# Patient Record
Sex: MALE | Race: WHITE | NOT HISPANIC OR LATINO | Employment: OTHER | URBAN - METROPOLITAN AREA
[De-identification: names, ages, dates, MRNs, and addresses within clinical notes are randomized per-mention and may not be internally consistent; named-entity substitution may affect disease eponyms.]

---

## 2017-02-08 NOTE — PROGRESS NOTES
SUBJECTIVE:                                                    Bucky Edgar is a 61 year old male who presents to clinic today for the following health issues:      Hypertension Follow-up      Outpatient blood pressures are being checked at home.  Results are 160-165/110.    Low Salt Diet: low salt. States daughter brings him low salt options, fruits and vegetables.     Has started taking nicotine lozenges-has taken about 10 lozenges daily for the last 7 days.  is helping with cigarette cravings (only smoking 3-4 per day instead of a pack per day)     Notes feeling anxious and jittery     Denies headaches, blurry vision, chest pain    Patient was previously on lisinopril 60 mg, coreg 25 mg, and chlorthalidone 12.5 mg. He was taken off these medications in October. He had fall at which point he has systolic BPs in 90s to 100s. At that time he had acute kidney injury with creatinine of 4.87. On recheck in clinic on 10/19 post hospitalization, creatinine was 1.21. He was not restarted on his blood pressure medications post hospitalization as blood pressures had remained within normal range. In addition, patient had stopped drinking.      COPD Follow-Up    Symptoms are currently: improved    Current fatigue or dyspnea with ambulation: none    Shortness of breath: stable    Increased or change in Cough/Sputum: No    Fever(s): No    Baseline ambulation without stopping to rest Not stopping to rest in the winter. Able to shovel snow without shortness of breath. No stairs in house so he is unsure how many flights of stairs without stopping to rest.    Any ER/UC or hospital admissions since your last visit? No     Hasn't used albuterol inhaler about a year.     History   Smoking status     Current Every Day Smoker -- 0.50 packs/day for 38 years     Types: Cigarettes   Smokeless tobacco     Never Used     Comment: Quit for 10 years, now smoked for 10 years. Max 2 PPD i the past     FEV1        2.89    11/27/2013  [id number]      60%   11/27/2013       Amount of exercise or physical activity: None    Problems taking medications regularly: No    Medication side effects: none    Diet: regular (no restrictions)    Patient also complains of left shoulder pain. He notes this started around the same time as the fall. States it is difficult to move arm and is having trouble picking things up. He did not have a shoulder x-ray done at the time of his fall.     Problem list and histories reviewed & adjusted, as indicated.  Additional history: as documented    Patient Active Problem List   Diagnosis     Displacement of lumbar intervertebral disc without myelopathy     Tobacco use disorder     CARDIOVASCULAR SCREENING; LDL GOAL LESS THAN 130     HTN, goal below 140/90     Advanced directives, counseling/discussion     COPD (chronic obstructive pulmonary disease) (H)     Impaired fasting glucose     Hypertriglyceridemia     Vitamin D deficiency     Chronic bronchitis with COPD (chronic obstructive pulmonary disease) (H)     Closed fracture of multiple ribs of left side with routine healing, subsequent encounter     Past Surgical History   Procedure Laterality Date     C decompress spinal cord,1 seg  1995, 4/2002     leftL4-L5, 2nd right L4-L5     Hc repair rotator cuff,chronic  1989     C spine fusion,anter,3 sgmts  2/9/2004     ant and post fusion L4-S1       Social History   Substance Use Topics     Smoking status: Current Some Day Smoker     Packs/day: 0.50     Years: 38.00     Types: Cigarettes     Smokeless tobacco: Never Used      Comment: Quit for 10 years, now smoked for 10 years. Max 2 PPD i the past     Alcohol use 0.0 oz/week     0 Standard drinks or equivalent per week      Comment: 2 beers a week     Family History   Problem Relation Age of Onset     Family History Negative Other      DIABETES No family hx of      Coronary Artery Disease No family hx of      Hypertension No family hx of      Hyperlipidemia No family  hx of      Breast Cancer No family hx of      Prostate Cancer No family hx of      Anxiety Disorder No family hx of      Substance Abuse No family hx of      Asthma No family hx of      Thyroid Disease No family hx of      Unknown/Adopted No family hx of      Genetic Disorder No family hx of      OSTEOPOROSIS No family hx of      Anesthesia Reaction No family hx of      MENTAL ILLNESS No family hx of      Depression No family hx of      Other Cancer No family hx of      Colon Cancer No family hx of      CEREBROVASCULAR DISEASE No family hx of      Obesity No family hx of          Current Outpatient Prescriptions   Medication Sig Dispense Refill     nicotine polacrilex (NICORETTE) 2 MG lozenge Place 2 mg inside cheek every hour as needed for smoking cessation       lisinopril (PRINIVIL/ZESTRIL) 40 MG tablet Take 1 tablet (40 mg) by mouth daily 30 tablet 1     acetaminophen (TYLENOL) 325 MG tablet Take 650 mg by mouth every 6 hours       albuterol (PROAIR HFA, PROVENTIL HFA, VENTOLIN HFA) 108 (90 BASE) MCG/ACT inhaler Inhale 2 puffs into the lungs every 6 hours as needed for shortness of breath / dyspnea 3 Inhaler 2     Allergies   Allergen Reactions     Chlorthalidone Other (See Comments)     Excessive lowering of blood pressure     Amlodipine Other (See Comments)     Intractable headache with use of medication as well as noting a small tremor of the upper extremities       ROS:  C: NEGATIVE for fever, chills, change in weight  E/M: NEGATIVE for ear, mouth and throat problems  R: NEGATIVE for significant cough or SOB  CV: NEGATIVE for chest pain, palpitations or peripheral edema  GI: NEGATIVE for nausea, abdominal pain, heartburn, or change in bowel habits  : negative for dysuria, hematuria, decreased urinary stream  MUSCULOSKELETAL: POSITIVE for left shoulder pain, decreased range of motion.   PSYCHIATRIC: NEGATIVE for changes in mood or affect    OBJECTIVE:                                                   "  Initial /110. Recheck BP (!) 170/116 (BP Location: Left arm)  Pulse 74  Temp 98.7  F (37.1  C) (Temporal)  Resp 10  Ht 5' 9.69\" (1.77 m)  Wt 207 lb 3.2 oz (94 kg)  SpO2 97%  BMI 30 kg/m2  Body mass index is 30 kg/(m^2).  GENERAL: healthy, alert and no distress  NECK: no adenopathy, no asymmetry, masses, or scars and thyroid normal to palpation  RESP: lungs clear to auscultation - no rales, rhonchi or wheezes  CV: regular rate and rhythm, normal S1 S2, no S3 or S4, no murmur, click or rub, no peripheral edema and peripheral pulses strong. No carotid bruits.   ABDOMEN: soft, nontender, no hepatosplenomegaly, no masses and bowel sounds normal  MS: no gross musculoskeletal defects noted, no edema, left shoulder decreased range of motion-flexion to 90 degrees. Unable to internally rotate shoulder.  NEURO: Normal strength and tone, mentation intact and speech normal    Left Shoulder X-ray  HISTORY: Pain in left shoulder.     COMPARISON: None.      FINDINGS: There is no fracture. The humeral head is well located  within the glenoid fossa. There are mild-to-moderate degenerative  changes of the acromioclavicular joint with minimal superior and  inferior hypertrophic change. Coracoclavicular and glenohumeral spaces  are well-maintained. Visualized portions of the adjacent lung are  clear.          IMPRESSION: Mild to moderate degenerative changes of the left  acromioclavicular joint. No other significant abnormalities are  identified. If there is clinical concern for rotator cuff pathology,  further evaluation with MRI may be helpful.       ASSESSMENT/PLAN:                                                        ICD-10-CM    1. Essential hypertension I10 lisinopril (PRINIVIL/ZESTRIL) 40 MG tablet     Basic metabolic panel   2. Encounter for fecal immunochemical test screening Z12.11    3. Screening for colon cancer Z12.11 Fecal colorectal cancer screen FIT   4. Left shoulder pain, unspecified chronicity M25.512 ORTHO "  REFERRAL     XR Shoulder Left G/E 3 Views     Starting patient on 40 mg lisinopril. He was previously on a higher dose of lisinopril as well as coreg.Blood pressure was significantly elevated today at 170/110 on initial check and 170/116 on recheck. Discussed with patient if he had symptoms such as dizziness that he should only take 20 mg (half tablet).    Patient to return to clinic in 1 week for follow up with provider. He will need BP check at this time. Will order BMP to check kidney function.     Patient due for FIT for colon cancer screening. Will order this for patient.    Shoulder x-ray was negative for fracture. MRI ordered to rule out rotator cuff injury. Referral placed for ortho consult.     I, Trish Marie RN, Student Nurse Practitioner, have seen and examined this patient with My Lipscomb CNP, TROY and am serving as scribe for this visit. She has reviewed and revised documentation as needed.     Trish Marie RN, Student Nurse Practitioner    TROY Martin CNP  [facility]

## 2017-02-14 ENCOUNTER — RADIANT APPOINTMENT (OUTPATIENT)
Dept: GENERAL RADIOLOGY | Facility: CLINIC | Age: 62
End: 2017-02-14
Attending: NURSE PRACTITIONER
Payer: COMMERCIAL

## 2017-02-14 ENCOUNTER — OFFICE VISIT (OUTPATIENT)
Dept: FAMILY MEDICINE | Facility: CLINIC | Age: 62
End: 2017-02-14
Payer: COMMERCIAL

## 2017-02-14 VITALS
HEART RATE: 74 BPM | SYSTOLIC BLOOD PRESSURE: 170 MMHG | TEMPERATURE: 98.7 F | RESPIRATION RATE: 10 BRPM | HEIGHT: 70 IN | WEIGHT: 207.2 LBS | DIASTOLIC BLOOD PRESSURE: 116 MMHG | OXYGEN SATURATION: 97 % | BODY MASS INDEX: 29.66 KG/M2

## 2017-02-14 DIAGNOSIS — M25.512 LEFT SHOULDER PAIN, UNSPECIFIED CHRONICITY: ICD-10-CM

## 2017-02-14 DIAGNOSIS — Z12.11 ENCOUNTER FOR FECAL IMMUNOCHEMICAL TEST SCREENING: ICD-10-CM

## 2017-02-14 DIAGNOSIS — Z12.11 SCREENING FOR COLON CANCER: ICD-10-CM

## 2017-02-14 DIAGNOSIS — I10 ESSENTIAL HYPERTENSION: Primary | ICD-10-CM

## 2017-02-14 LAB
ANION GAP SERPL CALCULATED.3IONS-SCNC: 9 MMOL/L (ref 3–14)
BUN SERPL-MCNC: 9 MG/DL (ref 7–30)
CALCIUM SERPL-MCNC: 9.2 MG/DL (ref 8.5–10.1)
CHLORIDE SERPL-SCNC: 105 MMOL/L (ref 94–109)
CO2 SERPL-SCNC: 29 MMOL/L (ref 20–32)
CREAT SERPL-MCNC: 0.97 MG/DL (ref 0.66–1.25)
GFR SERPL CREATININE-BSD FRML MDRD: 79 ML/MIN/1.7M2
GLUCOSE SERPL-MCNC: 86 MG/DL (ref 70–99)
POTASSIUM SERPL-SCNC: 4.1 MMOL/L (ref 3.4–5.3)
SODIUM SERPL-SCNC: 143 MMOL/L (ref 133–144)

## 2017-02-14 PROCEDURE — 73030 X-RAY EXAM OF SHOULDER: CPT | Mod: LT

## 2017-02-14 PROCEDURE — 36415 COLL VENOUS BLD VENIPUNCTURE: CPT | Performed by: NURSE PRACTITIONER

## 2017-02-14 PROCEDURE — 99214 OFFICE O/P EST MOD 30 MIN: CPT | Performed by: NURSE PRACTITIONER

## 2017-02-14 PROCEDURE — 80048 BASIC METABOLIC PNL TOTAL CA: CPT | Performed by: NURSE PRACTITIONER

## 2017-02-14 RX ORDER — LISINOPRIL 40 MG/1
40 TABLET ORAL DAILY
Qty: 30 TABLET | Refills: 1 | Status: SHIPPED | OUTPATIENT
Start: 2017-02-14 | End: 2017-03-09

## 2017-02-14 RX ORDER — POLYETHYLENE GLYCOL 3350 17 G
2 POWDER IN PACKET (EA) ORAL
COMMUNITY
End: 2021-02-18

## 2017-02-14 ASSESSMENT — PAIN SCALES - GENERAL: PAINLEVEL: MODERATE PAIN (5)

## 2017-02-14 NOTE — MR AVS SNAPSHOT
After Visit Summary   2/14/2017    Bucky Edgar    MRN: [id number]           Patient Information     Date Of Birth          1955        Visit Information        Provider Department      2/14/2017 11:00 AM My Lipscomb APRN CNP [facility]        Today's Diagnoses     Essential hypertension    -  1    Encounter for fecal immunochemical test screening        Screening for colon cancer        Left shoulder pain, unspecified chronicity           Follow-ups after your visit        Additional Services     ORTHO  REFERRAL       [facility] is referring you to the Orthopedic  Services at [facility].       The  Representative will assist you in the coordination of your Orthopedic and Musculoskeletal Care as prescribed by your physician.    The  Representative will call you within 1 business day to help schedule your appointment, or you may contact the  Representative at:    All areas ~ [phone]     Type of Referral : Surgical / Specialist       Timeframe requested: Dr. Martinez, this week if able at [place]    Coverage of these services is subject to the terms and limitations of your health insurance plan.  Please call member services at your health plan with any benefit or coverage questions.      If X-rays, CT or MRI's have been performed, please contact the facility where they were done to arrange for , prior to your scheduled appointment.  Please bring this referral request to your appointment and present it to your specialist.                  Your next 10 appointments already scheduled     Feb 14, 2017 11:35 AM CST   XR SHOULDER LEFT G/E 2 VIEWS with RGXR1   [facility] ([facility])    [address], [address]  [place] [address] [phone]           Please bring a list of your current medicines to your exam. (Include vitamins, minerals and  "over-thecounter medicines.) Leave your valuables at home.  Tell your doctor if there is a chance you may be pregnant.  You do not need to do anything special for this exam.              Future tests that were ordered for you today     Open Future Orders        Priority Expected Expires Ordered    Fecal colorectal cancer screen FIT Routine 3/7/2017 2017 2017            Who to contact     If you have questions or need follow up information about today's clinic visit or your schedule please contact [facility] [place] directly at [phone].  Normal or non-critical lab and imaging results will be communicated to you by FoodFanhart, letter or phone within 4 business days after the clinic has received the results. If you do not hear from us within 7 days, please contact the clinic through [employer] or phone. If you have a critical or abnormal lab result, we will notify you by phone as soon as possible.  Submit refill requests through [employer] or call your pharmacy and they will forward the refill request to us. Please allow 3 business days for your refill to be completed.          Additional Information About Your Visit        [employer] Information     [employer] lets you send messages to your doctor, view your test results, renew your prescriptions, schedule appointments and more. To sign up, go to [email or web address] . Click on \"Log in\" on the left side of the screen, which will take you to the Welcome page. Then click on \"Sign up Now\" on the right side of the page.     You will be asked to enter the access code listed below, as well as some personal information. Please follow the directions to create your username and password.     Your access code is: [email or web address]  Expires: 3/12/2017 10:48 AM     Your access code will  in 90 days. If you need help or a new code, please call your [place] clinic or [phone].        Care EveryWhere ID     This is your Care EveryWhere ID. This could be used by other " "organizations to access your [facility] medical records  [id number]        Your Vitals Were     Pulse Temperature Respirations Height Pulse Oximetry BMI (Body Mass Index)    74 98.7  F (37.1  C) (Temporal) 10 5' 9.69\" (1.77 m) 97% 30 kg/m2       Blood Pressure from Last 3 Encounters:   02/14/17 (!) 170/110   12/12/16 138/86   11/29/16 110/78    Weight from Last 3 Encounters:   02/14/17 207 lb 3.2 oz (94 kg)   12/12/16 201 lb 8 oz (91.4 kg)   11/29/16 193 lb 8 oz (87.8 kg)              We Performed the Following     Basic metabolic panel     ORTHO  REFERRAL          Today's Medication Changes          These changes are accurate as of: 2/14/17 11:32 AM.  If you have any questions, ask your nurse or doctor.               Start taking these medicines.        Dose/Directions    lisinopril 40 MG tablet   Commonly known as:  PRINIVIL/ZESTRIL   Used for:  Essential hypertension   Started by:  My Lipscomb APRN CNP        Dose:  40 mg   Take 1 tablet (40 mg) by mouth daily   Quantity:  30 tablet   Refills:  1         Stop taking these medicines if you haven't already. Please contact your care team if you have questions.     hydrOXYzine 25 MG capsule   Commonly known as:  VISTARIL   Stopped by:  My Lipscomb APRN CNP           oxyCODONE 5 MG IR tablet   Commonly known as:  ROXICODONE   Stopped by:  My Lipscomb APRN CNP           polyethylene glycol Packet   Commonly known as:  MIRALAX/GLYCOLAX   Stopped by:  My Lipscomb APRN CNP           senna-docusate 8.6-50 MG per tablet   Commonly known as:  SENOKOT-S;PERICOLACE   Stopped by:  My Lipscomb APRN CNP                Where to get your medicines      These medications were sent to [facility] [place] - [address]  [address] [place] [address]    Hours:  test fax sent successfully 1/20/04   Phone:  [phone]     lisinopril 40 MG tablet                Primary Care Provider Office Phone # " Fax #    Percy Deshpande [phone]       [facility] [place] [address]  [place] [address]        Thank you!     Thank you for choosing [facility] [place]  for your care. Our goal is always to provide you with excellent care. Hearing back from our patients is one way we can continue to improve our services. Please take a few minutes to complete the written survey that you may receive in the mail after your visit with us. Thank you!             Your Updated Medication List - Protect others around you: Learn how to safely use, store and throw away your medicines at www.disposemymeds.org.          This list is accurate as of: 2/14/17 11:32 AM.  Always use your most recent med list.                   Brand Name Dispense Instructions for use    acetaminophen 325 MG tablet    TYLENOL     Take 650 mg by mouth every 6 hours       albuterol 108 (90 BASE) MCG/ACT Inhaler    PROAIR HFA/PROVENTIL HFA/VENTOLIN HFA    3 Inhaler    Inhale 2 puffs into the lungs every 6 hours as needed for shortness of breath / dyspnea       lisinopril 40 MG tablet    PRINIVIL/ZESTRIL    30 tablet    Take 1 tablet (40 mg) by mouth daily       NICORETTE 2 MG lozenge   Generic drug:  nicotine polacrilex      Place 2 mg inside cheek every hour as needed for smoking cessation

## 2017-02-15 ENCOUNTER — TELEPHONE (OUTPATIENT)
Dept: FAMILY MEDICINE | Facility: CLINIC | Age: 62
End: 2017-02-15

## 2017-02-15 NOTE — TELEPHONE ENCOUNTER
Notes Recorded by Alexandro Sunshine on 2/15/2017 at 8:44 AM  Unable to reach pt, busy signal. Will have to try again .    ------    Notes Recorded by My Lipscomb APRN CNP on 2/14/2017 at 5:00 PM  Labs are normal. Please notify patient. My Lipscomb, DNP

## 2017-02-20 NOTE — PROGRESS NOTES
"  SUBJECTIVE:                                                    Bucky Edgar is a 61 year old male who presents to clinic today for the following health issues:    Hypertension Follow-up      Outpatient blood pressures are being checked at home.  Results are 165/110.    Low Salt Diet: no added salt       Amount of exercise or physical activity: 4-5 days/week for an average of 30-45 minutes    Problems taking medications regularly: No    Medication side effects: none  Diet: low salt    COPD Follow-Up    Symptoms are currently: stable    Current fatigue or dyspnea with ambulation: none    Shortness of breath: stable, only with walk because pt walk 1 mile a day when the weather is nice     Increased or change in Cough/Sputum: No    Fever(s): No    Baseline ambulation without stopping to rest 1 feet, miles. Able to walk up \"don't know\" flights of stairs without stopping to rest.    Any ER/UC or hospital admissions since your last visit? No     History   Smoking Status     Current Some Day Smoker     Packs/day: 0.50     Years: 38.00     Types: Cigarettes   Smokeless Tobacco     Never Used     Comment: Quit for 10 years, now smoked for 10 years. Max 2 PPD i the past     Lab Results   Component Value Date    FEV1 2.89 11/27/2013    [id number] 60% 11/27/2013          Patient reports his BP remains elevated. His at home reading are around 165/110. He states he is not able to tell that his BP is elevated. Patient denies dizziness.     Problem list and histories reviewed & adjusted, as indicated.  Additional history: as documented    Patient Active Problem List   Diagnosis     Displacement of lumbar intervertebral disc without myelopathy     Tobacco use disorder     CARDIOVASCULAR SCREENING; LDL GOAL LESS THAN 130     HTN, goal below 140/90     Advanced directives, counseling/discussion     COPD (chronic obstructive pulmonary disease) (H)     Impaired fasting glucose     Hypertriglyceridemia     Vitamin D deficiency     " Chronic bronchitis with COPD (chronic obstructive pulmonary disease) (H)     Closed fracture of multiple ribs of left side with routine healing, subsequent encounter     Chronic pain due to trauma     Past Surgical History   Procedure Laterality Date     C decompress spinal cord,1 seg  1995, 4/2002     leftL4-L5, 2nd right L4-L5     Hc repair rotator cuff,chronic  1989     C spine fusion,anter,3 sgmts  2/9/2004     ant and post fusion L4-S1       Social History   Substance Use Topics     Smoking status: Current Some Day Smoker     Packs/day: 0.50     Years: 38.00     Types: Cigarettes     Smokeless tobacco: Never Used      Comment: Quit for 10 years, now smoked for 10 years. Max 2 PPD i the past     Alcohol use 0.0 oz/week     0 Standard drinks or equivalent per week      Comment: 2 beers a week     Family History   Problem Relation Age of Onset     Family History Negative Other      DIABETES No family hx of      Coronary Artery Disease No family hx of      Hypertension No family hx of      Hyperlipidemia No family hx of      Breast Cancer No family hx of      Prostate Cancer No family hx of      Anxiety Disorder No family hx of      Substance Abuse No family hx of      Asthma No family hx of      Thyroid Disease No family hx of      Unknown/Adopted No family hx of      Genetic Disorder No family hx of      OSTEOPOROSIS No family hx of      Anesthesia Reaction No family hx of      MENTAL ILLNESS No family hx of      Depression No family hx of      Other Cancer No family hx of      Colon Cancer No family hx of      CEREBROVASCULAR DISEASE No family hx of      Obesity No family hx of          Current Outpatient Prescriptions   Medication Sig Dispense Refill     carvedilol (COREG CR) 20 MG 24 hr capsule Take 1 capsule (20 mg) by mouth daily 30 capsule 1     nicotine polacrilex (NICORETTE) 2 MG lozenge Place 2 mg inside cheek every hour as needed for smoking cessation       lisinopril (PRINIVIL/ZESTRIL) 40 MG tablet  "Take 1 tablet (40 mg) by mouth daily 30 tablet 1     acetaminophen (TYLENOL) 325 MG tablet Take 650 mg by mouth every 6 hours       albuterol (PROAIR HFA, PROVENTIL HFA, VENTOLIN HFA) 108 (90 BASE) MCG/ACT inhaler Inhale 2 puffs into the lungs every 6 hours as needed for shortness of breath / dyspnea (Patient not taking: Reported on 2/23/2017) 3 Inhaler 2     Problem list, Medication list, Allergies, and Medical/Social/Surgical histories reviewed in [facility] and updated as appropriate.    ROS:  Constitutional, HEENT, cardiovascular, pulmonary, gi and gu systems are negative, except as otherwise noted.    This document serves as a record of the services and decisions personally performed and made by My Lipscomb DNP. It was created on her behalf by Cyndy Gardner, a trained medical scribe. The creation of this document is based on the provider's statements to the medical scribe.  Cyndy Gardner 12:13 PM February 23, 2017    OBJECTIVE:                                                    BP (!) 160/102  Pulse 88  Temp 99  F (37.2  C) (Temporal)  Resp 16  Ht 5' 9\" (1.753 m)  Wt 206 lb (93.4 kg)  BMI 30.42 kg/m2  Body mass index is 30.42 kg/(m^2).  GENERAL APPEARANCE: healthy, alert and no distress  RESP: lungs clear to auscultation - no rales, rhonchi or wheezes  CV: regular rates and rhythm, normal S1 S2, no S3 or S4 and no murmur, click or rub  NEURO: Normal strength and tone, mentation intact and speech normal  PSYCH: mentation appears normal and affect normal/bright    Diagnostic test results:  No results found for this or any previous visit (from the past 24 hour(s)).     ASSESSMENT/PLAN:                                                        ICD-10-CM    1. Essential hypertension I10 carvedilol (COREG CR) 20 MG 24 hr capsule   2. Chronic pain due to trauma G89.21        Adding long acting coreg to HTN medication regimen. Order placed for carvedilol. Medication direction, dosage, and side effects discussed with " patient. Getting close to pre-accident medication regimen, suspect will get back to similar dosing.     Left should pain is improving so he is holding off on MRI. Pt has been doing stretches at home.     Follow up with: Nurse in 2 weeks     The information in this document, created by the medical scribe for me, accurately reflects the services I personally performed and the decisions made by me. I have reviewed and approved this document for accuracy prior to leaving the patient care area.  February 23, 2017 12:14 PM    TROY Martin [facility]

## 2017-02-23 ENCOUNTER — OFFICE VISIT (OUTPATIENT)
Dept: FAMILY MEDICINE | Facility: CLINIC | Age: 62
End: 2017-02-23
Payer: COMMERCIAL

## 2017-02-23 DIAGNOSIS — Z12.11 SCREENING FOR COLON CANCER: ICD-10-CM

## 2017-02-23 DIAGNOSIS — I10 ESSENTIAL HYPERTENSION: Primary | ICD-10-CM

## 2017-02-23 DIAGNOSIS — G89.21 CHRONIC PAIN DUE TO TRAUMA: ICD-10-CM

## 2017-02-23 PROCEDURE — 82274 ASSAY TEST FOR BLOOD FECAL: CPT | Performed by: NURSE PRACTITIONER

## 2017-02-23 PROCEDURE — 99213 OFFICE O/P EST LOW 20 MIN: CPT | Performed by: NURSE PRACTITIONER

## 2017-02-23 RX ORDER — CARVEDILOL PHOSPHATE 20 MG/1
20 CAPSULE, EXTENDED RELEASE ORAL DAILY
Qty: 30 CAPSULE | Refills: 1 | Status: SHIPPED | OUTPATIENT
Start: 2017-02-23 | End: 2017-05-17

## 2017-02-23 ASSESSMENT — PAIN SCALES - GENERAL: PAINLEVEL: SEVERE PAIN (7)

## 2017-02-23 NOTE — NURSING NOTE
"Chief Complaint   Patient presents with     Hypertension     Panel Management     MyChart, FIT, Tobacco Cessation       Initial BP (!) 164/108 (BP Location: Left arm, Cuff Size: Adult Regular)  Pulse 88  Temp 99  F (37.2  C) (Temporal)  Resp 16  Ht 5' 9\" (1.753 m)  Wt 206 lb (93.4 kg)  BMI 30.42 kg/m2 Estimated body mass index is 30.42 kg/(m^2) as calculated from the following:    Height as of this encounter: 5' 9\" (1.753 m).    Weight as of this encounter: 206 lb (93.4 kg).  Medication Reconciliation: complete     Alexandro Sunshine MA    "

## 2017-02-23 NOTE — MR AVS SNAPSHOT
"              After Visit Summary   2/23/2017    Bucky Edgar    MRN: [id number]           Patient Information     Date Of Birth          1955        Visit Information        Provider Department      2/23/2017 12:00 PM My Lipscomb, APRN CNP [facility]        Today's Diagnoses     Essential hypertension    -  1    Chronic pain due to trauma           Follow-ups after your visit        Your next 10 appointments already scheduled     Mar 09, 2017 11:00 AM CST   [facility]    [address], [address]  [facility] [id number] [phone]              Who to contact     If you have questions or need follow up information about today's clinic visit or your schedule please contact [facility] directly at [phone].  Normal or non-critical lab and imaging results will be communicated to you by Access System[employer], letter or phone within 4 business days after the clinic has received the results. If you do not hear from us within 7 days, please contact the clinic through Access System[employer]art or phone. If you have a critical or abnormal lab result, we will notify you by phone as soon as possible.  Submit refill requests through [employer] or call your pharmacy and they will forward the refill request to us. Please allow 3 business days for your refill to be completed.          Additional Information About Your Visit        MyChart Information     [employer] lets you send messages to your doctor, view your test results, renew your prescriptions, schedule appointments and more. To sign up, go to [email or web address] . Click on \"Log in\" on the left side of the screen, which will take you to the Welcome page. Then click on \"Sign up Now\" on the right side of the page.     You will be asked to enter the access code listed below, as well as some personal information. Please follow the directions to create your username and password.     Your access " "code is: [email or web address]  Expires: 3/12/2017 10:48 AM     Your access code will  in 90 days. If you need help or a new code, please call your [place] clinic or [phone].        Care EveryWhere ID     This is your Care EveryWhere ID. This could be used by other organizations to access your [place] medical records  [id number]        Your Vitals Were     Pulse Temperature Respirations Height BMI (Body Mass Index)       88 99  F (37.2  C) (Temporal) 16 5' 9\" (1.753 m) 30.42 kg/m2        Blood Pressure from Last 3 Encounters:   17 (!) 160/102   17 (!) 170/116   16 138/86    Weight from Last 3 Encounters:   17 206 lb (93.4 kg)   17 207 lb 3.2 oz (94 kg)   16 201 lb 8 oz (91.4 kg)              Today, you had the following     No orders found for display         Today's Medication Changes          These changes are accurate as of: 17 11:59 PM.  If you have any questions, ask your nurse or doctor.               Start taking these medicines.        Dose/Directions    carvedilol 20 MG 24 hr capsule   Commonly known as:  COREG CR   Used for:  Essential hypertension   Started by:  My Lipscomb APRN CNP        Dose:  20 mg   Take 1 capsule (20 mg) by mouth daily   Quantity:  30 capsule   Refills:  1            Where to get your medicines      These medications were sent to [facility] [address]  [place] - [address]  [address], [place] [address]    Hours:  test fax sent successfully 04   Phone:  [phone]     carvedilol 20 MG 24 hr capsule                Primary Care Provider Office Phone # Fax #    Percy Deshpande [phone]       [facility] [place] [address]  [place] [address]        Thank you!     Thank you for choosing [facility]  for your care. Our goal is always to provide you with excellent care. Hearing back from our patients is one way we can continue to " improve our services. Please take a few minutes to complete the written survey that you may receive in the mail after your visit with us. Thank you!             Your Updated Medication List - Protect others around you: Learn how to safely use, store and throw away your medicines at www.disposemymeds.org.          This list is accurate as of: 2/23/17 11:59 PM.  Always use your most recent med list.                   Brand Name Dispense Instructions for use    acetaminophen 325 MG tablet    TYLENOL     Take 650 mg by mouth every 6 hours       albuterol 108 (90 BASE) MCG/ACT Inhaler    PROAIR HFA/PROVENTIL HFA/VENTOLIN HFA    3 Inhaler    Inhale 2 puffs into the lungs every 6 hours as needed for shortness of breath / dyspnea       carvedilol 20 MG 24 hr capsule    COREG CR    30 capsule    Take 1 capsule (20 mg) by mouth daily       lisinopril 40 MG tablet    PRINIVIL/ZESTRIL    30 tablet    Take 1 tablet (40 mg) by mouth daily       NICORETTE 2 MG lozenge   Generic drug:  nicotine polacrilex      Place 2 mg inside cheek every hour as needed for smoking cessation

## 2017-02-24 VITALS
TEMPERATURE: 99 F | WEIGHT: 206 LBS | HEIGHT: 69 IN | SYSTOLIC BLOOD PRESSURE: 160 MMHG | DIASTOLIC BLOOD PRESSURE: 102 MMHG | BODY MASS INDEX: 30.51 KG/M2 | RESPIRATION RATE: 16 BRPM | HEART RATE: 88 BPM

## 2017-02-25 LAB — HEMOCCULT STL QL IA: NEGATIVE

## 2017-02-27 ENCOUNTER — TELEPHONE (OUTPATIENT)
Dept: FAMILY MEDICINE | Facility: CLINIC | Age: 62
End: 2017-02-27

## 2017-02-27 DIAGNOSIS — I10 ESSENTIAL HYPERTENSION: Primary | ICD-10-CM

## 2017-02-27 RX ORDER — CARVEDILOL 25 MG/1
25 TABLET ORAL 2 TIMES DAILY WITH MEALS
Qty: 60 TABLET | Refills: 1 | Status: SHIPPED | OUTPATIENT
Start: 2017-02-27 | End: 2017-05-11

## 2017-02-27 NOTE — TELEPHONE ENCOUNTER
Prior Authorization request received from  [facility] for  carvedilol (COREG CR) 20 MG 24 hr capsule .     Insurance company   phone #   and member ID#  .    Would you like to proceed with prior authorization or change medication?     Rationale for PA request?    What medications has patient tried & failed?

## 2017-02-27 NOTE — TELEPHONE ENCOUNTER
Still titrating dose, will fill short acting until BP is controlled. Notified pt. Of dose change.   My Lipscomb

## 2017-03-09 ENCOUNTER — ALLIED HEALTH/NURSE VISIT (OUTPATIENT)
Dept: FAMILY MEDICINE | Facility: CLINIC | Age: 62
End: 2017-03-09
Payer: COMMERCIAL

## 2017-03-09 VITALS — HEART RATE: 72 BPM | DIASTOLIC BLOOD PRESSURE: 88 MMHG | SYSTOLIC BLOOD PRESSURE: 132 MMHG

## 2017-03-09 DIAGNOSIS — I10 ESSENTIAL HYPERTENSION: ICD-10-CM

## 2017-03-09 DIAGNOSIS — I10 HTN, GOAL BELOW 140/90: Primary | ICD-10-CM

## 2017-03-09 PROCEDURE — 99207 ZZC NO CHARGE NURSE ONLY: CPT

## 2017-03-09 RX ORDER — LISINOPRIL 40 MG/1
40 TABLET ORAL DAILY
Qty: 30 TABLET | Refills: 1 | Status: SHIPPED | OUTPATIENT
Start: 2017-03-09 | End: 2017-05-11

## 2017-03-09 NOTE — TELEPHONE ENCOUNTER
Lisinopril      Last Written Prescription Date: 2/14/2017  Last Fill Quantity: 30, # refills: 1  Last Office Visit with [facility], [facility] or [facility] prescribing provider: 2/23/2017  Next 5 appointments (look out 90 days)     Mar 09, 2017 11:00 AM CST   Nurse Only with RG FLOAT 1   [facility]    [address], [address]  [facility] [id number] [phone]                   Potassium   Date Value Ref Range Status   02/14/2017 4.1 3.4 - 5.3 mmol/L Final     Creatinine   Date Value Ref Range Status   02/14/2017 0.97 0.66 - 1.25 mg/dL Final     BP Readings from Last 3 Encounters:   02/24/17 (!) 160/102   02/14/17 (!) 170/116   12/12/16 138/86

## 2017-03-09 NOTE — MR AVS SNAPSHOT
"              After Visit Summary   3/9/2017    Bucky Edgar    MRN: [id number]           Patient Information     Date Of Birth          1955        Visit Information        Provider Department      3/9/2017 11:00 AM RG FLOAT 1 [facility] [place]        Today's Diagnoses     HTN, goal below 140/90    -  1       Follow-ups after your visit        Who to contact     If you have questions or need follow up information about today's clinic visit or your schedule please contact [facility] directly at [phone].  Normal or non-critical lab and imaging results will be communicated to you by [employer], letter or phone within 4 business days after the clinic has received the results. If you do not hear from us within 7 days, please contact the clinic through [employer] or phone. If you have a critical or abnormal lab result, we will notify you by phone as soon as possible.  Submit refill requests through [employer] or call your pharmacy and they will forward the refill request to us. Please allow 3 business days for your refill to be completed.          Additional Information About Your Visit        MyChart Information     [employer] lets you send messages to your doctor, view your test results, renew your prescriptions, schedule appointments and more. To sign up, go to [email or web address] . Click on \"Log in\" on the left side of the screen, which will take you to the Welcome page. Then click on \"Sign up Now\" on the right side of the page.     You will be asked to enter the access code listed below, as well as some personal information. Please follow the directions to create your username and password.     Your access code is: [email or web address]  Expires: 3/12/2017 10:48 AM     Your access code will  in 90 days. If you need help or a new code, please call your [facility] or [phone].        Care EveryWhere ID     This is your Care EveryWhere ID. This could be used by other organizations to " access your [place] medical records  [id number]        Your Vitals Were     Pulse                   72            Blood Pressure from Last 3 Encounters:   03/09/17 132/88   02/24/17 (!) 160/102   02/14/17 (!) 170/116    Weight from Last 3 Encounters:   02/23/17 206 lb (93.4 kg)   02/14/17 207 lb 3.2 oz (94 kg)   12/12/16 201 lb 8 oz (91.4 kg)              Today, you had the following     No orders found for display       Primary Care Provider Office Phone # Fax #    Percy Deshpande [phone]       [facility] [address] [address]        Thank you!     Thank you for choosing [facility]  for your care. Our goal is always to provide you with excellent care. Hearing back from our patients is one way we can continue to improve our services. Please take a few minutes to complete the written survey that you may receive in the mail after your visit with us. Thank you!             Your Updated Medication List - Protect others around you: Learn how to safely use, store and throw away your medicines at www.disposemymeds.org.          This list is accurate as of: 3/9/17 11:00 AM.  Always use your most recent med list.                   Brand Name Dispense Instructions for use    acetaminophen 325 MG tablet    TYLENOL     Take 650 mg by mouth every 6 hours       albuterol 108 (90 BASE) MCG/ACT Inhaler    PROAIR HFA/PROVENTIL HFA/VENTOLIN HFA    3 Inhaler    Inhale 2 puffs into the lungs every 6 hours as needed for shortness of breath / dyspnea       carvedilol 20 MG 24 hr capsule    COREG CR    30 capsule    Take 1 capsule (20 mg) by mouth daily       carvedilol 25 MG tablet    COREG    60 tablet    Take 1 tablet (25 mg) by mouth 2 times daily (with meals)       lisinopril 40 MG tablet    PRINIVIL/ZESTRIL    30 tablet    Take 1 tablet (40 mg) by mouth daily       NICORETTE 2 MG lozenge   Generic drug:  nicotine polacrilex      Place 2 mg inside cheek every hour  as needed for smoking cessation

## 2017-03-09 NOTE — NURSING NOTE
Bucky Edgar is a 61 year old male who comes in today for a Blood Pressure check because of ongoing blood pressure monitoring.    /88 (BP Location: Left arm, Patient Position: Supine, Cuff Size: Adult Large)  Pulse 72      Vitals as recorded, a large cuff was used.    Patient is taking medication as prescribed  Patient is tolerating medications well.  Patient is monitoring Blood Pressure at home.  Average readings if yes are 150s/100s    Current complaints: none    Disposition: results routed to MD/AP

## 2017-04-17 ENCOUNTER — TELEPHONE (OUTPATIENT)
Dept: FAMILY MEDICINE | Facility: CLINIC | Age: 62
End: 2017-04-17

## 2017-04-17 DIAGNOSIS — M10.9 ACUTE GOUTY ARTHRITIS: Primary | ICD-10-CM

## 2017-04-17 RX ORDER — METHYLPREDNISOLONE 4 MG
TABLET, DOSE PACK ORAL
Qty: 21 TABLET | Refills: 0 | Status: SHIPPED | OUTPATIENT
Start: 2017-04-17 | End: 2017-05-17

## 2017-04-17 NOTE — TELEPHONE ENCOUNTER
Reviewed--    1. Sent a prescription for Medrol Dose Pack 4 mg to help with the acute gouty attack.  2. To be seen if not improving in a few days.  3. Needing to discontinue all alcohol intake.    Percy Deshpande MD  Please close encounter when call/work completed.

## 2017-04-17 NOTE — TELEPHONE ENCOUNTER
"Bucky Edgar is a 61 year old male    S-(situation): Bucky is calling today with concerns of gout attack    B-(background): patient states he has had in past. Most recent labs for this done in December; at that time was mentioned no need to prophylactic treatment, but would continue to monitor. Last Uric Acid level was 7.1. Patient states last week went to a [employer] gathering and ate too much steak and drank too much beer and is now \"paying for it\"    A-(assessment): present 2 days. Left ankle and foot swollen. Red ankle. Hot to touch. Pain-can barely walk due to pain.     Pain scale (1-10): 7/10   Denies: Fever, sores, drainage, inability to walk/move, deformity, injury   Meds/MeasuresTried: Staying off as much as possible, elevating   Improves/Worsens: n/a       R-(recommendations): Home care advice - will route to provider for review. Will notify patient if able to send in or if needs to be seen.   Will comply with recommendation: YES     If further questions/concerns or if Sxs do not improve, worsen or new Sxs develop, call your PCP or [facility] as soon as possible.    Guideline used:  Telephone Triage Protocols for Nurses, Fourth Edition, Kira Marin  Foot problems  Gout Clinical References    My Manning RN, BSN      "

## 2017-05-12 NOTE — PROGRESS NOTES
SUBJECTIVE:                                                    Bucky Edgar is a 61 year old male who presents to clinic today for the following health issues:      Hypertension Follow-up--doing well on current medications      Outpatient blood pressures are being checked at home.  Results are 130-140/80.    Low Salt Diet: no added salt,      COPD Follow-Up    Symptoms are currently: stable    Current fatigue or dyspnea with ambulation: stable     Shortness of breath: stable    Increased or change in Cough/Sputum: No    Fever(s): No    Baseline ambulation without stopping to rest 1/2 miles. Able to walk up 8 flights of stairs without stopping to rest.    Any ER/UC or hospital admissions since your last visit? No     History   Smoking Status     Current Some Day Smoker     Packs/day: 0.50     Years: 38.00     Types: Cigarettes   Smokeless Tobacco     Never Used     Comment: Quit for 10 years, now smoked for 10 years. Max 2 PPD i the past     Lab Results   Component Value Date    FEV1 2.89 11/27/2013    MMA8UQQ 60% 11/27/2013          Amount of exercise or physical activity: 4-5 days/week for an average of 30-45 minutes    Problems taking medications regularly: No    Medication side effects: none    Diet: regular (no restrictions) and low salt          Problem list and histories reviewed & adjusted, as indicated.  Additional history: as documented        Reviewed and updated as needed this visit by clinical staff  Tobacco  Allergies  Med Hx  Surg Hx  Fam Hx  Soc Hx      Reviewed and updated as needed this visit by Provider         ROS:  C: NEGATIVE for fever, chills, change in weight  I: NEGATIVE for worrisome rashes, moles or lesions  E: NEGATIVE for vision changes or irritation  E/M: NEGATIVE for ear, mouth and throat problems  RESP:continues to smoke but is down to 1/3 pack per day. Occasional use of Albuterol for respiratory symptoms. Has ongoing localized intermittent pain form prior fractured ribs.No  "changes in respiratory status.  B: NEGATIVE for masses, tenderness or discharge  CV: NEGATIVE for chest pain, palpitations or peripheral edema  GI: NEGATIVE for nausea, abdominal pain, heartburn, or change in bowel habits  : NEGATIVE for frequency, dysuria, or hematuria   male :totally recovered form the acute renal failure from hypotension and dehydration.  MUSCULOSKELETAL:1-2 episodes of gouty arthritis when increased beer and protein intake. Responds well to Medrol   N: NEGATIVE for weakness, dizziness or paresthesias  E: NEGATIVE for temperature intolerance, skin/hair changes  H: NEGATIVE for bleeding problems  P: NEGATIVE for changes in mood or affect    OBJECTIVE:                                                    /84 (BP Location: Left arm, Cuff Size: Adult Large)  Pulse 72  Temp 99.1  F (37.3  C) (Temporal)  Resp 16  Ht 5' 9\" (1.753 m)  Wt 209 lb (94.8 kg)  SpO2 97%  BMI 30.86 kg/m2  Body mass index is 30.86 kg/(m^2).  GENERAL: alert, no distress, cooperative and over weight  EYES: Eyes grossly normal to inspection, extraocular movements - intact, and PERRL  HENT: ear canals- normal; TMs- normal; Nose- normal; Mouth- no ulcers, no lesions  NECK: no tenderness, no adenopathy, no asymmetry, no masses, no stiffness; thyroid- normal to palpation  NECK: no carotid bruits  RESP: lungs clear to auscultation - no rales, no rhonchi, no wheezes  CV: regular rates and rhythm, normal S1 S2, no S3 or S4 and no murmur, no click or rub -  ABDOMEN: soft, no tenderness, no  hepatosplenomegaly, no masses, normal bowel sounds  MS: extremities- no gross deformities noted, no edema  MS: peripheral pulses normal  SKIN: no suspicious lesions, no rashes  NEURO: strength and tone- normal, sensory exam- grossly normal, mentation- intact, speech- normal, reflexes- symmetric  BACK: no CVA tenderness, no paralumbar tenderness  PSYCH: Alert and oriented times 3; speech- coherent , normal rate and volume; able to " articulate logical thoughts, able to abstract reason, no tangential thoughts, no hallucinations or delusions, affect- normal  LYMPHATICS: ant. cervical- normal, post. cervical- normal, axillary- normal, supraclavicular- normal, inguinal- normal    Diagnostic test results:  Diagnostic Test Results:  Results for orders placed or performed in visit on 02/23/17   Fecal colorectal cancer screen FIT   Result Value Ref Range    Occult Blood Scn FIT Negative NEG        Last Basic Metabolic Panel:  Lab Results   Component Value Date     02/14/2017      Lab Results   Component Value Date    POTASSIUM 4.1 02/14/2017     Lab Results   Component Value Date    CHLORIDE 105 02/14/2017     Lab Results   Component Value Date    JUSTIN 9.2 02/14/2017     Lab Results   Component Value Date    CO2 29 02/14/2017     Lab Results   Component Value Date    BUN 9 02/14/2017     Lab Results   Component Value Date    CR 0.97 02/14/2017     Lab Results   Component Value Date    GLC 86 02/14/2017       ASSESSMENT/PLAN:                                                    1. HTN, goal below 140/90  Well controlled on current medications - see in six months  - carvedilol (COREG) 25 MG tablet; Take 1 tablet (25 mg) by mouth 2 times daily (with meals)  Dispense: 60 tablet; Refill: 4  - lisinopril (PRINIVIL/ZESTRIL) 40 MG tablet; Take 1 tablet (40 mg) by mouth daily  Dispense: 30 tablet; Refill: 4    2. Chronic obstructive pulmonary disease, unspecified COPD type (H)  Appearing to be stable--advising to discontinue all tobacco use   - COPD ACTION PLAN    3. Tobacco abuse  Advise and information given to discontinue all tobacco use  - Tobacco Cessation - for Health Maintenance      Follow up with Provider - Follow up in six months with clinic visit and fasting blood work.   See Patient Instructions    The patient understood the rational for the diagnosis and treatment plan. All questions were answered to best of my ability and the patient's  satisfaction.      Percy Deshpande MD  [facility]

## 2017-05-17 ENCOUNTER — OFFICE VISIT (OUTPATIENT)
Dept: FAMILY MEDICINE | Facility: CLINIC | Age: 62
End: 2017-05-17
Payer: COMMERCIAL

## 2017-05-17 VITALS
SYSTOLIC BLOOD PRESSURE: 122 MMHG | BODY MASS INDEX: 30.96 KG/M2 | HEART RATE: 72 BPM | DIASTOLIC BLOOD PRESSURE: 84 MMHG | OXYGEN SATURATION: 97 % | HEIGHT: 69 IN | WEIGHT: 209 LBS | TEMPERATURE: 99.1 F | RESPIRATION RATE: 16 BRPM

## 2017-05-17 DIAGNOSIS — Z72.0 TOBACCO ABUSE: ICD-10-CM

## 2017-05-17 DIAGNOSIS — J44.9 CHRONIC OBSTRUCTIVE PULMONARY DISEASE, UNSPECIFIED COPD TYPE (H): ICD-10-CM

## 2017-05-17 DIAGNOSIS — I10 HTN, GOAL BELOW 140/90: Primary | ICD-10-CM

## 2017-05-17 PROCEDURE — 99214 OFFICE O/P EST MOD 30 MIN: CPT | Performed by: FAMILY MEDICINE

## 2017-05-17 RX ORDER — LISINOPRIL 40 MG/1
40 TABLET ORAL DAILY
Qty: 30 TABLET | Refills: 4 | Status: SHIPPED | OUTPATIENT
Start: 2017-05-17 | End: 2017-12-08

## 2017-05-17 RX ORDER — CARVEDILOL 25 MG/1
25 TABLET ORAL 2 TIMES DAILY WITH MEALS
Qty: 60 TABLET | Refills: 4 | Status: SHIPPED | OUTPATIENT
Start: 2017-05-17 | End: 2017-12-14

## 2017-05-17 ASSESSMENT — PAIN SCALES - GENERAL: PAINLEVEL: MODERATE PAIN (5)

## 2017-05-17 ASSESSMENT — ANXIETY QUESTIONNAIRES
3. WORRYING TOO MUCH ABOUT DIFFERENT THINGS: NOT AT ALL
GAD7 TOTAL SCORE: 0
6. BECOMING EASILY ANNOYED OR IRRITABLE: NOT AT ALL
5. BEING SO RESTLESS THAT IT IS HARD TO SIT STILL: NOT AT ALL
1. FEELING NERVOUS, ANXIOUS, OR ON EDGE: NOT AT ALL
2. NOT BEING ABLE TO STOP OR CONTROL WORRYING: NOT AT ALL
7. FEELING AFRAID AS IF SOMETHING AWFUL MIGHT HAPPEN: NOT AT ALL

## 2017-05-17 ASSESSMENT — PATIENT HEALTH QUESTIONNAIRE - PHQ9: 5. POOR APPETITE OR OVEREATING: NOT AT ALL

## 2017-05-17 NOTE — NURSING NOTE
"Chief Complaint   Patient presents with     Hypertension     Panel Management     PHQ, ALLEN        Initial /84 (BP Location: Left arm, Cuff Size: Adult Large)  Pulse 72  Temp 99.1  F (37.3  C) (Temporal)  Resp 16  Ht 5' 9\" (1.753 m)  Wt 209 lb (94.8 kg)  BMI 30.86 kg/m2 Estimated body mass index is 30.86 kg/(m^2) as calculated from the following:    Height as of this encounter: 5' 9\" (1.753 m).    Weight as of this encounter: 209 lb (94.8 kg).  Medication Reconciliation: complete       Alexandro Sunshine MA    "

## 2017-05-17 NOTE — LETTER
My COPD Action Plan   Name: Bucky Edgar    YOB: 1955   Date: 5/17/2017    My doctor: Percy Deshpande MD   My clinic: [address], [address]  [place] [id number] [phone]  My Controller Medicine: None   Dose:      My Rescue Medicine: Albuterol (Proair/Ventolin/Proventil) inhaler   Dose: 2 puffs every 4 hours as needed     My Flare Up Medicine: Prednisone and Doxycycline (Doryx, Monodox, Vibramycin)   Dose: As directed  My COPD Severity: Moderate = FeV1 < 79% -50%     Use of Oxygen: Oxygen Not Prescribed         GREEN ZONE       Doing well today      Usual level of activity and exercise    Usual amount of cough and mucus    No shortness of breath    Usual level of health (thinking clearly, sleeping well, feel like eating) Actions:      Take daily medicines    Use oxygen as prescribed    Follow regular exercise and diet plan    Avoid cigarette smoke and other irritants that harm the lungs           YELLOW ZONE          Having a bad day or flare up      Short of breath more than usual    A lot more sputum (mucus) than usual    Sputum looks yellow, green, tan, brown or bloody    More coughing or wheezing    Fever or chills    Less energy; trouble completing activities    Trouble thinking or focusing    Using quick relief inhaler or nebulizer more often    Poor sleep; symptoms wake me up    Do not feel like eating Actions:      Get plenty of rest    Take daily medicines    Use quick relief inhaler every 4 hours    If you use oxygen, call you doctor to see if you should adjust your oxygen    Do breathing exercises or other things to help you relax    Let a loved one, friend or neighbor know you are feeling worse    Call your care team if you have 2 or more symptoms.  Start taking steroids or antibiotics if directed by your care team           RED ZONE       Need medical care now      Severe shortness of breath (feel you can't breathe)    Fever, chills    Not  enough breath to do any activity    Trouble coughing up mucus, walking or talking    Blood in mucus    Frequent coughing   Rescue medicines are not working    Not able to sleep because of breathing    Feel confused or drowsy    Chest pain    Actions:      Call your health care team.  If you cannot reach your care team, call 911 or go to the emergency room.        Electronically signed by: Percy Deshpande, May 17, 2017  Annual Reminders:  Meet with Care Team, Flu Shot every Fall and Pneumonia Shot at least once  Pharmacy:    [facility] [place], [place] - [address] [address] [place] [address] [id number]

## 2017-05-17 NOTE — PATIENT INSTRUCTIONS
HOW TO QUIT SMOKING  Smoking is one of the hardest habits to break. About half of all those who have ever smoked have been able to quit, and most of those (about 70%) who still smoke want to quit. Here are some of the best ways to stop smoking.     KEEP TRYING:  It takes most smokers about 8 tries before they are finally able to fully quit. So, the more often you try and fail, the better your chance of quitting the next time! So, don't give up!    GO COLD TURKEY:  Most ex-smokers quit cold turkey. Trying to cut back gradually doesn't seem to work as well, perhaps because it continues the smoking habit. Also, it is possible to fool yourself by inhaling more while smoking fewer cigarettes. This results in the same amount of nicotine in your body!    GET SUPPORT:  Support programs can make an important difference, especially for the heavy smoker. These groups offer lectures, methods to change your behavior and peer support. Call the free national Quitline for more information. 800-QUIT-NOW (633-530-7618). Low-cost or free programs are offered by many hospitals, local chapters of the American Lung Association (927-869-6516) and the American Cancer Society (352-318-9487). Support at home is important too. Non-smokers can help by offering praise and encouragement. If the smoker fails to quit, encourage them to try again!    OVER-THE-COUNTER MEDICINES:  For those who can't quit on their own, Nicotine Replacement Therapy (NRT) may make quitting much easier. Certain aids such as the nicotine patch, gum and lozenge are available without a prescription. However, it is best to use these under the guidance of your doctor. The skin patch provides a steady supply of nicotine to the body. Nicotine gum and lozenge gives temporary bursts of low levels of nicotine. Both methods take the edge off the craving for cigarettes. WARNING: If you feel symptoms of nicotine overdose, such as nausea, vomiting, dizziness, weakness, or fast  heartbeat, stop using these and see your doctor.    PRESCRIPTION MEDICINES:  After evaluating your smoking patterns and prior attempts at quitting, your doctor may offer a prescription medicine such as bupropion (Zyban, Wellbutrin), varenicline (Chantix, Champix), a niocotine inhaler or nasal spray. Each has its unique advantage and side effects which your doctor can review with you.    HEALTH BENEFITS OF QUITTING:  The benefits of quitting start right away and keep improving the longer you go without smokin minutes: blood pressure and pulse return to normal  8 hours: oxygen levels return to normal  2 days: ability to smell and taste begins to improve as damaged nerves start to regrow  2-3 weeks: circulation and lung function improves  1-9 months: decreased cough, congestion and shortness of breath; less tired  1 year: risk of heart attack decreases by half  5 years: risk of lung cancer decreases by half; risk of stroke becomes the same as a non-smoker  For information about how to quit smoking, visit the following links:  National Cancer [place] ,   Clearing the Air, Quit Smoking Today   - an online booklet. http://www.smokefree.gov/pubs/clearing_the_air.pdf  Smokefree.gov http://smokefree.gov/  QuitNet http://www.quitnet.com/    1326-5323 Richelle Islas, [address] [place] [address], [place] [address]. All rights reserved. This information is not intended as a substitute for professional medical care. Always follow your healthcare professional's instructions.    The Benefits of Living Smoke Free  What do you want to gain from quitting? Check off some reasons to quit.  Health Benefits  ___ Reduce my risk of lung cancer, heart disease, chronic lung disease  ___ Have fewer wrinkles and softer skin  ___ Improve my sense of taste and smell  ___ For pregnant women--reduce the risk of having a miscarriage, stillbirth, premature birth, or low-birth-weight baby  Personal Benefits  ___ Feel more in control of my  life  ___ Have better-smelling hair, breath, clothes, home, and car  ___ Save time by not having to take smoke breaks, buy cigarettes, or hunt for a light  ___ Have whiter teeth  Family Benefits  ___ Reduce my children s respiratory tract infections  ___ Set a good example for my children  ___ Reduce my family s cancer risk  Financial Benefits  ___ Save hundreds of dollars each year that would be spent on cigarettes  ___ Save money on medical bills  ___ Save on life, health, and car insurance premiums    Those Dollars Add Up!  Cigarettes are expensive, and getting more expensive all the time. Do you realize how much money you are spending on cigarettes per year? What is the average amount you spend on a pack of cigarettes? What is the average number of packs that you smoke per day? Using your answers to these questions, fill in this formula to help you find out:  ($ _____ per pack) ×  ( _____ number of packs per day) × (365 days) =  $ _____ yearly cost of smoking  Besides tobacco, there are other costs, including extra cleaning bills and replacement costs for clothing and furniture; medical expenses for smoking-related illnesses; and higher health, life, and car insurance premiums.    Cigars and Pipes Count Too!  Cigars and pipes are also dangerous. So are smokeless (chewing) tobacco and snuff. All of these products contain nicotine, a highly addictive substance that has harmful effects on your body. Quitting smoking means giving up all tobacco products.      0406-8483 [facility], [address] [place] [address], [place] [address]. All rights reserved. This information is not intended as a substitute for professional medical care. Always follow your healthcare professional's instructions.    These are new changes to your current plan of care based on today's visit:    Medications stopped    Medications to be started    Change dose of this medication None   New treatments        Follow up appointments:    1.  FOLLOW UP  WITH YOUR PRIMARY CARE PROVIDER: 6 month(s) for clinic visit with fasting blood work    Percy Deshpande MD

## 2017-05-17 NOTE — MR AVS SNAPSHOT
After Visit Summary   5/17/2017    Bucky Edgar    MRN: [id number]           Patient Information     Date Of Birth          1955        Visit Information        Provider Department      5/17/2017 9:40 AM Percy Deshpande MD [facility]        Today's Diagnoses     Tobacco abuse    -  1    Essential hypertension          Care Instructions      HOW TO QUIT SMOKING  Smoking is one of the hardest habits to break. About half of all those who have ever smoked have been able to quit, and most of those (about 70%) who still smoke want to quit. Here are some of the best ways to stop smoking.     KEEP TRYING:  It takes most smokers about 8 tries before they are finally able to fully quit. So, the more often you try and fail, the better your chance of quitting the next time! So, don't give up!    GO COLD TURKEY:  Most ex-smokers quit cold turkey. Trying to cut back gradually doesn't seem to work as well, perhaps because it continues the smoking habit. Also, it is possible to fool yourself by inhaling more while smoking fewer cigarettes. This results in the same amount of nicotine in your body!    GET SUPPORT:  Support programs can make an important difference, especially for the heavy smoker. These groups offer lectures, methods to change your behavior and peer support. Call the free national Quitline for more information. 800-QUIT-NOW (926-116-0766). Low-cost or free programs are offered by many hospitals, local chapters of the American Lung Association (957-609-4384) and the American Cancer Society (622-539-6061). Support at home is important too. Non-smokers can help by offering praise and encouragement. If the smoker fails to quit, encourage them to try again!    OVER-THE-COUNTER MEDICINES:  For those who can't quit on their own, Nicotine Replacement Therapy (NRT) may make quitting much easier. Certain aids such as the nicotine patch, gum and lozenge are available without a  prescription. However, it is best to use these under the guidance of your doctor. The skin patch provides a steady supply of nicotine to the body. Nicotine gum and lozenge gives temporary bursts of low levels of nicotine. Both methods take the edge off the craving for cigarettes. WARNING: If you feel symptoms of nicotine overdose, such as nausea, vomiting, dizziness, weakness, or fast heartbeat, stop using these and see your doctor.    PRESCRIPTION MEDICINES:  After evaluating your smoking patterns and prior attempts at quitting, your doctor may offer a prescription medicine such as bupropion (Zyban, Wellbutrin), varenicline (Chantix, Champix), a niocotine inhaler or nasal spray. Each has its unique advantage and side effects which your doctor can review with you.    HEALTH BENEFITS OF QUITTING:  The benefits of quitting start right away and keep improving the longer you go without smokin minutes: blood pressure and pulse return to normal  8 hours: oxygen levels return to normal  2 days: ability to smell and taste begins to improve as damaged nerves start to regrow  2-3 weeks: circulation and lung function improves  1-9 months: decreased cough, congestion and shortness of breath; less tired  1 year: risk of heart attack decreases by half  5 years: risk of lung cancer decreases by half; risk of stroke becomes the same as a non-smoker  For information about how to quit smoking, visit the following links:  National Cancer [place] ,   Clearing the Air, Quit Smoking Today   - an online booklet. http://www.smokefree.gov/pubs/clearing_the_air.pdf  Smokefree.gov http://smokefree.gov/  QuitNet http://www.quitnet.com/    4176-1331 Richelle Islas, [address] [place] [address]. All rights reserved. This information is not intended as a substitute for professional medical care. Always follow your healthcare professional's instructions.    The Benefits of Living Smoke Free  What do you want to gain from  quitting? Check off some reasons to quit.  Health Benefits  ___ Reduce my risk of lung cancer, heart disease, chronic lung disease  ___ Have fewer wrinkles and softer skin  ___ Improve my sense of taste and smell  ___ For pregnant women--reduce the risk of having a miscarriage, stillbirth, premature birth, or low-birth-weight baby  Personal Benefits  ___ Feel more in control of my life  ___ Have better-smelling hair, breath, clothes, home, and car  ___ Save time by not having to take smoke breaks, buy cigarettes, or hunt for a light  ___ Have whiter teeth  Family Benefits  ___ Reduce my children s respiratory tract infections  ___ Set a good example for my children  ___ Reduce my family s cancer risk  Financial Benefits  ___ Save hundreds of dollars each year that would be spent on cigarettes  ___ Save money on medical bills  ___ Save on life, health, and car insurance premiums    Those Dollars Add Up!  Cigarettes are expensive, and getting more expensive all the time. Do you realize how much money you are spending on cigarettes per year? What is the average amount you spend on a pack of cigarettes? What is the average number of packs that you smoke per day? Using your answers to these questions, fill in this formula to help you find out:  ($ _____ per pack) ×  ( _____ number of packs per day) × (365 days) =  $ _____ yearly cost of smoking  Besides tobacco, there are other costs, including extra cleaning bills and replacement costs for clothing and furniture; medical expenses for smoking-related illnesses; and higher health, life, and car insurance premiums.    Cigars and Pipes Count Too!  Cigars and pipes are also dangerous. So are smokeless (chewing) tobacco and snuff. All of these products contain nicotine, a highly addictive substance that has harmful effects on your body. Quitting smoking means giving up all tobacco products.      7789-4090 Richelle Islas, [address] [facility], [place] [address]. All rights  "reserved. This information is not intended as a substitute for professional medical care. Always follow your healthcare professional's instructions.    These are new changes to your current plan of care based on today's visit:    Medications stopped    Medications to be started    Change dose of this medication None   New treatments        Follow up appointments:    1.  FOLLOW UP WITH YOUR PRIMARY CARE PROVIDER: 6 month(s) for clinic visit with fasting blood work    Percy Deshpande MD              Follow-ups after your visit        Follow-up notes from your care team     Return in about 6 months (around 11/17/2017) for Physical Exam, Lab Work.      Who to contact     If you have questions or need follow up information about today's clinic visit or your schedule please contact [facility] directly at [phone].  Normal or non-critical lab and imaging results will be communicated to you by [email or web address], letter or phone within 4 business days after the clinic has received the results. If you do not hear from us within 7 days, please contact the clinic through [email or web address] or phone. If you have a critical or abnormal lab result, we will notify you by phone as soon as possible.  Submit refill requests through [employer] or call your pharmacy and they will forward the refill request to us. Please allow 3 business days for your refill to be completed.          Additional Information About Your Visit        Hidden[employer] Information     [employer] lets you send messages to your doctor, view your test results, renew your prescriptions, schedule appointments and more. To sign up, go to [email or web address] . Click on \"Log in\" on the left side of the screen, which will take you to the Welcome page. Then click on \"Sign up Now\" on the right side of the page.     You will be asked to enter the access code listed below, as well as some personal information. Please follow the directions to create your username and password.     Your " "access code is: [email or web address]  Expires: 8/15/2017 10:05 AM     Your access code will  in 90 days. If you need help or a new code, please call your [facility] clinic or [phone].        Care EveryWhere ID     This is your Care EveryWhere ID. This could be used by other organizations to access your [facility] medical records  [id number]        Your Vitals Were     Pulse Temperature Respirations Height BMI (Body Mass Index)       72 99.1  F (37.3  C) (Temporal) 16 5' 9\" (1.753 m) 30.86 kg/m2        Blood Pressure from Last 3 Encounters:   17 122/84   17 132/88   17 (!) 160/102    Weight from Last 3 Encounters:   17 209 lb (94.8 kg)   17 206 lb (93.4 kg)   17 207 lb 3.2 oz (94 kg)              We Performed the Following     Tobacco Cessation - for Health Maintenance          Where to get your medicines      These medications were sent to [id number] - [place] - [address]  [address], [place] [address]    Hours:  test fax sent successfully 04  kr Phone:  [phone]     carvedilol 25 MG tablet    lisinopril 40 MG tablet          Primary Care Provider Office Phone # Fax #    Percy Deshpande [phone]       [facility] [address] [facility]  [place] [address]        Thank you!     Thank you for choosing [facility]  for your care. Our goal is always to provide you with excellent care. Hearing back from our patients is one way we can continue to improve our services. Please take a few minutes to complete the written survey that you may receive in the mail after your visit with us. Thank you!             Your Updated Medication List - Protect others around you: Learn how to safely use, store and throw away your medicines at www.disposemymeds.org.          This list is accurate as of: 17 10:05 AM.  Always use your most recent med list.                   Brand Name " Dispense Instructions for use    acetaminophen 325 MG tablet    TYLENOL     Take 650 mg by mouth every 6 hours       albuterol 108 (90 BASE) MCG/ACT Inhaler    PROAIR HFA/PROVENTIL HFA/VENTOLIN HFA    3 Inhaler    Inhale 2 puffs into the lungs every 6 hours as needed for shortness of breath / dyspnea       carvedilol 25 MG tablet    COREG    60 tablet    Take 1 tablet (25 mg) by mouth 2 times daily (with meals)       lisinopril 40 MG tablet    PRINIVIL/ZESTRIL    30 tablet    Take 1 tablet (40 mg) by mouth daily       NICORETTE 2 MG lozenge   Generic drug:  nicotine polacrilex      Place 2 mg inside cheek every hour as needed for smoking cessation

## 2017-05-18 ASSESSMENT — ANXIETY QUESTIONNAIRES: GAD7 TOTAL SCORE: 0

## 2017-05-18 ASSESSMENT — PATIENT HEALTH QUESTIONNAIRE - PHQ9: SUM OF ALL RESPONSES TO PHQ QUESTIONS 1-9: 1

## 2017-06-13 ENCOUNTER — TELEPHONE (OUTPATIENT)
Dept: FAMILY MEDICINE | Facility: CLINIC | Age: 62
End: 2017-06-13

## 2017-06-13 DIAGNOSIS — M10.9 ACUTE GOUTY ARTHRITIS: ICD-10-CM

## 2017-06-13 RX ORDER — METHYLPREDNISOLONE 4 MG
TABLET, DOSE PACK ORAL
Qty: 21 TABLET | Refills: 0 | Status: SHIPPED | OUTPATIENT
Start: 2017-06-13 | End: 2017-06-29

## 2017-06-13 NOTE — TELEPHONE ENCOUNTER
Patient was called concerning the request for refill on methylprednisolone. He has had a second episode of gouty arthritis, presumably from alcohol use which he admits to. This is the second occurrence in the last 6 weeks.    We'll refill it methylprednisolone ×1. Advised to discontinue alcohol. Should be seen in the clinic next week or if this is recurring to consider beginning Allopurinol.    The patient understood the rational for the diagnosis and treatment plan. All questions were answered to best of my ability and the patient's satisfaction.    Percy Deshpande MD

## 2017-06-13 NOTE — TELEPHONE ENCOUNTER
Methylprednisone 4 mg      Last Written Prescription Date:    Last Fill Quantity: ,   # refills:   Last Office Visit with [facility] or  [email or web address] prescribing provider: 5/17/2017  Future Office visit:       Routing refill request to provider for review/approval because:  Drug not on the [facility], [facility] or [facility] refill protocol or controlled substance

## 2017-06-14 NOTE — TELEPHONE ENCOUNTER
I have attempted to call the pt with the following message. Unable to leave message. I will call back another time. Rosenda Miner CMA ([facility])

## 2017-06-15 NOTE — PROGRESS NOTES
"  SUBJECTIVE:                                                    Bucky Edgar is a 61 year old male who presents to clinic today for the following health issues:      Gout/ single inflamed joint - Recheck--    Patient seen today for initial evaluation of the pain involving his mid left lower leg. His self diagnosis was that he had recurrent gout and was placed on a Medrol Dosepak at his request. This did not change the situation.    In essence he appears to have sustained a partial tear of the gastrocnemius muscle 3 weeks ago when he stepped off a couch and injured his left calf. Was somewhat of a awkward stepping down and he hyperextended his left lower leg at the knee. He felt immediate pain in within a few days had ecchymotic changes develop the proximal posterior left calf.    The present time he still has some discomfort but is slowly improving. There's been no swelling. He is ambulatory.        Onset: 3-4 weeks here for recheck     Description:   Location: back of left knee and calf muscle  - left  Joint Swelling: no   Redness: YES- \"black and blue \"   Pain: YES    Intensity: moderate    Progression of Symptoms:  improving    Accompanying Signs & Symptoms:  Fevers: no    History:   Trauma to the area: YES- hear a pop sounds   Previous history of gout: YES   Recent illness:  no     Precipitating factors:   Diet-rich in purine: no  Alcohol use: YES- had one beer a week ago   Diuretic use: no     Alleviating factors:  none     Therapies Tried and outcome: prednisolone , Tylenol          Problem list and histories reviewed & adjusted, as indicated.  Additional history: as documented        Reviewed and updated as needed this visit by clinical staff       Reviewed and updated as needed this visit by Provider         ROS:  C: NEGATIVE for fever, chills, change in weight  E/M: NEGATIVE for ear, mouth and throat problems  R: NEGATIVE for significant cough or SOB  RESP: Kidneys are regularly cigarettes  CV: " "NEGATIVE for chest pain, palpitations or peripheral edema  GI: NEGATIVE for nausea, abdominal pain, heartburn, or change in bowel habits  : NEGATIVE for frequency, dysuria, or hematuria  MUSCULOSKELETAL: Subacute strain of his left lower leg as noted above. Past history of recurrent gouty attacks due to abuse of diet.  N: NEGATIVE for weakness, dizziness or paresthesias  P: NEGATIVE for changes in mood or affect    OBJECTIVE:                                                    /80  Pulse 63  Temp 98.7  F (37.1  C) (Temporal)  Ht 5' 9\" (1.753 m)  Wt 203 lb (92.1 kg)  SpO2 96%  BMI 29.98 kg/m2  Body mass index is 29.98 kg/(m^2).  GENERAL: alert, no distress and cooperative  HENT: ear canals- normal; TMs- normal; Nose- normal; Mouth- no ulcers, no lesions  NECK: no tenderness, no adenopathy, no asymmetry, no masses, no stiffness; thyroid- normal to palpation  RESP: lungs clear to auscultation - no rales, no rhonchi, no wheezes  CV: regular rates and rhythm, normal S1 S2, no S3 or S4 and no murmur, no click or rub -  ABDOMEN: soft, no tenderness, no  hepatosplenomegaly, no masses, normal bowel sounds  MS: Left lower extremity does not appear edematous. There is some slight tenderness at the proximal gastrocnemius muscle but no mass effect. Does not appear to have venous distention or venous obstruction. Full range of motion at the ankle and knee. Dorsiflexion and plantar flexion of the left foot are within normal limits. CMS appears intact.  NEURO: strength and tone- normal, sensory exam- grossly normal, mentation- intact, speech- normal, reflexes- symmetric  PSYCH: Alert and oriented times 3; speech- coherent , normal rate and volume; able to articulate logical thoughts, able to abstract reason, no tangential thoughts, no hallucinations or delusions, affect- normal    Diagnostic test results:  Diagnostic Test Results:  none      ASSESSMENT/PLAN:                                                    1. " Gastrocnemius muscle tear, left, initial encounter    By history appears that he partially torn portion of the gastrocnemius muscle or plantaris muscle. Treatment is simply stretching, use as tolerated and heat and elevation as needed. Follow-up if not improving.    2. Tobacco abuse   Continue to advise to discontinue smoking.  - Tobacco Cessation - for Health Maintenance      Follow up with Provider - Follow-up in November as planned. If recurrent gout related attacks do occur, consider Allopurinol for preventative therapy.   See Patient Instructions    The patient understood the rational for the diagnosis and treatment plan. All questions were answered to best of my ability and the patient's satisfaction.    Note: Chart documentation done in part with Dragon Voice Recognition software. Although reviewed after completion, some word and grammatical errors may remain.  Please consider this when interpreting information in this chart.      Percy Deshpande MD  [facility]

## 2017-06-19 ENCOUNTER — OFFICE VISIT (OUTPATIENT)
Dept: FAMILY MEDICINE | Facility: CLINIC | Age: 62
End: 2017-06-19
Payer: COMMERCIAL

## 2017-06-19 VITALS
WEIGHT: 203 LBS | SYSTOLIC BLOOD PRESSURE: 138 MMHG | HEIGHT: 69 IN | DIASTOLIC BLOOD PRESSURE: 80 MMHG | OXYGEN SATURATION: 96 % | TEMPERATURE: 98.7 F | HEART RATE: 63 BPM | BODY MASS INDEX: 30.07 KG/M2

## 2017-06-19 DIAGNOSIS — S86.112A GASTROCNEMIUS MUSCLE TEAR, LEFT, INITIAL ENCOUNTER: Primary | ICD-10-CM

## 2017-06-19 DIAGNOSIS — Z72.0 TOBACCO ABUSE: ICD-10-CM

## 2017-06-19 PROCEDURE — 99213 OFFICE O/P EST LOW 20 MIN: CPT | Performed by: FAMILY MEDICINE

## 2017-06-19 ASSESSMENT — PAIN SCALES - GENERAL: PAINLEVEL: MODERATE PAIN (5)

## 2017-06-19 NOTE — NURSING NOTE
"Chief Complaint   Patient presents with     RECHECK     Gout     Panel Management     Rejit, PHQ, ALLEN        Initial /90  Pulse 63  Temp 98.7  F (37.1  C) (Temporal)  Ht 5' 9\" (1.753 m)  Wt 203 lb (92.1 kg)  SpO2 96%  BMI 29.98 kg/m2 Estimated body mass index is 29.98 kg/(m^2) as calculated from the following:    Height as of this encounter: 5' 9\" (1.753 m).    Weight as of this encounter: 203 lb (92.1 kg).  Medication Reconciliation: complete       Alexandro Sunshine MA    "

## 2017-06-19 NOTE — MR AVS SNAPSHOT
After Visit Summary   6/19/2017    Bucky Edgar    MRN: [id number]           Patient Information     Date Of Birth          1955        Visit Information        Provider Department      6/19/2017 11:20 AM Percy Deshpande MD [facility]        Today's Diagnoses     Gastrocnemius muscle tear, left, initial encounter    -  1      Care Instructions    These are new changes to your current plan of care based on today's visit:    Medications stopped    Medications to be started    Change dose of this medication None   New treatments        Follow up appointments:    1.  FOLLOW UP WITH YOUR PRIMARY CARE PROVIDER: 5 month(s) for clinic visit with fasting blood work as planned                  Follow-ups after your visit        Follow-up notes from your care team     Return in about 5 months (around 11/19/2017) for Routine Visit, Lab Work.      Your next 10 appointments already scheduled     Nov 13, 2017 10:00 AM CST   Office Visit with Percy Deshpande MD   [facility] ([facility])    [address] [place] [address], [address]  [facility] [id number] [phone]           Bring a current list of meds and any records pertaining to this visit.  For Physicals, please bring immunization records and any forms needing to be filled out.  Please arrive 10 minutes early to complete paperwork.              Who to contact     If you have questions or need follow up information about today's clinic visit or your schedule please contact [facility] directly at [phone].  Normal or non-critical lab and imaging results will be communicated to you by MyChart, letter or phone within 4 business days after the clinic has received the results. If you do not hear from us within 7 days, please contact the clinic through MyChart or phone. If you have a critical or abnormal lab result, we will notify you by phone as soon as possible.  Submit refill requests through [employer]  "or call your pharmacy and they will forward the refill request to us. Please allow 3 business days for your refill to be completed.          Additional Information About Your Visit        MyChart Information     [employer] lets you send messages to your doctor, view your test results, renew your prescriptions, schedule appointments and more. To sign up, go to [email or web address] . Click on \"Log in\" on the left side of the screen, which will take you to the Welcome page. Then click on \"Sign up Now\" on the right side of the page.     You will be asked to enter the access code listed below, as well as some personal information. Please follow the directions to create your username and password.     Your access code is: [email or web address]  Expires: 8/15/2017 10:05 AM     Your access code will  in 90 days. If you need help or a new code, please call your [place] clinic or [phone].        Care EveryWhere ID     This is your Care EveryWhere ID. This could be used by other organizations to access your [place] medical records  [id number]        Your Vitals Were     Pulse Temperature Height Pulse Oximetry BMI (Body Mass Index)       63 98.7  F (37.1  C) (Temporal) 5' 9\" (1.753 m) 96% 29.98 kg/m2        Blood Pressure from Last 3 Encounters:   17 138/80   17 122/84   17 132/88    Weight from Last 3 Encounters:   17 203 lb (92.1 kg)   17 209 lb (94.8 kg)   17 206 lb (93.4 kg)              Today, you had the following     No orders found for display       Primary Care Provider Office Phone # Fax #    Percy Deshpande [phone]       [facility] [place] [address]  [place] [address]        Thank you!     [facility]  [facility] your care. Our goal is always to provide you with excellent care. Hearing back from our patients is one way we can continue to improve our services. Please take a few minutes to complete the written " survey that you may receive in the mail after your visit with us. Thank you!             Your Updated Medication List - Protect others around you: Learn how to safely use, store and throw away your medicines at www.disposemymeds.org.          This list is accurate as of: 6/19/17 11:51 AM.  Always use your most recent med list.                   Brand Name Dispense Instructions for use    acetaminophen 325 MG tablet    TYLENOL     Take 650 mg by mouth every 6 hours       albuterol 108 (90 BASE) MCG/ACT Inhaler    PROAIR HFA/PROVENTIL HFA/VENTOLIN HFA    3 Inhaler    Inhale 2 puffs into the lungs every 6 hours as needed for shortness of breath / dyspnea       carvedilol 25 MG tablet    COREG    60 tablet    Take 1 tablet (25 mg) by mouth 2 times daily (with meals)       lisinopril 40 MG tablet    PRINIVIL/ZESTRIL    30 tablet    Take 1 tablet (40 mg) by mouth daily       methylPREDNISolone 4 MG tablet    MEDROL DOSEPAK    21 tablet    FOLLOW PACKAGE DIRECTIONS       NICORETTE 2 MG lozenge   Generic drug:  nicotine polacrilex      Place 2 mg inside cheek every hour as needed for smoking cessation

## 2017-06-19 NOTE — PATIENT INSTRUCTIONS
These are new changes to your current plan of care based on today's visit:    Medications stopped    Medications to be started    Change dose of this medication None   New treatments        Follow up appointments:    1.  FOLLOW UP WITH YOUR PRIMARY CARE PROVIDER: 5 month(s) for clinic visit with fasting blood work as planned          HOW TO QUIT SMOKING  Smoking is one of the hardest habits to break. About half of all those who have ever smoked have been able to quit, and most of those (about 70%) who still smoke want to quit. Here are some of the best ways to stop smoking.     KEEP TRYING:  It takes most smokers about 8 tries before they are finally able to fully quit. So, the more often you try and fail, the better your chance of quitting the next time! So, don't give up!    GO COLD TURKEY:  Most ex-smokers quit cold turkey. Trying to cut back gradually doesn't seem to work as well, perhaps because it continues the smoking habit. Also, it is possible to fool yourself by inhaling more while smoking fewer cigarettes. This results in the same amount of nicotine in your body!    GET SUPPORT:  Support programs can make an important difference, especially for the heavy smoker. These groups offer lectures, methods to change your behavior and peer support. Call the free national Quitline for more information. 800-QUIT-NOW (205-990-7945). Low-cost or free programs are offered by many hospitals, local chapters of the American Lung Association (970-899-9803) and the American Cancer Society (766-916-4315). Support at home is important too. Non-smokers can help by offering praise and encouragement. If the smoker fails to quit, encourage them to try again!    OVER-THE-COUNTER MEDICINES:  For those who can't quit on their own, Nicotine Replacement Therapy (NRT) may make quitting much easier. Certain aids such as the nicotine patch, gum and lozenge are available without a prescription. However, it is best to use these under the  guidance of your doctor. The skin patch provides a steady supply of nicotine to the body. Nicotine gum and lozenge gives temporary bursts of low levels of nicotine. Both methods take the edge off the craving for cigarettes. WARNING: If you feel symptoms of nicotine overdose, such as nausea, vomiting, dizziness, weakness, or fast heartbeat, stop using these and see your doctor.    PRESCRIPTION MEDICINES:  After evaluating your smoking patterns and prior attempts at quitting, your doctor may offer a prescription medicine such as bupropion (Zyban, Wellbutrin), varenicline (Chantix, Champix), a niocotine inhaler or nasal spray. Each has its unique advantage and side effects which your doctor can review with you.    HEALTH BENEFITS OF QUITTING:  The benefits of quitting start right away and keep improving the longer you go without smokin minutes: blood pressure and pulse return to normal  8 hours: oxygen levels return to normal  2 days: ability to smell and taste begins to improve as damaged nerves start to regrow  2-3 weeks: circulation and lung function improves  1-9 months: decreased cough, congestion and shortness of breath; less tired  1 year: risk of heart attack decreases by half  5 years: risk of lung cancer decreases by half; risk of stroke becomes the same as a non-smoker  For information about how to quit smoking, visit the following links:  National Cancer [employer] ,   Clearing the Air, Quit Smoking Today   - an online booklet. http://www.smokefree.gov/pubs/clearing_the_air.pdf  Smokefree.gov http://smokefree.gov/  QuitNet http://www.quitnet.com/    1241-2595 Richelle Islas, [address] [place] [address], [place] [address]. All rights reserved. This information is not intended as a substitute for professional medical care. Always follow your healthcare professional's instructions.    The Benefits of Living Smoke Free  What do you want to gain from quitting? Check off some reasons to quit.  Health  Benefits  ___ Reduce my risk of lung cancer, heart disease, chronic lung disease  ___ Have fewer wrinkles and softer skin  ___ Improve my sense of taste and smell  ___ For pregnant women--reduce the risk of having a miscarriage, stillbirth, premature birth, or low-birth-weight baby  Personal Benefits  ___ Feel more in control of my life  ___ Have better-smelling hair, breath, clothes, home, and car  ___ Save time by not having to take smoke breaks, buy cigarettes, or hunt for a light  ___ Have whiter teeth  Family Benefits  ___ Reduce my children s respiratory tract infections  ___ Set a good example for my children  ___ Reduce my family s cancer risk  Financial Benefits  ___ Save hundreds of dollars each year that would be spent on cigarettes  ___ Save money on medical bills  ___ Save on life, health, and car insurance premiums    Those Dollars Add Up!  Cigarettes are expensive, and getting more expensive all the time. Do you realize how much money you are spending on cigarettes per year? What is the average amount you spend on a pack of cigarettes? What is the average number of packs that you smoke per day? Using your answers to these questions, fill in this formula to help you find out:  ($ _____ per pack) ×  ( _____ number of packs per day) × (365 days) =  $ _____ yearly cost of smoking  Besides tobacco, there are other costs, including extra cleaning bills and replacement costs for clothing and furniture; medical expenses for smoking-related illnesses; and higher health, life, and car insurance premiums.    Cigars and Pipes Count Too!  Cigars and pipes are also dangerous. So are smokeless (chewing) tobacco and snuff. All of these products contain nicotine, a highly addictive substance that has harmful effects on your body. Quitting smoking means giving up all tobacco products.      8703-6379 Richelle Islas, [address], [place] [address]. All rights reserved. This information is not intended as a  substitute for professional medical care. Always follow your healthcare professional's instructions.

## 2017-06-20 ASSESSMENT — PATIENT HEALTH QUESTIONNAIRE - PHQ9: SUM OF ALL RESPONSES TO PHQ QUESTIONS 1-9: 1

## 2017-06-28 NOTE — PROGRESS NOTES
"  SUBJECTIVE:                                                    Bucky Edgar is a 61 year old male who presents to clinic today for the following health issues:    Joint Pain    Onset: x 2 weeks    Description:   Location: left knee  Character: Dull ache    Intensity: moderate, severe    Progression of Symptoms: better than Monday and Tuesday    Accompanying Signs & Symptoms:  Other symptoms: swelling    History:   Previous similar pain: no       Precipitating factors:   Trauma or overuse: YES    Alleviating factors:  Improved by: rest/inactivity, heat and ice    Therapies Tried and outcome: ice, heat, rest    Saw  ~10 d ago (06/19/17) for leg pain, injury after stepping awkardly off ladder. Knee hyperextended \"bent the wrong way\".   Calf swelled. Dx with tear of gastrocnemius. Was treating with wrapping and applying heat.   Helped the calf pain. Then knee swelled up a few days later- hard time bending it. Swelling did go down with ice.   Pain above knee cap, laterally.   Feels like \"can't trust it\", like will give out.   Some continued swelling behind the knee.   Unable to bend knee normally, cannot extend fully and trouble bending.   Is a lot better than earlier this week. Icing helped.     No hx of knee surgery or knee problems  Pt was worried about gout but does not think that is what it was/is.   No hip or ankle pain.     Problem list and histories reviewed & adjusted, as indicated.  Additional history: as documented    Patient Active Problem List   Diagnosis     Displacement of lumbar intervertebral disc without myelopathy     Tobacco use disorder     CARDIOVASCULAR SCREENING; LDL GOAL LESS THAN 130     HTN, goal below 140/90     Advanced directives, counseling/discussion     COPD (chronic obstructive pulmonary disease) (H)     Impaired fasting glucose     Hypertriglyceridemia     Vitamin D deficiency     Chronic bronchitis with COPD (chronic obstructive pulmonary disease) (H)     Closed " fracture of multiple ribs of left side with routine healing, subsequent encounter     Chronic pain due to trauma     Past Surgical History:   Procedure Laterality Date     C DECOMPRESS SPINAL CORD,1 SEG  1995, 4/2002    leftL4-L5, 2nd right L4-L5     C SPINE FUSION,ANTER,3 SGMTS  2/9/2004    ant and post fusion L4-S1     HC REPAIR ROTATOR CUFF,CHRONIC  1989       Social History   Substance Use Topics     Smoking status: Current Some Day Smoker     Packs/day: 0.50     Years: 38.00     Types: Cigarettes     Smokeless tobacco: Never Used      Comment: Quit for 10 years, now smoked for 10 years. Max 2 PPD i the past     Alcohol use 0.0 oz/week     0 Standard drinks or equivalent per week      Comment: 2 beers a week     Family History   Problem Relation Age of Onset     Family History Negative Other      DIABETES No family hx of      Coronary Artery Disease No family hx of      Hypertension No family hx of      Hyperlipidemia No family hx of      Breast Cancer No family hx of      Prostate Cancer No family hx of      Anxiety Disorder No family hx of      Substance Abuse No family hx of      Asthma No family hx of      Thyroid Disease No family hx of      Unknown/Adopted No family hx of      Genetic Disorder No family hx of      OSTEOPOROSIS No family hx of      Anesthesia Reaction No family hx of      MENTAL ILLNESS No family hx of      Depression No family hx of      Other Cancer No family hx of      Colon Cancer No family hx of      CEREBROVASCULAR DISEASE No family hx of      Obesity No family hx of          Current Outpatient Prescriptions   Medication Sig Dispense Refill     carvedilol (COREG) 25 MG tablet Take 1 tablet (25 mg) by mouth 2 times daily (with meals) 60 tablet 4     lisinopril (PRINIVIL/ZESTRIL) 40 MG tablet Take 1 tablet (40 mg) by mouth daily 30 tablet 4     nicotine polacrilex (NICORETTE) 2 MG lozenge Place 2 mg inside cheek every hour as needed for smoking cessation       acetaminophen (TYLENOL)  "325 MG tablet Take 650 mg by mouth every 6 hours       albuterol (PROAIR HFA, PROVENTIL HFA, VENTOLIN HFA) 108 (90 BASE) MCG/ACT inhaler Inhale 2 puffs into the lungs every 6 hours as needed for shortness of breath / dyspnea (Patient not taking: Reported on 6/19/2017) 3 Inhaler 2     Allergies   Allergen Reactions     Chlorthalidone Other (See Comments)     Excessive lowering of blood pressure     Amlodipine Other (See Comments)     Intractable headache with use of medication as well as noting a small tremor of the upper extremities     BP Readings from Last 3 Encounters:   06/29/17 112/68   06/19/17 138/80   05/17/17 122/84    Wt Readings from Last 3 Encounters:   06/29/17 200 lb 9.6 oz (91 kg)   06/19/17 203 lb (92.1 kg)   05/17/17 209 lb (94.8 kg)                  Labs reviewed in EPIC    Reviewed and updated as needed this visit by clinical staff       Reviewed and updated as needed this visit by Provider         ROS:  As above    OBJECTIVE:     /68  Pulse 78  Temp 98.8  F (37.1  C) (Temporal)  Resp 16  Ht 5' 8.78\" (1.747 m)  Wt 200 lb 9.6 oz (91 kg)  SpO2 96%  BMI 29.81 kg/m2  Body mass index is 29.81 kg/(m^2).  General: well appearing, pleasant 61 year old male NAD  Knee: LEFT: no skin changes, bruising. Small effusion vs right. Tender to palpation above and lateral to patella and also tender at popliteal space. Joint line nontender. Patella nontender to exam. ROM: less than full extension, flexion stops at 90 degrees. Pain with ligament testing but no obvious laxity.  Mild calf tenderness. Posterior tibial  And dorsalis pedis pulses normal, no edema, sensation intact. Gait cautious, using cane    Diagnostic Test Results:  Xray -   Xr Knee Left 3 Views  Result Date: 6/29/2017  LEFT KNEE 3 VIEWS  6/29/2017 4:21 PM HISTORY:  Pain in left knee. COMPARISON:  None. FINDINGS:  No fracture or osseous lesion is seen. The joint spaces are well preserved. No adjacent soft tissue pathology is seen. "     IMPRESSION:  Unremarkable examination. HAYDEE MOHR MD      ASSESSMENT/PLAN:       1. Acute pain of left knee  Continue ice, rest, brace or wrap for comfort  Difficulty w/ambulation not improving and impaired ROM, not improving.  Discussed consult ortho vs MRI  Pt would like to do MRI but wants to call insurance  - XR Knee Left 3 Views; Future  - MR Knee Left w/o Contrast; Future    Follow Up: For worsening symptoms (ie new fevers, worsening pain, etc), non-improvement as expected/discussed, questions regarding your medications or treatment plan. Discussed parameters for follow up and included in After Visit Summary given to patient.      Elizabeth Bojorquez PA-C  [facility]

## 2017-06-29 ENCOUNTER — OFFICE VISIT (OUTPATIENT)
Dept: FAMILY MEDICINE | Facility: CLINIC | Age: 62
End: 2017-06-29
Payer: COMMERCIAL

## 2017-06-29 ENCOUNTER — RADIANT APPOINTMENT (OUTPATIENT)
Dept: GENERAL RADIOLOGY | Facility: CLINIC | Age: 62
End: 2017-06-29
Attending: PHYSICIAN ASSISTANT
Payer: COMMERCIAL

## 2017-06-29 VITALS
HEIGHT: 69 IN | DIASTOLIC BLOOD PRESSURE: 68 MMHG | WEIGHT: 200.6 LBS | HEART RATE: 78 BPM | OXYGEN SATURATION: 96 % | RESPIRATION RATE: 16 BRPM | SYSTOLIC BLOOD PRESSURE: 112 MMHG | TEMPERATURE: 98.8 F | BODY MASS INDEX: 29.71 KG/M2

## 2017-06-29 DIAGNOSIS — M25.562 ACUTE PAIN OF LEFT KNEE: ICD-10-CM

## 2017-06-29 DIAGNOSIS — M25.562 ACUTE PAIN OF LEFT KNEE: Primary | ICD-10-CM

## 2017-06-29 PROCEDURE — 73562 X-RAY EXAM OF KNEE 3: CPT | Mod: LT

## 2017-06-29 PROCEDURE — 99214 OFFICE O/P EST MOD 30 MIN: CPT | Performed by: PHYSICIAN ASSISTANT

## 2017-06-29 ASSESSMENT — PAIN SCALES - GENERAL: PAINLEVEL: MODERATE PAIN (5)

## 2017-06-29 NOTE — NURSING NOTE
"Chief Complaint   Patient presents with     Musculoskeletal Problem     left knee pain x 2 weeks     Panel Management     MyChart, Tobacco Cessation       Initial /68  Pulse 78  Temp 98.8  F (37.1  C) (Temporal)  Resp 16  Ht 5' 8.78\" (1.747 m)  Wt 200 lb 9.6 oz (91 kg)  SpO2 96%  BMI 29.81 kg/m2 Estimated body mass index is 29.81 kg/(m^2) as calculated from the following:    Height as of this encounter: 5' 8.78\" (1.747 m).    Weight as of this encounter: 200 lb 9.6 oz (91 kg).  Medication Reconciliation: complete  Rosenda Miner CMA (AAMA)    "

## 2017-06-29 NOTE — MR AVS SNAPSHOT
After Visit Summary   6/29/2017    Bucky Edgar    MRN: [id number]           Patient Information     Date Of Birth          1955        Visit Information        Provider Department      6/29/2017 3:40 PM Elizabeth Bojorquez PA-C [facility]        Today's Diagnoses     Acute pain of left knee    -  1      Care Instructions    We discussed MRI of the knee or an office visit with the orthopedic MD  I placed the referral for the MRI    You can call to schedule:  To schedule your test or specialty referral please call:  [facility]:  Radiology (imaging): [phone]  [address]  [facility] [address]               Follow-ups after your visit        Your next 10 appointments already scheduled     Nov 13, 2017 10:00 AM CST   Office Visit with Percy Deshpande MD   [facility] ([facility])    [address], [address]  [place] [address] [phone]           Bring a current list of meds and any records pertaining to this visit.  For Physicals, please bring immunization records and any forms needing to be filled out.  Please arrive 10 minutes early to complete paperwork.              Future tests that were ordered for you today     Open Future Orders        Priority Expected Expires Ordered    MR Knee Left w/o Contrast Routine  6/29/2018 6/29/2017            Who to contact     If you have questions or need follow up information about today's clinic visit or your schedule please contact [facility] directly at [phone].  Normal or non-critical lab and imaging results will be communicated to you by MyChart, letter or phone within 4 business days after the clinic has received the results. If you do not hear from us within 7 days, please contact the clinic through MyChart or phone. If you have a critical or abnormal lab result, we will notify you by phone as soon as possible.  Submit refill  "requests through [employer] or call your pharmacy and they will forward the refill request to us. Please allow 3 business days for your refill to be completed.          Additional Information About Your Visit        [employer] Information     [employer] lets you send messages to your doctor, view your test results, renew your prescriptions, schedule appointments and more. To sign up, go to [email or web address] . Click on \"Log in\" on the left side of the screen, which will take you to the Welcome page. Then click on \"Sign up Now\" on the right side of the page.     You will be asked to enter the access code listed below, as well as some personal information. Please follow the directions to create your username and password.     Your access code is: [email or web address]  Expires: 8/15/2017 10:05 AM     Your access code will  in 90 days. If you need help or a new code, please call your [place] clinic or [phone].        Care EveryWhere ID     This is your Care EveryWhere ID. This could be used by other organizations to access your [place] medical records  [id number]        Your Vitals Were     Pulse Temperature Respirations Height Pulse Oximetry BMI (Body Mass Index)    78 98.8  F (37.1  C) (Temporal) 16 5' 8.78\" (1.747 m) 96% 29.81 kg/m2       Blood Pressure from Last 3 Encounters:   17 112/68   17 138/80   17 122/84    Weight from Last 3 Encounters:   17 200 lb 9.6 oz (91 kg)   17 203 lb (92.1 kg)   17 209 lb (94.8 kg)               Primary Care Provider Office Phone # Fax #    Percy Deshpande [phone]       [facility] [place] [address] [address] [facility] [address] [facility]  [place] [address]        Equal Access to Services     [facility] GEENA : Herbie Mathew, waesteban luqadaha, qaybta chelsy dhillon . [facility] [phone].    ATENCIÓN: Si habla español, tiene a garcia disposición servicios gratuitos de asistencia lingüística. " Deanna joe [phone].    We comply with applicable federal civil rights laws and [place] laws. We do not discriminate on the basis of race, color, national origin, age, disability sex, sexual orientation or gender identity.            Thank you!     Thank you for choosing [facility]  for your care. Our goal is always to provide you with excellent care. Hearing back from our patients is one way we can continue to improve our services. Please take a few minutes to complete the written survey that you may receive in the mail after your visit with us. Thank you!             Your Updated Medication List - Protect others around you: Learn how to safely use, store and throw away your medicines at www.disposemymeds.org.          This list is accurate as of: 6/29/17  4:32 PM.  Always use your most recent med list.                   Brand Name Dispense Instructions for use Diagnosis    acetaminophen 325 MG tablet    TYLENOL     Take 650 mg by mouth every 6 hours        albuterol 108 (90 BASE) MCG/ACT Inhaler    PROAIR HFA/PROVENTIL HFA/VENTOLIN HFA    3 Inhaler    Inhale 2 puffs into the lungs every 6 hours as needed for shortness of breath / dyspnea    Chronic bronchitis with COPD (chronic obstructive pulmonary disease) (H)       carvedilol 25 MG tablet    COREG    60 tablet    Take 1 tablet (25 mg) by mouth 2 times daily (with meals)    HTN, goal below 140/90       lisinopril 40 MG tablet    PRINIVIL/ZESTRIL    30 tablet    Take 1 tablet (40 mg) by mouth daily    HTN, goal below 140/90       NICORETTE 2 MG lozenge   Generic drug:  nicotine polacrilex      Place 2 mg inside cheek every hour as needed for smoking cessation

## 2017-06-29 NOTE — PATIENT INSTRUCTIONS
We discussed MRI of the knee or an office visit with the orthopedic MD  I placed the referral for the MRI    You can call to schedule:  To schedule your test or specialty referral please call:  [facility]:  Radiology (imaging): [phone] [address]  [facility] [address]

## 2017-08-23 ENCOUNTER — TELEPHONE (OUTPATIENT)
Dept: FAMILY MEDICINE | Facility: CLINIC | Age: 62
End: 2017-08-23

## 2017-08-23 NOTE — TELEPHONE ENCOUNTER
Reason for Call:  Medication or medication refill:    Do you use a [facility]?  Name of the pharmacy and phone number for the current request:  [facility]il[facility] - [phone]    Name of the medication requested: rx for gout on leg    Other request: declined appt. Is wondering if he could just get a refill ?     Can we leave a detailed message on this number? YES    Phone number patient can be reached at: Home number on file [phone] (home)    Best Time: any    Call taken on 8/23/2017 at 9:54 AM by Mariaelena Gregory

## 2017-08-23 NOTE — TELEPHONE ENCOUNTER
Reviewed--    Needing to be seen--does not appear to be gout--cellulitis likely and needing assessment.    Percy Deshpande MD

## 2017-08-23 NOTE — PROGRESS NOTES
SUBJECTIVE:                                                    Bucky Edgar is a 61 year old male who presents to clinic today for the following health issues:      Concern - Left Leg Swelling    This patient presents with a history of tenderness and discomfort in the proximal medial left thigh 2 days ago. Discomfort increased yesterday and over the last 12-24 hours she has had significant swelling of the entire left lower extremity. Is also dusky in color. Initially he felt there was swelling in the medial left thigh. Currently he has no respiratory changes. Gross examination with suggestive massive DVT of the left lower extremity. Currently in the left lower extremity is not cold touch. Significant swelling of at least twice the size of the right lower extremity.    Onset: 2 days    Description:   Dull ache, Swelling, Painful, throbbing    Intensity: moderate when putting pressure on leg it is moderate    Progression of Symptoms:  worsening    Accompanying Signs & Symptoms:  Hot to the touch    Previous history of similar problem:   In foot and knee    Precipitating factors:   Worsened by: pressure    Alleviating factors:  Improved by: Laying down    Therapies Tried and outcome: ice and heat, made it worse    (M79.89) Swelling of left lower extremity  (primary encounter diagnosis)  Comment:  Briefly looked at the patient's left lower extremity and took a history. Feel this is a high likelihood of a massive DVT of the left lower extremity and should have immediate evaluation the emergency room for diagnosis and subsequent treatment.  Plan: If a major DVT, will need thrombolytic therapy. He was sent immediately to [facility] and information was given to one of the ER physicians.    Patient should not have a primary care charge as he was simply briefly looked at and triaged quickly.    The patient understood the rational for the diagnosis and treatment plan. All questions were answered to best  of my ability and the patient's satisfaction.    I have reviewed the family history with the patient today and appropriate changes and additions were made.    Percy Deshpande MD

## 2017-08-23 NOTE — TELEPHONE ENCOUNTER
Patient informed and scheduled.   Next 5 appointments (look out 90 days)     Aug 24, 2017 10:40 AM CDT   [facility] Visit with Percy [facility]hpande MD   [facility]    [address]  [facility] [address]   [phone]            Nov 16, 2017 10:00 AM CST   Office Visit with Percy Deshpande MD   [facility]    [address]  [place] [address]   [phone]                Tabby Dixon, RN, BSN

## 2017-08-23 NOTE — TELEPHONE ENCOUNTER
Bucky Edgar is a 61 year old male who calls with requesting a medication for gout flareup.    NURSING ASSESSMENT:  Description:  Requesting a medication refill for Methylprednisolone. Patient believes he is having a gout flare up. Left upper thigh is swollen and hot to the touch. Stated it has never been in the thigh before, usually occurred in his foot. Thigh swelling started 2 days ago and progressively getting worse. Having chills and sweats at night. Uncomfortable sleeping. Is curing walking with a cane and limping due to the pressure and pain on his knee with this swelling. Denies chest pain, shortness of breath, discoloration, fever.   Onset/duration:  2 days  Improves/worsens symptoms:  Tylenol decreases pain, elevating foot  Pain scale (0-10)   6/10 dull ache and pressure  Last exam/Treatment:  06/29/2017  Allergies:   Allergies   Allergen Reactions     Chlorthalidone Other (See Comments)     Excessive lowering of blood pressure     Amlodipine Other (See Comments)     Intractable headache with use of medication as well as noting a small tremor of the upper extremities     NURSING PLAN: Routed to provider Yes    RECOMMENDED DISPOSITION:  See in 2-4 hours, another person to drive - for thigh swelling, hot to the touch and pain  Will comply with recommendation: No- Barriers to comply with plan of care would like a prescription.  If further questions/concerns or if symptoms do not improve, worsen or new symptoms develop, call your PCP or [facility] as soon as possible.    NOTES:  Disposition was determined by the first positive assessment question, therefore all previous assessment questions were negative    Guideline used:  Telephone Triage Protocols for Nurses, Fifth Edition, Kira Marin  Leg Pain/Swelling  Nursing judgment  Routing to  to review and advise    Tabby Dixon, RN, BSN

## 2017-08-24 ENCOUNTER — OFFICE VISIT (OUTPATIENT)
Dept: FAMILY MEDICINE | Facility: CLINIC | Age: 62
End: 2017-08-24
Payer: COMMERCIAL

## 2017-08-24 VITALS
HEART RATE: 80 BPM | OXYGEN SATURATION: 97 % | SYSTOLIC BLOOD PRESSURE: 108 MMHG | HEIGHT: 69 IN | DIASTOLIC BLOOD PRESSURE: 70 MMHG | TEMPERATURE: 96.7 F | RESPIRATION RATE: 16 BRPM

## 2017-08-24 DIAGNOSIS — M79.89 SWELLING OF LEFT LOWER EXTREMITY: Primary | ICD-10-CM

## 2017-08-24 PROCEDURE — 99207 ZZC NO BILLABLE SERVICE THIS VISIT: CPT | Performed by: FAMILY MEDICINE

## 2017-08-24 ASSESSMENT — PAIN SCALES - GENERAL: PAINLEVEL: WORST PAIN (10)

## 2017-08-24 NOTE — MR AVS SNAPSHOT
"              After Visit Summary   8/24/2017    Bucky Edgar    MRN: [id number]           Patient Information     Date Of Birth          1955        Visit Information        Provider Department      8/24/2017 10:40 AM Percy Deshpande MD [facility]        Today's Diagnoses     Swelling of left lower extremity    -  1       Follow-ups after your visit        Your next 10 appointments already scheduled     Nov 16, 2017 10:00 AM CST   Office Visit with Percy Deshpande MD   [facility] ([facility])    [address], [address]  [place] [address]   [phone]           Bring a current list of meds and any records pertaining to this visit. For Physicals, please bring immunization records and any forms needing to be filled out. Please arrive 10 minutes early to complete paperwork.              Who to contact     If you have questions or need follow up information about today's clinic visit or your schedule please contact [facility] directly at [phone].  Normal or non-critical lab and imaging results will be communicated to you by Compare And Sharehart, letter or phone within 4 business days after the clinic has received the results. If you do not hear from us within 7 days, please contact the clinic through Compare [employer] Share[employer] or phone. If you have a critical or abnormal lab result, we will notify you by phone as soon as possible.  Submit refill requests through Symptify or call your pharmacy and they will forward the refill request to us. Please allow 3 business days for your refill to be completed.          Additional Information About Your Visit        Compare And ShareharCitalDoc Information     Symptify lets you send messages to your doctor, view your test results, renew your prescriptions, schedule appointments and more. To sign up, go to [email or web address] . Click on \"Log in\" on the left side of the screen, which will take you to the Welcome page. Then click on \"Sign up " "Now\" on the right side of the page.     You will be asked to enter the access code listed below, as well as some personal information. Please follow the directions to create your username and password.     Your access code is: [email or web address]  Expires: 2017  3:50 PM     Your access code will  in 90 days. If you need help or a new code, please call your [place] clinic or [phone].        Care EveryWhere ID     This is your Care EveryWhere ID. This could be used by other organizations to access your [place] medical records  [id number]        Your Vitals Were     Pulse Temperature Respirations Height Pulse Oximetry       80 96.7  F (35.9  C) (Temporal) 16 5' 9\" (1.753 m) 97%        Blood Pressure from Last 3 Encounters:   17 108/70   17 112/68   17 138/80    Weight from Last 3 Encounters:   17 200 lb 9.6 oz (91 kg)   17 203 lb (92.1 kg)   17 209 lb (94.8 kg)              Today, you had the following     No orders found for display       Primary Care Provider Office Phone # Fax #    Percy Deshpande [phone] [address] [facility] [address]        Equal Access to Services     [facility]: Hadii aad ku hadasho Soomaali, waaxda luqadaha, qaybta kaalmada adeegyada, chelsy gonzalez . So [facility] [phone].    ATENCIÓN: Si habla español, tiene a garcia disposición servicios gratuitos de asistencia lingüística. Llame al [phone].    We comply with applicable federal civil rights laws and [place] laws. We do not discriminate on the basis of race, color, national origin, age, disability sex, sexual orientation or gender identity.            Thank you!     Thank you for choosing [facility]  for your care. Our goal is always to provide you with excellent care. Hearing back from our patients is one way we can continue to improve our services. Please take a few minutes to complete the written survey that " you may receive in the mail after your visit with us. Thank you!             Your Updated Medication List - Protect others around you: Learn how to safely use, store and throw away your medicines at www.disposemymeds.org.          This list is accurate as of: 8/24/17  3:50 PM.  Always use your most recent med list.                   Brand Name Dispense Instructions for use Diagnosis    acetaminophen 325 MG tablet    TYLENOL     Take 650 mg by mouth every 6 hours        albuterol 108 (90 BASE) MCG/ACT Inhaler    PROAIR HFA/PROVENTIL HFA/VENTOLIN HFA    3 Inhaler    Inhale 2 puffs into the lungs every 6 hours as needed for shortness of breath / dyspnea    Chronic bronchitis with COPD (chronic obstructive pulmonary disease) (H)       carvedilol 25 MG tablet    COREG    60 tablet    Take 1 tablet (25 mg) by mouth 2 times daily (with meals)    HTN, goal below 140/90       lisinopril 40 MG tablet    PRINIVIL/ZESTRIL    30 tablet    Take 1 tablet (40 mg) by mouth daily    HTN, goal below 140/90       NICORETTE 2 MG lozenge   Generic drug:  nicotine polacrilex      Place 2 mg inside cheek every hour as needed for smoking cessation

## 2017-09-01 ENCOUNTER — TELEPHONE (OUTPATIENT)
Dept: FAMILY MEDICINE | Facility: CLINIC | Age: 62
End: 2017-09-01

## 2017-09-01 ENCOUNTER — TELEPHONE (OUTPATIENT)
Dept: ANTICOAGULATION | Facility: OTHER | Age: 62
End: 2017-09-01

## 2017-09-01 ENCOUNTER — ANTICOAGULATION THERAPY VISIT (OUTPATIENT)
Dept: ANTICOAGULATION | Facility: OTHER | Age: 62
End: 2017-09-01
Payer: COMMERCIAL

## 2017-09-01 DIAGNOSIS — Z79.01 LONG-TERM (CURRENT) USE OF ANTICOAGULANTS: ICD-10-CM

## 2017-09-01 DIAGNOSIS — Z79.01 LONG TERM CURRENT USE OF ANTICOAGULANT THERAPY: Primary | ICD-10-CM

## 2017-09-01 DIAGNOSIS — I82.4Z9 ACUTE DEEP VEIN THROMBOSIS (DVT) OF DISTAL VEIN OF LOWER EXTREMITY, UNSPECIFIED LATERALITY (H): Primary | ICD-10-CM

## 2017-09-01 DIAGNOSIS — Z79.01 LONG TERM CURRENT USE OF ANTICOAGULANT THERAPY: ICD-10-CM

## 2017-09-01 DIAGNOSIS — I82.409 DEEP VEIN THROMBOSIS (DVT) (H): ICD-10-CM

## 2017-09-01 LAB — INR BLD: 1.8 (ref 0.86–1.14)

## 2017-09-01 PROCEDURE — 99207 ZZC NO CHARGE NURSE ONLY: CPT | Performed by: FAMILY MEDICINE

## 2017-09-01 NOTE — MR AVS SNAPSHOT
Bucky CHRISTINE Edgar   9/1/2017   Anticoagulation Therapy Visit    Description:  61 year old male   Provider:  Percy Deshpande MD   Department:  Er Anticoag           INR as of 9/1/2017     Today's INR 1.8!      Anticoagulation Summary as of 9/1/2017     INR goal 2.0-3.0   Today's INR 1.8!   Full instructions 9/1: 7.5 mg; 9/2: 5 mg; 9/3: 7.5 mg; 9/4: 5 mg; Otherwise No maintenance plan   Next INR check 9/5/2017    Indications   Long-term (current) use of anticoagulants [Z79.01] [Z79.01]  Deep vein thrombosis (DVT) (H) [I82.409] [I82.409]         Your next Anticoagulation Clinic appointment(s)     Sep 05, 2017  1:00 PM CDT   Anticoagulation Visit with ER ANTI COAG   [facility]    [address] [facility] [address]   [phone]              Contact Numbers     Clinic Number:         September 2017 Details    Sun Mon Tue Wed Thu Fri Sat          1      7.5 mg   See details      2      5 mg           3      7.5 mg         4      5 mg         5            6               7               8               9                 10               11               12               13               14               15               16                 17               18               19               20               21               22               23                 24               25               26               27               28               29               30                Date Details   09/01 This INR check       Date of next INR:  9/5/2017         How to take your warfarin dose     To take:  5 mg Take 1 of the 5 mg tablets.    To take:  7.5 mg Take 1.5 of the 5 mg tablets.

## 2017-09-01 NOTE — TELEPHONE ENCOUNTER
Huddled with FIONA, per [facility] INR referral placed and will have the INR clinic reach out to patient today and set up appointment.     I called the INR clinic and spoke to Larry. The INR clinic will placed the lab order, call and talk to the patient today.

## 2017-09-01 NOTE — TELEPHONE ENCOUNTER
Telephone call from nurse at [facility], that pt was started on coumadin for DVT (seen in office on 8/24) and was discharged from [facility] on 8/30/17.  Pt is supposed to get INR today.  My [place] CNP placed INR referral and nurse is requesting that [facility] follow up with pt.  Per chart review in care everywhere, it appears pt took 7.5 mg Sat, 7.5 mg Sun, 7.5 mg Mon, 5 Tue, 5 mg Wed, 5 mg Thurs and pt has already taken 5 mg today.      Telephone call to pt.  Pt going to lab at [facility] at 2 pm today.  Standing orders placed for INR.  Advised pt that he will get INR drawn at lab and [facility] will call with results and dosing. Did schedule pt for consult at [facility] on 9/5 to go through education.  Routing to [facility].    Larry Hernandez RN, BSN

## 2017-09-01 NOTE — TELEPHONE ENCOUNTER
Patient was recently in the [facility] for 10 days and got discharge on Wednesday  due of blood clot and was told that he needs lab order done as soon as possible. Patient is on blood thinner. Provider please place order and call patient back.   Patient scheduled to see Dr. Deshpande next week 9/7/17.

## 2017-09-01 NOTE — PROGRESS NOTES
ANTICOAGULATION FOLLOW-UP CLINIC VISIT    Patient Name:  Bucky Edgar  Date:  9/1/2017  Contact Type:  Telephone    SUBJECTIVE:     Patient Findings     Positives Initiation of therapy    Comments Telephone call [facility], that pt was started on coumadin for DVT (seen in office on 8/24) and was discharged from [facility] on 8/30/17.  Pt is supposed to get INR today.  My [facility] CNP placed INR referral and nurse is requesting that [facility] follow up with pt.  Per chart review in care everywhere, it appears pt took 7.5 mg Sat, 7.5 mg Sun, 7.5 mg Mon, 5 Tue, 5 mg Wed, 5 mg Thurs and pt has already taken 5 mg today.             OBJECTIVE    INR Point of Care   Date Value Ref Range Status   09/01/2017 1.8 (H) 0.86 - 1.14 Final     Comment:     This test is intended for monitoring Coumadin therapy.  Results are not   accurate in patients with prolonged INR due to factor deficiency.         ASSESSMENT / PLAN  INR assessment SUB    Recheck INR In: 4 DAYS    INR Location Outside lab      Anticoagulation Summary as of 9/1/2017     INR goal 2.0-3.0   Today's INR 1.8!   Maintenance plan No maintenance plan   Full instructions 9/1: 7.5 mg; 9/2: 5 mg; 9/3: 7.5 mg; 9/4: 5 mg; Otherwise No maintenance plan   Next INR check 9/5/2017   Target end date     Indications   Long-term (current) use of anticoagulants [Z79.01] [Z79.01]  Deep vein thrombosis (DVT) (H) [I82.409] [I82.409]         Anticoagulation Episode Summary     INR check location     Preferred lab     Send INR reminders to [facility] MIGDALIA    Comments       Anticoagulation Care Providers     Provider Role Specialty Phone number    Percy Deshpande MD [facility] [phone]            See the Encounter Report to view Anticoagulation Flowsheet and Dosing Calendar (Go to Encounters tab in chart review, and find the Anticoagulation Therapy Visit)    Dosage adjustment made based on physician directed care plan.    Allie Menard,  RN

## 2017-09-05 ENCOUNTER — ANTICOAGULATION THERAPY VISIT (OUTPATIENT)
Dept: ANTICOAGULATION | Facility: OTHER | Age: 62
End: 2017-09-05
Payer: COMMERCIAL

## 2017-09-05 DIAGNOSIS — Z79.01 LONG-TERM (CURRENT) USE OF ANTICOAGULANTS: ICD-10-CM

## 2017-09-05 DIAGNOSIS — I82.409 DEEP VEIN THROMBOSIS (DVT) (H): ICD-10-CM

## 2017-09-05 LAB — INR POINT OF CARE: 1.7 (ref 0.86–1.14)

## 2017-09-05 PROCEDURE — 85610 PROTHROMBIN TIME: CPT | Mod: QW

## 2017-09-05 PROCEDURE — 36416 COLLJ CAPILLARY BLOOD SPEC: CPT

## 2017-09-05 PROCEDURE — 99207 ZZC NO CHARGE NURSE ONLY: CPT

## 2017-09-05 RX ORDER — WARFARIN SODIUM 5 MG/1
5 TABLET ORAL DAILY
Qty: 1 TABLET | COMMUNITY
Start: 2017-09-05 | End: 2017-09-11

## 2017-09-05 NOTE — PROGRESS NOTES
ANTICOAGULATION INITIAL CLINIC VISIT    Patient Name:  Bucky Edgar  Date:  9/5/2017  Referred by: Dr. Deshpande  Contact Type:  Face to Face    SUBJECTIVE:  Coumadin education was completed today.  Topics covered include:  -Introduction to coumadin  -Proper Administration  -INR Testing  -Sign/Symptoms of Bleeding  -Signs/Symptoms of Clot Formation or Stroke  -Dietary Intake of Vitamin K  -Drug Interactions  -Anticoagulation Identification (bracelet, necklace or wallet card)  -Future Surgery  -Effects of Alcohol, Tobacco, and Exercise on Coumadin    Coumadin Education Booklet and Coumadin Identification Wallet Card were given to the patient.       Patient Findings     Positives Initiation of therapy          OBJECTIVE    INR Protime   Date Value Ref Range Status   09/05/2017 1.7 (A) 0.86 - 1.14 Final       ASSESSMENT / PLAN  INR assessment SUB    Recheck INR In: 2 DAYS    INR Location Clinic      Anticoagulation Summary as of 9/5/2017     INR goal 2.0-3.0   Today's INR 1.7!   Maintenance plan No maintenance plan   Full instructions 9/5: 7.5 mg; 9/6: 7.5 mg; Otherwise No maintenance plan   Next INR check 9/7/2017   Target end date     Indications   Long-term (current) use of anticoagulants [Z79.01] [Z79.01]  Deep vein thrombosis (DVT) (H) [I82.409] [I82.409]         Anticoagulation Episode Summary     INR check location     Preferred lab     Send INR reminders to [facility] MIGDALIA    Comments       Anticoagulation Care Providers     Provider Role Specialty Phone number    Percy Deshpande MD [facility] [phone]            See the Encounter Report to view Anticoagulation Flowsheet and Dosing Calendar (Go to Encounters tab in chart review, and find the Anticoagulation Therapy Visit)    Dosage adjustment made based on physician directed care plan.    Larry Hernandez RN

## 2017-09-07 ENCOUNTER — ANTICOAGULATION THERAPY VISIT (OUTPATIENT)
Dept: ANTICOAGULATION | Facility: OTHER | Age: 62
End: 2017-09-07
Payer: COMMERCIAL

## 2017-09-07 ENCOUNTER — OFFICE VISIT (OUTPATIENT)
Dept: FAMILY MEDICINE | Facility: CLINIC | Age: 62
End: 2017-09-07
Payer: COMMERCIAL

## 2017-09-07 VITALS
HEIGHT: 69 IN | WEIGHT: 207 LBS | BODY MASS INDEX: 30.66 KG/M2 | OXYGEN SATURATION: 98 % | TEMPERATURE: 98.8 F | SYSTOLIC BLOOD PRESSURE: 122 MMHG | DIASTOLIC BLOOD PRESSURE: 84 MMHG | HEART RATE: 69 BPM

## 2017-09-07 DIAGNOSIS — Z71.6 TOBACCO ABUSE COUNSELING: ICD-10-CM

## 2017-09-07 DIAGNOSIS — Z79.01 LONG TERM CURRENT USE OF ANTICOAGULANT THERAPY: ICD-10-CM

## 2017-09-07 DIAGNOSIS — I82.4Y2 ACUTE DEEP VEIN THROMBOSIS (DVT) OF PROXIMAL VEIN OF LEFT LOWER EXTREMITY (H): Primary | ICD-10-CM

## 2017-09-07 DIAGNOSIS — E66.3 PATIENT OVERWEIGHT: ICD-10-CM

## 2017-09-07 DIAGNOSIS — Z79.01 LONG-TERM (CURRENT) USE OF ANTICOAGULANTS: ICD-10-CM

## 2017-09-07 DIAGNOSIS — I10 HTN, GOAL BELOW 140/90: ICD-10-CM

## 2017-09-07 DIAGNOSIS — I82.409 DEEP VEIN THROMBOSIS (DVT) (H): ICD-10-CM

## 2017-09-07 LAB — INR BLD: 1.3 (ref 0.86–1.14)

## 2017-09-07 PROCEDURE — 99207 ZZC NO CHARGE NURSE ONLY: CPT | Performed by: FAMILY MEDICINE

## 2017-09-07 PROCEDURE — 99214 OFFICE O/P EST MOD 30 MIN: CPT | Performed by: FAMILY MEDICINE

## 2017-09-07 RX ORDER — NICOTINE 21 MG/24HR
1 PATCH, TRANSDERMAL 24 HOURS TRANSDERMAL EVERY 24 HOURS
Qty: 28 PATCH | Refills: 5 | Status: SHIPPED | OUTPATIENT
Start: 2017-09-07 | End: 2017-11-16

## 2017-09-07 RX ORDER — OXYCODONE HYDROCHLORIDE 5 MG/1
5 TABLET ORAL
COMMUNITY
Start: 2017-08-30 | End: 2017-10-23

## 2017-09-07 RX ORDER — POLYETHYLENE GLYCOL 3350 17 G/17G
17 POWDER, FOR SOLUTION ORAL
COMMUNITY
Start: 2017-08-30 | End: 2017-10-23

## 2017-09-07 RX ORDER — SENNOSIDES 8.6 MG
TABLET ORAL
Refills: 0 | COMMUNITY
Start: 2017-08-30 | End: 2020-04-07

## 2017-09-07 ASSESSMENT — PAIN SCALES - GENERAL: PAINLEVEL: SEVERE PAIN (6)

## 2017-09-07 NOTE — PROGRESS NOTES
SUBJECTIVE:   Bucky Edgar is a 61 year old male who presents to clinic today for the following health issues:          Hospital Follow-up Visit:    This patient was acutely admitted to [facility] with a extensive deep venous thrombophlebitis of the left lower extremity. This extended from the proximal deep femoral vein to involve the distal veins as well. Patient need thrombolytic therapy and also angioplasty of the proximal common femoral vein to drainage. Was hospitalized for 10 days. He is currently on warfarin therapy but somewhat subtherapeutic today. Overall his leg is improved but still painful and still swollen. Improved from 2 weeks ago.    Did not appear to have pulmonary emboli with his acute episode.    Hospital/Nursing Home/IP Rehab Facility: [facility]   Date of Admission: 8/24/17  Date of Discharge: 8/30/17  Reason(s) for Admission: blood clot in left leg             Problems taking medications regularly:  None       Medication changes since discharge: coumadin        Problems adhering to non-medication therapy:  None    Summary of hospitalization:  CareEverywhere information obtained and reviewed  Diagnostic Tests/Treatments reviewed.  Follow up needed: Ongoing INR monitoring for adequate anticoagulation for 6 months  Other Healthcare Providers Involved in Patient s Care:         None  Update since discharge: improved.     Post Discharge Medication Reconciliation: discharge medications reconciled and changed, per note/orders (see AVS).  Plan of care communicated with patient     Coding guidelines for this visit:  Type of Medical   Decision Making Face-to-Face Visit       within 7 Days of discharge Face-to-Face Visit        within 14 days of discharge   Moderate Complexity [id number]   High Complexity 97197 [id number]              Hypertension Follow-up      Outpatient blood pressures are not being checked.    Low Salt Diet: no added salt          Problem list and histories reviewed &  "adjusted, as indicated.  Additional history: as documented        Reviewed and updated as needed this visit by clinical staffTobacco  Allergies  Med Hx  Surg Hx  Fam Hx  Soc Hx      Reviewed and updated as needed this visit by Provider         ROS:  C: NEGATIVE for fever, chills, change in weight  I: NEGATIVE for worrisome rashes, moles or lesions  E: NEGATIVE for vision changes or irritation  E/M: NEGATIVE for ear, mouth and throat problems  R: NEGATIVE for significant cough or SOB  RESP: Has discontinued smoking for the last 2 weeks.  CV: NEGATIVE for chest pain, palpitations or peripheral edema  GI: NEGATIVE for nausea, abdominal pain, heartburn, or change in bowel habits  : NEGATIVE for frequency, dysuria, or hematuria  MUSCULOSKELETAL: Some improvement in the left lower extremity but still significant swelling, discomfort and needing support with walking.  N: NEGATIVE for weakness, dizziness or paresthesias  E: NEGATIVE for temperature intolerance, skin/hair changes  H: NEGATIVE for bleeding problems  P: NEGATIVE for changes in mood or affect    OBJECTIVE:                                                    /84  Pulse 69  Temp 98.8  F (37.1  C) (Temporal)  Ht 5' 9\" (1.753 m)  Wt 207 lb (93.9 kg)  SpO2 98%  BMI 30.57 kg/m2  Body mass index is 30.57 kg/(m^2).  GENERAL: alert, no distress and cooperative  EYES: Eyes grossly normal to inspection, extraocular movements - intact, and PERRL  HENT: ear canals- normal; TMs- normal; Nose- normal; Mouth- no ulcers, no lesions  NECK: no tenderness, no adenopathy, no asymmetry, no masses, no stiffness; thyroid- normal to palpation  RESP: lungs clear to auscultation - no rales, no rhonchi, no wheezes  CV: regular rates and rhythm, normal S1 S2, no S3 or S4 and no murmur, no click or rub -  ABDOMEN: soft, no tenderness, no  hepatosplenomegaly, no masses, normal bowel sounds  MS: Left lower extremity still is significantly swollen compared to the right. No " major deep venous tenderness. The dusky color has resolved. No suggestion of ischemic changes.  SKIN: no suspicious lesions, no rashes  NEURO: strength and tone- normal, sensory exam- grossly normal, mentation- intact, speech- normal, reflexes- symmetric  PSYCH: Alert and oriented times 3; speech- coherent , normal rate and volume; able to articulate logical thoughts, able to abstract reason, no tangential thoughts, no hallucinations or delusions, affect- normal    Diagnostic test results:  Diagnostic Test Results:  Results for orders placed or performed in visit on 09/07/17 (from the past 24 hour(s))   INR point of care   Result Value Ref Range    INR Point of Care 1.3 (H) 0.86 - 1.14          ASSESSMENT/PLAN:                                                    1. Acute deep vein thrombosis (DVT) of proximal vein of left lower extremity (H)   Stable with 2 adequately be anticoagulated. Followed by the INR clinics. Follow up in 1 month.    2. HTN, goal below 140/90  Adequate control with medication    3. Patient overweight  Long-term weight loss would be recommended    4. Tobacco abuse counseling  Continue nicotine patches during the day and absolutely discontinue all smoking.  - nicotine (NICODERM CQ) 21 MG/24HR 24 hr patch; Place 1 patch onto the skin every 24 hours  Dispense: 28 patch; Refill: 5      Follow up with Provider - Follow-up in 4 weeks.   See Patient Instructions    The patient understood the rational for the diagnosis and treatment plan. All questions were answered to best of my ability and the patient's satisfaction.    Note: Chart documentation done in part with Dragon Voice Recognition software. Although reviewed after completion, some word and grammatical errors may remain.  Please consider this when interpreting information in this chart.      Percy Deshpande MD  [facility]

## 2017-09-07 NOTE — NURSING NOTE
"Chief Complaint   Patient presents with     Hospital F/U       Initial /84  Pulse 69  Temp 98.8  F (37.1  C) (Temporal)  Ht 5' 9\" (1.753 m)  Wt 207 lb (93.9 kg)  SpO2 98%  BMI 30.57 kg/m2 Estimated body mass index is 30.57 kg/(m^2) as calculated from the following:    Height as of this encounter: 5' 9\" (1.753 m).    Weight as of this encounter: 207 lb (93.9 kg).  Medication Reconciliation: complete     Alexandro Sunshine MA    "

## 2017-09-07 NOTE — MR AVS SNAPSHOT
"              After Visit Summary   9/7/2017    Bucky Edgar    MRN: [id number]           Patient Information     Date Of Birth          1955        Visit Information        Provider Department      9/7/2017 11:40 AM Percy Deshpande MD [facility]         Follow-ups after your visit        Follow-up notes from your care team     Return in about 1 month (around 10/7/2017) for Routine Visit.      Your next 10 appointments already scheduled     Nov 16, 2017 10:00 AM CST   Office Visit with Percy Deshpande MD   [facility] ([facility])    [address], [address]  [facility] [address]   [phone]           Bring a current list of meds and any records pertaining to this visit. For Physicals, please bring immunization records and any forms needing to be filled out. Please arrive 10 minutes early to complete paperwork.              Who to contact     If you have questions or need follow up information about today's clinic visit or your schedule please contact [facility] directly at [phone].  Normal or non-critical lab and imaging results will be communicated to you by 24h00hart, letter or phone within 4 business days after the clinic has received the results. If you do not hear from us within 7 days, please contact the clinic through 24h00hart or phone. If you have a critical or abnormal lab result, we will notify you by phone as soon as possible.  Submit refill requests through [employer] or call your pharmacy and they will forward the refill request to us. Please allow 3 business days for your refill to be completed.          Additional Information About Your Visit        MyChart Information     [employer] lets you send messages to your doctor, view your test results, renew your prescriptions, schedule appointments and more. To sign up, go to [email or web address] . Click on \"Log in\" on the left side of the screen, which will take you to the " "Welcome page. Then click on \"Sign up Now\" on the right side of the page.     You will be asked to enter the access code listed below, as well as some personal information. Please follow the directions to create your username and password.     Your access code is: [email or web address]  Expires: 2017  3:50 PM     Your access code will  in 90 days. If you need help or a new code, please call your [place] clinic or [phone].        Care EveryWhere ID     This is your Care EveryWhere ID. This could be used by other organizations to access your [facility] medical records  [id number]        Your Vitals Were     Pulse Temperature Height Pulse Oximetry BMI (Body Mass Index)       69 98.8  F (37.1  C) (Temporal) 5' 9\" (1.753 m) 98% 30.57 kg/m2        Blood Pressure from Last 3 Encounters:   17 122/84   17 108/70   17 112/68    Weight from Last 3 Encounters:   17 207 lb (93.9 kg)   17 200 lb 9.6 oz (91 kg)   17 203 lb (92.1 kg)              Today, you had the following     No orders found for display       Primary Care Provider Office Phone # Fax #    Percy Deshpande [phone] [address] [facility] [address]        Equal Access to Services     [facility] AH: Hadii aad ku hadasho Soomaali, waaxda luqadaha, qaybta kaalmada adeegyada, chelsy gonzalez . So [facility] [phone].    ATENCIÓN: Si habla español, tiene a garcia disposición servicios gratuitos de asistencia lingüística. Llame al [phone].    We comply with applicable federal civil rights laws and [place] laws. We do not discriminate on the basis of race, color, national origin, age, disability sex, sexual orientation or gender identity.            Thank you!     Thank you for choosing [facility]  for your care. Our goal is always to provide you with excellent care. Hearing back from our patients is one way we can continue to improve our services. Please " take a few minutes to complete the written survey that you may receive in the mail after your visit with us. Thank you!             Your Updated Medication List - Protect others around you: Learn how to safely use, store and throw away your medicines at www.disposemymeds.org.          This list is accurate as of: 9/7/17 12:04 PM.  Always use your most recent med list.                   Brand Name Dispense Instructions for use Diagnosis    acetaminophen 325 MG tablet    TYLENOL     Take 650 mg by mouth every 6 hours        albuterol 108 (90 BASE) MCG/ACT Inhaler    PROAIR HFA/PROVENTIL HFA/VENTOLIN HFA    3 Inhaler    Inhale 2 puffs into the lungs every 6 hours as needed for shortness of breath / dyspnea    Chronic bronchitis with COPD (chronic obstructive pulmonary disease) (H)       carvedilol 25 MG tablet    COREG    60 tablet    Take 1 tablet (25 mg) by mouth 2 times daily (with meals)    HTN, goal below 140/90       lisinopril 40 MG tablet    PRINIVIL/ZESTRIL    30 tablet    Take 1 tablet (40 mg) by mouth daily    HTN, goal below 140/90       NICORETTE 2 MG lozenge   Generic drug:  nicotine polacrilex      Place 2 mg inside cheek every hour as needed for smoking cessation        NICOTINE STEP 1 21 MG/24HR 24 hr patch   Generic drug:  nicotine      ROBBY 1 PATCH TO SKIN ONCE D X 42 DAYS        oxyCODONE 5 MG IR tablet    ROXICODONE     Take 5 mg by mouth        sennosides 8.6 MG tablet    SENOKOT     TK 1 T PO BID        * SMOOTH LAX powder   Generic drug:  polyethylene glycol      TK 17GRAMS MIXED WITH LIQUID ONCE D        * polyethylene glycol Packet    MIRALAX/GLYCOLAX     Take 17 g by mouth        warfarin 5 MG tablet    COUMADIN    1 tablet    Take 1 tablet (5 mg) by mouth daily        * Notice:  This list has 2 medication(s) that are the same as other medications prescribed for you. Read the directions carefully, and ask your doctor or other care provider to review them with you.

## 2017-09-07 NOTE — MR AVS SNAPSHOT
Bucky CHRISTINE Edgar   9/7/2017   Anticoagulation Therapy Visit    Description:  61 year old male   Provider:  Percy Deshpande MD   Department:  Er Anticoag           INR as of 9/7/2017     Today's INR 1.3!      Anticoagulation Summary as of 9/7/2017     INR goal 2.0-3.0   Today's INR 1.3!   Full instructions 9/7: 10 mg; 9/8: 7.5 mg; 9/9: 10 mg; 9/10: 7.5 mg; Otherwise No maintenance plan   Next INR check 9/11/2017    Indications   Long-term (current) use of anticoagulants [Z79.01] [Z79.01]  Deep vein thrombosis (DVT) (H) [I82.409] [I82.409]         Your next Anticoagulation Clinic appointment(s)     Sep 11, 2017  1:15 PM CDT   [facility] with [facility]AG   [facility]    [address] [facility] [address]   [phone]              Contact Numbers     Clinic Number:         September 2017 Details    Sun Mon Tue Wed Thu Fri Sat          1               2                 3               4               5               6               7      10 mg   See details      8      7.5 mg         9      10 mg           10      7.5 mg         11            12               13               14               15               16                 17               18               19               20               21               22               23                 24               25               26               27               28               29               30                Date Details   09/07 This INR check       Date of next INR:  9/11/2017         How to take your warfarin dose     To take:  7.5 mg Take 1.5 of the 5 mg tablets.    To take:  10 mg Take 2 of the 5 mg tablets.

## 2017-09-07 NOTE — PATIENT INSTRUCTIONS
Nicotine Patch    Dosing:    >10 cigarettes per day Dose   Weeks 1-6 Use one 21 mg patch per day.   Weeks 7-8 Use one 14 mg patch per day.   Weeks 9-10 Use one 7 mg patch per day   <10 cigarettes per day  Weeks 1-6 Use one 14 mg patch per day   Weeks 7-8 Use one 7 mg patch per day       How to use the Nicotine Patch:    Apply a new patch to non-hairy, clean, dry skin on the upper body or upper outer arm.  Remove backing from patch and press on skin.  Hold for 10 seconds.    Apply patch around the same time every day.    Wash your hands after applying the patch.    Remove the patch at the end of the day before you go to bed.    Apply a new patch the next morning.    Do not apply the patch to an area where you have worn a patch in the last week.   This will help prevent or reduce skin irritation.    Some Tips:    Do not smoke while you are using the nicotine patch!    Do not cut the nicotine patch -it will be ineffective.    Remove the patch prior to receiving an MRI since the patch may contain some metal.    Do not put the patch on irritated, cut, or burned skin.    If the patch falls off and cannot be reapplied, put on a new patch.    Follow -up with your health care professional if you have any questions and also to help taper your nicotine patch dose.    Side Effects:  Some people experience some skin irritation where the patch was placed.  Moving around the site where you are putting the patch each day should help.  If you experience any other troublesome or unusual side effects, call your health care professional.            HOW TO QUIT SMOKING  Smoking is one of the hardest habits to break. About half of all those who have ever smoked have been able to quit, and most of those (about 70%) who still smoke want to quit. Here are some of the best ways to stop smoking.     KEEP TRYING:  It takes most smokers about 8 tries before they are finally able to fully quit. So, the more often you try and fail, the better  your chance of quitting the next time! So, don't give up!    GO COLD TURKEY:  Most ex-smokers quit cold turkey. Trying to cut back gradually doesn't seem to work as well, perhaps because it continues the smoking habit. Also, it is possible to fool yourself by inhaling more while smoking fewer cigarettes. This results in the same amount of nicotine in your body!    GET SUPPORT:  Support programs can make an important difference, especially for the heavy smoker. These groups offer lectures, methods to change your behavior and peer support. Call the free national Quitline for more information. 800-QUIT-NOW (854-948-0056). Low-cost or free programs are offered by many hospitals, local chapters of the American Lung Association (108-426-9296) and the American Cancer Society (570-638-6560). Support at home is important too. Non-smokers can help by offering praise and encouragement. If the smoker fails to quit, encourage them to try again!    OVER-THE-COUNTER MEDICINES:  For those who can't quit on their own, Nicotine Replacement Therapy (NRT) may make quitting much easier. Certain aids such as the nicotine patch, gum and lozenge are available without a prescription. However, it is best to use these under the guidance of your doctor. The skin patch provides a steady supply of nicotine to the body. Nicotine gum and lozenge gives temporary bursts of low levels of nicotine. Both methods take the edge off the craving for cigarettes. WARNING: If you feel symptoms of nicotine overdose, such as nausea, vomiting, dizziness, weakness, or fast heartbeat, stop using these and see your doctor.    PRESCRIPTION MEDICINES:  After evaluating your smoking patterns and prior attempts at quitting, your doctor may offer a prescription medicine such as bupropion (Zyban, Wellbutrin), varenicline (Chantix, Champix), a niocotine inhaler or nasal spray. Each has its unique advantage and side effects which your doctor can review with you.    HEALTH  BENEFITS OF QUITTING:  The benefits of quitting start right away and keep improving the longer you go without smokin minutes: blood pressure and pulse return to normal  8 hours: oxygen levels return to normal  2 days: ability to smell and taste begins to improve as damaged nerves start to regrow  2-3 weeks: circulation and lung function improves  1-9 months: decreased cough, congestion and shortness of breath; less tired  1 year: risk of heart attack decreases by half  5 years: risk of lung cancer decreases by half; risk of stroke becomes the same as a non-smoker  For information about how to quit smoking, visit the following links:  National Cancer [place] ,   Clearing the Air, Quit Smoking Today   - an online booklet. http://www.smokefree.gov/pubs/clearing_the_air.pdf  Smokefree.gov http://smokefree.gov/  QuitNet http://www.quitnet.com/    8020-6890 Richelle Islas, [address] [place] [address], [place] [address]. All rights reserved. This information is not intended as a substitute for professional medical care. Always follow your healthcare professional's instructions.

## 2017-09-07 NOTE — PROGRESS NOTES
Body mass index is 30.57 kg/(m^2).  Weight management plan: Discussed healthy diet and exercise guidelines and patient will follow up in 12 months in clinic to re-evaluate.     Percy Deshpande MD

## 2017-09-07 NOTE — PROGRESS NOTES
ANTICOAGULATION FOLLOW-UP CLINIC VISIT    Patient Name:  Bucky Edgar  Date:  9/7/2017  Contact Type:  Telephone    SUBJECTIVE:     Patient Findings     Positives Initiation of therapy, Unexplained INR or factor level change           OBJECTIVE    INR Point of Care   Date Value Ref Range Status   09/07/2017 1.3 (H) 0.86 - 1.14 Final     Comment:     This test is intended for monitoring Coumadin therapy.  Results are not   accurate in patients with prolonged INR due to factor deficiency.         ASSESSMENT / PLAN  INR assessment SUB    Recheck INR In: 4 DAYS    INR Location Outside lab      Anticoagulation Summary as of 9/7/2017     INR goal 2.0-3.0   Today's INR 1.3!   Maintenance plan No maintenance plan   Full instructions 9/7: 10 mg; 9/8: 7.5 mg; 9/9: 10 mg; 9/10: 7.5 mg; Otherwise No maintenance plan   Next INR check 9/11/2017   Target end date     Indications   Long-term (current) use of anticoagulants [Z79.01] [Z79.01]  Deep vein thrombosis (DVT) (H) [I82.409] [I82.409]         Anticoagulation Episode Summary     INR check location     Preferred lab     Send INR reminders to [facility] MIGDALIA    Comments       Anticoagulation Care Providers     Provider Role Specialty Phone number    Percy Deshpande MD [facility] [phone]            See the Encounter Report to view Anticoagulation Flowsheet and Dosing Calendar (Go to Encounters tab in chart review, and find the Anticoagulation Therapy Visit)    Dosage adjustment made based on physician directed care plan.    Allie Menard RN

## 2017-09-11 ENCOUNTER — ANTICOAGULATION THERAPY VISIT (OUTPATIENT)
Dept: ANTICOAGULATION | Facility: OTHER | Age: 62
End: 2017-09-11
Payer: COMMERCIAL

## 2017-09-11 DIAGNOSIS — Z79.01 LONG-TERM (CURRENT) USE OF ANTICOAGULANTS: ICD-10-CM

## 2017-09-11 DIAGNOSIS — I82.409 DEEP VEIN THROMBOSIS (DVT) (H): ICD-10-CM

## 2017-09-11 LAB — INR POINT OF CARE: 2.5 (ref 0.86–1.14)

## 2017-09-11 PROCEDURE — 36416 COLLJ CAPILLARY BLOOD SPEC: CPT

## 2017-09-11 PROCEDURE — 85610 PROTHROMBIN TIME: CPT | Mod: QW

## 2017-09-11 PROCEDURE — 99207 ZZC NO CHARGE NURSE ONLY: CPT

## 2017-09-11 RX ORDER — WARFARIN SODIUM 5 MG/1
TABLET ORAL
Qty: 50 TABLET | Refills: 0 | Status: SHIPPED | OUTPATIENT
Start: 2017-09-11 | End: 2017-10-12

## 2017-09-11 NOTE — PROGRESS NOTES
ANTICOAGULATION FOLLOW-UP CLINIC VISIT    Patient Name:  Bucky Edgar  Date:  9/11/2017  Contact Type:  Face to Face    SUBJECTIVE:     Patient Findings     Positives No Problem Findings           OBJECTIVE    INR Protime   Date Value Ref Range Status   09/11/2017 2.5 (A) 0.86 - 1.14 Final       ASSESSMENT / PLAN  INR assessment THER    Recheck INR In: 1 WEEK    INR Location Clinic      Anticoagulation Summary as of 9/11/2017     INR goal 2.0-3.0   Today's INR 2.5   Maintenance plan 10 mg (5 mg x 2) on Thu; 7.5 mg (5 mg x 1.5) all other days   Full instructions 10 mg on Thu; 7.5 mg all other days   Weekly total 55 mg   Plan last modified Janeth Mccarthy RN (9/11/2017)   Next INR check 9/18/2017   Target end date     Indications   Long-term (current) use of anticoagulants [Z79.01] [Z79.01]  Deep vein thrombosis (DVT) (H) [I82.409] [I82.409]         Anticoagulation Episode Summary     INR check location     Preferred lab     Send INR reminders to [facility] MIGDALIA    Comments       Anticoagulation Care Providers     Provider Role Specialty Phone number    Percy Deshpande MD [facility] [phone]            See the Encounter Report to view Anticoagulation Flowsheet and Dosing Calendar (Go to Encounters tab in chart review, and find the Anticoagulation Therapy Visit)    Dosage adjustment made based on physician directed care plan.    Janeth Mccarthy RN

## 2017-09-11 NOTE — MR AVS SNAPSHOT
Bucky CHRISTINE Edgar   9/11/2017 1:15 PM   Anticoagulation Therapy Visit    Description:  62 year old male   Provider:  ER ANTI COAG   Department:  Er Anticoag           INR as of 9/11/2017     Today's INR 2.5      Anticoagulation Summary as of 9/11/2017     INR goal 2.0-3.0   Today's INR 2.5   Full instructions 10 mg on Thu; 7.5 mg all other days   Next INR check 9/18/2017    Indications   Long-term (current) use of anticoagulants [Z79.01] [Z79.01]  Deep vein thrombosis (DVT) (H) [I82.409] [I82.409]         Your next Anticoagulation Clinic appointment(s)     Sep 18, 2017 11:15 AM CDT   [facility] with ER [facility]AG   [facility]    [address] [facility] [address]   [phone]              Contact Numbers     Clinic Number:         September 2017 Details    Sun Mon Tue Wed Thu Fri Sat          1               2                 3               4               5               6               7               8               9                 10               11      7.5 mg   See details      12      7.5 mg         13      7.5 mg         14      10 mg         15      7.5 mg         16      7.5 mg           17      7.5 mg         18            19               20               21               22               23                 24               25               26               27               28               29               30                Date Details   09/11 This INR check       Date of next INR:  9/18/2017         How to take your warfarin dose     To take:  7.5 mg Take 1.5 of the 5 mg tablets.    To take:  10 mg Take 2 of the 5 mg tablets.

## 2017-09-18 ENCOUNTER — ANTICOAGULATION THERAPY VISIT (OUTPATIENT)
Dept: ANTICOAGULATION | Facility: OTHER | Age: 62
End: 2017-09-18
Payer: COMMERCIAL

## 2017-09-18 DIAGNOSIS — Z79.01 LONG-TERM (CURRENT) USE OF ANTICOAGULANTS: ICD-10-CM

## 2017-09-18 DIAGNOSIS — I82.409 DEEP VEIN THROMBOSIS (DVT) (H): ICD-10-CM

## 2017-09-18 LAB — INR POINT OF CARE: 4.3 (ref 0.86–1.14)

## 2017-09-18 PROCEDURE — 85610 PROTHROMBIN TIME: CPT | Mod: QW

## 2017-09-18 PROCEDURE — 99207 ZZC NO CHARGE NURSE ONLY: CPT

## 2017-09-18 PROCEDURE — 36416 COLLJ CAPILLARY BLOOD SPEC: CPT

## 2017-09-18 NOTE — MR AVS SNAPSHOT
Bucky CHRISTINE Edgar   9/18/2017 11:15 AM   Anticoagulation Therapy Visit    Description:  62 year old male   Provider:  ER ANTI COAG   Department:  Er Anticoag           INR as of 9/18/2017     Today's INR 4.3!      Anticoagulation Summary as of 9/18/2017     INR goal 2.0-3.0   Today's INR 4.3!   Full instructions 9/18: 2.5 mg; Otherwise 5 mg on Tue; 7.5 mg all other days   Next INR check 9/25/2017    Indications   Long-term (current) use of anticoagulants [Z79.01] [Z79.01]  Deep vein thrombosis (DVT) (H) [I82.409] [I82.409]         Your next Anticoagulation Clinic appointment(s)     Sep 25, 2017 11:00 AM CDT   Anticoagulation Visit with ER ANTI COAG   [facility]    [address] [facility] [address]   [phone]              Contact Numbers     Clinic Number:         September 2017 Details    Sun Mon Tue Wed Thu Fri Sat          1               2                 3               4               5               6               7               8               9                 10               11               12               13               14               15               16                 17               18      2.5 mg   See details      19      5 mg         20      7.5 mg         21      7.5 mg         22      7.5 mg         23      7.5 mg           24      7.5 mg         25            26               27               28               29               30                Date Details   09/18 This INR check       Date of next INR:  9/25/2017         How to take your warfarin dose     To take:  2.5 mg Take 0.5 of a 5 mg tablet.    To take:  5 mg Take 1 of the 5 mg tablets.    To take:  7.5 mg Take 1.5 of the 5 mg tablets.

## 2017-09-18 NOTE — PROGRESS NOTES
ANTICOAGULATION FOLLOW-UP CLINIC VISIT    Patient Name:  Bucky Edgar  Date:  9/18/2017  Contact Type:  Face to Face    SUBJECTIVE:     Patient Findings     Positives Initiation of therapy, Unexplained INR or factor level change           OBJECTIVE    INR Protime   Date Value Ref Range Status   09/18/2017 4.3 (A) 0.86 - 1.14 Final       ASSESSMENT / PLAN  INR assessment SUPRA    Recheck INR In: 1 WEEK    INR Location Clinic      Anticoagulation Summary as of 9/18/2017     INR goal 2.0-3.0   Today's INR 4.3!   Maintenance plan 5 mg (5 mg x 1) on Tue; 7.5 mg (5 mg x 1.5) all other days   Full instructions 9/18: 2.5 mg; Otherwise 5 mg on Tue; 7.5 mg all other days   Weekly total 50 mg   Plan last modified Janeth Mccarthy RN (9/18/2017)   Next INR check 9/25/2017   Target end date     Indications   Long-term (current) use of anticoagulants [Z79.01] [Z79.01]  Deep vein thrombosis (DVT) (H) [I82.409] [I82.409]         Anticoagulation Episode Summary     INR check location     Preferred lab     Send INR reminders to [facility]    Comments PM dose, 5 mg tabs        Anticoagulation Care Providers     Provider Role Specialty Phone number    Percy Deshpande MD [facility] [phone]            See the Encounter Report to view Anticoagulation Flowsheet and Dosing Calendar (Go to Encounters tab in chart review, and find the Anticoagulation Therapy Visit)    Dosage adjustment made based on physician directed care plan.    Janeth Mccarthy RN

## 2017-09-25 ENCOUNTER — ANTICOAGULATION THERAPY VISIT (OUTPATIENT)
Dept: ANTICOAGULATION | Facility: OTHER | Age: 62
End: 2017-09-25
Payer: COMMERCIAL

## 2017-09-25 DIAGNOSIS — I82.409 DEEP VEIN THROMBOSIS (DVT) (H): ICD-10-CM

## 2017-09-25 DIAGNOSIS — Z79.01 LONG-TERM (CURRENT) USE OF ANTICOAGULANTS: ICD-10-CM

## 2017-09-25 LAB — INR POINT OF CARE: 3.2 (ref 0.86–1.14)

## 2017-09-25 PROCEDURE — 85610 PROTHROMBIN TIME: CPT | Mod: QW

## 2017-09-25 PROCEDURE — 99207 ZZC NO CHARGE NURSE ONLY: CPT

## 2017-09-25 PROCEDURE — 36416 COLLJ CAPILLARY BLOOD SPEC: CPT

## 2017-09-25 NOTE — PROGRESS NOTES
ANTICOAGULATION FOLLOW-UP CLINIC VISIT    Patient Name:  Bucky Edgar  Date:  9/25/2017  Contact Type:  Face to Face    SUBJECTIVE:     Patient Findings     Positives Initiation of therapy, No Problem Findings           OBJECTIVE    INR Protime   Date Value Ref Range Status   09/25/2017 3.2 (A) 0.86 - 1.14 Final       ASSESSMENT / PLAN  INR assessment THER    Recheck INR In: 1 WEEK    INR Location Clinic      Anticoagulation Summary as of 9/25/2017     INR goal 2.0-3.0   Today's INR 3.2!   Maintenance plan 5 mg (5 mg x 1) on Mon, Wed, Fri; 7.5 mg (5 mg x 1.5) all other days   Full instructions 9/25: 5 mg; 9/26: 7.5 mg; 9/27: 5 mg; 9/29: 5 mg; Otherwise 5 mg on Mon, Wed, Fri; 7.5 mg all other days   Weekly total 45 mg   Plan last modified Larry Hernandez RN (9/25/2017)   Next INR check 10/2/2017   Target end date     Indications   Long-term (current) use of anticoagulants [Z79.01] [Z79.01]  Deep vein thrombosis (DVT) (H) [I82.409] [I82.409]         Anticoagulation Episode Summary     INR check location     Preferred lab     Send INR reminders to [facility] MIGDALIA    Comments PM dose, 5 mg tabs        Anticoagulation Care Providers     Provider Role Specialty Phone number    Percy Deshpande MD [facility] [phone]            See the Encounter Report to view Anticoagulation Flowsheet and Dosing Calendar (Go to Encounters tab in chart review, and find the Anticoagulation Therapy Visit)    Dosage adjustment made based on physician directed care plan.    Larry Hernandez RN

## 2017-09-25 NOTE — MR AVS SNAPSHOT
Bucky CHRISTINE Edgar   9/25/2017 11:00 AM   Anticoagulation Therapy Visit    Description:  62 year old male   Provider:  [facility]   Department:  [facility]           INR as of 9/25/2017     Today's INR 3.2!      Anticoagulation Summary as of 9/25/2017     INR goal 2.0-3.0   Today's INR 3.2!   Full instructions 9/25: 5 mg; 9/26: 7.5 mg; 9/27: 5 mg; 9/29: 5 mg; Otherwise 5 mg on Mon, Wed, Fri; 7.5 mg all other days   Next INR check 10/2/2017    Indications   Long-term (current) use of anticoagulants [Z79.01] [Z79.01]  Deep vein thrombosis (DVT) (H) [I82.409] [I82.409]         Your next Anticoagulation Clinic appointment(s)     Oct 02, 2017 11:00 AM CDT   [facility]    [address] [place] [address]   [phone]              Contact Numbers     Clinic Number:         September 2017 Details    Sun Mon Tue Wed Thu Fri Sat          1               2                 3               4               5               6               7               8               9                 10               11               12               13               14               15               16                 17               18               19               20               21               22               23                 24               25      5 mg   See details      26      7.5 mg         27      5 mg         28      7.5 mg         29      5 mg         30      7.5 mg          Date Details   09/25 This INR check               How to take your warfarin dose     To take:  5 mg Take 1 of the 5 mg tablets.    To take:  7.5 mg Take 1.5 of the 5 mg tablets.           October 2017 Details    Sun Mon Tue Wed Thu Fri Sat     1      7.5 mg         2            3               4               5               6               7                 8               9               10               11               12               13                14                 15               16               17               18               19               20               21                 22               23               24               25               26               27               28                 29               30               31                    Date Details   No additional details    Date of next INR:  10/2/2017         How to take your warfarin dose     To take:  5 mg Take 1 of the 5 mg tablets.    To take:  7.5 mg Take 1.5 of the 5 mg tablets.

## 2017-10-02 ENCOUNTER — ANTICOAGULATION THERAPY VISIT (OUTPATIENT)
Dept: ANTICOAGULATION | Facility: OTHER | Age: 62
End: 2017-10-02
Payer: COMMERCIAL

## 2017-10-02 DIAGNOSIS — Z79.01 LONG-TERM (CURRENT) USE OF ANTICOAGULANTS: ICD-10-CM

## 2017-10-02 DIAGNOSIS — I82.409 DEEP VEIN THROMBOSIS (DVT) (H): ICD-10-CM

## 2017-10-02 LAB — INR POINT OF CARE: 4.4 (ref 0.86–1.14)

## 2017-10-02 PROCEDURE — 85610 PROTHROMBIN TIME: CPT | Mod: QW

## 2017-10-02 PROCEDURE — 36416 COLLJ CAPILLARY BLOOD SPEC: CPT

## 2017-10-02 PROCEDURE — 99207 ZZC NO CHARGE NURSE ONLY: CPT

## 2017-10-02 NOTE — MR AVS SNAPSHOT
Bucky CHRISTINE Edgar   10/2/2017 11:00 AM   Anticoagulation Therapy Visit    Description:  62 year old male   Provider:  [facility]   Department:  [facility] Antico[facility]           INR as of 10/2/2017     Today's INR 4.4!      Anticoagulation Summary as of 10/2/2017     INR goal 2.0-3.0   Today's INR 4.4!   Full instructions 10/2: 2.5 mg; Otherwise 7.5 mg on Mon, Fri; 5 mg all other days   Next INR check 10/12/2017    Indications   Long-term (current) use of anticoagulants [Z79.01] [Z79.01]  Deep vein thrombosis (DVT) (H) [I82.409] [I82.409]         Your next Anticoagulation Clinic appointment(s)     Oct 12, 2017 10:45 AM CDT   An[facility]agulation Visit with [facility]   [facility]    [address] [facility] [address]   [phone]              Contact Numbers     Clinic Number:         October 2017 Details    Sun Mon Tue Wed Thu Fri Sat     1               2      2.5 mg   See details      3      5 mg         4      5 mg         5      5 mg         6      7.5 mg         7      5 mg           8      5 mg         9      7.5 mg         10      5 mg         11      5 mg         12            13               14                 15               16               17               18               19               20               21                 22               23               24               25               26               27               28                 29               30               31                    Date Details   10/02 This INR check       Date of next INR:  10/12/2017         How to take your warfarin dose     To take:  2.5 mg Take 0.5 of a 5 mg tablet.    To take:  5 mg Take 1 of the 5 mg tablets.    To take:  7.5 mg Take 1.5 of the 5 mg tablets.

## 2017-10-02 NOTE — PROGRESS NOTES
ANTICOAGULATION FOLLOW-UP CLINIC VISIT    Patient Name:  Bucky Edgar  Date:  10/2/2017  Contact Type:  Face to Face    SUBJECTIVE:     Patient Findings     Positives Initiation of therapy, No Problem Findings, Unexplained INR or factor level change           OBJECTIVE    INR Protime   Date Value Ref Range Status   10/02/2017 4.4 (A) 0.86 - 1.14 Final       ASSESSMENT / PLAN  INR assessment SUPRA    Recheck INR In: 9 DAYS    INR Location Clinic      Anticoagulation Summary as of 10/2/2017     INR goal 2.0-3.0   Today's INR 4.4!   Maintenance plan 7.5 mg (5 mg x 1.5) on Mon, Fri; 5 mg (5 mg x 1) all other days   Full instructions 10/2: 2.5 mg; Otherwise 7.5 mg on Mon, Fri; 5 mg all other days   Weekly total 40 mg   Plan last modified Janeth Mccarthy RN (10/2/2017)   Next INR check 10/12/2017   Target end date     Indications   Long-term (current) use of anticoagulants [Z79.01] [Z79.01]  Deep vein thrombosis (DVT) (H) [I82.409] [I82.409]         Anticoagulation Episode Summary     INR check location     Preferred lab     Send INR reminders to [facility] MIGDALIA    Comments PM dose, 5 mg tabs        Anticoagulation Care Providers     Provider Role Specialty Phone number    Percy Deshpande MD [facility] [phone]            See the Encounter Report to view Anticoagulation Flowsheet and Dosing Calendar (Go to Encounters tab in chart review, and find the Anticoagulation Therapy Visit)    Dosage adjustment made based on physician directed care plan.    Janeth Mccarthy RN

## 2017-10-12 ENCOUNTER — ANTICOAGULATION THERAPY VISIT (OUTPATIENT)
Dept: ANTICOAGULATION | Facility: OTHER | Age: 62
End: 2017-10-12
Payer: COMMERCIAL

## 2017-10-12 DIAGNOSIS — I82.409 DEEP VEIN THROMBOSIS (DVT) (H): ICD-10-CM

## 2017-10-12 DIAGNOSIS — Z79.01 LONG-TERM (CURRENT) USE OF ANTICOAGULANTS: ICD-10-CM

## 2017-10-12 LAB — INR POINT OF CARE: 2.3 (ref 0.86–1.14)

## 2017-10-12 PROCEDURE — 36416 COLLJ CAPILLARY BLOOD SPEC: CPT

## 2017-10-12 PROCEDURE — 99207 ZZC NO CHARGE NURSE ONLY: CPT

## 2017-10-12 PROCEDURE — 85610 PROTHROMBIN TIME: CPT | Mod: QW

## 2017-10-12 RX ORDER — WARFARIN SODIUM 5 MG/1
TABLET ORAL
Qty: 50 TABLET | Refills: 0 | COMMUNITY
Start: 2017-10-12 | End: 2017-11-28 | Stop reason: DRUGHIGH

## 2017-10-12 NOTE — PROGRESS NOTES
ANTICOAGULATION FOLLOW-UP CLINIC VISIT    Patient Name:  Bucky Edgar  Date:  10/12/2017  Contact Type:  Face to Face    SUBJECTIVE:     Patient Findings     Positives No Problem Findings           OBJECTIVE    INR Protime   Date Value Ref Range Status   10/12/2017 2.3 (A) 0.86 - 1.14 Final       ASSESSMENT / PLAN  INR assessment THER    Recheck INR In: 2 WEEKS    INR Location Clinic      Anticoagulation Summary as of 10/12/2017     INR goal 2.0-3.0   Today's INR 2.3   Maintenance plan 7.5 mg (5 mg x 1.5) on Mon, Fri; 5 mg (5 mg x 1) all other days   Full instructions 7.5 mg on Mon, Fri; 5 mg all other days   Weekly total 40 mg   No change documented Janeth Mccarthy RN   Plan last modified Janeth Mccarthy RN (10/2/2017)   Next INR check 10/24/2017   Target end date     Indications   Long-term (current) use of anticoagulants [Z79.01] [Z79.01]  Deep vein thrombosis (DVT) (H) [I82.409] [I82.409]         Anticoagulation Episode Summary     INR check location     Preferred lab     Send INR reminders to [facility]IGDALIA    Comments PM dose, 5 mg tabs        Anticoagulation Care Providers     Provider Role Specialty Phone number    Percy Deshpande MD [facility] [phone]            See the Encounter Report to view Anticoagulation Flowsheet and Dosing Calendar (Go to Encounters tab in chart review, and find the Anticoagulation Therapy Visit)    Dosage adjustment made based on physician directed care plan.    Janeth Mccarthy RN

## 2017-10-12 NOTE — MR AVS SNAPSHOT
Bucky CHRISTINE Edgar   10/12/2017 10:45 AM   Anticoagulation Therapy Visit    Description:  62 year old male   Provider:  [facility]   Department:  [facility] Anticoag           INR as of 10/12/2017     Today's INR 2.3      Anticoagulation Summary as of 10/12/2017     INR goal 2.0-3.0   Today's INR 2.3   Full instructions 7.5 mg on Mon, Fri; 5 mg all other days   Next INR check 10/24/2017    Indications   Long-term (current) use of anticoagulants [Z79.01] [Z79.01]  Deep vein thrombosis (DVT) (H) [I82.409] [I82.409]         Your next Anticoagulation Clinic appointment(s)     Oct 12, 2017 10:45 AM CDT   [facility]    [address] [facility] [address]   [phone]            Oct 24, 2017 11:00 AM CDT   [facility]   [facility]    [address] [facility] [address]   [phone]              Contact Numbers     Clinic Number:         October 2017 Details    Sun Mon Tue Wed Thu Fri Sat     1               2               3               4               5               6               7                 8               9               10               11               12      5 mg   See details      13      7.5 mg         14      5 mg           15      5 mg         16      7.5 mg         17      5 mg         18      5 mg         19      5 mg         20      7.5 mg         21      5 mg           22      5 mg         23      7.5 mg         24            25               26               27               28                 29               30               31                    Date Details   10/12 This INR check       Date of next INR:  10/24/2017         How to take your warfarin dose     To take:  5 mg Take 1 of the 5 mg tablets.    To take:  7.5 mg Take 1.5 of the 5 mg tablets.

## 2017-10-17 NOTE — PROGRESS NOTES
"  SUBJECTIVE:                                                    Bucky Edgar is a 62 year old male who presents to clinic today for the following health issues:      History of Present Illness     Diet:  Low salt  Frequency of exercise:  2-3 days/week  Duration of exercise:  30-45 minutes  Taking medications regularly:  Yes  Medication side effects:  None  Additional concerns today:  No      Concern - follow up blood clot --    This patient is currently stable on warfarin therapy. He is completed approximately 2 months of anticoagulation post extensive deep venous thrombosis involving the left lower extremity. He did not have any pulmonary emboli. Was relatively unprovoked but no major history of DVTs. Initial screening studies apparently did not indicate hypercoagulable markers. He continues to have decreasing swelling of his left lower extremity. His knee is no longer painful. This had no respiratory changes.    Plan is for 6 months of anticoagulation and then likely discontinuation. He did require invasive thrombolytic therapy and intervention from the extensive clot.    Onset: 2 months ago, had surgery     Description:   Patient says he still have pain in his left leg, slightly swelling, dulled pain, discoloration around the ankle same as last time, no new symptom. Patient mentioned about pain in left elbow onset for week, don't recall hurting, it hurt to use the left elbow or \" glass of water\"     Intensity: 6/10    Progression of Symptoms:  Improving in left leg and worsening in elbow     Accompanying Signs & Symptoms:  \"it hurt and feels weird  \":     Previous history of similar problem:   None     Precipitating factors:   Worsened by: movement hurt the elbow     Alleviating factors:  Improved by: none     Therapies Tried and outcome: ice,and tylenol      Problem list and histories reviewed & adjusted, as indicated.  Additional history: as documented      Hypertension Follow-up  --  has been " compliant with medications according to the patient.       Outpatient blood pressures are being checked at home.  Results are usually less than 140/90 over the last number of weeks. Over the last weekend, the patient consumed a good deal of alcohol and likely salt-containing foods. Blood pressure noted to be quite elevated today..    Low Salt Diet: Was not monitoring salt or alcohol over the past weekend.            ROS:  C: NEGATIVE for fever, chills, change in weight  I: NEGATIVE for worrisome rashes, moles or lesions  E: NEGATIVE for vision changes or irritation  E/M: NEGATIVE for ear, mouth and throat problems  R: NEGATIVE for significant cough or SOB  CV: NEGATIVE for chest pain, palpitations or peripheral edema  CV: Improving venous return of the left lower extremity. Completed 2 months of anticoagulation.  GI: NEGATIVE for nausea, abdominal pain, heartburn, or change in bowel habits  : NEGATIVE for frequency, dysuria, or hematuria  M: NEGATIVE for significant arthralgias or myalgia  MUSCULOSKELETAL: Improving left lower extremity swelling. As insidious onset of pain in his left elbow with gripping, localized at the left lateral epicondyle. No cervical pain.  N: NEGATIVE for weakness, dizziness or paresthesias  E: NEGATIVE for temperature intolerance, skin/hair changes  H: NEGATIVE for bleeding problems  P: NEGATIVE for changes in mood or affect    OBJECTIVE:                                                    BP (!) 180/100  Pulse 59  Temp 99.3  F (37.4  C) (Temporal)  Wt 221 lb (100.2 kg)  SpO2 96%  BMI 32.64 kg/m2  Body mass index is 32.64 kg/(m^2).  GENERAL: alert, active, no distress and cooperative  EYES: Eyes grossly normal to inspection, extraocular movements - intact, and PERRL  HENT: ear canals- normal; TMs- normal; Nose- normal; Mouth- no ulcers, no lesions  NECK: no tenderness, no adenopathy, no asymmetry, no masses, no stiffness; thyroid- normal to palpation  NECK: No carotid bruits  noted  RESP: lungs clear to auscultation - no rales, no rhonchi, no wheezes  CV: regular rates and rhythm, normal S1 S2, no S3 or S4 and no murmur, no click or rub -  ABDOMEN: soft, no tenderness, no  hepatosplenomegaly, no masses, normal bowel sounds  MS: extremities- no gross deformities noted, no edema  MS: Mild peripheral edema of the left lower extremity but otherwise stable circulatory status. CMS otherwise intact. Tender over the left lateral epicondylar area with pain with gripping.  SKIN: no suspicious lesions, no rashes  NEURO: strength and tone- normal, sensory exam- grossly normal, mentation- intact, speech- normal, reflexes- symmetric  BACK: no CVA tenderness, no paralumbar tenderness  PSYCH: Alert and oriented times 3; speech- coherent , normal rate and volume; able to articulate logical thoughts, able to abstract reason, no tangential thoughts, no hallucinations or delusions, affect- normal  LYMPHATICS: ant. cervical- normal, post. cervical- normal, axillary- normal, supraclavicular- normal, inguinal- normal    Diagnostic test results:  Diagnostic Test Results:  none        ASSESSMENT/PLAN:                                                    1. Acute deep vein thrombosis (DVT) of other specified vein of left lower extremity (H)  2 months post anticoagulation for extensive deep venous thrombosis of the left lower extremity. We'll plan on completing 6 months of anticoagulation followed by discontinuation of warfarin and monitor. No symptoms consistent with progression for pulmonary emboli.    2. HTN, goal below 140/90   Blood pressure markedly elevated today. Suspect secondary to heavy alcohol use in salt intake over the last weekend. He'll monitor his blood pressure home and return for a nurse visit in 11 week. He'll be seen in 3-4 weeks for a scheduled appointment. Currently we'll continue the antihypertensives as ordered.    3. Epicondylitis, lateral, left  Patient has a tennis elbow support and at  home. He'll use this to splint the force on the left lateral epicondylar area. Short course of Medrol for anticoagulation.  - methylprednisolone (MEDROL DOSEPAK) 4 MG tablet; Follow package instructions  Dispense: 21 tablet; Refill: 0    4. Need for prophylactic vaccination and inoculation against influenza  Given today.  - FLU VAC, SPLIT VIRUS IM > 3 YO (QUADRIVALENT) [95191]  - Vaccine Administration, Initial [93813]      Follow up with Provider - Follow-up in mid November as planned.   See Patient Instructions    The patient understood the rational for the diagnosis and treatment plan. All questions were answered to best of my ability and the patient's satisfaction.    Note: Chart documentation done in part with Dragon Voice Recognition software. Although reviewed after completion, some word and grammatical errors may remain.  Please consider this when interpreting information in this chart.      Percy Deshpande MD  [facility]

## 2017-10-23 ENCOUNTER — OFFICE VISIT (OUTPATIENT)
Dept: FAMILY MEDICINE | Facility: CLINIC | Age: 62
End: 2017-10-23
Payer: COMMERCIAL

## 2017-10-23 VITALS
BODY MASS INDEX: 32.64 KG/M2 | DIASTOLIC BLOOD PRESSURE: 100 MMHG | OXYGEN SATURATION: 96 % | HEART RATE: 59 BPM | WEIGHT: 221 LBS | SYSTOLIC BLOOD PRESSURE: 180 MMHG | TEMPERATURE: 99.3 F

## 2017-10-23 DIAGNOSIS — M77.12 EPICONDYLITIS, LATERAL, LEFT: ICD-10-CM

## 2017-10-23 DIAGNOSIS — Z23 NEED FOR PROPHYLACTIC VACCINATION AND INOCULATION AGAINST INFLUENZA: ICD-10-CM

## 2017-10-23 DIAGNOSIS — I82.492 ACUTE DEEP VEIN THROMBOSIS (DVT) OF OTHER SPECIFIED VEIN OF LEFT LOWER EXTREMITY (H): Primary | ICD-10-CM

## 2017-10-23 DIAGNOSIS — I10 HTN, GOAL BELOW 140/90: ICD-10-CM

## 2017-10-23 PROCEDURE — 90686 IIV4 VACC NO PRSV 0.5 ML IM: CPT | Performed by: FAMILY MEDICINE

## 2017-10-23 PROCEDURE — 90471 IMMUNIZATION ADMIN: CPT | Performed by: FAMILY MEDICINE

## 2017-10-23 PROCEDURE — 99214 OFFICE O/P EST MOD 30 MIN: CPT | Mod: 25 | Performed by: FAMILY MEDICINE

## 2017-10-23 RX ORDER — METHYLPREDNISOLONE 4 MG
TABLET, DOSE PACK ORAL
Qty: 21 TABLET | Refills: 0 | Status: SHIPPED | OUTPATIENT
Start: 2017-10-23 | End: 2017-11-16

## 2017-10-23 ASSESSMENT — PAIN SCALES - GENERAL: PAINLEVEL: SEVERE PAIN (6)

## 2017-10-23 NOTE — NURSING NOTE
"Chief Complaint   Patient presents with     Deep Vein Thrombosis     follow up blood clot      Panel Management     flu, honoring choice ,       Initial BP (!) 190/116  Pulse 66  Temp 99.3  F (37.4  C) (Temporal)  Wt 221 lb (100.2 kg)  SpO2 96%  BMI 32.64 kg/m2 Estimated body mass index is 32.64 kg/(m^2) as calculated from the following:    Height as of 9/7/17: 5' 9\" (1.753 m).    Weight as of this encounter: 221 lb (100.2 kg).  Medication Reconciliation: complete     Alexandro Sunshine MA    "

## 2017-10-23 NOTE — MR AVS SNAPSHOT
After Visit Summary   10/23/2017    Bucky Edgar    MRN: [id number]           Patient Information     Date Of Birth          1955        Visit Information        Provider Department      10/23/2017 11:20 AM Percy Deshpande MD [facility]        Today's Diagnoses     Acute deep vein thrombosis (DVT) of other specified vein of left lower extremity (H)    -  1    HTN, goal below 140/90        Epicondylitis, lateral, left          Care Instructions    Monitor blood pressure over the week and return next week for a blood pressure check    Tennis elbow band as discussed.    Percy Deshpande MD            Follow-ups after your visit        Follow-up notes from your care team     Return in about 3 weeks (around 11/16/2017) for Lab Work, Routine Visit.      Your next 10 appointments already scheduled     Oct 24, 2017 11:00 AM CDT   Anticoagulation Visit with ER ANTI COAG   [facility] ([facility]    [address] [facility] [address]   [phone]            Nov 16, 2017 10:00 AM CST   Office Visit with Percy Deshpande MD   [facility] ([facility]    [address], [address]  [facility] [id number] [phone]           Bring a current list of meds and any records pertaining to this visit. For Physicals, please bring immunization records and any forms needing to be filled out. Please arrive 10 minutes early to complete paperwork.              Who to contact     If you have questions or need follow up information about today's clinic visit or your schedule please contact [facility] directly at [phone].  Normal or non-critical lab and imaging results will be communicated to you by MyChart, letter or phone within 4 business days after the clinic has received the results. If you do not hear from us within 7 days, please contact the clinic through MyChart or phone. If you have a critical or  "abnormal lab result, we will notify you by phone as soon as possible.  Submit refill requests through [employer] or call your pharmacy and they will forward the refill request to us. Please allow 3 business days for your refill to be completed.          Additional Information About Your Visit        [employer] Information     [employer] lets you send messages to your doctor, view your test results, renew your prescriptions, schedule appointments and more. To sign up, go to [email or web address] . Click on \"Log in\" on the left side of the screen, which will take you to the Welcome page. Then click on \"Sign up Now\" on the right side of the page.     You will be asked to enter the access code listed below, as well as some personal information. Please follow the directions to create your username and password.     Your access code is: [email or web address]  Expires: 2017  3:50 PM     Your access code will  in 90 days. If you need help or a new code, please call your [place] clinic or [phone].        Care EveryWhere ID     This is your Care EveryWhere ID. This could be used by other organizations to access your [place] medical records  [id number]        Your Vitals Were     Pulse Temperature Pulse Oximetry BMI (Body Mass Index)          59 99.3  F (37.4  C) (Temporal) 96% 32.64 kg/m2         Blood Pressure from Last 3 Encounters:   10/23/17 (!) 180/100   17 122/84   17 108/70    Weight from Last 3 Encounters:   10/23/17 221 lb (100.2 kg)   17 207 lb (93.9 kg)   17 200 lb 9.6 oz (91 kg)              Today, you had the following     No orders found for display         Today's Medication Changes          These changes are accurate as of: 10/23/17 12:05 PM.  If you have any questions, ask your nurse or doctor.               Start taking these medicines.        Dose/Directions    methylPREDNISolone 4 MG tablet   Commonly known as:  MEDROL DOSEPAK   Used for:  Epicondylitis, lateral, left   Started " by:  Percy Deshpande MD        Follow package instructions   Quantity:  21 tablet   Refills:  0            Where to get your medicines      These medications were sent to [facility] - [place] - [address]  [address], [place] [address]    Hours:  test fax sent successfully 1/20/04  kr Phone:  [phone]     methylPREDNISolone 4 MG tablet                Primary Care Provider Office Phone # Fax #    Percy Deshpande [phone] [address]  [place] [address]        Equal Access to Services     [facility]: Hadii aad ku hadasho Soomaali, waaxda luqadaha, qaybta kaalmada adeegyada, waxay idiin haychadn leslie gonzalez . So [facility] [phone].    ATENCIÓN: Si habla español, tiene a garcia disposición servicios gratuitos de asistencia lingüística. Llame al [phone].    We comply with applicable federal civil rights laws and [place] laws. We do not discriminate on the basis of race, color, national origin, age, disability, sex, sexual orientation, or gender identity.            Thank you!     Thank you for choosing [facility]  for your care. Our goal is always to provide you with excellent care. Hearing back from our patients is one way we can continue to improve our services. Please take a few minutes to complete the written survey that you may receive in the mail after your visit with us. Thank you!             Your Updated Medication List - Protect others around you: Learn how to safely use, store and throw away your medicines at www.disposemymeds.org.          This list is accurate as of: 10/23/17 12:05 PM.  Always use your most recent med list.                   Brand Name Dispense Instructions for use Diagnosis    acetaminophen 325 MG tablet    TYLENOL     Take 650 mg by mouth every 6 hours        albuterol 108 (90 BASE) MCG/ACT Inhaler    PROAIR HFA/PROVENTIL HFA/VENTOLIN HFA    3 Inhaler     Inhale 2 puffs into the lungs every 6 hours as needed for shortness of breath / dyspnea    Chronic bronchitis with COPD (chronic obstructive pulmonary disease) (H)       carvedilol 25 MG tablet    COREG    60 tablet    Take 1 tablet (25 mg) by mouth 2 times daily (with meals)    HTN, goal below 140/90       lisinopril 40 MG tablet    PRINIVIL/ZESTRIL    30 tablet    Take 1 tablet (40 mg) by mouth daily    HTN, goal below 140/90       methylPREDNISolone 4 MG tablet    MEDROL DOSEPAK    21 tablet    Follow package instructions    Epicondylitis, lateral, left       NICORETTE 2 MG lozenge   Generic drug:  nicotine polacrilex      Place 2 mg inside cheek every hour as needed for smoking cessation        * NICOTINE STEP 1 21 MG/24HR 24 hr patch   Generic drug:  nicotine      ROBBY 1 PATCH TO SKIN ONCE D X 42 DAYS        * nicotine 21 MG/24HR 24 hr patch    NICODERM CQ    28 patch    Place 1 patch onto the skin every 24 hours    Tobacco abuse counseling       sennosides 8.6 MG tablet    SENOKOT     TK 1 T PO BID        warfarin 5 MG tablet    COUMADIN    50 tablet    Take 7.5 mg Mon & Fri and 5 mg all other days or as directed by coumadin clinic    Long-term (current) use of anticoagulants, Deep vein thrombosis (DVT) (H)       * Notice:  This list has 2 medication(s) that are the same as other medications prescribed for you. Read the directions carefully, and ask your doctor or other care provider to review them with you.

## 2017-10-23 NOTE — PATIENT INSTRUCTIONS
Monitor blood pressure over the week and return next week for a blood pressure check    Tennis elbow band as discussed.    Percy Deshpande MD

## 2017-10-24 ENCOUNTER — ANTICOAGULATION THERAPY VISIT (OUTPATIENT)
Dept: ANTICOAGULATION | Facility: OTHER | Age: 62
End: 2017-10-24
Payer: COMMERCIAL

## 2017-10-24 DIAGNOSIS — Z79.01 LONG-TERM (CURRENT) USE OF ANTICOAGULANTS: ICD-10-CM

## 2017-10-24 DIAGNOSIS — I82.409 DEEP VEIN THROMBOSIS (DVT) (H): ICD-10-CM

## 2017-10-24 LAB — INR POINT OF CARE: 2.5 (ref 0.86–1.14)

## 2017-10-24 PROCEDURE — 99207 ZZC NO CHARGE NURSE ONLY: CPT

## 2017-10-24 PROCEDURE — 85610 PROTHROMBIN TIME: CPT | Mod: QW

## 2017-10-24 PROCEDURE — 36416 COLLJ CAPILLARY BLOOD SPEC: CPT

## 2017-10-24 NOTE — MR AVS SNAPSHOT
Bucky Edgar   10/24/2017 11:00 AM   Anticoagulation Therapy Visit    Description:  62 year old male   Provider:  ER ANTI COAG   Department:  Er Anticoag           INR as of 10/24/2017     Today's INR 2.5      Anticoagulation Summary as of 10/24/2017     INR goal 2.0-3.0   Today's INR 2.5   Full instructions 7.5 mg on Mon, Fri; 5 mg all other days   Next INR check 11/14/2017    Indications   Long-term (current) use of anticoagulants [Z79.01] [Z79.01]  Deep vein thrombosis (DVT) (H) [I82.409] [I82.409]         Your next Anticoagulation Clinic appointment(s)     Nov 14, 2017 10:45 AM CST   An[facility]agulation Visit with ER ANTI COAG   [facility]    [address] [address]   [phone]              Contact Numbers     Clinic Number:         October 2017 Details    Sun Mon Tue Wed Thu Fri Sat     1               2               3               4               5               6               7                 8               9               10               11               12               13               14                 15               16               17               18               19               20               21                 22               23               24      5 mg   See details      25      5 mg         26      5 mg         27      7.5 mg         28      5 mg           29      5 mg         30      7.5 mg         31      5 mg              Date Details   10/24 This INR check               How to take your warfarin dose     To take:  5 mg Take 1 of the 5 mg tablets.    To take:  7.5 mg Take 1.5 of the 5 mg tablets.           November 2017 Details    Sun Mon Tue Wed Thu Fri Sat        1      5 mg         2      5 mg         3      7.5 mg         4      5 mg           5      5 mg         6      7.5 mg         7      5 mg         8      5 mg         9      5 mg         10      7.5 mg         11      5 mg           12      5 mg          13      7.5 mg         14            15               16               17               18                 19               20               21               22               23               24               25                 26               27               28               29               30                  Date Details   No additional details    Date of next INR:  11/14/2017         How to take your warfarin dose     To take:  5 mg Take 1 of the 5 mg tablets.    To take:  7.5 mg Take 1.5 of the 5 mg tablets.

## 2017-10-24 NOTE — PROGRESS NOTES
ANTICOAGULATION FOLLOW-UP CLINIC VISIT    Patient Name:  Bucky Edgar  Date:  10/24/2017  Contact Type:  Face to Face    SUBJECTIVE:     Patient Findings     Positives No Problem Findings           OBJECTIVE    INR Protime   Date Value Ref Range Status   10/24/2017 2.5 (A) 0.86 - 1.14 Final       ASSESSMENT / PLAN  INR assessment THER    Recheck INR In: 3 WEEKS    INR Location Clinic      Anticoagulation Summary as of 10/24/2017     INR goal 2.0-3.0   Today's INR 2.5   Maintenance plan 7.5 mg (5 mg x 1.5) on Mon, Fri; 5 mg (5 mg x 1) all other days   Full instructions 7.5 mg on Mon, Fri; 5 mg all other days   Weekly total 40 mg   No change documented Janeth Mccarthy RN   Plan last modified Janeth Mccarthy RN (10/2/2017)   Next INR check 11/14/2017   Target end date     Indications   Long-term (current) use of anticoagulants [Z79.01] [Z79.01]  Deep vein thrombosis (DVT) (H) [I82.409] [I82.409]         Anticoagulation Episode Summary     INR check location     Preferred lab     Send INR reminders to [facility] MIGDALIA    Comments PM dose, 5 mg tabs        Anticoagulation Care Providers     Provider Role Specialty Phone number    Percy Deshpande MD [facility] [phone]            See the Encounter Report to view Anticoagulation Flowsheet and Dosing Calendar (Go to Encounters tab in chart review, and find the Anticoagulation Therapy Visit)    Dosage adjustment made based on physician directed care plan.    Janeth Mccarthy RN

## 2017-11-03 DIAGNOSIS — Z79.01 LONG-TERM (CURRENT) USE OF ANTICOAGULANTS: ICD-10-CM

## 2017-11-03 DIAGNOSIS — I82.409 DEEP VEIN THROMBOSIS (DVT) (H): ICD-10-CM

## 2017-11-03 RX ORDER — WARFARIN SODIUM 5 MG/1
TABLET ORAL
Qty: 100 TABLET | Refills: 0 | Status: SHIPPED | OUTPATIENT
Start: 2017-11-03 | End: 2017-11-28

## 2017-11-10 NOTE — PROGRESS NOTES
SUBJECTIVE:                                                    Bucky Edgar is a 62 year old male who presents to clinic today for the following health issues:      HPI    Hypertension Follow-up--Improved with triple therapy but still has systolic pressures above 140 and diastolic near 90. Is tolerating his current medications.      Outpatient blood pressures are being checked at home.  Results are 177/110 this morning      Low Salt Diet: no added salt    COPD Follow-Up    Symptoms are currently: stable    Current fatigue or dyspnea with ambulation: stable     Shortness of breath: stable    Increased or change in Cough/Sputum: No    Fever(s): No    Baseline ambulation without stopping to rest:  1/2 blocks. Able to walk up 3 flights of stairs without stopping to rest.    Any ER/UC or hospital admissions since your last visit? No     History   Smoking Status     Current Some Day Smoker     Packs/day: 0.50     Years: 38.00     Types: Cigarettes   Smokeless Tobacco     Never Used     Comment: Quit for 10 years, now smoked for 10 years. Max 2 PPD i the past     Lab Results   Component Value Date    FEV1 2.89 11/27/2013    ZQR4MGM 60% 11/27/2013             Problem list and histories reviewed & adjusted, as indicated.  Additional history: as documented            ROS:  C: NEGATIVE for fever, chills, change in weight  I: NEGATIVE for worrisome rashes, moles or lesions  E: NEGATIVE for vision changes or irritation  E/M: NEGATIVE for ear, mouth and throat problems  R: NEGATIVE for significant cough or SOB  RESP: Stable COPD. Patient is using nicotine replacement therapy but has not been actively smoking for 3 months.  B: NEGATIVE for masses, tenderness or discharge  CV: NEGATIVE for chest pain, palpitations or peripheral edema  CV: Blood pressure still not adequate control but improved.  GI: NEGATIVE for nausea, abdominal pain, heartburn, or change in bowel habits  : NEGATIVE for frequency, dysuria, or  "hematuria  MUSCULOSKELETAL: Was diagnosed with left lateral epicondylitis in his last visit and a course of oral steroids failed to change his level of discomfort. He also continues to have some persistent left knee pain following his DVT. Looking for orthopedic consultation.  N: NEGATIVE for weakness, dizziness or paresthesias  E: NEGATIVE for temperature intolerance, skin/hair changes  ENDOCRINE:  Has a history of impaired fasting glucose will recheck labs.  H: NEGATIVE for bleeding problems  HEME/ALLERGY/IMMUNE: Continues on warfarin therapy for a total 6 months for DVT of the left lower extremity. This was not provoked. Will complete treatment at the end of February 2018.  P: NEGATIVE for changes in mood or affect    OBJECTIVE:                                                    /80  Pulse 74  Temp 96.3  F (35.7  C) (Temporal)  Resp 16  Ht 5' 9\" (1.753 m)  Wt 220 lb (99.8 kg)  SpO2 97%  BMI 32.49 kg/m2  Body mass index is 32.49 kg/(m^2).  GENERAL: alert, no distress, cooperative and over weight  EYES: Eyes grossly normal to inspection, extraocular movements - intact, and PERRL  HENT: ear canals- normal; TMs- normal; Nose- normal; Mouth- no ulcers, no lesions  NECK: no tenderness, no adenopathy, no asymmetry, no masses, no stiffness; thyroid- normal to palpation  RESP: lungs clear to auscultation - no rales, no rhonchi, no wheezes  RESP: No major change in breath sounds.  CV: regular rates and rhythm, normal S1 S2, no S3 or S4 and no murmur, no click or rub -  ABDOMEN: soft, no tenderness, no  hepatosplenomegaly, no masses, normal bowel sounds  MS: Slightly larger left lower extremity post DVT. No active tenderness. Has an antalgic gait with his left knee.  over the left lateral epicondylar area.  SKIN: no suspicious lesions, no rashes  NEURO: strength and tone- normal, sensory exam- grossly normal, mentation- intact, speech- normal, reflexes- symmetric  BACK: no CVA tenderness, no " paralumbar tenderness  PSYCH: Alert and oriented times 3; speech- coherent , normal rate and volume; able to articulate logical thoughts, able to abstract reason, no tangential thoughts, no hallucinations or delusions, affect- normal  LYMPHATICS: ant. cervical- normal, post. cervical- normal, axillary- normal, supraclavicular- normal, inguinal- normal    Diagnostic test results:  Diagnostic Test Results:  Results for orders placed or performed in visit on 11/16/17 (from the past 24 hour(s))   CBC with platelets   Result Value Ref Range    WBC 8.1 4.0 - 11.0 10e9/L    RBC Count 5.42 4.4 - 5.9 10e12/L    Hemoglobin 17.7 13.3 - 17.7 g/dL    Hematocrit 51.0 40.0 - 53.0 %    MCV 94 78 - 100 fl    MCH 32.7 26.5 - 33.0 pg    MCHC 34.7 31.5 - 36.5 g/dL    RDW 16.3 (H) 10.0 - 15.0 %    Platelet Count 192 150 - 450 10e9/L   Lipid panel reflex to direct LDL Fasting   Result Value Ref Range    Cholesterol 230 (H) <200 mg/dL    Triglycerides 314 (H) <150 mg/dL    HDL Cholesterol 56 >39 mg/dL    LDL Cholesterol Calculated 111 (H) <100 mg/dL    Non HDL Cholesterol 174 (H) <130 mg/dL   Comprehensive metabolic panel   Result Value Ref Range    Sodium 139 133 - 144 mmol/L    Potassium 3.9 3.4 - 5.3 mmol/L    Chloride 108 94 - 109 mmol/L    Carbon Dioxide 23 20 - 32 mmol/L    Anion Gap 8 3 - 14 mmol/L    Glucose 111 (H) 70 - 99 mg/dL    Urea Nitrogen 14 7 - 30 mg/dL    Creatinine 0.96 0.66 - 1.25 mg/dL    GFR Estimate 80 >60 mL/min/1.7m2    GFR Estimate If Black >90 >60 mL/min/1.7m2    Calcium 8.9 8.5 - 10.1 mg/dL    Bilirubin Total 1.1 0.2 - 1.3 mg/dL    Albumin 3.7 3.4 - 5.0 g/dL    Protein Total 7.1 6.8 - 8.8 g/dL    Alkaline Phosphatase 84 40 - 150 U/L    ALT 26 0 - 70 U/L    AST 16 0 - 45 U/L   Prostate spec antigen screen   Result Value Ref Range    PSA 0.40 0 - 4 ug/L   Albumin Random Urine Quantitative with Creat Ratio   Result Value Ref Range    Creatinine Urine 296 mg/dL    Albumin Urine mg/L 77 mg/L    Albumin Urine mg/g Cr  26.01 (H) 0 - 17 mg/g Cr          ASSESSMENT/PLAN:                                                    1. HTN, goal below 140/90   Not At Goal. Will Add Small Dose of Hydrochlorothiazide. He Did Have a Significant Drop in Blood Pressure with A Time but at a Time When He Was Also Mildly Dehydrated. Follow-up in 1 month.  - hydrochlorothiazide 12.5 MG TABS tablet; Take 1 tablet (12.5 mg) by mouth daily  Dispense: 30 tablet; Refill: 1  - CBC with platelets  - Comprehensive metabolic panel  - Albumin Random Urine Quantitative with Creat Ratio    2. Chronic obstructive pulmonary disease, unspecified COPD type (H)  Currently appears stable and he is not using tobacco products. Continue with nicotine replacement therapy  - CBC with platelets    3. Tobacco abuse counseling  continue with nicotine replacement therapy and remain off smoking    4. Deep vein thrombosis (DVT) of distal vein of left lower extremity, unspecified chronicity (H)  Stable the present time. No recurrences. We'll continue Warfarin until 3/1/18.    5. Impaired fasting glucose  Checking A1c. Fasting glucose 112    6. Special screening for malignant neoplasm of prostate  PSA is normal. Exam was deferred today.  - Prostate spec antigen screen    7. CARDIOVASCULAR SCREENING; LDL GOAL LESS THAN 130  Lipid profile reexamined. High triglycerides noted, likely from diet.  - Lipid panel reflex to direct LDL Fasting    8. Medial epicondylitis of elbow, left  Continues despite trial of oral steroids. Referred for orthopedic evaluation  - ORTHO  REFERRAL    9. Left knee pain, unspecified chronicity  Referred for orthopedic evaluation.  - ORTHO  REFERRAL      Follow up with Provider - Follow-up in 1 month for hypertension management.   See Patient Instructions    The patient understood the rational for the diagnosis and treatment plan. All questions were answered to best of my ability and the patient's satisfaction.    Note: Chart documentation done  in part with Dragon Voice Recognition software. Although reviewed after completion, some word and grammatical errors may remain.  Please consider this when interpreting information in this chart.    Percy Deshpande MD  [facility]  Answers for HPI/ROS submitted by the patient on 11/16/2017   PHQ-2 Score: 0

## 2017-11-14 ENCOUNTER — ANTICOAGULATION THERAPY VISIT (OUTPATIENT)
Dept: ANTICOAGULATION | Facility: OTHER | Age: 62
End: 2017-11-14
Payer: COMMERCIAL

## 2017-11-14 DIAGNOSIS — I82.409 DEEP VEIN THROMBOSIS (DVT) (H): ICD-10-CM

## 2017-11-14 DIAGNOSIS — Z79.01 LONG-TERM (CURRENT) USE OF ANTICOAGULANTS: ICD-10-CM

## 2017-11-14 LAB — INR POINT OF CARE: 1.7 (ref 0.86–1.14)

## 2017-11-14 PROCEDURE — 85610 PROTHROMBIN TIME: CPT | Mod: QW

## 2017-11-14 PROCEDURE — 99207 ZZC NO CHARGE NURSE ONLY: CPT

## 2017-11-14 PROCEDURE — 36416 COLLJ CAPILLARY BLOOD SPEC: CPT

## 2017-11-14 NOTE — PROGRESS NOTES
ANTICOAGULATION FOLLOW-UP CLINIC VISIT    Patient Name:  Bucky Edgar  Date:  11/14/2017  Contact Type:  Face to Face    SUBJECTIVE:     Patient Findings     Positives Change in diet/appetite (Had a few servings of greens this past, had only beeing eating about 1/wk. would like to have 2-3. )           OBJECTIVE    INR Protime   Date Value Ref Range Status   11/14/2017 1.7 (A) 0.86 - 1.14 Final       ASSESSMENT / PLAN  INR assessment SUB    Recheck INR In: 2 WEEKS    INR Location Clinic      Anticoagulation Summary as of 11/14/2017     INR goal 2.0-3.0   Today's INR 1.7!   Maintenance plan 7.5 mg (5 mg x 1.5) on Mon, Wed, Fri; 5 mg (5 mg x 1) all other days   Full instructions 11/14: 7.5 mg; Otherwise 7.5 mg on Mon, Wed, Fri; 5 mg all other days   Weekly total 42.5 mg   Plan last modified Janeth Mccarthy RN (11/14/2017)   Next INR check 11/28/2017   Target end date     Indications   Long-term (current) use of anticoagulants [Z79.01] [Z79.01]  Deep vein thrombosis (DVT) (H) [I82.409] [I82.409]         Anticoagulation Episode Summary     INR check location     Preferred lab     Send INR reminders to [facility]IGDALIA    Comments PM dose, 5 mg tabs        Anticoagulation Care Providers     Provider Role Specialty Phone number    Percy Deshpnade MD [facility] [phone]            See the Encounter Report to view Anticoagulation Flowsheet and Dosing Calendar (Go to Encounters tab in chart review, and find the Anticoagulation Therapy Visit)    Dosage adjustment made based on physician directed care plan.    Janeth Mccarthy, KINA

## 2017-11-14 NOTE — MR AVS SNAPSHOT
Bucky Edgar   11/14/2017 10:45 AM   Anticoagulation Therapy Visit    Description:  62 year old male   Provider:  ER ANTI COAG   Department:  Er Anticoag           INR as of 11/14/2017     Today's INR 1.7!      Anticoagulation Summary as of 11/14/2017     INR goal 2.0-3.0   Today's INR 1.7!   Full instructions 11/14: 7.5 mg; Otherwise 7.5 mg on Mon, Wed, Fri; 5 mg all other days   Next INR check 11/28/2017    Indications   Long-term (current) use of anticoagulants [Z79.01] [Z79.01]  Deep vein thrombosis (DVT) (H) [I82.409] [I82.409]         Your next Anticoagulation Clinic appointment(s)     Nov 28, 2017 11:15 AM CST   [facility] with ER [facility]AG   [facility]    [address] [facility] [address]   [phone]              Contact Numbers     Clinic Number:         November 2017 Details    Sun Mon Tue Wed Thu Fri Sat        1               2               3               4                 5               6               7               8               9               10               11                 12               13               14      7.5 mg   See details      15      7.5 mg         16      5 mg         17      7.5 mg         18      5 mg           19      5 mg         20      7.5 mg         21      5 mg         22      7.5 mg         23      5 mg         24      7.5 mg         25      5 mg           26      5 mg         27      7.5 mg         28            29               30                  Date Details   11/14 This INR check       Date of next INR:  11/28/2017         How to take your warfarin dose     To take:  5 mg Take 1 of the 5 mg tablets.    To take:  7.5 mg Take 1.5 of the 5 mg tablets.

## 2017-11-16 ENCOUNTER — OFFICE VISIT (OUTPATIENT)
Dept: FAMILY MEDICINE | Facility: CLINIC | Age: 62
End: 2017-11-16
Payer: COMMERCIAL

## 2017-11-16 VITALS
DIASTOLIC BLOOD PRESSURE: 80 MMHG | HEIGHT: 69 IN | RESPIRATION RATE: 16 BRPM | HEART RATE: 74 BPM | WEIGHT: 220 LBS | TEMPERATURE: 96.3 F | OXYGEN SATURATION: 97 % | BODY MASS INDEX: 32.58 KG/M2 | SYSTOLIC BLOOD PRESSURE: 150 MMHG

## 2017-11-16 DIAGNOSIS — I82.4Z2 DEEP VEIN THROMBOSIS (DVT) OF DISTAL VEIN OF LEFT LOWER EXTREMITY, UNSPECIFIED CHRONICITY (H): ICD-10-CM

## 2017-11-16 DIAGNOSIS — M77.02 MEDIAL EPICONDYLITIS OF ELBOW, LEFT: ICD-10-CM

## 2017-11-16 DIAGNOSIS — I10 HTN, GOAL BELOW 140/90: Primary | ICD-10-CM

## 2017-11-16 DIAGNOSIS — Z12.5 SPECIAL SCREENING FOR MALIGNANT NEOPLASM OF PROSTATE: ICD-10-CM

## 2017-11-16 DIAGNOSIS — Z13.6 CARDIOVASCULAR SCREENING; LDL GOAL LESS THAN 130: ICD-10-CM

## 2017-11-16 DIAGNOSIS — M25.562 LEFT KNEE PAIN, UNSPECIFIED CHRONICITY: ICD-10-CM

## 2017-11-16 DIAGNOSIS — R73.01 IMPAIRED FASTING GLUCOSE: ICD-10-CM

## 2017-11-16 DIAGNOSIS — J44.9 CHRONIC OBSTRUCTIVE PULMONARY DISEASE, UNSPECIFIED COPD TYPE (H): ICD-10-CM

## 2017-11-16 DIAGNOSIS — Z71.6 TOBACCO ABUSE COUNSELING: ICD-10-CM

## 2017-11-16 LAB
ALBUMIN SERPL-MCNC: 3.7 G/DL (ref 3.4–5)
ALP SERPL-CCNC: 84 U/L (ref 40–150)
ALT SERPL W P-5'-P-CCNC: 26 U/L (ref 0–70)
ANION GAP SERPL CALCULATED.3IONS-SCNC: 8 MMOL/L (ref 3–14)
AST SERPL W P-5'-P-CCNC: 16 U/L (ref 0–45)
BILIRUB SERPL-MCNC: 1.1 MG/DL (ref 0.2–1.3)
BUN SERPL-MCNC: 14 MG/DL (ref 7–30)
CALCIUM SERPL-MCNC: 8.9 MG/DL (ref 8.5–10.1)
CHLORIDE SERPL-SCNC: 108 MMOL/L (ref 94–109)
CHOLEST SERPL-MCNC: 230 MG/DL
CO2 SERPL-SCNC: 23 MMOL/L (ref 20–32)
CREAT SERPL-MCNC: 0.96 MG/DL (ref 0.66–1.25)
CREAT UR-MCNC: 296 MG/DL
ERYTHROCYTE [DISTWIDTH] IN BLOOD BY AUTOMATED COUNT: 16.3 % (ref 10–15)
GFR SERPL CREATININE-BSD FRML MDRD: 80 ML/MIN/1.7M2
GLUCOSE SERPL-MCNC: 111 MG/DL (ref 70–99)
HBA1C MFR BLD: 5.1 % (ref 4.3–6)
HCT VFR BLD AUTO: 51 % (ref 40–53)
HDLC SERPL-MCNC: 56 MG/DL
HGB BLD-MCNC: 17.7 G/DL (ref 13.3–17.7)
LDLC SERPL CALC-MCNC: 111 MG/DL
MCH RBC QN AUTO: 32.7 PG (ref 26.5–33)
MCHC RBC AUTO-ENTMCNC: 34.7 G/DL (ref 31.5–36.5)
MCV RBC AUTO: 94 FL (ref 78–100)
MICROALBUMIN UR-MCNC: 77 MG/L
MICROALBUMIN/CREAT UR: 26.01 MG/G CR (ref 0–17)
NONHDLC SERPL-MCNC: 174 MG/DL
PLATELET # BLD AUTO: 192 10E9/L (ref 150–450)
POTASSIUM SERPL-SCNC: 3.9 MMOL/L (ref 3.4–5.3)
PROT SERPL-MCNC: 7.1 G/DL (ref 6.8–8.8)
PSA SERPL-ACNC: 0.4 UG/L (ref 0–4)
RBC # BLD AUTO: 5.42 10E12/L (ref 4.4–5.9)
SODIUM SERPL-SCNC: 139 MMOL/L (ref 133–144)
TRIGL SERPL-MCNC: 314 MG/DL
WBC # BLD AUTO: 8.1 10E9/L (ref 4–11)

## 2017-11-16 PROCEDURE — 85027 COMPLETE CBC AUTOMATED: CPT | Performed by: FAMILY MEDICINE

## 2017-11-16 PROCEDURE — 80061 LIPID PANEL: CPT | Performed by: FAMILY MEDICINE

## 2017-11-16 PROCEDURE — 80053 COMPREHEN METABOLIC PANEL: CPT | Performed by: FAMILY MEDICINE

## 2017-11-16 PROCEDURE — 36415 COLL VENOUS BLD VENIPUNCTURE: CPT | Performed by: FAMILY MEDICINE

## 2017-11-16 PROCEDURE — 82043 UR ALBUMIN QUANTITATIVE: CPT | Performed by: FAMILY MEDICINE

## 2017-11-16 PROCEDURE — 99214 OFFICE O/P EST MOD 30 MIN: CPT | Performed by: FAMILY MEDICINE

## 2017-11-16 PROCEDURE — G0103 PSA SCREENING: HCPCS | Performed by: FAMILY MEDICINE

## 2017-11-16 PROCEDURE — 83036 HEMOGLOBIN GLYCOSYLATED A1C: CPT | Performed by: FAMILY MEDICINE

## 2017-11-16 RX ORDER — HYDROCHLOROTHIAZIDE 12.5 MG/1
12.5 TABLET ORAL DAILY
Qty: 30 TABLET | Refills: 1 | Status: SHIPPED | OUTPATIENT
Start: 2017-11-16 | End: 2017-12-14

## 2017-11-16 ASSESSMENT — PAIN SCALES - GENERAL: PAINLEVEL: EXTREME PAIN (9)

## 2017-11-16 NOTE — PATIENT INSTRUCTIONS
These are new changes to your current plan of care based on today's visit:    Medications stopped    Medications to be started Adding low dose of Hydrochlorothiazide 12.5 mg each morning to help lower blood pressure   Change dose of this medication    New treatments        Follow up appointments:    1.  FOLLOW UP WITH YOUR PRIMARY CARE PROVIDER: 1 month(s) for clinic visit     2. FOLLOW UP WITH SPECIALIST: Orthopedist for the elbow and the left knee    Percy Deshpande MD

## 2017-11-16 NOTE — MR AVS SNAPSHOT
After Visit Summary   11/16/2017    Bucky Edgar    MRN: [id number]           Patient Information     Date Of Birth          1955        Visit Information        Provider Department      11/16/2017 10:00 AM Percy Deshpande MD [facility]        Today's Diagnoses     HTN, goal below 140/90    -  1    Chronic obstructive pulmonary disease, unspecified COPD type (H)        Tobacco abuse counseling        Deep vein thrombosis (DVT) of distal vein of left lower extremity, unspecified chronicity (H)        Impaired fasting glucose        Special screening for malignant neoplasm of prostate        CARDIOVASCULAR SCREENING; LDL GOAL LESS THAN 130        Medial epicondylitis of elbow, left        Left knee pain, unspecified chronicity          Care Instructions    These are new changes to your current plan of care based on today's visit:    Medications stopped    Medications to be started Adding low dose of Hydrochlorothiazide 12.5 mg each morning to help lower blood pressure   Change dose of this medication    New treatments        Follow up appointments:    1.  FOLLOW UP WITH YOUR PRIMARY CARE PROVIDER: 1 month(s) for clinic visit     2. FOLLOW UP WITH SPECIALIST: Orthopedist for the elbow and the left knee    Percy Deshpande MD                Follow-ups after your visit        Additional Services     ORTHO  REFERRAL       [facility] is referring you to the Orthopedic  Services at [facility].       The  Representative will assist you in the coordination of your Orthopedic and Musculoskeletal Care as prescribed by your physician.    The  Representative will call you within 1 business day to help schedule your appointment, or you may contact the  Representative at:    All areas ~ [phone]     Type of Referral : Surgical / Specialist -- Orthopedic Surgeon      Timeframe requested:  "Routine    Coverage of these services is subject to the terms and limitations of your health insurance plan.  Please call member services at your health plan with any benefit or coverage questions.      If X-rays, CT or MRI's have been performed, please contact the facility where they were done to arrange for , prior to your scheduled appointment.  Please bring this referral request to your appointment and present it to your specialist.                  Follow-up notes from your care team     Return in about 1 month (around 12/16/2017) for Routine Visit, Lab Work.      Your next 10 appointments already scheduled     Nov 28, 2017 11:15 AM CST   Anticoagulation Visit with ER ANTI COAG   [facility]    [address] [facility] [address] [phone]              Who to contact     If you have questions or need follow up information about today's clinic visit or your schedule please contact [facility] [place] directly at [phone].  Normal or non-critical lab and imaging results will be communicated to you by MyChart, letter or phone within 4 business days after the clinic has received the results. If you do not hear from us within 7 days, please contact the clinic through [facility]yl[facility] or phone. If you have a critical or abnormal lab result, we will notify you by phone as soon as possible.  Submit refill requests through Sc[facility]ers or call your pharmacy and they will forward the refill request to us. Please allow 3 business days for your refill to be completed.          Additional Information About Your Visit        [facility]ylah[facility] Information     Scooters lets you send messages to your doctor, view your test results, renew your prescriptions, schedule appointments and more. To sign up, go to [email or web address] . Click on \"Log in\" on the left side of the screen, which will take you to the Welcome page. Then click on \"Sign up Now\" on the right side of the page. " "    You will be asked to enter the access code listed below, as well as some personal information. Please follow the directions to create your username and password.     Your access code is: [email or web address]  Expires: 2017  2:50 PM     Your access code will  in 90 days. If you need help or a new code, please call your [facility] clinic or [phone].        Care EveryWhere ID     This is your Care EveryWhere ID. This could be used by other organizations to access your [facility] medical records  [id number]        Your Vitals Were     Pulse Temperature Respirations Height Pulse Oximetry BMI (Body Mass Index)    74 96.3  F (35.7  C) (Temporal) 16 5' 9\" (1.753 m) 97% 32.49 kg/m2       Blood Pressure from Last 3 Encounters:   17 150/80   10/23/17 (!) 180/100   17 122/84    Weight from Last 3 Encounters:   17 220 lb (99.8 kg)   10/23/17 221 lb (100.2 kg)   17 207 lb (93.9 kg)              We Performed the Following     Albumin Random Urine Quantitative with Creat Ratio     CBC with platelets     Comprehensive metabolic panel     Lipid panel reflex to direct LDL Fasting     ORTHO  REFERRAL     Prostate spec antigen screen          Today's Medication Changes          These changes are accurate as of: 17 10:36 AM.  If you have any questions, ask your nurse or doctor.               Start taking these medicines.        Dose/Directions    hydrochlorothiazide 12.5 MG Tabs tablet   Used for:  HTN, goal below 140/90   Started by:  Percy Deshpande MD        Dose:  12.5 mg   Take 1 tablet (12.5 mg) by mouth daily   Quantity:  30 tablet   Refills:  1         These medicines have changed or have updated prescriptions.        Dose/Directions    NICOTINE STEP 1 21 MG/24HR 24 hr patch   This may have changed:  Another medication with the same name was removed. Continue taking this medication, and follow the directions you see here.   Generic drug:  nicotine   Changed by:  Jeramy" Percy Jacobson MD        ROBBY 1 PATCH TO SKIN ONCE D X 42 DAYS   Refills:  0         Stop taking these medicines if you haven't already. Please contact your care team if you have questions.     methylPREDNISolone 4 MG tablet   Commonly known as:  MEDROL DOSEPAK   Stopped by:  Percy Deshpande MD                Where to get your medicines      These medications were sent to [id number]  [place] - [address]  [address], [place] [address]    Hours:  test fax sent successfully 1/20/04  kr Phone:  [phone]     hydrochlorothiazide 12.5 MG Tabs tablet                Primary Care Provider Office Phone # Fax #    Percy Deshpande [phone] [address] [facility]  [place] [address]        Equal Access to Services     [facility]: Hadii aad ku hadasho Soomaali, waaxda luqadaha, qaybta kaalmada adeegyada, waxay idiin hayaan adeeg kharabalaji augustaan . So [facility] [phone].    ATENCIÓN: Si habla español, tiene a garcia disposición servicios gratuitos de asistencia lingüística. Llame al [phone].    We comply with applicable federal civil rights laws and [place] laws. We do not discriminate on the basis of race, color, national origin, age, disability, sex, sexual orientation, or gender identity.            Thank you!     Thank you for choosing [facility]  for your care. Our goal is always to provide you with excellent care. Hearing back from our patients is one way we can continue to improve our services. Please take a few minutes to complete the written survey that you may receive in the mail after your visit with us. Thank you!             Your Updated Medication List - Protect others around you: Learn how to safely use, store and throw away your medicines at www.disposemymeds.org.          This list is accurate as of: 11/16/17 10:36 AM.  Always use your most recent med list.                   Brand Name Dispense  Instructions for use Diagnosis    acetaminophen 325 MG tablet    TYLENOL     Take 650 mg by mouth every 6 hours        albuterol 108 (90 BASE) MCG/ACT Inhaler    PROAIR HFA/PROVENTIL HFA/VENTOLIN HFA    3 Inhaler    Inhale 2 puffs into the lungs every 6 hours as needed for shortness of breath / dyspnea    Chronic bronchitis with COPD (chronic obstructive pulmonary disease) (H)       carvedilol 25 MG tablet    COREG    60 tablet    Take 1 tablet (25 mg) by mouth 2 times daily (with meals)    HTN, goal below 140/90       hydrochlorothiazide 12.5 MG Tabs tablet     30 tablet    Take 1 tablet (12.5 mg) by mouth daily    HTN, goal below 140/90       lisinopril 40 MG tablet    PRINIVIL/ZESTRIL    30 tablet    Take 1 tablet (40 mg) by mouth daily    HTN, goal below 140/90       NICORETTE 2 MG lozenge   Generic drug:  nicotine polacrilex      Place 2 mg inside cheek every hour as needed for smoking cessation        NICOTINE STEP 1 21 MG/24HR 24 hr patch   Generic drug:  nicotine      ROBBY 1 PATCH TO SKIN ONCE D X 42 DAYS        sennosides 8.6 MG tablet    SENOKOT     TK 1 T PO BID        * warfarin 5 MG tablet    COUMADIN    50 tablet    Take 7.5 mg Mon & Fri and 5 mg all other days or as directed by coumadin clinic    Long-term (current) use of anticoagulants, Deep vein thrombosis (DVT) (H)       * warfarin 5 MG tablet    COUMADIN    100 tablet    Take 7.5 mg on Monday, Friday and 5 mg all other days, or as directed by the coumadin clinic.    Long-term (current) use of anticoagulants, Deep vein thrombosis (DVT) (H)       * Notice:  This list has 2 medication(s) that are the same as other medications prescribed for you. Read the directions carefully, and ask your doctor or other care provider to review them with you.

## 2017-11-16 NOTE — NURSING NOTE
"Chief Complaint   Patient presents with     Panel Management     my chart,      Recheck Medication       Initial /80  Pulse 74  Temp 96.3  F (35.7  C) (Temporal)  Resp 16  Ht 5' 9\" (1.753 m)  Wt 220 lb (99.8 kg)  SpO2 97%  BMI 32.49 kg/m2 Estimated body mass index is 32.49 kg/(m^2) as calculated from the following:    Height as of this encounter: 5' 9\" (1.753 m).    Weight as of this encounter: 220 lb (99.8 kg).  Medication Reconciliation: complete     Audrey Sorensen MA       "

## 2017-11-16 NOTE — LETTER
November 16, 2017      Bucky Edgar  [address] [place] [address]        Dear Robyn Abebe is a copy of the laboratory values from today. Results as follows:    1. There continues to be some protein/albumin in the urine due to the high blood pressure readings. We'll continue to follow this. This should improve after the blood pressures well under control.  2. No evidence of diabetes although your fasting glucose was 111. A1c was well within normal limits and average blood glucose levels over time. Liver function and kidney function by blood studies were normal.  4. There was significant elevation of total cholesterol and triglycerides. Dietary measures should include food with lower fat content, lower amount of starches and carbohydrates and the elimination of alcohol. These can be rechecked in the future.  5. The CBC shows no evidence of anemia and is normal  6. The PSA screening for prostate cancer continues to be very low and normal. No suspicion of development of prostate cancer.    Follow-up is planned for blood pressure. Will plan on rechecking the lipid profile in the future.  Please call with any questions or concerns and thank you for your confidence.      Sincerely,        Percy Deshpande MD

## 2017-11-28 ENCOUNTER — ANTICOAGULATION THERAPY VISIT (OUTPATIENT)
Dept: ANTICOAGULATION | Facility: OTHER | Age: 62
End: 2017-11-28
Payer: COMMERCIAL

## 2017-11-28 DIAGNOSIS — I82.4Z2 DEEP VEIN THROMBOSIS (DVT) OF DISTAL VEIN OF LEFT LOWER EXTREMITY, UNSPECIFIED CHRONICITY (H): ICD-10-CM

## 2017-11-28 DIAGNOSIS — Z79.01 LONG-TERM (CURRENT) USE OF ANTICOAGULANTS: ICD-10-CM

## 2017-11-28 DIAGNOSIS — I82.409 DEEP VEIN THROMBOSIS (DVT) (H): ICD-10-CM

## 2017-11-28 LAB — INR POINT OF CARE: 1.8 (ref 0.86–1.14)

## 2017-11-28 PROCEDURE — 85610 PROTHROMBIN TIME: CPT | Mod: QW

## 2017-11-28 PROCEDURE — 36416 COLLJ CAPILLARY BLOOD SPEC: CPT

## 2017-11-28 PROCEDURE — 99207 ZZC NO CHARGE NURSE ONLY: CPT

## 2017-11-28 RX ORDER — WARFARIN SODIUM 5 MG/1
TABLET ORAL
Qty: 100 TABLET | Refills: 0 | COMMUNITY
Start: 2017-11-28 | End: 2018-02-08

## 2017-11-28 NOTE — PROGRESS NOTES
ANTICOAGULATION FOLLOW-UP CLINIC VISIT    Patient Name:  Bucky Edgar  Date:  11/28/2017  Contact Type:  Face to Face    SUBJECTIVE:     Patient Findings     Positives Change in diet/appetite (increased greens)           OBJECTIVE    INR Protime   Date Value Ref Range Status   11/28/2017 1.8 (A) 0.86 - 1.14 Final       ASSESSMENT / PLAN  INR assessment SUB    Recheck INR In: 2 WEEKS    INR Location Clinic      Anticoagulation Summary as of 11/28/2017     INR goal 2.0-3.0   Today's INR 1.8!   Maintenance plan 5 mg (5 mg x 1) on Tue, Thu, Sat; 7.5 mg (5 mg x 1.5) all other days   Full instructions 11/28: 7.5 mg; Otherwise 5 mg on Tue, Thu, Sat; 7.5 mg all other days   Weekly total 45 mg   Plan last modified Janeth Mccarthy RN (11/28/2017)   Next INR check 12/14/2017   Target end date     Indications   Long-term (current) use of anticoagulants [Z79.01] [Z79.01]  Deep vein thrombosis (DVT) (H) [I82.409] [I82.409]         Anticoagulation Episode Summary     INR check location     Preferred lab     Send INR reminders to [facility] [place]    Comments PM dose, 5 mg tabs        Anticoagulation Care Providers     Provider Role Specialty Phone number    Percy Deshpande MD [facility] [phone]            See the Encounter Report to view Anticoagulation Flowsheet and Dosing Calendar (Go to Encounters tab in chart review, and find the Anticoagulation Therapy Visit)    Dosage adjustment made based on physician directed care plan.    Janeth Mccarthy RN

## 2017-11-28 NOTE — MR AVS SNAPSHOT
Bucky CHRISTINE Edgar   11/28/2017 11:15 AM   Anticoagulation Therapy Visit    Description:  62 year old male   Provider:  ER ANTI COAG   Department:  Er Anticoag           INR as of 11/28/2017     Today's INR 1.8!      Anticoagulation Summary as of 11/28/2017     INR goal 2.0-3.0   Today's INR 1.8!   Full instructions 11/28: 7.5 mg; Otherwise 5 mg on Tue, Thu, Sat; 7.5 mg all other days   Next INR check 12/14/2017    Indications   Long-term (current) use of anticoagulants [Z79.01] [Z79.01]  Deep vein thrombosis (DVT) (H) [I82.409] [I82.409]         Contact Numbers     Clinic Number:         November 2017 Details    Sun Mon Tue Wed Thu Fri Sat        1               2               3               4                 5               6               7               8               9               10               11                 12               13               14               15               16               17               18                 19               20               21               22               23               24               25                 26               27               28      7.5 mg   See details      29      7.5 mg         30      5 mg            Date Details   11/28 This INR check               How to take your warfarin dose     To take:  5 mg Take 1 of the 5 mg tablets.    To take:  7.5 mg Take 1.5 of the 5 mg tablets.           December 2017 Details    Sun Mon Tue Wed Thu Fri Sat          1      7.5 mg         2      5 mg           3      7.5 mg         4      7.5 mg         5      5 mg         6      7.5 mg         7      5 mg         8      7.5 mg         9      5 mg           10      7.5 mg         11      7.5 mg         12      5 mg         13      7.5 mg         14            15               16                 17               18               19               20               21               22               23                 24               25               26                27               28               29               30                 31                      Date Details   No additional details    Date of next INR:  12/14/2017         How to take your warfarin dose     To take:  5 mg Take 1 of the 5 mg tablets.    To take:  7.5 mg Take 1.5 of the 5 mg tablets.

## 2017-12-08 DIAGNOSIS — I10 HTN, GOAL BELOW 140/90: ICD-10-CM

## 2017-12-08 RX ORDER — LISINOPRIL 40 MG/1
TABLET ORAL
Qty: 90 TABLET | Refills: 1 | Status: SHIPPED | OUTPATIENT
Start: 2017-12-08 | End: 2018-06-18

## 2017-12-08 NOTE — PROGRESS NOTES
SUBJECTIVE:                                                    Bucky Edgar is a 62 year old male who presents to clinic today for the following health issues:      History of Present Illness     Hypertension:     Blood pressures checked at:  Home    Dietary sodium intake::  Low salt diet    Diet:  Low salt  Taking medications regularly:  Yes  Medication side effects:  None  Additional concerns today:  No    Recently he has had blood pressure readings in the 90s systolically and discontinue the diuretic therapy.  Most recent blood pressure reading at home was yesterday with a systolic pressure 135.  Currently has no symptoms of lightheadedness or unsteadiness given his systolic pressure of 100-105.  No symptoms of cardiac arrhythmia.    Currently he has not been consuming as much beer or alcoholic beverages which appears to have helped his blood pressure.  Medications will be adjusted.    COPD Follow-Up    Symptoms are currently: stable    Current fatigue or dyspnea with ambulation: none    Shortness of breath: minimal  due to current cold     Increased or change in Cough/Sputum: No    Fever(s): No    Baseline ambulation without stopping to rest: 200  feet. Able to walk up 2 flights of stairs without stopping to rest.    Any ER/UC or hospital admissions since your last visit? No     History   Smoking Status     Current Some Day Smoker     Packs/day: 0.50     Years: 38.00     Types: Cigarettes   Smokeless Tobacco     Never Used     Comment: Quit for 10 years, now smoked for 10 years. Max 2 PPD i the past     Lab Results   Component Value Date    FEV1 2.89 11/27/2013    YNP6ZHR 60% 11/27/2013             Problem list and histories reviewed & adjusted, as indicated.  Additional history: as documented            ROS:  C: NEGATIVE for fever, chills, change in weight  I: NEGATIVE for worrisome rashes, moles or lesions  E: NEGATIVE for vision changes or irritation  E/M: NEGATIVE for ear, mouth and throat problems  R:  "NEGATIVE for significant cough or SOB  RESP: Stable COPD and encouraged not to use tobacco products  CV: NEGATIVE for chest pain, palpitations or peripheral edema  CV: Hypertension under treatment.  Medication to be adjusted given his systolic pressure below 105.  No cardiac issues noted.  GI: NEGATIVE for nausea, abdominal pain, heartburn, or change in bowel habits  : NEGATIVE for frequency, dysuria, or hematuria  M: NEGATIVE for significant arthralgias or myalgia  MUSCULOSKELETAL: Has essentially lost all the swelling and enlargement of his left lower extremity, now 4 months post treatment for DVT.  Continues on anticoagulation through February 2018.   N: NEGATIVE for weakness, dizziness or paresthesias  E: NEGATIVE for temperature intolerance, skin/hair changes  H: NEGATIVE for bleeding problems  HEME/ALLERGY/IMMUNE: Continues on warfarin therapy for an additional 10 weeks.  P: NEGATIVE for changes in mood or affect    OBJECTIVE:                                                    /68  Pulse 78  Temp 96.3  F (35.7  C) (Temporal)  Resp 18  Ht 5' 9\" (1.753 m)  Wt 218 lb 6.4 oz (99.1 kg)  SpO2 98%  BMI 32.25 kg/m2  Body mass index is 32.25 kg/(m^2).  GENERAL: healthy, alert and no distress  EYES: Eyes grossly normal to inspection, extraocular movements - intact, and PERRL  HENT: ear canals- normal; TMs- normal; Nose- normal; Mouth- no ulcers, no lesions  NECK: no tenderness, no adenopathy, no asymmetry, no masses, no stiffness; thyroid- normal to palpation  NECK: No carotid bruits noted  RESP: lungs clear to auscultation - no rales, no rhonchi, no wheezes  CV: regular rates and rhythm, normal S1 S2, no S3 or S4 and no murmur, no click or rub -  ABDOMEN: soft, no tenderness, no  hepatosplenomegaly, no masses, normal bowel sounds  MS: extremities- no gross deformities noted, no edema  MS: Left lower extremity appears similar in size to the right with no deep venous tenderness.  Negative Homans " sign.  SKIN: no suspicious lesions, no rashes  NEURO: strength and tone- normal, sensory exam- grossly normal, mentation- intact, speech- normal, reflexes- symmetric  PSYCH: Alert and oriented times 3; speech- coherent , normal rate and volume; able to articulate logical thoughts, able to abstract reason, no tangential thoughts, no hallucinations or delusions, affect- normal  LYMPHATICS: ant. cervical- normal, post. cervical- normal, axillary- normal, supraclavicular- normal, inguinal- normal    Diagnostic test results:  Diagnostic Test Results:  Results for orders placed or performed in visit on 12/14/17 (from the past 24 hour(s))   INR point of care   Result Value Ref Range    INR Point of Care 1.8 (H) 0.86 - 1.14          ASSESSMENT/PLAN:                                                    1. HTN, goal below 140/90  Given current levels of his blood pressure, will reduce Coreg to 12.5 mg twice daily instead of 25 mg twice daily.  Continue off the diuretic therapy.  Blood pressure check in 2 weeks and continue to monitor at home with assessment based on accuracy.  He will compare his home blood pressure reading today to the reading in the office today.  Appropriate adjustments to be made.  - carvedilol (COREG) 25 MG tablet; Take 0.5 tablets (12.5 mg) by mouth 2 times daily (with meals)  Dispense: 60 tablet; Refill: 4    2. Long term current use of anticoagulant therapy  Continue on Warfarin until the end of February 2018  - INR point of care    3. Chronic obstructive pulmonary disease, unspecified COPD type (H)  Stable and encourage no tobacco use.    4. Deep vein thrombosis (DVT) of distal vein of left lower extremity, unspecified chronicity (H)  Seems to have resolved the obstructive flow with the left lower extremity now looking normal in size.      Follow up with Provider -Follow-up in February 2018 for hypertension follow-up and to look to discontinue long-term anticoagulation.  He will then have reached 6  months on warfarin therapy.  See Patient Instructions    The patient understood the rational for the diagnosis and treatment plan. All questions were answered to best of my ability and the patient's satisfaction.    Note: Chart documentation done in part with Dragon Voice Recognition software. Although reviewed after completion, some word and grammatical errors may remain.  Please consider this when interpreting information in this chart.      Percy Deshpande MD  [facility]

## 2017-12-08 NOTE — TELEPHONE ENCOUNTER
BP Readings from Last 3 Encounters:   11/16/17 150/80   10/23/17 (!) 180/100   09/07/17 122/84     Routing refill request to provider for review/approval because:  Labs out of range:  Blood pressure.    Rhonda Matthew RN

## 2017-12-14 ENCOUNTER — ANTICOAGULATION THERAPY VISIT (OUTPATIENT)
Dept: ANTICOAGULATION | Facility: OTHER | Age: 62
End: 2017-12-14
Payer: COMMERCIAL

## 2017-12-14 ENCOUNTER — OFFICE VISIT (OUTPATIENT)
Dept: FAMILY MEDICINE | Facility: CLINIC | Age: 62
End: 2017-12-14
Payer: COMMERCIAL

## 2017-12-14 VITALS
OXYGEN SATURATION: 98 % | HEART RATE: 78 BPM | BODY MASS INDEX: 32.35 KG/M2 | RESPIRATION RATE: 18 BRPM | TEMPERATURE: 96.3 F | HEIGHT: 69 IN | SYSTOLIC BLOOD PRESSURE: 104 MMHG | WEIGHT: 218.4 LBS | DIASTOLIC BLOOD PRESSURE: 68 MMHG

## 2017-12-14 DIAGNOSIS — I82.4Z2 DEEP VEIN THROMBOSIS (DVT) OF DISTAL VEIN OF LEFT LOWER EXTREMITY, UNSPECIFIED CHRONICITY (H): ICD-10-CM

## 2017-12-14 DIAGNOSIS — I10 HTN, GOAL BELOW 140/90: ICD-10-CM

## 2017-12-14 DIAGNOSIS — J44.9 CHRONIC OBSTRUCTIVE PULMONARY DISEASE, UNSPECIFIED COPD TYPE (H): Primary | ICD-10-CM

## 2017-12-14 DIAGNOSIS — Z79.01 LONG-TERM (CURRENT) USE OF ANTICOAGULANTS: ICD-10-CM

## 2017-12-14 DIAGNOSIS — Z79.01 LONG TERM CURRENT USE OF ANTICOAGULANT THERAPY: ICD-10-CM

## 2017-12-14 LAB — INR BLD: 1.8 (ref 0.86–1.14)

## 2017-12-14 PROCEDURE — 99214 OFFICE O/P EST MOD 30 MIN: CPT | Performed by: FAMILY MEDICINE

## 2017-12-14 PROCEDURE — 85610 PROTHROMBIN TIME: CPT | Mod: QW | Performed by: FAMILY MEDICINE

## 2017-12-14 PROCEDURE — 99207 ZZC NO CHARGE NURSE ONLY: CPT | Performed by: FAMILY MEDICINE

## 2017-12-14 PROCEDURE — 36416 COLLJ CAPILLARY BLOOD SPEC: CPT | Performed by: FAMILY MEDICINE

## 2017-12-14 RX ORDER — CARVEDILOL 25 MG/1
12.5 TABLET ORAL 2 TIMES DAILY WITH MEALS
Qty: 60 TABLET | Refills: 4
Start: 2017-12-14 | End: 2018-02-28

## 2017-12-14 ASSESSMENT — PAIN SCALES - GENERAL: PAINLEVEL: NO PAIN (0)

## 2017-12-14 NOTE — PATIENT INSTRUCTIONS
These are new changes to your current plan of care based on today's visit:    Medications stopped    Medications to be started    Change dose of this medication Reduce Carvedilol to 12.5 mg twice daily  ( 1/2 tablet twice daily )   New treatments        Follow up appointments:    1.  FOLLOW UP WITH YOUR PRIMARY CARE PROVIDER: 2 month(s) for clinic visit     2. FOLLOW UP WITH SPECIALIST:     3. FOLLOW UP WITH NURSE VISIT: As planned for INR/Warfarin and also have a blood pressure check with following INR check    Percy Deshpande MD

## 2017-12-14 NOTE — MR AVS SNAPSHOT
Bucky CHRISTINE Edgar   12/14/2017   Anticoagulation Therapy Visit    Description:  62 year old male   Provider:  Percy Deshpande MD   Department:  Er Anticoag           INR as of 12/14/2017     Today's INR 1.8!      Anticoagulation Summary as of 12/14/2017     INR goal 2.0-3.0   Today's INR 1.8!   Full instructions 5 mg on Tue; 7.5 mg all other days   Next INR check 12/21/2017    Indications   Long-term (current) use of anticoagulants [Z79.01] [Z79.01]  Deep vein thrombosis (DVT) (H) [I82.409] [I82.409]         Your next Anticoagulation Clinic appointment(s)     Dec 21, 2017 10:30 AM CST   Anticoagulation Visit with ER ANTI COAG   [facility]    [address] [facility] [address]   [phone]              Contact Numbers     Clinic Number:         December 2017 Details    Sun Mon Tue Wed Thu Fri Sat          1               2                 3               4               5               6               7               8               9                 10               11               12               13               14      7.5 mg   See details      15      7.5 mg         16      7.5 mg           17      7.5 mg         18      7.5 mg         19      5 mg         20      7.5 mg         21            22               23                 24               25               26               27               28               29               30                 31                      Date Details   12/14 This INR check       Date of next INR:  12/21/2017         How to take your warfarin dose     To take:  5 mg Take 1 of the 5 mg tablets.    To take:  7.5 mg Take 1.5 of the 5 mg tablets.

## 2017-12-14 NOTE — MR AVS SNAPSHOT
After Visit Summary   12/14/2017    Bucky Edgar    MRN: [id number]           Patient Information     Date Of Birth          1955        Visit Information        Provider Department      12/14/2017 10:00 AM Percy Deshpande MD [facility]        Today's Diagnoses     HTN, goal below 140/90        Long term current use of anticoagulant therapy          Care Instructions    These are new changes to your current plan of care based on today's visit:    Medications stopped    Medications to be started    Change dose of this medication Reduce Carvedilol to 12.5 mg twice daily  ( 1/2 tablet twice daily )   New treatments        Follow up appointments:    1.  FOLLOW UP WITH YOUR PRIMARY CARE PROVIDER: 2 month(s) for clinic visit     2. FOLLOW UP WITH SPECIALIST:     3. FOLLOW UP WITH NURSE VISIT: As planned for INR/Warfarin and also have a blood pressure check with following INR check    Percy Deshpande MD                Follow-ups after your visit        Follow-up notes from your care team     Return in about 2 months (around 2/14/2018) for Routine Visit, Lab Work.      Who to contact     If you have questions or need follow up information about today's clinic visit or your schedule please contact [facility] directly at [phone].  Normal or non-critical lab and imaging results will be communicated to you by MyChart, letter or phone within 4 business days after the clinic has received the results. If you do not hear from us within 7 days, please contact the clinic through Thumb Arcadehart or phone. If you have a critical or abnormal lab result, we will notify you by phone as soon as possible.  Submit refill requests through Tour Desk or call your pharmacy and they will forward the refill request to us. Please allow 3 business days for your refill to be completed.          Additional Information About Your Visit        [employer] Information     Tour Desk lets you send messages to  "your doctor, view your test results, renew your prescriptions, schedule appointments and more. To sign up, go to [email or web address] . Click on \"Log in\" on the left side of the screen, which will take you to the Welcome page. Then click on \"Sign up Now\" on the right side of the page.     You will be asked to enter the access code listed below, as well as some personal information. Please follow the directions to create your username and password.     Your access code is: [email or web address]  Expires: 3/14/2018 10:29 AM     Your access code will  in 90 days. If you need help or a new code, please call your [place] clinic or [phone].        Care EveryWhere ID     This is your Care EveryWhere ID. This could be used by other organizations to access your [place] medical records  [id number]        Your Vitals Were     Pulse Temperature Respirations Height Pulse Oximetry BMI (Body Mass Index)    78 96.3  F (35.7  C) (Temporal) 18 5' 9\" (1.753 m) 98% 32.25 kg/m2       Blood Pressure from Last 3 Encounters:   17 104/68   17 150/80   10/23/17 (!) 180/100    Weight from Last 3 Encounters:   17 218 lb 6.4 oz (99.1 kg)   17 220 lb (99.8 kg)   10/23/17 221 lb (100.2 kg)              We Performed the Following     INR point of care          Today's Medication Changes          These changes are accurate as of: 17 10:30 AM.  If you have any questions, ask your nurse or doctor.               These medicines have changed or have updated prescriptions.        Dose/Directions    carvedilol 25 MG tablet   Commonly known as:  COREG   This may have changed:  how much to take   Used for:  HTN, goal below 140/90   Changed by:  Percy Deshpande MD        Dose:  12.5 mg   Take 0.5 tablets (12.5 mg) by mouth 2 times daily (with meals)   Quantity:  60 tablet   Refills:  4         Stop taking these medicines if you haven't already. Please contact your care team if you have questions.     " hydrochlorothiazide 12.5 MG Tabs tablet   Stopped by:  Percy Deshpande MD                Where to get your medicines      Some of these will need a paper prescription and others can be bought over the counter.  Ask your nurse if you have questions.     You don't need a prescription for these medications     carvedilol 25 MG tablet                Primary Care Provider Office Phone # Fax #    Percy Deshpande [phone] [address] [facility] [address]        Equal Access to Services     [facility]: Hadii aad ku hadasho Soomaali, waaxda luqadaha, qaybta kaalmada adeegyada, waxay idiin hayaan adeeg khjossue gonzalez . So [facility] [phone].    ATENCIÓN: Si habla español, tiene a garcia disposición servicios gratuitos de asistencia lingüística. [facility] [phone].    We comply with applicable federal civil rights laws and [place] laws. We do not discriminate on the basis of race, color, national origin, age, disability, sex, sexual orientation, or gender identity.            Thank you!     Thank you for choosing [facility]  for your care. Our goal is always to provide you with excellent care. Hearing back from our patients is one way we can continue to improve our services. Please take a few minutes to complete the written survey that you may receive in the mail after your visit with us. Thank you!             Your Updated Medication List - Protect others around you: Learn how to safely use, store and throw away your medicines at www.disposemymeds.org.          This list is accurate as of: 12/14/17 10:30 AM.  Always use your most recent med list.                   Brand Name Dispense Instructions for use Diagnosis    acetaminophen 325 MG tablet    TYLENOL     Take 650 mg by mouth every 6 hours        albuterol 108 (90 BASE) MCG/ACT Inhaler    PROAIR HFA/PROVENTIL HFA/VENTOLIN HFA    3 Inhaler    Inhale 2 puffs into the lungs every 6 hours as needed for shortness of  breath / dyspnea    Chronic bronchitis with COPD (chronic obstructive pulmonary disease) (H)       carvedilol 25 MG tablet    COREG    60 tablet    Take 0.5 tablets (12.5 mg) by mouth 2 times daily (with meals)    HTN, goal below 140/90       lisinopril 40 MG tablet    PRINIVIL/ZESTRIL    90 tablet    TAKE ONE TABLET BY MOUTH DAILY    HTN, goal below 140/90       NICORETTE 2 MG lozenge   Generic drug:  nicotine polacrilex      Place 2 mg inside cheek every hour as needed for smoking cessation        NICOTINE STEP 1 21 MG/24HR 24 hr patch   Generic drug:  nicotine      ROBBY 1 PATCH TO SKIN ONCE D X 42 DAYS        sennosides 8.6 MG tablet    SENOKOT     TK 1 T PO BID        warfarin 5 MG tablet    COUMADIN    100 tablet    Take 5 mg TU, TH, SA and 7.5 mg all other days, or as directed by the coumadin clinic.    Long-term (current) use of anticoagulants, Deep vein thrombosis (DVT) (H)

## 2017-12-14 NOTE — NURSING NOTE
"Chief Complaint   Patient presents with     Recheck Medication     Panel Management     advance directive        Initial /68  Pulse 78  Temp 96.3  F (35.7  C) (Temporal)  Resp 18  Ht 5' 9\" (1.753 m)  Wt 218 lb 6.4 oz (99.1 kg)  SpO2 98%  BMI 32.25 kg/m2 Estimated body mass index is 32.25 kg/(m^2) as calculated from the following:    Height as of this encounter: 5' 9\" (1.753 m).    Weight as of this encounter: 218 lb 6.4 oz (99.1 kg).  Medication Reconciliation: complete     Audrey Sorensen MA       "

## 2017-12-14 NOTE — PROGRESS NOTES
ANTICOAGULATION FOLLOW-UP CLINIC VISIT    Patient Name:  Bucky Edgar  Date:  12/14/2017  Contact Type:  Telephone    SUBJECTIVE:     Patient Findings     Positives Change in diet/appetite (eating more greens)           OBJECTIVE    INR Point of Care   Date Value Ref Range Status   12/14/2017 1.8 (H) 0.86 - 1.14 Final     Comment:     This test is intended for monitoring Coumadin therapy.  Results are not   accurate in patients with prolonged INR due to factor deficiency.         ASSESSMENT / PLAN  INR assessment SUB    Recheck INR In: 1 WEEK    INR Location Outside lab      Anticoagulation Summary as of 12/14/2017     INR goal 2.0-3.0   Today's INR 1.8!   Maintenance plan 5 mg (5 mg x 1) on Tue; 7.5 mg (5 mg x 1.5) all other days   Full instructions 5 mg on Tue; 7.5 mg all other days   Weekly total 50 mg   Plan last modified Janeth Mccarthy RN (12/14/2017)   Next INR check 12/21/2017   Target end date     Indications   Long-term (current) use of anticoagulants [Z79.01] [Z79.01]  Deep vein thrombosis (DVT) (H) [I82.409] [I82.409]         Anticoagulation Episode Summary     INR check location     Preferred lab     Send INR reminders to [facility] [place]    Comments PM dose, 5 mg tabs        Anticoagulation Care Providers     Provider Role Specialty Phone number    Percy Deshpande MD [facility] [phone]            See the Encounter Report to view Anticoagulation Flowsheet and Dosing Calendar (Go to Encounters tab in chart review, and find the Anticoagulation Therapy Visit)    Dosage adjustment made based on physician directed care plan.    Janeth Mccarthy RN

## 2017-12-19 ENCOUNTER — TELEPHONE (OUTPATIENT)
Dept: FAMILY MEDICINE | Facility: CLINIC | Age: 62
End: 2017-12-19

## 2017-12-19 NOTE — TELEPHONE ENCOUNTER
Reason for call:  Form  Reason for Call:  Form, our goal is to have forms completed with 72 hours, however, some forms may require a visit or additional information.    Type of letter, form or note:  medical    Who is the form from?: principal insurance forms    Where did the form come from: form was faxed in    What clinic location was the form placed at?: [facility] - [phone]    Where the form was placed:  in box     What number is listed as a contact on the form?: please fax        Additional comments: Fax to [phone]    Call taken on 11/8/2016 at 3:08 PM by Chitra Asencio

## 2017-12-19 NOTE — TELEPHONE ENCOUNTER
Per DA, pt requested to schedule OV to complete the forms    Pt informed and scheduled for tomorrow

## 2017-12-19 NOTE — PROGRESS NOTES
SUBJECTIVE:                                                    Bucky Edgar is a 62 year old male who presents to clinic today for the following health issues:      HPI     This patient is seen today for consultation and completion of disability forms related to life insurance policy.  Has disability is from multilevel degenerative disc disease of the lumbar spine with 3 prior surgeries, the last including two-level fusion.  He is quite limited in activities due to his lower back.  Form was filled out with the assistance of the patient.  He has limited range of motion of his lower back and is limited in the fact that static positioning causes increased pain and he has minimal ability to lift and carry and cannot push or pull due to back pain.    Hypertension Follow-up      Outpatient blood pressures are being checked at home.  Results are 130/80-90.    Low Salt Diet: no added salt    COPD Follow-Up    Symptoms are currently: stable    Current fatigue or dyspnea with ambulation: stable     Shortness of breath: stable    Increased or change in Cough/Sputum: No    Fever(s): Yes-  Had cold last week    Baseline ambulation without stopping to rest:  100 feet. Able to walk up 1 flights of stairs without stopping to rest.    Any ER/UC or hospital admissions since your last visit? No     History   Smoking Status     Former Smoker     Packs/day: 0.50     Years: 38.00     Types: Cigarettes   Smokeless Tobacco     Never Used     Comment: Quit for 10 years, now smoked for 10 years. Max 2 PPD i the past     Lab Results   Component Value Date    FEV1 2.89 11/27/2013    HXM1UTK 60% 11/27/2013             Problem list and histories reviewed & adjusted, as indicated.  Additional history: as documented            ROS:   ROS: 10 point ROS neg other than the symptoms noted above in the HPI.      OBJECTIVE:                                                    /80  Pulse 78  Temp 99.6  F (37.6  C) (Temporal)  Resp 16  Wt  222 lb (100.7 kg)  SpO2 97%  BMI 32.78 kg/m2  Body mass index is 32.78 kg/(m^2).  GENERAL: healthy, alert and no distress  HENT: ear canals- normal; TMs- normal; Nose- normal; Mouth- no ulcers, no lesions  NECK: no tenderness, no adenopathy, no asymmetry, no masses, no stiffness; thyroid- normal to palpation  RESP: lungs clear to auscultation - no rales, no rhonchi, no wheezes  CV: regular rates and rhythm, normal S1 S2, no S3 or S4 and no murmur, no click or rub -  ABDOMEN: soft, no tenderness, no  hepatosplenomegaly, no masses, normal bowel sounds  MS: Does not appear to have gross weakness of the lower extremities but does have diminished sensation on the medial aspect of his right foot/great toe.  Decreased flexibility of the lumbar spine at the hips.  NEURO: strength and tone- normal, sensory exam- grossly normal, mentation- intact, speech- normal, reflexes- symmetric  NEURO: No major focal deficit concerning motor function.  PSYCH: Alert and oriented times 3; speech- coherent , normal rate and volume; able to articulate logical thoughts, able to abstract reason, no tangential thoughts, no hallucinations or delusions, affect- normal    Diagnostic test results:  Diagnostic Test Results:  none        ASSESSMENT/PLAN:                                                    1. Displacement of lumbar intervertebral disc without myelopathy  Current situation is leading to long-term disability and inability to work due to restrictions and limitations.  Disability forms filled out with the assistance of the patient and reviewing his current functional capacities.    2. HTN, goal below 140/90  Continue current medications      Follow up with Provider -Follow-up in late February 2018 as planned.  See Patient Instructions    The patient understood the rational for the diagnosis and treatment plan. All questions were answered to best of my ability and the patient's satisfaction.    Note: Chart documentation done in part with  Validus Voice Recognition software. Although reviewed after completion, some word and grammatical errors may remain.  Please consider this when interpreting information in this chart.      Percy Deshpande MD  [facility]

## 2017-12-20 ENCOUNTER — OFFICE VISIT (OUTPATIENT)
Dept: FAMILY MEDICINE | Facility: CLINIC | Age: 62
End: 2017-12-20
Payer: COMMERCIAL

## 2017-12-20 VITALS
OXYGEN SATURATION: 97 % | TEMPERATURE: 99.6 F | HEART RATE: 78 BPM | BODY MASS INDEX: 32.78 KG/M2 | DIASTOLIC BLOOD PRESSURE: 80 MMHG | WEIGHT: 222 LBS | SYSTOLIC BLOOD PRESSURE: 130 MMHG | RESPIRATION RATE: 16 BRPM

## 2017-12-20 DIAGNOSIS — I10 HTN, GOAL BELOW 140/90: ICD-10-CM

## 2017-12-20 DIAGNOSIS — M51.26 DISPLACEMENT OF LUMBAR INTERVERTEBRAL DISC WITHOUT MYELOPATHY: Primary | ICD-10-CM

## 2017-12-20 PROCEDURE — 99213 OFFICE O/P EST LOW 20 MIN: CPT | Performed by: FAMILY MEDICINE

## 2017-12-20 ASSESSMENT — PAIN SCALES - GENERAL: PAINLEVEL: MODERATE PAIN (5)

## 2017-12-20 NOTE — MR AVS SNAPSHOT
After Visit Summary   12/20/2017    Bucky Edgar    MRN: [id number]           Patient Information     Date Of Birth          1955        Visit Information        Provider Department      12/20/2017 3:20 PM Percy Deshpande MD [facility]        Today's Diagnoses     Displacement of lumbar intervertebral disc without myelopathy    -  1    HTN, goal below 140/90           Follow-ups after your visit        Your next 10 appointments already scheduled     Dec 21, 2017 10:30 AM CST   Anticoagulation Visit with ER ANTI COAG   [facility] ([facility]    [address]  [facility] [address]   [phone]            Feb 14, 2018  9:20 AM CST   Office Visit with Percy Deshpande MD   [facility] ([facility])    [address], [address]  [facility] [address] [phone]           Bring a current list of meds and any records pertaining to this visit. For Physicals, please bring immunization records and any forms needing to be filled out. Please arrive 10 minutes early to complete paperwork.              Who to contact     If you have questions or need follow up information about today's clinic visit or your schedule please contact [facility] directly at [phone].  Normal or non-critical lab and imaging results will be communicated to you by Looxiihart, letter or phone within 4 business days after the clinic has received the results. If you do not hear from us within 7 days, please contact the clinic through Looxiihart or phone. If you have a critical or abnormal lab result, we will notify you by phone as soon as possible.  Submit refill requests through [employer] or call your pharmacy and they will forward the refill request to us. Please allow 3 business days for your refill to be completed.          Additional Information About Your Visit        [employer] Information     [employer] lets you send messages to  "your doctor, view your test results, renew your prescriptions, schedule appointments and more. To sign up, go to [email or web address] . Click on \"Log in\" on the left side of the screen, which will take you to the Welcome page. Then click on \"Sign up Now\" on the right side of the page.     You will be asked to enter the access code listed below, as well as some personal information. Please follow the directions to create your username and password.     Your access code is: [email or web address]  Expires: 3/14/2018 10:29 AM     Your access code will  in 90 days. If you need help or a new code, please call your [place] clinic or [phone].        Care EveryWhere ID     This is your Care EveryWhere ID. This could be used by other organizations to access your [place] medical records  [id number]        Your Vitals Were     Pulse Temperature Respirations Pulse Oximetry BMI (Body Mass Index)       78 99.6  F (37.6  C) (Temporal) 16 97% 32.78 kg/m2        Blood Pressure from Last 3 Encounters:   17 130/80   17 104/68   17 150/80    Weight from Last 3 Encounters:   17 222 lb (100.7 kg)   17 218 lb 6.4 oz (99.1 kg)   17 220 lb (99.8 kg)              Today, you had the following     No orders found for display       Primary Care Provider Office Phone # Fax #    Percy Deshpande [phone] [address] [facility] [address]        Equal Access to Services     [facility] : Hadii lynn Mathew, waaxda janeen, qaybta carsonalchelsy knight. So [facility] [phone].    ATENCIÓN: Si habla español, tiene a garcia disposición servicios gratuitos de asistencia lingüística. Llame al [phone].    We comply with applicable federal civil rights laws and [place] laws. We do not discriminate on the basis of race, color, national origin, age, disability, sex, sexual orientation, or gender identity.            Thank you! "     Thank you for choosing [facility]  for your care. Our goal is always to provide you with excellent care. Hearing back from our patients is one way we can continue to improve our services. Please take a few minutes to complete the written survey that you may receive in the mail after your visit with us. Thank you!             Your Updated Medication List - Protect others around you: Learn how to safely use, store and throw away your medicines at www.disposemymeds.org.          This list is accurate as of: 12/20/17  6:59 PM.  Always use your most recent med list.                   Brand Name Dispense Instructions for use Diagnosis    acetaminophen 325 MG tablet    TYLENOL     Take 650 mg by mouth every 6 hours        albuterol 108 (90 BASE) MCG/ACT Inhaler    PROAIR HFA/PROVENTIL HFA/VENTOLIN HFA    3 Inhaler    Inhale 2 puffs into the lungs every 6 hours as needed for shortness of breath / dyspnea    Chronic bronchitis with COPD (chronic obstructive pulmonary disease) (H)       carvedilol 25 MG tablet    COREG    60 tablet    Take 0.5 tablets (12.5 mg) by mouth 2 times daily (with meals)    HTN, goal below 140/90       lisinopril 40 MG tablet    PRINIVIL/ZESTRIL    90 tablet    TAKE ONE TABLET BY MOUTH DAILY    HTN, goal below 140/90       NICORETTE 2 MG lozenge   Generic drug:  nicotine polacrilex      Place 2 mg inside cheek every hour as needed for smoking cessation        NICOTINE STEP 1 21 MG/24HR 24 hr patch   Generic drug:  nicotine      ROBBY 1 PATCH TO SKIN ONCE D X 42 DAYS        sennosides 8.6 MG tablet    SENOKOT     TK 1 T PO BID        warfarin 5 MG tablet    COUMADIN    100 tablet    Take 5 mg TU, TH, SA and 7.5 mg all other days, or as directed by the coumadin clinic.    Long-term (current) use of anticoagulants, Deep vein thrombosis (DVT) (H)

## 2018-01-02 ENCOUNTER — TELEPHONE (OUTPATIENT)
Dept: FAMILY MEDICINE | Facility: OTHER | Age: 63
End: 2018-01-02

## 2018-01-02 ENCOUNTER — ANTICOAGULATION THERAPY VISIT (OUTPATIENT)
Dept: ANTICOAGULATION | Facility: OTHER | Age: 63
End: 2018-01-02
Payer: COMMERCIAL

## 2018-01-02 VITALS — DIASTOLIC BLOOD PRESSURE: 92 MMHG | SYSTOLIC BLOOD PRESSURE: 164 MMHG

## 2018-01-02 DIAGNOSIS — I82.4Z2 DEEP VEIN THROMBOSIS (DVT) OF DISTAL VEIN OF LEFT LOWER EXTREMITY, UNSPECIFIED CHRONICITY (H): ICD-10-CM

## 2018-01-02 DIAGNOSIS — Z79.01 LONG-TERM (CURRENT) USE OF ANTICOAGULANTS: ICD-10-CM

## 2018-01-02 LAB — INR POINT OF CARE: 1.8 (ref 0.86–1.14)

## 2018-01-02 PROCEDURE — 36416 COLLJ CAPILLARY BLOOD SPEC: CPT

## 2018-01-02 PROCEDURE — 85610 PROTHROMBIN TIME: CPT | Mod: QW

## 2018-01-02 PROCEDURE — 99207 ZZC NO CHARGE NURSE ONLY: CPT

## 2018-01-02 NOTE — TELEPHONE ENCOUNTER
Call pt. advise OV with PCP, Dr. Deshpande as BP is not controlled, and likely contributing to his symptoms. My [place]

## 2018-01-02 NOTE — TELEPHONE ENCOUNTER
Pt was in for INR check today.  States that he had his blood pressure medications all changed around over past couple of weeks.  Pt states that he has had lightheadedness at least once daily.  States that sometimes he notices it with position change, but not always.   States that he has had a very mild, nagging headache for a few days.  States that his blood pressure was 145/90 at lunch today.  Checked blood pressure in appt and it was 164/92.  States that he is not lightheaded currently.  Pt denies any loss of consciousness, chest pain, difficulty breathing, nausea/vomiting, diarrhea, signs of dehydration, bleeding (blood in stool/urine, etc). Did advise pt that if he is having new/worsening symptoms that he needs to go into the ER.  Routing to  DP provider for further recommendation, please advise.       Larry Hernandez RN, BSN

## 2018-01-02 NOTE — TELEPHONE ENCOUNTER
Please schedule a clinic visit for follow-up on his blood pressure.    Percy Deshpande MD  Please close encounter when call/work completed.

## 2018-01-02 NOTE — MR AVS SNAPSHOT
Bucky CHRISTINE Edgar   1/2/2018 4:15 PM   Anticoagulation Therapy Visit    Description:  62 year old male   Provider:  ER ANTI COAG   Department:  Er Anticoag           INR as of 1/2/2018     Today's INR 1.8!      Anticoagulation Summary as of 1/2/2018     INR goal 2.0-3.0   Today's INR 1.8!   Full instructions 1/2: 7.5 mg; 1/3: 5 mg; 1/4: 5 mg; 1/5: 5 mg; 1/6: 5 mg; 1/7: 5 mg; 1/8: 5 mg; Otherwise 5 mg on Tue; 7.5 mg all other days   Next INR check 1/9/2018    Indications   Long-term (current) use of anticoagulants [Z79.01] [Z79.01]  Deep vein thrombosis (DVT) (H) [I82.409] [I82.409]         Your next Anticoagulation Clinic appointment(s)     Jan 09, 2018  9:00 AM CST   Anticoagulation Visit with ER ANTI COAG   [facility]    [address] [address]   [phone]              Contact Numbers     Clinic Number:         January 2018 Details    Sun Mon Tue Wed Thu Fri Sat      1               2      7.5 mg   See details      3      5 mg         4      5 mg         5      5 mg         6      5 mg           7      5 mg         8      5 mg         9            10               11               12               13                 14               15               16               17               18               19               20                 21               22               23               24               25               26               27                 28               29               30               31                   Date Details   01/02 This INR check       Date of next INR:  1/9/2018         How to take your warfarin dose     To take:  5 mg Take 1 of the 5 mg tablets.    To take:  7.5 mg Take 1.5 of the 5 mg tablets.

## 2018-01-02 NOTE — PROGRESS NOTES
ANTICOAGULATION FOLLOW-UP CLINIC VISIT    Patient Name:  Bucky Edgar  Date:  1/2/2018  Contact Type:  Face to Face    SUBJECTIVE:     Patient Findings     Positives Other complaints (see TE to PCP), Med error (Pt missed last INR appt and decided on his own to take 5 mg daily)           OBJECTIVE    INR Protime   Date Value Ref Range Status   01/02/2018 1.8 (A) 0.86 - 1.14 Final       ASSESSMENT / PLAN  INR assessment SUB    Recheck INR In: 1 WEEK    INR Location Clinic      Anticoagulation Summary as of 1/2/2018     INR goal 2.0-3.0   Today's INR 1.8!   Maintenance plan 5 mg (5 mg x 1) on Tue; 7.5 mg (5 mg x 1.5) all other days   Full instructions 1/2: 7.5 mg; 1/3: 5 mg; 1/4: 5 mg; 1/5: 5 mg; 1/6: 5 mg; 1/7: 5 mg; 1/8: 5 mg; Otherwise 5 mg on Tue; 7.5 mg all other days   Weekly total 50 mg   Plan last modified Janeth Mccarthy, RN (12/14/2017)   Next INR check 1/9/2018   Target end date     Indications   Long-term (current) use of anticoagulants [Z79.01] [Z79.01]  Deep vein thrombosis (DVT) (H) [I82.409] [I82.409]         Anticoagulation Episode Summary     INR check location     Preferred lab     Send INR reminders to [facility] MIGDALIA    Comments PM dose, 5 mg tabs        Anticoagulation Care Providers     Provider Role Specialty Phone number    Percy Deshpande MD [facility] [phone]            See the Encounter Report to view Anticoagulation Flowsheet and Dosing Calendar (Go to Encounters tab in chart review, and find the Anticoagulation Therapy Visit)    Dosage adjustment made based on physician directed care plan.    Larry Hernandez, RN

## 2018-01-04 NOTE — PROGRESS NOTES
SUBJECTIVE:                                                    Bucky Edgar is a 62 year old male who presents to clinic today for the following health issues:      History of Present Illness     COPD:     Current status of COPD symptom::  Stable    Status of fatigue and dyspnea with ambulation::  Stable (have cold for the last couple weeks)    Status of dyspnea::  Stable    Increase or change in cough or sputum::  YES (cough)    Fever::  No    Baseline ambulation without stopping to rest::  6-10 blocks    Number of flights of stairs without resting::  >5    ER or UC Visits or Hospital admissions::  None    Hypertension:     Outpatient blood pressures:  Are being checked    Blood pressures checked at:  Home    Dietary sodium intake::  No added salt diet    Diet:  Low salt  Frequency of exercise:  2-3 days/week  Duration of exercise:  15-30 minutes  Taking medications regularly:  Yes  Medication side effects:  None  Additional concerns today:  No    The last number of days or weeks, this patient's had labile blood pressure readings.  At times systolic pressures above 160.  Home blood pressure reading today was 145-150/.  Reports mild headache but no chest pain, respiratory changes or lower extremity swelling.    Current medications are Coreg 12.5 mg twice daily lisinopril 40 mg each morning.  Morning blood pressure readings are consistently elevated recently but afternoon blood pressure readings are below 140/90.  Does appear to have early morning rise in blood pressure readings.     Reports no major alcohol use since his trip to [place] a few weeks ago.  Apparently has been watching his diet from the standpoint of salt intake.        Problem list and histories reviewed & adjusted, as indicated.  Additional history: as documented            ROS:  C: NEGATIVE for fever, chills, change in weight  CONSTITUTIONAL: Continues overweight and add obesity level 1  I: NEGATIVE for worrisome rashes, moles  "or lesions  E: NEGATIVE for vision changes or irritation  E/M: NEGATIVE for ear, mouth and throat problems  RESP:NEGATIVE for significant cough or SOB and stable COPD from smoking  CV: NEGATIVE for chest pain, palpitations or peripheral edema  CV: Hypertension, not under perfect control.  Currently appears not to have major target organ effects.  GI: NEGATIVE for nausea, abdominal pain, heartburn, or change in bowel habits  : NEGATIVE for frequency, dysuria, or hematuria  M: NEGATIVE for significant arthralgias or myalgia  N: NEGATIVE for weakness, dizziness or paresthesias  NEURO: Mild transient headache at times with his blood pressure elevated.  No focal neurologic deficits.  E: NEGATIVE for temperature intolerance, skin/hair changes  H: NEGATIVE for bleeding problems  HEME/ALLERGY/IMMUNE: Continues on warfarin therapy through February 2018 for DVT with pulmonary emboli.  P: NEGATIVE for changes in mood or affect    OBJECTIVE:                                                    BP (!) 146/94  Pulse 64  Temp 98.9  F (37.2  C) (Temporal)  Resp 16  Ht 5' 9\" (1.753 m)  Wt 222 lb (100.7 kg)  SpO2 96%  BMI 32.78 kg/m2  Body mass index is 32.78 kg/(m^2).  GENERAL: alert, no distress, cooperative and over weight  EYES: Eyes grossly normal to inspection, extraocular movements - intact, and PERRL  HENT: ear canals- normal; TMs- normal; Nose- normal; Mouth- no ulcers, no lesions  NECK: no tenderness, no adenopathy, no asymmetry, no masses, no stiffness; thyroid- normal to palpation  NECK: No carotid bruits bilaterally  RESP: lungs clear to auscultation - no rales, no rhonchi, no wheezes  CV: regular rates and rhythm, normal S1 S2, no S3 or S4 and no murmur, no click or rub -  ABDOMEN: soft, no tenderness, no  hepatosplenomegaly, no masses, normal bowel sounds  MS: extremities- no gross deformities noted, no edema  MS: Left lower extremity shows no swelling or edema.  NEURO: strength and tone- normal, sensory exam- " "grossly normal, mentation- intact, speech- normal, reflexes- symmetric  PSYCH: Alert and oriented times 3; speech- coherent , normal rate and volume; able to articulate logical thoughts, able to abstract reason, no tangential thoughts, no hallucinations or delusions, affect- normal    Diagnostic test results:  Diagnostic Test Results:  none        ASSESSMENT/PLAN:                                                      (I10) HTN, goal below 140/90  (primary encounter diagnosis)  Comment: Blood pressure slightly variable with elevated blood pressure readings in the morning and acceptable readings in the afternoon.  Plan: We will continue Coreg 12.5 mg twice daily.  Begin Lisinopril 20 mg twice daily (splitting the current 40 mg tablets at home) in an effort to \"even out\" the blood pressure control effects..  If the blood pressure readings do not improve and become stable, add Amlodipine 5 mg daily.  Patient will call with blood pressure readings in 3-4 days.    (J44.9) Chronic obstructive pulmonary disease, unspecified COPD type (H)  Comment:   Plan: Currently appears stable from a symptomatic standpoint.  To avoid smoking.        Follow up with Provider -Follow-up in February as planned for DVT/PE follow-up.  Adjust medications for blood pressure in the next few days.  See Patient Instructions    The patient understood the rational for the diagnosis and treatment plan. All questions were answered to best of my ability and the patient's satisfaction.    Note: Chart documentation done in part with Dragon Voice Recognition software. Although reviewed after completion, some word and grammatical errors may remain.  Please consider this when interpreting information in this chart.      Percy Deshpande MD  [facility]RR[facility]  "

## 2018-01-08 ENCOUNTER — OFFICE VISIT (OUTPATIENT)
Dept: FAMILY MEDICINE | Facility: CLINIC | Age: 63
End: 2018-01-08
Payer: COMMERCIAL

## 2018-01-08 VITALS
BODY MASS INDEX: 32.88 KG/M2 | SYSTOLIC BLOOD PRESSURE: 146 MMHG | HEART RATE: 64 BPM | TEMPERATURE: 98.9 F | WEIGHT: 222 LBS | OXYGEN SATURATION: 96 % | HEIGHT: 69 IN | DIASTOLIC BLOOD PRESSURE: 94 MMHG | RESPIRATION RATE: 16 BRPM

## 2018-01-08 DIAGNOSIS — J44.9 CHRONIC OBSTRUCTIVE PULMONARY DISEASE, UNSPECIFIED COPD TYPE (H): ICD-10-CM

## 2018-01-08 DIAGNOSIS — I10 HTN, GOAL BELOW 140/90: Primary | ICD-10-CM

## 2018-01-08 PROCEDURE — 99213 OFFICE O/P EST LOW 20 MIN: CPT | Performed by: FAMILY MEDICINE

## 2018-01-08 ASSESSMENT — PAIN SCALES - GENERAL: PAINLEVEL: MODERATE PAIN (4)

## 2018-01-08 NOTE — PATIENT INSTRUCTIONS
These are new changes to your current plan of care based on today's visit:    Medications stopped    Medications to be started    Change dose of this medication Changing Lisinopril to 20 mg twice daily-- 1/2 tablet twice daily   New treatments May add Amlodipine 5 mg daily if the blood pressure is still elevated at the end of the week       Follow up appointments:    1.  FOLLOW UP WITH YOUR PRIMARY CARE PROVIDER: As planned for February 2018    Call the blood pressure readings Thursday or Friday for possibly adding a third medication.    Peryc Deshpande MD

## 2018-01-08 NOTE — MR AVS SNAPSHOT
After Visit Summary   1/8/2018    Bucky Edgar    MRN: [id number]           Patient Information     Date Of Birth          1955        Visit Information        Provider Department      1/8/2018 10:20 AM Percy Deshpande MD [facility]        Care Instructions    These are new changes to your current plan of care based on today's visit:    Medications stopped    Medications to be started    Change dose of this medication Changing Lisinopril to 20 mg twice daily-- 1/2 tablet twice daily   New treatments May add Amlodipine 5 mg daily if the blood pressure is still elevated at the end of the week       Follow up appointments:    1.  FOLLOW UP WITH YOUR PRIMARY CARE PROVIDER: As planned for February 2018    Call the blood pressure readings Thursday or Friday for possibly adding a third medication.    Percy Deshpande MD                  Follow-ups after your visit        Your next 10 appointments already scheduled     Jan 09, 2018  9:00 AM CST   An[facility] with ER ANTI COAG   [facility]    [address] [facility] [address] [facility] [address]   [phone]            Feb 14, 2018  9:20 AM CST   [facility]    [address], [address]  [place] [facility] [facility] [id number] [phone]           Bring a current list of meds and any records pertaining to this visit. For Physicals, please bring immunization records and any forms needing to be filled out. Please arrive 10 minutes early to complete paperwork.              Who to contact     If you have questions or need follow up information about today's clinic visit or your schedule please contact [facility] directly at [phone].  Normal or non-critical lab and imaging results will be communicated to you by MyChart, letter or phone within 4 business days after the clinic has received the results. If you  "do not hear from us within 7 days, please contact the clinic through [employer] or phone. If you have a critical or abnormal lab result, we will notify you by phone as soon as possible.  Submit refill requests through [employer] or call your pharmacy and they will forward the refill request to us. Please allow 3 business days for your refill to be completed.          Additional Information About Your Visit        [employer] Information     [employer] lets you send messages to your doctor, view your test results, renew your prescriptions, schedule appointments and more. To sign up, go to [email or web address] . Click on \"Log in\" on the left side of the screen, which will take you to the Welcome page. Then click on \"Sign up Now\" on the right side of the page.     You will be asked to enter the access code listed below, as well as some personal information. Please follow the directions to create your username and password.     Your access code is: [email or web address]  Expires: 3/14/2018 10:29 AM     Your access code will  in 90 days. If you need help or a new code, please call your [facility] clinic or [phone].        Care EveryWhere ID     This is your Care EveryWhere ID. This could be used by other organizations to access your [facility] medical records  [id number]        Your Vitals Were     Pulse Temperature Respirations Height Pulse Oximetry BMI (Body Mass Index)    64 98.9  F (37.2  C) (Temporal) 16 5' 9\" (1.753 m) 96% 32.78 kg/m2       Blood Pressure from Last 3 Encounters:   18 (!) 146/94   18 (!) 164/92   17 130/80    Weight from Last 3 Encounters:   18 222 lb (100.7 kg)   17 222 lb (100.7 kg)   17 218 lb 6.4 oz (99.1 kg)              Today, you had the following     No orders found for display       Primary Care Provider Office Phone # Fax #    Percy Deshpande [phone] [address] [address]        Equal Access to Services     [facility] " GAAR : Hadii aad ku hadleticia Somylesali, waaxda luqadaha, qaybta kaalmada adehiren, chelsy yangn aderuthy mejias laCaydenmauricio . So [facility] [phone].    ATENCIÓN: Si habla español, tiene a garcia disposición servicios gratuitos de asistencia lingüística. Llame al [phone].    We comply with applicable federal civil rights laws and [place] laws. We do not discriminate on the basis of race, color, national origin, age, disability, sex, sexual orientation, or gender identity.            Thank you!     Thank you for choosing [facility]  for your care. Our goal is always to provide you with excellent care. Hearing back from our patients is one way we can continue to improve our services. Please take a few minutes to complete the written survey that you may receive in the mail after your visit with us. Thank you!             Your Updated Medication List - Protect others around you: Learn how to safely use, store and throw away your medicines at www.disposemymeds.org.          This list is accurate as of: 1/8/18 10:47 AM.  Always use your most recent med list.                   Brand Name Dispense Instructions for use Diagnosis    acetaminophen 325 MG tablet    TYLENOL     Take 650 mg by mouth every 6 hours        albuterol 108 (90 BASE) MCG/ACT Inhaler    PROAIR HFA/PROVENTIL HFA/VENTOLIN HFA    3 Inhaler    Inhale 2 puffs into the lungs every 6 hours as needed for shortness of breath / dyspnea    Chronic bronchitis with COPD (chronic obstructive pulmonary disease) (H)       carvedilol 25 MG tablet    COREG    60 tablet    Take 0.5 tablets (12.5 mg) by mouth 2 times daily (with meals)    HTN, goal below 140/90       lisinopril 40 MG tablet    PRINIVIL/ZESTRIL    90 tablet    TAKE ONE TABLET BY MOUTH DAILY    HTN, goal below 140/90       NICORETTE 2 MG lozenge   Generic drug:  nicotine polacrilex      Place 2 mg inside cheek every hour as needed for smoking cessation        NICOTINE STEP 1 21 MG/24HR 24 hr  patch   Generic drug:  nicotine      ROBBY 1 PATCH TO SKIN ONCE D X 42 DAYS        sennosides 8.6 MG tablet    SENOKOT     TK 1 T PO BID        warfarin 5 MG tablet    COUMADIN    100 tablet    Take 5 mg TU, TH, SA and 7.5 mg all other days, or as directed by the coumadin clinic.    Long-term (current) use of anticoagulants, Deep vein thrombosis (DVT) (H)

## 2018-01-09 ENCOUNTER — ANTICOAGULATION THERAPY VISIT (OUTPATIENT)
Dept: ANTICOAGULATION | Facility: OTHER | Age: 63
End: 2018-01-09
Payer: COMMERCIAL

## 2018-01-09 DIAGNOSIS — Z79.01 LONG-TERM (CURRENT) USE OF ANTICOAGULANTS: ICD-10-CM

## 2018-01-09 DIAGNOSIS — I82.4Z2 DEEP VEIN THROMBOSIS (DVT) OF DISTAL VEIN OF LEFT LOWER EXTREMITY, UNSPECIFIED CHRONICITY (H): ICD-10-CM

## 2018-01-09 LAB — INR POINT OF CARE: 2.2 (ref 0.86–1.14)

## 2018-01-09 PROCEDURE — 85610 PROTHROMBIN TIME: CPT | Mod: QW

## 2018-01-09 PROCEDURE — 99207 ZZC NO CHARGE NURSE ONLY: CPT

## 2018-01-09 PROCEDURE — 36416 COLLJ CAPILLARY BLOOD SPEC: CPT

## 2018-01-09 NOTE — PROGRESS NOTES
ANTICOAGULATION FOLLOW-UP CLINIC VISIT    Patient Name:  Bucky Edgar  Date:  1/9/2018  Contact Type:  Face to Face    SUBJECTIVE:     Patient Findings     Positives No Problem Findings           OBJECTIVE    INR Protime   Date Value Ref Range Status   01/09/2018 2.2 (A) 0.86 - 1.14 Final       ASSESSMENT / PLAN  INR assessment THER    Recheck INR In: 3 WEEKS    INR Location Clinic      Anticoagulation Summary as of 1/9/2018     INR goal 2.0-3.0   Today's INR 2.2   Maintenance plan 7.5 mg (5 mg x 1.5) on Tue; 5 mg (5 mg x 1) all other days   Full instructions 7.5 mg on Tue; 5 mg all other days   Weekly total 37.5 mg   Plan last modified Janeth Mccarthy RN (1/9/2018)   Next INR check 1/30/2018   Target end date     Indications   Long-term (current) use of anticoagulants [Z79.01] [Z79.01]  Deep vein thrombosis (DVT) (H) [I82.409] [I82.409]         Anticoagulation Episode Summary     INR check location     Preferred lab     Send INR reminders to [facility] MIGDALIA    Comments PM dose, 5 mg tabs        Anticoagulation Care Providers     Provider Role Specialty Phone number    Percy Deshpande MD [facility] [phone]            See the Encounter Report to view Anticoagulation Flowsheet and Dosing Calendar (Go to Encounters tab in chart review, and find the Anticoagulation Therapy Visit)    Dosage adjustment made based on physician directed care plan.    Janeth Mccarthy RN

## 2018-01-09 NOTE — MR AVS SNAPSHOT
Bucky CHRISTINE Edgar   1/9/2018 9:00 AM   Anticoagulation Therapy Visit    Description:  62 year old male   Provider:  ER ANTI COAG   Department:  Er Anticoag           INR as of 1/9/2018     Today's INR 2.2      Anticoagulation Summary as of 1/9/2018     INR goal 2.0-3.0   Today's INR 2.2   Full instructions 7.5 mg on Tue; 5 mg all other days   Next INR check 1/30/2018    Indications   Long-term (current) use of anticoagulants [Z79.01] [Z79.01]  Deep vein thrombosis (DVT) (H) [I82.409] [I82.409]         Your next Anticoagulation Clinic appointment(s)     Jan 30, 2018 10:45 AM CST   [facility]AG   [facility]    [address] [address]   [phone]              Contact Numbers     Clinic Number:         January 2018 Details    Sun Mon Tue Wed Thu Fri Sat      1               2               3               4               5               6                 7               8               9      7.5 mg   See details      10      5 mg         11      5 mg         12      5 mg         13      5 mg           14      5 mg         15      5 mg         16      7.5 mg         17      5 mg         18      5 mg         19      5 mg         20      5 mg           21      5 mg         22      5 mg         23      7.5 mg         24      5 mg         25      5 mg         26      5 mg         27      5 mg           28      5 mg         29      5 mg         30            31                   Date Details   01/09 This INR check       Date of next INR:  1/30/2018         How to take your warfarin dose     To take:  5 mg Take 1 of the 5 mg tablets.    To take:  7.5 mg Take 1.5 of the 5 mg tablets.

## 2018-01-10 DIAGNOSIS — I10 HTN, GOAL BELOW 140/90: ICD-10-CM

## 2018-01-11 RX ORDER — CARVEDILOL 25 MG/1
TABLET ORAL
Qty: 60 TABLET | Refills: 0 | OUTPATIENT
Start: 2018-01-11

## 2018-01-11 NOTE — TELEPHONE ENCOUNTER
"Requested Prescriptions   Pending Prescriptions Disp Refills     carvedilol (COREG) 25 MG tablet [Pharmacy Med Name: CARVEDILOL 25MG TABLETS] 60 tablet 0     Sig: TAKE ONE TABLET BY MOUTH TWICE DAILY WITH MEALS    Beta-Blockers Protocol Failed    1/11/2018  2:41 PM       Failed - Blood pressure under 140/90    BP Readings from Last 3 Encounters:   01/08/18 (!) 146/94   01/02/18 (!) 164/92   12/20/17 130/80                Passed - Patient is age 6 or older       Passed - Recent or future visit with authorizing provider's specialty    Patient had office visit in the last year or has a visit in the next 30 days with authorizing provider.  See \"Patient Info\" tab in inbasket, or \"Choose Columns\" in Meds & Orders section of the refill encounter.     01/08/2018          Should have refills from dec.    Cesar Ochoa, RN, BSN        "

## 2018-01-22 ENCOUNTER — TRANSFERRED RECORDS (OUTPATIENT)
Dept: HEALTH INFORMATION MANAGEMENT | Facility: CLINIC | Age: 63
End: 2018-01-22

## 2018-01-30 ENCOUNTER — ANTICOAGULATION THERAPY VISIT (OUTPATIENT)
Dept: ANTICOAGULATION | Facility: OTHER | Age: 63
End: 2018-01-30
Payer: COMMERCIAL

## 2018-01-30 DIAGNOSIS — I82.4Z2 DEEP VEIN THROMBOSIS (DVT) OF DISTAL VEIN OF LEFT LOWER EXTREMITY, UNSPECIFIED CHRONICITY (H): ICD-10-CM

## 2018-01-30 DIAGNOSIS — Z79.01 LONG-TERM (CURRENT) USE OF ANTICOAGULANTS: ICD-10-CM

## 2018-01-30 LAB — INR POINT OF CARE: 1.7 (ref 0.86–1.14)

## 2018-01-30 PROCEDURE — 85610 PROTHROMBIN TIME: CPT | Mod: QW

## 2018-01-30 PROCEDURE — 99207 ZZC NO CHARGE NURSE ONLY: CPT

## 2018-01-30 PROCEDURE — 36416 COLLJ CAPILLARY BLOOD SPEC: CPT

## 2018-01-30 NOTE — PROGRESS NOTES
ANTICOAGULATION FOLLOW-UP CLINIC VISIT    Patient Name:  Bucky Edgar  Date:  1/30/2018  Contact Type:  Face to Face    SUBJECTIVE:     Patient Findings     Positives Med error (Pt on abx (unknown) and predisone for severe gout flare in his hand ([facility])), Unexplained INR or factor level change           OBJECTIVE    INR Protime   Date Value Ref Range Status   01/30/2018 1.7 (A) 0.86 - 1.14 Final       ASSESSMENT / PLAN  INR assessment SUB    Recheck INR In: 1 WEEK    INR Location Clinic      Anticoagulation Summary as of 1/30/2018     INR goal 2.0-3.0   Today's INR 1.7!   Maintenance plan 7.5 mg (5 mg x 1.5) on Tue, Thu; 5 mg (5 mg x 1) all other days   Full instructions 7.5 mg on Tue, Thu; 5 mg all other days   Weekly total 40 mg   Plan last modified Janeth Mccarthy RN (1/30/2018)   Next INR check 2/6/2018   Target end date     Indications   Long-term (current) use of anticoagulants [Z79.01] [Z79.01]  Deep vein thrombosis (DVT) (H) [I82.409] [I82.409]         Anticoagulation Episode Summary     INR check location     Preferred lab     Send INR reminders to [facility]    Comments PM dose, 5 mg tabs        Anticoagulation Care Providers     Provider Role Specialty Phone number    Percy Dsehpande MD [facility] [phone]            See the Encounter Report to view Anticoagulation Flowsheet and Dosing Calendar (Go to Encounters tab in chart review, and find the Anticoagulation Therapy Visit)    Dosage adjustment made based on physician directed care plan.    Janeth Mccarthy RN

## 2018-01-30 NOTE — MR AVS SNAPSHOT
Bucky CHRISTINE Edgar   1/30/2018 10:45 AM   Anticoagulation Therapy Visit    Description:  62 year old male   Provider:  ER ANTI COAG   Department:  Er Anticoag           INR as of 1/30/2018     Today's INR 1.7!      Anticoagulation Summary as of 1/30/2018     INR goal 2.0-3.0   Today's INR 1.7!   Full instructions 7.5 mg on Tue, Thu; 5 mg all other days   Next INR check 2/6/2018    Indications   Long-term (current) use of anticoagulants [Z79.01] [Z79.01]  Deep vein thrombosis (DVT) (H) [I82.409] [I82.409]         Your next Anticoagulation Clinic appointment(s)     Feb 06, 2018 10:30 AM CST   [facility]agulation Visit with ER ANTI COAG   [facility]    [address] [facility] [address]   [phone]              Contact Numbers     Clinic Number:         January 2018 Details    Sun Mon Tue Wed Thu Fri Sat      1               2               3               4               5               6                 7               8               9               10               11               12               13                 14               15               16               17               18               19               20                 21               22               23               24               25               26               27                 28               29               30      7.5 mg   See details      31      5 mg             Date Details   01/30 This INR check               How to take your warfarin dose     To take:  5 mg Take 1 of the 5 mg tablets.    To take:  7.5 mg Take 1.5 of the 5 mg tablets.           February 2018 Details    Sun Mon Tue Wed Thu Fri Sat         1      7.5 mg         2      5 mg         3      5 mg           4      5 mg         5      5 mg         6            7               8               9               10                 11               12               13               14               15               16                17                 18               19               20               21               22               23               24                 25               26               27               28                   Date Details   No additional details    Date of next INR:  2/6/2018         How to take your warfarin dose     To take:  5 mg Take 1 of the 5 mg tablets.    To take:  7.5 mg Take 1.5 of the 5 mg tablets.

## 2018-02-05 ENCOUNTER — OFFICE VISIT (OUTPATIENT)
Dept: FAMILY MEDICINE | Facility: CLINIC | Age: 63
End: 2018-02-05
Payer: COMMERCIAL

## 2018-02-05 VITALS
OXYGEN SATURATION: 96 % | DIASTOLIC BLOOD PRESSURE: 90 MMHG | HEIGHT: 69 IN | RESPIRATION RATE: 16 BRPM | WEIGHT: 220.2 LBS | BODY MASS INDEX: 32.61 KG/M2 | TEMPERATURE: 98.6 F | SYSTOLIC BLOOD PRESSURE: 124 MMHG | HEART RATE: 86 BPM

## 2018-02-05 DIAGNOSIS — M10.9 ACUTE GOUTY ARTHRITIS: ICD-10-CM

## 2018-02-05 DIAGNOSIS — Z79.01 LONG-TERM (CURRENT) USE OF ANTICOAGULANTS: ICD-10-CM

## 2018-02-05 DIAGNOSIS — J44.9 CHRONIC OBSTRUCTIVE PULMONARY DISEASE, UNSPECIFIED COPD TYPE (H): ICD-10-CM

## 2018-02-05 DIAGNOSIS — M25.532 LEFT WRIST PAIN: Primary | ICD-10-CM

## 2018-02-05 LAB
CRP SERPL-MCNC: 173 MG/L (ref 0–8)
ERYTHROCYTE [DISTWIDTH] IN BLOOD BY AUTOMATED COUNT: 13.8 % (ref 10–15)
ERYTHROCYTE [SEDIMENTATION RATE] IN BLOOD BY WESTERGREN METHOD: 8 MM/H (ref 0–20)
HCT VFR BLD AUTO: 51.5 % (ref 40–53)
HGB BLD-MCNC: 17.2 G/DL (ref 13.3–17.7)
MCH RBC QN AUTO: 33.1 PG (ref 26.5–33)
MCHC RBC AUTO-ENTMCNC: 33.4 G/DL (ref 31.5–36.5)
MCV RBC AUTO: 99 FL (ref 78–100)
PLATELET # BLD AUTO: 295 10E9/L (ref 150–450)
RBC # BLD AUTO: 5.2 10E12/L (ref 4.4–5.9)
URATE SERPL-MCNC: 7.8 MG/DL (ref 3.5–7.2)
WBC # BLD AUTO: 16.5 10E9/L (ref 4–11)

## 2018-02-05 PROCEDURE — 86140 C-REACTIVE PROTEIN: CPT | Performed by: PHYSICIAN ASSISTANT

## 2018-02-05 PROCEDURE — 84550 ASSAY OF BLOOD/URIC ACID: CPT | Performed by: PHYSICIAN ASSISTANT

## 2018-02-05 PROCEDURE — 85652 RBC SED RATE AUTOMATED: CPT | Performed by: PHYSICIAN ASSISTANT

## 2018-02-05 PROCEDURE — 36415 COLL VENOUS BLD VENIPUNCTURE: CPT | Performed by: PHYSICIAN ASSISTANT

## 2018-02-05 PROCEDURE — 85027 COMPLETE CBC AUTOMATED: CPT | Performed by: PHYSICIAN ASSISTANT

## 2018-02-05 PROCEDURE — 99214 OFFICE O/P EST MOD 30 MIN: CPT | Performed by: PHYSICIAN ASSISTANT

## 2018-02-05 RX ORDER — OXYCODONE HYDROCHLORIDE 5 MG/1
5 TABLET ORAL EVERY 6 HOURS PRN
Qty: 12 TABLET | Refills: 0 | Status: SHIPPED | OUTPATIENT
Start: 2018-02-05 | End: 2018-02-28

## 2018-02-05 RX ORDER — ALLOPURINOL 100 MG/1
100 TABLET ORAL DAILY
Qty: 30 TABLET | Refills: 0 | Status: SHIPPED | OUTPATIENT
Start: 2018-02-05 | End: 2018-02-28

## 2018-02-05 RX ORDER — ALLOPURINOL 100 MG/1
100 TABLET ORAL DAILY
Qty: 30 TABLET | Refills: 1 | Status: CANCELLED | OUTPATIENT
Start: 2018-02-05

## 2018-02-05 RX ORDER — PREDNISONE 20 MG/1
20 TABLET ORAL DAILY
Qty: 8 TABLET | Refills: 0 | Status: SHIPPED | OUTPATIENT
Start: 2018-02-05 | End: 2018-02-14

## 2018-02-05 ASSESSMENT — PAIN SCALES - GENERAL: PAINLEVEL: WORST PAIN (10)

## 2018-02-05 NOTE — PROGRESS NOTES
"  SUBJECTIVE:                                                    Bucky Edgar is a 62 year old male who presents to clinic today for the following health issues:      Joint Pain- LEFT WRIST    Onset: 3 weeks  No injury or trauma. No repetitive tasks with left hand. Is right hand dominant  Pain, swelling, redness, warmth to the touch. \"Like wrist has a heartbeat\" No fevers.   Was seen [facility] ER 01/18/2018- dx cellulitis and gout. Had xray. Tx: augmentin and prednisone. Referred to orthopedics- [facility]. Saw [facility] and told \"looks good\".  As soon as done with medications \"it came back\". So went back to the ER at [facility] 01/24/2018. Did arthrocentesis \"not enough to test. Only 3 drops came out\". Dx gout. Prednisone for 9 days (taper from 60mg to 20mg).  Sx went away on the prednisone. \"Just didn't stay away\".   Pt reports he drinks \"6 pack of beer per week\".   Quit smoking Sept 2017.  Coumadin for DVT.     Labs from [facility] ER ([facility])  01/18/2018: Uric acid 10.0 (H). CRP 3.7 (H)  01/24/18: CRP 3.0      Description:   Location: left hand  Character: Sharp and Burning    Intensity: severe    Progression of Symptoms: worse    Accompanying Signs & Symptoms:  Other symptoms: radiation of pain to wrist and knuckles, warmth, swelling and redness    History:   Previous similar pain: YES      Precipitating factors:   Trauma or overuse: no     Alleviating factors:  Improved by: nothing    Therapies Tried and outcome: Tylenol. Does not help.     From [facility]:  XR HAND LT 3 VIEW1/18/2018  [facility]   Result Impression   IMPRESSION: AP, lateral, and oblique images acquired.      The alignment is normal. There is no fracture or other acute bone abnormality.    No significant joint degeneration.    Soft tissue swelling about the wrist and dorsum of the hand.   Result Narrative   EXAM: XR LEFT HAND, 3 VIEWS, 18 January 2018    CLINICAL DATA: Swelling at wrist and dorsum of hand, " evaluate for fracture or joint effusion.    COMPARISON: None.   Status           Problem list and histories reviewed & adjusted, as indicated.  Additional history: as documented    Patient Active Problem List   Diagnosis     Displacement of lumbar intervertebral disc without myelopathy     Tobacco use disorder     CARDIOVASCULAR SCREENING; LDL GOAL LESS THAN 130     HTN, goal below 140/90     Advanced directives, counseling/discussion     COPD (chronic obstructive pulmonary disease) (H)     Impaired fasting glucose     Hypertriglyceridemia     Vitamin D deficiency     Chronic bronchitis with COPD (chronic obstructive pulmonary disease) (H)     Closed fracture of multiple ribs of left side with routine healing, subsequent encounter     Chronic pain due to trauma     Long-term (current) use of anticoagulants [Z79.01]     Deep vein thrombosis (DVT) (H) [I82.409]     Past Surgical History:   Procedure Laterality Date     C DECOMPRESS SPINAL CORD,1 SEG  1995, 4/2002    leftL4-L5, 2nd right L4-L5     C SPINE FUSION,ANTER,3 SGMTS  2/9/2004    ant and post fusion L4-S1     HC REPAIR ROTATOR CUFF,CHRONIC  1989       Social History   Substance Use Topics     Smoking status: Former Smoker     Packs/day: 0.50     Years: 38.00     Types: Cigarettes     Quit date: 10/1/2017     Smokeless tobacco: Never Used      Comment: Quit for 10 years, now smoked for 10 years. Max 2 PPD i the past     Alcohol use 0.0 oz/week     0 Standard drinks or equivalent per week      Comment: 2 beers a week     Family History   Problem Relation Age of Onset     Family History Negative Other      DIABETES No family hx of      Coronary Artery Disease No family hx of      Hypertension No family hx of      Hyperlipidemia No family hx of      Breast Cancer No family hx of      Prostate Cancer No family hx of      Anxiety Disorder No family hx of      Substance Abuse No family hx of      Asthma No family hx of      Thyroid Disease No family hx of       Unknown/Adopted No family hx of      Genetic Disorder No family hx of      OSTEOPOROSIS No family hx of      Anesthesia Reaction No family hx of      MENTAL ILLNESS No family hx of      Depression No family hx of      Other Cancer No family hx of      Colon Cancer No family hx of      CEREBROVASCULAR DISEASE No family hx of      Obesity No family hx of          Current Outpatient Prescriptions   Medication Sig Dispense Refill     predniSONE (DELTASONE) 20 MG tablet Take 1 tablet (20 mg) by mouth daily Take 2 tabs (40mg) today, then 1 tab each day after for 6 days. 8 tablet 0     allopurinol (ZYLOPRIM) 100 MG tablet Take 1 tablet (100 mg) by mouth daily 30 tablet 0     oxyCODONE IR (ROXICODONE) 5 MG tablet Take 1 tablet (5 mg) by mouth every 6 hours as needed for pain maximum 4 tablet(s) per day 12 tablet 0     carvedilol (COREG) 25 MG tablet Take 0.5 tablets (12.5 mg) by mouth 2 times daily (with meals) 60 tablet 4     lisinopril (PRINIVIL/ZESTRIL) 40 MG tablet TAKE ONE TABLET BY MOUTH DAILY 90 tablet 1     warfarin (COUMADIN) 5 MG tablet Take 5 mg TU, TH, SA and 7.5 mg all other days, or as directed by the coumadin clinic. 100 tablet 0     nicotine polacrilex (NICORETTE) 2 MG lozenge Place 2 mg inside cheek every hour as needed for smoking cessation       acetaminophen (TYLENOL) 325 MG tablet Take 650 mg by mouth every 6 hours       NICOTINE STEP 1 21 MG/24HR 24 hr patch ROBBY 1 PATCH TO SKIN ONCE D X 42 DAYS  0     sennosides (SENOKOT) 8.6 MG tablet TK 1 T PO BID  0     albuterol (PROAIR HFA, PROVENTIL HFA, VENTOLIN HFA) 108 (90 BASE) MCG/ACT inhaler Inhale 2 puffs into the lungs every 6 hours as needed for shortness of breath / dyspnea (Patient not taking: Reported on 12/20/2017) 3 Inhaler 2     Allergies   Allergen Reactions     Chlorthalidone Other (See Comments)     Excessive lowering of blood pressure     Amlodipine Other (See Comments)     Intractable headache with use of medication as well as noting a small  "tremor of the upper extremities     BP Readings from Last 3 Encounters:   02/05/18 124/90   01/08/18 (!) 146/94   01/02/18 (!) 164/92    Wt Readings from Last 3 Encounters:   02/05/18 220 lb 3.2 oz (99.9 kg)   01/08/18 222 lb (100.7 kg)   12/20/17 222 lb (100.7 kg)                  Labs reviewed in EPIC    ROS:  \"Cold sx for a week or so\". Mucus in throat. Denies SOB, CROCKER from baseline with his COPD. Denies CP.   Denies swelling in legs.   Constitutional, HEENT, cardiovascular, pulmonary, gi and gu systems are negative, except as otherwise noted.    OBJECTIVE:     /90  Pulse 86  Temp 98.6  F (37  C) (Temporal)  Resp 16  Ht 5' 9\" (1.753 m)  Wt 220 lb 3.2 oz (99.9 kg)  SpO2 96%  BMI 32.52 kg/m2  Body mass index is 32.52 kg/(m^2).  GENERAL: healthy, alert and no distress  EYES: Eyes grossly normal to inspection, PERRL and conjunctivae and sclerae normal  NECK: no adenopathy, no asymmetry, masses, or scars and thyroid normal to palpation  RESP: lungs clear to auscultation - no rales, rhonchi or wheezes  CV: regular rate and rhythm, normal S1 S2, no S3 or S4, no murmur, click or rub, no peripheral edema and peripheral pulses strong  MSK: Hand/Wrist:LEFT: +swelling, +redness, + warmth over dorsum of left wrist. Exquisitely tender with palpation and minimal wrist ROM flex/extension and  pronation, supination at forearm.  4/5, sensation normal, radial pulses normal.  Nontender MCPs, fingers- and no swelling/edema onto fingers.   Elbow: LEFT: mildly tender    Diagnostic Test Results:  Results for orders placed or performed in visit on 02/05/18 (from the past 24 hour(s))   CBC with platelets   Result Value Ref Range    WBC 16.5 (H) 4.0 - 11.0 10e9/L    RBC Count 5.20 4.4 - 5.9 10e12/L    Hemoglobin 17.2 13.3 - 17.7 g/dL    Hematocrit 51.5 40.0 - 53.0 %    MCV 99 78 - 100 fl    MCH 33.1 (H) 26.5 - 33.0 pg    MCHC 33.4 31.5 - 36.5 g/dL    RDW 13.8 10.0 - 15.0 %    Platelet Count 295 150 - 450 10e9/L   ESR: " Erythrocyte sedimentation rate   Result Value Ref Range    Sed Rate 8 0 - 20 mm/h       ASSESSMENT/PLAN:     1. Left wrist pain  2. Acute gouty arthritis  Reviewed notes, xray reports, labs from ER visits at [facility]. Reviewed most recent labs - comp panel 11/20017 from .   Prolonged gout flare with recurrent sx occurring with prednisone withdrawal. Uric acid 10.0 from [place] results.   Restart prednisone. Start allopurinol daily.   Small supply of oxycodone for pain.  Labs as below.  Gout diet and limited alcohol. (Pt reports 6 beers per week).  See PCP in 1 week for recheck   - Uric acid  - CBC with platelets  - ESR: Erythrocyte sedimentation rate  - CRP, inflammation  - predniSONE (DELTASONE) 20 MG tablet; Take 1 tablet (20 mg) by mouth daily Take 2 tabs (40mg) today, then 1 tab each day after for 6 days.  Dispense: 8 tablet; Refill: 0  - allopurinol (ZYLOPRIM) 100 MG tablet; Take 1 tablet (100 mg) by mouth daily  Dispense: 30 tablet; Refill: 0  - oxyCODONE IR (ROXICODONE) 5 MG tablet; Take 1 tablet (5 mg) by mouth every 6 hours as needed for pain maximum 4 tablet(s) per day  Dispense: 12 tablet; Refill: 0    3. Chronic obstructive pulmonary disease, unspecified COPD type (H)  URI sx for 2 weeks with increase in sputum. Pt declines abx at this time. Sees PCP in 1 week for recheck, can reassess at that time.     4. Long-term (current) use of anticoagulants [Z79.01]  Seeing INR RN tomorrow- discuss new medications and may want INR recheck Friday before the weekend.     Follow Up: For worsening symptoms (ie new fevers, worsening pain, etc), non-improvement as expected/discussed, questions regarding your medications or treatment plan. Discussed parameters for follow up and included in After Visit Summary given to patient.      Elizabeth Bojorquez PA-C  [facility]

## 2018-02-05 NOTE — MR AVS SNAPSHOT
After Visit Summary   2/5/2018    Bucky Edgar    MRN: [id number]           Patient Information     Date Of Birth          1955        Visit Information        Provider Department      2/5/2018 11:40 AM Elizabeth Bojorquez PA-C [facility]        Today's Diagnoses     Left wrist pain    -  1    Acute gouty arthritis          Care Instructions    Start prednisone today 40mg (2 tabs), then after today 1 tab daily for 6 more days.     Start allopurinol 100mg daily. Plan to see  in 1 week. May need to go up on the dose to lower the uric acid level.  The uric acid level was 10.0 in the ER. (goal is best under 7).    Tell your INR Nurse tomorrow that you were started on the allopurinol it can affect the INR    Oxycodone for pain for a few days. 1 tab every 6 hours.     The medication you were prescribed, is a narcotic or contains a narcotic. Narcotics can cause dizzy or drowsiness. You should not drive or operate machinery while taking this medication. Narcotics can cause constipation. If you will be taking this medication for more than a few days, add Colace (docusate sodium) 100mg twice daily to keep stools soft or Miralax 1 cap daily dissolved into a beverage of your choice.    Prednisone: This is a strong anti-inflammatory.   Primary side effects can include insomnia (trouble with sleep), increased appetite, and irritability/moodiness.  By dosing this medication earlier in the day (taking in the morning and/or at lunch) can help reduce the sleep issues.    Do not take any NSAIDs while taking prednisone (examples of NSAIDs: ibuprofen, Advil, Aleve, naproxen, diclofenac, celebrex, etc).                Follow-ups after your visit        Your next 10 appointments already scheduled     Feb 06, 2018 10:30 AM CST   An[facility]agulation Visit with ER ANTI COAG   [facility]    [address] [facility] [id number] [phone]             "2018  9:20 AM CST   Office Visit with Percy Deshpande MD   [facility] [place] ([facility])    [address], [address]  [place] [address] [phone]           Bring a current list of meds and any records pertaining to this visit. For Physicals, please bring immunization records and any forms needing to be filled out. Please arrive 10 minutes early to complete paperwork.              Who to contact     If you have questions or need follow up information about today's clinic visit or your schedule please contact [facility] directly at [phone].  Normal or non-critical lab and imaging results will be communicated to you by MyChart, letter or phone within 4 business days after the clinic has received the results. If you do not hear from us within 7 days, please contact the clinic through Freebase[employer] or phone. If you have a critical or abnormal lab result, we will notify you by phone as soon as possible.  Submit refill requests through [employer] or call your pharmacy and they will forward the refill request to us. Please allow 3 business days for your refill to be completed.          Additional Information About Your Visit        MyChart Information     [employer] lets you send messages to your doctor, view your test results, renew your prescriptions, schedule appointments and more. To sign up, go to [email or web address] . Click on \"Log in\" on the left side of the screen, which will take you to the Welcome page. Then click on \"Sign up Now\" on the right side of the page.     You will be asked to enter the access code listed below, as well as some personal information. Please follow the directions to create your username and password.     Your access code is: [email or web address]  Expires: 3/14/2018 10:29 AM     Your access code will  in 90 days. If you need help or a new code, please call your [facility] or [phone].        Care Every[facility] ID     This is your " "Care [facility] ID. This could be used by other organizations to access your [place] medical records  [id number]        Your Vitals Were     Pulse Temperature Respirations Height Pulse Oximetry BMI (Body Mass Index)    86 98.6  F (37  C) (Temporal) 16 5' 9\" (1.753 m) 96% 32.52 kg/m2       Blood Pressure from Last 3 Encounters:   02/05/18 124/90   01/08/18 (!) 146/94   01/02/18 (!) 164/92    Weight from Last 3 Encounters:   02/05/18 220 lb 3.2 oz (99.9 kg)   01/08/18 222 lb (100.7 kg)   12/20/17 222 lb (100.7 kg)              We Performed the Following     CBC with platelets     CRP, inflammation     ESR: Erythrocyte sedimentation rate     Uric acid          Today's Medication Changes          These changes are accurate as of 2/5/18 12:10 PM.  If you have any questions, ask your nurse or doctor.               Start taking these medicines.        Dose/Directions    allopurinol 100 MG tablet   Commonly known as:  ZYLOPRIM   Used for:  Acute gouty arthritis   Started by:  Elizabeth Bojorquez PA-C        Dose:  100 mg   Take 1 tablet (100 mg) by mouth daily   Quantity:  30 tablet   Refills:  0       oxyCODONE IR 5 MG tablet   Commonly known as:  ROXICODONE   Used for:  Acute gouty arthritis   Started by:  Elizabeth Bojorquez PA-C        Dose:  5 mg   Take 1 tablet (5 mg) by mouth every 6 hours as needed for pain maximum 4 tablet(s) per day   Quantity:  12 tablet   Refills:  0       predniSONE 20 MG tablet   Commonly known as:  DELTASONE   Used for:  Left wrist pain, Acute gouty arthritis   Started by:  Elizabeth Bojorquez PA-C        Dose:  20 mg   Take 1 tablet (20 mg) by mouth daily Take 2 tabs (40mg) today, then 1 tab each day after for 6 days.   Quantity:  8 tablet   Refills:  0            Where to get your medicines      [facility] [place] - [address] AT [address]  [address], [place] [address]    Hours:  test fax sent " successfully 1/20/04   Phone:  [phone]     allopurinol 100 MG tablet    predniSONE 20 MG tablet         Some of these will need a paper prescription and others can be bought over the counter.  Ask your nurse if you have questions.     Bring a paper prescription for each of these medications     oxyCODONE IR 5 MG tablet                Primary Care Provider Office Phone # Fax #    Percy Deshpande [phone] [address] [facility] [address]        Equal Access to Services     CHRISTOPHER SEAY : Hadii aad ku hadasho Soomaali, waaxda luqadaha, qaybta kaalmada adeegyada, waxay idiin hayaan adeeg magalie gonzalez . So [facility] [phone].    ATENCIÓN: Si habla español, tiene a garcia disposición servicios gratuitos de asistencia lingüística. [facility] [phone].    We comply with applicable federal civil rights laws and [place] laws. We do not discriminate on the basis of race, color, national origin, age, disability, sex, sexual orientation, or gender identity.            Thank you!     Thank you for choosing [facility]  for your care. Our goal is always to provide you with excellent care. Hearing back from our patients is one way we can continue to improve our services. Please take a few minutes to complete the written survey that you may receive in the mail after your visit with us. Thank you!             Your Updated Medication List - Protect others around you: Learn how to safely use, store and throw away your medicines at www.disposemymeds.org.          This list is accurate as of 2/5/18 12:10 PM.  Always use your most recent med list.                   Brand Name Dispense Instructions for use Diagnosis    acetaminophen 325 MG tablet    TYLENOL     Take 650 mg by mouth every 6 hours        albuterol 108 (90 BASE) MCG/ACT Inhaler    PROAIR HFA/PROVENTIL HFA/VENTOLIN HFA    3 Inhaler    Inhale 2 puffs into the lungs every 6 hours as needed for shortness of breath / dyspnea     Chronic bronchitis with COPD (chronic obstructive pulmonary disease) (H)       allopurinol 100 MG tablet    ZYLOPRIM    30 tablet    Take 1 tablet (100 mg) by mouth daily    Acute gouty arthritis       carvedilol 25 MG tablet    COREG    60 tablet    Take 0.5 tablets (12.5 mg) by mouth 2 times daily (with meals)    HTN, goal below 140/90       lisinopril 40 MG tablet    PRINIVIL/ZESTRIL    90 tablet    TAKE ONE TABLET BY MOUTH DAILY    HTN, goal below 140/90       NICORETTE 2 MG lozenge   Generic drug:  nicotine polacrilex      Place 2 mg inside cheek every hour as needed for smoking cessation        NICOTINE STEP 1 21 MG/24HR 24 hr patch   Generic drug:  nicotine      ROBBY 1 PATCH TO SKIN ONCE D X 42 DAYS        oxyCODONE IR 5 MG tablet    ROXICODONE    12 tablet    Take 1 tablet (5 mg) by mouth every 6 hours as needed for pain maximum 4 tablet(s) per day    Acute gouty arthritis       predniSONE 20 MG tablet    DELTASONE    8 tablet    Take 1 tablet (20 mg) by mouth daily Take 2 tabs (40mg) today, then 1 tab each day after for 6 days.    Left wrist pain, Acute gouty arthritis       sennosides 8.6 MG tablet    SENOKOT     TK 1 T PO BID        warfarin 5 MG tablet    COUMADIN    100 tablet    Take 5 mg TU, TH, SA and 7.5 mg all other days, or as directed by the coumadin clinic.    Long-term (current) use of anticoagulants, Deep vein thrombosis (DVT) (H)

## 2018-02-05 NOTE — PATIENT INSTRUCTIONS
Start prednisone today 40mg (2 tabs), then after today 1 tab daily for 6 more days.     Start allopurinol 100mg daily. Plan to see  in 1 week. May need to go up on the dose to lower the uric acid level.  The uric acid level was 10.0 in the ER. (goal is best under 7).    Tell your INR Nurse tomorrow that you were started on the allopurinol it can affect the INR    Oxycodone for pain for a few days. 1 tab every 6 hours.     The medication you were prescribed, is a narcotic or contains a narcotic. Narcotics can cause dizzy or drowsiness. You should not drive or operate machinery while taking this medication. Narcotics can cause constipation. If you will be taking this medication for more than a few days, add Colace (docusate sodium) 100mg twice daily to keep stools soft or Miralax 1 cap daily dissolved into a beverage of your choice.    Prednisone: This is a strong anti-inflammatory.   Primary side effects can include insomnia (trouble with sleep), increased appetite, and irritability/moodiness.  By dosing this medication earlier in the day (taking in the morning and/or at lunch) can help reduce the sleep issues.    Do not take any NSAIDs while taking prednisone (examples of NSAIDs: ibuprofen, Advil, Aleve, naproxen, diclofenac, celebrex, etc).

## 2018-02-08 ENCOUNTER — ANTICOAGULATION THERAPY VISIT (OUTPATIENT)
Dept: ANTICOAGULATION | Facility: OTHER | Age: 63
End: 2018-02-08
Payer: COMMERCIAL

## 2018-02-08 DIAGNOSIS — Z79.01 LONG-TERM (CURRENT) USE OF ANTICOAGULANTS: ICD-10-CM

## 2018-02-08 DIAGNOSIS — I82.409 DEEP VEIN THROMBOSIS (DVT) (H): ICD-10-CM

## 2018-02-08 DIAGNOSIS — I82.4Z2 DEEP VEIN THROMBOSIS (DVT) OF DISTAL VEIN OF LEFT LOWER EXTREMITY, UNSPECIFIED CHRONICITY (H): ICD-10-CM

## 2018-02-08 LAB — INR POINT OF CARE: 1.2 (ref 0.86–1.14)

## 2018-02-08 PROCEDURE — 85610 PROTHROMBIN TIME: CPT | Mod: QW

## 2018-02-08 PROCEDURE — 36416 COLLJ CAPILLARY BLOOD SPEC: CPT

## 2018-02-08 PROCEDURE — 99207 ZZC NO CHARGE NURSE ONLY: CPT

## 2018-02-08 RX ORDER — WARFARIN SODIUM 5 MG/1
TABLET ORAL
Qty: 100 TABLET | Refills: 0 | Status: SHIPPED | OUTPATIENT
Start: 2018-02-08 | End: 2018-02-14

## 2018-02-08 NOTE — PROGRESS NOTES
ANTICOAGULATION FOLLOW-UP CLINIC VISIT    Patient Name:  Bucky Edgar  Date:  2/8/2018  Contact Type:  Face to Face    SUBJECTIVE:     Patient Findings     Positives Change in medications (Pt started on allopurinol on monday which may raise INR), Unexplained INR or factor level change    Comments Pt denies any missed doses?           OBJECTIVE    INR Protime   Date Value Ref Range Status   02/08/2018 1.2 (A) 0.86 - 1.14 Final       ASSESSMENT / PLAN  INR assessment SUB    Recheck INR In: 4 DAYS    INR Location Clinic      Anticoagulation Summary as of 2/8/2018     INR goal 2.0-3.0   Today's INR 1.2!   Maintenance plan 7.5 mg (5 mg x 1.5) on Tue, Thu; 5 mg (5 mg x 1) all other days   Full instructions 2/9: 7.5 mg; 2/10: 7.5 mg; 2/11: 7.5 mg; Otherwise 7.5 mg on Tue, Thu; 5 mg all other days   Weekly total 40 mg   Plan last modified Janeth Mccarthy RN (1/30/2018)   Next INR check 2/12/2018   Target end date     Indications   Long-term (current) use of anticoagulants [Z79.01] [Z79.01]  Deep vein thrombosis (DVT) (H) [I82.409] [I82.409]         Anticoagulation Episode Summary     INR check location     Preferred lab     Send INR reminders to [facility]    Comments PM dose, 5 mg tabs        Anticoagulation Care Providers     Provider Role Specialty Phone number    Percy Deshpande MD [facility] [phone]            See the Encounter Report to view Anticoagulation Flowsheet and Dosing Calendar (Go to Encounters tab in chart review, and find the Anticoagulation Therapy Visit)    Dosage adjustment made based on physician directed care plan.    Janeth Mccarthy, KINA

## 2018-02-08 NOTE — MR AVS SNAPSHOT
Bucky CHRISTINE Edgar   2/8/2018 1:15 PM   Anticoagulation Therapy Visit    Description:  62 year old male   Provider:  ER ANTI COAG   Department:  [facility] Anticoag           INR as of 2/8/2018     Today's INR 1.2!      Anticoagulation Summary as of 2/8/2018     INR goal 2.0-3.0   Today's INR 1.2!   Full instructions 2/9: 7.5 mg; 2/10: 7.5 mg; 2/11: 7.5 mg; Otherwise 7.5 mg on Tue, Thu; 5 mg all other days   Next INR check 2/12/2018    Indications   Long-term (current) use of anticoagulants [Z79.01] [Z79.01]  Deep vein thrombosis (DVT) (H) [I82.409] [I82.409]         Your next Anticoagulation Clinic appointment(s)     Feb 12, 2018 11:30 AM CST   Anticoagulation Visit with [facility]   [facility]    [address] [address]   [phone]              Contact Numbers     Clinic Number:         February 2018 Details    Sun Mon Tue Wed Thu Fri Sat         1               2               3                 4               5               6               7               8      7.5 mg   See details      9      7.5 mg         10      7.5 mg           11      7.5 mg         12            13               14               15               16               17                 18               19               20               21               22               23               24                 25               26               27               28                   Date Details   02/08 This INR check       Date of next INR:  2/12/2018         How to take your warfarin dose     To take:  5 mg Take 1 of the 5 mg tablets.    To take:  7.5 mg Take 1.5 of the 5 mg tablets.

## 2018-02-09 NOTE — PROGRESS NOTES
SUBJECTIVE:   Bucky Edgar is a 62 year old male who presents to clinic today for the following health issues:      History of Present Illness     Medication Followup of prednisone, allopurinol, oxycodone    Taking Medication as prescribed: yes    Side Effects:  None    Medication Helping Symptoms:  Yes, but symptoms return after medications are stopped.      Problem list and histories reviewed & adjusted, as indicated.  Additional history: as documented    Hand pain--    This patient is seen in follow-up for acute gouty arthritis of his left hand and wrist.  His uric acid level is been as high as 10 in the past.  He was seen 10 days ago and was placed on prednisone which helped the pain initially but it is quickly come back after prednisone was finished.  He is use some oxycodone for pain relief.  He was placed on Allopurinol 100 mg daily 10 days ago while he was having an acute attack.  Uric acid was 7.8 with the acute attack.  He is usually had recurrent attacks in his left foot and ankle.  He has had multiple attacks through the last year.      Duration: 9 days    Description (location/character/radiation): left hand    Intensity:  severe    Accompanying signs and symptoms: None    History (similar episodes/previous evaluation): Seen on 2/5 by     Precipitating or alleviating factors: None    Therapies tried and outcome: medications prescribed on 2/5 help the symptoms but symptoms return after finishing medications.            ROS:  C: NEGATIVE for fever, chills, change in weight  I: NEGATIVE for worrisome rashes, moles or lesions  E/M: NEGATIVE for ear, mouth and throat problems  R: NEGATIVE for significant cough or SOB  CV: NEGATIVE for chest pain, palpitations or peripheral edema  CV: Past history of hypertension and on medication with good control.  GI: NEGATIVE for nausea, abdominal pain, heartburn, or change in bowel habits  : NEGATIVE for frequency, dysuria, or hematuria  MUSCULOSKELETAL:  "Recurrent gouty arthritis as noted  N: NEGATIVE for weakness, dizziness or paresthesias  ENDOCRINE: Hyperuricemia with recurrent gout attacks.  H: NEGATIVE for bleeding problems  HEME/ALLERGY/IMMUNE: Is approximately 6 months from his left lower extremity DVT with pulmonary emboli.  States that he has not been taking warfarin for the last week or 2 because he ran out of medication.  There is been no recurrences.  Given the multiple medications being used, will discontinue at this time.  P: NEGATIVE for changes in mood or affect    OBJECTIVE:                                                    /80  Pulse 102  Temp 98.3  F (36.8  C) (Temporal)  Resp 18  Ht 5' 9\" (1.753 m)  Wt 221 lb (100.2 kg)  SpO2 95%  BMI 32.64 kg/m2  Body mass index is 32.64 kg/(m^2).  GENERAL: alert, mild distress and cooperative  HENT: ear canals- normal; TMs- normal; Nose- normal; Mouth- no ulcers, no lesions  NECK: no tenderness, no adenopathy, no asymmetry, no masses, no stiffness; thyroid- normal to palpation  RESP: lungs clear to auscultation - no rales, no rhonchi, no wheezes  CV: regular rates and rhythm, normal S1 S2, no S3 or S4 and no murmur, no click or rub -  ABDOMEN: soft, no tenderness, no  hepatosplenomegaly, no masses, normal bowel sounds  MS: Significant swelling of his left wrist and dorsum of the hand to the MP joints.  Tender to touch.  Patient wishes not to move the joint.  Slight increase in temperature also noted.  CMS otherwise intact.  SKIN: no suspicious lesions, no rashes  NEURO: strength and tone- normal, sensory exam- grossly normal, mentation- intact, speech- normal, reflexes- symmetric  PSYCH: Alert and oriented times 3; speech- coherent , normal rate and volume; able to articulate logical thoughts, able to abstract reason, no tangential thoughts, no hallucinations or delusions, affect- normal  LYMPHATICS: ant. cervical- normal, post. cervical- normal, axillary- normal, supraclavicular- normal, " inguinal- normal    Diagnostic test results:  Diagnostic Test Results:  none      ASSESSMENT/PLAN:                                                    1. Acute gouty arthritis  Attempted to place on twice daily colchicine but cost was prohibitive.  1 month supply was over 300 hours.  Will treat with a 12 day course of prednisone, tapering to 10 mg per day over the last 3 days.  We will also begin Naproxen 500 mg twice daily as the patient has normal kidney function.  Short supply of Oxycodone given to the patient to assist in pain management until the prednisone can be effective.  Follow-up in 12-14 days.  - predniSONE (DELTASONE) 20 MG tablet; Take 3 tabs (60 mg) by mouth daily x 3 days, 2 tabs (40 mg) daily x 3 days, 1 tab (20 mg) daily x 3 days, then 1/2 tab (10 mg) x 3 days.  Dispense: 20 tablet; Refill: 0  - oxyCODONE-acetaminophen (PERCOCET) 5-325 MG per tablet; Take 1 tablet by mouth every 6 hours as needed for pain maximum 4 tablet(s) per day  Dispense: 18 tablet; Refill: 0    2. Hyperuricemia  Was started on Allopurinol 100 mg daily.  Last uric acid level was 7.8 milligrams percent.  Goal will be to increase Allopurinol eventually to a point of having the uric acid less than 6.0 mg percent to prevent gouty attacks.    3. HTN, goal below 140/90  Stable despite the pain.    (Z59.465) History of deep venous thrombosis  Comment: Approaching 6 months since being treated and on long-term anticoagulation.  Plan: We will discontinue 2 weeks early due to the multiple medications being used which would interfere with his INR.  He is also been off warfarin for at least a week.    (Z84.678) History of pulmonary embolism  Comment: Discontinue therapy at this time.  Plan: Follow-up as needed.          Follow up with Provider -Follow-up in 2 weeks to assess need for possible prednisone continuation.  Will check uric acid level in 2 weeks.  See Patient Instructions    The patient understood the rational for the diagnosis  and treatment plan. All questions were answered to best of my ability and the patient's satisfaction.    Note: Chart documentation done in part with Dragon Voice Recognition software. Although reviewed after completion, some word and grammatical errors may remain.  Please consider this when interpreting information in this chart.      Percy Deshpande MD  [facility]  Answers for HPI/ROS submitted by the patient on 2/14/2018   PHQ-2 Score: Incomplete

## 2018-02-14 ENCOUNTER — TELEPHONE (OUTPATIENT)
Dept: FAMILY MEDICINE | Facility: CLINIC | Age: 63
End: 2018-02-14

## 2018-02-14 ENCOUNTER — OFFICE VISIT (OUTPATIENT)
Dept: FAMILY MEDICINE | Facility: CLINIC | Age: 63
End: 2018-02-14
Payer: COMMERCIAL

## 2018-02-14 VITALS
HEIGHT: 69 IN | DIASTOLIC BLOOD PRESSURE: 80 MMHG | HEART RATE: 102 BPM | OXYGEN SATURATION: 95 % | SYSTOLIC BLOOD PRESSURE: 115 MMHG | TEMPERATURE: 98.3 F | WEIGHT: 221 LBS | RESPIRATION RATE: 18 BRPM | BODY MASS INDEX: 32.73 KG/M2

## 2018-02-14 DIAGNOSIS — M10.9 ACUTE GOUTY ARTHRITIS: Primary | ICD-10-CM

## 2018-02-14 DIAGNOSIS — Z86.711 HISTORY OF PULMONARY EMBOLISM: ICD-10-CM

## 2018-02-14 DIAGNOSIS — E79.0 HYPERURICEMIA: ICD-10-CM

## 2018-02-14 DIAGNOSIS — I10 HTN, GOAL BELOW 140/90: ICD-10-CM

## 2018-02-14 DIAGNOSIS — Z86.718 HISTORY OF DEEP VENOUS THROMBOSIS: ICD-10-CM

## 2018-02-14 PROCEDURE — 99214 OFFICE O/P EST MOD 30 MIN: CPT | Performed by: FAMILY MEDICINE

## 2018-02-14 RX ORDER — COLCHICINE 0.6 MG/1
0.6 TABLET ORAL 2 TIMES DAILY
Qty: 60 TABLET | Refills: 1 | Status: SHIPPED | OUTPATIENT
Start: 2018-02-14 | End: 2018-02-14

## 2018-02-14 RX ORDER — NAPROXEN 500 MG/1
500 TABLET ORAL 2 TIMES DAILY WITH MEALS
Qty: 60 TABLET | Refills: 1 | Status: SHIPPED | OUTPATIENT
Start: 2018-02-14 | End: 2018-03-23

## 2018-02-14 RX ORDER — PREDNISONE 20 MG/1
TABLET ORAL
Qty: 20 TABLET | Refills: 0 | Status: SHIPPED | OUTPATIENT
Start: 2018-02-14 | End: 2018-04-04

## 2018-02-14 RX ORDER — OXYCODONE AND ACETAMINOPHEN 5; 325 MG/1; MG/1
1 TABLET ORAL EVERY 6 HOURS PRN
Qty: 18 TABLET | Refills: 0 | Status: SHIPPED | OUTPATIENT
Start: 2018-02-14 | End: 2018-02-28

## 2018-02-14 ASSESSMENT — PAIN SCALES - GENERAL: PAINLEVEL: EXTREME PAIN (8)

## 2018-02-14 NOTE — PATIENT INSTRUCTIONS
These are new changes to your current plan of care based on today's visit:    Medications stopped    Medications to be started Prednisone for 12 days  Colchicine 0.6 mg twice daily for gout   Oxycodone 5 mg as directed for initial pain relief   Change dose of this medication Warfarin to be stopped today   New treatments        Follow up appointments:    1.  FOLLOW UP WITH YOUR PRIMARY CARE PROVIDER: 2 week(s) for follow up     Percy Deshpande MD

## 2018-02-14 NOTE — MR AVS SNAPSHOT
After Visit Summary   2/14/2018    Bucky Edgar    MRN: [id number]           Patient Information     Date Of Birth          1955        Visit Information        Provider Department      2/14/2018 9:20 AM Percy Deshpande MD [facility]        Today's Diagnoses     Acute gouty arthritis    -  1    Hyperuricemia        HTN, goal below 140/90          Care Instructions    These are new changes to your current plan of care based on today's visit:    Medications stopped    Medications to be started Prednisone for 12 days  Colchicine 0.6 mg twice daily for gout   Oxycodone 5 mg as directed for initial pain relief   Change dose of this medication Warfarin to be stopped today   New treatments        Follow up appointments:    1.  FOLLOW UP WITH YOUR PRIMARY CARE PROVIDER: 2 week(s) for follow up     Percy Deshpande MD                Follow-ups after your visit        Follow-up notes from your care team     Return in about 2 weeks (around 2/28/2018) for Routine Visit.      Who to contact     If you have questions or need follow up information about today's clinic visit or your schedule please contact [facility] [place] directly at [phone].  Normal or non-critical lab and imaging results will be communicated to you by InSamplehart, letter or phone within 4 business days after the clinic has received the results. If you do not hear from us within 7 days, please contact the clinic through InSamplehart or phone. If you have a critical or abnormal lab result, we will notify you by phone as soon as possible.  Submit refill requests through Connectloud or call your pharmacy and they will forward the refill request to us. Please allow 3 business days for your refill to be completed.          Additional Information About Your Visit        InSampleharNetronome Systems Information     Connectloud lets you send messages to your doctor, view your test results, renew your prescriptions, schedule appointments and more. To sign  "up, go to [email or web address] . Click on \"Log in\" on the left side of the screen, which will take you to the Welcome page. Then click on \"Sign up Now\" on the right side of the page.     You will be asked to enter the access code listed below, as well as some personal information. Please follow the directions to create your username and password.     Your access code is: [email or web address]  Expires: 3/14/2018 10:29 AM     Your access code will  in 90 days. If you need help or a new code, please call your [place] clinic or [phone].        Care EveryWhere ID     This is your Care EveryWhere ID. This could be used by other organizations to access your [place] medical records  [id number]        Your Vitals Were     Pulse Temperature Respirations Height Pulse Oximetry BMI (Body Mass Index)    102 98.3  F (36.8  C) (Temporal) 18 5' 9\" (1.753 m) 95% 32.64 kg/m2       Blood Pressure from Last 3 Encounters:   18 115/80   18 124/90   18 (!) 146/94    Weight from Last 3 Encounters:   18 221 lb (100.2 kg)   18 220 lb 3.2 oz (99.9 kg)   18 222 lb (100.7 kg)              Today, you had the following     No orders found for display         Today's Medication Changes          These changes are accurate as of 18 10:07 AM.  If you have any questions, ask your nurse or doctor.               Start taking these medicines.        Dose/Directions    colchicine 0.6 MG tablet   Used for:  Acute gouty arthritis   Started by:  Percy Deshpande MD        Dose:  0.6 mg   Take 1 tablet (0.6 mg) by mouth 2 times daily   Quantity:  60 tablet   Refills:  1       oxyCODONE-acetaminophen 5-325 MG per tablet   Commonly known as:  PERCOCET   Used for:  Acute gouty arthritis   Started by:  Percy Deshpande MD        Dose:  1 tablet   Take 1 tablet by mouth every 6 hours as needed for pain maximum 4 tablet(s) per day   Quantity:  18 tablet   Refills:  0         These medicines have changed or " have updated prescriptions.        Dose/Directions    predniSONE 20 MG tablet   Commonly known as:  DELTASONE   This may have changed:    - how much to take  - how to take this  - when to take this  - additional instructions   Used for:  Acute gouty arthritis   Changed by:  Percy Deshpande MD        Take 3 tabs (60 mg) by mouth daily x 3 days, 2 tabs (40 mg) daily x 3 days, 1 tab (20 mg) daily x 3 days, then 1/2 tab (10 mg) x 3 days.   Quantity:  20 tablet   Refills:  0         Stop taking these medicines if you haven't already. Please contact your care team if you have questions.     warfarin 5 MG tablet   Commonly known as:  COUMADIN   Stopped by:  Percy Deshpande MD                Where to get your medicines      These medications were sent to [id number] [place] - [address]  [address], [place] [address]    Hours:  test fax sent successfully 1/20/04  kr Phone:  [phone]     colchicine 0.6 MG tablet    predniSONE 20 MG tablet         Some of these will need a paper prescription and others can be bought over the counter.  Ask your nurse if you have questions.     Bring a paper prescription for each of these medications     oxyCODONE-acetaminophen 5-325 MG per tablet                Primary Care Provider Office Phone # Fax #    Percy Deshpande -[phone] [address]  [place] [address]        Equal Access to Services     MITRA SEAY AH: Hadii lynn ku hadasho Soomaali, waaxda luqadaha, qaybta kaalmada adeegyada, waxay berry gonzalez . So [facility] [phone].    ATENCIÓN: Si habla sandra, tiene a garcia disposición servicios gratuitos de asistencia lingüística. Llame al [phone].    We comply with applicable federal civil rights laws and [place] laws. We do not discriminate on the basis of race, color, national origin, age, disability, sex, sexual orientation, or gender identity.             Thank you!     Thank you for choosing [facility]  for your care. Our goal is always to provide you with excellent care. Hearing back from our patients is one way we can continue to improve our services. Please take a few minutes to complete the written survey that you may receive in the mail after your visit with us. Thank you!             Your Updated Medication List - Protect others around you: Learn how to safely use, store and throw away your medicines at www.disposemymeds.org.          This list is accurate as of 2/14/18 10:07 AM.  Always use your most recent med list.                   Brand Name Dispense Instructions for use Diagnosis    acetaminophen 325 MG tablet    TYLENOL     Take 650 mg by mouth every 6 hours        albuterol 108 (90 BASE) MCG/ACT Inhaler    PROAIR HFA/PROVENTIL HFA/VENTOLIN HFA    3 Inhaler    Inhale 2 puffs into the lungs every 6 hours as needed for shortness of breath / dyspnea    Chronic bronchitis with COPD (chronic obstructive pulmonary disease) (H)       allopurinol 100 MG tablet    ZYLOPRIM    30 tablet    Take 1 tablet (100 mg) by mouth daily    Acute gouty arthritis       * carvedilol 25 MG tablet    COREG    60 tablet    Take 0.5 tablets (12.5 mg) by mouth 2 times daily (with meals)    HTN, goal below 140/90       * carvedilol 25 MG tablet    COREG    180 tablet    TAKE ONE TABLET BY MOUTH TWICE DAILY WITH MEALS    HTN, goal below 140/90       colchicine 0.6 MG tablet     60 tablet    Take 1 tablet (0.6 mg) by mouth 2 times daily    Acute gouty arthritis       lisinopril 40 MG tablet    PRINIVIL/ZESTRIL    90 tablet    TAKE ONE TABLET BY MOUTH DAILY    HTN, goal below 140/90       NICORETTE 2 MG lozenge   Generic drug:  nicotine polacrilex      Place 2 mg inside cheek every hour as needed for smoking cessation        NICOTINE STEP 1 21 MG/24HR 24 hr patch   Generic drug:  nicotine      ROBBY 1 PATCH TO SKIN ONCE D X 42 DAYS        oxyCODONE IR 5 MG tablet     ROXICODONE    12 tablet    Take 1 tablet (5 mg) by mouth every 6 hours as needed for pain maximum 4 tablet(s) per day    Acute gouty arthritis       oxyCODONE-acetaminophen 5-325 MG per tablet    PERCOCET    18 tablet    Take 1 tablet by mouth every 6 hours as needed for pain maximum 4 tablet(s) per day    Acute gouty arthritis       predniSONE 20 MG tablet    DELTASONE    20 tablet    Take 3 tabs (60 mg) by mouth daily x 3 days, 2 tabs (40 mg) daily x 3 days, 1 tab (20 mg) daily x 3 days, then 1/2 tab (10 mg) x 3 days.    Acute gouty arthritis       sennosides 8.6 MG tablet    SENOKOT     TK 1 T PO BID        * Notice:  This list has 2 medication(s) that are the same as other medications prescribed for you. Read the directions carefully, and ask your doctor or other care provider to review them with you.

## 2018-02-14 NOTE — TELEPHONE ENCOUNTER
Reviewed--    Will send a prescription for Naproxen 500 mg twice daily as along term anti-inflammatory agent. Does not need to fill Colchicine.    To continue prednisone as prescribed and follow up in two weeks as planned.    Percy Deshpadne MD  Please close encounter when call/work completed.

## 2018-02-14 NOTE — TELEPHONE ENCOUNTER
Reason for call:  Right after the patient left the clinic he went to the pharmacy and the prescription that was sent over will cost him over 300.00.  He states he cant afford that.  Was wondering if there was something else he could use. The generic is just as expensive.  The medication is Colchicine. Ok to leave a message.

## 2018-02-21 NOTE — PROGRESS NOTES
SUBJECTIVE:   Bucky Edgar is a 62 year old male who presents to clinic today for the following health issues:      History of Present Illness     Follow up from hand/wrist problem from 2/14. No pain or swelling any longer. Still currently taking the medications as prescribed.     Has been having recurrent acute gouty arthritic exacerbations over the past few weeks. When last seen restarted oral prednisone and has deepti doing well with good resolution or right wrist pain and also of left knee and shoulder pain. Did start Allopurinol 100 mg daily for prevention approximately one month ago and may have led to an exacerbation of acute gouty arthritis. Has had uric acid levels over 10.0 mg% in the past, possibly as high as 12 mg%. No history of uric acid calculi.       Hypertension Follow-up      Outpatient blood pressures are being checked at home.  Results are improved with pain relief--currently very acceptable..    Low Salt Diet: no added salt              ROS:  CONSTITUTIONAL: NEGATIVE for fever, chills, change in weight  INTEGUMENTARY/SKIN: NEGATIVE for worrisome rashes, moles or lesions  EYES: NEGATIVE for vision changes or irritation  ENT/MOUTH: NEGATIVE for ear, mouth and throat problems  RESP:still with some smoking activity--otherwise stable COPD.  CV: NEGATIVE for chest pain, palpitations or peripheral edema  CV: Ongoing care for hypertension as noted. No cardiac symptoms noted. Has completed six months of warfarin for extensive left lower extremity DVT--stable and no residual edema or major post phlebitic changes.  GI: NEGATIVE for nausea, abdominal pain, heartburn, or change in bowel habits  GI: Needing a screening colonoscopy   : NEGATIVE for frequency, dysuria, or hematuria  MUSCULOSKELETAL:currently resolved acute gouty arthritic flares  NEURO: NEGATIVE for weakness, dizziness or paresthesias  ENDOCRINE: NEGATIVE for temperature intolerance, skin/hair changes  ENDOCRINE: Adjusting medications for  lowering hyperuricemia --currently on 100 mg Allopurinol  HEME: NEGATIVE for bleeding problems  HEME/ALLERGY/IMMUNE: has completed six months of warfarin therapy as noted.  PSYCHIATRIC: NEGATIVE for changes in mood or affect    OBJECTIVE:                                                    /84  Pulse 80  Temp 97.4  F (36.3  C) (Temporal)  Resp 16  Wt 220 lb (99.8 kg)  SpO2 99%  BMI 32.49 kg/m2  Body mass index is 32.49 kg/(m^2).  GENERAL: alert, active, no distress and cooperative  EYES: Eyes grossly normal to inspection, extraocular movements - intact, and PERRL  HENT: ear canals- normal; TMs- normal; Nose- normal; Mouth- no ulcers, no lesions  NECK: no tenderness, no adenopathy, no asymmetry, no masses, no stiffness; thyroid- normal to palpation  RESP: lungs clear to auscultation - no rales, no rhonchi, no wheezes  CV: regular rates and rhythm, normal S1 S2, no S3 or S4 and no murmur, no click or rub -  ABDOMEN: soft, no tenderness, no  hepatosplenomegaly, no masses, normal bowel sounds  MS: extremities- no gross deformities noted, no edema  MS: normal appearing left wrist--tenderness and joint effusion have resolved.  SKIN: no suspicious lesions, no rashes  NEURO: strength and tone- normal, sensory exam- grossly normal, mentation- intact, speech- normal, reflexes- symmetric  BACK: no CVA tenderness, no paralumbar tenderness  PSYCH: Alert and oriented times 3; speech- coherent , normal rate and volume; able to articulate logical thoughts, able to abstract reason, no tangential thoughts, no hallucinations or delusions, affect- normal  LYMPHATICS: ant. cervical- normal, post. cervical- normal, axillary- normal, supraclavicular- normal, inguinal- normal    Diagnostic test results:  Diagnostic Test Results:  Results for orders placed or performed in visit on 02/28/18 (from the past 24 hour(s))   CBC with platelets   Result Value Ref Range    WBC 12.0 (H) 4.0 - 11.0 10e9/L    RBC Count 5.03 4.4 - 5.9 10e12/L     Hemoglobin 16.5 13.3 - 17.7 g/dL    Hematocrit 49.2 40.0 - 53.0 %    MCV 98 78 - 100 fl    MCH 32.8 26.5 - 33.0 pg    MCHC 33.5 31.5 - 36.5 g/dL    RDW 13.5 10.0 - 15.0 %    Platelet Count 205 150 - 450 10e9/L   Uric acid   Result Value Ref Range    Uric Acid 6.9 3.5 - 7.2 mg/dL   Comprehensive metabolic panel   Result Value Ref Range    Sodium 139 133 - 144 mmol/L    Potassium 5.0 3.4 - 5.3 mmol/L    Chloride 106 94 - 109 mmol/L    Carbon Dioxide 27 20 - 32 mmol/L    Anion Gap 6 3 - 14 mmol/L    Glucose 107 (H) 70 - 99 mg/dL    Urea Nitrogen 17 7 - 30 mg/dL    Creatinine 1.27 (H) 0.66 - 1.25 mg/dL    GFR Estimate 58 (L) >60 mL/min/1.7m2    GFR Estimate If Black 70 >60 mL/min/1.7m2    Calcium 9.2 8.5 - 10.1 mg/dL    Bilirubin Total 0.7 0.2 - 1.3 mg/dL    Albumin 3.2 (L) 3.4 - 5.0 g/dL    Protein Total 6.7 (L) 6.8 - 8.8 g/dL    Alkaline Phosphatase 72 40 - 150 U/L    ALT 31 0 - 70 U/L    AST 14 0 - 45 U/L        ASSESSMENT/PLAN:                                                      (E79.0) Hyperuricemia  (primary encounter diagnosis)  Comment: 100 mg of allopurinol his reduce the uric acid to 6.9 mg percent.  Goal is to have the uric acid level below 6 mg percent.  Will be increasing Allopurinol to 300 mg daily and follow-up in 1 month.  Recheck uric acid in 1 month.  Continue on prophylactic agents until 1-2 months of past.  Plan: CBC with platelets, Uric acid, allopurinol         (ZYLOPRIM) 300 MG tablet            (M10.9) Acute gouty arthritis  Comment: Currently asymptomatic concerning his left wrist, left shoulder and left knee.  Currently on prednisone.  Plan: Monitor and continue on anti-inflammatory agents for the next few months.    (I10) HTN, goal below 140/90  Comment: Blood pressure adequately controlled when pain is controlled.  Continue current medications.  Restart aspirin after stopping Warfarin.  Plan: Comprehensive metabolic panel, aspirin 81 MG         tablet             (J44.9) Chronic  obstructive pulmonary disease, unspecified COPD type (H)  Comment: Continues with some tobacco products.  Encouraged to discontinue completely  Plan: Stable at this time.    (Z12.11) Special screening for malignant neoplasms, colon  Comment: Off warfarin and I will schedule a screening colonoscopy.  Order placed.  Plan: GASTROENTEROLOGY ADULT REF PROCEDURE ONLY [facility] [phone]; No Provider         Preference            (R78.00) Elevated serum creatinine  Comment: A be due slightly to dehydration.  Will encourage good hydration and recheck in 1 month.  Do not believe Allopurinol would cause this resolved but he has been on anti-inflammatory agents.  Plan: We will need to monitor closely.  When he was hypotensive for medication, he had an acute kidney injury and elevated serum creatinine requiring hospitalization.        Follow up with Provider -Follow-up in 1 month with repeat blood studies.  Will recommend that he be well-hydrated on the next blood studies were drawn.  See Patient Instructions    The patient understood the rational for the diagnosis and treatment plan. All questions were answered to best of my ability and the patient's satisfaction.    Note: Chart documentation done in part with Dragon Voice Recognition software. Although reviewed after completion, some word and grammatical errors may remain.  Please consider this when interpreting information in this chart.      Percy Deshpande MD  [facility]

## 2018-02-27 ENCOUNTER — TELEPHONE (OUTPATIENT)
Dept: FAMILY MEDICINE | Facility: CLINIC | Age: 63
End: 2018-02-27

## 2018-02-27 NOTE — TELEPHONE ENCOUNTER
Panel Management Review      Patient has the following on his problem list:     Hypertension   Last three blood pressure readings:  BP Readings from Last 3 Encounters:   02/14/18 115/80   02/05/18 124/90   01/08/18 (!) 146/94     Blood pressure: Passed    HTN Guidelines:  Age 18-59 BP range:  Less than 140/90  Age 60-85 with Diabetes:  Less than 140/90  Age 60-85 without Diabetes:  less than 150/90      Composite cancer screening  Chart review shows that this patient is due/due soon for the following Fecal Colorectal (FIT)  Summary:     Patient is due/failing the following:   Shoulder MRI and FIT    Action needed:   Patient needs non-fasting lab only appointment    Type of outreach:    None, routed to provider for review.    Questions for provider review:    Do you still want patient to have the shoulder MRI done still?                                                                                                                                    Kyra Welsh CMA       Chart routed to Provider .

## 2018-02-28 ENCOUNTER — OFFICE VISIT (OUTPATIENT)
Dept: FAMILY MEDICINE | Facility: CLINIC | Age: 63
End: 2018-02-28
Payer: COMMERCIAL

## 2018-02-28 VITALS
BODY MASS INDEX: 32.49 KG/M2 | RESPIRATION RATE: 16 BRPM | HEART RATE: 80 BPM | OXYGEN SATURATION: 99 % | DIASTOLIC BLOOD PRESSURE: 84 MMHG | TEMPERATURE: 97.4 F | WEIGHT: 220 LBS | SYSTOLIC BLOOD PRESSURE: 122 MMHG

## 2018-02-28 DIAGNOSIS — I10 HTN, GOAL BELOW 140/90: ICD-10-CM

## 2018-02-28 DIAGNOSIS — J44.9 CHRONIC OBSTRUCTIVE PULMONARY DISEASE, UNSPECIFIED COPD TYPE (H): ICD-10-CM

## 2018-02-28 DIAGNOSIS — R79.89 ELEVATED SERUM CREATININE: ICD-10-CM

## 2018-02-28 DIAGNOSIS — M10.9 ACUTE GOUTY ARTHRITIS: ICD-10-CM

## 2018-02-28 DIAGNOSIS — E79.0 HYPERURICEMIA: Primary | ICD-10-CM

## 2018-02-28 DIAGNOSIS — Z12.11 SPECIAL SCREENING FOR MALIGNANT NEOPLASMS, COLON: ICD-10-CM

## 2018-02-28 LAB
ALBUMIN SERPL-MCNC: 3.2 G/DL (ref 3.4–5)
ALP SERPL-CCNC: 72 U/L (ref 40–150)
ALT SERPL W P-5'-P-CCNC: 31 U/L (ref 0–70)
ANION GAP SERPL CALCULATED.3IONS-SCNC: 6 MMOL/L (ref 3–14)
AST SERPL W P-5'-P-CCNC: 14 U/L (ref 0–45)
BILIRUB SERPL-MCNC: 0.7 MG/DL (ref 0.2–1.3)
BUN SERPL-MCNC: 17 MG/DL (ref 7–30)
CALCIUM SERPL-MCNC: 9.2 MG/DL (ref 8.5–10.1)
CHLORIDE SERPL-SCNC: 106 MMOL/L (ref 94–109)
CO2 SERPL-SCNC: 27 MMOL/L (ref 20–32)
CREAT SERPL-MCNC: 1.27 MG/DL (ref 0.66–1.25)
ERYTHROCYTE [DISTWIDTH] IN BLOOD BY AUTOMATED COUNT: 13.5 % (ref 10–15)
GFR SERPL CREATININE-BSD FRML MDRD: 58 ML/MIN/1.7M2
GLUCOSE SERPL-MCNC: 107 MG/DL (ref 70–99)
HCT VFR BLD AUTO: 49.2 % (ref 40–53)
HGB BLD-MCNC: 16.5 G/DL (ref 13.3–17.7)
MCH RBC QN AUTO: 32.8 PG (ref 26.5–33)
MCHC RBC AUTO-ENTMCNC: 33.5 G/DL (ref 31.5–36.5)
MCV RBC AUTO: 98 FL (ref 78–100)
PLATELET # BLD AUTO: 205 10E9/L (ref 150–450)
POTASSIUM SERPL-SCNC: 5 MMOL/L (ref 3.4–5.3)
PROT SERPL-MCNC: 6.7 G/DL (ref 6.8–8.8)
RBC # BLD AUTO: 5.03 10E12/L (ref 4.4–5.9)
SODIUM SERPL-SCNC: 139 MMOL/L (ref 133–144)
URATE SERPL-MCNC: 6.9 MG/DL (ref 3.5–7.2)
WBC # BLD AUTO: 12 10E9/L (ref 4–11)

## 2018-02-28 PROCEDURE — 36415 COLL VENOUS BLD VENIPUNCTURE: CPT | Performed by: FAMILY MEDICINE

## 2018-02-28 PROCEDURE — 85027 COMPLETE CBC AUTOMATED: CPT | Performed by: FAMILY MEDICINE

## 2018-02-28 PROCEDURE — 84550 ASSAY OF BLOOD/URIC ACID: CPT | Performed by: FAMILY MEDICINE

## 2018-02-28 PROCEDURE — 99214 OFFICE O/P EST MOD 30 MIN: CPT | Performed by: FAMILY MEDICINE

## 2018-02-28 PROCEDURE — 80053 COMPREHEN METABOLIC PANEL: CPT | Performed by: FAMILY MEDICINE

## 2018-02-28 RX ORDER — ALLOPURINOL 300 MG/1
300 TABLET ORAL DAILY
Qty: 30 TABLET | Refills: 1 | Status: SHIPPED | OUTPATIENT
Start: 2018-02-28 | End: 2018-04-04

## 2018-02-28 ASSESSMENT — PAIN SCALES - GENERAL: PAINLEVEL: NO PAIN (0)

## 2018-02-28 NOTE — PATIENT INSTRUCTIONS
These are new changes to your current plan of care based on today's visit:    Medications stopped    Medications to be started    Change dose of this medication Increasing Allopurinol to 300 mg daily to further lower uric acid   New treatments        Follow up appointments:    1.  FOLLOW UP WITH YOUR PRIMARY CARE PROVIDER: 1 month(s) for clinic visit with blood work    2. FOLLOW UP WITH SPECIALIST:     3. FOLLOW UP WITH NURSE VISIT:     4. IMAGING STUDIES TO BE SCHEDULED:     5. PROCEDURES TO BE SCHEDULED: Schedule a colonoscopy    Percy Deshpande MD

## 2018-02-28 NOTE — MR AVS SNAPSHOT
After Visit Summary   2/28/2018    Bucky Edgar    MRN: [id number]           Patient Information     Date Of Birth          1955        Visit Information        Provider Department      2/28/2018 10:20 AM Percy Deshpande MD [facility]        Today's Diagnoses     Hyperuricemia    -  1    Acute gouty arthritis        HTN, goal below 140/90        Special screening for malignant neoplasms, colon          Care Instructions    These are new changes to your current plan of care based on today's visit:    Medications stopped    Medications to be started    Change dose of this medication Increasing Allopurinol to 300 mg daily to further lower uric acid   New treatments        Follow up appointments:    1.  FOLLOW UP WITH YOUR PRIMARY CARE PROVIDER: 1 month(s) for clinic visit with blood work    2. FOLLOW UP WITH SPECIALIST:     3. FOLLOW UP WITH NURSE VISIT:     4. IMAGING STUDIES TO BE SCHEDULED:     5. PROCEDURES TO BE SCHEDULED: Schedule a colonoscopy    Percy Deshpande MD                Follow-ups after your visit        Additional Services     GASTROENTEROLOGY ADULT REF PROCEDURE ONLY [facility] [phone]; No Provider Preference       Last Lab Result: Creatinine (mg/dL)       Date                     Value                 11/16/2017               0.96             ----------  Body mass index is 32.49 kg/(m^2).      Patient will be contacted to schedule procedure.     Please be aware that coverage of these services is subject to the terms and limitations of your health insurance plan.  Call member services at your health plan with any benefit or coverage questions.  Any procedures must be performed at a [place] facility OR coordinated by your clinic's referral office.    Please bring the following with you to your appointment:    (1) Any X-Rays, CTs or MRIs which have been performed.  Contact the facility where they were done to arrange for  prior to your  "scheduled appointment.    (2) List of current medications   (3) This referral request   (4) Any documents/labs given to you for this referral                  Who to contact     If you have questions or need follow up information about today's clinic visit or your schedule please contact [facility] [place] directly at [phone].  Normal or non-critical lab and imaging results will be communicated to you by MyChart, letter or phone within 4 business days after the clinic has received the results. If you do not hear from us within 7 days, please contact the clinic through [employer] or phone. If you have a critical or abnormal lab result, we will notify you by phone as soon as possible.  Submit refill requests through [email or web address] or call your pharmacy and they will forward the refill request to us. Please allow 3 business days for your refill to be completed.          Additional Information About Your Visit        MyChart Information     [email or web address] lets you send messages to your doctor, view your test results, renew your prescriptions, schedule appointments and more. To sign up, go to [email or web address] . Click on \"Log in\" on the left side of the screen, which will take you to the Welcome page. Then click on \"Sign up Now\" on the right side of the page.     You will be asked to enter the access code listed below, as well as some personal information. Please follow the directions to create your username and password.     Your access code is: [email or web address]  Expires: 3/14/2018 10:29 AM     Your access code will  in 90 days. If you need help or a new code, please call your [facility] or [phone].        Care EveryWhere ID     This is your Care EveryWhere ID. This could be used by other organizations to access your [place] medical records  [id number]        Your Vitals Were     Pulse Temperature Respirations Pulse Oximetry BMI (Body Mass Index)       80 97.4  F (36.3  C) (Temporal) 16 99% 32.49 kg/m2     "    Blood Pressure from Last 3 Encounters:   02/28/18 122/84   02/14/18 115/80   02/05/18 124/90    Weight from Last 3 Encounters:   02/28/18 220 lb (99.8 kg)   02/14/18 221 lb (100.2 kg)   02/05/18 220 lb 3.2 oz (99.9 kg)              We Performed the Following     CBC with platelets     Comprehensive metabolic panel     GASTROENTEROLOGY ADULT REF PROCEDURE ONLY [facility] [phone]; No Provider Preference     Uric acid          Today's Medication Changes          These changes are accurate as of 2/28/18 10:46 AM.  If you have any questions, ask your nurse or doctor.               Start taking these medicines.        Dose/Directions    aspirin 81 MG tablet   Used for:  HTN, goal below 140/90   Started by:  Percy Deshpande MD        Dose:  81 mg   Take 1 tablet (81 mg) by mouth daily   Quantity:  30 tablet   Refills:  0         These medicines have changed or have updated prescriptions.        Dose/Directions    allopurinol 300 MG tablet   Commonly known as:  ZYLOPRIM   This may have changed:    - medication strength  - how much to take   Used for:  Hyperuricemia   Changed by:  Percy Deshpande MD        Dose:  300 mg   Take 1 tablet (300 mg) by mouth daily   Quantity:  30 tablet   Refills:  1         Stop taking these medicines if you haven't already. Please contact your care team if you have questions.     oxyCODONE IR 5 MG tablet   Commonly known as:  ROXICODONE   Stopped by:  Percy Deshpande MD           oxyCODONE-acetaminophen 5-325 MG per tablet   Commonly known as:  PERCOCET   Stopped by:  Percy Deshpande MD                Where to get your medicines      These medications were sent to [id number] [place] - [address]  [address] [place] [address]    Hours:  test fax sent successfully 1/20/04  kr Phone:  [phone]     allopurinol 300 MG tablet         Some of these will need a paper prescription and others can  be bought over the counter.  Ask your nurse if you have questions.     You don't need a prescription for these medications     aspirin 81 MG tablet                Primary Care Provider Office Phone # Fax #    Percy Deshpande [phone] [address] [facility] [address]        Equal Access to Services     CHRISTOPHER SEAY : Hadii aad ku hadasho Soomaali, waaxda luqadaha, qaybta kaalmada adeegyada, waxay idiin hayaan adeeg kharash lashaann . So [facility] [phone].    ATENCIÓN: Si habla español, tiene a garcia disposición servicios gratuitos de asistencia lingüística. Llame al [phone].    We comply with applicable federal civil rights laws and [place] laws. We do not discriminate on the basis of race, color, national origin, age, disability, sex, sexual orientation, or gender identity.            Thank you!     Thank you for choosing [facility]  for your care. Our goal is always to provide you with excellent care. Hearing back from our patients is one way we can continue to improve our services. Please take a few minutes to complete the written survey that you may receive in the mail after your visit with us. Thank you!             Your Updated Medication List - Protect others around you: Learn how to safely use, store and throw away your medicines at www.disposemymeds.org.          This list is accurate as of 2/28/18 10:46 AM.  Always use your most recent med list.                   Brand Name Dispense Instructions for use Diagnosis    acetaminophen 325 MG tablet    TYLENOL     Take 650 mg by mouth every 6 hours        albuterol 108 (90 BASE) MCG/ACT Inhaler    PROAIR HFA/PROVENTIL HFA/VENTOLIN HFA    3 Inhaler    Inhale 2 puffs into the lungs every 6 hours as needed for shortness of breath / dyspnea    Chronic bronchitis with COPD (chronic obstructive pulmonary disease) (H)       allopurinol 300 MG tablet    ZYLOPRIM    30 tablet    Take 1 tablet (300 mg) by mouth daily     Hyperuricemia       aspirin 81 MG tablet     30 tablet    Take 1 tablet (81 mg) by mouth daily    HTN, goal below 140/90       * carvedilol 25 MG tablet    COREG    60 tablet    Take 0.5 tablets (12.5 mg) by mouth 2 times daily (with meals)    HTN, goal below 140/90       * carvedilol 25 MG tablet    COREG    180 tablet    TAKE ONE TABLET BY MOUTH TWICE DAILY WITH MEALS    HTN, goal below 140/90       lisinopril 40 MG tablet    PRINIVIL/ZESTRIL    90 tablet    TAKE ONE TABLET BY MOUTH DAILY    HTN, goal below 140/90       naproxen 500 MG tablet    NAPROSYN    60 tablet    Take 1 tablet (500 mg) by mouth 2 times daily (with meals)    Acute gouty arthritis       NICORETTE 2 MG lozenge   Generic drug:  nicotine polacrilex      Place 2 mg inside cheek every hour as needed for smoking cessation        NICOTINE STEP 1 21 MG/24HR 24 hr patch   Generic drug:  nicotine      ROBBY 1 PATCH TO SKIN ONCE D X 42 DAYS        predniSONE 20 MG tablet    DELTASONE    20 tablet    Take 3 tabs (60 mg) by mouth daily x 3 days, 2 tabs (40 mg) daily x 3 days, 1 tab (20 mg) daily x 3 days, then 1/2 tab (10 mg) x 3 days.    Acute gouty arthritis       sennosides 8.6 MG tablet    SENOKOT     TK 1 T PO BID        * Notice:  This list has 2 medication(s) that are the same as other medications prescribed for you. Read the directions carefully, and ask your doctor or other care provider to review them with you.

## 2018-02-28 NOTE — LETTER
March 1, 2018      Bucky Edgar  [address] [place] [address]              Dear Robyn Guevara is a copy of the laboratory studies from Tuesday with results as follows:    1.  Uric acid has lowered to 6.9 with the 100 mg of Allopurinol.  It would appear that 300 mg dose that you are now taking will likely get the level under 6.0 and should prevent further gout attacks.  2.  Liver enzymes are normal.  Slight decrease in kidney function was noted which may be related to being slightly dehydrated at the time.  This is not a serious drop and will be rechecked next month.  Be sure you are well hydrated during the day.  3.  The CBC was normal except for a slight elevation of white blood cells.  This is secondary to the prednisone.    Please follow-up in 1 month as planned.  We will recheck the kidney function and uric acid levels at your next visit.  Please be well-hydrated before coming.Please call with any questions or concerns and thank you for your confidence.      Sincerely,      Percy Deshpande MD

## 2018-03-23 ENCOUNTER — TELEPHONE (OUTPATIENT)
Dept: FAMILY MEDICINE | Facility: CLINIC | Age: 63
End: 2018-03-23

## 2018-03-23 ENCOUNTER — OFFICE VISIT (OUTPATIENT)
Dept: FAMILY MEDICINE | Facility: CLINIC | Age: 63
End: 2018-03-23
Payer: COMMERCIAL

## 2018-03-23 VITALS
HEIGHT: 69 IN | BODY MASS INDEX: 32.58 KG/M2 | TEMPERATURE: 98.9 F | RESPIRATION RATE: 18 BRPM | WEIGHT: 220 LBS | SYSTOLIC BLOOD PRESSURE: 120 MMHG | HEART RATE: 116 BPM | DIASTOLIC BLOOD PRESSURE: 86 MMHG

## 2018-03-23 DIAGNOSIS — M10.9 ACUTE GOUTY ARTHRITIS: Primary | ICD-10-CM

## 2018-03-23 DIAGNOSIS — E79.0 HYPERURICEMIA: ICD-10-CM

## 2018-03-23 DIAGNOSIS — R79.89 ELEVATED SERUM CREATININE: ICD-10-CM

## 2018-03-23 LAB
ANION GAP SERPL CALCULATED.3IONS-SCNC: 11 MMOL/L (ref 3–14)
BASOPHILS # BLD AUTO: 0.1 10E9/L (ref 0–0.2)
BASOPHILS NFR BLD AUTO: 0.6 %
BUN SERPL-MCNC: 10 MG/DL (ref 7–30)
CALCIUM SERPL-MCNC: 9.4 MG/DL (ref 8.5–10.1)
CHLORIDE SERPL-SCNC: 108 MMOL/L (ref 94–109)
CO2 SERPL-SCNC: 21 MMOL/L (ref 20–32)
CREAT SERPL-MCNC: 1.06 MG/DL (ref 0.66–1.25)
DIFFERENTIAL METHOD BLD: ABNORMAL
EOSINOPHIL # BLD AUTO: 0.2 10E9/L (ref 0–0.7)
EOSINOPHIL NFR BLD AUTO: 1.2 %
ERYTHROCYTE [DISTWIDTH] IN BLOOD BY AUTOMATED COUNT: 13.6 % (ref 10–15)
GFR SERPL CREATININE-BSD FRML MDRD: 71 ML/MIN/1.7M2
GLUCOSE SERPL-MCNC: 106 MG/DL (ref 70–99)
HCT VFR BLD AUTO: 46.8 % (ref 40–53)
HGB BLD-MCNC: 15.5 G/DL (ref 13.3–17.7)
LYMPHOCYTES # BLD AUTO: 1.7 10E9/L (ref 0.8–5.3)
LYMPHOCYTES NFR BLD AUTO: 13.3 %
MCH RBC QN AUTO: 31.3 PG (ref 26.5–33)
MCHC RBC AUTO-ENTMCNC: 33.1 G/DL (ref 31.5–36.5)
MCV RBC AUTO: 95 FL (ref 78–100)
MONOCYTES # BLD AUTO: 1.6 10E9/L (ref 0–1.3)
MONOCYTES NFR BLD AUTO: 12.4 %
NEUTROPHILS # BLD AUTO: 9.4 10E9/L (ref 1.6–8.3)
NEUTROPHILS NFR BLD AUTO: 72.5 %
PLATELET # BLD AUTO: 307 10E9/L (ref 150–450)
POTASSIUM SERPL-SCNC: 3.9 MMOL/L (ref 3.4–5.3)
RBC # BLD AUTO: 4.95 10E12/L (ref 4.4–5.9)
SODIUM SERPL-SCNC: 140 MMOL/L (ref 133–144)
URATE SERPL-MCNC: 5 MG/DL (ref 3.5–7.2)
WBC # BLD AUTO: 13 10E9/L (ref 4–11)

## 2018-03-23 PROCEDURE — 36415 COLL VENOUS BLD VENIPUNCTURE: CPT | Performed by: FAMILY MEDICINE

## 2018-03-23 PROCEDURE — 99213 OFFICE O/P EST LOW 20 MIN: CPT | Performed by: FAMILY MEDICINE

## 2018-03-23 PROCEDURE — 85025 COMPLETE CBC W/AUTO DIFF WBC: CPT | Performed by: FAMILY MEDICINE

## 2018-03-23 PROCEDURE — 80048 BASIC METABOLIC PNL TOTAL CA: CPT | Performed by: FAMILY MEDICINE

## 2018-03-23 PROCEDURE — 84550 ASSAY OF BLOOD/URIC ACID: CPT | Performed by: FAMILY MEDICINE

## 2018-03-23 RX ORDER — ALLOPURINOL 300 MG/1
300 TABLET ORAL 2 TIMES DAILY
Qty: 60 TABLET | Refills: 5 | Status: SHIPPED | OUTPATIENT
Start: 2018-03-23 | End: 2018-07-03

## 2018-03-23 RX ORDER — PREDNISONE 20 MG/1
TABLET ORAL
Qty: 20 TABLET | Refills: 0 | Status: SHIPPED | OUTPATIENT
Start: 2018-03-23 | End: 2018-04-04

## 2018-03-23 RX ORDER — NAPROXEN 500 MG/1
500 TABLET ORAL 2 TIMES DAILY WITH MEALS
Qty: 60 TABLET | Refills: 3 | Status: SHIPPED | OUTPATIENT
Start: 2018-03-23 | End: 2018-07-03

## 2018-03-23 RX ORDER — OXYCODONE HYDROCHLORIDE 5 MG/1
5 TABLET ORAL EVERY 6 HOURS PRN
Qty: 30 TABLET | Refills: 0 | Status: SHIPPED | OUTPATIENT
Start: 2018-03-23 | End: 2018-05-02

## 2018-03-23 ASSESSMENT — PAIN SCALES - GENERAL: PAINLEVEL: EXTREME PAIN (9)

## 2018-03-23 NOTE — LETTER
March 23, 2018      Bucky Edgar  [address] [place] [address]              Dear Robyn Guevara is a copy of the laboratory studies from today.  Results as follows:    1.  Uric acid level is actually at 5.0, under there is 6.0 goal.  This may be artificially lowered due to the acute gout attack.  I would still recommend that you increase Allopurinol to 300 mg twice daily.  2.  Your kidney function is now normal.  3 weeks ago it was mildly compromised.  3.  The white blood cell count is elevated at 13,000, most likely secondary to the acute inflammation that goes with gout.    Hopefully by the time this letter arrives you are much more comfortable.  Follow-up in 2 weeks as planned.Please call with any questions or concerns and thank you for your confidence.      Sincerely,      Percy Deshpande MD

## 2018-03-23 NOTE — PATIENT INSTRUCTIONS
These are new changes to your current plan of care based on today's visit:    Medications stopped    Medications to be started Naprosyn 500 mg twice daily--continue daily even if doing well    Prednisone as directed for 12 days       Change dose of this medication Increase Allopurinol to 300 mg twice daily to lower uric acid   New treatments          Follow up appointments:    1.  FOLLOW UP WITH YOUR PRIMARY CARE PROVIDER: 2 week(s) for follow up    2. FOLLOW UP WITH SPECIALIST: Rheumatologist as discussed for consultation    Percy Deshpande MD

## 2018-03-23 NOTE — PROGRESS NOTES
"  SUBJECTIVE:   Bucky Edgar is a 62 year old male who presents to clinic today for the following health issues:      HPI  Gout/ single inflamed joint --    At this patient is seen for an acute recurrence of apparent acute coronary arthritis of the left wrist and hand.  He finished the oral course of prednisone and Naprosyn approximately 10 days ago.  The last few days the pain has increased to the point of being extremely uncomfortable.  Swelling is also progressed.    He is currently on Allopurinol 300 mg daily which was started in the past after one of the acute attacks had resolved.  He did not continue Naproxen after finishing the prednisone.  In the past she has not been able to afford Colchicine due to cost.    In the past this joint is also been aspirated with insufficient fluid for evaluation.  He has had an x-ray as an outpatient in the hospital ER setting which was apparently unremarkable.  No fever or chills.  No trauma noted.    Onset: flare up again yesterday, worsen this morning     Description:   Location: hand - left  Joint Swelling: YES  Redness: YES- warmth to the touch   Pain: YES    Intensity: severe    Progression of Symptoms:  worsening    Accompanying Signs & Symptoms:  Fevers: no \" cold and chills all the time\"     History:   Trauma to the area: no   Previous history of gout: YES - same hand   Recent illness:  YES-cold     Precipitating factors:   Diet-rich in purine: no  Alcohol use: no - patient states that he stopped   Diuretic use: no     Alleviating factors:  None     Therapies Tried and outcome: ice- no help       Problem list and histories reviewed & adjusted, as indicated.  Additional history: as documented            ROS:   ROS: 10 point ROS neg or unchanged other than the symptoms noted above in the HPI.  Reports no alcohol use for months and no major intake of red meats.      OBJECTIVE:                                                    /86  Pulse 116  Temp 98.9  F " "(37.2  C) (Temporal)  Resp 18  Ht 5' 9\" (1.753 m)  Wt 220 lb (99.8 kg)  BMI 32.49 kg/m2  Body mass index is 32.49 kg/(m^2).  GENERAL: alert, mild distress and cooperative  HENT: ear canals- normal; TMs- normal; Nose- normal; Mouth- no ulcers, no lesions  NECK: no tenderness, no adenopathy, no asymmetry, no masses, no stiffness; thyroid- normal to palpation  RESP: lungs clear to auscultation - no rales, no rhonchi, no wheezes  CV: regular rates and rhythm, normal S1 S2, no S3 or S4 and no murmur, no click or rub -  ABDOMEN: soft, no tenderness, no  hepatosplenomegaly, no masses, normal bowel sounds  MS: Acute swelling of the left wrist and hand, with some increased warmth and moderate synovial swelling.  Patient states it is exactly the previous gout attack presentation.  SKIN: no suspicious lesions, no rashes  NEURO: strength and tone- normal, sensory exam- grossly normal, mentation- intact, speech- normal, reflexes- symmetric  PSYCH: Alert and oriented times 3; speech- coherent , normal rate and volume; able to articulate logical thoughts, able to abstract reason, no tangential thoughts, no hallucinations or delusions, affect- normal    Diagnostic test results:  Diagnostic Test Results:  Results for orders placed or performed in visit on 03/23/18 (from the past 24 hour(s))   CBC with platelets differential   Result Value Ref Range    WBC 13.0 (H) 4.0 - 11.0 10e9/L    RBC Count 4.95 4.4 - 5.9 10e12/L    Hemoglobin 15.5 13.3 - 17.7 g/dL    Hematocrit 46.8 40.0 - 53.0 %    MCV 95 78 - 100 fl    MCH 31.3 26.5 - 33.0 pg    MCHC 33.1 31.5 - 36.5 g/dL    RDW 13.6 10.0 - 15.0 %    Platelet Count 307 150 - 450 10e9/L    Diff Method Automated Method     % Neutrophils 72.5 %    % Lymphocytes 13.3 %    % Monocytes 12.4 %    % Eosinophils 1.2 %    % Basophils 0.6 %    Absolute Neutrophil 9.4 (H) 1.6 - 8.3 10e9/L    Absolute Lymphocytes 1.7 0.8 - 5.3 10e9/L    Absolute Monocytes 1.6 (H) 0.0 - 1.3 10e9/L    Absolute " Eosinophils 0.2 0.0 - 0.7 10e9/L    Absolute Basophils 0.1 0.0 - 0.2 10e9/L   Basic metabolic panel   Result Value Ref Range    Sodium 140 133 - 144 mmol/L    Potassium 3.9 3.4 - 5.3 mmol/L    Chloride 108 94 - 109 mmol/L    Carbon Dioxide 21 20 - 32 mmol/L    Anion Gap 11 3 - 14 mmol/L    Glucose 106 (H) 70 - 99 mg/dL    Urea Nitrogen 10 7 - 30 mg/dL    Creatinine 1.06 0.66 - 1.25 mg/dL    GFR Estimate 71 >60 mL/min/1.7m2    GFR Estimate If Black 86 >60 mL/min/1.7m2    Calcium 9.4 8.5 - 10.1 mg/dL   Uric acid   Result Value Ref Range    Uric Acid 5.0 3.5 - 7.2 mg/dL        ASSESSMENT/PLAN:                                                    1. Acute gouty arthritis  With the acute flareup, will again restart Prednisone or a 12 day tapering course and also add Naproxen on a daily basis with plans to continue indefinitely.  Follow-up in 2 weeks.  - predniSONE (DELTASONE) 20 MG tablet; Take 3 tabs (60 mg) by mouth daily x 3 days, 2 tabs (40 mg) daily x 3 days, 1 tab (20 mg) daily x 3 days, then 1/2 tab (10 mg) x 3 days.  Dispense: 20 tablet; Refill: 0  - naproxen (NAPROSYN) 500 MG tablet; Take 1 tablet (500 mg) by mouth 2 times daily (with meals)  Dispense: 60 tablet; Refill: 3  - oxyCODONE IR (ROXICODONE) 5 MG tablet; Take 1 tablet (5 mg) by mouth every 6 hours as needed for severe pain maximum 6 tablet(s) per day  Dispense: 30 tablet; Refill: 0  - RHEUMATOLOGY REFERRAL  - CBC with platelets differential    2. Hyperuricemia  Uric acid is down to 5.0 mg%.  May be artificially lower than the 6.9 mg percent seen previously due to the acute attack.  Will increase Allopurinol 300 mg twice daily and attempt to significantly lower uric acid below 6 to hopefully reduce recurrent attacks.  - allopurinol (ZYLOPRIM) 300 MG tablet; Take 1 tablet (300 mg) by mouth 2 times daily  Dispense: 60 tablet; Refill: 5  - Basic metabolic panel  - Uric acid    3. Elevated serum creatinine  Has resolved and is normal renal function at this  time.  To maintain good hydration.      Follow up with Provider -Follow-up in 2 weeks.  He will be off prednisone 2 days at that point.  Could consider low-dose prednisone but will need to assess benefits versus risk.  See Patient Instructions    The patient understood the rational for the diagnosis and treatment plan. All questions were answered to best of my ability and the patient's satisfaction.    Note: Chart documentation done in part with Dragon Voice Recognition software. Although reviewed after completion, some word and grammatical errors may remain.  Please consider this when interpreting information in this chart.      Percy Deshpande MD  [facility]

## 2018-03-23 NOTE — MR AVS SNAPSHOT
After Visit Summary   3/23/2018    Bucky Edgar    MRN: [id number]           Patient Information     Date Of Birth          1955        Visit Information        Provider Department      3/23/2018 8:40 AM Percy Deshpande MD [facility]        Today's Diagnoses     Acute gouty arthritis    -  1    Hyperuricemia        Elevated serum creatinine          Care Instructions    These are new changes to your current plan of care based on today's visit:    Medications stopped    Medications to be started Naprosyn 500 mg twice daily--continue daily even if doing well    Prednisone as directed for 12 days       Change dose of this medication Increase Allopurinol to 300 mg twice daily to lower uric acid   New treatments          Follow up appointments:    1.  FOLLOW UP WITH YOUR PRIMARY CARE PROVIDER: 2 week(s) for follow up    2. FOLLOW UP WITH SPECIALIST: Rheumatologist as discussed for consultation    Percy Deshpande MD                Follow-ups after your visit        Additional Services     RHEUMATOLOGY REFERRAL       Your provider has referred you to: [facility]: [facility] [phone]   [email or web address]    Please be aware that coverage of these services is subject to the terms and limitations of your health insurance plan.  Call member services at your health plan with any benefit or coverage questions.      Please bring the following with you to your appointment:    (1) Any X-Rays, CTs or MRIs which have been performed.  Contact the facility where they were done to arrange for  prior to your scheduled appointment.    (2) List of current medications   (3) This referral request   (4) Any documents/labs given to you for this referral                  Follow-up notes from your care team     Return in about 2 years (around 3/23/2020) for Routine Visit.      Your next 10 appointments already scheduled     Apr 02, 2018 11:00 AM CDT  "  Office Visit with Percy Deshpande MD   [facility] [place] ([facility])    [address], [address]  [place] [id number] [phone]           Bring a current list of meds and any records pertaining to this visit. For Physicals, please bring immunization records and any forms needing to be filled out. Please arrive 10 minutes early to complete paperwork.            Jul 03, 2018  8:40 AM CDT   New Visit with Coy Bolaños MD   [facility]    [address] [id number] [phone]              Who to contact     If you have questions or need follow up information about today's clinic visit or your schedule please contact [facility] directly at [phone].  Normal or non-critical lab and imaging results will be communicated to you by MyChart, letter or phone within 4 business days after the clinic has received the results. If you do not hear from us within 7 days, please contact the clinic through MyChart or phone. If you have a critical or abnormal lab result, we will notify you by phone as soon as possible.  Submit refill requests through [employer] or call your pharmacy and they will forward the refill request to us. Please allow 3 business days for your refill to be completed.          Additional Information About Your Visit        MyChart Information     [employer] lets you send messages to your doctor, view your test results, renew your prescriptions, schedule appointments and more. To sign up, go to [email or web address] . Click on \"Log in\" on the left side of the screen, which will take you to the Welcome page. Then click on \"Sign up Now\" on the right side of the page.     You will be asked to enter the access code listed below, as well as some personal information. Please follow the directions to create your username and password.     Your access code is: [email or web address]  Expires: " "2018  9:11 AM     Your access code will  in 90 days. If you need help or a new code, please call your [place] clinic or [phone].        Care EveryWhere ID     This is your Care EveryWhere ID. This could be used by other organizations to access your [place] medical records  [id number]        Your Vitals Were     Pulse Temperature Respirations Height BMI (Body Mass Index)       116 98.9  F (37.2  C) (Temporal) 18 5' 9\" (1.753 m) 32.49 kg/m2        Blood Pressure from Last 3 Encounters:   18 120/86   18 122/84   18 115/80    Weight from Last 3 Encounters:   18 220 lb (99.8 kg)   18 220 lb (99.8 kg)   18 221 lb (100.2 kg)              We Performed the Following     Basic metabolic panel     CBC with platelets differential     RHEUMATOLOGY REFERRAL     Uric acid          Today's Medication Changes          These changes are accurate as of 3/23/18  9:17 AM.  If you have any questions, ask your nurse or doctor.               Start taking these medicines.        Dose/Directions    oxyCODONE IR 5 MG tablet   Commonly known as:  ROXICODONE   Used for:  Acute gouty arthritis   Started by:  Percy Deshpande MD        Dose:  5 mg   Take 1 tablet (5 mg) by mouth every 6 hours as needed for severe pain maximum 6 tablet(s) per day   Quantity:  30 tablet   Refills:  0         These medicines have changed or have updated prescriptions.        Dose/Directions    * allopurinol 300 MG tablet   Commonly known as:  ZYLOPRIM   This may have changed:  Another medication with the same name was added. Make sure you understand how and when to take each.   Used for:  Hyperuricemia   Changed by:  Percy Deshpande MD        Dose:  300 mg   Take 1 tablet (300 mg) by mouth daily   Quantity:  30 tablet   Refills:  1       * allopurinol 300 MG tablet   Commonly known as:  ZYLOPRIM   This may have changed:  You were already taking a medication with the same name, and this prescription " was added. Make sure you understand how and when to take each.   Used for:  Hyperuricemia   Changed by:  Percy Deshpande MD        Dose:  300 mg   Take 1 tablet (300 mg) by mouth 2 times daily   Quantity:  60 tablet   Refills:  5       * predniSONE 20 MG tablet   Commonly known as:  DELTASONE   This may have changed:  Another medication with the same name was added. Make sure you understand how and when to take each.   Used for:  Acute gouty arthritis   Changed by:  Percy Deshpande MD        Take 3 tabs (60 mg) by mouth daily x 3 days, 2 tabs (40 mg) daily x 3 days, 1 tab (20 mg) daily x 3 days, then 1/2 tab (10 mg) x 3 days.   Quantity:  20 tablet   Refills:  0       * predniSONE 20 MG tablet   Commonly known as:  DELTASONE   This may have changed:  You were already taking a medication with the same name, and this prescription was added. Make sure you understand how and when to take each.   Used for:  Acute gouty arthritis   Changed by:  Percy Deshpande MD        Take 3 tabs (60 mg) by mouth daily x 3 days, 2 tabs (40 mg) daily x 3 days, 1 tab (20 mg) daily x 3 days, then 1/2 tab (10 mg) x 3 days.   Quantity:  20 tablet   Refills:  0       * Notice:  This list has 4 medication(s) that are the same as other medications prescribed for you. Read the directions carefully, and ask your doctor or other care provider to review them with you.         Where to get your medicines      These medications were sent to [facility]   [place] [place] - [address] AT SEC of [address]  [address] [place] [address]    Hours:  test fax sent successfully 1/20/04   Phone:  [phone]     allopurinol 300 MG tablet    naproxen 500 MG tablet    predniSONE 20 MG tablet         Some of these will need a paper prescription and others can be bought over the counter.  Ask your nurse if you have questions.     Bring a paper prescription for each of these medications     oxyCODONE IR 5 MG  tablet                Primary Care Provider Office Phone # Fax #    Percy Deshpande [phone] [address] [address]        Equal Access to Services     CHRISTOPHER SEAY : Hadii lynn cardenas bradfordo Somylesali, waaxda luqadaha, qaybta kaalmada adehiren, chelsy augustmauricio gates. So [facility] [phone].    ATENCIÓN: Si habla español, tiene a garcia disposición servicios gratuitos de asistencia lingüística. [facility] [phone].    We comply with applicable federal civil rights laws and [place] laws. We do not discriminate on the basis of race, color, national origin, age, disability, sex, sexual orientation, or gender identity.            Thank you!     Thank you for choosing [facility]  for your care. Our goal is always to provide you with excellent care. Hearing back from our patients is one way we can continue to improve our services. Please take a few minutes to complete the written survey that you may receive in the mail after your visit with us. Thank you!             Your Updated Medication List - Protect others around you: Learn how to safely use, store and throw away your medicines at www.disposemymeds.org.          This list is accurate as of 3/23/18  9:17 AM.  Always use your most recent med list.                   Brand Name Dispense Instructions for use Diagnosis    acetaminophen 325 MG tablet    TYLENOL     Take 650 mg by mouth every 6 hours        albuterol 108 (90 BASE) MCG/ACT Inhaler    PROAIR HFA/PROVENTIL HFA/VENTOLIN HFA    3 Inhaler    Inhale 2 puffs into the lungs every 6 hours as needed for shortness of breath / dyspnea    Chronic bronchitis with COPD (chronic obstructive pulmonary disease) (H)       * allopurinol 300 MG tablet    ZYLOPRIM    30 tablet    Take 1 tablet (300 mg) by mouth daily    Hyperuricemia       * allopurinol 300 MG tablet    ZYLOPRIM    60 tablet    Take 1 tablet (300 mg) by mouth 2 times daily    Hyperuricemia        aspirin 81 MG tablet     30 tablet    Take 1 tablet (81 mg) by mouth daily    HTN, goal below 140/90       carvedilol 25 MG tablet    COREG    180 tablet    TAKE ONE TABLET BY MOUTH TWICE DAILY WITH MEALS    HTN, goal below 140/90       lisinopril 40 MG tablet    PRINIVIL/ZESTRIL    90 tablet    TAKE ONE TABLET BY MOUTH DAILY    HTN, goal below 140/90       naproxen 500 MG tablet    NAPROSYN    60 tablet    Take 1 tablet (500 mg) by mouth 2 times daily (with meals)    Acute gouty arthritis       NICORETTE 2 MG lozenge   Generic drug:  nicotine polacrilex      Place 2 mg inside cheek every hour as needed for smoking cessation        NICOTINE STEP 1 21 MG/24HR 24 hr patch   Generic drug:  nicotine      ROBBY 1 PATCH TO SKIN ONCE D X 42 DAYS        oxyCODONE IR 5 MG tablet    ROXICODONE    30 tablet    Take 1 tablet (5 mg) by mouth every 6 hours as needed for severe pain maximum 6 tablet(s) per day    Acute gouty arthritis       * predniSONE 20 MG tablet    DELTASONE    20 tablet    Take 3 tabs (60 mg) by mouth daily x 3 days, 2 tabs (40 mg) daily x 3 days, 1 tab (20 mg) daily x 3 days, then 1/2 tab (10 mg) x 3 days.    Acute gouty arthritis       * predniSONE 20 MG tablet    DELTASONE    20 tablet    Take 3 tabs (60 mg) by mouth daily x 3 days, 2 tabs (40 mg) daily x 3 days, 1 tab (20 mg) daily x 3 days, then 1/2 tab (10 mg) x 3 days.    Acute gouty arthritis       sennosides 8.6 MG tablet    SENOKOT     TK 1 T PO BID        * Notice:  This list has 4 medication(s) that are the same as other medications prescribed for you. Read the directions carefully, and ask your doctor or other care provider to review them with you.

## 2018-03-30 NOTE — PROGRESS NOTES
"  SUBJECTIVE:   Bucky Edgar is a 62 year old male who presents to clinic today for the following health issues:      History of Present Illness     Diet:  Low salt  Frequency of exercise:  2-3 days/week  Duration of exercise:  15-30 minutes  Taking medications regularly:  Yes  Medication side effects:  Lightheadedness    Patient also mentioned about low left back pain, patient thinks it his kidney,felt the urgency to go bathroom.    Gout--    Patient continues with ongoing left wrist gouty arthritis.  He again improved with the oral steroids but within a few days of finishing prednisone, he is beginning to have pain and discomfort in his left wrist.  By history this is been evaluated both in the emergency room and by [facility] and consensus is that this is gouty arthritis.  His recent uric acid level was 5.0 while taking Allopurinol.  He has had a significant elevation of C-reactive protein and ESR as well as some leukocytosis.  He is now on allopurinol 300 mg daily.  Plan is to continue with control of the inflammation and continue to push the uric acid level lower.  In the past aspiration of the synovial fluid in the left wrist was not successful to confirm gout.  He is tolerated oral prednisone over time.  He is also on regular doses of Naprosyn on a daily basis.    Duration: 1 week ago flared up,\" better but still hurt \"  Description   Location: hand - left  Joint Swelling: YES  Redness: YES  Pain intensity:  moderate, severe  Accompanying signs and symptoms: lightheaded, \"feeling weird sometime off sometime, I don't if from the pills\"   History  Previous history of gout: YES   Trauma to the area: no   Precipitating factors:   Alcohol usage: quit drinking- patient reported he quit drinking about 3- 4 months   Diuretic use: no   Recent illness: no   Therapies tried and outcome:naproxen, patient states he competed oxycodone, still on prednisone, taking allopurinol daily as prescribed     Problem " list and histories reviewed & adjusted, as indicated.  Additional history: as documented        Current Outpatient Prescriptions   Medication Sig Dispense Refill     predniSONE (DELTASONE) 10 MG tablet Two tablets daily for one week then one daily to control gout 60 tablet 0     naproxen (NAPROSYN) 500 MG tablet Take 1 tablet (500 mg) by mouth 2 times daily (with meals) 60 tablet 3     oxyCODONE IR (ROXICODONE) 5 MG tablet Take 1 tablet (5 mg) by mouth every 6 hours as needed for severe pain maximum 6 tablet(s) per day 30 tablet 0     allopurinol (ZYLOPRIM) 300 MG tablet Take 1 tablet (300 mg) by mouth 2 times daily 60 tablet 5     aspirin 81 MG tablet Take 1 tablet (81 mg) by mouth daily 30 tablet 0     carvedilol (COREG) 25 MG tablet TAKE ONE TABLET BY MOUTH TWICE DAILY WITH MEALS 180 tablet 1     lisinopril (PRINIVIL/ZESTRIL) 40 MG tablet TAKE ONE TABLET BY MOUTH DAILY 90 tablet 1     NICOTINE STEP 1 21 MG/24HR 24 hr patch ROBBY 1 PATCH TO SKIN ONCE D X 42 DAYS  0     sennosides (SENOKOT) 8.6 MG tablet TK 1 T PO BID  0     nicotine polacrilex (NICORETTE) 2 MG lozenge Place 2 mg inside cheek every hour as needed for smoking cessation       acetaminophen (TYLENOL) 325 MG tablet Take 650 mg by mouth every 6 hours       albuterol (PROAIR HFA, PROVENTIL HFA, VENTOLIN HFA) 108 (90 BASE) MCG/ACT inhaler Inhale 2 puffs into the lungs every 6 hours as needed for shortness of breath / dyspnea 3 Inhaler 2       ROS:  CONSTITUTIONAL: NEGATIVE for fever, chills, change in weight  CONSTITUTIONAL: Patient continues to state that he remains off colic beverages.  INTEGUMENTARY/SKIN: NEGATIVE for worrisome rashes, moles or lesions  EYES: NEGATIVE for vision changes or irritation  ENT/MOUTH: NEGATIVE for ear, mouth and throat problems  RESP: NEGATIVE for significant cough or SOB  CV: NEGATIVE for chest pain, palpitations or peripheral edema  GI: NEGATIVE for nausea, abdominal pain, heartburn, or change in bowel habits  GI: He is  scheduled for screening colonoscopy for colorectal cancer screening.  : NEGATIVE for frequency, dysuria, or hematuria  MUSCULOSKELETAL: Persistent gouty arthritis suspected in the left wrist with continued limitations of pain and swelling.  NEURO: NEGATIVE for weakness, dizziness or paresthesias  ENDOCRINE: NEGATIVE for temperature intolerance, skin/hair changes  ENDOCRINE: Continues on Allopurinol 300 mg daily for uric acid lowering.  HEME: NEGATIVE for bleeding problems  PSYCHIATRIC: NEGATIVE for changes in mood or affect    OBJECTIVE:                                                    /84  Pulse 78  Temp 98.7  F (37.1  C) (Temporal)  Wt 225 lb (102.1 kg)  BMI 33.23 kg/m2  Body mass index is 33.23 kg/(m^2).  GENERAL: alert and cooperative  HENT: ear canals- normal; TMs- normal; Nose- normal; Mouth- no ulcers, no lesions  NECK: no tenderness, no adenopathy, no asymmetry, no masses, no stiffness; thyroid- normal to palpation  RESP: lungs clear to auscultation - no rales, no rhonchi, no wheezes  CV: regular rates and rhythm, normal S1 S2, no S3 or S4 and no murmur, no click or rub -  ABDOMEN: soft, no tenderness, no  hepatosplenomegaly, no masses, normal bowel sounds  MS: Slight recurrence synovial swelling of the left wrist with pain with extension and lateral flexion bilaterally.  No loss of motor function.  Slight increased warmth overlying the dorsal left wrist.  SKIN: no suspicious lesions, no rashes  NEURO: strength and tone- normal, sensory exam- grossly normal, mentation- intact, speech- normal, reflexes- symmetric  PSYCH: Alert and oriented times 3; speech- coherent , normal rate and volume; able to articulate logical thoughts, able to abstract reason, no tangential thoughts, no hallucinations or delusions, affect- normal  LYMPHATICS: ant. cervical- normal, post. cervical- normal, axillary- normal, supraclavicular- normal, inguinal- normal    Diagnostic test results:  Diagnostic Test  Results:  Results for orders placed or performed in visit on 04/04/18 (from the past 24 hour(s))   CBC with platelets differential   Result Value Ref Range    WBC 12.4 (H) 4.0 - 11.0 10e9/L    RBC Count 4.82 4.4 - 5.9 10e12/L    Hemoglobin 15.0 13.3 - 17.7 g/dL    Hematocrit 46.1 40.0 - 53.0 %    MCV 96 78 - 100 fl    MCH 31.1 26.5 - 33.0 pg    MCHC 32.5 31.5 - 36.5 g/dL    RDW 13.9 10.0 - 15.0 %    Platelet Count 228 150 - 450 10e9/L    Diff Method Automated Method     % Neutrophils 73.1 %    % Lymphocytes 15.2 %    % Monocytes 8.4 %    % Eosinophils 3.1 %    % Basophils 0.2 %    Absolute Neutrophil 9.0 (H) 1.6 - 8.3 10e9/L    Absolute Lymphocytes 1.9 0.8 - 5.3 10e9/L    Absolute Monocytes 1.0 0.0 - 1.3 10e9/L    Absolute Eosinophils 0.4 0.0 - 0.7 10e9/L    Absolute Basophils 0.0 0.0 - 0.2 10e9/L   ESR: Erythrocyte sedimentation rate   Result Value Ref Range    Sed Rate 9 0 - 20 mm/h   CRP, inflammation   Result Value Ref Range    CRP Inflammation 47.9 (H) 0.0 - 8.0 mg/L        ASSESSMENT/PLAN:                                                    1. Acute idiopathic gout of left wrist  Continue with the working diagnosis of acute and persistent gouty arthritis of the left wrist.  He has had this diagnosis confirmed by multiple consultants including ER visit and orthopedic visit.  Records to be requested from [facility].  Have not been able to aspirate any fluid from the left wrist.  He is not felt to have septic joint as he improves with prednisone and is not septic.  Plan is to utilize 10 mg of prednisone daily as a prophylactic measure since colchicine is expensive for the patient.  Continue Naprosyn.  Follow-up in 1 month.  Look to taper prednisone as able.  - predniSONE (DELTASONE) 10 MG tablet; Two tablets daily for one week then one daily to control gout  Dispense: 60 tablet; Refill: 0  - CBC with platelets differential  - ESR: Erythrocyte sedimentation rate  - CRP, inflammation    2. Screen for  colon cancer  Patient is scheduled for screening colonoscopy.    3. Hyperuricemia  Continue Allopurinol 300 mg daily.  Eventually should be able to reduce the uric acid levels in his joint.    4. Leukemoid reaction  Suspect part of this is due to high-dose prednisone.  He is improved from last visit.      Follow up with Provider -Follow-up in 1 month or as needed.  Records requested from [facility].  See Patient Instructions    The patient understood the rational for the diagnosis and treatment plan. All questions were answered to best of my ability and the patient's satisfaction.    Note: Chart documentation done in part with Dragon Voice Recognition software. Although reviewed after completion, some word and grammatical errors may remain.  Please consider this when interpreting information in this chart.      Percy Deshpande MD  [facility]

## 2018-04-04 ENCOUNTER — OFFICE VISIT (OUTPATIENT)
Dept: FAMILY MEDICINE | Facility: CLINIC | Age: 63
End: 2018-04-04
Payer: COMMERCIAL

## 2018-04-04 VITALS
DIASTOLIC BLOOD PRESSURE: 84 MMHG | WEIGHT: 225 LBS | SYSTOLIC BLOOD PRESSURE: 132 MMHG | HEART RATE: 78 BPM | BODY MASS INDEX: 33.23 KG/M2 | TEMPERATURE: 98.7 F

## 2018-04-04 DIAGNOSIS — E79.0 HYPERURICEMIA: ICD-10-CM

## 2018-04-04 DIAGNOSIS — M10.032 ACUTE IDIOPATHIC GOUT OF LEFT WRIST: Primary | ICD-10-CM

## 2018-04-04 DIAGNOSIS — D72.823 LEUKEMOID REACTION: ICD-10-CM

## 2018-04-04 DIAGNOSIS — Z12.11 SCREEN FOR COLON CANCER: ICD-10-CM

## 2018-04-04 LAB
BASOPHILS # BLD AUTO: 0 10E9/L (ref 0–0.2)
BASOPHILS NFR BLD AUTO: 0.2 %
CRP SERPL-MCNC: 47.9 MG/L (ref 0–8)
DIFFERENTIAL METHOD BLD: ABNORMAL
EOSINOPHIL # BLD AUTO: 0.4 10E9/L (ref 0–0.7)
EOSINOPHIL NFR BLD AUTO: 3.1 %
ERYTHROCYTE [DISTWIDTH] IN BLOOD BY AUTOMATED COUNT: 13.9 % (ref 10–15)
ERYTHROCYTE [SEDIMENTATION RATE] IN BLOOD BY WESTERGREN METHOD: 9 MM/H (ref 0–20)
HCT VFR BLD AUTO: 46.1 % (ref 40–53)
HGB BLD-MCNC: 15 G/DL (ref 13.3–17.7)
LYMPHOCYTES # BLD AUTO: 1.9 10E9/L (ref 0.8–5.3)
LYMPHOCYTES NFR BLD AUTO: 15.2 %
MCH RBC QN AUTO: 31.1 PG (ref 26.5–33)
MCHC RBC AUTO-ENTMCNC: 32.5 G/DL (ref 31.5–36.5)
MCV RBC AUTO: 96 FL (ref 78–100)
MONOCYTES # BLD AUTO: 1 10E9/L (ref 0–1.3)
MONOCYTES NFR BLD AUTO: 8.4 %
NEUTROPHILS # BLD AUTO: 9 10E9/L (ref 1.6–8.3)
NEUTROPHILS NFR BLD AUTO: 73.1 %
PLATELET # BLD AUTO: 228 10E9/L (ref 150–450)
RBC # BLD AUTO: 4.82 10E12/L (ref 4.4–5.9)
WBC # BLD AUTO: 12.4 10E9/L (ref 4–11)

## 2018-04-04 PROCEDURE — 36415 COLL VENOUS BLD VENIPUNCTURE: CPT | Performed by: FAMILY MEDICINE

## 2018-04-04 PROCEDURE — 99214 OFFICE O/P EST MOD 30 MIN: CPT | Performed by: FAMILY MEDICINE

## 2018-04-04 PROCEDURE — 85652 RBC SED RATE AUTOMATED: CPT | Performed by: FAMILY MEDICINE

## 2018-04-04 PROCEDURE — 85025 COMPLETE CBC W/AUTO DIFF WBC: CPT | Performed by: FAMILY MEDICINE

## 2018-04-04 PROCEDURE — 86140 C-REACTIVE PROTEIN: CPT | Performed by: FAMILY MEDICINE

## 2018-04-04 RX ORDER — PREDNISONE 10 MG/1
TABLET ORAL
Qty: 60 TABLET | Refills: 0 | Status: SHIPPED | OUTPATIENT
Start: 2018-04-04 | End: 2018-07-03

## 2018-04-04 ASSESSMENT — PAIN SCALES - GENERAL: PAINLEVEL: SEVERE PAIN (6)

## 2018-04-04 NOTE — PATIENT INSTRUCTIONS
These are new changes to your current plan of care based on today's visit:    Medications stopped    Medications to be started Prednisone 20 mg daily for one week and then one daily to control gout   Change dose of this medication    New treatments        Follow up appointments:    1.  FOLLOW UP WITH YOUR PRIMARY CARE PROVIDER: 1 month(s) for follow up    Percy Deshpande MD

## 2018-04-04 NOTE — MR AVS SNAPSHOT
After Visit Summary   4/4/2018    Bucky Edgar    MRN: [id number]           Patient Information     Date Of Birth          1955        Visit Information        Provider Department      4/4/2018 9:40 AM Percy Deshpande MD [facility]        Today's Diagnoses     Acute idiopathic gout of left wrist    -  1    Screen for colon cancer        Hyperuricemia          Care Instructions    These are new changes to your current plan of care based on today's visit:    Medications stopped    Medications to be started Prednisone 20 mg daily for one week and then one daily to control gout   Change dose of this medication    New treatments        Follow up appointments:    1.  FOLLOW UP WITH YOUR PRIMARY CARE PROVIDER: 1 month(s) for follow up    Percy Deshpande MD                Follow-ups after your visit        Follow-up notes from your care team     Return in about 4 weeks (around 5/2/2018) for Routine Visit.      Your next 10 appointments already scheduled     Jul 03, 2018  8:40 AM CDT   New Visit with Coy Bolaños MD   [facility]    [address] [place] [id number] [phone]              Who to contact     If you have questions or need follow up information about today's clinic visit or your schedule please contact [facility] directly at [phone].  Normal or non-critical lab and imaging results will be communicated to you by MyChart, letter or phone within 4 business days after the clinic has received the results. If you do not hear from us within 7 days, please contact the clinic through MyChart or phone. If you have a critical or abnormal lab result, we will notify you by phone as soon as possible.  Submit refill requests through [employer] or call your pharmacy and they will forward the refill request to us. Please allow 3 business days for your refill to be completed.          Additional  "Information About Your Visit        MyChart Information     [employer] lets you send messages to your doctor, view your test results, renew your prescriptions, schedule appointments and more. To sign up, go to [email or web address] . Click on \"Log in\" on the left side of the screen, which will take you to the Welcome page. Then click on \"Sign up Now\" on the right side of the page.     You will be asked to enter the access code listed below, as well as some personal information. Please follow the directions to create your username and password.     Your access code is: [email or web address]  Expires: 2018  9:11 AM     Your access code will  in 90 days. If you need help or a new code, please call your [facility] clinic or [phone].        Care EveryWhere ID     This is your Care EveryWhere ID. This could be used by other organizations to access your [facility] medical records  [id number]        Your Vitals Were     Pulse Temperature BMI (Body Mass Index)             78 98.7  F (37.1  C) (Temporal) 33.23 kg/m2          Blood Pressure from Last 3 Encounters:   18 132/84   18 120/86   18 122/84    Weight from Last 3 Encounters:   18 225 lb (102.1 kg)   18 220 lb (99.8 kg)   18 220 lb (99.8 kg)              We Performed the Following     CBC with platelets differential     CRP, inflammation     ESR: Erythrocyte sedimentation rate          Today's Medication Changes          These changes are accurate as of 18 10:16 AM.  If you have any questions, ask your nurse or doctor.               These medicines have changed or have updated prescriptions.        Dose/Directions    allopurinol 300 MG tablet   Commonly known as:  ZYLOPRIM   This may have changed:  Another medication with the same name was removed. Continue taking this medication, and follow the directions you see here.   Used for:  Hyperuricemia   Changed by:  Percy Deshpande MD        Dose:  300 mg   Take 1 tablet (300 " mg) by mouth 2 times daily   Quantity:  60 tablet   Refills:  5       predniSONE 10 MG tablet   Commonly known as:  DELTASONE   This may have changed:    - medication strength  - additional instructions  - Another medication with the same name was removed. Continue taking this medication, and follow the directions you see here.   Used for:  Acute idiopathic gout of left wrist   Changed by:  Percy Deshpande MD        Two tablets daily for one week then one daily to control gout   Quantity:  60 tablet   Refills:  0            Where to get your medicines      These medications were sent to [id number] - [place] - [address] AT SEC of [facility] [address]  [address], [place] [address]    Hours:  test fax sent successfully 1/20/04  kr Phone:  [phone]     predniSONE 10 MG tablet                Primary Care Provider Office Phone # Fax #    Percy Deshpande [phone] [address] [facility]  [place] [address]        Equal Access to Services     [facility] AH: Hadii aad ku hadasho Soomaali, waaxda luqadaha, qaybta kaalmada adeegyada, waxay idiin hayaan adeeg kharabalaji gonzalez . [facility] [place] [phone].    ATENCIÓN: Si habla español, tiene a garcia disposición servicios gratuitos de asistencia lingüística. [facility] [phone].    We comply with applicable federal civil rights laws and [place] laws. We do not discriminate on the basis of race, color, national origin, age, disability, sex, sexual orientation, or gender identity.            Thank you!     Thank you for choosing [facility]  for your care. Our goal is always to provide you with excellent care. Hearing back from our patients is one way we can continue to improve our services. Please take a few minutes to complete the written survey that you may receive in the mail after your visit with us. Thank you!             Your Updated Medication List - Protect others around you: Learn how to safely  use, store and throw away your medicines at www.disposemymeds.org.          This list is accurate as of 4/4/18 10:16 AM.  Always use your most recent med list.                   Brand Name Dispense Instructions for use Diagnosis    acetaminophen 325 MG tablet    TYLENOL     Take 650 mg by mouth every 6 hours        albuterol 108 (90 BASE) MCG/ACT Inhaler    PROAIR HFA/PROVENTIL HFA/VENTOLIN HFA    3 Inhaler    Inhale 2 puffs into the lungs every 6 hours as needed for shortness of breath / dyspnea    Chronic bronchitis with COPD (chronic obstructive pulmonary disease) (H)       allopurinol 300 MG tablet    ZYLOPRIM    60 tablet    Take 1 tablet (300 mg) by mouth 2 times daily    Hyperuricemia       aspirin 81 MG tablet     30 tablet    Take 1 tablet (81 mg) by mouth daily    HTN, goal below 140/90       carvedilol 25 MG tablet    COREG    180 tablet    TAKE ONE TABLET BY MOUTH TWICE DAILY WITH MEALS    HTN, goal below 140/90       lisinopril 40 MG tablet    PRINIVIL/ZESTRIL    90 tablet    TAKE ONE TABLET BY MOUTH DAILY    HTN, goal below 140/90       naproxen 500 MG tablet    NAPROSYN    60 tablet    Take 1 tablet (500 mg) by mouth 2 times daily (with meals)    Acute gouty arthritis       NICORETTE 2 MG lozenge   Generic drug:  nicotine polacrilex      Place 2 mg inside cheek every hour as needed for smoking cessation        NICOTINE STEP 1 21 MG/24HR 24 hr patch   Generic drug:  nicotine      ROBBY 1 PATCH TO SKIN ONCE D X 42 DAYS        oxyCODONE IR 5 MG tablet    ROXICODONE    30 tablet    Take 1 tablet (5 mg) by mouth every 6 hours as needed for severe pain maximum 6 tablet(s) per day    Acute gouty arthritis       predniSONE 10 MG tablet    DELTASONE    60 tablet    Two tablets daily for one week then one daily to control gout    Acute idiopathic gout of left wrist       sennosides 8.6 MG tablet    SENOKOT     TK 1 T PO BID

## 2018-04-27 NOTE — PROGRESS NOTES
"  SUBJECTIVE:   Bucky Edgar is a 62 year old male who presents to clinic today for the following health issues:      History of Present Illness     Diet:  Low salt  Frequency of exercise:  2-3 days/week  Duration of exercise:  15-30 minutes  Taking medications regularly:  Yes  Medication side effects:  None  Additional concerns today:  No    Gout--    This patient is seen in follow-up after re instituting prednisone for recurrent left wrist synovitis from acute gouty arthritis.  He has had a slow taper and currently feels reasonably well.  Slight swelling of the left wrist maintains but his pain is eventually resolved and his range of motion is improved.  He was on allopurinol 300 mg daily with continued recurrent symptoms this was increased to 600 mg daily which she is tolerating well.  He is here for follow-up and medication adjustment.  No other joints have been symptomatic at the present time.    Duration: ongoing for 6 months, patient states it getting better since the last office visit, a little stiffness in the left wrist.    Description   Location: hand and wrist - left  Joint Swelling: YES- \"feels weak\"   Redness: YES  Pain intensity:  mild  Accompanying signs and symptoms: None  History  Previous history of gout: YES   Trauma to the area: no   Precipitating factors:   Alcohol usage: \"right now none\"  Diuretic use: no   Recent illness: no   Therapies tried and outcome: allopurinol, and prednisone     Problem list and histories reviewed & adjusted, as indicated.  Additional history: as documented      Hypertension Follow-up      Outpatient blood pressures are being checked at home.  Results are usually less than 140/90..    Low Salt Diet: no added salt    COPD Follow-Up    Symptoms are currently: stable    Current fatigue or dyspnea with ambulation: stable     Shortness of breath: stable    Increased or change in Cough/Sputum: No    Fever(s): No    Baseline ambulation without stopping to rest: Multiple " blocks. Able to walk up multiple flights of stairs without stopping to rest.    Any ER/UC or hospital admissions since your last visit? No     History   Smoking Status     Former Smoker     Packs/day: 0.50     Years: 38.00     Types: Cigarettes     Quit date: 10/1/2017   Smokeless Tobacco     Never Used     Comment: Quit for 10 years, now smoked for 10 years. Max 2 PPD i the past     Lab Results   Component Value Date    FEV1 2.89 11/27/2013    DZP8CVA 60% 11/27/2013           Current Outpatient Prescriptions   Medication Sig Dispense Refill     acetaminophen (TYLENOL) 325 MG tablet Take 650 mg by mouth every 6 hours       albuterol (PROAIR HFA, PROVENTIL HFA, VENTOLIN HFA) 108 (90 BASE) MCG/ACT inhaler Inhale 2 puffs into the lungs every 6 hours as needed for shortness of breath / dyspnea 3 Inhaler 2     allopurinol (ZYLOPRIM) 300 MG tablet Take 1 tablet (300 mg) by mouth 2 times daily 60 tablet 5     aspirin 81 MG tablet Take 1 tablet (81 mg) by mouth daily 30 tablet 0     carvedilol (COREG) 25 MG tablet TAKE ONE TABLET BY MOUTH TWICE DAILY WITH MEALS 180 tablet 1     lisinopril (PRINIVIL/ZESTRIL) 40 MG tablet TAKE ONE TABLET BY MOUTH DAILY 90 tablet 1     naproxen (NAPROSYN) 500 MG tablet Take 1 tablet (500 mg) by mouth 2 times daily (with meals) 60 tablet 3     nicotine polacrilex (NICORETTE) 2 MG lozenge Place 2 mg inside cheek every hour as needed for smoking cessation       predniSONE (DELTASONE) 10 MG tablet Two tablets daily for one week then one daily to control gout 60 tablet 0     predniSONE (DELTASONE) 5 MG tablet One tablet daily for two weeks and then one every other day for two weeks 21 tablet 0     NICOTINE STEP 1 21 MG/24HR 24 hr patch ROBBY 1 PATCH TO SKIN ONCE D X 42 DAYS  0     sennosides (SENOKOT) 8.6 MG tablet TK 1 T PO BID  0       ROS:  CONSTITUTIONAL: NEGATIVE for fever, chills, change in weight  INTEGUMENTARY/SKIN: NEGATIVE for worrisome rashes, moles or lesions  EYES: NEGATIVE for vision  changes or irritation  ENT/MOUTH: NEGATIVE for ear, mouth and throat problems  RESP: NEGATIVE for significant cough or SOB  RESP: Stable COPD.  Currently not smoking.  CV: NEGATIVE for chest pain, palpitations or peripheral edema  CV: Hypertension has been adequately maintained on current medications  GI: NEGATIVE for nausea, abdominal pain, heartburn, or change in bowel habits  GI: Continues to work on having availability to do colonoscopy.  Planning to repeat this in the near future.  : NEGATIVE for frequency, dysuria, or hematuria  MUSCULOSKELETAL: Persistent gouty arthritis of the left wrist as noted above.  Also noting some postphlebitic swelling of his right lower extremity and some discomfort.  Likely secondary to postphlebitic swelling.  NEURO: NEGATIVE for weakness, dizziness or paresthesias  ENDOCRINE: NEGATIVE for temperature intolerance, skin/hair changes  HEME: NEGATIVE for bleeding problems  PSYCHIATRIC: NEGATIVE for changes in mood or affect    OBJECTIVE:                                                    /86  Pulse 76  Temp 98.9  F (37.2  C) (Temporal)  Resp 14  Wt 230 lb (104.3 kg)  BMI 33.97 kg/m2  Body mass index is 33.97 kg/(m^2).  GENERAL: healthy, alert and no distress  EYES: Eyes grossly normal to inspection, extraocular movements - intact, and PERRL  HENT: ear canals- normal; TMs- normal; Nose- normal; Mouth- no ulcers, no lesions  NECK: no tenderness, no adenopathy, no asymmetry, no masses, no stiffness; thyroid- normal to palpation  RESP: lungs clear to auscultation - no rales, no rhonchi, no wheezes  CV: regular rates and rhythm, normal S1 S2, no S3 or S4 and no murmur, no click or rub -  ABDOMEN: soft, no tenderness, no  hepatosplenomegaly, no masses, normal bowel sounds  MS: Left wrist is most improved with significant decrease in swelling and essentially normal range of motion.  No tenderness.  CMS intact.  Right lower extremity shows slight postphlebitic type edema but  otherwise no acute changes.  SKIN: no suspicious lesions, no rashes  NEURO: strength and tone- normal, sensory exam- grossly normal, mentation- intact, speech- normal, reflexes- symmetric  BACK: no CVA tenderness, no paralumbar tenderness  PSYCH: Alert and oriented times 3; speech- coherent , normal rate and volume; able to articulate logical thoughts, able to abstract reason, no tangential thoughts, no hallucinations or delusions, affect- normal  LYMPHATICS: ant. cervical- normal, post. cervical- normal, axillary- normal, supraclavicular- normal, inguinal- normal    Diagnostic test results:  Diagnostic Test Results:  Results for orders placed or performed in visit on 05/02/18 (from the past 24 hour(s))   Uric acid   Result Value Ref Range    Uric Acid 3.3 (L) 3.5 - 7.2 mg/dL   Comprehensive metabolic panel   Result Value Ref Range    Sodium 142 133 - 144 mmol/L    Potassium 4.4 3.4 - 5.3 mmol/L    Chloride 111 (H) 94 - 109 mmol/L    Carbon Dioxide 27 20 - 32 mmol/L    Anion Gap 4 3 - 14 mmol/L    Glucose 102 (H) 70 - 99 mg/dL    Urea Nitrogen 15 7 - 30 mg/dL    Creatinine 1.11 0.66 - 1.25 mg/dL    GFR Estimate 67 >60 mL/min/1.7m2    GFR Estimate If Black 82 >60 mL/min/1.7m2    Calcium 8.9 8.5 - 10.1 mg/dL    Bilirubin Total 0.8 0.2 - 1.3 mg/dL    Albumin 3.4 3.4 - 5.0 g/dL    Protein Total 6.8 6.8 - 8.8 g/dL    Alkaline Phosphatase 67 40 - 150 U/L    ALT 26 0 - 70 U/L    AST 18 0 - 45 U/L   CBC with platelets   Result Value Ref Range    WBC 11.1 (H) 4.0 - 11.0 10e9/L    RBC Count 4.83 4.4 - 5.9 10e12/L    Hemoglobin 15.0 13.3 - 17.7 g/dL    Hematocrit 45.5 40.0 - 53.0 %    MCV 94 78 - 100 fl    MCH 31.1 26.5 - 33.0 pg    MCHC 33.0 31.5 - 36.5 g/dL    RDW 14.5 10.0 - 15.0 %    Platelet Count 230 150 - 450 10e9/L   ESR: Erythrocyte sedimentation rate   Result Value Ref Range    Sed Rate 7 0 - 20 mm/h   CRP, inflammation   Result Value Ref Range    CRP Inflammation 10.1 (H) 0.0 - 8.0 mg/L        ASSESSMENT/PLAN:                                                     1. Hyperuricemia  Uric acid now 3.3.  Since he is tolerating Allopurinol 600 mg twice daily will continue this level to help maintain a low uric acid level and eliminate recurrent gouty attacks.  CBC and liver function as well as renal function stable.  - Uric acid  - Comprehensive metabolic panel  - CBC with platelets    2. HTN, goal below 140/90  Continue current blood pressure medications  - Comprehensive metabolic panel    3. Screen for colon cancer  We will temporarily do a stool screening with eventually complete a colonoscopy  - Fecal colorectal cancer screen (FIT); Future  - CBC with platelets    4. Chronic obstructive pulmonary disease, unspecified COPD type (H)  Stable with no medication needed  - COPD ACTION PLAN    5. Acute gouty arthritis  Slow taper prednisone to 5 mg daily for 2 weeks and 5 mg every other day and then discontinue.  Inflammatory markers are essentially normal.  - predniSONE (DELTASONE) 5 MG tablet; One tablet daily for two weeks and then one every other day for two weeks  Dispense: 21 tablet; Refill: 0  - ESR: Erythrocyte sedimentation rate  - CRP, inflammation      Follow up with Provider -follow-up in 3 months unless changes.  Continue to monitor blood work as needed.  See Patient Instructions    The patient understood the rational for the diagnosis and treatment plan. All questions were answered to best of my ability and the patient's satisfaction.    Note: Chart documentation done in part with Dragon Voice Recognition software. Although reviewed after completion, some word and grammatical errors may remain.  Please consider this when interpreting information in this chart.      Percy Deshpande MD  [facility]

## 2018-05-02 ENCOUNTER — OFFICE VISIT (OUTPATIENT)
Dept: FAMILY MEDICINE | Facility: CLINIC | Age: 63
End: 2018-05-02
Payer: COMMERCIAL

## 2018-05-02 VITALS
HEART RATE: 76 BPM | WEIGHT: 230 LBS | TEMPERATURE: 98.9 F | BODY MASS INDEX: 33.97 KG/M2 | SYSTOLIC BLOOD PRESSURE: 136 MMHG | RESPIRATION RATE: 14 BRPM | DIASTOLIC BLOOD PRESSURE: 86 MMHG

## 2018-05-02 DIAGNOSIS — I10 HTN, GOAL BELOW 140/90: ICD-10-CM

## 2018-05-02 DIAGNOSIS — J44.9 CHRONIC OBSTRUCTIVE PULMONARY DISEASE, UNSPECIFIED COPD TYPE (H): ICD-10-CM

## 2018-05-02 DIAGNOSIS — E79.0 HYPERURICEMIA: Primary | ICD-10-CM

## 2018-05-02 DIAGNOSIS — M10.9 ACUTE GOUTY ARTHRITIS: ICD-10-CM

## 2018-05-02 DIAGNOSIS — Z12.11 SCREEN FOR COLON CANCER: ICD-10-CM

## 2018-05-02 LAB
ALBUMIN SERPL-MCNC: 3.4 G/DL (ref 3.4–5)
ALP SERPL-CCNC: 67 U/L (ref 40–150)
ALT SERPL W P-5'-P-CCNC: 26 U/L (ref 0–70)
ANION GAP SERPL CALCULATED.3IONS-SCNC: 4 MMOL/L (ref 3–14)
AST SERPL W P-5'-P-CCNC: 18 U/L (ref 0–45)
BILIRUB SERPL-MCNC: 0.8 MG/DL (ref 0.2–1.3)
BUN SERPL-MCNC: 15 MG/DL (ref 7–30)
CALCIUM SERPL-MCNC: 8.9 MG/DL (ref 8.5–10.1)
CHLORIDE SERPL-SCNC: 111 MMOL/L (ref 94–109)
CO2 SERPL-SCNC: 27 MMOL/L (ref 20–32)
CREAT SERPL-MCNC: 1.11 MG/DL (ref 0.66–1.25)
CRP SERPL-MCNC: 10.1 MG/L (ref 0–8)
ERYTHROCYTE [DISTWIDTH] IN BLOOD BY AUTOMATED COUNT: 14.5 % (ref 10–15)
ERYTHROCYTE [SEDIMENTATION RATE] IN BLOOD BY WESTERGREN METHOD: 7 MM/H (ref 0–20)
GFR SERPL CREATININE-BSD FRML MDRD: 67 ML/MIN/1.7M2
GLUCOSE SERPL-MCNC: 102 MG/DL (ref 70–99)
HCT VFR BLD AUTO: 45.5 % (ref 40–53)
HGB BLD-MCNC: 15 G/DL (ref 13.3–17.7)
MCH RBC QN AUTO: 31.1 PG (ref 26.5–33)
MCHC RBC AUTO-ENTMCNC: 33 G/DL (ref 31.5–36.5)
MCV RBC AUTO: 94 FL (ref 78–100)
PLATELET # BLD AUTO: 230 10E9/L (ref 150–450)
POTASSIUM SERPL-SCNC: 4.4 MMOL/L (ref 3.4–5.3)
PROT SERPL-MCNC: 6.8 G/DL (ref 6.8–8.8)
RBC # BLD AUTO: 4.83 10E12/L (ref 4.4–5.9)
SODIUM SERPL-SCNC: 142 MMOL/L (ref 133–144)
URATE SERPL-MCNC: 3.3 MG/DL (ref 3.5–7.2)
WBC # BLD AUTO: 11.1 10E9/L (ref 4–11)

## 2018-05-02 PROCEDURE — 99214 OFFICE O/P EST MOD 30 MIN: CPT | Performed by: FAMILY MEDICINE

## 2018-05-02 PROCEDURE — 85027 COMPLETE CBC AUTOMATED: CPT | Performed by: FAMILY MEDICINE

## 2018-05-02 PROCEDURE — 84550 ASSAY OF BLOOD/URIC ACID: CPT | Performed by: FAMILY MEDICINE

## 2018-05-02 PROCEDURE — 80053 COMPREHEN METABOLIC PANEL: CPT | Performed by: FAMILY MEDICINE

## 2018-05-02 PROCEDURE — 86140 C-REACTIVE PROTEIN: CPT | Performed by: FAMILY MEDICINE

## 2018-05-02 PROCEDURE — 85652 RBC SED RATE AUTOMATED: CPT | Performed by: FAMILY MEDICINE

## 2018-05-02 PROCEDURE — 36415 COLL VENOUS BLD VENIPUNCTURE: CPT | Performed by: FAMILY MEDICINE

## 2018-05-02 RX ORDER — PREDNISONE 5 MG/1
TABLET ORAL
Qty: 21 TABLET | Refills: 0 | Status: SHIPPED | OUTPATIENT
Start: 2018-05-02 | End: 2018-07-03

## 2018-05-02 ASSESSMENT — ANXIETY QUESTIONNAIRES
6. BECOMING EASILY ANNOYED OR IRRITABLE: NOT AT ALL
2. NOT BEING ABLE TO STOP OR CONTROL WORRYING: NOT AT ALL
1. FEELING NERVOUS, ANXIOUS, OR ON EDGE: NOT AT ALL
4. TROUBLE RELAXING: SEVERAL DAYS
5. BEING SO RESTLESS THAT IT IS HARD TO SIT STILL: SEVERAL DAYS
GAD7 TOTAL SCORE: 2
3. WORRYING TOO MUCH ABOUT DIFFERENT THINGS: NOT AT ALL
GAD7 TOTAL SCORE: 2
7. FEELING AFRAID AS IF SOMETHING AWFUL MIGHT HAPPEN: NOT AT ALL
7. FEELING AFRAID AS IF SOMETHING AWFUL MIGHT HAPPEN: NOT AT ALL
GAD7 TOTAL SCORE: 2

## 2018-05-02 ASSESSMENT — PAIN SCALES - GENERAL: PAINLEVEL: MILD PAIN (3)

## 2018-05-02 ASSESSMENT — PATIENT HEALTH QUESTIONNAIRE - PHQ9
SUM OF ALL RESPONSES TO PHQ QUESTIONS 1-9: 0
SUM OF ALL RESPONSES TO PHQ QUESTIONS 1-9: 0

## 2018-05-02 NOTE — LETTER
My COPD Action Plan     Name: Bucky Edgar    YOB: 1955   Date: 5/2/2018    My doctor: Percy Deshpande MD   My clinic: [address], [address]  [place] [id number] [phone]  My Controller Medicine: None   Dose:      My Rescue Medicine: Albuterol (Proair/Ventolin/Proventil) inhaler   Dose: Two puffs every 4 hours as needed for breathing     My Flare Up Medicine: Prednisone   Dose: As needed FEV-1 (no units)   Date Value   11/27/2013 2.89     FEV1/FVC (no units)   Date Value   11/27/2013 60%      My COPD Severity: Moderate = FeV1 < 79% -50%      Use of Oxygen: Oxygen Not Prescribed      Make sure you've had your pneumonia   vaccines.          GREEN ZONE       Doing well today      Usual level of activity and exercise    Usual amount of cough and mucus    No shortness of breath    Usual level of health (thinking clearly, sleeping well, feel like eating) Actions:      Take daily medicines    Use oxygen as prescribed    Follow regular exercise and diet plan    Avoid cigarette smoke and other irritants that harm the lungs           YELLOW ZONE          Having a bad day or flare up      Short of breath more than usual    A lot more sputum (mucus) than usual    Sputum looks yellow, green, tan, brown or bloody    More coughing or wheezing    Fever or chills    Less energy; trouble completing activities    Trouble thinking or focusing    Using quick relief inhaler or nebulizer more often    Poor sleep; symptoms wake me up    Do not feel like eating Actions:      Get plenty of rest    Take daily medicines    Use quick relief inhaler every 4 hours    If you use oxygen, call you doctor to see if you should adjust your oxygen    Do breathing exercises or other things to help you relax    Let a loved one, friend or neighbor know you are feeling worse    Call your care team if you have 2 or more symptoms.  Start taking steroids or antibiotics if directed by your care  team           RED ZONE       Need medical care now      Severe shortness of breath (feel you can't breathe)    Fever, chills    Not enough breath to do any activity    Trouble coughing up mucus, walking or talking    Blood in mucus    Frequent coughing   Rescue medicines are not working    Not able to sleep because of breathing    Feel confused or drowsy    Chest pain    Actions:      Call your health care team.  If you cannot reach your care team, call 911 or go to the emergency room.        Annual Reminders:  Meet with Care Team, Flu Shot every Fall  Pharmacy:    [facility] [place], [place] - [address] [facility] [id number] [place] [address] [id number] OF  [address]

## 2018-05-02 NOTE — PATIENT INSTRUCTIONS
These are new changes to your current plan of care based on today's visit:    Medications stopped    Medications to be started    Change dose of this medication Tapering off Prednisone as discussed--10 mg daily till your current supply is finished and then 5 mg daily for two weeks and then 5 mg every other day for two weeks   New treatments        Follow up appointments:    1.  FOLLOW UP WITH YOUR PRIMARY CARE PROVIDER: 3 month(s) for follow up     2. FOLLOW UP WITH SPECIALIST:     3. FOLLOW UP WITH NURSE VISIT:     4. IMAGING STUDIES TO BE SCHEDULED:     5. PROCEDURES TO BE SCHEDULED: Complete the stool screening for colon cancer and eventually a colonoscopy    Percy Deshpande MD

## 2018-05-02 NOTE — LETTER
May 2, 2018      Bucky Edgar  [address] [place] [address]        Dear Robyn Guevara is a copy of the laboratory studies from today with results as follows:    1.  With the increase in Allopurinol, the uric acid level is now at 3.3, will under the goal of 6.0.  This should  resolve future gout attacks since the uric acid will stay in solution in the joints.  The other lab tests appear to indicate that you are tolerating the increased dose without difficulty.  2.  Liver function and kidney function are normal, not being affected by the increased dose of allopurinol.  3.  The inflammatory markers previously noted (CRP and sedimentation rate) are now essentially approaching normal.  This would indicate the joint inflammation is essentially resolved.  4.  The CBC shows only a slight elevation of white blood cell count at 11.1.  This is most likely due to reduction in the dose of Prednisone as well as resolution of the gouty arthritis.    No changes are suggested in your current plans and medication management.  Follow-up in 3 months as planned. Please call with any questions or concerns and thank you for your confidence.      Sincerely,      Percy Deshpande MD

## 2018-05-02 NOTE — MR AVS SNAPSHOT
After Visit Summary   5/2/2018    Bucky Edgar    MRN: [id number]           Patient Information     Date Of Birth          1955        Visit Information        Provider Department      5/2/2018 10:20 AM Percy Deshpande MD [facility]        Today's Diagnoses     Hyperuricemia    -  1    HTN, goal below 140/90        Screen for colon cancer        Chronic obstructive pulmonary disease, unspecified COPD type (H)        Acute gouty arthritis          Care Instructions    These are new changes to your current plan of care based on today's visit:    Medications stopped    Medications to be started    Change dose of this medication Tapering off Prednisone as discussed--10 mg daily till your current supply is finished and then 5 mg daily for two weeks and then 5 mg every other day for two weeks   New treatments        Follow up appointments:    1.  FOLLOW UP WITH YOUR PRIMARY CARE PROVIDER: 3 month(s) for follow up     2. FOLLOW UP WITH SPECIALIST:     3. FOLLOW UP WITH NURSE VISIT:     4. IMAGING STUDIES TO BE SCHEDULED:     5. PROCEDURES TO BE SCHEDULED: Complete the stool screening for colon cancer and eventually a colonoscopy    Percy Deshpande MD                Follow-ups after your visit        Follow-up notes from your care team     Return in about 3 months (around 8/2/2018).      Your next 10 appointments already scheduled     Jul 03, 2018  8:40 AM CDT   New Visit with Coy Bolaños MD   [facility]    [address] [place] [id number] [phone]              Future tests that were ordered for you today     Open Future Orders        Priority Expected Expires Ordered    Fecal colorectal cancer screen (FIT) Routine 5/18/2018 7/20/2018 5/2/2018            Who to contact     If you have questions or need follow up information about today's clinic visit or your schedule please contact [facility]  "[facility] directly at [phone].  Normal or non-critical lab and imaging results will be communicated to you by MyChart, letter or phone within 4 business days after the clinic has received the results. If you do not hear from us within 7 days, please contact the clinic through Sitrionhart or phone. If you have a critical or abnormal lab result, we will notify you by phone as soon as possible.  Submit refill requests through NextDocs or call your pharmacy and they will forward the refill request to us. Please allow 3 business days for your refill to be completed.          Additional Information About Your Visit        SitrionharVMob Information     NextDocs lets you send messages to your doctor, view your test results, renew your prescriptions, schedule appointments and more. To sign up, go to [email or web address] . Click on \"Log in\" on the left side of the screen, which will take you to the Welcome page. Then click on \"Sign up Now\" on the right side of the page.     You will be asked to enter the access code listed below, as well as some personal information. Please follow the directions to create your username and password.     Your access code is: [email or web address]  Expires: 2018  9:11 AM     Your access code will  in 90 days. If you need help or a new code, please call your [facility] clinic or [phone].        Care EveryWhere ID     This is your Care EveryWhere ID. This could be used by other organizations to access your [facility] medical records  [id number]        Your Vitals Were     Pulse Temperature Respirations BMI (Body Mass Index)          76 98.9  F (37.2  C) (Temporal) 14 33.97 kg/m2         Blood Pressure from Last 3 Encounters:   18 136/86   18 132/84   18 120/86    Weight from Last 3 Encounters:   18 230 lb (104.3 kg)   18 225 lb (102.1 kg)   18 220 lb (99.8 kg)              We Performed the Following     CBC with platelets     Comprehensive metabolic panel     " COPD ACTION PLAN     CRP, inflammation     ESR: Erythrocyte sedimentation rate     Uric acid          Today's Medication Changes          These changes are accurate as of 5/2/18 10:38 AM.  If you have any questions, ask your nurse or doctor.               These medicines have changed or have updated prescriptions.        Dose/Directions    * predniSONE 10 MG tablet   Commonly known as:  DELTASONE   This may have changed:  Another medication with the same name was added. Make sure you understand how and when to take each.   Used for:  Acute idiopathic gout of left wrist   Changed by:  Percy Deshpande MD        Two tablets daily for one week then one daily to control gout   Quantity:  60 tablet   Refills:  0       * predniSONE 5 MG tablet   Commonly known as:  DELTASONE   This may have changed:  You were already taking a medication with the same name, and this prescription was added. Make sure you understand how and when to take each.   Used for:  Acute gouty arthritis   Changed by:  Percy Deshpande MD        One tablet daily for two weeks and then one every other day for two weeks   Quantity:  21 tablet   Refills:  0       * Notice:  This list has 2 medication(s) that are the same as other medications prescribed for you. Read the directions carefully, and ask your doctor or other care provider to review them with you.      Stop taking these medicines if you haven't already. Please contact your care team if you have questions.     oxyCODONE IR 5 MG tablet   Commonly known as:  ROXICODONE   Stopped by:  Percy Deshpande MD                Where to get your medicines      These medications were sent to [facility] [id number]  [place] - [address]  [address] [place] [address]    Hours:  test fax sent successfully 1/20/04  kr Phone:  [phone]     predniSONE 5 MG tablet                Primary Care Provider Office Phone # Fax #    Percy Deshpande  [phone]       [address] [facility] [address]        Equal Access to Services     CHRISTOPHER SEAY : Hadii aad ku hadleticia Mathew, wachrisda luqmari, qaybta kaalmada driss, chelsy rachidin hayaan duncanruthy mejias laCaydenmauricio gates. So [facility] [phone].    ATENCIÓN: Si habla español, tiene a garcia disposición servicios gratuitos de asistencia lingüística. Llame al [phone].    We comply with applicable federal civil rights laws and [place] laws. We do not discriminate on the basis of race, color, national origin, age, disability, sex, sexual orientation, or gender identity.            Thank you!     Thank you for choosing [facility]  for your care. Our goal is always to provide you with excellent care. Hearing back from our patients is one way we can continue to improve our services. Please take a few minutes to complete the written survey that you may receive in the mail after your visit with us. Thank you!             Your Updated Medication List - Protect others around you: Learn how to safely use, store and throw away your medicines at www.disposemymeds.org.          This list is accurate as of 5/2/18 10:38 AM.  Always use your most recent med list.                   Brand Name Dispense Instructions for use Diagnosis    acetaminophen 325 MG tablet    TYLENOL     Take 650 mg by mouth every 6 hours        albuterol 108 (90 Base) MCG/ACT Inhaler    PROAIR HFA/PROVENTIL HFA/VENTOLIN HFA    3 Inhaler    Inhale 2 puffs into the lungs every 6 hours as needed for shortness of breath / dyspnea    Chronic bronchitis with COPD (chronic obstructive pulmonary disease) (H)       allopurinol 300 MG tablet    ZYLOPRIM    60 tablet    Take 1 tablet (300 mg) by mouth 2 times daily    Hyperuricemia       aspirin 81 MG tablet     30 tablet    Take 1 tablet (81 mg) by mouth daily    HTN, goal below 140/90       carvedilol 25 MG tablet    COREG    180 tablet    TAKE ONE TABLET BY MOUTH TWICE DAILY WITH MEALS     HTN, goal below 140/90       lisinopril 40 MG tablet    PRINIVIL/ZESTRIL    90 tablet    TAKE ONE TABLET BY MOUTH DAILY    HTN, goal below 140/90       naproxen 500 MG tablet    NAPROSYN    60 tablet    Take 1 tablet (500 mg) by mouth 2 times daily (with meals)    Acute gouty arthritis       NICORETTE 2 MG lozenge   Generic drug:  nicotine polacrilex      Place 2 mg inside cheek every hour as needed for smoking cessation        NICOTINE STEP 1 21 MG/24HR 24 hr patch   Generic drug:  nicotine      ROBBY 1 PATCH TO SKIN ONCE D X 42 DAYS        * predniSONE 10 MG tablet    DELTASONE    60 tablet    Two tablets daily for one week then one daily to control gout    Acute idiopathic gout of left wrist       * predniSONE 5 MG tablet    DELTASONE    21 tablet    One tablet daily for two weeks and then one every other day for two weeks    Acute gouty arthritis       sennosides 8.6 MG tablet    SENOKOT     TK 1 T PO BID        * Notice:  This list has 2 medication(s) that are the same as other medications prescribed for you. Read the directions carefully, and ask your doctor or other care provider to review them with you.

## 2018-05-03 ASSESSMENT — ANXIETY QUESTIONNAIRES: GAD7 TOTAL SCORE: 2

## 2018-05-03 ASSESSMENT — PATIENT HEALTH QUESTIONNAIRE - PHQ9: SUM OF ALL RESPONSES TO PHQ QUESTIONS 1-9: 0

## 2018-06-18 ENCOUNTER — TELEPHONE (OUTPATIENT)
Dept: FAMILY MEDICINE | Facility: CLINIC | Age: 63
End: 2018-06-18

## 2018-06-18 DIAGNOSIS — I10 HTN, GOAL BELOW 140/90: ICD-10-CM

## 2018-06-18 RX ORDER — LISINOPRIL 40 MG/1
40 TABLET ORAL DAILY
Qty: 90 TABLET | Refills: 1 | Status: SHIPPED | OUTPATIENT
Start: 2018-06-18 | End: 2018-11-01

## 2018-06-18 NOTE — TELEPHONE ENCOUNTER
Reason for Call:  Medication or medication refill:    Do you use a [facility]?  Name of the pharmacy and phone number for the current request:  [facility] - [phone]    Name of the medication requested: Lisinopril    Other request: Patient is out, please refill    Can we leave a detailed message on this number? YES    Phone number patient can be reached at: Home number on file [phone] (home)    Best Time: any    Call taken on 6/18/2018 at 8:43 AM by Radha Arellano

## 2018-07-03 ENCOUNTER — OFFICE VISIT (OUTPATIENT)
Dept: RHEUMATOLOGY | Facility: CLINIC | Age: 63
End: 2018-07-03
Attending: FAMILY MEDICINE
Payer: COMMERCIAL

## 2018-07-03 VITALS
OXYGEN SATURATION: 96 % | HEIGHT: 69 IN | HEART RATE: 66 BPM | BODY MASS INDEX: 34.57 KG/M2 | SYSTOLIC BLOOD PRESSURE: 198 MMHG | TEMPERATURE: 98.4 F | WEIGHT: 233.4 LBS | RESPIRATION RATE: 18 BRPM | DIASTOLIC BLOOD PRESSURE: 118 MMHG

## 2018-07-03 DIAGNOSIS — M1A.09X0 IDIOPATHIC CHRONIC GOUT OF MULTIPLE SITES WITHOUT TOPHUS: Primary | ICD-10-CM

## 2018-07-03 PROCEDURE — 99203 OFFICE O/P NEW LOW 30 MIN: CPT | Performed by: INTERNAL MEDICINE

## 2018-07-03 RX ORDER — ALLOPURINOL 100 MG/1
TABLET ORAL
Qty: 30 TABLET | Refills: 4 | Status: SHIPPED | OUTPATIENT
Start: 2018-07-03 | End: 2018-11-01

## 2018-07-03 RX ORDER — ALLOPURINOL 300 MG/1
TABLET ORAL
Qty: 60 TABLET | Refills: 4 | Status: SHIPPED | OUTPATIENT
Start: 2018-07-03 | End: 2018-11-13

## 2018-07-03 RX ORDER — PREDNISONE 20 MG/1
TABLET ORAL
Qty: 21 TABLET | Refills: 0 | Status: SHIPPED | OUTPATIENT
Start: 2018-07-03 | End: 2018-11-01

## 2018-07-03 NOTE — MR AVS SNAPSHOT
After Visit Summary   7/3/2018    Bucky Edgar    MRN: [id number]           Patient Information     Date Of Birth          1955        Visit Information        Provider Department      7/3/2018 8:40 AM Coy Bolaños MD [facility] [address] [facility]        Today's Diagnoses     Idiopathic chronic gout of multiple sites without tophus    -  1      Care Instructions    Please check with your insurance - will they cover colchicine as Mitigare or Colcrys?     Rheumatology    Dr. Coy [place] [facility]   (Monday)  [address]  [place] [address]       [facility]   (Tuesday)  [address]  [place] [address]    [facility]   (Wed., Thurs., and Friday)  [address] [facility] [address] [facility] [place] [address]    Phone number: [phone]  Thank you for choosing [facility].  Saba Patricia CMA            Follow-ups after your visit        Your next 10 appointments already scheduled     Aug 07, 2018  9:30 AM CDT   LAB with RG LAB   [facility] ([facility])    [address] [place] [address], [address]  [facility] [id number] [phone]           Please do not eat 10-12 hours before your appointment if you are coming in fasting for labs on lipids, cholesterol, or glucose (sugar). This does not apply to pregnant women. Water, hot tea and black coffee (with nothing added) are okay. Do not drink other fluids, diet soda or chew gum.            Nov 13, 2018  9:00 AM CST   Return Visit with Coy Bolaños MD   [facility] [address] ([facility])    [address] [facility] [id number] [phone]              Future tests that were ordered for you today     Open Future Orders        Priority Expected Expires Ordered    Uric acid Routine 7/31/2018 8/21/2018 7/3/2018    CBC with platelets differential Routine 7/31/2018 8/21/2018 7/3/2018    Creatinine Routine 7/31/2018 8/21/2018 7/3/2018    CRP inflammation  "Routine 7/31/2018 8/21/2018 7/3/2018    Erythrocyte sedimentation rate auto Routine 7/31/2018 8/21/2018 7/3/2018    Hepatic panel Routine 7/31/2018 8/21/2018 7/3/2018            Who to contact     If you have questions or need follow up information about today's clinic visit or your schedule please contact [facility] directly at [phone].  Normal or non-critical lab and imaging results will be communicated to you by MyChart, letter or phone within 4 business days after the clinic has received the results. If you do not hear from us within 7 days, please contact the clinic through [employer] or phone. If you have a critical or abnormal lab result, we will notify you by phone as soon as possible.  Submit refill requests through [employer] or call your pharmacy and they will forward the refill request to us. Please allow 3 business days for your refill to be completed.          Additional Information About Your Visit        Care EveryWhere ID     This is your Care EveryWhere ID. This could be used by other organizations to access your [facility] medical records  [id number]        Your Vitals Were     Pulse Temperature Respirations Height Pulse Oximetry BMI (Body Mass Index)    66 98.4  F (36.9  C) (Oral) 18 1.753 m (5' 9\") 96% 34.47 kg/m2       Blood Pressure from Last 3 Encounters:   05/02/18 136/86   04/04/18 132/84   03/23/18 120/86    Weight from Last 3 Encounters:   07/03/18 105.9 kg (233 lb 6.4 oz)   05/02/18 104.3 kg (230 lb)   04/04/18 102.1 kg (225 lb)                 Today's Medication Changes          These changes are accurate as of 7/3/18  9:24 AM.  If you have any questions, ask your nurse or doctor.               These medicines have changed or have updated prescriptions.        Dose/Directions    * allopurinol 300 MG tablet   Commonly known as:  ZYLOPRIM   This may have changed:    - how much to take  - how to take this  - when to take this  - additional instructions   Used for:  " Idiopathic chronic gout of multiple sites without tophus   Changed by:  Coy Bolaños MD        Allopurinol 700mg daily (two 300mg tablets + one 100mg tablet)   Quantity:  60 tablet   Refills:  4       * allopurinol 100 MG tablet   Commonly known as:  ZYLOPRIM   This may have changed:  You were already taking a medication with the same name, and this prescription was added. Make sure you understand how and when to take each.   Used for:  Idiopathic chronic gout of multiple sites without tophus   Changed by:  Coy Bolaños MD        Allopurinol 700mg daily (two 300mg tablets + one 100mg tablet)   Quantity:  30 tablet   Refills:  4       predniSONE 20 MG tablet   Commonly known as:  DELTASONE   This may have changed:    - medication strength  - additional instructions  - Another medication with the same name was removed. Continue taking this medication, and follow the directions you see here.   Used for:  Idiopathic chronic gout of multiple sites without tophus   Changed by:  Coy Bolaños MD        Prednisone 60mg daily x2days, then 40mg daily x5days, then 20mg daily x5days, then stop.   Quantity:  21 tablet   Refills:  0       * Notice:  This list has 2 medication(s) that are the same as other medications prescribed for you. Read the directions carefully, and ask your doctor or other care provider to review them with you.      Stop taking these medicines if you haven't already. Please contact your care team if you have questions.     naproxen 500 MG tablet   Commonly known as:  NAPROSYN   Stopped by:  Coy Bolaños MD                Where to get your medicines      These medications were sent to [facility] [id number]  [place] - [address] AT SEC of [facility] [address]  [address], [place] [address]    Hours:  test fax sent successfully 1/20/04   Phone:  [phone]     allopurinol 100 MG tablet    allopurinol 300 MG tablet    predniSONE 20 MG tablet                Primary Care  Provider Office Phone # Fax #    Percy Deshpande [phone] [address] [facility] [address]        Equal Access to Services     MICHAMITRA GEENA : Hadluciana lynn ku bradfordo Sohung, waaxda luqadaha, qaybta kaalmada adeleilada, chelsy davisday kody. So [facility] [phone].    ATENCIÓN: Si habla español, tiene a garcia disposición servicios gratuitos de asistencia lingüística. Nicoleame al [phone].    We comply with applicable federal civil rights laws and [place] laws. We do not discriminate on the basis of race, color, national origin, age, disability, sex, sexual orientation, or gender identity.            Thank you!     Thank you for choosing [facility]  for your care. Our goal is always to provide you with excellent care. Hearing back from our patients is one way we can continue to improve our services. Please take a few minutes to complete the written survey that you may receive in the mail after your visit with us. Thank you!             Your Updated Medication List - Protect others around you: Learn how to safely use, store and throw away your medicines at www.disposemymeds.org.          This list is accurate as of 7/3/18  9:24 AM.  Always use your most recent med list.                   Brand Name Dispense Instructions for use Diagnosis    acetaminophen 325 MG tablet    TYLENOL     Take 650 mg by mouth every 6 hours        albuterol 108 (90 Base) MCG/ACT Inhaler    PROAIR HFA/PROVENTIL HFA/VENTOLIN HFA    3 Inhaler    Inhale 2 puffs into the lungs every 6 hours as needed for shortness of breath / dyspnea    Chronic bronchitis with COPD (chronic obstructive pulmonary disease) (H)       * allopurinol 300 MG tablet    ZYLOPRIM    60 tablet    Allopurinol 700mg daily (two 300mg tablets + one 100mg tablet)    Idiopathic chronic gout of multiple sites without tophus       * allopurinol 100 MG tablet    ZYLOPRIM    30 tablet    Allopurinol 700mg daily  (two 300mg tablets + one 100mg tablet)    Idiopathic chronic gout of multiple sites without tophus       aspirin 81 MG tablet     30 tablet    Take 1 tablet (81 mg) by mouth daily    HTN, goal below 140/90       carvedilol 25 MG tablet    COREG    180 tablet    TAKE ONE TABLET BY MOUTH TWICE DAILY WITH MEALS    HTN, goal below 140/90       lisinopril 40 MG tablet    PRINIVIL/ZESTRIL    90 tablet    Take 1 tablet (40 mg) by mouth daily    HTN, goal below 140/90       NICORETTE 2 MG lozenge   Generic drug:  nicotine polacrilex      Place 2 mg inside cheek every hour as needed for smoking cessation        NICOTINE STEP 1 21 MG/24HR 24 hr patch   Generic drug:  nicotine      ROBBY 1 PATCH TO SKIN ONCE D X 42 DAYS        predniSONE 20 MG tablet    DELTASONE    21 tablet    Prednisone 60mg daily x2days, then 40mg daily x5days, then 20mg daily x5days, then stop.    Idiopathic chronic gout of multiple sites without tophus       sennosides 8.6 MG tablet    SENOKOT     TK 1 T PO BID        * Notice:  This list has 2 medication(s) that are the same as other medications prescribed for you. Read the directions carefully, and ask your doctor or other care provider to review them with you.

## 2018-07-03 NOTE — NURSING NOTE
Patient to follow up with Primary Care provider regarding elevated blood pressure.    Blood pressure rechecked after visit by KINA Urias at [facility]  218/124 five minutes later Bridgett went back in and took it again 182/22.   Patient stated he did not have any chest pain, dizzy, headache or nausea. Patient stated his blood pressure is always high when he goes to doctor appointments. Patient will follow up with his primary provider.    Saba Patricia CMA  7/3/2018 9:47 AM

## 2018-07-03 NOTE — PROGRESS NOTES
Rheumatology Clinic Visit      Bucky Edgar MRN# [id number]   YOB: 1955 Age: 62 year old      Date of visit: 7/03/18   Referring provider: Dr. Percy Deshpande  PCP: Dr. Percy Deshpande    Chief Complaint   Patient presents with:  Consult: Patient has gout, in his left wrist and right ankle.      Assessment and Plan     1.  Gout: Started having gout flares in approximately early 2017, acute onset and resolved with prednisone; joints involved include his left ankle, left first MTP, and left wrist.  Colchicine was cost prohibitive.  We reviewed his diagnosis and treatment options.  Currently on allopurinol 600 mg daily and has flared on this dose.  Advised that his allopurinol be increased to 700 mg daily and given that he has persistent left wrist discomfort that could be due to chronic gouty deposition will treat with prednisone taper that will also be prophylactic for mobilization flare during the increased allopurinol dose.  He was asked to check with his insurance about cost of mitigare versus colcrys - is one of these affordable?  - Increase allopurinol to 700 mg daily (two 300mg tablets + one 100mg tablet)  - Prednisone 60mg daily x2days, then 40mg daily x5days, then 20mg daily x5days, then stop.  - Labs in 4-6 weeks: CBC, Cr, Hepatic Panel, Uric Acid    # Prednisone Risks and Benefits: The risks and benefits of prednisone were discussed in detail and the patient verbalized understanding.  The risks discussed include, but are not limited to, weight gain, fluid retention, impaired wound healing, hyperglycemia, adrenal suppression, GI upset, peptic ulcer, hepatotoxicity, aseptic necrosis of the femoral and humeral heads, osteoporosis, myopathy, tendon rupture (particularly Achilles tendon), ocular changes including an increased intraocular pressure.  I encouraged reviewing the package insert and asking any questions about the medication.      # Allopurinol Risks and Benefits.  The risks and  benefits of allopurinol were discussed in detail and the patient verbalized understanding.  The risks discussed include, but are not limited to, the risk for hypersensitivity, anaphylaxis, anaphylactoid reactions,skin rash, precipitating gout flares, diarrhea, nausea, elevated liver enzymes, and bone marrow suppression.    2. Hypertersion: blood pressure is much too high at this time; I instructed that he be seen by his PCP or urgent care today. Patient to follow up with Primary Care provider regarding elevated blood pressure.     3. BMI 34.47, obesity:  Encouraged weight loss and discussed the health benefits.    Mr. Edgar verbalized agreement with and understanding of the rational for the diagnosis and treatment plan.  All questions were answered to best of my ability and the patient's satisfaction. Mr. Edgar was advised to contact the clinic with any questions that may arise after the clinic visit.      Thank you for involving me in the care of the patient    Return to clinic: 3 months      HPI   Bucky Edgar is a 62 year old male with a past medical history significant for COPD, dyslipidemia, vitamin D deficiency, history of rib fractures, DVT on anticoagulation, hypertension, who is seen in consultation at the request of Dr. Percy Deshpande for evaluation of gout.    Today, Mr. Edgar reports that for approximately 1.5 years he has been having acute onset gout flares affecting his left ankle, left first MTP, and left wrist pain, typically present when he wakes up in the morning, a sensation that is coming on before he goes to bed, and resolves with prednisone after a few days.  Colchicine was prescribed in the past but it was unaffordable.  For the last 2 uric acid values, and has been during a flare.  Flares are severe enough that he cannot move the joint that is affected.  Between flares, he will occasionally have left wrist pain but no stiffness.  Left toe and ankle are asymptomatic between  flares.    Denies fevers, chills, nausea, vomiting, constipation, diarrhea. No abdominal pain. No chest pain/pressure, palpitations, or shortness of breath. No LE swelling.  No rash.   No history of inflammatory eye disease or inflammatory bowel disease.      Tobacco: Quit in 2017  EtOH: 12 pack of beer every 2 weeks  Drugs: None    ROS   GEN: No fevers, chills, or night sweats  SKIN: No itching, rashes, sores  HEENT: No oral or nasal ulcers.  CV: No chest pain, pressure, palpitations, or dyspnea on exertion.  PULM: No SOB, wheeze, cough.  GI: No nausea, vomiting, constipation, diarrhea. No blood in stool. No abdominal pain.  : No blood in urine.  MSK: See HPI.  NEURO: No numbness, tingling, or weakness.  EXT: No LE swelling  PSYCH: Negative    Active Problem List     Patient Active Problem List   Diagnosis     Displacement of lumbar intervertebral disc without myelopathy     Tobacco use disorder     CARDIOVASCULAR SCREENING; LDL GOAL LESS THAN 130     HTN, goal below 140/90     Advanced directives, counseling/discussion     COPD (chronic obstructive pulmonary disease) (H)     Impaired fasting glucose     Hypertriglyceridemia     Vitamin D deficiency     Chronic bronchitis with COPD (chronic obstructive pulmonary disease) (H)     Closed fracture of multiple ribs of left side with routine healing, subsequent encounter     Chronic pain due to trauma     Long-term (current) use of anticoagulants [Z79.01]     Deep vein thrombosis (DVT) (H) [I82.409]     Elevated serum creatinine     Past Medical History     Past Medical History:   Diagnosis Date     COPD (chronic obstructive pulmonary disease) (H)      Displacement of lumbar intervertebral disc without myelopathy 1995, 2002     HTN, goal below 140/90      Past Surgical History     Past Surgical History:   Procedure Laterality Date     C DECOMPRESS SPINAL CORD,1 SEG  1995, 4/2002    leftL4-L5, 2nd right L4-L5     C SPINE FUSION,ANTER,3 SGMTS  2/9/2004    ant and post  fusion L4-S1     HC REPAIR ROTATOR CUFF,CHRONIC  1989     Allergy     Allergies   Allergen Reactions     Chlorthalidone Other (See Comments)     Excessive lowering of blood pressure     Amlodipine Other (See Comments)     Intractable headache with use of medication as well as noting a small tremor of the upper extremities     Current Medication List     Current Outpatient Prescriptions   Medication Sig     acetaminophen (TYLENOL) 325 MG tablet Take 650 mg by mouth every 6 hours     albuterol (PROAIR HFA, PROVENTIL HFA, VENTOLIN HFA) 108 (90 BASE) MCG/ACT inhaler Inhale 2 puffs into the lungs every 6 hours as needed for shortness of breath / dyspnea     allopurinol (ZYLOPRIM) 300 MG tablet Take 1 tablet (300 mg) by mouth 2 times daily     aspirin 81 MG tablet Take 1 tablet (81 mg) by mouth daily     carvedilol (COREG) 25 MG tablet TAKE ONE TABLET BY MOUTH TWICE DAILY WITH MEALS     lisinopril (PRINIVIL/ZESTRIL) 40 MG tablet Take 1 tablet (40 mg) by mouth daily     nicotine polacrilex (NICORETTE) 2 MG lozenge Place 2 mg inside cheek every hour as needed for smoking cessation     naproxen (NAPROSYN) 500 MG tablet Take 1 tablet (500 mg) by mouth 2 times daily (with meals) (Patient not taking: Reported on 7/3/2018)     NICOTINE STEP 1 21 MG/24HR 24 hr patch ROBBY 1 PATCH TO SKIN ONCE D X 42 DAYS     predniSONE (DELTASONE) 10 MG tablet Two tablets daily for one week then one daily to control gout (Patient not taking: Reported on 7/3/2018)     predniSONE (DELTASONE) 5 MG tablet One tablet daily for two weeks and then one every other day for two weeks (Patient not taking: Reported on 7/3/2018)     sennosides (SENOKOT) 8.6 MG tablet TK 1 T PO BID     No current facility-administered medications for this visit.          Social History   See HPI    Family History     Family History   Problem Relation Age of Onset     Family History Negative Other      Diabetes No family hx of      Coronary Artery Disease No family hx of       "Hypertension No family hx of      Hyperlipidemia No family hx of      Breast Cancer No family hx of      Prostate Cancer No family hx of      Anxiety Disorder No family hx of      Substance Abuse No family hx of      Asthma No family hx of      Thyroid Disease No family hx of      Unknown/Adopted No family hx of      Genetic Disorder No family hx of      Osteoperosis No family hx of      Anesthesia Reaction No family hx of      Mental Illness No family hx of      Depression No family hx of      Other Cancer No family hx of      Colon Cancer No family hx of      Cerebrovascular Disease No family hx of      Obesity No family hx of        Physical Exam     Temp Readings from Last 3 Encounters:   07/03/18 98.4  F (36.9  C) (Oral)   05/02/18 98.9  F (37.2  C) (Temporal)   04/04/18 98.7  F (37.1  C) (Temporal)     BP Readings from Last 5 Encounters:   05/02/18 136/86   04/04/18 132/84   03/23/18 120/86   02/28/18 122/84   02/14/18 115/80     Pulse Readings from Last 1 Encounters:   07/03/18 66     Resp Readings from Last 1 Encounters:   07/03/18 18     Estimated body mass index is 34.47 kg/(m^2) as calculated from the following:    Height as of this encounter: 1.753 m (5' 9\").    Weight as of this encounter: 105.9 kg (233 lb 6.4 oz).    GEN: NAD; overweight  HEENT: MMM. No oral lesions.  Anicteric, noninjected sclera  CV: S1, S2. RRR. No m/r/g.  PULM: CTA bilaterally. No w/c.  ABD: +BS.  MSK: MCPs, PIPs, wrists, elbows, shoulders, knees, and ankles without swelling or tenderness to palpation.  Negative MCP and MTP squeeze.   Hips nontender to palpation.    NEURO: UE and LE strengths 5/5 and equal bilaterally.   SKIN: No rash  EXT: No LE edema  PSYCH: Alert. Appropriate.    Labs / Imaging (select studies)   CBC  Recent Labs   Lab Test  05/02/18   1039  04/04/18   1023  03/23/18   0918   05/03/16   1155   WBC  11.1*  12.4*  13.0*   < >  8.2   RBC  4.83  4.82  4.95   < >  5.49   HGB  15.0  15.0  15.5   < >  17.7   HCT  45.5  " 46.1  46.8   < >  52.9   MCV  94  96  95   < >  96   RDW  14.5  13.9  13.6   < >  13.4   PLT  230  228  307   < >  200   MCH  31.1  31.1  31.3   < >  32.2   MCHC  33.0  32.5  33.1   < >  33.5   NEUTROPHIL   --   73.1  72.5   --   67.3   LYMPH   --   15.2  13.3   --   19.2   MONOCYTE   --   8.4  12.4   --   10.1   EOSINOPHIL   --   3.1  1.2   --   3.0   BASOPHIL   --   0.2  0.6   --   0.4   ANEU   --   9.0*  9.4*   --   5.5   ALYM   --   1.9  1.7   --   1.6   LIZZ   --   1.0  1.6*   --   0.8   AEOS   --   0.4  0.2   --   0.3   ABAS   --   0.0  0.1   --   0.0    < > = values in this interval not displayed.     CMP  Recent Labs   Lab Test  05/02/18   1039  03/23/18   0918 02/28/18   1047  11/16/17   1036   NA  142  140  139  139   POTASSIUM  4.4  3.9  5.0  3.9   CHLORIDE  111*  108  106  108   CO2  27  21  27  23   ANIONGAP  4  11  6  8   GLC  102*  106*  107*  111*   BUN  15  10  17  14   CR  1.11  1.06  1.27*  0.96   GFRESTIMATED  67  71  58*  80   GFRESTBLACK  82  86  70  >90   JUSTIN  8.9  9.4  9.2  8.9   BILITOTAL  0.8   --   0.7  1.1   ALBUMIN  3.4   --   3.2*  3.7   PROTTOTAL  6.8   --   6.7*  7.1   ALKPHOS  67   --   72  84   AST  18   --   14  16   ALT  26   --   31  26     Uric Acid  Recent Labs   Lab Test  05/02/18   1039  03/23/18   0918  02/28/18   1047  02/05/18   1212  12/12/16   1055  02/19/14   1532   URIC  3.3*  5.0  6.9  7.8*  7.1  7.5       Immunization History     Immunization History   Administered Date(s) Administered     Influenza Vaccine IM 3yrs+ 4 Valent IIV4 11/27/2013, 10/29/2015, 12/12/2016, 10/23/2017     Mantoux Tuberculin Skin Test 07/17/2012     Pneumo Conj 13-V (2010&after) 01/22/2016     Pneumococcal 23 valent 11/27/2013     TDAP Vaccine (Adacel) 08/25/2009          Chart documentation done in part with Dragon Voice recognition Software. Although reviewed after completion, some word and grammatical error may remain.    Coy Bolaños MD

## 2018-07-03 NOTE — PATIENT INSTRUCTIONS
Please check with your insurance - will they cover colchicine as Mitigare or Colcrys?     Rheumatology    Dr. Coy Bolaños         [facility]   (Monday)  [address]  [place] [address]       [facility]   (Tuesday)  [address] [place] [address]    [facility]   (Wed., Thurs., and Friday)  [address] [facility]  [place] [address]    Phone number: [phone]  Thank you for choosing [facility].  Saba Patricia CMA

## 2018-07-05 ENCOUNTER — TELEPHONE (OUTPATIENT)
Dept: RHEUMATOLOGY | Facility: CLINIC | Age: 63
End: 2018-07-05

## 2018-07-05 DIAGNOSIS — M1A.09X0 IDIOPATHIC CHRONIC GOUT OF MULTIPLE SITES WITHOUT TOPHUS: Primary | ICD-10-CM

## 2018-07-05 NOTE — TELEPHONE ENCOUNTER
Reason for Call:  Medication or medication refill:    Do you use a [facility]?  Name of the pharmacy and phone number for the current request:  See below    Name of the medication requested:  The only medication the insurance will cover is the colcrys. Please call in for him.     Other request:  na    Can we leave a detailed message on this number? YES    Phone number patient can be reached at: Home number on file [phone] (home)    Best Time:  any    Call taken on 7/5/2018 at 1:27 PM by Kandice Bender

## 2018-07-10 RX ORDER — COLCHICINE 0.6 MG/1
TABLET ORAL
Qty: 30 TABLET | Refills: 0 | Status: SHIPPED | OUTPATIENT
Start: 2018-07-10 | End: 2019-05-14

## 2018-07-12 NOTE — TELEPHONE ENCOUNTER
Attempted to contact Pt.  Pt does not have a voicemail, and did not answer will try again later.    Kristel Lebron, JESUS  7/12/2018  1:43 PM

## 2018-07-13 NOTE — TELEPHONE ENCOUNTER
2nd attempt to reach pt unsuccessful.  No option to leave vm.    Riley Petersen RN....7/13/2018 3:29 PM

## 2018-07-17 NOTE — TELEPHONE ENCOUNTER
Called and spoke with patient.  Notified him of Dr. Bolaños's message below.    Riley Petersen RN....7/17/2018 10:17 AM

## 2018-07-24 NOTE — TELEPHONE ENCOUNTER
Patient Information     Patient Name  Ria Warren MRN  [id number] Sex  Female   1948      Letter by Jada Bullard MD at      Author:  Jada Bullard MD Service:  (none) Author Type:  (none)    Filed:   Encounter Date:  2017 Status:  (Other)           Ria Warren  [address]  [facility] [address]          May 18, 2017    Dear Ms. Warren:    This letter is in regards to your colonoscopy that was done by Jada Bullard MD on 17.   The polyp(s) removed from your colon were TUBULAR ADENOMAS.  Adenomatous polyps are  pre-cancerous, yet BENIGN.  The polyp was removed. You have an increased risk for developing other polyps in the future.  For that reason, Dr. Jada Bullard MD recommends a repeat colonoscopy in 5 years unless you have problems.      Dr. Jada Bullard MD also recommends a high fiber diet. A fiber supplement such as Metamucil, FiberCon, or Citrucel is recommended. Generic forms of these supplements are fine. These are available over the counter.       If you have any questions regarding this report, please call the Surgery department at [phone]  A copy of this report will be placed in your electronic medical record for your primary care provider's review.    Sincerely,      General Surgery      Reviewed and electronically signed by provider.           Rheumatology team: Please call to notify Mr. Edgar that Colcrys has been prescribed, to be used only for gout flares.  If having more frequent gout flares then he is to notify me and we will change it to be used as a preventative treatment for gout flares until his uric acid is better controlled with allopurinol.    1. Idiopathic chronic gout of multiple sites without tophus  - colchicine (COLCRYS) 0.6 MG tablet; PRN gout flare: 1.2mg (2 tablets) once followed by 0.6mg (1 tablet) 1 hour later; do not take more than 1.8mg within a week period.  Dispense: 30 tablet; Refill: 0    Coy Bolaños MD  7/10/2018 2:14 PM

## 2018-07-26 PROCEDURE — 82274 ASSAY TEST FOR BLOOD FECAL: CPT | Performed by: FAMILY MEDICINE

## 2018-07-27 DIAGNOSIS — Z12.11 SCREEN FOR COLON CANCER: ICD-10-CM

## 2018-07-29 LAB — HEMOCCULT STL QL IA: NEGATIVE

## 2018-08-07 DIAGNOSIS — M1A.09X0 IDIOPATHIC CHRONIC GOUT OF MULTIPLE SITES WITHOUT TOPHUS: ICD-10-CM

## 2018-08-07 LAB
ALBUMIN SERPL-MCNC: 3.3 G/DL (ref 3.4–5)
ALP SERPL-CCNC: 108 U/L (ref 40–150)
ALT SERPL W P-5'-P-CCNC: 29 U/L (ref 0–70)
AST SERPL W P-5'-P-CCNC: 17 U/L (ref 0–45)
BASOPHILS # BLD AUTO: 0 10E9/L (ref 0–0.2)
BASOPHILS NFR BLD AUTO: 0.5 %
BILIRUB DIRECT SERPL-MCNC: 0.1 MG/DL (ref 0–0.2)
BILIRUB SERPL-MCNC: 0.4 MG/DL (ref 0.2–1.3)
CREAT SERPL-MCNC: 1.16 MG/DL (ref 0.66–1.25)
CRP SERPL-MCNC: 10.8 MG/L (ref 0–8)
DIFFERENTIAL METHOD BLD: ABNORMAL
EOSINOPHIL # BLD AUTO: 0.2 10E9/L (ref 0–0.7)
EOSINOPHIL NFR BLD AUTO: 3.2 %
ERYTHROCYTE [DISTWIDTH] IN BLOOD BY AUTOMATED COUNT: 15.4 % (ref 10–15)
ERYTHROCYTE [SEDIMENTATION RATE] IN BLOOD BY WESTERGREN METHOD: 3 MM/H (ref 0–20)
GFR SERPL CREATININE-BSD FRML MDRD: 64 ML/MIN/1.7M2
HCT VFR BLD AUTO: 54.3 % (ref 40–53)
HGB BLD-MCNC: 17.8 G/DL (ref 13.3–17.7)
LYMPHOCYTES # BLD AUTO: 1.5 10E9/L (ref 0.8–5.3)
LYMPHOCYTES NFR BLD AUTO: 19.4 %
MCH RBC QN AUTO: 31.5 PG (ref 26.5–33)
MCHC RBC AUTO-ENTMCNC: 32.8 G/DL (ref 31.5–36.5)
MCV RBC AUTO: 96 FL (ref 78–100)
MONOCYTES # BLD AUTO: 0.6 10E9/L (ref 0–1.3)
MONOCYTES NFR BLD AUTO: 8.3 %
NEUTROPHILS # BLD AUTO: 5.2 10E9/L (ref 1.6–8.3)
NEUTROPHILS NFR BLD AUTO: 68.6 %
PLATELET # BLD AUTO: 241 10E9/L (ref 150–450)
PROT SERPL-MCNC: 6.8 G/DL (ref 6.8–8.8)
RBC # BLD AUTO: 5.65 10E12/L (ref 4.4–5.9)
URATE SERPL-MCNC: 2 MG/DL (ref 3.5–7.2)
WBC # BLD AUTO: 7.6 10E9/L (ref 4–11)

## 2018-08-07 PROCEDURE — 82565 ASSAY OF CREATININE: CPT | Performed by: INTERNAL MEDICINE

## 2018-08-07 PROCEDURE — 86140 C-REACTIVE PROTEIN: CPT | Performed by: INTERNAL MEDICINE

## 2018-08-07 PROCEDURE — 85652 RBC SED RATE AUTOMATED: CPT | Performed by: INTERNAL MEDICINE

## 2018-08-07 PROCEDURE — 84550 ASSAY OF BLOOD/URIC ACID: CPT | Performed by: INTERNAL MEDICINE

## 2018-08-07 PROCEDURE — 36415 COLL VENOUS BLD VENIPUNCTURE: CPT | Performed by: INTERNAL MEDICINE

## 2018-08-07 PROCEDURE — 80076 HEPATIC FUNCTION PANEL: CPT | Performed by: INTERNAL MEDICINE

## 2018-08-07 PROCEDURE — 85025 COMPLETE CBC W/AUTO DIFF WBC: CPT | Performed by: INTERNAL MEDICINE

## 2018-08-14 NOTE — PROGRESS NOTES
I called and discussed with Mr. Edgar.  Uric acid 2.  He is no longer having flares.  It is possible that he just needs this lower the uric acid to prevent the flares.  Continue monitoring for now.  All questions answered anything me for the call.     Coy Bolaños MD  8/14/2018 5:53 PM

## 2018-11-01 ENCOUNTER — OFFICE VISIT (OUTPATIENT)
Dept: FAMILY MEDICINE | Facility: CLINIC | Age: 63
End: 2018-11-01
Payer: COMMERCIAL

## 2018-11-01 VITALS
DIASTOLIC BLOOD PRESSURE: 82 MMHG | HEIGHT: 69 IN | TEMPERATURE: 98.6 F | OXYGEN SATURATION: 96 % | WEIGHT: 231 LBS | HEART RATE: 69 BPM | RESPIRATION RATE: 18 BRPM | SYSTOLIC BLOOD PRESSURE: 114 MMHG | BODY MASS INDEX: 34.21 KG/M2

## 2018-11-01 DIAGNOSIS — M53.3 SACROILIAC JOINT PAIN: ICD-10-CM

## 2018-11-01 DIAGNOSIS — Z86.718 HISTORY OF DEEP VENOUS THROMBOSIS (DVT) OF DISTAL VEIN OF LEFT LOWER EXTREMITY: ICD-10-CM

## 2018-11-01 DIAGNOSIS — Z13.6 CARDIOVASCULAR SCREENING; LDL GOAL LESS THAN 130: ICD-10-CM

## 2018-11-01 DIAGNOSIS — R73.01 IMPAIRED FASTING GLUCOSE: ICD-10-CM

## 2018-11-01 DIAGNOSIS — Z23 NEED FOR PROPHYLACTIC VACCINATION AND INOCULATION AGAINST INFLUENZA: ICD-10-CM

## 2018-11-01 DIAGNOSIS — I10 HTN, GOAL BELOW 140/90: Primary | ICD-10-CM

## 2018-11-01 DIAGNOSIS — J44.9 CHRONIC OBSTRUCTIVE PULMONARY DISEASE, UNSPECIFIED COPD TYPE (H): ICD-10-CM

## 2018-11-01 DIAGNOSIS — Z71.89 ADVANCED DIRECTIVES, COUNSELING/DISCUSSION: ICD-10-CM

## 2018-11-01 PROBLEM — I82.409 DEEP VEIN THROMBOSIS (DVT) (H): Status: RESOLVED | Noted: 2017-09-01 | Resolved: 2018-11-01

## 2018-11-01 PROCEDURE — 99214 OFFICE O/P EST MOD 30 MIN: CPT | Mod: 25 | Performed by: FAMILY MEDICINE

## 2018-11-01 PROCEDURE — G0008 ADMIN INFLUENZA VIRUS VAC: HCPCS | Performed by: FAMILY MEDICINE

## 2018-11-01 PROCEDURE — 90686 IIV4 VACC NO PRSV 0.5 ML IM: CPT | Performed by: FAMILY MEDICINE

## 2018-11-01 RX ORDER — PREDNISONE 20 MG/1
20 TABLET ORAL 2 TIMES DAILY
Qty: 14 TABLET | Refills: 0 | Status: SHIPPED | OUTPATIENT
Start: 2018-11-01 | End: 2019-05-03

## 2018-11-01 RX ORDER — CARVEDILOL 25 MG/1
TABLET ORAL
Qty: 180 TABLET | Refills: 2 | Status: SHIPPED | OUTPATIENT
Start: 2018-11-01 | End: 2019-11-25

## 2018-11-01 RX ORDER — ALLOPURINOL 300 MG/1
TABLET ORAL
Qty: 60 TABLET | Refills: 4 | Status: CANCELLED | OUTPATIENT
Start: 2018-11-01

## 2018-11-01 RX ORDER — LISINOPRIL 40 MG/1
40 TABLET ORAL DAILY
Qty: 90 TABLET | Refills: 2 | Status: SHIPPED | OUTPATIENT
Start: 2018-11-01 | End: 2019-05-03

## 2018-11-01 ASSESSMENT — PAIN SCALES - GENERAL: PAINLEVEL: WORST PAIN (10)

## 2018-11-01 NOTE — PATIENT INSTRUCTIONS
These are new changes to your current plan of care based on today's visit:    Medications stopped    Medications to be started Prednisone 20 mg twice daily for one week   Change dose of this medication    New treatments Heat to the back for improving the pain level       Follow up appointments:    1.  FOLLOW UP WITH YOUR PRIMARY CARE PROVIDER: 6 month(s) for clinic visit with [facility]    2. FOLLOW UP WITH SPECIALIST: Dr. Bolaños    3. FOLLOW UP WITH NURSE VISIT:     4. IMAGING STUDIES TO BE SCHEDULED:     5. PROCEDURES TO BE SCHEDULED:     6. LABS TO BE COMPLETED PRIOR TO NEXT VISIT: fasting blood work on Tuesday AM     Percy Deshpande MD

## 2018-11-01 NOTE — MR AVS SNAPSHOT
After Visit Summary   11/1/2018    Bucky Edgar    MRN: [id number]           Patient Information     Date Of Birth          1955        Visit Information        Provider Department      11/1/2018 8:40 AM Percy Deshpande MD [facility]        Today's Diagnoses     HTN, goal below 140/90    -  1    Chronic obstructive pulmonary disease, unspecified COPD type (H)        Sacroiliac joint pain        CARDIOVASCULAR SCREENING; LDL GOAL LESS THAN 130        Advanced directives, counseling/discussion        History of deep venous thrombosis (DVT) of distal vein of left lower extremity        Impaired fasting glucose          Care Instructions    These are new changes to your current plan of care based on today's visit:    Medications stopped    Medications to be started Prednisone 20 mg twice daily for one week   Change dose of this medication    New treatments Heat to the back for improving the pain level       Follow up appointments:    1.  FOLLOW UP WITH YOUR PRIMARY CARE PROVIDER: 6 month(s) for clinic visit with [facility]    2. FOLLOW UP WITH SPECIALIST: Dr. Bolaños    3. FOLLOW UP WITH NURSE VISIT:     4. IMAGING STUDIES TO BE SCHEDULED:     5. PROCEDURES TO BE SCHEDULED:     6. LABS TO BE COMPLETED PRIOR TO NEXT VISIT: fasting blood work on Tuesday AM     Percy Deshpande MD                Follow-ups after your visit        Follow-up notes from your care team     Return in about 6 months (around 5/1/2019) for Lab Work, Routine Visit with [facility].      Your next 10 appointments already scheduled     Nov 13, 2018  9:00 AM CST   Return Visit with Coy Bolaños MD   [facility] ([facility])    [address] [place] [id number]   [phone]              Future tests that were ordered for you today     Open Future Orders        Priority Expected Expires Ordered    Lipid panel reflex to direct LDL Fasting Routine  "2018    Basic metabolic panel Routine 2018    Hemoglobin A1c Routine 2018            Who to contact     If you have questions or need follow up information about today's clinic visit or your schedule please contact [facility] [place] directly at [phone].  Normal or non-critical lab and imaging results will be communicated to you by SWYFhart, letter or phone within 4 business days after the clinic has received the results. If you do not hear from us within 7 days, please contact the clinic through SWYFhart or phone. If you have a critical or abnormal lab result, we will notify you by phone as soon as possible.  Submit refill requests through [employer] or call your pharmacy and they will forward the refill request to us. Please allow 3 business days for your refill to be completed.          Additional Information About Your Visit        [employer] Information     [employer] lets you send messages to your doctor, view your test results, renew your prescriptions, schedule appointments and more. To sign up, go to [email or web address] . Click on \"Log in\" on the left side of the screen, which will take you to the Welcome page. Then click on \"Sign up Now\" on the right side of the page.     You will be asked to enter the access code listed below, as well as some personal information. Please follow the directions to create your username and password.     Your access code is: [email or web address]  Expires: 2019  9:20 AM     Your access code will  in 90 days. If you need help or a new code, please call your [facility] clinic or [phone].        Care EveryWhere ID     This is your Care EveryWhere ID. This could be used by other organizations to access your [facility] medical records  [id number]        Your Vitals Were     Pulse Temperature Respirations Height Pulse Oximetry BMI (Body Mass Index)    69 98.6  F (37  C) (Temporal) 18 5' 9\" (1.753 m) " 96% 34.11 kg/m2       Blood Pressure from Last 3 Encounters:   11/01/18 114/82   07/03/18 (!) 198/118   05/02/18 136/86    Weight from Last 3 Encounters:   11/01/18 231 lb (104.8 kg)   07/03/18 233 lb 6.4 oz (105.9 kg)   05/02/18 230 lb (104.3 kg)                 Today's Medication Changes          These changes are accurate as of 11/1/18  9:22 AM.  If you have any questions, ask your nurse or doctor.               These medicines have changed or have updated prescriptions.        Dose/Directions    allopurinol 300 MG tablet   Commonly known as:  ZYLOPRIM   This may have changed:  Another medication with the same name was removed. Continue taking this medication, and follow the directions you see here.   Used for:  Idiopathic chronic gout of multiple sites without tophus   Changed by:  Percy Deshpande MD        Allopurinol 700mg daily (two 300mg tablets + one 100mg tablet)   Quantity:  60 tablet   Refills:  4       predniSONE 20 MG tablet   Commonly known as:  DELTASONE   This may have changed:    - how much to take  - how to take this  - when to take this  - additional instructions   Used for:  Sacroiliac joint pain   Changed by:  Percy Deshpande MD        Dose:  20 mg   Take 1 tablet (20 mg) by mouth 2 times daily   Quantity:  14 tablet   Refills:  0            Where to get your medicines      [id number] [place] - [address]  [address], [place] [address]    Hours:  test fax sent successfully 1/20/04   Phone:  [phone]     carvedilol 25 MG tablet    lisinopril 40 MG tablet    predniSONE 20 MG tablet                Primary Care Provider Office Phone # Fax #    Percy Deshpande [phone] [address] [facility]  [place] [address]        Equal Access to Services     [facility]: Hadii aad ku hadasho Soomaali, waaxda luqadaha, qaybta kaalmada driss, chelsy nelson  magalie augustmauricio ah. So [facility] [phone].    ATENCIÓN: Si grzegorz flores, tiene a garcia disposición servicios gratuitos de asistencia lingüística. Deanna joe [phone].    We comply with applicable federal civil rights laws and [place] laws. We do not discriminate on the basis of race, color, national origin, age, disability, sex, sexual orientation, or gender identity.            Thank you!     Thank you for choosing [facility]  for your care. Our goal is always to provide you with excellent care. Hearing back from our patients is one way we can continue to improve our services. Please take a few minutes to complete the written survey that you may receive in the mail after your visit with us. Thank you!             Your Updated Medication List - Protect others around you: Learn how to safely use, store and throw away your medicines at www.disposemymeds.org.          This list is accurate as of 11/1/18  9:22 AM.  Always use your most recent med list.                   Brand Name Dispense Instructions for use Diagnosis    acetaminophen 325 MG tablet    TYLENOL     Take 650 mg by mouth every 6 hours        albuterol 108 (90 Base) MCG/ACT inhaler    PROAIR HFA/PROVENTIL HFA/VENTOLIN HFA    3 Inhaler    Inhale 2 puffs into the lungs every 6 hours as needed for shortness of breath / dyspnea    Chronic bronchitis with COPD (chronic obstructive pulmonary disease) (H)       allopurinol 300 MG tablet    ZYLOPRIM    60 tablet    Allopurinol 700mg daily (two 300mg tablets + one 100mg tablet)    Idiopathic chronic gout of multiple sites without tophus       aspirin 81 MG tablet     30 tablet    Take 1 tablet (81 mg) by mouth daily    HTN, goal below 140/90       carvedilol 25 MG tablet    COREG    180 tablet    TAKE ONE TABLET BY MOUTH TWICE DAILY WITH MEALS    HTN, goal below 140/90       colchicine 0.6 MG tablet    COLCRYS    30 tablet    PRN gout flare: 1.2mg (2 tablets) once followed by 0.6mg (1 tablet) 1 hour  later; do not take more than 1.8mg within a week period.    Idiopathic chronic gout of multiple sites without tophus       lisinopril 40 MG tablet    PRINIVIL/ZESTRIL    90 tablet    Take 1 tablet (40 mg) by mouth daily    HTN, goal below 140/90       NICORETTE 2 MG lozenge   Generic drug:  nicotine polacrilex      Place 2 mg inside cheek every hour as needed for smoking cessation        NICOTINE STEP 1 21 MG/24HR 24 hr patch   Generic drug:  nicotine      ROBBY 1 PATCH TO SKIN ONCE D X 42 DAYS        predniSONE 20 MG tablet    DELTASONE    14 tablet    Take 1 tablet (20 mg) by mouth 2 times daily    Sacroiliac joint pain       sennosides 8.6 MG tablet    SENOKOT     TK 1 T PO BID

## 2018-11-01 NOTE — PROGRESS NOTES
SUBJECTIVE:   Bucky Edgar is a 63 year old male who presents to clinic today for the following health issues:      HPI  Hypertension Follow-up--Reporting blood pressures at home are usually less than 135/85.  Tolerating medications well.  No orthostatic symptoms.      Outpatient blood pressures are being checked at home.  Results are 135s/85s on average .    Low Salt Diet: low salt    COPD Follow-Up--has remained off cigarettes and tobacco use for over a year.  Currently has a mild viral type infection with minimal clear mucus production and no major shortness of breath.  Slight fatigue from the infection.  Has not needed an inhaler.    Symptoms are currently: stable    Current fatigue or dyspnea with ambulation: fatigue due to 2 week cold    Shortness of breath: manageable      Increased or change in Cough/Sputum: Yes-  Due to cold     Fever(s): No    Baseline ambulation without stopping to rest:  100 feet. Able to walk up 0 flights of stairs without stopping to rest.    Any ER/UC or hospital admissions since your last visit? No     History   Smoking Status     Former Smoker     Packs/day: 0.50     Years: 38.00     Types: Cigarettes     Quit date: 10/1/2017   Smokeless Tobacco     Never Used     Lab Results   Component Value Date    FEV1 2.89 11/27/2013    ULJ7AYG 60% 11/27/2013       Problem list and histories reviewed & adjusted, as indicated.  Additional history: as documented        BP Readings from Last 3 Encounters:   11/01/18 114/82   07/03/18 (!) 198/118   05/02/18 136/86    Wt Readings from Last 3 Encounters:   11/01/18 231 lb (104.8 kg)   07/03/18 233 lb 6.4 oz (105.9 kg)   05/02/18 230 lb (104.3 kg)                    ROS:  CONSTITUTIONAL: NEGATIVE for fever, chills, change in weight  CONSTITUTIONAL: Continues overweight with a BMI greater than 30.  INTEGUMENTARY/SKIN: NEGATIVE for worrisome rashes, moles or lesions  EYES: NEGATIVE for vision changes or irritation  ENT/MOUTH: NEGATIVE for ear,  "mouth and throat problems  RESP:as above and currently does not appear to be needing an antibiotic based on current symptoms.  BREAST: NEGATIVE for masses, tenderness or discharge  CV: NEGATIVE for chest pain, palpitations or peripheral edema  CV: Tino pressure appears to be adequately controlled.  GI: NEGATIVE for nausea, abdominal pain, heartburn, or change in bowel habits  : NEGATIVE for frequency, dysuria, or hematuria  MUSCULOSKELETAL: Has ongoing postphlebitic left lower extremity swelling, 15 months post extensive DVT involving the whole left lower extremity.  Mild discomfort when ambulatory due to suspected venous pressure and edema.  Currently on high dose allopurinol and has had good control of recurrent gouty arthritis and is followed by Dr. Bolaños.  Last uric acid level was 2.0.  Recently has developed bilateral SI joint pain with some difficulty with forward flexion but no radiculopathy.  He is tried stretching exercises as well as time and rest.  Symptoms present for the past few weeks.  No fever or chills.  No urinary symptoms.  He reports no recurrent gouty arthritic attacks since being on the higher dose of Allopurinol.  NEURO: NEGATIVE for weakness, dizziness or paresthesias  ENDOCRINE: NEGATIVE for temperature intolerance, skin/hair changes  ENDOCRINE: Long history of hyperuricemia.  HEME: NEGATIVE for bleeding problems  PSYCHIATRIC: NEGATIVE for changes in mood or affect    OBJECTIVE:                                                    /82  Pulse 69  Temp 98.6  F (37  C) (Temporal)  Resp 18  Ht 5' 9\" (1.753 m)  Wt 231 lb (104.8 kg)  SpO2 96%  BMI 34.11 kg/m2  Body mass index is 34.11 kg/(m^2).  GENERAL: alert, no distress, cooperative, over weight and mild antalgic gait due to bilateral lower back pain.  EYES: Eyes grossly normal to inspection, extraocular movements - intact, and PERRL  HENT: ear canals- normal; TMs- normal; Nose- normal; Mouth- no ulcers, no lesions  NECK: no " tenderness, no adenopathy, no asymmetry, no masses, no stiffness; thyroid- normal to palpation  NECK: No carotid bruits  RESP: lungs clear to auscultation - no rales, no rhonchi, no wheezes  CV: regular rates and rhythm, normal S1 S2, no S3 or S4 and no murmur, no click or rub -  ABDOMEN: soft, no tenderness, no  hepatosplenomegaly, no masses, normal bowel sounds  ABDOMEN: no bruits heard  MS: Mild diffuse edema of the left lower extremity compared to the right.  Tender over both SI joints with restricted forward flexion due to pain.  Straight leg raising appears negative.  Slight tenderness over the lower lumbar spine.  No major muscular spasm.  SKIN: no suspicious lesions, no rashes  NEURO: strength and tone- normal, sensory exam- grossly normal, mentation- intact, speech- normal, reflexes- symmetric  BACK: no CVA tenderness, no paralumbar tenderness  PSYCH: Alert and oriented times 3; speech- coherent , normal rate and volume; able to articulate logical thoughts, able to abstract reason, no tangential thoughts, no hallucinations or delusions, affect- normal  LYMPHATICS: ant. cervical- normal, post. cervical- normal, axillary- normal, supraclavicular- normal, inguinal- normal    Diagnostic test results:  Diagnostic Test Results:  Patient will return for fasting blood studies early next week.     ASSESSMENT/PLAN:                                                    1. HTN, goal below 140/90  Appears to be adequately controlled with dual therapy.  Renew medications for 9 months and to be followed up in 6 months.  - carvedilol (COREG) 25 MG tablet; TAKE ONE TABLET BY MOUTH TWICE DAILY WITH MEALS  Dispense: 180 tablet; Refill: 2  - lisinopril (PRINIVIL/ZESTRIL) 40 MG tablet; Take 1 tablet (40 mg) by mouth daily  Dispense: 90 tablet; Refill: 2  - Basic metabolic panel; Future    2. Chronic obstructive pulmonary disease, unspecified COPD type (H)  Stable at the present time with mild viral type respiratory infection  compromising status slightly.  No critical illness noted.    3. Sacroiliac joint pain  Bilateral tenderness will be treated with a short course of prednisone for 1 week.  Also recommended heat and continued back exercises.  - predniSONE (DELTASONE) 20 MG tablet; Take 1 tablet (20 mg) by mouth 2 times daily  Dispense: 14 tablet; Refill: 0    4. CARDIOVASCULAR SCREENING; LDL GOAL LESS THAN 130  We will be checking lipids next week.  - Lipid panel reflex to direct LDL Fasting; Future    5. Advanced directives, counseling/discussion  Discussed and the patient will be getting some information for Honoring Choices for his family.    6. History of deep venous thrombosis (DVT) of distal vein of left lower extremity  15 months post extensive DVT of the left lower extremity.  Has post phlebitic swelling noted.    7. Impaired fasting glucose  Past history of impaired fasting glucose.  Will check A1c along with a basic metabolic profile.  - Hemoglobin A1c; Future    8. Need for prophylactic vaccination and inoculation against influenza  Vaccination given for the current influenza season.  - FLU VACCINE, SPLIT VIRUS, IM (QUADRIVALENT) [50599]- >3 YRS  - Vaccine Administration, Initial [46535]      Follow up with Provider -Follow-up in 6 months unless needed.  Continue follow-up with Dr. Bolaños concerning treatment of recurring gout and hyperuricemia.  See Patient Instructions    Percy Deshpande MD  [facility]    Injectable Influenza Immunization Documentation    1.  Is the person to be vaccinated sick today?   No    2. Does the person to be vaccinated have an allergy to a component   of the vaccine?   No  Egg Allergy Algorithm Link    3. Has the person to be vaccinated ever had a serious reaction   to influenza vaccine in the past?   No    4. Has the person to be vaccinated ever had Guillain-Barré syndrome?   No    Form completed by Audrey Sorensen MA

## 2018-11-13 ENCOUNTER — TELEPHONE (OUTPATIENT)
Dept: RHEUMATOLOGY | Facility: CLINIC | Age: 63
End: 2018-11-13

## 2018-11-13 ENCOUNTER — OFFICE VISIT (OUTPATIENT)
Dept: RHEUMATOLOGY | Facility: CLINIC | Age: 63
End: 2018-11-13
Payer: COMMERCIAL

## 2018-11-13 VITALS
SYSTOLIC BLOOD PRESSURE: 130 MMHG | OXYGEN SATURATION: 95 % | HEART RATE: 73 BPM | DIASTOLIC BLOOD PRESSURE: 82 MMHG | WEIGHT: 231 LBS | BODY MASS INDEX: 34.11 KG/M2

## 2018-11-13 DIAGNOSIS — G89.29 CHRONIC LOW BACK PAIN, UNSPECIFIED BACK PAIN LATERALITY, WITH SCIATICA PRESENCE UNSPECIFIED: ICD-10-CM

## 2018-11-13 DIAGNOSIS — M54.5 CHRONIC LOW BACK PAIN, UNSPECIFIED BACK PAIN LATERALITY, WITH SCIATICA PRESENCE UNSPECIFIED: ICD-10-CM

## 2018-11-13 DIAGNOSIS — M1A.09X0 IDIOPATHIC CHRONIC GOUT OF MULTIPLE SITES WITHOUT TOPHUS: Primary | ICD-10-CM

## 2018-11-13 PROCEDURE — 99214 OFFICE O/P EST MOD 30 MIN: CPT | Performed by: INTERNAL MEDICINE

## 2018-11-13 RX ORDER — ALLOPURINOL 300 MG/1
TABLET ORAL
Qty: 60 TABLET | Refills: 6 | Status: SHIPPED | OUTPATIENT
Start: 2018-11-13 | End: 2019-05-14

## 2018-11-13 RX ORDER — ALLOPURINOL 100 MG/1
TABLET ORAL
Qty: 30 TABLET | Refills: 6 | Status: SHIPPED | OUTPATIENT
Start: 2018-11-13 | End: 2019-05-14

## 2018-11-13 RX ORDER — PREDNISONE 20 MG/1
TABLET ORAL
Qty: 21 TABLET | Refills: 0 | Status: SHIPPED | OUTPATIENT
Start: 2018-11-13 | End: 2019-05-03

## 2018-11-13 NOTE — TELEPHONE ENCOUNTER
Spoke to pharmacy and provided verbal to switch to 90 day supply of Allopurinol per patient request.    Riley Petersen RN....11/13/2018 3:30 PM

## 2018-11-13 NOTE — MR AVS SNAPSHOT
After Visit Summary   11/13/2018    Bucky Edgar    MRN: [id number]           Patient Information     Date Of Birth          1955        Visit Information        Provider Department      11/13/2018 9:00 AM Coy Bolaños MD [facility]        Today's Diagnoses     Idiopathic chronic gout of multiple sites without tophus    -  1    Chronic low back pain, unspecified back pain laterality, with sciatica presence unspecified           Follow-ups after your visit        Additional Services     ORTHO  REFERRAL       [facility] is referring you to the Orthopedic  Services at [facility].       The  Representative will assist you in the coordination of your Orthopedic and Musculoskeletal Care as prescribed by your physician.    The  Representative will call you within 1 business day to help schedule your appointment, or you may contact the  Representative at:    All areas ~ [phone]     Type of Referral : Spine: Lumbar: Spine Surgeon      Reason for referral: chronic lower back pain; reports having had fusions in the distant past (about 20 years ago) at the [facility].       Timeframe requested: Routine    Coverage of these services is subject to the terms and limitations of your health insurance plan.  Please call member services at your health plan with any benefit or coverage questions.      If X-rays, CT or MRI's have been performed, please contact the facility where they were done to arrange for , prior to your scheduled appointment.  Please bring this referral request to your appointment and present it to your specialist.                  Your next 10 appointments already scheduled     May 03, 2019  9:20 AM CDT   Office Visit with TROY Blancas CNP   [facility]    [address]  [facility] [address]    [phone]           Bring a current list of meds and any records pertaining to this visit. For Physicals, please bring immunization records and any forms needing to be filled out. Please arrive 10 minutes early to complete paperwork.            May 08, 2019 10:00 AM CDT   LAB with RG LAB   [facility]    [address], [address]  [facility] [id number] [phone]           Please do not eat 10-12 hours before your appointment if you are coming in fasting for labs on lipids, cholesterol, or glucose (sugar). This does not apply to pregnant women. Water, hot tea and black coffee (with nothing added) are okay. Do not drink other fluids, diet soda or chew gum.            May 14, 2019 10:00 AM CDT   [facility]    [address] [place] [id number] [phone]              Future tests that were ordered for you today     Open Future Orders        Priority Expected Expires Ordered    CBC with platelets differential Routine 4/29/2019 5/24/2019 11/13/2018    Creatinine Routine 4/29/2019 5/24/2019 11/13/2018    Hepatic panel Routine 4/29/2019 5/24/2019 11/13/2018    Uric acid Routine 4/29/2019 5/24/2019 11/13/2018            Who to contact     If you have questions or need follow up information about today's clinic visit or your schedule please contact [facility] directly at [phone].  Normal or non-critical lab and imaging results will be communicated to you by MyChart, letter or phone within 4 business days after the clinic has received the results. If you do not hear from us within 7 days, please contact the clinic through MyChart or phone. If you have a critical or abnormal lab result, we will notify you by phone as soon as possible.  Submit refill requests through [employer] or call your pharmacy and they will forward the refill  "request to us. Please allow 3 business days for your refill to be completed.          Additional Information About Your Visit        [employer] Information     [employer] lets you send messages to your doctor, view your test results, renew your prescriptions, schedule appointments and more. To sign up, go to [email or web address] . Click on \"Log in\" on the left side of the screen, which will take you to the Welcome page. Then click on \"Sign up Now\" on the right side of the page.     You will be asked to enter the access code listed below, as well as some personal information. Please follow the directions to create your username and password.     Your access code is: [email or web address]  Expires: 2019  8:20 AM     Your access code will  in 90 days. If you need help or a new code, please call your [facility] clinic or [phone].        Care EveryWhere ID     This is your Care EveryWhere ID. This could be used by other organizations to access your [facility] medical records  [id number]        Your Vitals Were     Pulse Pulse Oximetry BMI (Body Mass Index)             73 95% 34.11 kg/m2          Blood Pressure from Last 3 Encounters:   18 130/82   18 114/82   18 (!) 198/118    Weight from Last 3 Encounters:   18 104.8 kg (231 lb)   18 104.8 kg (231 lb)   18 105.9 kg (233 lb 6.4 oz)              We Performed the Following     ORTHO  REFERRAL          Today's Medication Changes          These changes are accurate as of 18  9:35 AM.  If you have any questions, ask your nurse or doctor.               These medicines have changed or have updated prescriptions.        Dose/Directions    * allopurinol 300 MG tablet   Commonly known as:  ZYLOPRIM   This may have changed:  Another medication with the same name was added. Make sure you understand how and when to take each.   Used for:  Idiopathic chronic gout of multiple sites without tophus   Changed by:  Coy Bolaños MD     "    Allopurinol 700mg daily (two 300mg tablets + one 100mg tablet)   Quantity:  60 tablet   Refills:  6       * allopurinol 100 MG tablet   Commonly known as:  ZYLOPRIM   This may have changed:  You were already taking a medication with the same name, and this prescription was added. Make sure you understand how and when to take each.   Used for:  Idiopathic chronic gout of multiple sites without tophus   Changed by:  Coy Bolaños MD        Allopurinol 700mg daily (two 300mg tablets + one 100mg tablet)   Quantity:  30 tablet   Refills:  6       * predniSONE 20 MG tablet   Commonly known as:  DELTASONE   This may have changed:  Another medication with the same name was added. Make sure you understand how and when to take each.   Used for:  Sacroiliac joint pain   Changed by:  Coy Bolaños MD        Dose:  20 mg   Take 1 tablet (20 mg) by mouth 2 times daily   Quantity:  14 tablet   Refills:  0       * predniSONE 20 MG tablet   Commonly known as:  DELTASONE   This may have changed:  You were already taking a medication with the same name, and this prescription was added. Make sure you understand how and when to take each.   Used for:  Idiopathic chronic gout of multiple sites without tophus   Changed by:  Coy Bolaños MD        For gout flare only: Prednisone 60mg daily x2days, then 40mg daily x5days, then 20mg daily x5days, then stop.   Quantity:  21 tablet   Refills:  0       * Notice:  This list has 4 medication(s) that are the same as other medications prescribed for you. Read the directions carefully, and ask your doctor or other care provider to review them with you.         Where to get your medicines      These medications were sent to [id number] [place] - [address] SEC OF [address]  [address], [place] [address]    Hours:  test fax sent successfully 1/20/04   Phone:  [phone]     allopurinol 100 MG tablet    allopurinol 300 MG tablet    predniSONE  20 MG tablet                Primary Care Provider Office Phone # Fax #    Percy Deshpande [phone] [address] [facility] [address]        Equal Access to Services     CHRISTOPHER SEAY : Hadluciana lynn cardenas bradfordo Somylesali, waaxda luqadaha, qaybta kaalmada adeegyada, chelsy augustmauricio gates. So [facility] [phone].    ATENCIÓN: Si habla español, tiene a garcia disposición servicios gratuitos de asistencia lingüística. Llame al [phone].    We comply with applicable federal civil rights laws and [place] laws. We do not discriminate on the basis of race, color, national origin, age, disability, sex, sexual orientation, or gender identity.            Thank you!     Thank you for choosing [facility]  for your care. Our goal is always to provide you with excellent care. Hearing back from our patients is one way we can continue to improve our services. Please take a few minutes to complete the written survey that you may receive in the mail after your visit with us. Thank you!             Your Updated Medication List - Protect others around you: Learn how to safely use, store and throw away your medicines at www.disposemymeds.org.          This list is accurate as of 11/13/18  9:35 AM.  Always use your most recent med list.                   Brand Name Dispense Instructions for use Diagnosis    acetaminophen 325 MG tablet    TYLENOL     Take 650 mg by mouth every 6 hours        albuterol 108 (90 Base) MCG/ACT inhaler    PROAIR HFA/PROVENTIL HFA/VENTOLIN HFA    3 Inhaler    Inhale 2 puffs into the lungs every 6 hours as needed for shortness of breath / dyspnea    Chronic bronchitis with COPD (chronic obstructive pulmonary disease) (H)       * allopurinol 300 MG tablet    ZYLOPRIM    60 tablet    Allopurinol 700mg daily (two 300mg tablets + one 100mg tablet)    Idiopathic chronic gout of multiple sites without tophus       * allopurinol 100 MG tablet     ZYLOPRIM    30 tablet    Allopurinol 700mg daily (two 300mg tablets + one 100mg tablet)    Idiopathic chronic gout of multiple sites without tophus       aspirin 81 MG tablet     30 tablet    Take 1 tablet (81 mg) by mouth daily    HTN, goal below 140/90       carvedilol 25 MG tablet    COREG    180 tablet    TAKE ONE TABLET BY MOUTH TWICE DAILY WITH MEALS    HTN, goal below 140/90       colchicine 0.6 MG tablet    COLCRYS    30 tablet    PRN gout flare: 1.2mg (2 tablets) once followed by 0.6mg (1 tablet) 1 hour later; do not take more than 1.8mg within a week period.    Idiopathic chronic gout of multiple sites without tophus       lisinopril 40 MG tablet    PRINIVIL/ZESTRIL    90 tablet    Take 1 tablet (40 mg) by mouth daily    HTN, goal below 140/90       NICORETTE 2 MG lozenge   Generic drug:  nicotine polacrilex      Place 2 mg inside cheek every hour as needed for smoking cessation        NICOTINE STEP 1 21 MG/24HR 24 hr patch   Generic drug:  nicotine      ROBBY 1 PATCH TO SKIN ONCE D X 42 DAYS        * predniSONE 20 MG tablet    DELTASONE    14 tablet    Take 1 tablet (20 mg) by mouth 2 times daily    Sacroiliac joint pain       * predniSONE 20 MG tablet    DELTASONE    21 tablet    For gout flare only: Prednisone 60mg daily x2days, then 40mg daily x5days, then 20mg daily x5days, then stop.    Idiopathic chronic gout of multiple sites without tophus       sennosides 8.6 MG tablet    SENOKOT     TK 1 T PO BID        * Notice:  This list has 4 medication(s) that are the same as other medications prescribed for you. Read the directions carefully, and ask your doctor or other care provider to review them with you.

## 2018-11-13 NOTE — TELEPHONE ENCOUNTER
Patient is requesting 90 days supply for allopurinol (ZYLOPRIM) 100 MG tablet.          Tello Faarax  [facility]

## 2018-12-13 ENCOUNTER — OFFICE VISIT (OUTPATIENT)
Dept: NEUROSURGERY | Facility: OTHER | Age: 63
End: 2018-12-13
Payer: COMMERCIAL

## 2018-12-13 VITALS
WEIGHT: 231 LBS | BODY MASS INDEX: 34.21 KG/M2 | TEMPERATURE: 97.4 F | SYSTOLIC BLOOD PRESSURE: 130 MMHG | DIASTOLIC BLOOD PRESSURE: 84 MMHG | HEIGHT: 69 IN

## 2018-12-13 DIAGNOSIS — M54.16 LUMBAR RADICULOPATHY: Primary | ICD-10-CM

## 2018-12-13 PROCEDURE — 99203 OFFICE O/P NEW LOW 30 MIN: CPT | Performed by: NURSE PRACTITIONER

## 2018-12-13 ASSESSMENT — MIFFLIN-ST. JEOR: SCORE: 1843.19

## 2018-12-13 NOTE — PROGRESS NOTES
Dr. Kwaku Kerr  [facility]  Neurosurgery Clinic Visit      CC: chronic low back pain    Primary care Provider: No Ref-Primary, Physician      Reason For Visit:   I was asked by Dr. Coy Bolaños to consult on the patient for chronic low back pain with sciatica.      HPI: Bucky Edgar is a 63 year old male with a history of chronic low back pain dating back to 1995. Per his report he has had multiple lumbar decompressions in the past and an anterior/posterior L4-S1 fusion in 2004. He also notes multiple SI joint injections. He notes intermittent flares of low back pain over the years that typically last less than a few weeks. Over the past month he has noted increasing pain in is right low back that intermittently radiates down his right thigh. He was unable to verbalize what location his pain radiated to. He also notes fluctuating numbness of his entire right leg. He states the pain is worse after lying down and improves with heat and elevation of his feet in a recliner. He denies weakness, foot drop, and bowel/bladder incontinences. He has not had any recent imaging, PT, or injections.     Pain at its worst 10  Pain right now:  6    Past Medical History:   Diagnosis Date     COPD (chronic obstructive pulmonary disease) (H)      Displacement of lumbar intervertebral disc without myelopathy 1995, 2002     HTN, goal below 140/90        Past Medical History reviewed with patient during visit.    Past Surgical History:   Procedure Laterality Date     C DECOMPRESS SPINAL CORD,1 SEG  1995, 4/2002    leftL4-L5, 2nd right L4-L5     C SPINE FUSION,ANTER,3 SGMTS  2/9/2004    ant and post fusion L4-S1     HC REPAIR ROTATOR CUFF,CHRONIC  1989     Past Surgical History reviewed with patient during visit.    Current Outpatient Medications   Medication     acetaminophen (TYLENOL) 325 MG tablet     albuterol (PROAIR HFA, PROVENTIL HFA, VENTOLIN HFA) 108 (90 BASE) MCG/ACT inhaler     allopurinol (ZYLOPRIM)  100 MG tablet     allopurinol (ZYLOPRIM) 300 MG tablet     aspirin 81 MG tablet     carvedilol (COREG) 25 MG tablet     colchicine (COLCRYS) 0.6 MG tablet     lisinopril (PRINIVIL/ZESTRIL) 40 MG tablet     nicotine polacrilex (NICORETTE) 2 MG lozenge     NICOTINE STEP 1 21 MG/24HR 24 hr patch     predniSONE (DELTASONE) 20 MG tablet     predniSONE (DELTASONE) 20 MG tablet     sennosides (SENOKOT) 8.6 MG tablet     No current facility-administered medications for this visit.        Allergies   Allergen Reactions     Chlorthalidone Other (See Comments)     Excessive lowering of blood pressure     Amlodipine Other (See Comments)     Intractable headache with use of medication as well as noting a small tremor of the upper extremities       Social History     Socioeconomic History     Marital status:      Spouse name: Not on file     Number of children: Not on file     Years of education: Not on file     Highest education level: Not on file   Social Needs     Financial resource strain: Not on file     Food insecurity - worry: Not on file     Food insecurity - inability: Not on file     Transportation needs - medical: Not on file     Transportation needs - non-medical: Not on file   Occupational History     Not on file   Tobacco Use     Smoking status: Former Smoker     Packs/day: 0.50     Years: 38.00     Pack years: 19.00     Types: Cigarettes     Last attempt to quit: 10/1/2017     Years since quittin.2     Smokeless tobacco: Never Used   Substance and Sexual Activity     Alcohol use: Yes     Alcohol/week: 0.0 oz     Comment: 12 pack of beer every 2 weeks     Drug use: No     Sexual activity: Not Currently     Partners: Female   Other Topics Concern     Parent/sibling w/ CABG, MI or angioplasty before 65F 55M? No   Social History Narrative     Not on file       Family History   Problem Relation Age of Onset     Family History Negative Other      Diabetes No family hx of      Coronary Artery Disease No family  hx of      Hypertension No family hx of      Hyperlipidemia No family hx of      Breast Cancer No family hx of      Prostate Cancer No family hx of      Anxiety Disorder No family hx of      Substance Abuse No family hx of      Asthma No family hx of      Thyroid Disease No family hx of      Unknown/Adopted No family hx of      Genetic Disorder No family hx of      Osteoporosis No family hx of      Anesthesia Reaction No family hx of      Mental Illness No family hx of      Depression No family hx of      Other Cancer No family hx of      Colon Cancer No family hx of      Cerebrovascular Disease No family hx of      Obesity No family hx of          Review Of Systems     ROS: 10 point ROS neg other than the symptoms noted above in the HPI.    Vital Signs:       Examination:  Constitutional:  Alert, well nourished, NAD.  Memory: recent and remote memory   HEENT: Normocephalic, atraumatic.   Pulm:  Without shortness of breath   CV:  No pitting edema of BLE.      Neurological:  Awake  Alert  Oriented x 3  Speech clear    Motor exam    Hip Flexor:                Right: 5/5  Left:  5/5  Hip Adductor:             Right:  5/5  Left:  5/5  Hip Abductor:             Right:  5/5  Left:  5/5  Gastroc Soleus:        Right:  5/5  Left:  5/5  Tib/Ant:                      Right:  5/5  Left:  5/5  EHL:                          Right:  5/5  Left:  5/5    Full strength of motor exam despite pain.    Sensation decreased to RLE  Muscle tone to bilateral upper and lower extremities.  Gait: Able to stand from a seated position. Normal non-antalgic, non-myelopathic gait.  Able to heel/toe walk without loss of balance    Lumbar examination reveals tenderness of the spine and right>left paraspinous muscles.  Hip height is symmetrical. Positive right SI joint tenderness, sciatic notch or greater trochanteric tenderness to palpation bilaterally.  Straight leg raise is positive bilaterally.      Imaging: None    Assessment/Plan:   Bucky KING  Tala is a 63 year old male with a history of chronic low back pain dating back to 1995. Per his report he has had multiple lumbar decompressions in the past and an anterior/posterior L4-S1 fusion in 2004. He also notes multiple SI joint injections. He notes intermittent flares of low back pain over the years that typically last less than a few weeks. Over the past month he has noted increasing pain in is right low back that intermittently radiates down his right thigh. He was unable to verbalize what location his pain radiated to. He also notes fluctuating numbness of his entire right leg. He states the pain is worse after lying down and improves with heat and elevation of his feet in a recliner. He denies weakness, foot drop, and bowel/bladder incontinences. He has not had any recent imaging, PT, or injections. Discussed getting updated imaging due to history of chronic back pain that is increasing in severity over the past month. I will contact him with the results. If imaging negative, would consider SI joint injection and PT. Patient verbalized understanding and agreement with the plan.    Denise Mcfarlane, CNP  [facility]  [address]  [address] [place] [id number]    Tel [phone]

## 2018-12-13 NOTE — PATIENT INSTRUCTIONS
Today's Visit    1. Ordered a lumbar MRI scan. To schedule, please call [phone]. You should schedule this within one week. We will call you with the results. If you don't hear from us 7 days after the scan, please call us.    Please call our clinic with any questions or concerns    Jeny TAYLOR RN ([facility] Nurse)  [facility]  [place] [address]  [place] [address]  T:  [phone]  F:  [phone]

## 2018-12-13 NOTE — LETTER
12/13/2018         RE: Bucky Edgar  [address] [place] [address]        Dear Colleague,    Thank you for referring your patient, Bucky Edgar, to the [facility]. Please see a copy of my visit note below.    Dr. Kwaku Kerr  [facility]  Neurosurgery Clinic Visit      CC: chronic low back pain    Primary care Provider: No Ref-Primary, Physician      Reason For Visit:   I was asked by Dr. Coy Bolaños to consult on the patient for chronic low back pain with sciatica.      HPI: Bucky Edgar is a 63 year old male with a history of chronic low back pain dating back to 1995. Per his report he has had multiple lumbar decompressions in the past and an anterior/posterior L4-S1 fusion in 2004. He also notes multiple SI joint injections. He notes intermittent flares of low back pain over the years that typically last less than a few weeks. Over the past month he has noted increasing pain in is right low back that intermittently radiates down his right thigh. He was unable to verbalize what location his pain radiated to. He also notes fluctuating numbness of his entire right leg. He states the pain is worse after lying down and improves with heat and elevation of his feet in a recliner. He denies weakness, foot drop, and bowel/bladder incontinences. He has not had any recent imaging, PT, or injections.     Pain at its worst 10  Pain right now:  6    Past Medical History:   Diagnosis Date     COPD (chronic obstructive pulmonary disease) (H)      Displacement of lumbar intervertebral disc without myelopathy 1995, 2002     HTN, goal below 140/90        Past Medical History reviewed with patient during visit.    Past Surgical History:   Procedure Laterality Date     C DECOMPRESS SPINAL CORD,1 SEG  1995, 4/2002    leftL4-L5, 2nd right L4-L5     C SPINE FUSION,ANTER,3 SGMTS  2/9/2004    ant and post fusion L4-S1     HC REPAIR ROTATOR CUFF,CHRONIC  1989     Past Surgical  History reviewed with patient during visit.    Current Outpatient Medications   Medication     acetaminophen (TYLENOL) 325 MG tablet     albuterol (PROAIR HFA, PROVENTIL HFA, VENTOLIN HFA) 108 (90 BASE) MCG/ACT inhaler     allopurinol (ZYLOPRIM) 100 MG tablet     allopurinol (ZYLOPRIM) 300 MG tablet     aspirin 81 MG tablet     carvedilol (COREG) 25 MG tablet     colchicine (COLCRYS) 0.6 MG tablet     lisinopril (PRINIVIL/ZESTRIL) 40 MG tablet     nicotine polacrilex (NICORETTE) 2 MG lozenge     NICOTINE STEP 1 21 MG/24HR 24 hr patch     predniSONE (DELTASONE) 20 MG tablet     predniSONE (DELTASONE) 20 MG tablet     sennosides (SENOKOT) 8.6 MG tablet     No current facility-administered medications for this visit.        Allergies   Allergen Reactions     Chlorthalidone Other (See Comments)     Excessive lowering of blood pressure     Amlodipine Other (See Comments)     Intractable headache with use of medication as well as noting a small tremor of the upper extremities       Social History     Socioeconomic History     Marital status:      Spouse name: Not on file     Number of children: Not on file     Years of education: Not on file     Highest education level: Not on file   Social Needs     Financial resource strain: Not on file     Food insecurity - worry: Not on file     Food insecurity - inability: Not on file     Transportation needs - medical: Not on file     Transportation needs - non-medical: Not on file   Occupational History     Not on file   Tobacco Use     Smoking status: Former Smoker     Packs/day: 0.50     Years: 38.00     Pack years: 19.00     Types: Cigarettes     Last attempt to quit: 10/1/2017     Years since quittin.2     Smokeless tobacco: Never Used   Substance and Sexual Activity     Alcohol use: Yes     Alcohol/week: 0.0 oz     Comment: 12 pack of beer every 2 weeks     Drug use: No     Sexual activity: Not Currently     Partners: Female   Other Topics Concern      Parent/sibling w/ CABG, MI or angioplasty before 65F 55M? No   Social History Narrative     Not on file       Family History   Problem Relation Age of Onset     Family History Negative Other      Diabetes No family hx of      Coronary Artery Disease No family hx of      Hypertension No family hx of      Hyperlipidemia No family hx of      Breast Cancer No family hx of      Prostate Cancer No family hx of      Anxiety Disorder No family hx of      Substance Abuse No family hx of      Asthma No family hx of      Thyroid Disease No family hx of      Unknown/Adopted No family hx of      Genetic Disorder No family hx of      Osteoporosis No family hx of      Anesthesia Reaction No family hx of      Mental Illness No family hx of      Depression No family hx of      Other Cancer No family hx of      Colon Cancer No family hx of      Cerebrovascular Disease No family hx of      Obesity No family hx of          Review Of Systems     ROS: 10 point ROS neg other than the symptoms noted above in the HPI.    Vital Signs:       Examination:  Constitutional:  Alert, well nourished, NAD.  Memory: recent and remote memory   HEENT: Normocephalic, atraumatic.   Pulm:  Without shortness of breath   CV:  No pitting edema of BLE.      Neurological:  Awake  Alert  Oriented x 3  Speech clear    Motor exam    Hip Flexor:                Right: 5/5  Left:  5/5  Hip Adductor:             Right:  5/5  Left:  5/5  Hip Abductor:             Right:  5/5  Left:  5/5  Gastroc Soleus:        Right:  5/5  Left:  5/5  Tib/Ant:                      Right:  5/5  Left:  5/5  EHL:                          Right:  5/5  Left:  5/5    Full strength of motor exam despite pain.    Sensation decreased to RLE  Muscle tone to bilateral upper and lower extremities.  Gait: Able to stand from a seated position. Normal non-antalgic, non-myelopathic gait.  Able to heel/toe walk without loss of balance    Lumbar examination reveals tenderness of the spine and  right>left paraspinous muscles.  Hip height is symmetrical. Positive right SI joint tenderness, sciatic notch or greater trochanteric tenderness to palpation bilaterally.  Straight leg raise is positive bilaterally.      Imaging: None    Assessment/Plan:   Bucky Edgar is a 63 year old male with a history of chronic low back pain dating back to 1995. Per his report he has had multiple lumbar decompressions in the past and an anterior/posterior L4-S1 fusion in 2004. He also notes multiple SI joint injections. He notes intermittent flares of low back pain over the years that typically last less than a few weeks. Over the past month he has noted increasing pain in is right low back that intermittently radiates down his right thigh. He was unable to verbalize what location his pain radiated to. He also notes fluctuating numbness of his entire right leg. He states the pain is worse after lying down and improves with heat and elevation of his feet in a recliner. He denies weakness, foot drop, and bowel/bladder incontinences. He has not had any recent imaging, PT, or injections. Discussed getting updated imaging due to history of chronic back pain that is increasing in severity over the past month. I will contact him with the results. If imaging negative, would consider SI joint injection and PT. Patient verbalized understanding and agreement with the plan.    Denise Mcfarlane CNP  [facility]  [address] [address]    Tel [phone]      Again, thank you for allowing me to participate in the care of your patient.        Sincerely,        Denise Mcfarlane NP

## 2018-12-15 ENCOUNTER — HOSPITAL ENCOUNTER (OUTPATIENT)
Dept: MRI IMAGING | Facility: CLINIC | Age: 63
Discharge: HOME | End: 2018-12-15
Attending: NURSE PRACTITIONER | Admitting: NURSE PRACTITIONER
Payer: MEDICARE

## 2018-12-15 DIAGNOSIS — M54.16 LUMBAR RADICULOPATHY: ICD-10-CM

## 2018-12-15 PROCEDURE — 72148 MRI LUMBAR SPINE W/O DYE: CPT

## 2018-12-17 ENCOUNTER — TELEPHONE (OUTPATIENT)
Dept: NEUROSURGERY | Facility: OTHER | Age: 63
End: 2018-12-17

## 2018-12-17 DIAGNOSIS — G89.29 CHRONIC BILATERAL LOW BACK PAIN WITH RIGHT-SIDED SCIATICA: Primary | ICD-10-CM

## 2018-12-17 DIAGNOSIS — M54.41 CHRONIC BILATERAL LOW BACK PAIN WITH RIGHT-SIDED SCIATICA: Primary | ICD-10-CM

## 2018-12-17 DIAGNOSIS — M54.16 LUMBAR RADICULOPATHY: ICD-10-CM

## 2018-12-17 NOTE — TELEPHONE ENCOUNTER
"Patient contacted to provide lumbar MRI results. He states he continues to have bilateral low back pain that occasionally radiates as a \"burning\" pain to his right thigh. He is unable to differentiate what location on his thigh but it does not extend past his knee. Discussed options of injections and physical therapy. He states that he has had SI joint injections in the past that were ineffective. Patient states he is not interested in any injections at this time and would like to initiate physical therapy instead. Order placed. Instructed him to contact the clinic if he decides to pursue an injection at a later date. Patient verbalized understanding and agreement with the plan.  "

## 2018-12-18 DIAGNOSIS — I10 HTN, GOAL BELOW 140/90: ICD-10-CM

## 2018-12-19 RX ORDER — LISINOPRIL 40 MG/1
TABLET ORAL
Qty: 90 TABLET | Refills: 0 | Status: SHIPPED | OUTPATIENT
Start: 2018-12-19 | End: 2019-05-03 | Stop reason: DRUGHIGH

## 2018-12-19 NOTE — TELEPHONE ENCOUNTER
"Requested Prescriptions   Pending Prescriptions Disp Refills     lisinopril (PRINIVIL/ZESTRIL) 40 MG tablet [Pharmacy Med Name: LISINOPRIL 40MG TABLETS] 90 tablet 0     Sig: TAKE 1 TABLET(40 MG) BY MOUTH DAILY    ACE Inhibitors (Including Combos) Protocol Passed - 12/18/2018 11:00 AM       Passed - Blood pressure under 140/90 in past 12 months    BP Readings from Last 3 Encounters:   12/13/18 130/84   11/13/18 130/82   11/01/18 114/82          Passed - Recent (12 mo) or future (30 days) visit within the authorizing provider's specialty    Patient had office visit in the last 12 months or has a visit in the next 30 days with authorizing provider or within the authorizing provider's specialty.  See \"Patient Info\" tab in inbasket, or \"Choose Columns\" in Meds & Orders section of the refill encounter.         Passed - Patient is age 18 or older       Passed - Normal serum creatinine on file in past 12 months    Recent Labs   Lab Test 08/07/18  0926   CR 1.16          Passed - Normal serum potassium on file in past 12 months    Recent Labs   Lab Test 05/02/18  1039   POTASSIUM 4.4           Last ov 11/01/2018    Prescription approved per [facility] [place] [place] Refill Protocol..    "

## 2018-12-27 ENCOUNTER — THERAPY VISIT (OUTPATIENT)
Dept: PHYSICAL THERAPY | Facility: CLINIC | Age: 63
End: 2018-12-27
Payer: MEDICARE

## 2018-12-27 DIAGNOSIS — M54.41 ACUTE RIGHT-SIDED LOW BACK PAIN WITH RIGHT-SIDED SCIATICA: Primary | ICD-10-CM

## 2018-12-27 DIAGNOSIS — M54.16 LUMBAR RADICULOPATHY: ICD-10-CM

## 2018-12-27 PROCEDURE — 97110 THERAPEUTIC EXERCISES: CPT | Mod: GP | Performed by: PHYSICAL THERAPIST

## 2018-12-27 PROCEDURE — G8982 BODY POS GOAL STATUS: HCPCS | Mod: GP | Performed by: PHYSICAL THERAPIST

## 2018-12-27 PROCEDURE — 97161 PT EVAL LOW COMPLEX 20 MIN: CPT | Mod: GP | Performed by: PHYSICAL THERAPIST

## 2018-12-27 PROCEDURE — 97140 MANUAL THERAPY 1/> REGIONS: CPT | Mod: GP | Performed by: PHYSICAL THERAPIST

## 2018-12-27 PROCEDURE — G8981 BODY POS CURRENT STATUS: HCPCS | Mod: GP | Performed by: PHYSICAL THERAPIST

## 2018-12-27 NOTE — PROGRESS NOTES
[facility] Initial Evaluation  Subjective:  Pt has been dealing with low back pain since Nov 2018.  The pain seemed to slowly come come on and increased over time.  Unknown cause.  He has hx of 5 lumbar surgeries.  The last surgery was a lumbar fusion L4-S1 joints 20 years ago.  Pain increases with prolonged positions.  Sitting limited to 10 min, standing limited to 10 min.  Waking after 4-5 hrs of sleep.  Then he is typically awake for a couple hours.  Feels better sitting in chair with heating pad.      The history is provided by the patient. No  was used.   Bucky Edgar is a 63 year old male with a sacroiliac and lumbar condition.  Condition occurred with:  Insidious onset.  Condition occurred: for unknown reasons.  This is a recurrent condition  MD order 12/17/18.    Patient reports pain:  SI joint right.  Radiates to:  Foot right, knee right, gluteals right, lower leg right and thigh right (typically stays in R thigh).  Pain is described as aching and burning and is constant and reported as 10/10.  Associated symptoms:  Loss of motion, loss of motion/stiffness, loss of strength, numbness and tingling. Pain is worse in the A.M..  Symptoms are exacerbated by standing, sitting, lying down, certain positions, lifting and bending and relieved by heat (tylenol).  Since onset symptoms are unchanged.  Special tests:  MRI (inflammed R SI joint).  Previous treatment includes surgery.  There was significant improvement following previous treatment.  General health as reported by patient is good.  Pertinent medical history includes:  High blood pressure and overweight.  Medical allergies: no.  Other surgeries include:  Orthopedic surgery (4 laminetomies/discectomy, 1 fusion).  Current medications:  High blood pressure medication.  Current occupation is None    Pt goal: improve sleeping and sitting tolerance.  Patient is currently not working due to present treatment problem.   Primary job tasks include:  Prolonged sitting and prolonged standing.    Barriers include:  None as reported by the patient.    Red flags:  None as reported by the patient.                        Objective:  System    Physical Exam    General     ROS  Bucky Edgar , : 1955, MRN: [id number]    Physical Therapy Objective Findings  Subjective information, goals, clinical impression, daily documentation and other information found in EPISODES tab.  Objective:     Lumbar Pain    Posture: decreased lumbar lordosis, mod rounded shoulders  Gait:  antalgic and R lower leg in ER, poor trunk rotation, poor push off R  Lumbar Range of Motion:  Flexion                                                33%                                                                                                                           Extension 10%   Right Side Bending 10%   Left Side Bending 25%   Repeated extension- standing    Repeated flexion- standing      Pelvic screen:                                                                         Positive                                            Negative                                             Standing Forward Bend X R    Gillet(March) X R    Supine to sit     Sacroiliac provocation test X R s/l with compression    Pubic symphysis provocation            -resisted hip add at 45  x   Other:       Hip Screen:                                                                  Positive                                             Negative                                             Hip ROM     Scour     GRUPO X B    FADIR X B    Other:     Manual Muscle Testing (graded 0-5, measured at 0 degrees unless otherwise noted):                                                                              Right                                  Left                                                     Transversus Abdominus     -Jose Leg Lowering (deg)     Hip Flex L2 3 (+++) 4+   Hip Abd      Hip Add 5 5   Hip Ext     Knee Flex 3 (+) 4 (+)   Knee Ext L3 3 (+) 4 (+)   Ankle Dorsiflexion L4 3 5   Great Toe Extension L5 3 5   Ankle Plantar Flexion S1     Other:     (+ mild pain, ++ moderate pain, +++ severe pain)    Special Tests:                                                                     Positive                                             Negative                                             Sign of Buttock  x   SLR X B    Quincy Test     Ely Test     Prone instability Test     Crossover SLR     Repeated extension prone     Other:       Flexibility:                                                              Right                                                 Left                                                      Hamstring SLR 27 SLR 35   Hip flexor     Quadricep     Elie's     piriformis Mod tightness Mod tightness   Other:            Segmental Mobility: fusion L4-S1, pain PA L3    Palpation: R ASIS sup, R PSIS inf, L sacral sulcus deeper, R MAREN inf, L FRS L3   Hypertonicity R QL    -If symptoms past hip, must do neuro testing  Dermatome/Sensory  Testing: decreased light touch R L4, L5 (related to other low back surgeries)  Reflex Testing:                                                                 Right                                                  Left                                                     Patellar Tendon Mild decrease  normal   Achilles Tendon normal normal   Babinski         Assessment/Plan:    Patient is a 63 year old male with lumbar complaints.    Patient has the following significant findings with corresponding treatment plan.                Diagnosis 1:  Low back pain consistent with L3-L4 and R SI irritation following lumbar fusion L4-S1 (20 years ago)    Pain -  hot/cold therapy, electric stimulation, self management, education and home program  Decreased ROM/flexibility - manual therapy, therapeutic exercise, therapeutic activity and home  program  Decreased joint mobility - manual therapy, therapeutic exercise, therapeutic activity and home program  Decreased strength - therapeutic exercise, therapeutic activities and home program  Inflammation - cold therapy, electric stimulation and self management/home program  Impaired gait - gait training and home program  Decreased function - therapeutic activities and home program  Impaired posture - neuro re-education, therapeutic activities and home program    Therapy Evaluation Codes:   1) History comprised of:   Personal factors that impact the plan of care:      Age, Past/current experiences and hx of 5 previous lumbar surgeries.    Comorbidity factors that impact the plan of care are:      Overweight.     Medications impacting care: High blood pressure.  2) Examination of Body Systems comprised of:   Body structures and functions that impact the plan of care:      Lumbar spine.   Activity limitations that impact the plan of care are:      Bathing, Bending, Driving, Dressing, Lifting, Sitting, Standing, Walking and Sleeping.  3) Clinical presentation characteristics are:   Stable/Uncomplicated.  4) Decision-Making    Low complexity using standardized patient assessment instrument and/or measureable assessment of functional outcome.  Cumulative Therapy Evaluation is: Low complexity.    Previous and current functional limitations:  (See Goal Flow Sheet for this information)    Short term and Long term goals: (See Goal Flow Sheet for this information)     Communication ability:  Patient appears to be able to clearly communicate and understand verbal and written communication and follow directions correctly.  Treatment Explanation - The following has been discussed with the patient:   RX ordered/plan of care  Anticipated outcomes  Possible risks and side effects  This patient would benefit from PT intervention to resume normal activities.   Rehab potential is good.    Frequency:  1 X week, once  daily  Duration:  for 8 weeks  Discharge Plan:  Achieve all LTG.  Independent in home treatment program.  Reach maximal therapeutic benefit.    Please refer to the daily flowsheet for treatment today, total treatment time and time spent performing 1:1 timed codes.     Kristopher Anguiano,PT, DPT, OCS

## 2018-12-27 NOTE — LETTER
DEPARTMENT OF HEALTH AND HUMAN SERVICES  CENTERS FOR MEDICARE & MEDICAID SERVICES    PLAN/UPDATED PLAN OF PROGRESS FOR OUTPATIENT REHABILITATION    PATIENTS NAME:  Bucky Edgar     : 1955    PROVIDER NUMBER:    [id number]    [id number]     PROVIDER NAME: [place] [facility]    MEDICAL RECORD NUMBER: [id number]     START OF CARE DATE:  SOC Date: 18   TYPE:  PT    PRIMARY/TREATMENT DIAGNOSIS: (Pertinent Medical Diagnosis)     Lumbar radiculopathy  Acute right-sided low back pain with right-sided sciatica    VISITS FROM START OF CARE:  Rxs Used: 1     [place] [facility] Initial Evaluation  Subjective:  Pt has been dealing with low back pain since 2018.  The pain seemed to slowly come come on and increased over time.  Unknown cause.  He has hx of 5 lumbar surgeries.  The last surgery was a lumbar fusion L4-S1 joints 20 years ago.  Pain increases with prolonged positions.  Sitting limited to 10 min, standing limited to 10 min.  Waking after 4-5 hrs of sleep.  Then he is typically awake for a couple hours.  Feels better sitting in chair with heating pad.    The history is provided by the patient. No  was used.   Bucky Edgar is a 63 year old male with a sacroiliac and lumbar condition.  Condition occurred with:  Insidious onset.  Condition occurred: for unknown reasons.  This is a recurrent condition  MD order 18.    Patient reports pain:  SI joint right.  Radiates to:  Foot right, knee right, gluteals right, lower leg right and thigh right (typically stays in R thigh).  Pain is described as aching and burning and is constant and reported as 10/10.  Associated symptoms:  Loss of motion, loss of motion/stiffness, loss of strength, numbness and tingling. Pain is worse in the A.M..  Symptoms are exacerbated by standing, sitting, lying down, certain positions, lifting and bending and relieved by heat (tylenol).   Since onset symptoms are unchanged.  Special tests:  MRI (inflammed R SI joint).  Previous treatment includes surgery.  There was significant improvement following previous treatment.  General health as reported by patient is good.  Pertinent medical history includes:  High blood pressure and overweight.  Medical allergies: no.  Other surgeries include:  Orthopedic surgery (4 laminetomies/discectomy, 1 fusion).  Current medications:  High blood pressure medication.  Current occupation is None    Pt goal: improve sleeping and sitting tolerance.  Patient is currently not working due to present treatment problem.  Primary job tasks include:  Prolonged sitting and prolonged standing.    Barriers include:  None as reported by the patient.    Red flags:  None as reported by the patient.            Objective:    Bucky CHRISTINE Edgar , : 1955, MRN: [id number]    Physical Therapy Objective Findings  Subjective information, goals, clinical impression, daily documentation and other information found in EPISODES tab.  Objective:     Lumbar Pain    Posture: decreased lumbar lordosis, mod rounded shoulders  Gait:  antalgic and R lower leg in ER, poor trunk rotation, poor push off R  Lumbar Range of Motion:  Flexion                                                33%                                                                                                                           Extension 10%   Right Side Bending 10%   Left Side Bending 25%   Repeated extension- standing    Repeated flexion- standing      Pelvic screen:                                                                         Positive                                            Negative                                             Standing Forward Bend X R    Gillet(March) X R    Supine to sit     Sacroiliac provocation test X R s/l with compression    Pubic symphysis provocation            -resisted hip add at 45  x   Other:       Hip Screen:                                                                   Positive                                             Negative                                             Hip ROM     Scour     GRUPO X B    FADIR X B    Other:     Manual Muscle Testing (graded 0-5, measured at 0 degrees unless otherwise noted):                                                                              Right                                  Left                                                     Transversus Abdominus     -Jose Leg Lowering (deg)     Hip Flex L2 3 (+++) 4+   Hip Abd     Hip Add 5 5   Hip Ext     Knee Flex 3 (+) 4 (+)   Knee Ext L3 3 (+) 4 (+)   Ankle Dorsiflexion L4 3 5   Great Toe Extension L5 3 5   Ankle Plantar Flexion S1     Other:     (+ mild pain, ++ moderate pain, +++ severe pain)    Special Tests:                                                                     Positive                                             Negative                                             Sign of Buttock  x   SLR X B    Quincy Test     Ely Test     Prone instability Test     Crossover SLR     Repeated extension prone     Other:       Flexibility:                                                              Right                                                 Left                                                      Hamstring SLR 27 SLR 35   Hip flexor     Quadricep     Elie's     piriformis Mod tightness Mod tightness   Other:            Segmental Mobility: fusion L4-S1, pain PA L3    Palpation: R ASIS sup, R PSIS inf, L sacral sulcus deeper, R MAREN inf, L FRS L3   Hypertonicity R QL    -If symptoms past hip, must do neuro testing  Dermatome/Sensory  Testing: decreased light touch R L4, L5 (related to other low back surgeries)  Reflex Testing:                                                                 Right                                                  Left                                                     Patellar Tendon Mild  decrease  normal   Achilles Tendon normal normal   Babinski       Assessment/Plan:    Patient is a 63 year old male with lumbar complaints.    Patient has the following significant findings with corresponding treatment plan.                Diagnosis 1:  Low back pain consistent with L3-L4 and R SI irritation following lumbar fusion L4-S1 (20 years ago)  Pain -  hot/cold therapy, electric stimulation, self management, education and home program  Decreased ROM/flexibility - manual therapy, therapeutic exercise, therapeutic activity and home program  Decreased joint mobility - manual therapy, therapeutic exercise, therapeutic activity and home program  Decreased strength - therapeutic exercise, therapeutic activities and home program  Inflammation - cold therapy, electric stimulation and self management/home program  Impaired gait - gait training and home program  Decreased function - therapeutic activities and home program  Impaired posture - neuro re-education, therapeutic activities and home program    Therapy Evaluation Codes:   1) History comprised of:   Personal factors that impact the plan of care:      Age, Past/current experiences and hx of 5 previous lumbar surgeries.    Comorbidity factors that impact the plan of care are:      Overweight.     Medications impacting care: High blood pressure.  2) Examination of Body Systems comprised of:   Body structures and functions that impact the plan of care:      Lumbar spine.   Activity limitations that impact the plan of care are:      Bathing, Bending, Driving, Dressing, Lifting, Sitting, Standing, Walking and Sleeping.  3) Clinical presentation characteristics are:   Stable/Uncomplicated.  4) Decision-Making    Low complexity using standardized patient assessment instrument and/or measureable assessment of functional outcome.  Cumulative Therapy Evaluation is: Low complexity.    Previous and current functional limitations:  (See Goal Flow Sheet for this  "information)    Short term and Long term goals: (See Goal Flow Sheet for this information)     Communication ability:  Patient appears to be able to clearly communicate and understand verbal and written communication and follow directions correctly.  Treatment Explanation - The following has been discussed with the patient:   RX ordered/plan of care  Anticipated outcomes  Possible risks and side effects  This patient would benefit from PT intervention to resume normal activities.   Rehab potential is good.    Frequency:  1 X week, once daily  Duration:  for 8 weeks  Discharge Plan:  Achieve all LTG.  Independent in home treatment program.  Reach maximal therapeutic benefit.    Caregiver Signature/Credentials _____________________________ Date ________       Treating Provider: Kristopher Anguiano DPT [facility]   I have reviewed and certified the need for these services and plan of treatment while under my care.        PHYSICIAN'S SIGNATURE:   _________________________________________  Date___________   Denise Mcfarlane MD    Certification period:  Beginning of Cert date period: 12/27/18 to  End of Cert period date: 03/07/19     Functional Level Progress Report: Please see attached \"Goal Flow sheet for Functional level.\"    ____X____ Continue Services or       ________ DC Services                Service dates: From  SOC Date: 12/27/18 date to present                         "

## 2018-12-27 NOTE — LETTER
DEPARTMENT OF HEALTH AND HUMAN SERVICES  CENTERS FOR MEDICARE & MEDICAID SERVICES    PLAN/UPDATED PLAN OF PROGRESS FOR OUTPATIENT REHABILITATION    PATIENTS NAME:  Bucky Edgar     : 1955    PROVIDER NUMBER:    [id number]    [id number]     PROVIDER NAME: [place] [facility]    MEDICAL RECORD NUMBER: [id number]     START OF CARE DATE:  SOC Date: 18   TYPE:  PT    PRIMARY/TREATMENT DIAGNOSIS: (Pertinent Medical Diagnosis)     Lumbar radiculopathy  Acute right-sided low back pain with right-sided sciatica    VISITS FROM START OF CARE:  Rxs Used: 1     [place] [facility] Initial Evaluation  Subjective:  Pt has been dealing with low back pain since 2018.  The pain seemed to slowly come come on and increased over time.  Unknown cause.  He has hx of 5 lumbar surgeries.  The last surgery was a lumbar fusion L4-S1 joints 20 years ago.  Pain increases with prolonged positions.  Sitting limited to 10 min, standing limited to 10 min.  Waking after 4-5 hrs of sleep.  Then he is typically awake for a couple hours.  Feels better sitting in chair with heating pad.    The history is provided by the patient. No  was used.   Bucky Edgar is a 63 year old male with a sacroiliac and lumbar condition.  Condition occurred with:  Insidious onset.  Condition occurred: for unknown reasons.  This is a recurrent condition     Patient reports pain:  SI joint right.  Radiates to:  Foot right, knee right, gluteals right, lower leg right and thigh right (typically stays in R thigh).  Pain is described as aching and burning and is constant and reported as 10/10.  Associated symptoms:  Loss of motion, loss of motion/stiffness, loss of strength, numbness and tingling. Pain is worse in the A.M..  Symptoms are exacerbated by standing, sitting, lying down, certain positions, lifting and bending and relieved by heat (tylenol).  Since onset symptoms  are unchanged.  Special tests:  MRI (inflammed R SI joint).  Previous treatment includes surgery.  There was significant improvement following previous treatment.  General health as reported by patient is good.  Pertinent medical history includes:  High blood pressure and overweight.  Medical allergies: no.  Other surgeries include:  Orthopedic surgery (4 laminetomies/discectomy, 1 fusion).  Current medications:  High blood pressure medication.  Current occupation is None    Pt goal: improve sleeping and sitting tolerance.  Patient is currently not working due to present treatment problem.  Primary job tasks include:  Prolonged sitting and prolonged standing.    Barriers include:  None as reported by the patient.  Red flags:  None as reported by the patient.           Objective:    Bucky Edgar , : 1955, MRN: [id number]    Physical Therapy Objective Findings  Subjective information, goals, clinical impression, daily documentation and other information found in EPISODES tab.  Objective:     Lumbar Pain    Posture: decreased lumbar lordosis, mod rounded shoulders  Gait:  antalgic and R lower leg in ER, poor trunk rotation, poor push off R  Lumbar Range of Motion:  Flexion                                                33%                                                                                                                           Extension 10%   Right Side Bending 10%   Left Side Bending 25%   Repeated extension- standing    Repeated flexion- standing      Pelvic screen:                                                                         Positive                                            Negative                                             Standing Forward Bend X R    Gillet(March) X R    Supine to sit     Sacroiliac provocation test X R s/l with compression    Pubic symphysis provocation            -resisted hip add at 45  x   Other:       Hip Screen:                                                                   Positive                                             Negative                                             Hip ROM     Scour     GRUPO X B    FADIR X B    Other:     Manual Muscle Testing (graded 0-5, measured at 0 degrees unless otherwise noted):                                                                              Right                                  Left                                                     Transversus Abdominus     -Jose Leg Lowering (deg)     Hip Flex L2 3 (+++) 4+   Hip Abd     Hip Add 5 5   Hip Ext     Knee Flex 3 (+) 4 (+)   Knee Ext L3 3 (+) 4 (+)   Ankle Dorsiflexion L4 3 5   Great Toe Extension L5 3 5   Ankle Plantar Flexion S1     Other:     (+ mild pain, ++ moderate pain, +++ severe pain)    Special Tests:                                                                     Positive                                             Negative                                             Sign of Buttock  x   SLR X B    Quincy Test     Ely Test     Prone instability Test     Crossover SLR     Repeated extension prone     Other:       Flexibility:                                                              Right                                                 Left                                                      Hamstring SLR 27 SLR 35   Hip flexor     Quadricep     Elie's     piriformis Mod tightness Mod tightness   Other:            Segmental Mobility: fusion L4-S1, pain PA L3    Palpation: R ASIS sup, R PSIS inf, L sacral sulcus deeper, R MAREN inf, L FRS L3   Hypertonicity R QL    -If symptoms past hip, must do neuro testing  Dermatome/Sensory  Testing: decreased light touch R L4, L5 (related to other low back surgeries)  Reflex Testing:                                                                 Right                                                  Left                                                     Patellar Tendon Mild decrease  normal    Achilles Tendon normal normal   Babinski         Assessment/Plan:    Patient is a 63 year old male with lumbar complaints.    Patient has the following significant findings with corresponding treatment plan.                Diagnosis 1:  Low back pain consistent with L3-L4 and R SI irritation following lumbar fusion L4-S1 (20 years ago)  Pain -  hot/cold therapy, electric stimulation, self management, education and home program  Decreased ROM/flexibility - manual therapy, therapeutic exercise, therapeutic activity and home program  Decreased joint mobility - manual therapy, therapeutic exercise, therapeutic activity and home program  Decreased strength - therapeutic exercise, therapeutic activities and home program  Inflammation - cold therapy, electric stimulation and self management/home program  Impaired gait - gait training and home program  Decreased function - therapeutic activities and home program  Impaired posture - neuro re-education, therapeutic activities and home program    Therapy Evaluation Codes:   1) History comprised of:   Personal factors that impact the plan of care:      Age, Past/current experiences and hx of 5 previous lumbar surgeries.    Comorbidity factors that impact the plan of care are:      Overweight.     Medications impacting care: High blood pressure.  2) Examination of Body Systems comprised of:   Body structures and functions that impact the plan of care:      Lumbar spine.   Activity limitations that impact the plan of care are:      Bathing, Bending, Driving, Dressing, Lifting, Sitting, Standing, Walking and Sleeping.  3) Clinical presentation characteristics are:   Stable/Uncomplicated.  4) Decision-Making    Low complexity using standardized patient assessment instrument and/or measureable assessment of functional outcome.  Cumulative Therapy Evaluation is: Low complexity.    Previous and current functional limitations:  (See Goal Flow Sheet for this information)    Short term  "and Long term goals: (See Goal Flow Sheet for this information)     Communication ability:  Patient appears to be able to clearly communicate and understand verbal and written communication and follow directions correctly.  Treatment Explanation - The following has been discussed with the patient:   RX ordered/plan of care  Anticipated outcomes  Possible risks and side effects  This patient would benefit from PT intervention to resume normal activities.   Rehab potential is good.    Frequency:  1 X week, once daily  Duration:  for 8 weeks  Discharge Plan:  Achieve all LTG.  Independent in home treatment program.  Reach maximal therapeutic benefit.    Caregiver Signature/Credentials _____________________________ Date ________       Treating Provider: Kristopher Anguiano DPT, [facility]   I have reviewed and certified the need for these services and plan of treatment while under my care.        PHYSICIAN'S SIGNATURE:   _________________________________________  Date___________   Denise Mcfarlane MD    Certification period:  Beginning of Cert date period: 12/27/18 to  End of Cert period date: 03/07/19     Functional Level Progress Report: Please see attached \"Goal Flow sheet for Functional level.\"    ____X____ Continue Services or       ________ DC Services                Service dates: From  SOC Date: 12/27/18 date to present                         "

## 2019-03-19 NOTE — PROGRESS NOTES
"  SUBJECTIVE:   Bucky Edgar is a 63 year old male who presents to clinic today for the following health issues:      HPI  Concern - possible hernia  Onset: 1 month    Description:   Left groin, tender     Intensity: depends    Progression of Symptoms:  same    Accompanying Signs & Symptoms:  Pain  Swelling - left side    Previous history of similar problem:   no    Precipitating factors:   Worsened by: certain movements     Alleviating factors:  Improved by: certain positions    Therapies Tried and outcome: NA      He has had a bulge in the left scrotum for the past 1.5 months. He denies any inciting injury but it seemed to just started 1 day. It is sore at times but it has not grown in size. He denies any redness. It is worse when moving from a lying to standing position. Sometimes he has nausea from the pain.     Problem list and histories reviewed & adjusted, as indicated.  Additional history: none    ROS:  GENERAL: Denies fever, fatigue, weakness, weight gain, or weight loss.  GENITOURINARY: +Left scrotal bulge and tenderness.     OBJECTIVE:     /82   Pulse 76   Temp 96.4  F (35.8  C) (Temporal)   Resp 18   Ht 1.753 m (5' 9\")   Wt 104.3 kg (230 lb)   SpO2 95%   BMI 33.97 kg/m    Body mass index is 33.97 kg/m .  GENERAL: healthy, alert and no distress  RESP: lungs clear to auscultation - no rales, rhonchi or wheezes  CV: regular rate and rhythm, normal S1 S2, no S3 or S4, no murmur, click or rub  ABDOMEN: soft, nontender, no hepatosplenomegaly, no masses and bowel sounds normal   (male): Peach sized left hydrocele, no current tenderness. No hernia.     ASSESSMENT/PLAN:       ICD-10-CM    1. Left hydrocele N43.3 UROLOGY ADULT REFERRAL       Left scrotal hydrocele noted which explains patient's symptoms. Since it has been painful, he would like it removed so will send to urology for further evaluation and to discuss treatment options. I recommend Tylenol as needed for pain with avoidance of " tight pants.    Hamlet Roberts PA-C  [facility]

## 2019-03-22 ENCOUNTER — OFFICE VISIT (OUTPATIENT)
Dept: FAMILY MEDICINE | Facility: OTHER | Age: 64
End: 2019-03-22
Payer: COMMERCIAL

## 2019-03-22 VITALS
HEIGHT: 69 IN | DIASTOLIC BLOOD PRESSURE: 82 MMHG | HEART RATE: 76 BPM | BODY MASS INDEX: 34.07 KG/M2 | SYSTOLIC BLOOD PRESSURE: 130 MMHG | OXYGEN SATURATION: 95 % | TEMPERATURE: 96.4 F | RESPIRATION RATE: 18 BRPM | WEIGHT: 230 LBS

## 2019-03-22 DIAGNOSIS — N43.3 LEFT HYDROCELE: Primary | ICD-10-CM

## 2019-03-22 PROCEDURE — 99213 OFFICE O/P EST LOW 20 MIN: CPT | Performed by: PHYSICIAN ASSISTANT

## 2019-03-22 ASSESSMENT — MIFFLIN-ST. JEOR: SCORE: 1838.65

## 2019-03-22 NOTE — PATIENT INSTRUCTIONS
The scrotal mass is consistent with a hydrocele which is a fluid filled sack.  It is not cancerous but can cause discomfort.  Will refer you to urology to discuss removal.      Patient Education     Hydrocele     A hydrocele is a sac of fluid that forms around a testicle. It occurs when fluid builds up in the layer of tissue that covers the testicle. It may be caused by an infection or by injury to the testicle. But the cause is often not known. A large hydrocele can cause pain or swelling in the scrotum. Hydrocelectomy is surgery to remove the hydrocele.    Date Last Reviewed: 8/1/2017 2000-2018 [employer]. [address] [place] [address], [place] [address]. All rights reserved. This information is not intended as a substitute for professional medical care. Always follow your healthcare professional's instructions.

## 2019-04-10 ENCOUNTER — OFFICE VISIT (OUTPATIENT)
Dept: UROLOGY | Facility: OTHER | Age: 64
End: 2019-04-10
Payer: COMMERCIAL

## 2019-04-10 VITALS — SYSTOLIC BLOOD PRESSURE: 134 MMHG | DIASTOLIC BLOOD PRESSURE: 86 MMHG

## 2019-04-10 DIAGNOSIS — N43.3 HYDROCELE, UNSPECIFIED HYDROCELE TYPE: Primary | ICD-10-CM

## 2019-04-10 PROCEDURE — 99203 OFFICE O/P NEW LOW 30 MIN: CPT | Performed by: UROLOGY

## 2019-04-10 ASSESSMENT — PAIN SCALES - GENERAL: PAINLEVEL: MODERATE PAIN (5)

## 2019-04-10 ASSESSMENT — MIFFLIN-ST. JEOR: SCORE: 1838.65

## 2019-04-10 NOTE — PROGRESS NOTES
S: Patient is a 63-year-old  male who is request be seen by Gilberto Roberts for a consultation with regard to patient's left scrotal swelling.  He has noticed this for several weeks.  He denies any trauma.  He has no pain.  It has not getting any larger.  Current Outpatient Medications   Medication Sig Dispense Refill     acetaminophen (TYLENOL) 325 MG tablet Take 650 mg by mouth every 6 hours       allopurinol (ZYLOPRIM) 100 MG tablet Allopurinol 700mg daily (two 300mg tablets + one 100mg tablet) 30 tablet 6     allopurinol (ZYLOPRIM) 300 MG tablet Allopurinol 700mg daily (two 300mg tablets + one 100mg tablet) 60 tablet 6     aspirin 81 MG tablet Take 1 tablet (81 mg) by mouth daily 30 tablet 0     carvedilol (COREG) 25 MG tablet TAKE ONE TABLET BY MOUTH TWICE DAILY WITH MEALS 180 tablet 2     colchicine (COLCRYS) 0.6 MG tablet PRN gout flare: 1.2mg (2 tablets) once followed by 0.6mg (1 tablet) 1 hour later; do not take more than 1.8mg within a week period. 30 tablet 0     lisinopril (PRINIVIL/ZESTRIL) 40 MG tablet TAKE 1 TABLET(40 MG) BY MOUTH DAILY 90 tablet 0     lisinopril (PRINIVIL/ZESTRIL) 40 MG tablet Take 1 tablet (40 mg) by mouth daily 90 tablet 2     nicotine polacrilex (NICORETTE) 2 MG lozenge Place 2 mg inside cheek every hour as needed for smoking cessation       NICOTINE STEP 1 21 MG/24HR 24 hr patch ROBBY 1 PATCH TO SKIN ONCE D X 42 DAYS  0     predniSONE (DELTASONE) 20 MG tablet For gout flare only: Prednisone 60mg daily x2days, then 40mg daily x5days, then 20mg daily x5days, then stop. 21 tablet 0     predniSONE (DELTASONE) 20 MG tablet Take 1 tablet (20 mg) by mouth 2 times daily 14 tablet 0     sennosides (SENOKOT) 8.6 MG tablet TK 1 T PO BID  0     albuterol (PROAIR HFA, PROVENTIL HFA, VENTOLIN HFA) 108 (90 BASE) MCG/ACT inhaler Inhale 2 puffs into the lungs every 6 hours as needed for shortness of breath / dyspnea (Patient not taking: Reported on 4/10/2019) 3 Inhaler 2     Allergies    Allergen Reactions     Chlorthalidone Other (See Comments)     Excessive lowering of blood pressure     Amlodipine Other (See Comments)     Intractable headache with use of medication as well as noting a small tremor of the upper extremities     Past Medical History:   Diagnosis Date     COPD (chronic obstructive pulmonary disease) (H)      Displacement of lumbar intervertebral disc without myelopathy 1995, 2002     HTN, goal below 140/90      Past Surgical History:   Procedure Laterality Date     C DECOMPRESS SPINAL CORD,1 SEG  1995, 4/2002    leftL4-L5, 2nd right L4-L5     C SPINE FUSION,ANTER,3 SGMTS  2/9/2004    ant and post fusion L4-S1     HC REPAIR ROTATOR CUFF,CHRONIC  1989      Family History   Problem Relation Age of Onset     Family History Negative Other      Diabetes No family hx of      Coronary Artery Disease No family hx of      Hypertension No family hx of      Hyperlipidemia No family hx of      Breast Cancer No family hx of      Prostate Cancer No family hx of      Anxiety Disorder No family hx of      Substance Abuse No family hx of      Asthma No family hx of      Thyroid Disease No family hx of      Unknown/Adopted No family hx of      Genetic Disorder No family hx of      Osteoporosis No family hx of      Anesthesia Reaction No family hx of      Mental Illness No family hx of      Depression No family hx of      Other Cancer No family hx of      Colon Cancer No family hx of      Cerebrovascular Disease No family hx of      Obesity No family hx of      Social History     Socioeconomic History     Marital status:      Spouse name: None     Number of children: None     Years of education: None     Highest education level: None   Occupational History     None   Social Needs     Financial resource strain: None     Food insecurity:     Worry: None     Inability: None     Transportation needs:     Medical: None     Non-medical: None   Tobacco Use     Smoking status: Former Smoker      "Packs/day: 0.50     Years: 38.00     Pack years: 19.00     Types: Cigarettes     Last attempt to quit: 10/1/2017     Years since quittin.5     Smokeless tobacco: Never Used   Substance and Sexual Activity     Alcohol use: Yes     Alcohol/week: 0.0 oz     Comment: 12 pack of beer every 2 weeks     Drug use: No     Sexual activity: Not Currently     Partners: Female   Lifestyle     Physical activity:     Days per week: None     Minutes per session: None     Stress: None   Relationships     Social connections:     Talks on phone: None     Gets together: None     Attends Sikhism service: None     Active member of club or organization: None     Attends meetings of clubs or organizations: None     Relationship status: None     Intimate partner violence:     Fear of current or ex partner: None     Emotionally abused: None     Physically abused: None     Forced sexual activity: None   Other Topics Concern     Parent/sibling w/ CABG, MI or angioplasty before 65F 55M? No   Social History Narrative     None       REVIEW OF SYSTEMS  =================  C: NEGATIVE for fever, chills, change in weight  I: NEGATIVE for worrisome rashes, moles or lesions  E/M: NEGATIVE for ear, mouth and throat problems  R: NEGATIVE for significant cough or SHORTNESS OF BREATH  CV:  NEGATIVE for chest pain, palpitations or peripheral edema  GI: NEGATIVE for nausea, abdominal pain, heartburn, or change in bowel habits  NEURO: NEGATIVE numbness/weakness  : see HPI  PSYCH: NEGATIVE depression/anxiety  LYmph: no new enlarged lymph nodes  Ortho: no new trauma/movements      Physical Exam:  /86   Pulse (P) 59   Temp (P) 98.1  F (36.7  C) (Oral)   Ht (P) 1.753 m (5' 9\")   Wt (P) 104.3 kg (230 lb)   BMI (P) 33.97 kg/m     Patient is pleasant, in no acute distress, good general condition.  Heart:  negative, PMI normal  Lung: no evidence of respiratory distress    Abdomen: Soft, nondistended, non tender. No masses. No rebound or " guarding.   Exam: Penis no discharge.  Testes without any masses.  Mild left hydrocele.  No scrotal skin lesion.  Skin: Warm and dry.  No redness.  Neuro: grossly normal  Musculaskeletal: moving all extremities  Psych normal mood and affect  Musculoskeletal  moving all extremities  Hematologic/Lymphatic/Immunologic: normal ant/post cervical, axillary, supraclavicular and inguinal nodes    Assessment/Plan:   Left hydrocele.  Anatomy discussed with patient at length.  Treatment options discussed.  Giving its current size I would recommend observation.  Follow-up with me as needed.

## 2019-04-29 NOTE — PROGRESS NOTES
SUBJECTIVE:   Bucky Edgar is a 63 year old male who presents to clinic today for the following health issues:    History of Present Illness     CKD:     NSAID use::  None    COPD:     Current status of COPD symptom::  Stable    Status of fatigue and dyspnea with ambulation::  Stable    Status of dyspnea::  Stable    Increase or change in cough or sputum::  YES (has cold for 3 weeks )    Fever::  YES (sweating at night )    Baseline ambulation without stopping to rest::  Less than 1 block    Number of flights of stairs without resting::  3    ER or UC Visits or Hospital admissions::  None    Hypertension:     Outpatient blood pressures:  Are being checked    Blood pressures checked at:  Home    Dietary sodium intake::  No added salt diet  Frequency of exercise:  2-3 days/week  Duration of exercise:  15-30 minutes  Taking medications regularly:  Yes  Medication side effects:  None  Additional concerns today:  No    Tobacco and alcohol use  Patient notes that he has stopped smoking, but continues to use nicotine lozenges. He reports having about 5 lozenges a day. He had smoked 2 packs a day for 10 years and a pack a day for over 20 years, since he was 14 years old. Alcohol use: 6 pack of beer a week. Patient relates that he can easily cut back.    Hypertension  He reports that he has had issues with his blood pressure for the past few days after helping his daughter replace her hardwood floors down in [place]; he is still sore in his knees and his blood pressure has stayed elevated. He notes that he skipped his afternoon pill twice this last week, but has no issues taking his morning doses.     COPD  He denies any chest pain or shortness of breath from his baseline COPD. Not had to use his inhaler for over a year. He denies having any unexpired inhalers at home.       He notes that he doesn't eat out and instead makes his meals at home. He relates his nutrition is decently healthy. He states that his mood is  good and stable.        Additional history: as documented  Reviewed and updated as needed this visit by clinical staff  Tobacco  Allergies  Meds  Med Hx  Surg Hx  Fam Hx  Soc Hx      Reviewed and updated as needed this visit by Provider       Patient Active Problem List   Diagnosis     Displacement of lumbar intervertebral disc without myelopathy     Tobacco use disorder     CARDIOVASCULAR SCREENING; LDL GOAL LESS THAN 130     HTN, goal below 140/90     Advanced directives, counseling/discussion     COPD (chronic obstructive pulmonary disease) (H)     Impaired fasting glucose     Hypertriglyceridemia     Vitamin D deficiency     Chronic bronchitis with COPD (chronic obstructive pulmonary disease) (H)     Closed fracture of multiple ribs of left side with routine healing, subsequent encounter     Chronic pain due to trauma     Long-term (current) use of anticoagulants [Z79.01]     Elevated serum creatinine     History of deep venous thrombosis (DVT) of distal vein of left lower extremity     Acute right-sided low back pain with right-sided sciatica     Past Surgical History:   Procedure Laterality Date     C DECOMPRESS SPINAL CORD,1 SEG  , 2002    leftL4-L5, 2nd right L4-L5     C SPINE FUSION,ANTER,3 SGMTS  2004    ant and post fusion L4-S1     HC REPAIR ROTATOR CUFF,CHRONIC         Social History     Tobacco Use     Smoking status: Former Smoker     Packs/day: 1.25     Years: 41.00     Pack years: 51.25     Types: Cigarettes     Last attempt to quit: 10/1/2017     Years since quittin.5     Smokeless tobacco: Never Used   Substance Use Topics     Alcohol use: Yes     Alcohol/week: 0.0 oz     Comment: 12 pack of beer every 2 weeks     Family History   Problem Relation Age of Onset     Family History Negative Other      Diabetes No family hx of      Coronary Artery Disease No family hx of      Hypertension No family hx of      Hyperlipidemia No family hx of      Breast Cancer No family hx of       Prostate Cancer No family hx of      Anxiety Disorder No family hx of      Substance Abuse No family hx of      Asthma No family hx of      Thyroid Disease No family hx of      Unknown/Adopted No family hx of      Genetic Disorder No family hx of      Osteoporosis No family hx of      Anesthesia Reaction No family hx of      Mental Illness No family hx of      Depression No family hx of      Other Cancer No family hx of      Colon Cancer No family hx of      Cerebrovascular Disease No family hx of      Obesity No family hx of          Current Outpatient Medications   Medication Sig Dispense Refill     acetaminophen (TYLENOL) 325 MG tablet Take 650 mg by mouth every 6 hours       albuterol (PROAIR HFA/PROVENTIL HFA/VENTOLIN HFA) 108 (90 Base) MCG/ACT inhaler Inhale 2 puffs into the lungs every 6 hours as needed for shortness of breath / dyspnea 18 g 0     allopurinol (ZYLOPRIM) 100 MG tablet Allopurinol 700mg daily (two 300mg tablets + one 100mg tablet) 30 tablet 6     allopurinol (ZYLOPRIM) 300 MG tablet Allopurinol 700mg daily (two 300mg tablets + one 100mg tablet) 60 tablet 6     aspirin 81 MG tablet Take 1 tablet (81 mg) by mouth daily 30 tablet 0     carvedilol (COREG) 25 MG tablet TAKE ONE TABLET BY MOUTH TWICE DAILY WITH MEALS 180 tablet 2     isosorbide mononitrate (IMDUR) 30 MG 24 hr tablet Take 1 tablet (30 mg) by mouth daily 30 tablet 1     lisinopril (PRINIVIL/ZESTRIL) 20 MG tablet Take 3 tablets (60 mg) by mouth daily 270 tablet 0     nicotine polacrilex (NICORETTE) 2 MG lozenge Place 2 mg inside cheek every hour as needed for smoking cessation       colchicine (COLCRYS) 0.6 MG tablet PRN gout flare: 1.2mg (2 tablets) once followed by 0.6mg (1 tablet) 1 hour later; do not take more than 1.8mg within a week period. (Patient not taking: Reported on 5/3/2019) 30 tablet 0     NICOTINE STEP 1 21 MG/24HR 24 hr patch ROBBY 1 PATCH TO SKIN ONCE D X 42 DAYS  0     sennosides (SENOKOT) 8.6 MG tablet TK 1 T PO  "BID  0     Allergies   Allergen Reactions     Chlorthalidone Other (See Comments)     Excessive lowering of blood pressure     Amlodipine Other (See Comments)     Intractable headache with use of medication as well as noting a small tremor of the upper extremities     Recent Labs   Lab Test 08/07/18  0926 05/02/18  1039 03/23/18  0918 02/28/18  1047 11/16/17  1037 11/16/17  1036  07/06/16  0950 11/12/15  1106 08/05/15  1333  01/29/14  0959   A1C  --   --   --   --  5.1  --   --   --   --  5.1  --  5.4   LDL  --   --   --   --   --  111*  --  105* 120  --   --  121   HDL  --   --   --   --   --  56  --  44 45  --   --  50   TRIG  --   --   --   --   --  314*  --  85 176*  --   --  205*   ALT 29 26  --  31  --  26  --  31  --   --   --   --    CR 1.16 1.11 1.06 1.27*  --  0.96   < > 1.13 1.03 1.18   < > 1.35*   GFRESTIMATED 64 67 71 58*  --  80   < > 66 74 63   < > 55*   GFRESTBLACK 77 82 86 70  --  >90   < > 80 90 77   < > 66   POTASSIUM  --  4.4 3.9 5.0  --  3.9   < > 4.7 4.1 4.6   < > 4.9    < > = values in this interval not displayed.      BP Readings from Last 3 Encounters:   05/03/19 (!) 160/108   04/10/19 134/86   03/22/19 130/82    Wt Readings from Last 3 Encounters:   05/03/19 103 kg (227 lb)   04/10/19 (P) 104.3 kg (230 lb)   03/22/19 104.3 kg (230 lb)        ROS:  Constitutional, HEENT, cardiovascular, pulmonary, GI, , musculoskeletal, neuro, skin, endocrine and psych systems are negative, except as otherwise noted.    This document serves as a record of the services and decisions personally performed and made by My Lipscomb CNP. It was created on his/her behalf by Aida Fitzgerald, trained medical scribe. The creation of this document is based the provider's statements to the medical scribes.    Tierney Fitzgerald 9:41 AM, May 3, 2019  OBJECTIVE:     BP (!) 160/108   Pulse 60   Temp 98.9  F (37.2  C) (Temporal)   Resp 20   Ht 1.753 m (5' 9\")   Wt 103 kg (227 lb)   SpO2 94%   BMI 33.52 kg/m  "   Body mass index is 33.52 kg/m .     GENERAL: healthy, alert and no distress  HENT: ear canals and TM's normal, nose and mouth without ulcers or lesions  NECK: no adenopathy, no asymmetry, masses, or scars and thyroid normal to palpation  RESP: no rales, rhonchi or wheezes, congestion on left  CV: regular rate and rhythm, normal S1 S2, no S3 or S4, no murmur, click or rub, no peripheral edema and peripheral pulses strong  SKIN:Francis face and roseacea, fine capillaries on face,   NEURO: Normal strength and tone, mentation intact and speech normal  PSYCH: mentation appears normal, affect normal/bright    Diagnostic Test Results:  No results found for this or any previous visit (from the past 24 hour(s)).    ASSESSMENT/PLAN:       ICD-10-CM    1. HTN, goal below 140/90 I10 BASIC METABOLIC PANEL     isosorbide mononitrate (IMDUR) 30 MG 24 hr tablet     lisinopril (PRINIVIL/ZESTRIL) 20 MG tablet     Basic metabolic panel     **A1C FUTURE 3mo     CARDIOLOGY EVAL ADULT REFERRAL   2. Nicotine use disorder F17.200 TOBACCO CESSATION ORDER FOR    3. Chronic bronchitis with COPD (chronic obstructive pulmonary disease) (H) J44.9 albuterol (PROAIR HFA/PROVENTIL HFA/VENTOLIN HFA) 108 (90 Base) MCG/ACT inhaler   4. Tobacco abuse counseling Z71.6    5. Personal history of tobacco use Z87.891 Prof Fee: Shared Decision Making Visit for Lung Cancer Screening     CT Chest Lung Cancer Scrn Low Dose wo   6. Screening for lipoid disorders Z13.220 Lipid panel reflex to direct LDL Fasting   7. Abnormal finding of blood chemistry  R79.9 **A1C FUTURE 3mo     Discussed CKD, COPD, hypertension, hyperlipidemia, healthy diet, and regular exercise at length with patient today. Future fasting lab work placed to return when fasting to complete- 5 days.    Blood pressure is uncontrolled. Advised increasing lisinopril from 40 to 60 mg once a day; Refill provided and prescription updated. Also advised starting Imdur 30 mg daily; Rx provided. Reviewed  directions, benefits, and side effects of medication with patient today.  Advised following up with cardiology; referral provided for patient to schedule, needs further eval.   Suspect alcohol use is under reported and contributing to labile HTN.     Medications are well tolerated with no reported side effects. Refills patient's albuterol 108 (90 base) inhaler mcg/act. Will recheck his lung congestion at follow up visit next week.    Due to patient's personal tobacco use history, discussed lung cancer screening. He does qualify for the low dose chest CT scan; referral provided for patient to schedule. Nicotine cessation as able.     Left base- mild lung congestion, discussed inhahler re-starting. Re-check exam next week. Asymptomatic of flaring COPD symptoms.     Follow up with PCP in 1 week for blood pressure /OV, fasting labs.   Cardiology referral.     The information in this document, created by the medical scribe for me, accurately reflects the services I personally performed and the decisions made by me. I have reviewed and approved this document for accuracy prior to leaving the patient care area.  My Lipscomb CNP  9:41 AM, 05/03/19    TROY Martin CNP  [facility]

## 2019-04-29 NOTE — PROGRESS NOTES
SUBJECTIVE:     Bucky Edgar is a 63 year old male, here for a routine health maintenance visit.    Patient was roomed by: Audrey MATHEW    {PEDS TEXT BY AGE:[id number]}

## 2019-05-03 ENCOUNTER — OFFICE VISIT (OUTPATIENT)
Dept: FAMILY MEDICINE | Facility: CLINIC | Age: 64
End: 2019-05-03
Payer: COMMERCIAL

## 2019-05-03 VITALS
DIASTOLIC BLOOD PRESSURE: 108 MMHG | WEIGHT: 227 LBS | HEART RATE: 60 BPM | BODY MASS INDEX: 33.62 KG/M2 | SYSTOLIC BLOOD PRESSURE: 160 MMHG | HEIGHT: 69 IN | RESPIRATION RATE: 20 BRPM | OXYGEN SATURATION: 94 % | TEMPERATURE: 98.9 F

## 2019-05-03 DIAGNOSIS — R79.9 ABNORMAL FINDING OF BLOOD CHEMISTRY: ICD-10-CM

## 2019-05-03 DIAGNOSIS — F17.200 NICOTINE USE DISORDER: ICD-10-CM

## 2019-05-03 DIAGNOSIS — Z71.6 TOBACCO ABUSE COUNSELING: ICD-10-CM

## 2019-05-03 DIAGNOSIS — J44.89 CHRONIC BRONCHITIS WITH COPD (CHRONIC OBSTRUCTIVE PULMONARY DISEASE) (H): ICD-10-CM

## 2019-05-03 DIAGNOSIS — Z13.220 SCREENING FOR LIPOID DISORDERS: ICD-10-CM

## 2019-05-03 DIAGNOSIS — Z87.891 PERSONAL HISTORY OF TOBACCO USE: ICD-10-CM

## 2019-05-03 DIAGNOSIS — I10 HTN, GOAL BELOW 140/90: Primary | ICD-10-CM

## 2019-05-03 PROCEDURE — G0296 VISIT TO DETERM LDCT ELIG: HCPCS | Performed by: NURSE PRACTITIONER

## 2019-05-03 PROCEDURE — 99214 OFFICE O/P EST MOD 30 MIN: CPT | Performed by: NURSE PRACTITIONER

## 2019-05-03 RX ORDER — ALBUTEROL SULFATE 90 UG/1
2 AEROSOL, METERED RESPIRATORY (INHALATION) EVERY 6 HOURS PRN
Qty: 18 G | Refills: 0 | Status: SHIPPED | OUTPATIENT
Start: 2019-05-03 | End: 2021-03-22

## 2019-05-03 RX ORDER — ISOSORBIDE MONONITRATE 30 MG/1
30 TABLET, EXTENDED RELEASE ORAL DAILY
Qty: 30 TABLET | Refills: 1 | Status: SHIPPED | OUTPATIENT
Start: 2019-05-03 | End: 2019-11-25

## 2019-05-03 RX ORDER — LISINOPRIL 20 MG/1
60 TABLET ORAL DAILY
Qty: 270 TABLET | Refills: 0 | Status: SHIPPED | OUTPATIENT
Start: 2019-05-03 | End: 2019-05-08

## 2019-05-03 ASSESSMENT — ANXIETY QUESTIONNAIRES
7. FEELING AFRAID AS IF SOMETHING AWFUL MIGHT HAPPEN: NOT AT ALL
GAD7 TOTAL SCORE: 3
3. WORRYING TOO MUCH ABOUT DIFFERENT THINGS: NOT AT ALL
2. NOT BEING ABLE TO STOP OR CONTROL WORRYING: NOT AT ALL
GAD7 TOTAL SCORE: 3
7. FEELING AFRAID AS IF SOMETHING AWFUL MIGHT HAPPEN: NOT AT ALL
6. BECOMING EASILY ANNOYED OR IRRITABLE: NOT AT ALL
1. FEELING NERVOUS, ANXIOUS, OR ON EDGE: NOT AT ALL
4. TROUBLE RELAXING: SEVERAL DAYS
5. BEING SO RESTLESS THAT IT IS HARD TO SIT STILL: MORE THAN HALF THE DAYS
GAD7 TOTAL SCORE: 3

## 2019-05-03 ASSESSMENT — PATIENT HEALTH QUESTIONNAIRE - PHQ9
10. IF YOU CHECKED OFF ANY PROBLEMS, HOW DIFFICULT HAVE THESE PROBLEMS MADE IT FOR YOU TO DO YOUR WORK, TAKE CARE OF THINGS AT HOME, OR GET ALONG WITH OTHER PEOPLE: NOT DIFFICULT AT ALL
SUM OF ALL RESPONSES TO PHQ QUESTIONS 1-9: 3
SUM OF ALL RESPONSES TO PHQ QUESTIONS 1-9: 3

## 2019-05-03 ASSESSMENT — PAIN SCALES - GENERAL: PAINLEVEL: MODERATE PAIN (4)

## 2019-05-03 ASSESSMENT — MIFFLIN-ST. JEOR: SCORE: 1825.05

## 2019-05-03 NOTE — PATIENT INSTRUCTIONS
Medications started:  - Today we started Imdur 30 mg daily and we increased your Lisinopril from 40 mg to 60 mg in the morning. You can cut your current pills in half to take 1.5 pills until you are out.  - Come fasting for your lab visit on Wednesday 5/8/19 and to make sure that the labs I placed as well as the labs your rheumatologist placed are drawn.  -Schedule a visit with cardiology as well      The Benefits of Living Smoke Free  What do you want to gain from quitting? Check off some reasons to quit.  Health Benefits  ___ Reduce my risk of lung cancer, heart disease, chronic lung disease  ___ Have fewer wrinkles and softer skin  ___ Improve my sense of taste and smell  ___ For pregnant women--reduce the risk of having a miscarriage, stillbirth, premature birth, or low-birth-weight baby  Personal Benefits  ___ Feel more in control of my life  ___ Have better-smelling hair, breath, clothes, home, and car  ___ Save time by not having to take smoke breaks, buy cigarettes, or hunt for a light  ___ Have whiter teeth  Family Benefits  ___ Reduce my children s respiratory tract infections  ___ Set a good example for my children  ___ Reduce my family s cancer risk  Financial Benefits  ___ Save hundreds of dollars each year that would be spent on cigarettes  ___ Save money on medical bills  ___ Save on life, health, and car insurance premiums    Those Dollars Add Up!  Cigarettes are expensive, and getting more expensive all the time. Do you realize how much money you are spending on cigarettes per year? What is the average amount you spend on a pack of cigarettes? What is the average number of packs that you smoke per day? Using your answers to these questions, fill in this formula to help you find out:  ($ _____ per pack) ×  ( _____ number of packs per day) × (365 days) =  $ _____ yearly cost of smoking  Besides tobacco, there are other costs, including extra cleaning bills and replacement costs for clothing and  furniture; medical expenses for smoking-related illnesses; and higher health, life, and car insurance premiums.    Cigars and Pipes Count Too!  Cigars and pipes are also dangerous. So are smokeless (chewing) tobacco and snuff. All of these products contain nicotine, a highly addictive substance that has harmful effects on your body. Quitting smoking means giving up all tobacco products.      3349-6449 [facility], [address] [place] [address], [place] [address]. All rights reserved. This information is not intended as a substitute for professional medical care. Always follow your healthcare professional's instructions.  Lung Cancer Screening   Frequently Asked Questions  If you are at high-risk for lung cancer, getting screened with low-dose computed tomography (LDCT) every year can help save your life. This handout offers answers to some of the most common questions about lung cancer screening. If you have other questions, please call [phone] ([phone]).     What is it?  Lung cancer screening uses special X-ray technology to create an image of your lung tissue. The exam is quick and easy and takes less than 10 seconds. We don t give you any medicine or use any needles. You can eat before and after the exam. You don t need to change your clothes as long as the clothing on your chest doesn t contain metal. But, you do need to be able to hold your breath for at least 6 seconds during the exam.    What is the goal of lung cancer screening?  The goal of lung cancer screening is to save lives. Many times, lung cancer is not found until a person starts having physical symptoms. Lung cancer screening can help detect lung cancer in the earliest stages when it may be easier to treat.    Who should be screened for lung cancer?  We suggest lung cancer screening for anyone who is at high-risk for lung cancer. You are in the high-risk group if you:      are between the ages of 55 and 79, and    have smoked at least  1 pack of cigarettes a day for 30 or more years, and    still smoke or have quit within the past 15 years.    However, if you have a new cough or shortness of breath, you should talk to your doctor before being screened.    Some national lung health advocacy groups also recommend screening for people ages 50 to 79 who have smoked an average of 1 pack of cigarettes a day for 20 years. They must also have at least 1 other risk factor for lung cancer, not including exposure to secondhand smoke. Other risk factors are having had cancer in the past, emphysema, pulmonary fibrosis, COPD, a family history of lung cancer, or exposure to certain materials such as arsenic, asbestos, beryllium, cadmium, chromium, diesel fumes, nickel, radon or silica. Your care team can help you know if you have one of these risk factors.     Why does it matter if I have symptoms?  Certain symptoms can be a sign that you have a condition in your lungs that should be checked and treated by your doctor. These symptoms include fever, chest pain, a new or changing cough, shortness of breath that you have never felt before, coughing up blood or unexplained weight loss. Having any of these symptoms can greatly affect the results of lung cancer screening.       Should all smokers get an LDCT lung cancer screening exam?  It depends. Lung cancer screening is for a very specific group of men and women who have a history of heavy smoking over a long period of time (see  Who should be screened for lung cancer  above).  I am in the high-risk group, but have been diagnosed with cancer in the past. Is LDCT lung cancer screening right for me?  In some cases, you should not have LDCT lung screening, such as when your doctor is already following your cancer with CT scan studies. Your doctor will help you decide if LDCT lung screening is right for you.  Do I need to have a screening exam every year?  Yes. If you are in the high-risk group described earlier, you  should get an LDCT lung cancer screening exam every year until you are 79, or are no longer willing or able to undergo screening and possible procedures to diagnose and treat lung cancer.  How effective is LDCT at preventing death from lung cancer?  Studies have shown that LDCT lung cancer screening can lower the risk of death from lung cancer by 20 percent in people who are at high-risk.  What are the risks?  There are some risks and limitations of LDCT lung cancer screening. We want to make sure you understand the risks and benefits, so please let us know if you have any questions. Your doctor may want to talk with you more about these risks.    Radiation exposure: As with any exam that uses radiation, there is a very small increased risk of cancer. The amount of radiation in LDCT is small--about the same amount a person would get from a mammogram. Your doctor orders the exam when he or she feels the potential benefits outweigh the risks.    False negatives: No test is perfect, including LDCT. It is possible that you may have a medical condition, including lung cancer, that is not found during your exam. This is called a false negative result.    False positives and more testing: LDCT very often finds something in the lung that could be cancer, but in fact is not. This is called a false positive result. False positive tests often cause anxiety. To make sure these findings are not cancer, you may need to have more tests. These tests will be done only if you give us permission. Sometimes patients need a treatment that can have side effects, such as a biopsy. For more information on false positives, see  What can I expect from the results?     Findings not related to lung cancer: Your LDCT exam also takes pictures of areas of your body next to your lungs. In a very small number of cases, the CT scan will show an abnormal finding in one of these areas, such as your kidneys, adrenal glands, liver or thyroid. This  finding may not be serious, but you may need more tests. Your doctor can help you decide what other tests you may need, if any.  What can I expect from the results?  About 1 out of 4 LDCT exams will find something that may need more tests. Most of the time, these findings are lung nodules. Lung nodules are very small collections of tissue in the lung. These nodules are very common, and the vast majority--more than 97 percent--are not cancer (benign). Most are normal lymph nodes or small areas of scarring from past infections.  But, if a small lung nodule is found to be cancer, the cancer can be cured more than 90 percent of the time. To know if the nodule is cancer, we may need to get more images before your next yearly screening exam. If the nodule has suspicious features (for example, it is large, has an odd shape or grows over time), we will refer you to a specialist for further testing.  Will my doctor also get the results?  Yes. Your doctor will get a copy of your results.  Is it okay to keep smoking now that there s a cancer screening exam?  No. Tobacco is one of the strongest cancer-causing agents. It causes not only lung cancer, but other cancers and cardiovascular (heart) diseases as well. The damage caused by smoking builds over time. This means that the longer you smoke, the higher your risk of disease. While it is never too late to quit, the sooner you quit, the better.  Where can I find help to quit smoking?  The best way to prevent lung cancer is to stop smoking. If you have already quit smoking, congratulations and keep it up! For help on quitting smoking, please call [employer] at [phone] ([phone] or the American Cancer Society at 1-535.710.5641 to find local resources near you.  One-on-one health coaching:  If you d prefer to work individually with a health care provider on tobacco cessation, we offer:      Medication Therapy Management:  Our specially trained pharmacists work closely with you  and your doctor to help you quit smoking.  Call [phone] or [phone] (toll free).     Can Do: Health coaching offered by [facility].  [email or web address]

## 2019-05-03 NOTE — PROGRESS NOTES
Lung Cancer Screening Shared Decision Making Visit     Bucky Edgar is eligible for lung cancer screening on the basis of the information provided in my signed lung cancer screening order.     I have discussed with patient the risks and benefits of screening for lung cancer with low-dose CT.     The risks include:  radiation exposure: one low dose chest CT has as much ionizing radiation as about 15 chest x-rays or 6 months of background radiation living in [place]    false positives: 96% of positive findings/nodules are NOT cancer, but some might still require additional diagnostic evaluation, including biopsy  over-diagnosis: some slow growing cancers that might never have been clinically significant will be detected and treated unnecessarily     The benefit of early detection of lung cancer is contingent upon adherence to annual screening or more frequent follow up if indicated.     Furthermore, reaping the benefits of screening requires Bucky Edgar to be willing and physically able to undergo diagnostic procedures, if indicated. Although no specific guide is available for determining severity of comorbidities, it is reasonable to withhold screening in patients who have greater mortality risk from other diseases.     We did discuss that the only way to prevent lung cancer is to not smoke. Smoking cessation assistance was offered.    I did not offer risk estimation using a calculator such as this one:    ShouldIScreen

## 2019-05-03 NOTE — PROGRESS NOTES
SUBJECTIVE:   Bucky Edgar is a 63 year old male who presents to clinic today for the following health issues:      HPI  Hypertension Follow-up      Outpatient blood pressures are being checked at home.  Results are 130-140/80s.    Low Salt Diet: no added salt    Pt was seen 1 week ago, last Thursday. He did not get labs done. He is FASTING today for labs. Also has labs per .     At his visit last week his BP was very high.   His lisinopril was increased from 40mg to 60mg and added imdur 30mg.  He states he did increase his lisinopril.  He did not  the imdur- he states he didn't know he was supposed to and didn't get it from the pharmacy.     He is already on carvedilol twice daily  He had medication reactions/side effects w/amlodipine and chlorthalidone (see allergy list)    He quit smoking > 1 year ago- Oct 2017  He walks for exercise sometimes and work around his home.  He follows a low sodium diet.       COPD Follow-Up    Symptoms are currently: stable- breathing feels stable.     Current fatigue or dyspnea with ambulation: none    Shortness of breath: stable    Increased or change in Cough/Sputum: No    Fever(s): No    Baseline ambulation without stopping to rest: 70  feet. Able to walk up 4 flights of stairs without stopping to rest.    Any ER/UC or hospital admissions since your last visit? No     History   Smoking Status     Former Smoker     Packs/day: 1.25     Years: 41.00     Types: Cigarettes     Quit date: 10/1/2017   Smokeless Tobacco     Never Used     Lab Results   Component Value Date    FEV1 2.89 11/27/2013    XLV1XVN 60% 11/27/2013       Additional history: as documented    Reviewed and updated as needed this visit by clinical staff         Reviewed and updated as needed this visit by Provider           Patient Active Problem List   Diagnosis     Displacement of lumbar intervertebral disc without myelopathy     Tobacco use disorder     CARDIOVASCULAR SCREENING; LDL GOAL  LESS THAN 130     HTN, goal below 140/90     Advanced directives, counseling/discussion     COPD (chronic obstructive pulmonary disease) (H)     Impaired fasting glucose     Hypertriglyceridemia     Vitamin D deficiency     Chronic bronchitis with COPD (chronic obstructive pulmonary disease) (H)     Closed fracture of multiple ribs of left side with routine healing, subsequent encounter     Chronic pain due to trauma     Long-term (current) use of anticoagulants [Z79.01]     Elevated serum creatinine     History of deep venous thrombosis (DVT) of distal vein of left lower extremity     Acute right-sided low back pain with right-sided sciatica     Past Surgical History:   Procedure Laterality Date     C DECOMPRESS SPINAL CORD,1 SEG  , 2002    leftL4-L5, 2nd right L4-L5     C SPINE FUSION,ANTER,3 SGMTS  2004    ant and post fusion L4-S1     HC REPAIR ROTATOR CUFF,CHRONIC         Social History     Tobacco Use     Smoking status: Former Smoker     Packs/day: 1.25     Years: 41.00     Pack years: 51.25     Types: Cigarettes     Last attempt to quit: 10/1/2017     Years since quittin.6     Smokeless tobacco: Never Used   Substance Use Topics     Alcohol use: Yes     Alcohol/week: 0.0 oz     Comment: 12 pack of beer every 2 weeks     Family History   Problem Relation Age of Onset     Family History Negative Other      Diabetes No family hx of      Coronary Artery Disease No family hx of      Hypertension No family hx of      Hyperlipidemia No family hx of      Breast Cancer No family hx of      Prostate Cancer No family hx of      Anxiety Disorder No family hx of      Substance Abuse No family hx of      Asthma No family hx of      Thyroid Disease No family hx of      Unknown/Adopted No family hx of      Genetic Disorder No family hx of      Osteoporosis No family hx of      Anesthesia Reaction No family hx of      Mental Illness No family hx of      Depression No family hx of      Other Cancer No family  hx of      Colon Cancer No family hx of      Cerebrovascular Disease No family hx of      Obesity No family hx of          Current Outpatient Medications   Medication Sig Dispense Refill     acetaminophen (TYLENOL) 325 MG tablet Take 650 mg by mouth every 6 hours       albuterol (PROAIR HFA/PROVENTIL HFA/VENTOLIN HFA) 108 (90 Base) MCG/ACT inhaler Inhale 2 puffs into the lungs every 6 hours as needed for shortness of breath / dyspnea 18 g 0     allopurinol (ZYLOPRIM) 100 MG tablet Allopurinol 700mg daily (two 300mg tablets + one 100mg tablet) 30 tablet 6     allopurinol (ZYLOPRIM) 300 MG tablet Allopurinol 700mg daily (two 300mg tablets + one 100mg tablet) 60 tablet 6     aspirin 81 MG tablet Take 1 tablet (81 mg) by mouth daily 30 tablet 0     carvedilol (COREG) 25 MG tablet TAKE ONE TABLET BY MOUTH TWICE DAILY WITH MEALS 180 tablet 2     colchicine (COLCRYS) 0.6 MG tablet PRN gout flare: 1.2mg (2 tablets) once followed by 0.6mg (1 tablet) 1 hour later; do not take more than 1.8mg within a week period. 30 tablet 0     isosorbide mononitrate (IMDUR) 30 MG 24 hr tablet Take 1 tablet (30 mg) by mouth daily 30 tablet 1     lisinopril (PRINIVIL/ZESTRIL) 20 MG tablet Take 3 tablets (60 mg) by mouth daily 270 tablet 0     nicotine polacrilex (NICORETTE) 2 MG lozenge Place 2 mg inside cheek every hour as needed for smoking cessation       NICOTINE STEP 1 21 MG/24HR 24 hr patch ROBBY 1 PATCH TO SKIN ONCE D X 42 DAYS  0     sennosides (SENOKOT) 8.6 MG tablet TK 1 T PO BID  0     Allergies   Allergen Reactions     Chlorthalidone Other (See Comments)     Excessive lowering of blood pressure     Amlodipine Other (See Comments)     Intractable headache with use of medication as well as noting a small tremor of the upper extremities     BP Readings from Last 3 Encounters:   05/08/19 140/88   05/03/19 (!) 160/108   04/10/19 134/86    Wt Readings from Last 3 Encounters:   05/08/19 102 kg (224 lb 12.8 oz)   05/03/19 103 kg (227 lb)  "  04/10/19 (P) 104.3 kg (230 lb)                  Labs reviewed in EPIC    ROS:  + seasonal spring allergies have started. Itchy, watery eyes, rhinorrhea.   Constitutional, HEENT, cardiovascular, pulmonary, gi and gu systems are negative, except as otherwise noted.    OBJECTIVE:     /88   Pulse 72   Temp 98.6  F (37  C) (Oral)   Resp 20   Ht 1.753 m (5' 9\")   Wt 102 kg (224 lb 12.8 oz)   SpO2 94%   BMI 33.20 kg/m    Body mass index is 33.2 kg/m .  GENERAL: healthy, alert and no distress  EYES: Eyes grossly normal to inspection, PERRL and conjunctivae and sclerae normal, right eye clear tearing.   RESP: mild throat clearing cough, decreased breath sounds in bases, no rales, rhonchi or wheeze.   CV: regular rate and rhythm, normal S1 S2, no S3 or S4, no murmur, click or rub, no peripheral edema and peripheral pulses strong  MS: no gross musculoskeletal defects noted, no edema  NEURO: Normal strength and tone, mentation intact and speech normal    Diagnostic Test Results:  Pending at time of note closure.    ASSESSMENT/PLAN:       1. HTN, goal below 140/90  Lisinopril - reduce from 60mg to 40mg. Max dosing per mfg 40mg daily.   Pt had not started imdur that was rx'ed last week. Provided info for him and in AVS to  from pharmacy.  Labs- fasting today- released from his visit last week drawn today.   F/u with My Lipscomb in 1-2 weeks for recheck.   - Basic metabolic panel  - **Hepatic panel FUTURE 2mo  - **A1C FUTURE 3mo  - lisinopril (PRINIVIL/ZESTRIL) 20 MG tablet; Take 2 tablets (40 mg) by mouth daily  Dispense: 180 tablet; Refill: 0    2. Abnormal finding of blood chemistry   Released future orders from last week  - **A1C FUTURE 3mo    3. Screening for lipoid disorders  Released future orders from last week- pt is fasting.   - Lipid panel reflex to direct LDL Fasting    4. Idiopathic chronic gout of multiple sites without tophus  Released orders from rheumatology - per pt request   - Uric acid  - " CBC with platelets differential    Follow Up: 1-2 weeks- HTN f/u   The patient was instructed to contact clinic for worsening symptoms, non-improvement as expected/discussed, and for questions regarding medications or treatment plan. Discussed parameters for follow up and included in After Visit Summary given to patient.      Elizabeth Bojorquez PA-C  [facility]

## 2019-05-04 ASSESSMENT — PATIENT HEALTH QUESTIONNAIRE - PHQ9: SUM OF ALL RESPONSES TO PHQ QUESTIONS 1-9: 3

## 2019-05-04 ASSESSMENT — ANXIETY QUESTIONNAIRES: GAD7 TOTAL SCORE: 3

## 2019-05-06 ENCOUNTER — ANCILLARY PROCEDURE (OUTPATIENT)
Dept: CT IMAGING | Facility: CLINIC | Age: 64
End: 2019-05-06
Attending: NURSE PRACTITIONER
Payer: COMMERCIAL

## 2019-05-06 DIAGNOSIS — Z87.891 PERSONAL HISTORY OF TOBACCO USE: ICD-10-CM

## 2019-05-06 PROCEDURE — G0297 LDCT FOR LUNG CA SCREEN: HCPCS | Performed by: RADIOLOGY

## 2019-05-08 ENCOUNTER — OFFICE VISIT (OUTPATIENT)
Dept: FAMILY MEDICINE | Facility: CLINIC | Age: 64
End: 2019-05-08
Payer: COMMERCIAL

## 2019-05-08 VITALS
SYSTOLIC BLOOD PRESSURE: 140 MMHG | RESPIRATION RATE: 20 BRPM | BODY MASS INDEX: 33.3 KG/M2 | HEIGHT: 69 IN | DIASTOLIC BLOOD PRESSURE: 88 MMHG | WEIGHT: 224.8 LBS | TEMPERATURE: 98.6 F | HEART RATE: 72 BPM | OXYGEN SATURATION: 94 %

## 2019-05-08 DIAGNOSIS — I10 HTN, GOAL BELOW 140/90: Primary | ICD-10-CM

## 2019-05-08 DIAGNOSIS — M1A.09X0 IDIOPATHIC CHRONIC GOUT OF MULTIPLE SITES WITHOUT TOPHUS: ICD-10-CM

## 2019-05-08 DIAGNOSIS — R79.9 ABNORMAL FINDING OF BLOOD CHEMISTRY: ICD-10-CM

## 2019-05-08 DIAGNOSIS — Z13.220 SCREENING FOR LIPOID DISORDERS: ICD-10-CM

## 2019-05-08 LAB
ALBUMIN SERPL-MCNC: 3.6 G/DL (ref 3.4–5)
ALP SERPL-CCNC: 105 U/L (ref 40–150)
ALT SERPL W P-5'-P-CCNC: 36 U/L (ref 0–70)
ANION GAP SERPL CALCULATED.3IONS-SCNC: 5 MMOL/L (ref 3–14)
AST SERPL W P-5'-P-CCNC: 20 U/L (ref 0–45)
BASOPHILS # BLD AUTO: 0.1 10E9/L (ref 0–0.2)
BASOPHILS NFR BLD AUTO: 0.6 %
BILIRUB DIRECT SERPL-MCNC: 0.3 MG/DL (ref 0–0.2)
BILIRUB SERPL-MCNC: 1.5 MG/DL (ref 0.2–1.3)
BUN SERPL-MCNC: 15 MG/DL (ref 7–30)
CALCIUM SERPL-MCNC: 9.2 MG/DL (ref 8.5–10.1)
CHLORIDE SERPL-SCNC: 107 MMOL/L (ref 94–109)
CHOLEST SERPL-MCNC: 180 MG/DL
CO2 SERPL-SCNC: 27 MMOL/L (ref 20–32)
CREAT SERPL-MCNC: 1.04 MG/DL (ref 0.66–1.25)
DIFFERENTIAL METHOD BLD: ABNORMAL
EOSINOPHIL # BLD AUTO: 0.3 10E9/L (ref 0–0.7)
EOSINOPHIL NFR BLD AUTO: 3.7 %
ERYTHROCYTE [DISTWIDTH] IN BLOOD BY AUTOMATED COUNT: 15.2 % (ref 10–15)
GFR SERPL CREATININE-BSD FRML MDRD: 77 ML/MIN/{1.73_M2}
GLUCOSE SERPL-MCNC: 100 MG/DL (ref 70–99)
HBA1C MFR BLD: 5.3 % (ref 0–5.6)
HCT VFR BLD AUTO: 58.1 % (ref 40–53)
HDLC SERPL-MCNC: 53 MG/DL
HGB BLD-MCNC: 19.7 G/DL (ref 13.3–17.7)
LDLC SERPL CALC-MCNC: 100 MG/DL
LYMPHOCYTES # BLD AUTO: 1.4 10E9/L (ref 0.8–5.3)
LYMPHOCYTES NFR BLD AUTO: 16.5 %
MCH RBC QN AUTO: 32.1 PG (ref 26.5–33)
MCHC RBC AUTO-ENTMCNC: 33.9 G/DL (ref 31.5–36.5)
MCV RBC AUTO: 95 FL (ref 78–100)
MONOCYTES # BLD AUTO: 1 10E9/L (ref 0–1.3)
MONOCYTES NFR BLD AUTO: 11.5 %
NEUTROPHILS # BLD AUTO: 5.8 10E9/L (ref 1.6–8.3)
NEUTROPHILS NFR BLD AUTO: 67.7 %
NONHDLC SERPL-MCNC: 127 MG/DL
PLATELET # BLD AUTO: 184 10E9/L (ref 150–450)
POTASSIUM SERPL-SCNC: 4.4 MMOL/L (ref 3.4–5.3)
PROT SERPL-MCNC: 7 G/DL (ref 6.8–8.8)
RBC # BLD AUTO: 6.13 10E12/L (ref 4.4–5.9)
SODIUM SERPL-SCNC: 139 MMOL/L (ref 133–144)
TRIGL SERPL-MCNC: 135 MG/DL
URATE SERPL-MCNC: 2.3 MG/DL (ref 3.5–7.2)
WBC # BLD AUTO: 8.6 10E9/L (ref 4–11)

## 2019-05-08 PROCEDURE — 80076 HEPATIC FUNCTION PANEL: CPT | Performed by: NURSE PRACTITIONER

## 2019-05-08 PROCEDURE — 85025 COMPLETE CBC W/AUTO DIFF WBC: CPT | Performed by: NURSE PRACTITIONER

## 2019-05-08 PROCEDURE — 99214 OFFICE O/P EST MOD 30 MIN: CPT | Performed by: PHYSICIAN ASSISTANT

## 2019-05-08 PROCEDURE — 83036 HEMOGLOBIN GLYCOSYLATED A1C: CPT | Performed by: NURSE PRACTITIONER

## 2019-05-08 PROCEDURE — 84550 ASSAY OF BLOOD/URIC ACID: CPT | Performed by: NURSE PRACTITIONER

## 2019-05-08 PROCEDURE — 80048 BASIC METABOLIC PNL TOTAL CA: CPT | Performed by: NURSE PRACTITIONER

## 2019-05-08 PROCEDURE — 36415 COLL VENOUS BLD VENIPUNCTURE: CPT | Performed by: NURSE PRACTITIONER

## 2019-05-08 PROCEDURE — 80061 LIPID PANEL: CPT | Performed by: NURSE PRACTITIONER

## 2019-05-08 RX ORDER — LISINOPRIL 20 MG/1
40 TABLET ORAL DAILY
Qty: 180 TABLET | Refills: 0 | Status: SHIPPED | OUTPATIENT
Start: 2019-05-08 | End: 2019-11-25

## 2019-05-08 ASSESSMENT — PATIENT HEALTH QUESTIONNAIRE - PHQ9
SUM OF ALL RESPONSES TO PHQ QUESTIONS 1-9: 0
10. IF YOU CHECKED OFF ANY PROBLEMS, HOW DIFFICULT HAVE THESE PROBLEMS MADE IT FOR YOU TO DO YOUR WORK, TAKE CARE OF THINGS AT HOME, OR GET ALONG WITH OTHER PEOPLE: NOT DIFFICULT AT ALL
SUM OF ALL RESPONSES TO PHQ QUESTIONS 1-9: 0

## 2019-05-08 ASSESSMENT — MIFFLIN-ST. JEOR: SCORE: 1815.07

## 2019-05-08 NOTE — PATIENT INSTRUCTIONS
Blood pressure - Hypertension   Lisinopril- take 40mg daily. (2 of the 20mg tablets)   Start on the isosorbide (Imdur 30mg) once daily for blood pressure. My prescribed this at your visit last week.   Continue low sodium diet    Will do fasting labs today     See My back in 1-2 weeks.

## 2019-05-09 ENCOUNTER — TELEPHONE (OUTPATIENT)
Dept: FAMILY MEDICINE | Facility: CLINIC | Age: 64
End: 2019-05-09

## 2019-05-09 DIAGNOSIS — R74.8 ELEVATED LIVER ENZYMES: Primary | ICD-10-CM

## 2019-05-09 ASSESSMENT — PATIENT HEALTH QUESTIONNAIRE - PHQ9: SUM OF ALL RESPONSES TO PHQ QUESTIONS 1-9: 0

## 2019-05-09 NOTE — TELEPHONE ENCOUNTER
Pt. notified of labs and liver function elevation. Advised no Tylenol- pt. Uses for his back. Drinks 6 pack/week.   Liver ultrasound ordered,number given. My Lipscomb

## 2019-05-10 PROBLEM — M54.41 ACUTE RIGHT-SIDED LOW BACK PAIN WITH RIGHT-SIDED SCIATICA: Status: RESOLVED | Noted: 2018-12-27 | Resolved: 2019-05-10

## 2019-05-10 NOTE — PROGRESS NOTES
Patient seen for one time evaluation and treatment.  Patient did not return for further treatment and current status is unknown.  Please see initial evaluation for further information.    Kristopher Anguiano,PT, DPT, OCS

## 2019-05-14 ENCOUNTER — OFFICE VISIT (OUTPATIENT)
Dept: RHEUMATOLOGY | Facility: CLINIC | Age: 64
End: 2019-05-14
Payer: COMMERCIAL

## 2019-05-14 ENCOUNTER — TELEPHONE (OUTPATIENT)
Dept: FAMILY MEDICINE | Facility: CLINIC | Age: 64
End: 2019-05-14

## 2019-05-14 VITALS
DIASTOLIC BLOOD PRESSURE: 108 MMHG | OXYGEN SATURATION: 94 % | BODY MASS INDEX: 39.52 KG/M2 | SYSTOLIC BLOOD PRESSURE: 188 MMHG | HEART RATE: 64 BPM | TEMPERATURE: 98.9 F | WEIGHT: 266.8 LBS | HEIGHT: 69 IN | RESPIRATION RATE: 16 BRPM

## 2019-05-14 DIAGNOSIS — I10 ESSENTIAL HYPERTENSION: Primary | ICD-10-CM

## 2019-05-14 DIAGNOSIS — M1A.09X0 IDIOPATHIC CHRONIC GOUT OF MULTIPLE SITES WITHOUT TOPHUS: Primary | ICD-10-CM

## 2019-05-14 DIAGNOSIS — Z87.891 PERSONAL HISTORY OF TOBACCO USE: ICD-10-CM

## 2019-05-14 PROCEDURE — 99213 OFFICE O/P EST LOW 20 MIN: CPT | Performed by: INTERNAL MEDICINE

## 2019-05-14 RX ORDER — ALLOPURINOL 100 MG/1
TABLET ORAL
Qty: 30 TABLET | Refills: 6 | Status: SHIPPED | OUTPATIENT
Start: 2019-05-14 | End: 2019-11-12

## 2019-05-14 RX ORDER — PREDNISONE 20 MG/1
TABLET ORAL
Qty: 21 TABLET | Refills: 0 | Status: SHIPPED | OUTPATIENT
Start: 2019-05-14 | End: 2021-03-17

## 2019-05-14 RX ORDER — ALLOPURINOL 300 MG/1
TABLET ORAL
Qty: 60 TABLET | Refills: 6 | Status: SHIPPED | OUTPATIENT
Start: 2019-05-14 | End: 2019-11-12

## 2019-05-14 ASSESSMENT — MIFFLIN-ST. JEOR: SCORE: 2005.58

## 2019-05-14 NOTE — NURSING NOTE
Bucky to follow up with Primary Care provider regarding elevated blood pressure.    Spoke with Dr. Bolaños about patient's blood pressure and he would like patient to call or see his PCP today. Explained this to patient and he will call her when he gets home. Patient had no headache, nausea, chest pain or tightness or pain in his arm.     Saba Patricia CMA Rheumatology  5/14/2019 10:53 AM

## 2019-05-14 NOTE — TELEPHONE ENCOUNTER
Make sure he is taking Imdur.  At this point, we can have him see cardiology. I would like him seen in the next 4 weeks. I'll place a referral. If not able to be seen, I want him seen with me later this week or early next week.    My [facility]comb

## 2019-05-14 NOTE — TELEPHONE ENCOUNTER
Pt saw Dr. Bolaños and his BP was 188/108.    FYI for provider.  Please advise if you request follow up.

## 2019-05-14 NOTE — TELEPHONE ENCOUNTER
Reason for Call:  Other Blood Pressure    Detailed comments: Patient just came back Dr. Mariam Bolaños want to have him call you regarding his bp    Phone Number Patient can be reached at: Cell number on file:    Telephone Information:   Mobile [phone]       Best Time: anytime    Can we leave a detailed message on this number?  No VM set up    Call taken on 5/14/2019 at 11:48 AM by Cuong Brown

## 2019-05-14 NOTE — NURSING NOTE
RAPID3 (0-30) Cumulative Score  13.7          RAPID3 Weighted Score (divide #4 by 3 and that is the weighted score)  4.6         Saba Patricia [facility]  5/14/2019 10:13 AM

## 2019-05-14 NOTE — PROGRESS NOTES
"Rheumatology Clinic Visit      Bucky Edgar MRN# [id number]   YOB: 1955 Age: 63 year old      Date of visit: 5/14/19   PCP: Dr. Percy Deshpande    Chief Complaint   Patient presents with:  Arthritis: Gout, patient has not had a flare since he last seen you in 11/13/18. Doing good.    Assessment and Plan     1.  Gout: Started having gout flares in approximately early 2017, acute onset and resolved with prednisone; joints involved include his left ankle, left first MTP, and left wrist.  Colchicine was cost prohibitive.  Currently on allopurinol 700 mg daily and is doing well with no gout flares.  Uric acid is low, lower than the standard \"less than 6\", but this level has achieved control of his gout flares so will continue. Encouraged EtOH reduction.   - Continue allopurinol 700 mg daily (two 300mg tablets + one 100mg tablet)  - For gout flare only: prednisone 60mg daily x2days, then 40mg daily x5days, then 20mg daily x5days, then stop.  - Labs in 6 months: CBC, Cr, Hepatic Panel, Uric Acid    2. Hypertersion: Bucky to follow up with Primary Care provider regarding elevated blood pressure.     3. Elevated Hgb: unclear etiology. Possibly related to COPD?  Advised that he follow-up with his PCP regarding this.     Mr. Edgar verbalized agreement with and understanding of the rational for the diagnosis and treatment plan.  All questions were answered to best of my ability and the patient's satisfaction. Mr. Edgar was advised to contact the clinic with any questions that may arise after the clinic visit.     Thank you for involving me in the care of the patient    Return to clinic: 6 months      HPI   Bucky Edgar is a 63 year old male with a past medical history significant for COPD, dyslipidemia, vitamin D deficiency, history of rib fractures, DVT on anticoagulation, hypertension, who is seen for follow-up of gout.    Today, Mr. Edgar reports that he has not had a gout flare since last seen.  " Tolerating allopurinol well.  Has not required prednisone.  No new issues.  He does note that he is going to have an abdominal ultrasound at the direction of his PCP because of elevated liver enzymes; last labs showed an elevated total bilirubin.  Blood pressure is elevated today and he says that his lisinopril was recently reduced.     Denies fevers, chills, nausea, vomiting, constipation, diarrhea. No abdominal pain. No chest pain/pressure, palpitations, or shortness of breath. No LE swelling.  No rash.   No history of inflammatory eye disease or inflammatory bowel disease.      Tobacco: Quit in 2017  EtOH: 12 pack of beer every 2 weeks  Drugs: None    ROS   GEN: No fevers, chills, or night sweats  SKIN: No itching, rashes, sores  HEENT: No oral or nasal ulcers.  CV: No chest pain, pressure, palpitations, or dyspnea on exertion.  PULM: No SOB, wheeze, cough.  GI: No nausea, vomiting, constipation, diarrhea. No blood in stool. No abdominal pain.  : No blood in urine.  MSK: See HPI.  NEURO: No numbness, tingling, or weakness.  EXT: No LE swelling  PSYCH: Negative    Active Problem List     Patient Active Problem List   Diagnosis     Displacement of lumbar intervertebral disc without myelopathy     CARDIOVASCULAR SCREENING; LDL GOAL LESS THAN 130     HTN, goal below 140/90     Advanced directives, counseling/discussion     COPD (chronic obstructive pulmonary disease) (H)     Impaired fasting glucose     Hypertriglyceridemia     Vitamin D deficiency     Chronic bronchitis with COPD (chronic obstructive pulmonary disease) (H)     Closed fracture of multiple ribs of left side with routine healing, subsequent encounter     Chronic pain due to trauma     Long-term (current) use of anticoagulants [Z79.01]     Elevated serum creatinine     History of deep venous thrombosis (DVT) of distal vein of left lower extremity     Elevated liver enzymes     Past Medical History     Past Medical History:   Diagnosis Date     COPD  (chronic obstructive pulmonary disease) (H)      Displacement of lumbar intervertebral disc without myelopathy 1995, 2002     HTN, goal below 140/90      Past Surgical History     Past Surgical History:   Procedure Laterality Date     C DECOMPRESS SPINAL CORD,1 SEG  1995, 4/2002    leftL4-L5, 2nd right L4-L5     C SPINE FUSION,ANTER,3 SGMTS  2/9/2004    ant and post fusion L4-S1     HC REPAIR ROTATOR CUFF,CHRONIC  1989     Allergy     Allergies   Allergen Reactions     Chlorthalidone Other (See Comments)     Excessive lowering of blood pressure     Amlodipine Other (See Comments)     Intractable headache with use of medication as well as noting a small tremor of the upper extremities     Current Medication List     Current Outpatient Medications   Medication Sig     acetaminophen (TYLENOL) 325 MG tablet Take 650 mg by mouth every 6 hours     albuterol (PROAIR HFA/PROVENTIL HFA/VENTOLIN HFA) 108 (90 Base) MCG/ACT inhaler Inhale 2 puffs into the lungs every 6 hours as needed for shortness of breath / dyspnea     allopurinol (ZYLOPRIM) 100 MG tablet Allopurinol 700mg daily (two 300mg tablets + one 100mg tablet)     allopurinol (ZYLOPRIM) 300 MG tablet Allopurinol 700mg daily (two 300mg tablets + one 100mg tablet)     aspirin 81 MG tablet Take 1 tablet (81 mg) by mouth daily     carvedilol (COREG) 25 MG tablet TAKE ONE TABLET BY MOUTH TWICE DAILY WITH MEALS     colchicine (COLCRYS) 0.6 MG tablet PRN gout flare: 1.2mg (2 tablets) once followed by 0.6mg (1 tablet) 1 hour later; do not take more than 1.8mg within a week period.     isosorbide mononitrate (IMDUR) 30 MG 24 hr tablet Take 1 tablet (30 mg) by mouth daily     lisinopril (PRINIVIL/ZESTRIL) 20 MG tablet Take 2 tablets (40 mg) by mouth daily     nicotine polacrilex (NICORETTE) 2 MG lozenge Place 2 mg inside cheek every hour as needed for smoking cessation     NICOTINE STEP 1 21 MG/24HR 24 hr patch ROBBY 1 PATCH TO SKIN ONCE D X 42 DAYS     sennosides (SENOKOT) 8.6  "MG tablet TK 1 T PO BID     No current facility-administered medications for this visit.          Social History   See HPI    Family History     Family History   Problem Relation Age of Onset     Family History Negative Other      Diabetes No family hx of      Coronary Artery Disease No family hx of      Hypertension No family hx of      Hyperlipidemia No family hx of      Breast Cancer No family hx of      Prostate Cancer No family hx of      Anxiety Disorder No family hx of      Substance Abuse No family hx of      Asthma No family hx of      Thyroid Disease No family hx of      Unknown/Adopted No family hx of      Genetic Disorder No family hx of      Osteoporosis No family hx of      Anesthesia Reaction No family hx of      Mental Illness No family hx of      Depression No family hx of      Other Cancer No family hx of      Colon Cancer No family hx of      Cerebrovascular Disease No family hx of      Obesity No family hx of        Physical Exam     Temp Readings from Last 3 Encounters:   05/08/19 98.6  F (37  C) (Oral)   05/03/19 98.9  F (37.2  C) (Temporal)   04/10/19 (P) 98.1  F (36.7  C) ((P) Oral)     BP Readings from Last 5 Encounters:   05/08/19 140/88   05/03/19 (!) 160/108   04/10/19 134/86   03/22/19 130/82   12/13/18 130/84     Pulse Readings from Last 1 Encounters:   05/08/19 72     Resp Readings from Last 1 Encounters:   05/08/19 20     Estimated body mass index is 33.2 kg/m  as calculated from the following:    Height as of 5/8/19: 1.753 m (5' 9\").    Weight as of 5/8/19: 102 kg (224 lb 12.8 oz).    GEN: NAD; overweight  HEENT: MMM. No oral lesions.  Anicteric, noninjected sclera  CV: S1, S2. RRR. No m/r/g.  PULM: CTA bilaterally. No w/c.  ABD: +BS.  MSK: MCPs, PIPs, wrists, elbows, shoulders, knees, and ankles without swelling or tenderness to palpation.  Negative MCP and MTP squeeze.   Hips nontender to palpation.    NEURO: UE and LE strengths 5/5 and equal bilaterally.   SKIN: No rash  EXT: No " LE edema  PSYCH: Alert. Appropriate.    Labs / Imaging (select studies)   CBC  Recent Labs   Lab Test 05/08/19  1041 08/07/18 0926 05/02/18  1039 04/04/18  1023   WBC 8.6 7.6 11.1* 12.4*   RBC 6.13* 5.65 4.83 4.82   HGB 19.7* 17.8* 15.0 15.0   HCT 58.1* 54.3* 45.5 46.1   MCV 95 96 94 96   RDW 15.2* 15.4* 14.5 13.9    241 230 228   MCH 32.1 31.5 31.1 31.1   MCHC 33.9 32.8 33.0 32.5   NEUTROPHIL 67.7 68.6  --  73.1   LYMPH 16.5 19.4  --  15.2   MONOCYTE 11.5 8.3  --  8.4   EOSINOPHIL 3.7 3.2  --  3.1   BASOPHIL 0.6 0.5  --  0.2   ANEU 5.8 5.2  --  9.0*   ALYM 1.4 1.5  --  1.9   LIZZ 1.0 0.6  --  1.0   AEOS 0.3 0.2  --  0.4   ABAS 0.1 0.0  --  0.0     CMP  Recent Labs   Lab Test 05/08/19  1041 08/07/18 0926 05/02/18 1039 03/23/18  0918     --  142 140   POTASSIUM 4.4  --  4.4 3.9   CHLORIDE 107  --  111* 108   CO2 27  --  27 21   ANIONGAP 5  --  4 11   *  --  102* 106*   BUN 15  --  15 10   CR 1.04 1.16 1.11 1.06   GFRESTIMATED 77 64 67 71   GFRESTBLACK 89 77 82 86   JUSTIN 9.2  --  8.9 9.4   BILITOTAL 1.5* 0.4 0.8  --    ALBUMIN 3.6 3.3* 3.4  --    PROTTOTAL 7.0 6.8 6.8  --    ALKPHOS 105 108 67  --    AST 20 17 18  --    ALT 36 29 26  --      Uric Acid  Recent Labs   Lab Test 05/08/19  1041 08/07/18  0926 05/02/18  1039 03/23/18  0918 02/28/18  1047 02/05/18  1212   URIC 2.3* 2.0* 3.3* 5.0 6.9 7.8*     Immunization History     Immunization History   Administered Date(s) Administered     Influenza Vaccine IM 3yrs+ 4 Valent IIV4 11/27/2013, 10/29/2015, 12/12/2016, 10/23/2017, 11/01/2018     Mantoux Tuberculin Skin Test 07/17/2012     Pneumo Conj 13-V (2010&after) 01/22/2016     Pneumococcal 23 valent 11/27/2013     TDAP Vaccine (Adacel) 08/25/2009     TDAP Vaccine (Boostrix) 08/25/2009          Chart documentation done in part with Dragon Voice recognition Software. Although reviewed after completion, some word and grammatical error may remain.    Coy Bolaños MD

## 2019-05-14 NOTE — TELEPHONE ENCOUNTER
I spoke with the pt who states 136/83 78.     Lisinopril 40mg daily  Imdur 30mg daily  Carvedilol 25mg BID    U/S Thursday    Next 5 appointments (look out 90 days)    May 20, 2019 10:20 AM CDT  Office Visit with TROY [facility]ancas CNP  [facility] [address], [address]  [place] [address] [phone]        Huddled with KL- Pt could try taking lisinopril 20mg BID. Spoke with pt and he will try that. He will discuss cardiology at next OV    Deyanira Willis, RN, BSN

## 2019-05-14 NOTE — PATIENT INSTRUCTIONS
Please call your primary doctor about your blood pressure, today it was 188/108.    Rheumatology    Dr. Coy Bolaños         [facility]   (Monday)  [address]  [place] [address]       [facility]   (Tuesday)  [address] [place] [address]    [facility]   (Wed., Thurs., and Friday)  [address] [place] [address]    Phone number: [phone]  Thank you for choosing [facility].  Saba Patricia CMA

## 2019-05-16 ENCOUNTER — ANCILLARY PROCEDURE (OUTPATIENT)
Dept: ULTRASOUND IMAGING | Facility: CLINIC | Age: 64
End: 2019-05-16
Attending: NURSE PRACTITIONER
Payer: COMMERCIAL

## 2019-05-16 ENCOUNTER — TELEPHONE (OUTPATIENT)
Dept: FAMILY MEDICINE | Facility: CLINIC | Age: 64
End: 2019-05-16

## 2019-05-16 DIAGNOSIS — N26.1 RENAL ATROPHY, RIGHT: Primary | ICD-10-CM

## 2019-05-16 DIAGNOSIS — I71.40 ABDOMINAL AORTIC ANEURYSM (AAA) WITHOUT RUPTURE (H): ICD-10-CM

## 2019-05-16 DIAGNOSIS — R16.0 HEPATOMEGALY: ICD-10-CM

## 2019-05-16 DIAGNOSIS — K76.0 FATTY LIVER: ICD-10-CM

## 2019-05-16 DIAGNOSIS — R74.8 ELEVATED LIVER ENZYMES: ICD-10-CM

## 2019-05-16 PROCEDURE — 76700 US EXAM ABDOM COMPLETE: CPT

## 2019-05-16 NOTE — TELEPHONE ENCOUNTER
Spoke with pt.     AAA- re-check very 1-2 years, small and stable. Continue smoke free    Fatty liver- avoid alcohol use and tylenol products. Work on healthy eating.   Discussed cirrhosis risks. LDL is at 100 without meds.     Right renal atrophy- unclear etiology, referred to nephrology for eval.     My Lipscomb

## 2019-05-20 ENCOUNTER — TELEPHONE (OUTPATIENT)
Dept: FAMILY MEDICINE | Facility: CLINIC | Age: 64
End: 2019-05-20

## 2019-05-20 NOTE — TELEPHONE ENCOUNTER
You placed a referral for patient to cardiology on 5/3/19.  Patient has not scheduled as of yet.      Please review and forward to team if follow up with the patient is needed.     Thank you!  Adriana/Clinic Referrals Dyad II

## 2019-05-20 NOTE — LETTER
[facility]  [address]  [place] [address]  [phone]      May 20, 2019    Bucky Edgar                                                                                                                     [address]  [place] [address]        Dear Bucky,      We received a notice that you are to be scheduled with a specialty clinic. The referral has been placed by your provider and you can call to schedule an appointment directly.     Enclosed, you will find the referral with the phone number to call to schedule an appointment.  If you have already scheduled this, you may disregard this letter.    Please call us if you have any questions or concerns.    Sincerely,     [facility] Support Staff / merrill

## 2019-06-03 ENCOUNTER — TELEPHONE (OUTPATIENT)
Dept: FAMILY MEDICINE | Facility: CLINIC | Age: 64
End: 2019-06-03

## 2019-06-03 NOTE — LETTER
[facility] ([facility]]  [address]  [place] [address]  [phone]      Rosemarie 3, 2019    Bucky Edgar                                                                                                                     [address]  [place] [address]        Dear Bucky,    We received a notice that you are to be scheduled with a specialty clinic. The referral has been placed by your provider and you can call to schedule an appointment directly.     Enclosed, you will find the referral with the phone number to call to schedule an appointment.  If you have already scheduled this, you may disregard this letter.    Please call us if you have any questions or concerns.      Sincerely,       [facility] Support Staff / merrill

## 2019-06-03 NOTE — TELEPHONE ENCOUNTER
You placed a referral for patient to nephrology on 5/16/19.  Patient has not scheduled as of yet.      Please review and forward to team if follow up with the patient is needed.     Thank you!  Adriana/Clinic Referrals Dyad II

## 2019-06-17 PROBLEM — Z79.01 LONG TERM CURRENT USE OF ANTICOAGULANT THERAPY: Status: ACTIVE | Noted: 2017-09-01

## 2019-07-10 ENCOUNTER — TELEPHONE (OUTPATIENT)
Dept: FAMILY MEDICINE | Facility: CLINIC | Age: 64
End: 2019-07-10

## 2019-07-10 NOTE — TELEPHONE ENCOUNTER
Summary:    Patient is due/failing the following:   BP check and FIT test    Action needed:   Patient needs nurse only appointment. and complete a FIT test    Type of outreach:    Phone, spoke to patient.  patient will call back to schedule  Patient reports that [place] passed away [place]  Questions for provider review:    None                                                                                                                                    Chantel Clayton       Chart routed to Care Team .    Panel Management Review      Patient has the following on his problem list:     Hypertension   Last three blood pressure readings:  BP Readings from Last 3 Encounters:   05/14/19 (!) 188/108   05/08/19 140/88   05/03/19 (!) 160/108     Blood pressure: FAILED    HTN Guidelines:  Less than 140/90      Composite cancer screening  Chart review shows that this patient is due/due soon for the following Fecal Colorectal (FIT)

## 2019-11-07 DIAGNOSIS — M1A.09X0 IDIOPATHIC CHRONIC GOUT OF MULTIPLE SITES WITHOUT TOPHUS: ICD-10-CM

## 2019-11-07 LAB
ALBUMIN SERPL-MCNC: 3.6 G/DL (ref 3.4–5)
ALP SERPL-CCNC: 103 U/L (ref 40–150)
ALT SERPL W P-5'-P-CCNC: 27 U/L (ref 0–70)
AST SERPL W P-5'-P-CCNC: 19 U/L (ref 0–45)
BASOPHILS # BLD AUTO: 0.1 10E9/L (ref 0–0.2)
BASOPHILS NFR BLD AUTO: 0.5 %
BILIRUB DIRECT SERPL-MCNC: 0.3 MG/DL (ref 0–0.2)
BILIRUB SERPL-MCNC: 1.1 MG/DL (ref 0.2–1.3)
CREAT SERPL-MCNC: 1.08 MG/DL (ref 0.66–1.25)
DIFFERENTIAL METHOD BLD: ABNORMAL
EOSINOPHIL # BLD AUTO: 0.2 10E9/L (ref 0–0.7)
EOSINOPHIL NFR BLD AUTO: 2.2 %
ERYTHROCYTE [DISTWIDTH] IN BLOOD BY AUTOMATED COUNT: 15.2 % (ref 10–15)
GFR SERPL CREATININE-BSD FRML MDRD: 73 ML/MIN/{1.73_M2}
HCT VFR BLD AUTO: 59.5 % (ref 40–53)
HGB BLD-MCNC: 20.1 G/DL (ref 13.3–17.7)
LYMPHOCYTES # BLD AUTO: 1.7 10E9/L (ref 0.8–5.3)
LYMPHOCYTES NFR BLD AUTO: 16.8 %
MCH RBC QN AUTO: 33 PG (ref 26.5–33)
MCHC RBC AUTO-ENTMCNC: 33.8 G/DL (ref 31.5–36.5)
MCV RBC AUTO: 98 FL (ref 78–100)
MONOCYTES # BLD AUTO: 0.8 10E9/L (ref 0–1.3)
MONOCYTES NFR BLD AUTO: 8.2 %
NEUTROPHILS # BLD AUTO: 7.2 10E9/L (ref 1.6–8.3)
NEUTROPHILS NFR BLD AUTO: 72.3 %
PLATELET # BLD AUTO: 198 10E9/L (ref 150–450)
PROT SERPL-MCNC: 6.9 G/DL (ref 6.8–8.8)
RBC # BLD AUTO: 6.09 10E12/L (ref 4.4–5.9)
URATE SERPL-MCNC: 4 MG/DL (ref 3.5–7.2)
WBC # BLD AUTO: 9.9 10E9/L (ref 4–11)

## 2019-11-07 PROCEDURE — 82565 ASSAY OF CREATININE: CPT | Performed by: INTERNAL MEDICINE

## 2019-11-07 PROCEDURE — 85025 COMPLETE CBC W/AUTO DIFF WBC: CPT | Performed by: INTERNAL MEDICINE

## 2019-11-07 PROCEDURE — 80076 HEPATIC FUNCTION PANEL: CPT | Performed by: INTERNAL MEDICINE

## 2019-11-07 PROCEDURE — 84550 ASSAY OF BLOOD/URIC ACID: CPT | Performed by: INTERNAL MEDICINE

## 2019-11-07 PROCEDURE — 36415 COLL VENOUS BLD VENIPUNCTURE: CPT | Performed by: INTERNAL MEDICINE

## 2019-11-12 ENCOUNTER — OFFICE VISIT (OUTPATIENT)
Dept: RHEUMATOLOGY | Facility: CLINIC | Age: 64
End: 2019-11-12
Payer: COMMERCIAL

## 2019-11-12 VITALS
HEART RATE: 63 BPM | DIASTOLIC BLOOD PRESSURE: 110 MMHG | SYSTOLIC BLOOD PRESSURE: 178 MMHG | BODY MASS INDEX: 32.88 KG/M2 | OXYGEN SATURATION: 93 % | HEIGHT: 69 IN | WEIGHT: 222 LBS

## 2019-11-12 DIAGNOSIS — D58.2 ELEVATED HEMOGLOBIN (H): Primary | ICD-10-CM

## 2019-11-12 DIAGNOSIS — Z23 NEED FOR PROPHYLACTIC VACCINATION AND INOCULATION AGAINST INFLUENZA: ICD-10-CM

## 2019-11-12 DIAGNOSIS — M1A.09X0 IDIOPATHIC CHRONIC GOUT OF MULTIPLE SITES WITHOUT TOPHUS: ICD-10-CM

## 2019-11-12 PROCEDURE — 99214 OFFICE O/P EST MOD 30 MIN: CPT | Mod: 25 | Performed by: INTERNAL MEDICINE

## 2019-11-12 PROCEDURE — 90682 RIV4 VACC RECOMBINANT DNA IM: CPT | Performed by: INTERNAL MEDICINE

## 2019-11-12 PROCEDURE — G0008 ADMIN INFLUENZA VIRUS VAC: HCPCS | Performed by: INTERNAL MEDICINE

## 2019-11-12 RX ORDER — ALLOPURINOL 300 MG/1
TABLET ORAL
Qty: 180 TABLET | Refills: 3 | Status: SHIPPED | OUTPATIENT
Start: 2019-11-12 | End: 2020-05-12

## 2019-11-12 RX ORDER — ALLOPURINOL 300 MG/1
TABLET ORAL
Qty: 90 TABLET | Refills: 3 | Status: SHIPPED | OUTPATIENT
Start: 2019-11-12 | End: 2019-11-12

## 2019-11-12 RX ORDER — ALLOPURINOL 100 MG/1
TABLET ORAL
Qty: 90 TABLET | Refills: 3 | Status: SHIPPED | OUTPATIENT
Start: 2019-11-12 | End: 2020-05-12

## 2019-11-12 RX ORDER — ALLOPURINOL 300 MG/1
TABLET ORAL
Qty: 90 TABLET | Refills: 3 | Status: CANCELLED | OUTPATIENT
Start: 2019-11-12

## 2019-11-12 ASSESSMENT — MIFFLIN-ST. JEOR: SCORE: 1797.37

## 2019-11-12 NOTE — PATIENT INSTRUCTIONS
Rheumatology    Dr. Coy Bolaños       [facility] [place]   (Monday)  [address]  [place] [address]       [facility] [place]   (Tuesday)  [address] [place] [address]    [facility] [place]   (Wed., Thurs., and Friday)  [address] [place] [address]    Phone number: [phone]  Thank you for choosing [facility].  Saba Patricia CMA

## 2019-11-12 NOTE — TELEPHONE ENCOUNTER
Pharmacy requesting this be changed to 90 day supply (180)    allopurinol (ZYLOPRIM) 300 MG tablet 90 tablet 3 11/12/2019

## 2019-11-12 NOTE — TELEPHONE ENCOUNTER
Dr. Bolaños has sent a new prescription to the pharmacy.  Saba Patricia CMA Rheumatology  11/12/2019 3:09 PM

## 2019-11-12 NOTE — NURSING NOTE
Bucky to follow up with Primary Care provider regarding elevated blood pressure.  Blood pressure rechecked after visit    178/110  Saba Patricia CMA Rheumatology  11/12/2019 10:43 AM                                RAPID3 (0-30) Cumulative Score  9.5          RAPID3 Weighted Score (divide #4 by 3 and that is the weighted score)  3.2     Saba Patricia CMA Rheumatology  11/12/2019 10:02 AM

## 2019-11-13 NOTE — TELEPHONE ENCOUNTER
ONCOLOGY INTAKE: Records Information        APPT INFORMATION:  Referring provider:  Coy Bolaños MD  Referring provider s clinic:  [facility]  Reason for visit/diagnosis:  D58.2 (ICD-10-CM) - Elevated hemoglobin (H)  Has patient been notified of appointment date and time?: yes     RECORDS INFORMATION:  Were the records received with the referral (via [employer])? No, Internal Referral     Has patient been seen for any external appt for this diagnosis? No     If yes, where? NA     Has patient had any imaging or procedures outside of [facility] for this condition? No                                If Yes, where? NA     ADDITIONAL INFORMATION:  LVM and Letter Sent

## 2019-11-13 NOTE — TELEPHONE ENCOUNTER
RECORDS STATUS - ALL OTHER DIAGNOSIS      RECORDS RECEIVED FROM: Epic/   DATE RECEIVED: 12/13/2019   NOTES STATUS DETAILS   OFFICE NOTE from referring provider Complete  Coy Bolaños MD   OFFICE NOTE from medical oncologist N/A    DISCHARGE SUMMARY from hospital N/A    DISCHARGE REPORT from the ER N/A    OPERATIVE REPORT N/A    MEDICATION LIST Complete [facility]   CLINICAL TRIAL TREATMENTS TO DATE N/A    LABS     PATHOLOGY REPORTS N/A    ANYTHING RELATED TO DIAGNOSIS Complete EPIC   GENONOMIC TESTING     TYPE:     IMAGING (NEED IMAGES & REPORT)     CT SCANS     MRI     MAMMO     ULTRASOUND     PET

## 2019-11-20 NOTE — PROGRESS NOTES
Subjective     Bucky Edgar is a 64 year old male who presents to clinic today for the following health issues:    History of Present Illness        COPD:  He presents for follow up of COPD.  Overall, COPD symptoms are stable since last visit. He has same as usual fatigue or shortness of breath with exertion and same as usual shortness of breath at rest.  He often coughs and does not have change in sputum. Patient has had recent fever. He can walk less than 1 block without stopping to rest. He can walk 2 flights of stairs without resting.The patient has had no ED, urgent care, or hospital admissions because of COPD since the last visit.     Hypertension: He presents for follow up of hypertension.  He does check blood pressure  regularly outside of the clinic. Outside blood pressures have been over 140/90. He follows a low salt diet.     He eats 2-3 servings of fruits and vegetables daily.He consumes 2 sweetened beverage(s) daily.He is missing 1 dose(s) of medications per week.  He is not taking prescribed medications regularly due to remembering to take.    He has COPD but does not use his rescue inhaler very frequently and is not on a daily inhaler. He has an intermittent cough but denies significant wheezing or shortness of breath.    Patient reports that his average at home BPs have been in the 150s systolic; he has been dealing with a lot of stress due to his brother's death and his other brother being sick over the past year. He continues to take his medications as prescribed. He reports a provider changed his Lisinopril 6-8mo ago, to 20 mg twice daily instead of 40 mg once daily. He admits to forgetting to take his medication some nights when he is up seeing his brother; max BP readings have been in the 200s systolic.     He stopped smoking one year and continues to use tobacco cessation medications, Chantix. He denies adverse effects but continues to have cravings.     Patient complains of pain in left  "side, related to previous rib fracture in 2016. He reports it was aggravated from working on a car and laying on his side. He denies no new changes in shortness of breath.    He has noticed some recently nasal/sinus congestion and plugging of his ears.    He has noticed right toe pain since he sprained his toe in July. Pain only occurs when his shoes rub against the toe and he states there is a \"bump\" there since the injury. It will occasionally turn red in color.    Answers for HPI/ROS submitted by the patient on 11/25/2019   Chronic problems general questions HPI Form  If you checked off any problems, how difficult have these problems made it for you to do your work, take care of things at home, or get along with other people?: Not difficult at all  PHQ9 TOTAL SCORE: 1  ALLEN 7 TOTAL SCORE: 2    Reviewed and updated as needed this visit by Provider  Tobacco  Allergies  Meds  Problems  Med Hx  Surg Hx  Fam Hx      Review of Systems   ROS COMP: Constitutional, HEENT, cardiovascular, pulmonary, gi and gu systems are negative, except as otherwise noted.      Objective    /84   Pulse 70   Temp 96.7  F (35.9  C) (Temporal)   Resp 18   Ht 1.753 m (5' 9\")   Wt 100.7 kg (222 lb)   SpO2 96%   BMI 32.78 kg/m    Body mass index is 32.78 kg/m .     Physical Exam   GENERAL: healthy, alert and no distress  HENT: serous effusion behind bilateral TM but without erythema or perforations; nasal turbinates erythematous and edematous but without lesions or rhinorrhea;  mouth without ulcers or lesions  NECK: no adenopathy, no asymmetry, masses, or scars and thyroid normal to palpation  RESP: lungs clear to auscultation - no rales, rhonchi or wheezes  CV: regular rate and rhythm, normal S1 S2, no S3 or S4, no murmur, click or rub, no peripheral edema   MS: Right first MTP joint with bony nodule. No tenderness or swelling. Full toe range of motion.   NEURO: Normal strength and tone, mentation intact and speech normal. " Gait is stable.   PSYCH: mentation appears normal, affect normal/bright      Assessment & Plan       ICD-10-CM    1. Essential hypertension I10 hydrALAZINE (APRESOLINE) 25 MG tablet     carvedilol (COREG) 25 MG tablet     lisinopril (PRINIVIL/ZESTRIL) 20 MG tablet   2. Chronic bronchitis with COPD (chronic obstructive pulmonary disease) (H) J44.9    3. Pain of toe of right foot M79.674 XR Toe Right G/E 2 Views   4. Idiopathic chronic gout of multiple sites without tophus M1A.09X0    5. Abdominal aortic aneurysm (AAA) without rupture (H) I71.4    6. Seasonal allergic rhinitis, unspecified trigger J30.2    7. Colon cancer screening Z12.11 Fecal colorectal cancer screen (FIT)   8. Need for Tdap vaccination Z23 TDAP, IM (10 - 64 YRS) - Adacel         1-HTN: Patient was advised to continue monitoring his BP at home and if readings are consistently over 140/90 to follow up. He will follow up every two weeks for one month for a BP recheck given his continued elevated blood pressure on initial reading today and home readings. Patient will continue to take lisinopril 20mg BID and carvedilol 25mg per day. Hydralazine 25 mg three times daily will be started to further decrease patient's BP which has been consistently in the 150s. Hydrochlorothiazide was not started due to patient's history of gout. He has not tolerated amlodipine in the past and did not start Imdur due to price. Patient is aware that a low sodium diet is recommended, he drinks approximately 1/2 gallon of water per day. He is also aware of the importance of regular exercise.     2. Patient reports no changes or exacerbations with his COPD. He stopped smoking approximately one year ago and reports he continues to use Chantix. He continues to use his albuterol inhaler as needed. His hemoglobin has been elevated recently which could be due to his chronic tobacco use/COPD causing hypoxia but he will be seeing hematology next month for further evaluatiuon.    3.  "Right toe pain with rubbing on his shoes and a bony nodule since injury in July. X-ray ordered for further evaluation and will await radiology report. May refer him to podiatry for further evaluation. Encouraged to try padding and wear wide shoes.    4. Chronic gout is well controlled with allopurinol.    5. History of mild AAA on US in May. Will recheck in 1 year.     6. Nasal congestion and ear effusions consistent with seasonal allergies. Recommend Flonase and a daily antihistamine.    7. Updated FIT testing ordered.    8. Tdap administered by MA.    Follow up in 6 months for a recheck.    BMI:   Estimated body mass index is 32.78 kg/m  as calculated from the following:    Height as of this encounter: 1.753 m (5' 9\").    Weight as of this encounter: 100.7 kg (222 lb).   Weight management plan: Discussed healthy diet and exercise guidelines    MEDICATIONS:  Continue current medications without change    Patient will follow up for a blood pressure reading every two weeks for the next month.    Patient seen in conjunction with MARK Roberts PA-C  [facility]    "

## 2019-11-25 ENCOUNTER — OFFICE VISIT (OUTPATIENT)
Dept: FAMILY MEDICINE | Facility: OTHER | Age: 64
End: 2019-11-25
Payer: COMMERCIAL

## 2019-11-25 ENCOUNTER — ANCILLARY PROCEDURE (OUTPATIENT)
Dept: GENERAL RADIOLOGY | Facility: OTHER | Age: 64
End: 2019-11-25
Attending: PHYSICIAN ASSISTANT
Payer: COMMERCIAL

## 2019-11-25 ENCOUNTER — TELEPHONE (OUTPATIENT)
Dept: FAMILY MEDICINE | Facility: OTHER | Age: 64
End: 2019-11-25

## 2019-11-25 VITALS
BODY MASS INDEX: 32.88 KG/M2 | SYSTOLIC BLOOD PRESSURE: 136 MMHG | HEIGHT: 69 IN | OXYGEN SATURATION: 96 % | TEMPERATURE: 96.7 F | WEIGHT: 222 LBS | RESPIRATION RATE: 18 BRPM | DIASTOLIC BLOOD PRESSURE: 84 MMHG | HEART RATE: 70 BPM

## 2019-11-25 DIAGNOSIS — I10 ESSENTIAL HYPERTENSION: Primary | ICD-10-CM

## 2019-11-25 DIAGNOSIS — I10 ESSENTIAL HYPERTENSION: ICD-10-CM

## 2019-11-25 DIAGNOSIS — J44.89 CHRONIC BRONCHITIS WITH COPD (CHRONIC OBSTRUCTIVE PULMONARY DISEASE) (H): ICD-10-CM

## 2019-11-25 DIAGNOSIS — M79.674 PAIN OF TOE OF RIGHT FOOT: ICD-10-CM

## 2019-11-25 DIAGNOSIS — M1A.09X0 IDIOPATHIC CHRONIC GOUT OF MULTIPLE SITES WITHOUT TOPHUS: ICD-10-CM

## 2019-11-25 DIAGNOSIS — I71.40 ABDOMINAL AORTIC ANEURYSM (AAA) WITHOUT RUPTURE (H): ICD-10-CM

## 2019-11-25 DIAGNOSIS — Z12.11 COLON CANCER SCREENING: ICD-10-CM

## 2019-11-25 DIAGNOSIS — Z23 NEED FOR TDAP VACCINATION: ICD-10-CM

## 2019-11-25 DIAGNOSIS — J30.2 SEASONAL ALLERGIC RHINITIS, UNSPECIFIED TRIGGER: ICD-10-CM

## 2019-11-25 PROCEDURE — 90715 TDAP VACCINE 7 YRS/> IM: CPT | Performed by: PHYSICIAN ASSISTANT

## 2019-11-25 PROCEDURE — 99214 OFFICE O/P EST MOD 30 MIN: CPT | Mod: 25 | Performed by: PHYSICIAN ASSISTANT

## 2019-11-25 PROCEDURE — 90471 IMMUNIZATION ADMIN: CPT | Performed by: PHYSICIAN ASSISTANT

## 2019-11-25 PROCEDURE — 73660 X-RAY EXAM OF TOE(S): CPT | Mod: RT

## 2019-11-25 RX ORDER — HYDRALAZINE HYDROCHLORIDE 25 MG/1
25 TABLET, FILM COATED ORAL 3 TIMES DAILY
Qty: 90 TABLET | Refills: 5 | Status: SHIPPED | OUTPATIENT
Start: 2019-11-25 | End: 2019-11-25

## 2019-11-25 RX ORDER — LISINOPRIL 20 MG/1
20 TABLET ORAL 2 TIMES DAILY
Qty: 180 TABLET | Refills: 3 | Status: SHIPPED | OUTPATIENT
Start: 2019-11-25 | End: 2021-02-08

## 2019-11-25 RX ORDER — CARVEDILOL 25 MG/1
TABLET ORAL
Qty: 180 TABLET | Refills: 3 | Status: SHIPPED | OUTPATIENT
Start: 2019-11-25 | End: 2021-02-08

## 2019-11-25 RX ORDER — HYDRALAZINE HYDROCHLORIDE 25 MG/1
25 TABLET, FILM COATED ORAL 3 TIMES DAILY
Qty: 270 TABLET | Refills: 1 | Status: SHIPPED | OUTPATIENT
Start: 2019-11-25 | End: 2021-03-22

## 2019-11-25 ASSESSMENT — MIFFLIN-ST. JEOR: SCORE: 1797.37

## 2019-11-25 ASSESSMENT — ANXIETY QUESTIONNAIRES
6. BECOMING EASILY ANNOYED OR IRRITABLE: NOT AT ALL
1. FEELING NERVOUS, ANXIOUS, OR ON EDGE: NOT AT ALL
4. TROUBLE RELAXING: SEVERAL DAYS
5. BEING SO RESTLESS THAT IT IS HARD TO SIT STILL: SEVERAL DAYS
7. FEELING AFRAID AS IF SOMETHING AWFUL MIGHT HAPPEN: NOT AT ALL
3. WORRYING TOO MUCH ABOUT DIFFERENT THINGS: NOT AT ALL
2. NOT BEING ABLE TO STOP OR CONTROL WORRYING: NOT AT ALL
GAD7 TOTAL SCORE: 2
7. FEELING AFRAID AS IF SOMETHING AWFUL MIGHT HAPPEN: NOT AT ALL

## 2019-11-25 ASSESSMENT — PATIENT HEALTH QUESTIONNAIRE - PHQ9
SUM OF ALL RESPONSES TO PHQ QUESTIONS 1-9: 1
10. IF YOU CHECKED OFF ANY PROBLEMS, HOW DIFFICULT HAVE THESE PROBLEMS MADE IT FOR YOU TO DO YOUR WORK, TAKE CARE OF THINGS AT HOME, OR GET ALONG WITH OTHER PEOPLE: NOT DIFFICULT AT ALL
SUM OF ALL RESPONSES TO PHQ QUESTIONS 1-9: 1

## 2019-11-25 NOTE — PATIENT INSTRUCTIONS
Will add hydralazine 25 mg 3 times daily to help with your blood pressure.  If you are still above 140/90 persistently at home, let me know.  Will have you follow up every 2 weeks for 1 month for blood pressure checks with the nurses.  Eat a low salt diet and stay well hydrated.    Will order an x-ray of your toe. If pain continues, let me know.    Follow up in 6 months for a recheck.

## 2019-11-25 NOTE — TELEPHONE ENCOUNTER
Pharmacy Note:  Patient is requesting authorization to dispense a 90 day supply: hydrALAZINE (APRESOLINE) 25 MG tablet

## 2019-11-26 ASSESSMENT — ANXIETY QUESTIONNAIRES: GAD7 TOTAL SCORE: 2

## 2019-11-26 ASSESSMENT — PATIENT HEALTH QUESTIONNAIRE - PHQ9: SUM OF ALL RESPONSES TO PHQ QUESTIONS 1-9: 1

## 2019-12-13 ENCOUNTER — ONCOLOGY VISIT (OUTPATIENT)
Dept: ONCOLOGY | Facility: CLINIC | Age: 64
End: 2019-12-13
Attending: INTERNAL MEDICINE
Payer: COMMERCIAL

## 2019-12-13 ENCOUNTER — PRE VISIT (OUTPATIENT)
Dept: FAMILY MEDICINE | Facility: CLINIC | Age: 64
End: 2019-12-13

## 2019-12-13 ENCOUNTER — PRE VISIT (OUTPATIENT)
Dept: ONCOLOGY | Facility: CLINIC | Age: 64
End: 2019-12-13

## 2019-12-13 VITALS
DIASTOLIC BLOOD PRESSURE: 78 MMHG | RESPIRATION RATE: 20 BRPM | HEIGHT: 69 IN | TEMPERATURE: 98.7 F | SYSTOLIC BLOOD PRESSURE: 162 MMHG | BODY MASS INDEX: 33.56 KG/M2 | WEIGHT: 226.6 LBS | HEART RATE: 73 BPM | OXYGEN SATURATION: 95 %

## 2019-12-13 DIAGNOSIS — D75.1 POLYCYTHEMIA: ICD-10-CM

## 2019-12-13 DIAGNOSIS — D58.2 ELEVATED HEMOGLOBIN (H): ICD-10-CM

## 2019-12-13 DIAGNOSIS — D75.1 POLYCYTHEMIA: Primary | ICD-10-CM

## 2019-12-13 LAB
ALBUMIN SERPL-MCNC: 3.6 G/DL (ref 3.4–5)
ALP SERPL-CCNC: 99 U/L (ref 40–150)
ALT SERPL W P-5'-P-CCNC: 28 U/L (ref 0–70)
ANION GAP SERPL CALCULATED.3IONS-SCNC: 4 MMOL/L (ref 3–14)
AST SERPL W P-5'-P-CCNC: 19 U/L (ref 0–45)
BASOPHILS # BLD AUTO: 0.1 10E9/L (ref 0–0.2)
BASOPHILS NFR BLD AUTO: 0.8 %
BILIRUB SERPL-MCNC: 0.9 MG/DL (ref 0.2–1.3)
BUN SERPL-MCNC: 14 MG/DL (ref 7–30)
CALCIUM SERPL-MCNC: 9.1 MG/DL (ref 8.5–10.1)
CHLORIDE SERPL-SCNC: 110 MMOL/L (ref 94–109)
CO2 SERPL-SCNC: 27 MMOL/L (ref 20–32)
CREAT SERPL-MCNC: 1.08 MG/DL (ref 0.66–1.25)
DIFFERENTIAL METHOD BLD: ABNORMAL
EOSINOPHIL # BLD AUTO: 0.3 10E9/L (ref 0–0.7)
EOSINOPHIL NFR BLD AUTO: 3.3 %
ERYTHROCYTE [DISTWIDTH] IN BLOOD BY AUTOMATED COUNT: 14.6 % (ref 10–15)
GFR SERPL CREATININE-BSD FRML MDRD: 73 ML/MIN/{1.73_M2}
GLUCOSE SERPL-MCNC: 101 MG/DL (ref 70–99)
HCT VFR BLD AUTO: 59.2 % (ref 40–53)
HGB BLD-MCNC: 19.8 G/DL (ref 13.3–17.7)
IMM GRANULOCYTES # BLD: 0 10E9/L (ref 0–0.4)
IMM GRANULOCYTES NFR BLD: 0.4 %
LDH SERPL L TO P-CCNC: 151 U/L (ref 85–227)
LYMPHOCYTES # BLD AUTO: 1.8 10E9/L (ref 0.8–5.3)
LYMPHOCYTES NFR BLD AUTO: 18.8 %
MCH RBC QN AUTO: 33.4 PG (ref 26.5–33)
MCHC RBC AUTO-ENTMCNC: 33.4 G/DL (ref 31.5–36.5)
MCV RBC AUTO: 100 FL (ref 78–100)
MONOCYTES # BLD AUTO: 0.8 10E9/L (ref 0–1.3)
MONOCYTES NFR BLD AUTO: 8.4 %
NEUTROPHILS # BLD AUTO: 6.5 10E9/L (ref 1.6–8.3)
NEUTROPHILS NFR BLD AUTO: 68.3 %
PLATELET # BLD AUTO: 194 10E9/L (ref 150–450)
POTASSIUM SERPL-SCNC: 4.2 MMOL/L (ref 3.4–5.3)
PROT SERPL-MCNC: 6.9 G/DL (ref 6.8–8.8)
RBC # BLD AUTO: 5.93 10E12/L (ref 4.4–5.9)
RETICS # AUTO: 133.4 10E9/L (ref 25–95)
RETICS/RBC NFR AUTO: 2.3 % (ref 0.5–2)
SODIUM SERPL-SCNC: 141 MMOL/L (ref 133–144)
WBC # BLD AUTO: 9.5 10E9/L (ref 4–11)

## 2019-12-13 PROCEDURE — 83615 LACTATE (LD) (LDH) ENZYME: CPT | Performed by: INTERNAL MEDICINE

## 2019-12-13 PROCEDURE — 85060 BLOOD SMEAR INTERPRETATION: CPT | Performed by: INTERNAL MEDICINE

## 2019-12-13 PROCEDURE — 85025 COMPLETE CBC W/AUTO DIFF WBC: CPT | Performed by: INTERNAL MEDICINE

## 2019-12-13 PROCEDURE — 85045 AUTOMATED RETICULOCYTE COUNT: CPT | Performed by: INTERNAL MEDICINE

## 2019-12-13 PROCEDURE — 36415 COLL VENOUS BLD VENIPUNCTURE: CPT | Performed by: INTERNAL MEDICINE

## 2019-12-13 PROCEDURE — 81270 JAK2 GENE: CPT | Performed by: INTERNAL MEDICINE

## 2019-12-13 PROCEDURE — 99000 SPECIMEN HANDLING OFFICE-LAB: CPT | Performed by: INTERNAL MEDICINE

## 2019-12-13 PROCEDURE — 82668 ASSAY OF ERYTHROPOIETIN: CPT | Mod: 90 | Performed by: INTERNAL MEDICINE

## 2019-12-13 PROCEDURE — 80053 COMPREHEN METABOLIC PANEL: CPT | Performed by: INTERNAL MEDICINE

## 2019-12-13 PROCEDURE — 99204 OFFICE O/P NEW MOD 45 MIN: CPT | Performed by: INTERNAL MEDICINE

## 2019-12-13 PROCEDURE — 81403 MOPATH PROCEDURE LEVEL 4: CPT | Performed by: INTERNAL MEDICINE

## 2019-12-13 ASSESSMENT — PAIN SCALES - GENERAL: PAINLEVEL: MODERATE PAIN (5)

## 2019-12-13 ASSESSMENT — MIFFLIN-ST. JEOR: SCORE: 1818.48

## 2019-12-13 NOTE — LETTER
12/13/2019         RE: Bucky Edgar  [address] [place] [address]        Dear Colleague,    Thank you for referring your patient, Bucky Edgar, to the [facility]. Please see a copy of my visit note below.    Hematology initial visit:  Date on this visit: 12/13/2019    Bucky Edgar  is referred by Dr.Kyle Bolaños for a hematology consultation. He requires evaluation for new diagnosis of elevated hemoglobin.    Primary Physician: Hamlet Roberts     History Of Present Illness:  Mr. Edgar is a 64 year old male who has hx of COPD, gout was referred for elevated hemoglobin which was noticed in August 2018.  Prior to that in 2015 and 2016 he had couple of readings when his hemoglobin was 18.1 and 18.5.  After that his hemoglobin was in the normal range but since August 2018 it has been persistently elevated.  Most recent hemoglobin on 11/7/2019 was 20.1 with hematocrit of 59.5 and RBC 6.09.    He comes in today and tells me that he has been feeling well.  He has some limitation due to chronic back pain and back surgeries.  He denies any B symptoms.  No erythromelalgia.  No weight loss.  Denies any nausea vomiting diarrhea constipation or bleeding issues.  Overall energy is stable.  Denies any change in his breathing but he has COPD so has dyspnea on exertion.  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      ROS:  A comprehensive ROS was otherwise neg    Past Medical/Surgical History:  Past Medical History:   Diagnosis Date     COPD (chronic obstructive pulmonary disease) (H)      Displacement of lumbar intervertebral disc without myelopathy 1995, 2002     HTN, goal below 140/90    Gout  He has history of DVT of the left leg in August 2017 and he required thrombolysis and there was suspicion for May Thurner syndrome with chronic obstruction of the left femoral vein and angioplasty was done.  He required anticoagulation  for 6 months.    Past Surgical History:   Procedure Laterality Date     C DECOMPRESS SPINAL CORD,1 SEG  1995, 4/2002    leftL4-L5, 2nd right L4-L5     C SPINE FUSION,ANTER,3 SGMTS  2/9/2004    ant and post fusion L4-S1     HC REPAIR ROTATOR CUFF,CHRONIC  1989     Allergies:  Allergies as of 12/13/2019 - Reviewed 11/25/2019   Allergen Reaction Noted     Chlorthalidone Other (See Comments) 11/08/2016     Amlodipine Other (See Comments) 12/01/2015     Current Medications:  Current Outpatient Medications   Medication Sig Dispense Refill     acetaminophen (TYLENOL) 325 MG tablet Take 650 mg by mouth every 6 hours       albuterol (PROAIR HFA/PROVENTIL HFA/VENTOLIN HFA) 108 (90 Base) MCG/ACT inhaler Inhale 2 puffs into the lungs every 6 hours as needed for shortness of breath / dyspnea 18 g 0     allopurinol (ZYLOPRIM) 100 MG tablet Allopurinol 700mg daily (two 300mg tablets + one 100mg tablet) 90 tablet 3     allopurinol (ZYLOPRIM) 300 MG tablet Allopurinol 700mg daily (two 300mg tablets + one 100mg tablet) 180 tablet 3     aspirin 81 MG tablet Take 1 tablet (81 mg) by mouth daily 30 tablet 0     carvedilol (COREG) 25 MG tablet TAKE ONE TABLET BY MOUTH TWICE DAILY WITH MEALS 180 tablet 3     hydrALAZINE (APRESOLINE) 25 MG tablet Take 1 tablet (25 mg) by mouth 3 times daily 270 tablet 1     lisinopril (PRINIVIL/ZESTRIL) 20 MG tablet Take 1 tablet (20 mg) by mouth 2 times daily 180 tablet 3     nicotine polacrilex (NICORETTE) 2 MG lozenge Place 2 mg inside cheek every hour as needed for smoking cessation       NICOTINE STEP 1 21 MG/24HR 24 hr patch ROBBY 1 PATCH TO SKIN ONCE D X 42 DAYS  0     predniSONE (DELTASONE) 20 MG tablet PRN gout flare: prednisone 60mg daily x2days, then 40mg daily x5days, then 20mg daily x5days, then stop. 21 tablet 0     sennosides (SENOKOT) 8.6 MG tablet TK 1 T PO BID  0      Family History:  Family History   Problem Relation Age of Onset     Family History Negative Other      Diabetes No family hx  of      Coronary Artery Disease No family hx of      Hypertension No family hx of      Hyperlipidemia No family hx of      Breast Cancer No family hx of      Prostate Cancer No family hx of      Anxiety Disorder No family hx of      Substance Abuse No family hx of      Asthma No family hx of      Thyroid Disease No family hx of      Unknown/Adopted No family hx of      Genetic Disorder No family hx of      Osteoporosis No family hx of      Anesthesia Reaction No family hx of      Mental Illness No family hx of      Depression No family hx of      Other Cancer No family hx of      Colon Cancer No family hx of      Cerebrovascular Disease No family hx of      Obesity No family hx of    His father had blood clot.  1 brother had pancreatic cancer and he will of blood clots.  Another brother had blood clots and he had hepatocellular carcinoma.  He has 2 children both daughters who are healthy.        Social History:  Social History     Socioeconomic History     Marital status:      Spouse name: Not on file     Number of children: Not on file     Years of education: Not on file     Highest education level: Not on file   Occupational History     Not on file   Social Needs     Financial resource strain: Not on file     Food insecurity:     Worry: Not on file     Inability: Not on file     Transportation needs:     Medical: Not on file     Non-medical: Not on file   Tobacco Use     Smoking status: Former Smoker     Packs/day: 1.25     Years: 41.00     Pack years: 51.25     Types: Cigarettes     Last attempt to quit: 10/1/2017     Years since quittin.2     Smokeless tobacco: Never Used   Substance and Sexual Activity     Alcohol use: Yes     Alcohol/week: 0.0 standard drinks     Comment: 12 pack of beer every 2 weeks     Drug use: No     Sexual activity: Not Currently     Partners: Female   Lifestyle     Physical activity:     Days per week: Not on file     Minutes per session: Not on file     Stress: Not on file    Relationships     Social connections:     Talks on phone: Not on file     Gets together: Not on file     Attends Che[employer] service: Not on file     Active member of club or organization: Not on file     Attends meetings of clubs or organizations: Not on file     Relationship status: Not on file     Intimate partner violence:     Fear of current or ex partner: Not on file     Emotionally abused: Not on file     Physically abused: Not on file     Forced sexual activity: Not on file   Other Topics Concern     Parent/sibling w/ CABG, MI or angioplasty before 65F 55M? No   Social History Narrative     Not on file   He has 45-pack-year smoking history but quit 1 year ago.  Drinks 3-4 beer a day.  Lives alone.  He is not working because of disability from the back.    Physical Exam:  There were no vitals taken for this visit.   Wt Readings from Last 4 Encounters:   11/25/19 100.7 kg (222 lb)   11/12/19 100.7 kg (222 lb)   05/14/19 121 kg (266 lb 12.8 oz)   05/08/19 102 kg (224 lb 12.8 oz)   CONSTITUTIONAL: no acute distress but his complexion is mildly bev  EYES: PERRLA, no palor or icterus.   ENT/MOUTH: no mouth lesions. Ears normal  CVS: s1s2 no m r g .   RESPIRATORY: clear to auscultation b/l  GI: soft non tender no hepatosplenomegaly  NEURO: AAOX3  Grossly non focal neuro exam  INTEGUMENT: no obvious rashes  LYMPHATIC: no palpable cervical, supraclavicular, axillary or inguinal LAD  MUSCULOSKELETAL: Unremarkable. No bony tenderness.   EXTREMITIES: no edema on the right side.  Trace edema on the left side.  PSYCH: Mentation, mood and affect are normal. Decision making capacity is intact    Laboratory/Imaging Studies    Reviewed        ASSESSMENT/PLAN:    Polycythemia with elevated hemoglobin, hematocrit and RBC.  I suspect that he could have polycythemia vera.  I would like to do further testing including peripheral blood dyspnea, erythropoietin level and Israel 2 mutation analysis.  We will repeat comprehensive  metabolic panel and LDH.  We also discussed that sometimes bone marrow biopsy is necessary and we discussed the procedure including its potential complications in detail but we will decide about Bone Marrow biopsy later on.    The other possibility for polycythemia could be from underlying COPD and [place] syndrome which basically is plasma contraction which is sometimes seen in patients with hypertension.    He does have episodes of gout and now he is on allopurinol 700 mg daily to prevent frequent flares and uric acid levels have been in the normal range.  I wonder if the episodes of gout are related to the underlying myeloproliferative neoplasm.    Elevated blood pressure.  In the clinic his blood pressure was very elevated.  He tells me that his blood pressure usually runs on the higher side.  I recommend close follow-up with his primary care physician and he tells me that he has an appointment on Monday.    Return to clinic on 12/31/2019.    All questions answered.  He is agreeable and comfortable with the plan.    Germain Muñoz MD      Again, thank you for allowing me to participate in the care of your patient.        Sincerely,        Germain Muñoz MD

## 2019-12-13 NOTE — PROGRESS NOTES
Hematology initial visit:  Date on this visit: 12/13/2019    Bucky Edgar  is referred by Dr.Kyle Bolaños for a hematology consultation. He requires evaluation for new diagnosis of elevated hemoglobin.    Primary Physician: Hamlet Roberts     History Of Present Illness:  Mr. Edgar is a 64 year old male who has hx of COPD, gout was referred for elevated hemoglobin which was noticed in August 2018.  Prior to that in 2015 and 2016 he had couple of readings when his hemoglobin was 18.1 and 18.5.  After that his hemoglobin was in the normal range but since August 2018 it has been persistently elevated.  Most recent hemoglobin on 11/7/2019 was 20.1 with hematocrit of 59.5 and RBC 6.09.    He comes in today and tells me that he has been feeling well.  He has some limitation due to chronic back pain and back surgeries.  He denies any B symptoms.  No erythromelalgia.  No weight loss.  Denies any nausea vomiting diarrhea constipation or bleeding issues.  Overall energy is stable.  Denies any change in his breathing but he has COPD so has dyspnea on exertion.  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      ROS:  A comprehensive ROS was otherwise neg    Past Medical/Surgical History:  Past Medical History:   Diagnosis Date     COPD (chronic obstructive pulmonary disease) (H)      Displacement of lumbar intervertebral disc without myelopathy 1995, 2002     HTN, goal below 140/90    Gout  He has history of DVT of the left leg in August 2017 and he required thrombolysis and there was suspicion for May Thurner syndrome with chronic obstruction of the left femoral vein and angioplasty was done.  He required anticoagulation for 6 months.    Past Surgical History:   Procedure Laterality Date     C DECOMPRESS SPINAL CORD,1 SEG  1995, 4/2002    leftL4-L5, 2nd right L4-L5     C SPINE FUSION,ANTER,3 SGMTS  2/9/2004    ant and post fusion L4-S1     HC REPAIR ROTATOR  CUFF,CHRONIC  1989     Allergies:  Allergies as of 12/13/2019 - Reviewed 11/25/2019   Allergen Reaction Noted     Chlorthalidone Other (See Comments) 11/08/2016     Amlodipine Other (See Comments) 12/01/2015     Current Medications:  Current Outpatient Medications   Medication Sig Dispense Refill     acetaminophen (TYLENOL) 325 MG tablet Take 650 mg by mouth every 6 hours       albuterol (PROAIR HFA/PROVENTIL HFA/VENTOLIN HFA) 108 (90 Base) MCG/ACT inhaler Inhale 2 puffs into the lungs every 6 hours as needed for shortness of breath / dyspnea 18 g 0     allopurinol (ZYLOPRIM) 100 MG tablet Allopurinol 700mg daily (two 300mg tablets + one 100mg tablet) 90 tablet 3     allopurinol (ZYLOPRIM) 300 MG tablet Allopurinol 700mg daily (two 300mg tablets + one 100mg tablet) 180 tablet 3     aspirin 81 MG tablet Take 1 tablet (81 mg) by mouth daily 30 tablet 0     carvedilol (COREG) 25 MG tablet TAKE ONE TABLET BY MOUTH TWICE DAILY WITH MEALS 180 tablet 3     hydrALAZINE (APRESOLINE) 25 MG tablet Take 1 tablet (25 mg) by mouth 3 times daily 270 tablet 1     lisinopril (PRINIVIL/ZESTRIL) 20 MG tablet Take 1 tablet (20 mg) by mouth 2 times daily 180 tablet 3     nicotine polacrilex (NICORETTE) 2 MG lozenge Place 2 mg inside cheek every hour as needed for smoking cessation       NICOTINE STEP 1 21 MG/24HR 24 hr patch ROBBY 1 PATCH TO SKIN ONCE D X 42 DAYS  0     predniSONE (DELTASONE) 20 MG tablet PRN gout flare: prednisone 60mg daily x2days, then 40mg daily x5days, then 20mg daily x5days, then stop. 21 tablet 0     sennosides (SENOKOT) 8.6 MG tablet TK 1 T PO BID  0      Family History:  Family History   Problem Relation Age of Onset     Family History Negative Other      Diabetes No family hx of      Coronary Artery Disease No family hx of      Hypertension No family hx of      Hyperlipidemia No family hx of      Breast Cancer No family hx of      Prostate Cancer No family hx of      Anxiety Disorder No family hx of       Substance Abuse No family hx of      Asthma No family hx of      Thyroid Disease No family hx of      Unknown/Adopted No family hx of      Genetic Disorder No family hx of      Osteoporosis No family hx of      Anesthesia Reaction No family hx of      Mental Illness No family hx of      Depression No family hx of      Other Cancer No family hx of      Colon Cancer No family hx of      Cerebrovascular Disease No family hx of      Obesity No family hx of    His father had blood clot.  1 brother had pancreatic cancer and he will of blood clots.  Another brother had blood clots and he had hepatocellular carcinoma.  He has 2 children both daughters who are healthy.        Social History:  Social History     Socioeconomic History     Marital status:      Spouse name: Not on file     Number of children: Not on file     Years of education: Not on file     Highest education level: Not on file   Occupational History     Not on file   Social Needs     Financial resource strain: Not on file     Food insecurity:     Worry: Not on file     Inability: Not on file     Transportation needs:     Medical: Not on file     Non-medical: Not on file   Tobacco Use     Smoking status: Former Smoker     Packs/day: 1.25     Years: 41.00     Pack years: 51.25     Types: Cigarettes     Last attempt to quit: 10/1/2017     Years since quittin.2     Smokeless tobacco: Never Used   Substance and Sexual Activity     Alcohol use: Yes     Alcohol/week: 0.0 standard drinks     Comment: 12 pack of beer every 2 weeks     Drug use: No     Sexual activity: Not Currently     Partners: Female   Lifestyle     Physical activity:     Days per week: Not on file     Minutes per session: Not on file     Stress: Not on file   Relationships     Social connections:     Talks on phone: Not on file     Gets together: Not on file     Attends Baptism service: Not on file     Active member of club or organization: Not on file     Attends meetings of clubs or  organizations: Not on file     Relationship status: Not on file     Intimate partner violence:     Fear of current or ex partner: Not on file     Emotionally abused: Not on file     Physically abused: Not on file     Forced sexual activity: Not on file   Other Topics Concern     Parent/sibling w/ CABG, MI or angioplasty before 65F 55M? No   Social History Narrative     Not on file   He has 45-pack-year smoking history but quit 1 year ago.  Drinks 3-4 beer a day.  Lives alone.  He is not working because of disability from the back.    Physical Exam:  There were no vitals taken for this visit.   Wt Readings from Last 4 Encounters:   11/25/19 100.7 kg (222 lb)   11/12/19 100.7 kg (222 lb)   05/14/19 121 kg (266 lb 12.8 oz)   05/08/19 102 kg (224 lb 12.8 oz)   CONSTITUTIONAL: no acute distress but his complexion is mildly bev  EYES: PERRLA, no palor or icterus.   ENT/MOUTH: no mouth lesions. Ears normal  CVS: s1s2 no m r g .   RESPIRATORY: clear to auscultation b/l  GI: soft non tender no hepatosplenomegaly  NEURO: AAOX3  Grossly non focal neuro exam  INTEGUMENT: no obvious rashes  LYMPHATIC: no palpable cervical, supraclavicular, axillary or inguinal LAD  MUSCULOSKELETAL: Unremarkable. No bony tenderness.   EXTREMITIES: no edema on the right side.  Trace edema on the left side.  PSYCH: Mentation, mood and affect are normal. Decision making capacity is intact    Laboratory/Imaging Studies    Reviewed        ASSESSMENT/PLAN:    Polycythemia with elevated hemoglobin, hematocrit and RBC.  I suspect that he could have polycythemia vera.  I would like to do further testing including peripheral blood dyspnea, erythropoietin level and Israel 2 mutation analysis.  We will repeat comprehensive metabolic panel and LDH.  We also discussed that sometimes bone marrow biopsy is necessary and we discussed the procedure including its potential complications in detail but we will decide about Bone Marrow biopsy later on.    The other  possibility for polycythemia could be from underlying COPD and [place] syndrome which basically is plasma contraction which is sometimes seen in patients with hypertension.    He does have episodes of gout and now he is on allopurinol 700 mg daily to prevent frequent flares and uric acid levels have been in the normal range.  I wonder if the episodes of gout are related to the underlying myeloproliferative neoplasm.    Elevated blood pressure.  In the clinic his blood pressure was very elevated.  He tells me that his blood pressure usually runs on the higher side.  I recommend close follow-up with his primary care physician and he tells me that he has an appointment on Monday.    Return to clinic on 12/31/2019.    All questions answered.  He is agreeable and comfortable with the plan.    Germain Muñoz MD

## 2019-12-13 NOTE — NURSING NOTE
"Oncology Rooming Note    December 13, 2019 2:32 PM   Bucky Edgar is a 64 year old male who presents for:    Chief Complaint   Patient presents with     Oncology Clinic Visit     New Patient- Elevated hemoglobin     Initial Vitals: BP (!) 162/78 (BP Location: Left arm)   Pulse 73   Temp 98.7  F (37.1  C) (Oral)   Resp 20   Ht 1.753 m (5' 9.02\")   Wt 102.8 kg (226 lb 9.6 oz)   SpO2 95%   BMI 33.45 kg/m   Estimated body mass index is 33.45 kg/m  as calculated from the following:    Height as of this encounter: 1.753 m (5' 9.02\").    Weight as of this encounter: 102.8 kg (226 lb 9.6 oz). Body surface area is 2.24 meters squared.  Moderate Pain (5) Comment: Data Unavailable   No LMP for male patient.  Allergies reviewed: Yes  Medications reviewed: Yes    Medications: Medication refills not needed today.  Pharmacy name entered into [facility]:    [facility] [place] [address] [id number] - [place] [address]  [facility][id number] - [place] - [address] [facility] [address] [place]              "

## 2019-12-16 LAB
COPATH REPORT: NORMAL
EPO SERPL-ACNC: 5 MU/ML (ref 4–27)

## 2019-12-24 LAB — COPATH REPORT: NORMAL

## 2020-01-01 NOTE — MR AVS SNAPSHOT
Bucky Edgar   9/5/2017 1:00 PM   Anticoagulation Therapy Visit    Description:  61 year old male   Provider:  [facility]   Department:  [facility]           INR as of 9/5/2017     Today's INR 1.7!      Anticoagulation Summary as of 9/5/2017     INR goal 2.0-3.0   Today's INR 1.7!   Full instructions 9/5: 7.5 mg; 9/6: 7.5 mg; Otherwise No maintenance plan   Next INR check 9/7/2017    Indications   Long-term (current) use of anticoagulants [Z79.01] [Z79.01]  Deep vein thrombosis (DVT) (H) [I82.409] [I82.409]         Contact Numbers     Clinic Number:         September 2017 Details    Sun Mon Tue Wed Thu Fri Sat          1               2                 3               4               5      7.5 mg   See details      6      7.5 mg         7            8               9                 10               11               12               13               14               15               16                 17               18               19               20               21               22               23                 24               25               26               27               28               29               30                Date Details   09/05 This INR check       Date of next INR:  9/7/2017         How to take your warfarin dose     To take:  7.5 mg Take 1.5 of the 5 mg tablets.            14.76 14.96 16.53 18.7

## 2020-01-02 ENCOUNTER — PATIENT OUTREACH (OUTPATIENT)
Dept: ONCOLOGY | Facility: CLINIC | Age: 65
End: 2020-01-02

## 2020-01-02 DIAGNOSIS — D75.1 POLYCYTHEMIA: Primary | ICD-10-CM

## 2020-01-02 NOTE — TELEPHONE ENCOUNTER
Communicated to patient that his results did not show a reason for his high hemoglobin/hematocrit and Dr. Muñoz is recommending a bone marrow biopsy; patient agrees and received information on this procedure when he was seen in clinic for consultation.  Advised we would be in touch with him for a date and time.

## 2020-01-03 ENCOUNTER — NURSE TRIAGE (OUTPATIENT)
Dept: FAMILY MEDICINE | Facility: OTHER | Age: 65
End: 2020-01-03

## 2020-01-03 NOTE — TELEPHONE ENCOUNTER
Additional Information    Negative: Bluish (or gray) lips or face    Negative: Severe difficulty breathing (e.g., struggling for each breath, speaks in single words)    Negative: Rapid onset of cough and has hives    Negative: Coughing started suddenly after medicine, an allergic food or bee sting    Negative: Difficulty breathing after exposure to flames, smoke, or fumes    Negative: Sounds like a life-threatening emergency to the triager    Negative: Previous asthma attacks and this feels like asthma attack    Negative: Chest pain present when not coughing    Negative: Difficulty breathing    Negative: Passed out (i.e., fainted, collapsed and was not responding)    Negative: Patient sounds very sick or weak to the triager    Negative: Wheezing is present    Negative: Coughed up > 1 tablespoon (15 ml) blood (Exception: blood-tinged sputum)    Negative: Fever > 103 F (39.4 C)    Negative: Fever > 101 F (38.3 C) and over 60 years of age    Negative: Fever > 100.0 F (37.8 C) and has diabetes mellitus or a weak immune system (e.g., HIV positive, cancer chemotherapy, organ transplant, splenectomy, chronic steroids)    Negative: Fever > 100.0 F (37.8 C) and bedridden (e.g., nursing home patient, stroke, chronic illness, recovering from surgery)    Negative: Increasing ankle swelling    SEVERE coughing spells (e.g., whooping sound after coughing, vomiting after coughing)    Known COPD or other severe lung disease (i.e., bronchiectasis, cystic fibrosis, lung surgery) and worsening symptoms (i.e., increased sputum purulence or amount, increased breathing difficulty)    Negative: Using nasal washes and pain medicine > 24 hours and sinus pain persists    Negative: Fever returns after gone for over 24 hours and symptoms worse or not improved    Negative: Fever present > 3 days (72 hours)    Negative: Coughing up berta-colored (reddish-brown) or blood-tinged sputum    Negative: Continuous (nonstop) coughing interferes with work  "or school and no improvement using cough treatment per Care Advice    Negative: Patient wants to be seen    Negative: Cough has been present for > 3 weeks    Negative: Allergy symptoms are also present (e.g., itchy eyes, clear nasal discharge, postnasal drip)    Negative: Nasal discharge present > 10 days    Negative: Exposure to TB (Tuberculosis)    Negative: Taking an ACE Inhibitor medication (e.g., benazepril/LOTENSIN, captopril/CAPOTEN, enalapril/VASOTEC, lisinopril/ZESTRIL)    Negative: Cough with no complications    Negative: Cough with cold symptoms (e.g., runny nose, postnasal drip, throat clearing)    Answer Assessment - Initial Assessment Questions  1. ONSET: \"When did the cough begin?\"       A few weeks ago, more productive the past few days  2. SEVERITY: \"How bad is the cough today?\"       Severe  3. RESPIRATORY DISTRESS: \"Describe your breathing.\"       No, speaking in full sentences  4. FEVER: \"Do you have a fever?\" If so, ask: \"What is your temperature, how was it measured, and when did it start?\"      Denies  5. HEMOPTYSIS: \"Are you coughing up any blood?\" If so ask: \"How much?\" (flecks, streaks, tablespoons, etc.)      Denies  6. TREATMENT: \"What have you done so far to treat the cough?\" (e.g., meds, fluids, humidifier)      Continues with home medications.  7. CARDIAC HISTORY: \"Do you have any history of heart disease?\" (e.g., heart attack, congestive heart failure)       HTN  8. LUNG HISTORY: \"Do you have any history of lung disease?\"  (e.g., pulmonary embolus, asthma, emphysema)      COPD  9. PE RISK FACTORS: \"Do you have a history of blood clots?\" (or: recent major surgery, recent prolonged travel, bedridden )      Denies  10. OTHER SYMPTOMS: \"Do you have any other symptoms? (e.g., runny nose, wheezing, chest pain)        Wheezing  11. PREGNANCY: \"Is there any chance you are pregnant?\" \"When was your last menstrual period?\"        denies  12. TRAVEL: \"Have you traveled out of the country in the " "last month?\" (e.g., travel history, exposures)        No    Protocols used: COUGH-A-OH      HX COPD, no current office appointments today, has pain with deep breathing and coughing.    Talking in full sentences, denies fevers, body aches, some trouble breathing.    Recommended urgent care today.    Tnoey Nesbitt RN, BSN      "

## 2020-01-03 NOTE — TELEPHONE ENCOUNTER
Reason for call:  Symptom  Reason for call:  Patient reporting a symptom    Symptom or request: cough/chest pain    Duration (how long have symptoms been present): a few weeks    Have you been treated for this before? Yes    Additional comments: Pt is concerned and has an appt on Monday the 6th. He thinks he may have pneumonia. He states that he has a hard time breathing and it hurts his heart. Sending to triage    Phone Number patient can be reached at:  Cell number on file:    Telephone Information:   Mobile [phone]       Best Time:  anytime    Can we leave a detailed message on this number:  YES    Call taken on 1/3/2020 at 9:28 AM by Albertina Velazquez

## 2020-01-03 NOTE — PROGRESS NOTES
"Subjective     Bucky Edgar is a 64 year old male who presents to clinic today for the following health issues:    HPI     Acute Illness   Acute illness concerns: cough  Onset: 2 weeks    Fever: no    Chills/Sweats: no    Headache (location?): no    Sinus Pressure:YES- post-nasal drainage, facial pain and pressure    Conjunctivitis:  no    Ear Pain: YES: left    Rhinorrhea: YES    Congestion: no    Sore Throat: no     Cough: YES-productive of green/brown sputum    Wheeze: YES- \"I have COPD\"    Decreased Appetite: no    Nausea: no    Vomiting: no    Diarrhea:  YES - a little bit the other day     Dysuria/Freq.: no    Fatigue/Achiness: YES    Sick/Strep Exposure: no     Therapies Tried and outcome: none - just flonase    He has having left chest/rib pain, worse with coughing and taking deep breaths.     Reviewed and updated as needed this visit by Provider   Allergies  Meds  Problems    Review of Systems   ROS COMP: Constitutional, HEENT, cardiovascular, pulmonary, gi and gu systems are negative, except as otherwise noted.      Objective    /80   Pulse 77   Temp 98.1  F (36.7  C) (Temporal)   Resp 18   Ht 1.753 m (5' 9\")   Wt 101.2 kg (223 lb)   SpO2 97%   BMI 32.93 kg/m    Body mass index is 32.93 kg/m .  Physical Exam   GENERAL: healthy, alert and no distress  EYES: Eyes grossly normal to inspection, PERRL and conjunctivae and sclerae normal  HENT: ear canals and TM's normal. Nasal mucosa is erythematous and edematous bilaterally. Pharynx is clear.   NECK: no adenopathy  RESP: lungs with mild rhonchi throughout, most noticeable over the right upper lung with mild crackles, no wheezing.   MUSCULOSKELETAL: Tenderness over the left lateral chest/ribs.   CV: regular rate and rhythm, normal S1 S2, no S3 or S4, no murmur, click or rub    Assessment & Plan       ICD-10-CM    1. Pneumonia of right upper lobe due to infectious organism (H) J18.1 azithromycin (ZITHROMAX) 250 MG tablet     predniSONE " (DELTASONE) 20 MG tablet   2. Chronic bronchitis with COPD (chronic obstructive pulmonary disease) (H) J44.9         Symptoms concerning for right upper lobe pneumonia. Will ttreat with azithromycin to take as directed.   Will prescribe prednisone 20 mg to take 2 tablets daily with breakfast for 5 days to help with breathing/wheezing.  I recommend plenty of fluids along with breathing in warm, moist air/humidifer.   Can use over the counter Mucinex to help with congestion. Flonase nasal spray can also be helpful.   Use Tylenol to help with fevers/pain including the rib pain. Continue with heating pads for the rib pain secondary to frequent coughing.  Honey and tea can help with cough.  If symptoms are not improving within the next week, he should be seen again.       Hamlet Roberts PA-C  [facility]

## 2020-01-06 ENCOUNTER — OFFICE VISIT (OUTPATIENT)
Dept: FAMILY MEDICINE | Facility: OTHER | Age: 65
End: 2020-01-06
Payer: COMMERCIAL

## 2020-01-06 VITALS
TEMPERATURE: 98.1 F | SYSTOLIC BLOOD PRESSURE: 138 MMHG | HEART RATE: 77 BPM | OXYGEN SATURATION: 97 % | DIASTOLIC BLOOD PRESSURE: 80 MMHG | RESPIRATION RATE: 18 BRPM | BODY MASS INDEX: 33.03 KG/M2 | HEIGHT: 69 IN | WEIGHT: 223 LBS

## 2020-01-06 DIAGNOSIS — J18.9 PNEUMONIA OF RIGHT UPPER LOBE DUE TO INFECTIOUS ORGANISM: Primary | ICD-10-CM

## 2020-01-06 DIAGNOSIS — J44.89 CHRONIC BRONCHITIS WITH COPD (CHRONIC OBSTRUCTIVE PULMONARY DISEASE) (H): ICD-10-CM

## 2020-01-06 PROCEDURE — 99213 OFFICE O/P EST LOW 20 MIN: CPT | Performed by: PHYSICIAN ASSISTANT

## 2020-01-06 RX ORDER — AZITHROMYCIN 250 MG/1
TABLET, FILM COATED ORAL
Qty: 6 TABLET | Refills: 0 | Status: SHIPPED | OUTPATIENT
Start: 2020-01-06 | End: 2020-01-14

## 2020-01-06 RX ORDER — PREDNISONE 20 MG/1
40 TABLET ORAL DAILY
Qty: 10 TABLET | Refills: 0 | Status: SHIPPED | OUTPATIENT
Start: 2020-01-06 | End: 2020-01-14

## 2020-01-06 ASSESSMENT — MIFFLIN-ST. JEOR: SCORE: 1801.9

## 2020-01-07 NOTE — PATIENT INSTRUCTIONS
Will treat you with azithromycin to take as directed.   Will prescribe prednisone to take 2 tablets daily with breakfast for 5 days to help with breathing/wheezing.  I recommend plenty of fluids along with breathing in warm, moist air/humidifer.   Can use over the counter Mucinex to help with congestion. Flonase nasal spray can also be helpful.   Use Tylenol to help with fevers/pain.  Honey and tea can help with cough.  If symptoms are not improving within the next week, you should be seen again.   Continue with heating pads for the rib pain.

## 2020-01-14 ENCOUNTER — ONCOLOGY VISIT (OUTPATIENT)
Dept: ONCOLOGY | Facility: CLINIC | Age: 65
End: 2020-01-14
Payer: COMMERCIAL

## 2020-01-14 VITALS
HEART RATE: 63 BPM | SYSTOLIC BLOOD PRESSURE: 172 MMHG | OXYGEN SATURATION: 96 % | DIASTOLIC BLOOD PRESSURE: 100 MMHG | TEMPERATURE: 98 F | RESPIRATION RATE: 16 BRPM

## 2020-01-14 DIAGNOSIS — D75.1 POLYCYTHEMIA: Primary | ICD-10-CM

## 2020-01-14 DIAGNOSIS — D75.1 POLYCYTHEMIA: ICD-10-CM

## 2020-01-14 LAB
BASOPHILS # BLD AUTO: 0.1 10E9/L (ref 0–0.2)
BASOPHILS NFR BLD AUTO: 0.5 %
DIFFERENTIAL METHOD BLD: ABNORMAL
EOSINOPHIL # BLD AUTO: 0.3 10E9/L (ref 0–0.7)
EOSINOPHIL NFR BLD AUTO: 3 %
ERYTHROCYTE [DISTWIDTH] IN BLOOD BY AUTOMATED COUNT: 13.9 % (ref 10–15)
HCT VFR BLD AUTO: 57.3 % (ref 40–53)
HGB BLD-MCNC: 19.2 G/DL (ref 13.3–17.7)
IMM GRANULOCYTES # BLD: 0.2 10E9/L (ref 0–0.4)
IMM GRANULOCYTES NFR BLD: 1.7 %
LYMPHOCYTES # BLD AUTO: 1.6 10E9/L (ref 0.8–5.3)
LYMPHOCYTES NFR BLD AUTO: 14.9 %
MCH RBC QN AUTO: 33.1 PG (ref 26.5–33)
MCHC RBC AUTO-ENTMCNC: 33.5 G/DL (ref 31.5–36.5)
MCV RBC AUTO: 99 FL (ref 78–100)
MONOCYTES # BLD AUTO: 1.2 10E9/L (ref 0–1.3)
MONOCYTES NFR BLD AUTO: 11.5 %
NEUTROPHILS # BLD AUTO: 7.3 10E9/L (ref 1.6–8.3)
NEUTROPHILS NFR BLD AUTO: 68.4 %
PLATELET # BLD AUTO: 195 10E9/L (ref 150–450)
RBC # BLD AUTO: 5.8 10E12/L (ref 4.4–5.9)
WBC # BLD AUTO: 10.7 10E9/L (ref 4–11)

## 2020-01-14 PROCEDURE — 40000951 ZZHCL STATISTIC BONE MARROW INTERP TC 85097: Performed by: INTERNAL MEDICINE

## 2020-01-14 PROCEDURE — 81403 MOPATH PROCEDURE LEVEL 4: CPT | Performed by: INTERNAL MEDICINE

## 2020-01-14 PROCEDURE — 96374 THER/PROPH/DIAG INJ IV PUSH: CPT | Mod: 59

## 2020-01-14 PROCEDURE — 38222 DX BONE MARROW BX & ASPIR: CPT | Performed by: NURSE PRACTITIONER

## 2020-01-14 PROCEDURE — 88311 DECALCIFY TISSUE: CPT | Performed by: INTERNAL MEDICINE

## 2020-01-14 PROCEDURE — 88161 CYTOPATH SMEAR OTHER SOURCE: CPT | Mod: 59 | Performed by: INTERNAL MEDICINE

## 2020-01-14 PROCEDURE — 88305 TISSUE EXAM BY PATHOLOGIST: CPT | Performed by: INTERNAL MEDICINE

## 2020-01-14 PROCEDURE — 88313 SPECIAL STAINS GROUP 2: CPT | Performed by: INTERNAL MEDICINE

## 2020-01-14 PROCEDURE — 00000161 ZZHCL STATISTIC H-SPHEME PROCESS B/S: Performed by: INTERNAL MEDICINE

## 2020-01-14 PROCEDURE — 88184 FLOWCYTOMETRY/ TC 1 MARKER: CPT | Performed by: INTERNAL MEDICINE

## 2020-01-14 PROCEDURE — 88185 FLOWCYTOMETRY/TC ADD-ON: CPT | Performed by: INTERNAL MEDICINE

## 2020-01-14 PROCEDURE — 81219 CALR GENE COM VARIANTS: CPT | Performed by: INTERNAL MEDICINE

## 2020-01-14 PROCEDURE — 85025 COMPLETE CBC W/AUTO DIFF WBC: CPT | Performed by: NURSE PRACTITIONER

## 2020-01-14 PROCEDURE — 85060 BLOOD SMEAR INTERPRETATION: CPT | Performed by: INTERNAL MEDICINE

## 2020-01-14 ASSESSMENT — PAIN SCALES - GENERAL
PAINLEVEL: SEVERE PAIN (6)
PAINLEVEL: MODERATE PAIN (5)

## 2020-01-14 NOTE — PROGRESS NOTES
[facility] Adult Bone Marrow Biopsy Procedure Note  January 14, 2020    /89   Pulse 67   Temp 99.4  F (37.4  C)   Resp 16   SpO2 96%    Results for orders placed or performed in visit on 01/14/20   CBC with platelets differential     Status: Abnormal   Result Value Ref Range    WBC 10.7 4.0 - 11.0 10e9/L    RBC Count 5.80 4.4 - 5.9 10e12/L    Hemoglobin 19.2 (H) 13.3 - 17.7 g/dL    Hematocrit 57.3 (H) 40.0 - 53.0 %    MCV 99 78 - 100 fl    MCH 33.1 (H) 26.5 - 33.0 pg    MCHC 33.5 31.5 - 36.5 g/dL    RDW 13.9 10.0 - 15.0 %    Platelet Count 195 150 - 450 10e9/L    Diff Method Automated Method     % Neutrophils 68.4 %    % Lymphocytes 14.9 %    % Monocytes 11.5 %    % Eosinophils 3.0 %    % Basophils 0.5 %    % Immature Granulocytes 1.7 %    Absolute Neutrophil 7.3 1.6 - 8.3 10e9/L    Absolute Lymphocytes 1.6 0.8 - 5.3 10e9/L    Absolute Monocytes 1.2 0.0 - 1.3 10e9/L    Absolute Eosinophils 0.3 0.0 - 0.7 10e9/L    Absolute Basophils 0.1 0.0 - 0.2 10e9/L    Abs Immature Granulocytes 0.2 0 - 0.4 10e9/L     Learning needs assessment complete within 12 months? NO    DIAGNOSIS: polycythemia and peripheral blood JAK2 is negative     PROCEDURE: Unilateral Bone Marrow Biopsy and Unilateral Aspirate    LOCATION: [facility]    Patient s identification was positively verified by verbal identification and invasive procedure safety checklist was completed. Informed consent was obtained. Following the administration of Midazolam 1+1mg as pre-medication, patient was placed in the prone position and prepped and draped in a sterile manner. Approximately 14 cc of 1% Lidocaine was used over the left posterior iliac spine. Following this a 3 mm incision was made. Trephine bone marrow core(s) was (were) obtained from the IC. Bone marrow aspirates were obtained from the IC. Aspirates were sent for morphology, immunophenotyping, cytogenetics and molecular diagnostics. A total of approximately 20 ml of marrow  was aspirated. Following this procedure a sterile dressing was applied to the bone marrow biopsy site(s). The patient was placed in the supine position to maintain pressure on the biopsy site. Post-procedure wound care instructions were given.     Complications: NO    Pre-procedural pain: 6 out of 10 on the numeric pain rating scale- chronic back pain with history of back surgeries    Post-procedural pain assessment: 6 out of 10 on the numeric pain rating scale.     Interventions: NO    Length of procedure:20 minutes or less      Procedure performed by: Misty Cedeno CNP

## 2020-01-14 NOTE — LETTER
1/14/2020         RE: Bucky Edgar  [address] [place] [address]        Dear Colleague,    Thank you for referring your patient, Bucky Edgar, to the [facility]. Please see a copy of my visit note below.    ONC Adult Bone Marrow Biopsy Procedure Note  January 14, 2020    /89   Pulse 67   Temp 99.4  F (37.4  C)   Resp 16   SpO2 96%    Results for orders placed or performed in visit on 01/14/20   CBC with platelets differential     Status: Abnormal   Result Value Ref Range    WBC 10.7 4.0 - 11.0 10e9/L    RBC Count 5.80 4.4 - 5.9 10e12/L    Hemoglobin 19.2 (H) 13.3 - 17.7 g/dL    Hematocrit 57.3 (H) 40.0 - 53.0 %    MCV 99 78 - 100 fl    MCH 33.1 (H) 26.5 - 33.0 pg    MCHC 33.5 31.5 - 36.5 g/dL    RDW 13.9 10.0 - 15.0 %    Platelet Count 195 150 - 450 10e9/L    Diff Method Automated Method     % Neutrophils 68.4 %    % Lymphocytes 14.9 %    % Monocytes 11.5 %    % Eosinophils 3.0 %    % Basophils 0.5 %    % Immature Granulocytes 1.7 %    Absolute Neutrophil 7.3 1.6 - 8.3 10e9/L    Absolute Lymphocytes 1.6 0.8 - 5.3 10e9/L    Absolute Monocytes 1.2 0.0 - 1.3 10e9/L    Absolute Eosinophils 0.3 0.0 - 0.7 10e9/L    Absolute Basophils 0.1 0.0 - 0.2 10e9/L    Abs Immature Granulocytes 0.2 0 - 0.4 10e9/L     Learning needs assessment complete within 12 months? NO    DIAGNOSIS: polycythemia and peripheral blood JAK2 is negative     PROCEDURE: Unilateral Bone Marrow Biopsy and Unilateral Aspirate    LOCATION: [facility]    Patient s identification was positively verified by verbal identification and invasive procedure safety checklist was completed. Informed consent was obtained. Following the administration of Midazolam 1+1mg as pre-medication, patient was placed in the prone position and prepped and draped in a sterile manner. Approximately 14 cc of 1% Lidocaine was used over the left posterior iliac spine. Following this a 3 mm incision was made.  Trephine bone marrow core(s) was (were) obtained from the [facility]. Bone marrow aspirates were obtained from the [facility]. Aspirates were sent for morphology, immunophenotyping, cytogenetics and molecular diagnostics. A total of approximately 20 ml of marrow was aspirated. Following this procedure a sterile dressing was applied to the bone marrow biopsy site(s). The patient was placed in the supine position to maintain pressure on the biopsy site. Post-procedure wound care instructions were given.     Complications: NO    Pre-procedural pain: 6 out of 10 on the numeric pain rating scale- chronic back pain with history of back surgeries    Post-procedural pain assessment: 6 out of 10 on the numeric pain rating scale.     Interventions: NO    Length of procedure:20 minutes or less      Procedure performed by: Misty Cedeno CNP      Again, thank you for allowing me to participate in the care of your patient.        Sincerely,        Misty Cedeno NP, APRN CNP

## 2020-01-14 NOTE — PROGRESS NOTES
Patient presents for bone marrow biopsy.  Discussed procedure with patient and daughter,Shayna.  Vitals obtained and stable.  Lab staff present during PIV start and lab drawn.  Misty Cedeno CNP met with patient and consent was signed.  Pre-medications administered, patient positioned in prone position.  Patient tolerated procedure well.     Post observation x 75 min. Patient remained hypertensive during observation period. Ok to discharge per Misty Cedeno CNP with instructions to continue to check blood pressure at home and if it remains elevated, to see PCP.  Rates pain a bmbx site 5 out of 10.  Offered Tylenol but patient declined ans said he will take it when he gets home.  PIV D/C'd.  Dressing assessed; no bleeding. Discharge instructions reviewed with patient and Shayna, both understanding.  Shayna accompanied patient to the car and will drive patient home.  Patient discharged at 1010. Kira Long RN  BSN OCN

## 2020-01-15 LAB — COPATH REPORT: NORMAL

## 2020-01-16 LAB — COPATH REPORT: NORMAL

## 2020-01-20 LAB — COPATH REPORT: NORMAL

## 2020-01-24 LAB — COPATH REPORT: NORMAL

## 2020-01-28 ENCOUNTER — ONCOLOGY VISIT (OUTPATIENT)
Dept: ONCOLOGY | Facility: CLINIC | Age: 65
End: 2020-01-28
Attending: INTERNAL MEDICINE
Payer: COMMERCIAL

## 2020-01-28 ENCOUNTER — ANCILLARY PROCEDURE (OUTPATIENT)
Dept: GENERAL RADIOLOGY | Facility: CLINIC | Age: 65
End: 2020-01-28
Attending: INTERNAL MEDICINE
Payer: COMMERCIAL

## 2020-01-28 VITALS
TEMPERATURE: 98.4 F | RESPIRATION RATE: 18 BRPM | BODY MASS INDEX: 33.62 KG/M2 | HEART RATE: 64 BPM | DIASTOLIC BLOOD PRESSURE: 98 MMHG | SYSTOLIC BLOOD PRESSURE: 154 MMHG | OXYGEN SATURATION: 96 % | HEIGHT: 69 IN | WEIGHT: 227 LBS

## 2020-01-28 DIAGNOSIS — R07.81 RIB PAIN ON LEFT SIDE: Primary | ICD-10-CM

## 2020-01-28 DIAGNOSIS — D75.1 POLYCYTHEMIA: ICD-10-CM

## 2020-01-28 DIAGNOSIS — R07.81 RIB PAIN ON LEFT SIDE: ICD-10-CM

## 2020-01-28 PROCEDURE — 99214 OFFICE O/P EST MOD 30 MIN: CPT | Performed by: INTERNAL MEDICINE

## 2020-01-28 PROCEDURE — 71101 X-RAY EXAM UNILAT RIBS/CHEST: CPT | Mod: LT | Performed by: RADIOLOGY

## 2020-01-28 ASSESSMENT — MIFFLIN-ST. JEOR: SCORE: 1820.3

## 2020-01-28 ASSESSMENT — PAIN SCALES - GENERAL: PAINLEVEL: SEVERE PAIN (6)

## 2020-01-28 NOTE — NURSING NOTE
"Oncology Rooming Note    January 28, 2020 7:38 AM   Bucky Edgar is a 64 year old male who presents for:    Chief Complaint   Patient presents with     Oncology Clinic Visit     Follow Up     Initial Vitals: BP (!) 203/124 (BP Location: Right arm)   Pulse 64   Temp 98.4  F (36.9  C) (Oral)   Resp 18   Ht 1.753 m (5' 9.02\")   Wt 103 kg (227 lb)   SpO2 96%   BMI 33.51 kg/m   Estimated body mass index is 33.51 kg/m  as calculated from the following:    Height as of this encounter: 1.753 m (5' 9.02\").    Weight as of this encounter: 103 kg (227 lb). Body surface area is 2.24 meters squared.  Severe Pain (6) Comment: Data Unavailable   No LMP for male patient.  Allergies reviewed: Yes  Medications reviewed: Yes    Medications: Medication refills not needed today.  Pharmacy name entered into [employer]:    [facility] [place] [place] [address] [id number] - [place] - [address]  [id number] - [place], [place] - [address] AT [facility] OF  [address]    Nae Espinosa LPN              "

## 2020-01-28 NOTE — LETTER
1/28/2020         RE: Bucky Edgar  [address] [place] [address]        Dear Colleague,    Thank you for referring your patient, Bucky Edgar, to the [facility]. Please see a copy of my visit note below.    Hematology follow-up visit:  Date on this visit: 1/28/2020     Bucky Edgar  is referred by Dr.Kyle Bolaños for a hematology consultation. He requires evaluation for new diagnosis of elevated hemoglobin.    Primary Physician: Hamlet Roberts     History Of Present Illness:  Please see my previous note for details.  I have copied and updated from prior note.    Mr. Edgar is a 64 year old male who has hx of COPD, gout was referred for elevated hemoglobin which was noticed in August 2018.  Prior to that in 2015 and 2016 he had couple of readings when his hemoglobin was 18.1 and 18.5.  After that his hemoglobin was in the normal range but since August 2018 it has been persistently elevated.  Most recent hemoglobin on 11/7/2019 was 20.1 with hematocrit of 59.5 and RBC 6.09.    When I saw him in December 2019, he was feeling well.  He has some limitation due to chronic back pain and back surgeries.  He denies any B symptoms.  No erythromelalgia.  No weight loss.  Denies any nausea vomiting diarrhea constipation or bleeding issues.  Overall energy is stable.  Denies any change in his breathing but he has COPD so has dyspnea on exertion.  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    I suspected that he could have polycythemia vera.  I did further testing including peripheral blood smear which did not show any unusual findings.  Erythropoietin level was 5 in the low normal range and peripheral blood NGS panel for myeloproliferative neoplasms was also unremarkable.  LDH was normal.  Ultrasound abdomen in May 2019 did not reveal splenomegaly.  We did a bone marrow biopsy on 1/14/2020 which was essentially unremarkable  showing normocellular marrow for age (30-40%) with trilineage hematopoiesis without increase in blasts and no dysplasia and no increase in fibrosis and no ring sideroblasts.  As the morphology was completely normal, FISH and cytogenetics was not done.    He comes in today and tells me overall he has been feeling about the same as before.  He continues to have left-sided rib cage area pain below the left nipple and omentum that sometimes he hears a crack and then it hurts more.  No new pain apart from chronic back pain.  He has chronic neuropathy of his feet.  Denies erythromelalgia or B symptoms.  No new swellings.  Overall energy is the same.  No new infections.  Denies any issues with bleeding or clotting.  He has dyspnea on exertion which is chronic.  He tells me that he was feeling very nervous today so that is why his blood pressure is elevated.      ROS:  Rest of the comprehensive review of the system was essentially unremarkable.    I reviewed other history in epic as below.    Past Medical/Surgical History:  Past Medical History:   Diagnosis Date     COPD (chronic obstructive pulmonary disease) (H)      Displacement of lumbar intervertebral disc without myelopathy 1995, 2002     HTN, goal below 140/90    Gout  He has history of DVT of the left leg in August 2017 and he required thrombolysis and there was suspicion for May Thurner syndrome with chronic obstruction of the left femoral vein and angioplasty was done.  He required anticoagulation for 6 months.    Past Surgical History:   Procedure Laterality Date     C DECOMPRESS SPINAL CORD,1 SEG  1995, 4/2002    leftL4-L5, 2nd right L4-L5     C SPINE FUSION,ANTER,3 SGMTS  2/9/2004    ant and post fusion L4-S1     HC REPAIR ROTATOR CUFF,CHRONIC  1989     Allergies:  Allergies as of 01/28/2020 - Reviewed 01/28/2020   Allergen Reaction Noted     Chlorthalidone Other (See Comments) 11/08/2016     Amlodipine Other (See Comments) 12/01/2015     Current  Medications:  Current Outpatient Medications   Medication Sig Dispense Refill     acetaminophen (TYLENOL) 325 MG tablet Take 650 mg by mouth every 6 hours       albuterol (PROAIR HFA/PROVENTIL HFA/VENTOLIN HFA) 108 (90 Base) MCG/ACT inhaler Inhale 2 puffs into the lungs every 6 hours as needed for shortness of breath / dyspnea 18 g 0     allopurinol (ZYLOPRIM) 100 MG tablet Allopurinol 700mg daily (two 300mg tablets + one 100mg tablet) 90 tablet 3     allopurinol (ZYLOPRIM) 300 MG tablet Allopurinol 700mg daily (two 300mg tablets + one 100mg tablet) 180 tablet 3     aspirin 81 MG tablet Take 1 tablet (81 mg) by mouth daily 30 tablet 0     carvedilol (COREG) 25 MG tablet TAKE ONE TABLET BY MOUTH TWICE DAILY WITH MEALS 180 tablet 3     lisinopril (PRINIVIL/ZESTRIL) 20 MG tablet Take 1 tablet (20 mg) by mouth 2 times daily 180 tablet 3     predniSONE (DELTASONE) 20 MG tablet PRN gout flare: prednisone 60mg daily x2days, then 40mg daily x5days, then 20mg daily x5days, then stop. 21 tablet 0     hydrALAZINE (APRESOLINE) 25 MG tablet Take 1 tablet (25 mg) by mouth 3 times daily (Patient not taking: Reported on 1/28/2020) 270 tablet 1     nicotine polacrilex (NICORETTE) 2 MG lozenge Place 2 mg inside cheek every hour as needed for smoking cessation       NICOTINE STEP 1 21 MG/24HR 24 hr patch ROBBY 1 PATCH TO SKIN ONCE D X 42 DAYS  0     sennosides (SENOKOT) 8.6 MG tablet TK 1 T PO BID  0      Family History:  Family History   Problem Relation Age of Onset     Family History Negative Other      Diabetes No family hx of      Coronary Artery Disease No family hx of      Hypertension No family hx of      Hyperlipidemia No family hx of      Breast Cancer No family hx of      Prostate Cancer No family hx of      Anxiety Disorder No family hx of      Substance Abuse No family hx of      Asthma No family hx of      Thyroid Disease No family hx of      Unknown/Adopted No family hx of      Genetic Disorder No family hx of       Osteoporosis No family hx of      Anesthesia Reaction No family hx of      Mental Illness No family hx of      Depression No family hx of      Other Cancer No family hx of      Colon Cancer No family hx of      Cerebrovascular Disease No family hx of      Obesity No family hx of    His father had blood clot.  1 brother had pancreatic cancer and he will of blood clots.  Another brother had blood clots and he had hepatocellular carcinoma.  He has 2 children both daughters who are healthy.        Social History:  Social History     Socioeconomic History     Marital status:      Spouse name: Not on file     Number of children: Not on file     Years of education: Not on file     Highest education level: Not on file   Occupational History     Not on file   Social Needs     Financial resource strain: Not on file     Food insecurity:     Worry: Not on file     Inability: Not on file     Transportation needs:     Medical: Not on file     Non-medical: Not on file   Tobacco Use     Smoking status: Former Smoker     Packs/day: 1.25     Years: 41.00     Pack years: 51.25     Types: Cigarettes     Last attempt to quit: 10/1/2017     Years since quittin.3     Smokeless tobacco: Never Used   Substance and Sexual Activity     Alcohol use: Yes     Alcohol/week: 0.0 standard drinks     Comment: 12 pack of beer every 2 weeks     Drug use: No     Sexual activity: Not Currently     Partners: Female   Lifestyle     Physical activity:     Days per week: Not on file     Minutes per session: Not on file     Stress: Not on file   Relationships     Social connections:     Talks on phone: Not on file     Gets together: Not on file     Attends Mu-ism service: Not on file     Active member of club or organization: Not on file     Attends meetings of clubs or organizations: Not on file     Relationship status: Not on file     Intimate partner violence:     Fear of current or ex partner: Not on file     Emotionally abused: Not on  "file     Physically abused: Not on file     Forced sexual activity: Not on file   Other Topics Concern     Parent/sibling w/ CABG, MI or angioplasty before 65F 55M? No   Social History Narrative     Not on file   He has 45-pack-year smoking history but quit 1 year ago.  Drinks 3-4 beer a day.  Lives alone.  He is not working because of disability from the back.    Physical Exam:  BP (!) 203/124 (BP Location: Right arm)   Pulse 64   Temp 98.4  F (36.9  C) (Oral)   Resp 18   Ht 1.753 m (5' 9.02\")   Wt 103 kg (227 lb)   SpO2 96%   BMI 33.51 kg/m      Wt Readings from Last 4 Encounters:   01/28/20 103 kg (227 lb)   01/06/20 101.2 kg (223 lb)   12/13/19 102.8 kg (226 lb 9.6 oz)   11/25/19 100.7 kg (222 lb)   CONSTITUTIONAL: No apparent distress  EYES: PERRLA, without pallor or jaundice  ENT/MOUTH: Ears unremarkable. No oral lesions  CVS: s1s2 normal  RESPIRATORY: Chest is clear  GI: Abdomen is benign  NEURO: Alert and oriented ×3  INTEGUMENT: no concerning skin rashes   LYMPHATIC: no palpable lymphadenopathy  MUSCULOSKELETAL: There is tenderness to palpation in the ribs in the anterior part below the left nipple  EXTREMITIES: no pedal edema  PSYCH: Mentation, mood and affect are appropriate      Laboratory/Imaging Studies    Reviewed        ASSESSMENT/PLAN:    Polycythemia with elevated hemoglobin, hematocrit and RBC.   He was noticed to have elevated hemoglobin in 2018 and prior to that in 2015 and 2016 he had couple of readings when his hemoglobin was 18.1 and 18.5.  After that his hemoglobin was in the normal range but since August 2018 it has been persistently elevated.  Recent hemoglobin on 11/7/2019 was 20.1 with hematocrit of 59.5 and RBC 6.09.  Most recent hemoglobin on 1/14/2020 was 19.2 with a hematocrit of 57.3.    I suspected that he could have polycythemia vera.  I did further testing including peripheral blood smear which did not show any unusual findings.  Erythropoietin level was 5 in the low " normal range and peripheral blood NGS panel for myeloproliferative neoplasms was also unremarkable.  LDH was normal.  Ultrasound abdomen in May 2019 did not reveal splenomegaly.  We did a bone marrow biopsy on 1/14/2020 which was essentially unremarkable showing normocellular marrow for age (30-40%) with trilineage hematopoiesis without increase in blasts and no dysplasia and no increase in fibrosis and no ring sideroblasts.  As the morphology was completely normal, FISH and cytogenetics was not done.    We discussed the situation in detail.  Currently there is no evidence of primary hematological disorder like polycythemia vera.  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    I recommend that he continues with baby aspirin daily to decrease the risk of clot.  As the hemoglobin is significantly high I believe that it would be reasonable to phlebotomize him and try to keep his hematocrit below 50.  We will try to schedule this and tentatively do this every month.    Elevated blood pressure.  When he came to the clinic his blood pressure was 203/124.  He was mentioning that he was very anxious coming to the clinic.  He takes his blood pressure medications regularly.  He denies any headaches or neurological problems.  He has chronic left rib cage area pain which is going on for the last 2 months or so.  He has chronic dyspnea on exertion which is unchanged.  I had thought of sending him to the emergency room but after we talked for a few minutes and he relaxed his repeat blood pressure by itself came down to 150/98.  I believe it is reasonable that he can follow as an outpatient with his primary care physician and I told him to follow very closely with him.  I also advised him to keep monitoring his blood pressures few times every day in a relaxed environment to make sure that it is not very  high.    Left rib cage area pain.  He mentions that he was told that it could be due to cracked ribs but no chest x-ray or rib x-rays were done.  He has tenderness to palpation of the ribs in the anterior part of the left chest below the left nipple.  I would like to check left-sided rib series x-ray.  We will try to get this done hopefully today.  I advised him to follow with primary care physician.    Chronic gout.  Continue allopurinol.  Now he is taking 700 mg daily to prevent frequent flares.  Last uric acid was in the normal range.    Going forward he will follow with his primary care physician but he knows to contact me if he has any questions or concerns.    I answered all of his questions to his satisfaction.  He is agreeable and comfortable with the plan.    Germain Muñoz MD      Again, thank you for allowing me to participate in the care of your patient.        Sincerely,        Germain Muñoz MD

## 2020-01-28 NOTE — PROGRESS NOTES
Hematology follow-up visit:  Date on this visit: 1/28/2020     Bucky Edgar  is referred by Dr.Kyle Bolaños for a hematology consultation. He requires evaluation for new diagnosis of elevated hemoglobin.    Primary Physician: Hamlet Roberts     History Of Present Illness:  Please see my previous note for details.  I have copied and updated from prior note.    Mr. Edgar is a 64 year old male who has hx of COPD, gout was referred for elevated hemoglobin which was noticed in August 2018.  Prior to that in 2015 and 2016 he had couple of readings when his hemoglobin was 18.1 and 18.5.  After that his hemoglobin was in the normal range but since August 2018 it has been persistently elevated.  Most recent hemoglobin on 11/7/2019 was 20.1 with hematocrit of 59.5 and RBC 6.09.    When I saw him in December 2019, he was feeling well.  He has some limitation due to chronic back pain and back surgeries.  He denies any B symptoms.  No erythromelalgia.  No weight loss.  Denies any nausea vomiting diarrhea constipation or bleeding issues.  Overall energy is stable.  Denies any change in his breathing but he has COPD so has dyspnea on exertion.  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    I suspected that he could have polycythemia vera.  I did further testing including peripheral blood smear which did not show any unusual findings.  Erythropoietin level was 5 in the low normal range and peripheral blood NGS panel for myeloproliferative neoplasms was also unremarkable.  LDH was normal.  Ultrasound abdomen in May 2019 did not reveal splenomegaly.  We did a bone marrow biopsy on 1/14/2020 which was essentially unremarkable showing normocellular marrow for age (30-40%) with trilineage hematopoiesis without increase in blasts and no dysplasia and no increase in fibrosis and no ring sideroblasts.  As the morphology was completely normal, FISH and cytogenetics was not  done.    He comes in today and tells me overall he has been feeling about the same as before.  He continues to have left-sided rib cage area pain below the left nipple and omentum that sometimes he hears a crack and then it hurts more.  No new pain apart from chronic back pain.  He has chronic neuropathy of his feet.  Denies erythromelalgia or B symptoms.  No new swellings.  Overall energy is the same.  No new infections.  Denies any issues with bleeding or clotting.  He has dyspnea on exertion which is chronic.  He tells me that he was feeling very nervous today so that is why his blood pressure is elevated.      ROS:  Rest of the comprehensive review of the system was essentially unremarkable.    I reviewed other history in epic as below.    Past Medical/Surgical History:  Past Medical History:   Diagnosis Date     COPD (chronic obstructive pulmonary disease) (H)      Displacement of lumbar intervertebral disc without myelopathy 1995, 2002     HTN, goal below 140/90    Gout  He has history of DVT of the left leg in August 2017 and he required thrombolysis and there was suspicion for May Thurner syndrome with chronic obstruction of the left femoral vein and angioplasty was done.  He required anticoagulation for 6 months.    Past Surgical History:   Procedure Laterality Date     C DECOMPRESS SPINAL CORD,1 SEG  1995, 4/2002    leftL4-L5, 2nd right L4-L5     C SPINE FUSION,ANTER,3 SGMTS  2/9/2004    ant and post fusion L4-S1     HC REPAIR ROTATOR CUFF,CHRONIC  1989     Allergies:  Allergies as of 01/28/2020 - Reviewed 01/28/2020   Allergen Reaction Noted     Chlorthalidone Other (See Comments) 11/08/2016     Amlodipine Other (See Comments) 12/01/2015     Current Medications:  Current Outpatient Medications   Medication Sig Dispense Refill     acetaminophen (TYLENOL) 325 MG tablet Take 650 mg by mouth every 6 hours       albuterol (PROAIR HFA/PROVENTIL HFA/VENTOLIN HFA) 108 (90 Base) MCG/ACT inhaler Inhale 2 puffs into  the lungs every 6 hours as needed for shortness of breath / dyspnea 18 g 0     allopurinol (ZYLOPRIM) 100 MG tablet Allopurinol 700mg daily (two 300mg tablets + one 100mg tablet) 90 tablet 3     allopurinol (ZYLOPRIM) 300 MG tablet Allopurinol 700mg daily (two 300mg tablets + one 100mg tablet) 180 tablet 3     aspirin 81 MG tablet Take 1 tablet (81 mg) by mouth daily 30 tablet 0     carvedilol (COREG) 25 MG tablet TAKE ONE TABLET BY MOUTH TWICE DAILY WITH MEALS 180 tablet 3     lisinopril (PRINIVIL/ZESTRIL) 20 MG tablet Take 1 tablet (20 mg) by mouth 2 times daily 180 tablet 3     predniSONE (DELTASONE) 20 MG tablet PRN gout flare: prednisone 60mg daily x2days, then 40mg daily x5days, then 20mg daily x5days, then stop. 21 tablet 0     hydrALAZINE (APRESOLINE) 25 MG tablet Take 1 tablet (25 mg) by mouth 3 times daily (Patient not taking: Reported on 1/28/2020) 270 tablet 1     nicotine polacrilex (NICORETTE) 2 MG lozenge Place 2 mg inside cheek every hour as needed for smoking cessation       NICOTINE STEP 1 21 MG/24HR 24 hr patch ROBBY 1 PATCH TO SKIN ONCE D X 42 DAYS  0     sennosides (SENOKOT) 8.6 MG tablet TK 1 T PO BID  0      Family History:  Family History   Problem Relation Age of Onset     Family History Negative Other      Diabetes No family hx of      Coronary Artery Disease No family hx of      Hypertension No family hx of      Hyperlipidemia No family hx of      Breast Cancer No family hx of      Prostate Cancer No family hx of      Anxiety Disorder No family hx of      Substance Abuse No family hx of      Asthma No family hx of      Thyroid Disease No family hx of      Unknown/Adopted No family hx of      Genetic Disorder No family hx of      Osteoporosis No family hx of      Anesthesia Reaction No family hx of      Mental Illness No family hx of      Depression No family hx of      Other Cancer No family hx of      Colon Cancer No family hx of      Cerebrovascular Disease No family hx of      Obesity No  family hx of    His father had blood clot.  1 brother had pancreatic cancer and he will of blood clots.  Another brother had blood clots and he had hepatocellular carcinoma.  He has 2 children both daughters who are healthy.        Social History:  Social History     Socioeconomic History     Marital status:      Spouse name: Not on file     Number of children: Not on file     Years of education: Not on file     Highest education level: Not on file   Occupational History     Not on file   Social Needs     Financial resource strain: Not on file     Food insecurity:     Worry: Not on file     Inability: Not on file     Transportation needs:     Medical: Not on file     Non-medical: Not on file   Tobacco Use     Smoking status: Former Smoker     Packs/day: 1.25     Years: 41.00     Pack years: 51.25     Types: Cigarettes     Last attempt to quit: 10/1/2017     Years since quittin.3     Smokeless tobacco: Never Used   Substance and Sexual Activity     Alcohol use: Yes     Alcohol/week: 0.0 standard drinks     Comment: 12 pack of beer every 2 weeks     Drug use: No     Sexual activity: Not Currently     Partners: Female   Lifestyle     Physical activity:     Days per week: Not on file     Minutes per session: Not on file     Stress: Not on file   Relationships     Social connections:     Talks on phone: Not on file     Gets together: Not on file     Attends [employer] service: Not on file     Active member of club or organization: Not on file     Attends meetings of clubs or organizations: Not on file     Relationship status: Not on file     Intimate partner violence:     Fear of current or ex partner: Not on file     Emotionally abused: Not on file     Physically abused: Not on file     Forced sexual activity: Not on file   Other Topics Concern     Parent/sibling w/ CABG, MI or angioplasty before 65F 55M? No   Social History Narrative     Not on file   He has 45-pack-year smoking history but quit 1 year ago.   "Drinks 3-4 beer a day.  Lives alone.  He is not working because of disability from the back.    Physical Exam:  BP (!) 203/124 (BP Location: Right arm)   Pulse 64   Temp 98.4  F (36.9  C) (Oral)   Resp 18   Ht 1.753 m (5' 9.02\")   Wt 103 kg (227 lb)   SpO2 96%   BMI 33.51 kg/m     Wt Readings from Last 4 Encounters:   01/28/20 103 kg (227 lb)   01/06/20 101.2 kg (223 lb)   12/13/19 102.8 kg (226 lb 9.6 oz)   11/25/19 100.7 kg (222 lb)   CONSTITUTIONAL: No apparent distress  EYES: PERRLA, without pallor or jaundice  ENT/MOUTH: Ears unremarkable. No oral lesions  CVS: s1s2 normal  RESPIRATORY: Chest is clear  GI: Abdomen is benign  NEURO: Alert and oriented ×3  INTEGUMENT: no concerning skin rashes   LYMPHATIC: no palpable lymphadenopathy  MUSCULOSKELETAL: There is tenderness to palpation in the ribs in the anterior part below the left nipple  EXTREMITIES: no pedal edema  PSYCH: Mentation, mood and affect are appropriate      Laboratory/Imaging Studies    Reviewed        ASSESSMENT/PLAN:    Polycythemia with elevated hemoglobin, hematocrit and RBC.   He was noticed to have elevated hemoglobin in 2018 and prior to that in 2015 and 2016 he had couple of readings when his hemoglobin was 18.1 and 18.5.  After that his hemoglobin was in the normal range but since August 2018 it has been persistently elevated.  Recent hemoglobin on 11/7/2019 was 20.1 with hematocrit of 59.5 and RBC 6.09.  Most recent hemoglobin on 1/14/2020 was 19.2 with a hematocrit of 57.3.    I suspected that he could have polycythemia vera.  I did further testing including peripheral blood smear which did not show any unusual findings.  Erythropoietin level was 5 in the low normal range and peripheral blood NGS panel for myeloproliferative neoplasms was also unremarkable.  LDH was normal.  Ultrasound abdomen in May 2019 did not reveal splenomegaly.  We did a bone marrow biopsy on 1/14/2020 which was essentially unremarkable showing normocellular " marrow for age (30-40%) with trilineage hematopoiesis without increase in blasts and no dysplasia and no increase in fibrosis and no ring sideroblasts.  As the morphology was completely normal, FISH and cytogenetics was not done.    We discussed the situation in detail.  Currently there is no evidence of primary hematological disorder like polycythemia vera.  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    I recommend that he continues with baby aspirin daily to decrease the risk of clot.  As the hemoglobin is significantly high I believe that it would be reasonable to phlebotomize him and try to keep his hematocrit below 50.  We will try to schedule this and tentatively do this every month.    Elevated blood pressure.  When he came to the clinic his blood pressure was 203/124.  He was mentioning that he was very anxious coming to the clinic.  He takes his blood pressure medications regularly.  He denies any headaches or neurological problems.  He has chronic left rib cage area pain which is going on for the last 2 months or so.  He has chronic dyspnea on exertion which is unchanged.  I had thought of sending him to the emergency room but after we talked for a few minutes and he relaxed his repeat blood pressure by itself came down to 150/98.  I believe it is reasonable that he can follow as an outpatient with his primary care physician and I told him to follow very closely with him.  I also advised him to keep monitoring his blood pressures few times every day in a relaxed environment to make sure that it is not very high.    Left rib cage area pain.  He mentions that he was told that it could be due to cracked ribs but no chest x-ray or rib x-rays were done.  He has tenderness to palpation of the ribs in the anterior part of the left chest below the left nipple.  I would like to check  left-sided rib series x-ray.  We will try to get this done hopefully today.  I advised him to follow with primary care physician.    Chronic gout.  Continue allopurinol.  Now he is taking 700 mg daily to prevent frequent flares.  Last uric acid was in the normal range.    Going forward he will follow with his primary care physician but he knows to contact me if he has any questions or concerns.    I answered all of his questions to his satisfaction.  He is agreeable and comfortable with the plan.    Germain Muñoz MD

## 2020-01-28 NOTE — PATIENT INSTRUCTIONS
Recheck BP  Cont aspirin 81 mg daily  Schedule phlebotomy every month to keep hematocrit below 50  Schedule xrays    Follow with pcp and see me as needed

## 2020-01-31 LAB — COPATH REPORT: NORMAL

## 2020-02-03 ENCOUNTER — OFFICE VISIT (OUTPATIENT)
Dept: FAMILY MEDICINE | Facility: OTHER | Age: 65
End: 2020-02-03
Payer: COMMERCIAL

## 2020-02-03 ENCOUNTER — PATIENT OUTREACH (OUTPATIENT)
Dept: ONCOLOGY | Facility: CLINIC | Age: 65
End: 2020-02-03

## 2020-02-03 VITALS
HEART RATE: 62 BPM | TEMPERATURE: 98.7 F | DIASTOLIC BLOOD PRESSURE: 111 MMHG | BODY MASS INDEX: 33.42 KG/M2 | SYSTOLIC BLOOD PRESSURE: 205 MMHG | OXYGEN SATURATION: 96 % | WEIGHT: 226.4 LBS | RESPIRATION RATE: 16 BRPM

## 2020-02-03 DIAGNOSIS — I10 HTN, GOAL BELOW 140/90: ICD-10-CM

## 2020-02-03 DIAGNOSIS — R07.81 RIB PAIN ON LEFT SIDE: Primary | ICD-10-CM

## 2020-02-03 PROCEDURE — 99213 OFFICE O/P EST LOW 20 MIN: CPT | Performed by: PHYSICIAN ASSISTANT

## 2020-02-03 RX ORDER — HYDROCODONE BITARTRATE AND ACETAMINOPHEN 5; 325 MG/1; MG/1
1 TABLET ORAL EVERY 6 HOURS PRN
Qty: 24 TABLET | Refills: 0 | Status: SHIPPED | OUTPATIENT
Start: 2020-02-03 | End: 2021-03-22

## 2020-02-03 NOTE — TELEPHONE ENCOUNTER
Patient called requesting result of recent chest x-ray from 1/28/20.  Advised will review and return call to patient.

## 2020-02-03 NOTE — PROGRESS NOTES
"Subjective     Bucky Edgar is a 64 year old male who presents to clinic today for the following health issues:    HPI   Joint Pain/rib pain recheck. Cannot sleep at night.     Onset: this has been going on for about a month and a half.     Description:   Location: left sided rib pain  Character: Sharp    Intensity: 6/10 constantly, jumps to about a 10/10 if he moves a certain way.     Progression of Symptoms: intermittent, yesterday he was \"great\" then around 11pm he couldn't move.     Accompanying Signs & Symptoms:  Other symptoms: none    History:   Previous similar pain: YES      Precipitating factors:   Trauma or overuse: YES- fell off of a ladder in the garage roughly 3 years ago, and when he had the xrays done recently they noted the same ribs. When they first started hurting this time around he was working on his car and laying on the left side.     Alleviating factors:  Improved by: heating pad but only for awhile.     Therapies Tried and outcome: heating pad, tylenol     About 2 months ago he was working on his car when he was laying on the left side and started to have rib pain.  It is the same area of pain that he had when he had the fall off ladder 3 years ago and fractured his ribs.  He had healed from initial injury 3 years ago and this was the first recurrence of pain.      He then developed LRTI and was seen by GA in January.  At that time he was diagnosed with Right sided upper lobe pneumonia and treated antibiotics and steroids.  He says the left side rib pain got significantly worse with the cough.  It had improved.  Yesterday he actually had no pain but then the pain recurred again today. He has been taking Tylenol without relief.     He has high blood pressure in clinic, also had elevated BP with Oncology visit recently but had come down to 150.  He does note he has higher blood pressures with doctor visits and pain.  He denies chest pain, shortness of breath, cough, headaches, vision " changes, claudication, paresthesias. He took his BP medications today. Takes BP at home is around 136 systolic.     Patient Active Problem List   Diagnosis     Displacement of lumbar intervertebral disc without myelopathy     CARDIOVASCULAR SCREENING; LDL GOAL LESS THAN 130     HTN, goal below 140/90     Advanced directives, counseling/discussion     COPD (chronic obstructive pulmonary disease) (H)     Impaired fasting glucose     Hypertriglyceridemia     Vitamin D deficiency     Chronic bronchitis with COPD (chronic obstructive pulmonary disease) (H)     Closed fracture of multiple ribs of left side with routine healing, subsequent encounter     Chronic pain due to trauma     Long-term (current) use of anticoagulants [Z79.01]     Elevated serum creatinine     History of deep venous thrombosis (DVT) of distal vein of left lower extremity     Elevated liver enzymes     Renal atrophy, right     Abdominal aortic aneurysm (AAA) without rupture (H)     Hepatomegaly     Fatty liver     Polycythemia     Past Surgical History:   Procedure Laterality Date     C DECOMPRESS SPINAL CORD,1 SEG  , 2002    leftL4-L5, 2nd right L4-L5     C SPINE FUSION,ANTER,3 SGMTS  2004    ant and post fusion L4-S1     HC REPAIR ROTATOR CUFF,CHRONIC         Social History     Tobacco Use     Smoking status: Former Smoker     Packs/day: 1.25     Years: 41.00     Pack years: 51.25     Types: Cigarettes     Last attempt to quit: 10/1/2017     Years since quittin.3     Smokeless tobacco: Never Used   Substance Use Topics     Alcohol use: Yes     Alcohol/week: 0.0 standard drinks     Comment: 12 pack of beer every 2 weeks     Family History   Problem Relation Age of Onset     Family History Negative Other      Diabetes No family hx of      Coronary Artery Disease No family hx of      Hypertension No family hx of      Hyperlipidemia No family hx of      Breast Cancer No family hx of      Prostate Cancer No family hx of      Anxiety  Disorder No family hx of      Substance Abuse No family hx of      Asthma No family hx of      Thyroid Disease No family hx of      Unknown/Adopted No family hx of      Genetic Disorder No family hx of      Osteoporosis No family hx of      Anesthesia Reaction No family hx of      Mental Illness No family hx of      Depression No family hx of      Other Cancer No family hx of      Colon Cancer No family hx of      Cerebrovascular Disease No family hx of      Obesity No family hx of          Current Outpatient Medications   Medication Sig Dispense Refill     acetaminophen (TYLENOL) 325 MG tablet Take 650 mg by mouth every 6 hours       allopurinol (ZYLOPRIM) 100 MG tablet Allopurinol 700mg daily (two 300mg tablets + one 100mg tablet) 90 tablet 3     allopurinol (ZYLOPRIM) 300 MG tablet Allopurinol 700mg daily (two 300mg tablets + one 100mg tablet) 180 tablet 3     aspirin 81 MG tablet Take 1 tablet (81 mg) by mouth daily 30 tablet 0     carvedilol (COREG) 25 MG tablet TAKE ONE TABLET BY MOUTH TWICE DAILY WITH MEALS 180 tablet 3     HYDROcodone-acetaminophen (NORCO) 5-325 MG tablet Take 1 tablet by mouth every 6 hours as needed for pain 24 tablet 0     lisinopril (PRINIVIL/ZESTRIL) 20 MG tablet Take 1 tablet (20 mg) by mouth 2 times daily 180 tablet 3     nicotine polacrilex (NICORETTE) 2 MG lozenge Place 2 mg inside cheek every hour as needed for smoking cessation       predniSONE (DELTASONE) 20 MG tablet PRN gout flare: prednisone 60mg daily x2days, then 40mg daily x5days, then 20mg daily x5days, then stop. 21 tablet 0     albuterol (PROAIR HFA/PROVENTIL HFA/VENTOLIN HFA) 108 (90 Base) MCG/ACT inhaler Inhale 2 puffs into the lungs every 6 hours as needed for shortness of breath / dyspnea (Patient not taking: Reported on 2/3/2020) 18 g 0     hydrALAZINE (APRESOLINE) 25 MG tablet Take 1 tablet (25 mg) by mouth 3 times daily (Patient not taking: Reported on 1/28/2020) 270 tablet 1     NICOTINE STEP 1 21 MG/24HR 24 hr  patch ROBBY 1 PATCH TO SKIN ONCE D X 42 DAYS  0     sennosides (SENOKOT) 8.6 MG tablet TK 1 T PO BID  0     Allergies   Allergen Reactions     Chlorthalidone Other (See Comments)     Excessive lowering of blood pressure     Amlodipine Other (See Comments)     Intractable headache with use of medication as well as noting a small tremor of the upper extremities       Reviewed and updated as needed this visit by Provider  Tobacco  Allergies  Meds  Problems  Med Hx  Surg Hx  Fam Hx         Review of Systems   ROS COMP: Constitutional, HEENT, cardiovascular, pulmonary, GI, , musculoskeletal, neuro, skin, endocrine and psych systems are negative, except as otherwise noted.      Objective    BP (!) 205/111 (BP Location: Right arm, Patient Position: Chair, Cuff Size: Adult Regular)   Pulse 62   Temp 98.7  F (37.1  C) (Temporal)   Resp 16   Wt 102.7 kg (226 lb 6.4 oz)   SpO2 96%   BMI 33.42 kg/m    Body mass index is 33.42 kg/m .  Physical Exam   GENERAL: healthy, alert and no distress  NECK: no adenopathy, no asymmetry, masses, or scars and thyroid normal to palpation  RESP: lungs clear to auscultation - no rales, rhonchi or wheezes  CV: regular rate and rhythm, normal S1 S2, no S3 or S4, no murmur, click or rub, no peripheral edema and peripheral pulses strong  MS: no gross musculoskeletal defects noted, no edema. Tenderness to palpation over the left lower ribs mainly anteriorly extending to the mid axillary line and mildly over the posterior portions of ribs.    SKIN: Left side lateral chest there is mild erythema over the 7-9th lateral ribs.    PSYCH: mentation appears normal, affect normal/bright    Diagnostic Test Results:  Labs reviewed in Epic        Assessment & Plan       ICD-10-CM    1. Rib pain on left side R07.81 HYDROcodone-acetaminophen (NORCO) 5-325 MG tablet   2. HTN, goal below 140/90 I10           Rib pain:  - Possibly acute on chronic pain. It was almost completely resolved (no pain  yesterday) but had pain again today.  The did an x-ray confirming the fractures.  No symptoms to suggest new fracture, PTX.    - Given norco to use for severe pain, discussed risks/benefits, no driving on medication.  He should use OTC therapies first.   - Because he had inciting causes for his pain - laying on side on ground and recent LRTI - this will likely resolve in the next 4 weeks with symptomatic cares.   - Did review x-ray and no signs of pulmonary etiology to suggest infection - no consolidation.  His last CT chest was in May.  May consider repeating this.     HTN:  - BP persistently elevated, advised evaluation in the ED to help lower.  He is having no symptoms of his elevated BP.   - Recommended ambulance but he is wanting to go via personal vehicle, advised to stop and call 911 if he develops symptoms.   - If this is white coat hypertension and pain they should be able to utilize pain medications and anxiolytics to manage BP, if not they can get stat labs as well as medications IV to help with BP.      Return in about 1 week (around 2/10/2020) for If not improving, sooner if worse or new concerns.     Options for treatment and follow-up care were reviewed with the patient and/or guardian. Patient and/or guardian engaged in the decision making process and verbalized understanding of the options discussed and agreed with the final plan.     Boubacar Mixon PA-C  [facility]

## 2020-02-03 NOTE — TELEPHONE ENCOUNTER
Reviewed x-ray results with patient.  Rib fractures are mildly displaced. These fractures have been present  on 5/6/2019 CT.  I let him know this; will call if Dr. Muñoz had anything else to add.

## 2020-02-04 NOTE — TELEPHONE ENCOUNTER
Per , patient should follow with his PCP.  Patient went to the ER due to rib pain and was found to have a pulmonary embolism and was put on Xarelto.  He will have a phlebotomy on 2/10/20.

## 2020-02-10 ENCOUNTER — INFUSION THERAPY VISIT (OUTPATIENT)
Dept: INFUSION THERAPY | Facility: CLINIC | Age: 65
End: 2020-02-10
Payer: COMMERCIAL

## 2020-02-10 VITALS
TEMPERATURE: 98.9 F | HEART RATE: 59 BPM | BODY MASS INDEX: 33.33 KG/M2 | DIASTOLIC BLOOD PRESSURE: 96 MMHG | WEIGHT: 225.8 LBS | RESPIRATION RATE: 16 BRPM | OXYGEN SATURATION: 96 % | SYSTOLIC BLOOD PRESSURE: 154 MMHG

## 2020-02-10 DIAGNOSIS — D75.1 POLYCYTHEMIA: Primary | ICD-10-CM

## 2020-02-10 DIAGNOSIS — M1A.09X0 IDIOPATHIC CHRONIC GOUT OF MULTIPLE SITES WITHOUT TOPHUS: ICD-10-CM

## 2020-02-10 LAB
BASOPHILS # BLD AUTO: 0.1 10E9/L (ref 0–0.2)
BASOPHILS NFR BLD AUTO: 0.8 %
DIFFERENTIAL METHOD BLD: ABNORMAL
EOSINOPHIL # BLD AUTO: 0.2 10E9/L (ref 0–0.7)
EOSINOPHIL NFR BLD AUTO: 2.4 %
ERYTHROCYTE [DISTWIDTH] IN BLOOD BY AUTOMATED COUNT: 13.9 % (ref 10–15)
HCT VFR BLD AUTO: 57.6 % (ref 40–53)
HGB BLD-MCNC: 19.2 G/DL (ref 13.3–17.7)
IMM GRANULOCYTES # BLD: 0.1 10E9/L (ref 0–0.4)
IMM GRANULOCYTES NFR BLD: 0.7 %
LYMPHOCYTES # BLD AUTO: 1.5 10E9/L (ref 0.8–5.3)
LYMPHOCYTES NFR BLD AUTO: 17.8 %
MCH RBC QN AUTO: 33.4 PG (ref 26.5–33)
MCHC RBC AUTO-ENTMCNC: 33.3 G/DL (ref 31.5–36.5)
MCV RBC AUTO: 100 FL (ref 78–100)
MONOCYTES # BLD AUTO: 0.9 10E9/L (ref 0–1.3)
MONOCYTES NFR BLD AUTO: 10.6 %
NEUTROPHILS # BLD AUTO: 5.8 10E9/L (ref 1.6–8.3)
NEUTROPHILS NFR BLD AUTO: 67.7 %
PLATELET # BLD AUTO: 193 10E9/L (ref 150–450)
RBC # BLD AUTO: 5.75 10E12/L (ref 4.4–5.9)
WBC # BLD AUTO: 8.5 10E9/L (ref 4–11)

## 2020-02-10 PROCEDURE — 36415 COLL VENOUS BLD VENIPUNCTURE: CPT | Performed by: INTERNAL MEDICINE

## 2020-02-10 PROCEDURE — 85025 COMPLETE CBC W/AUTO DIFF WBC: CPT | Performed by: INTERNAL MEDICINE

## 2020-02-10 PROCEDURE — 99195 PHLEBOTOMY: CPT | Performed by: NURSE PRACTITIONER

## 2020-02-10 ASSESSMENT — PAIN SCALES - GENERAL: PAINLEVEL: MILD PAIN (3)

## 2020-02-10 NOTE — PROGRESS NOTES
"Prior to scheduled phlebotomy verified patient identity using patient's name and date of birth.  Lab(s) verified: yes    Peripheral Access:  Accessed: right AC with 20ga autoguard instyte catheter without difficulty.  Positive blood return.  No redness, edema or complaints of discomfort.  Pt tolerated phlebotomy, 500 mls of blood removed via gravity using whole blood collection bag and scale per MD's order.  500 mls of oral fluid replaced.  Pt tolerated procedure without adverse reactions.  Pt denies headache, nausea or discomfort.  IV flushed with 10mls 0.9% Normal Saline after completion of procedure and discontinued per policy with pressure dressing applied.      Pre Vital Signs including O2 saturation documented in \"Vitals\"  Post Vital Signs see flow sheets.    Reviewed and provided discharge instructions regarding therapeutic phlebotomy.  Pt verbalized understanding.    Reviewed appointments and copy of schedule given to pt.  Return to clinic in: 4 weeks.       Ambulation steady.  Pt alert and orientated upon discharge.              "

## 2020-03-07 DIAGNOSIS — D75.1 POLYCYTHEMIA: Primary | ICD-10-CM

## 2020-03-09 ENCOUNTER — INFUSION THERAPY VISIT (OUTPATIENT)
Dept: INFUSION THERAPY | Facility: CLINIC | Age: 65
End: 2020-03-09
Payer: COMMERCIAL

## 2020-03-09 VITALS
OXYGEN SATURATION: 96 % | DIASTOLIC BLOOD PRESSURE: 97 MMHG | SYSTOLIC BLOOD PRESSURE: 162 MMHG | HEART RATE: 69 BPM | TEMPERATURE: 98.3 F | RESPIRATION RATE: 14 BRPM

## 2020-03-09 DIAGNOSIS — D75.1 POLYCYTHEMIA: Primary | ICD-10-CM

## 2020-03-09 DIAGNOSIS — D75.1 POLYCYTHEMIA: ICD-10-CM

## 2020-03-09 LAB
BASOPHILS # BLD AUTO: 0.1 10E9/L (ref 0–0.2)
BASOPHILS NFR BLD AUTO: 1 %
DIFFERENTIAL METHOD BLD: ABNORMAL
EOSINOPHIL # BLD AUTO: 0.2 10E9/L (ref 0–0.7)
EOSINOPHIL NFR BLD AUTO: 3.3 %
ERYTHROCYTE [DISTWIDTH] IN BLOOD BY AUTOMATED COUNT: 14.6 % (ref 10–15)
HCT VFR BLD AUTO: 60.8 % (ref 40–53)
HGB BLD-MCNC: 19.6 G/DL (ref 13.3–17.7)
IMM GRANULOCYTES # BLD: 0 10E9/L (ref 0–0.4)
IMM GRANULOCYTES NFR BLD: 0.4 %
LYMPHOCYTES # BLD AUTO: 1.4 10E9/L (ref 0.8–5.3)
LYMPHOCYTES NFR BLD AUTO: 19.5 %
MCH RBC QN AUTO: 32.9 PG (ref 26.5–33)
MCHC RBC AUTO-ENTMCNC: 32.2 G/DL (ref 31.5–36.5)
MCV RBC AUTO: 102 FL (ref 78–100)
MONOCYTES # BLD AUTO: 0.6 10E9/L (ref 0–1.3)
MONOCYTES NFR BLD AUTO: 8.9 %
NEUTROPHILS # BLD AUTO: 4.7 10E9/L (ref 1.6–8.3)
NEUTROPHILS NFR BLD AUTO: 66.9 %
PLATELET # BLD AUTO: 174 10E9/L (ref 150–450)
RBC # BLD AUTO: 5.96 10E12/L (ref 4.4–5.9)
WBC # BLD AUTO: 7 10E9/L (ref 4–11)

## 2020-03-09 PROCEDURE — 99195 PHLEBOTOMY: CPT | Performed by: NURSE PRACTITIONER

## 2020-03-09 PROCEDURE — 85025 COMPLETE CBC W/AUTO DIFF WBC: CPT | Performed by: INTERNAL MEDICINE

## 2020-03-09 PROCEDURE — 36415 COLL VENOUS BLD VENIPUNCTURE: CPT | Performed by: INTERNAL MEDICINE

## 2020-03-09 ASSESSMENT — PAIN SCALES - GENERAL: PAINLEVEL: NO PAIN (0)

## 2020-03-09 NOTE — PROGRESS NOTES
Patient identified by name and date of birth.    Patient here for scheduled phlebotomy. 20 gauge autoguard insyte placed in right upper arm with good blood return noted. Site patent and intact, free of redness, edema, or complaints of discomfort. 500 mLs phlebotomized via gravity from patient per MD order.     Phlebotomy tolerated without incident and access discontinued per protocol.  Patient denies dizziness, headache, nausea, or pain s/p phlebotomy: yes  Patient reports these side effects since last phlebotomy: non    **Patient reminded of follow-up care: Patient had high blood pressure readings today. Dr Armando LAMAS notified of these reading and patient report that he is taking his blood pressure medications as ordered.    **Dr Muñoz messaged as Ismael did not want to schedule future phlebotomies as he stated that Dr Muñoz was going to look at his labs and decide if he needed more than 2 phlebotomies (which he has now completed).    Albertina More RN

## 2020-03-16 ENCOUNTER — TELEPHONE (OUTPATIENT)
Dept: FAMILY MEDICINE | Facility: OTHER | Age: 65
End: 2020-03-16

## 2020-03-16 NOTE — PROGRESS NOTES
Bucky Edgar complains of    Chief Complaint   Patient presents with     Hypertension       I have reviewed and updated the patient's Past Medical History, Social History, Family History and Medication List.    ALLERGIES  Chlorthalidone and Amlodipine    Toma Gomez CMA  (MA signature)    Patient states that he does have a blood pressure cuff at home - he does sometimes get above 140/90 but not often.    Additional provider notes: He was seen in the ED last month for left sided chest wall/rib pain. He has a history of multiple left rib fractures in 2016 but imaging did not reveal any new fractures. He did have very high blood pressure and and elevated D dimer and was found to have a small right pulmonary embolism. He was started on Xarelto but states this ran out last week. He was also started on hydralazine 25 mg three times daily which seemed to be helping lower his blood pressures at first but they have been high again the past few weeks, usually around 140/80 in the evening but this morning it was 160/99. He has undergone two phlebotomies the past few months due to a new diagnosis of polycythemia vera without any change in his hemoglobin thus far. He is wondering when he needs to follow up with hematology again. His left rib pain has improved significantly since hist ED visit last month.     Assessment/Plan:    ICD-10-CM    1. HTN, goal below 140/90  I10 hydrALAZINE (APRESOLINE) 50 MG tablet   2. Pulmonary embolism without acute cor pulmonale, unspecified chronicity, unspecified pulmonary embolism type (H)  I26.99 rivaroxaban ANTICOAGULANT (XARELTO ANTICOAGULANT) 20 MG TABS tablet   3. Rib pain on left side  R07.81    4. Polycythemia  D75.1        1. His blood pressure remains elevated at home so will increase hydralazine to 50 mg three times daily. He will continue to monitor home pressures closely and if consistently above 140/90, he will contact the clinic. Will continue current doses of lisinopril  and Coreg. I recommend he continue to work on a low salt diet.     2, 4. PE noted on CT angiogram last month. He was started on Xarelto but states he finished it last week. He needs to be on this for at least 3 months so will restart this at 20 mg nightly with dinner. This seems to be an unprovoked PE and he has a history of DVT as well. Given his recent diagnosis of polycthemia as well, he will follow up with his hematologist, Dr. Muñoz, on 4/1 to discuss the PE and polycythemia. He will stop his aspirin as he restarts the Xarelto.    3. His rib pain has improved significantly over the past month. He likely has costochondritis so I recommend heat and Tylenol as needed along with avoiding heavy lifting.    Follow up in 3-4 months for a recheck.      I have reviewed the note as documented above.  This accurately captures the substance of my conversation with the patient.  Hamlet Roberts PA-C      Phone call contact time  Call Started at 9:40 am  Call Ended at 9:46 am

## 2020-03-16 NOTE — TELEPHONE ENCOUNTER
Reason for Call:  Other appointment    Detailed comments: patient calling to be seen for BP follow up. He stated everything is going well and if JM wants he is okay with waiting to be seen or does he want to do a telephone visit. Currently not out of medication.     Phone Number Patient can be reached at: Cell number on file:    Telephone Information:   Mobile [phone]       Best Time: any    Can we leave a detailed message on this number? YES    Call taken on 3/16/2020 at 1:33 PM by Alba Morton

## 2020-03-16 NOTE — TELEPHONE ENCOUNTER
I would recommend phone visit since he was in the ED last month and had a pulmonary embolism.    Hamlet Roberts PA-C

## 2020-03-18 ENCOUNTER — VIRTUAL VISIT (OUTPATIENT)
Dept: FAMILY MEDICINE | Facility: OTHER | Age: 65
End: 2020-03-18
Payer: COMMERCIAL

## 2020-03-18 DIAGNOSIS — I10 HTN, GOAL BELOW 140/90: Primary | ICD-10-CM

## 2020-03-18 DIAGNOSIS — I26.99 PULMONARY EMBOLISM WITHOUT ACUTE COR PULMONALE, UNSPECIFIED CHRONICITY, UNSPECIFIED PULMONARY EMBOLISM TYPE (H): ICD-10-CM

## 2020-03-18 DIAGNOSIS — R07.81 RIB PAIN ON LEFT SIDE: ICD-10-CM

## 2020-03-18 DIAGNOSIS — D75.1 POLYCYTHEMIA: ICD-10-CM

## 2020-03-18 PROCEDURE — 99441 ZZC PHYSICIAN TELEPHONE EVALUATION 5-10 MIN: CPT | Performed by: PHYSICIAN ASSISTANT

## 2020-03-18 RX ORDER — HYDRALAZINE HYDROCHLORIDE 50 MG/1
50 TABLET, FILM COATED ORAL 3 TIMES DAILY
Qty: 90 TABLET | Refills: 5 | Status: SHIPPED | OUTPATIENT
Start: 2020-03-18 | End: 2021-03-22

## 2020-03-18 NOTE — LETTER
March 18, 2020      Bucky Edgar  [address] [place] [address]        Dear Bucky,     This letter is to inform you of your appointment with Dr. Muñoz. It is scheduled for Wednesday, April 1st for 4:30 PM at the [facility]. If you have any questions for them, please call [phone].    Please let us know if you have any further questions or concerns.    Thank you,  [facility]

## 2020-04-01 ENCOUNTER — VIRTUAL VISIT (OUTPATIENT)
Dept: ONCOLOGY | Facility: CLINIC | Age: 65
End: 2020-04-01
Payer: COMMERCIAL

## 2020-04-01 ENCOUNTER — TELEPHONE (OUTPATIENT)
Dept: FAMILY MEDICINE | Facility: OTHER | Age: 65
End: 2020-04-01

## 2020-04-01 DIAGNOSIS — I26.99 PULMONARY EMBOLISM, UNSPECIFIED CHRONICITY, UNSPECIFIED PULMONARY EMBOLISM TYPE, UNSPECIFIED WHETHER ACUTE COR PULMONALE PRESENT (H): ICD-10-CM

## 2020-04-01 DIAGNOSIS — I26.99 PULMONARY EMBOLISM WITHOUT ACUTE COR PULMONALE, UNSPECIFIED CHRONICITY, UNSPECIFIED PULMONARY EMBOLISM TYPE (H): Primary | ICD-10-CM

## 2020-04-01 DIAGNOSIS — D75.1 POLYCYTHEMIA: Primary | ICD-10-CM

## 2020-04-01 PROCEDURE — 99213 OFFICE O/P EST LOW 20 MIN: CPT | Mod: TEL | Performed by: INTERNAL MEDICINE

## 2020-04-01 NOTE — NURSING NOTE
"Bucky Edgar is a 64 year old male who is being evaluated via a billable telephone visit.      The patient has been notified of following:     \"This telephone visit will be conducted via a call between you and your physician/provider. We have found that certain health care needs can be provided without the need for a physical exam.  This service lets us provide the care you need with a short phone conversation.  If a prescription is necessary we can send it directly to your pharmacy.  If lab work is needed we can place an order for that and you can then stop by our lab to have the test done at a later time.    If during the course of the call the physician/provider feels a telephone visit is not appropriate, you will not be charged for this service.\"     Patient has given verbal consent for Telephone visit?  Yes    Bucky Edgar complains of    Chief Complaint   Patient presents with     Oncology Clinic Visit     follow up        I have reviewed and updated the patient's Past Medical History, Social History, Family History and Medication List.    ALLERGIES  Chlorthalidone and Amlodipine     Eileen Claros CMA    "

## 2020-04-01 NOTE — TELEPHONE ENCOUNTER
He stopped his Xarelto due to affordability but needs to be on a blood thinner due to recent pulmonary embolism diagnosis. Will see if Eliquis is better covered so prescription has been sent to [facility] to take as directed. If not covered, he will need to start Warfarin. Please have him let us know right away if this is not covered or we can call the pharmacy to see how much it will be as he needs to get this restarted right away. Once restarting the Eliquis or the Warfarin, he needs to stop the aspirin.    Hamlet Roberts PA-C

## 2020-04-01 NOTE — TELEPHONE ENCOUNTER
Spoke to [facility], they said it is expensive $475.05 for one month. they say it tells them that the deductible is $429.45, they think it is going to the deductible. If he wants to know how much it will be after deductible, he will need to contact his insurance. Spoke to patient, he would like to try to have the Warfarin sent over. I believe he said his deductible is like $400?  Mahsa Winkler MA

## 2020-04-01 NOTE — PROGRESS NOTES
Hematology follow-up visit:  Date on this visit: 4/1/2020     Bucky Edgar  is referred by Dr.Kyle Bolaños for a hematology consultation. He requires evaluation for new diagnosis of elevated hemoglobin.    Primary Physician: Hamlet Roberts     History Of Present Illness:  Please see my previous note for details.  I have copied and updated from prior note.    Mr. Edgar is a 64 year old male who has hx of COPD, gout was referred for elevated hemoglobin which was noticed in August 2018.  Prior to that in 2015 and 2016 he had couple of readings when his hemoglobin was 18.1 and 18.5.  After that his hemoglobin was in the normal range but since August 2018 it has been persistently elevated.  Most recent hemoglobin on 11/7/2019 was 20.1 with hematocrit of 59.5 and RBC 6.09.    When I saw him in December 2019, he was feeling well.  He has some limitation due to chronic back pain and back surgeries.  He denies any B symptoms.  No erythromelalgia.  No weight loss.  Denies any nausea vomiting diarrhea constipation or bleeding issues.  Overall energy is stable.  Denies any change in his breathing but he has COPD so has dyspnea on exertion.  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    I suspected that he could have polycythemia vera.  I did further testing including peripheral blood smear which did not show any unusual findings.  Erythropoietin level was 5 in the low normal range and peripheral blood NGS panel for myeloproliferative neoplasms was also unremarkable.  LDH was normal.  Ultrasound abdomen in May 2019 did not reveal splenomegaly.  We did a bone marrow biopsy on 1/14/2020 which was essentially unremarkable showing normocellular marrow for age (30-40%) with trilineage hematopoiesis without increase in blasts and no dysplasia and no increase in fibrosis and no ring sideroblasts.  As the morphology was completely normal, FISH and cytogenetics was not  done.    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    I recommended that he continues with baby aspirin daily to decrease the risk of clot.  As the hemoglobin is significantly high I recommended that it would be reasonable to phlebotomize him and try to keep his hematocrit below 50.   He had phlebotomy on 2/10/2020 and 3/9/2020    On 2/3/2020 he was noted to have a small right lower lobe segmental pulmonary artery embolism and he was started on Xarelto.    Interval history.    This is a home visit.  He tells me he has been doing well.  He does not have any pain currently.  Breathing is good.  He tolerated phlebotomy well.  After 1 phlebotomy, 1 month later he still had elevated hemoglobin and hematocrit so he required another phlebotomy on 3/9/2020.  He denies any B symptoms.  No erythromelalgia.  He took Xarelto for about a month but then stopped it because it was costing him $500 and he tells me that he left a message for his primary care provider about this but he has not heard back.  He denies any new swellings.  No nausea vomiting diarrhea constipation.  He tells me that his blood pressure is running better at home with hydralazine lisinopril and Coreg.      ROS:  Rest of the comprehensive review of the system was essentially unremarkable.    I reviewed other history in epic as below.    Past Medical/Surgical History:  Past Medical History:   Diagnosis Date     COPD (chronic obstructive pulmonary disease) (H)      Displacement of lumbar intervertebral disc without myelopathy 1995, 2002     HTN, goal below 140/90    Gout  He has history of DVT of the left leg in August 2017 and he required thrombolysis and there was suspicion for May Thurner syndrome with chronic obstruction of the left femoral vein  and angioplasty was done.  He required anticoagulation for 6 months.    Past Surgical History:   Procedure Laterality Date     C DECOMPRESS SPINAL CORD,1 SEG  1995, 4/2002    leftL4-L5, 2nd right L4-L5     C SPINE FUSION,ANTER,3 SGMTS  2/9/2004    ant and post fusion L4-S1     HC REPAIR ROTATOR CUFF,CHRONIC  1989     Allergies:  Allergies as of 04/01/2020 - Reviewed 03/18/2020   Allergen Reaction Noted     Chlorthalidone Other (See Comments) 11/08/2016     Amlodipine Other (See Comments) 12/01/2015     Current Medications:  Current Outpatient Medications   Medication Sig Dispense Refill     acetaminophen (TYLENOL) 325 MG tablet Take 650 mg by mouth every 6 hours       albuterol (PROAIR HFA/PROVENTIL HFA/VENTOLIN HFA) 108 (90 Base) MCG/ACT inhaler Inhale 2 puffs into the lungs every 6 hours as needed for shortness of breath / dyspnea 18 g 0     allopurinol (ZYLOPRIM) 100 MG tablet Allopurinol 700mg daily (two 300mg tablets + one 100mg tablet) 90 tablet 3     allopurinol (ZYLOPRIM) 300 MG tablet Allopurinol 700mg daily (two 300mg tablets + one 100mg tablet) 180 tablet 3     aspirin 81 MG tablet Take 1 tablet (81 mg) by mouth daily 30 tablet 0     carvedilol (COREG) 25 MG tablet TAKE ONE TABLET BY MOUTH TWICE DAILY WITH MEALS 180 tablet 3     hydrALAZINE (APRESOLINE) 25 MG tablet Take 1 tablet (25 mg) by mouth 3 times daily 270 tablet 1     hydrALAZINE (APRESOLINE) 50 MG tablet Take 1 tablet (50 mg) by mouth 3 times daily 90 tablet 5     HYDROcodone-acetaminophen (NORCO) 5-325 MG tablet Take 1 tablet by mouth every 6 hours as needed for pain 24 tablet 0     lisinopril (PRINIVIL/ZESTRIL) 20 MG tablet Take 1 tablet (20 mg) by mouth 2 times daily 180 tablet 3     nicotine polacrilex (NICORETTE) 2 MG lozenge Place 2 mg inside cheek every hour as needed for smoking cessation       NICOTINE STEP 1 21 MG/24HR 24 hr patch ROBBY 1 PATCH TO SKIN ONCE D X 42 DAYS  0     predniSONE (DELTASONE) 20 MG tablet PRN gout flare: prednisone  60mg daily x2days, then 40mg daily x5days, then 20mg daily x5days, then stop. 21 tablet 0     rivaroxaban ANTICOAGULANT (XARELTO ANTICOAGULANT) 20 MG TABS tablet Take 1 tablet (20 mg) by mouth daily (with dinner) 30 tablet 2     sennosides (SENOKOT) 8.6 MG tablet TK 1 T PO BID  0      Family History:  Family History   Problem Relation Age of Onset     Family History Negative Other      Diabetes No family hx of      Coronary Artery Disease No family hx of      Hypertension No family hx of      Hyperlipidemia No family hx of      Breast Cancer No family hx of      Prostate Cancer No family hx of      Anxiety Disorder No family hx of      Substance Abuse No family hx of      Asthma No family hx of      Thyroid Disease No family hx of      Unknown/Adopted No family hx of      Genetic Disorder No family hx of      Osteoporosis No family hx of      Anesthesia Reaction No family hx of      Mental Illness No family hx of      Depression No family hx of      Other Cancer No family hx of      Colon Cancer No family hx of      Cerebrovascular Disease No family hx of      Obesity No family hx of    His father had blood clot.  1 brother had pancreatic cancer and he will of blood clots.  Another brother had blood clots and he had hepatocellular carcinoma.  He has 2 children both daughters who are healthy.        Social History:  Social History     Socioeconomic History     Marital status:      Spouse name: Not on file     Number of children: Not on file     Years of education: Not on file     Highest education level: Not on file   Occupational History     Not on file   Social Needs     Financial resource strain: Not on file     Food insecurity     Worry: Not on file     Inability: Not on file     Transportation needs     Medical: Not on file     Non-medical: Not on file   Tobacco Use     Smoking status: Former Smoker     Packs/day: 1.25     Years: 41.00     Pack years: 51.25     Types: Cigarettes     Last attempt to quit:  10/1/2017     Years since quittin.5     Smokeless tobacco: Never Used   Substance and Sexual Activity     Alcohol use: Yes     Alcohol/week: 0.0 standard drinks     Comment: 12 pack of beer every 2 weeks     Drug use: No     Sexual activity: Not Currently     Partners: Female   Lifestyle     Physical activity     Days per week: Not on file     Minutes per session: Not on file     Stress: Not on file   Relationships     Social connections     Talks on phone: Not on file     Gets together: Not on file     Attends [employer] service: Not on file     Active member of club or organization: Not on file     Attends meetings of clubs or organizations: Not on file     Relationship status: Not on file     Intimate partner violence     Fear of current or ex partner: Not on file     Emotionally abused: Not on file     Physically abused: Not on file     Forced sexual activity: Not on file   Other Topics Concern     Parent/sibling w/ CABG, MI or angioplasty before 65F 55M? No   Social History Narrative     Not on file   He has 45-pack-year smoking history but quit 1 year ago.  Drinks 3-4 beer a day.  Lives alone.  He is not working because of disability from the back.    Physical Exam:      Phone visit.    Laboratory/Imaging Studies    Reviewed        ASSESSMENT/PLAN:    Polycythemia with elevated hemoglobin, hematocrit and RBC.   He was noticed to have elevated hemoglobin in  and prior to that in  and  he had couple of readings when his hemoglobin was 18.1 and 18.5.  After that his hemoglobin was in the normal range but since 2018 it has been persistently elevated.  Recent hemoglobin on 2019 was 20.1 with hematocrit of 59.5 and RBC 6.09.  Most recent hemoglobin on 2020 was 19.2 with a hematocrit of 57.3.    I suspected that he could have polycythemia vera.  I did further testing including peripheral blood smear which did not show any unusual findings.  Erythropoietin level was 5 in the low normal  range and peripheral blood NGS panel for myeloproliferative neoplasms was also unremarkable.  LDH was normal.  Ultrasound abdomen in May 2019 did not reveal splenomegaly.  We did a bone marrow biopsy on 1/14/2020 which was essentially unremarkable showing normocellular marrow for age (30-40%) with trilineage hematopoiesis without increase in blasts and no dysplasia and no increase in fibrosis and no ring sideroblasts.  As the morphology was completely normal, FISH and cytogenetics was not done.    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    I recommended that he continues with baby aspirin daily to decrease the risk of clot.  As the hemoglobin is significantly high I recommended that it would be reasonable to phlebotomize him and try to keep his hematocrit below 50.   He had phlebotomy on 2/10/2020 and 3/9/2020.    He is on baby aspirin.  I recommend that he gets phlebotomy every 2 weeks to keep his hematocrit below 50.  He recently was diagnosed with small right-sided pulmonary embolism and the elevated hematocrit would have contributed to this.  He was started on Xarelto but he has not been taking it for the last 2 weeks or so because of financial issues.  I have sent a message to his primary care provider Hamlet Roberts to look into it.  Potentially we can change to Eliquis.  If he cannot afford it then we will have to give him warfarin. I think he will need AC at least for 6 months and potentially indefinitely.  In the meantime he will continue aspirin.      Elevated blood pressure.  He tells me that it is under much better control than it was 130/80 today.  He will continue hydralazine, Coreg and lisinopril.  He will follow with PCP.    We did not address the following today.    Chronic gout.   Continue allopurinol.  Now he is taking 700 mg daily to prevent frequent flares.  Last uric acid was in the normal range.    Going forward he will follow with his primary care physician but he knows to contact me if he has any questions or concerns.    I answered all of his questions to his satisfaction.  He is agreeable and comfortable with the plan.    Germain Muñoz MD    Total phone call time. 8 minutes.

## 2020-04-02 ENCOUNTER — ANTICOAGULATION THERAPY VISIT (OUTPATIENT)
Dept: ANTICOAGULATION | Facility: OTHER | Age: 65
End: 2020-04-02
Payer: COMMERCIAL

## 2020-04-02 DIAGNOSIS — Z79.01 LONG TERM CURRENT USE OF ANTICOAGULANT THERAPY: Primary | ICD-10-CM

## 2020-04-02 DIAGNOSIS — I26.99 PULMONARY EMBOLISM WITHOUT ACUTE COR PULMONALE, UNSPECIFIED CHRONICITY, UNSPECIFIED PULMONARY EMBOLISM TYPE (H): ICD-10-CM

## 2020-04-02 PROCEDURE — 99207 ZZC NO CHARGE NURSE ONLY: CPT | Performed by: PHYSICIAN ASSISTANT

## 2020-04-02 RX ORDER — WARFARIN SODIUM 5 MG/1
5 TABLET ORAL DAILY
Qty: 30 TABLET | Refills: 0 | Status: SHIPPED | OUTPATIENT
Start: 2020-04-02 | End: 2020-04-24

## 2020-04-02 NOTE — TELEPHONE ENCOUNTER
I have sent over Warfarin 5 mg to start daily. He should not take aspirin or any NSAIDs (ibuprofen or Aleve) while on this. INR clinic referral order so please help schedule with Janeth Mccarthy.    Hamlet Roberts PA-C

## 2020-04-02 NOTE — PROGRESS NOTES
ANTICOAGULATION FOLLOW-UP CLINIC VISIT    Patient Name:  Bucky Edgar  Date:  4/2/2020  Contact Type:  Telephone    SUBJECTIVE:  Patient Findings     Comments:   Initiation of therapy, was on warfarin a few years ago. Previous dosing was 7.5 mg one day and 5 mg ROW.        Clinical Outcomes     Comments:   Initiation of therapy, was on warfarin a few years ago. Previous dosing was 7.5 mg one day and 5 mg ROW.           OBJECTIVE    INR Protime   Date Value Ref Range Status   02/08/2018 1.2 (A) 0.86 - 1.14 Final       ASSESSMENT / PLAN  Recheck INR In: 4 DAYS      Anticoagulation Summary  As of 4/2/2020    INR goal:   2.0-3.0   TTR:   --   INR used for dosing:   No new INR was available at the time of this encounter.   Warfarin maintenance plan:   5 mg (5 mg x 1) every day   Full warfarin instructions:   5 mg every day   Weekly warfarin total:   35 mg   Plan last modified:   Janeth Mccarthy RN (4/2/2020)   Next INR check:   4/6/2020   Target end date:   Indefinite    Indications    Pulmonary embolism without acute cor pulmonale  unspecified chronicity  unspecified pulmonary embolism type (H) [I26.99]             Anticoagulation Episode Summary     INR check location:       Preferred lab:       Send INR reminders to:   [facility]    Comments:   PE/ DVT, 5 mg tabs, PM dose      Anticoagulation Care Providers     Provider Role Specialty Phone number    Hamlet Roberts PA-C Referring Physician Assistant [phone]            See the Encounter Report to view Anticoagulation Flowsheet and Dosing Calendar (Go to Encounters tab in chart review, and find the Anticoagulation Therapy Visit)    Dosage adjustment made based on physician directed care plan.    Janeth Mccarthy RN

## 2020-04-06 ENCOUNTER — TELEPHONE (OUTPATIENT)
Dept: FAMILY MEDICINE | Facility: OTHER | Age: 65
End: 2020-04-06

## 2020-04-06 ENCOUNTER — ANTICOAGULATION THERAPY VISIT (OUTPATIENT)
Dept: ANTICOAGULATION | Facility: OTHER | Age: 65
End: 2020-04-06

## 2020-04-06 DIAGNOSIS — I26.99 PULMONARY EMBOLISM WITHOUT ACUTE COR PULMONALE, UNSPECIFIED CHRONICITY, UNSPECIFIED PULMONARY EMBOLISM TYPE (H): ICD-10-CM

## 2020-04-06 DIAGNOSIS — Z79.01 LONG TERM (CURRENT) USE OF ANTICOAGULANTS: Primary | ICD-10-CM

## 2020-04-06 LAB
CAPILLARY BLOOD COLLECTION: NORMAL
INR PPP: 1.3 (ref 0.86–1.14)

## 2020-04-06 PROCEDURE — 99207 ZZC NO CHARGE NURSE ONLY: CPT | Performed by: PHYSICIAN ASSISTANT

## 2020-04-06 PROCEDURE — 36416 COLLJ CAPILLARY BLOOD SPEC: CPT | Performed by: PHYSICIAN ASSISTANT

## 2020-04-06 PROCEDURE — 85610 PROTHROMBIN TIME: CPT | Performed by: PHYSICIAN ASSISTANT

## 2020-04-06 NOTE — TELEPHONE ENCOUNTER
Pt presented to the clinic. He has a cough. The same cough he has had for several months, COPD. No fevers. Pt will wear a mask, lab contacted to limit time in clinic.    Deyanira Willis, MSN, RN

## 2020-04-06 NOTE — PROGRESS NOTES
ANTICOAGULATION FOLLOW-UP CLINIC VISIT    Patient Name:  Bucky Edgar  Date:  2020  Contact Type:  Telephone    SUBJECTIVE:  Patient Findings     Comments:   Initiation of therapy        Clinical Outcomes     Comments:   Initiation of therapy           OBJECTIVE    INR   Date Value Ref Range Status   2020 1.30 (H) 0.86 - 1.14 Final     Comment:     This test is intended for monitoring Coumadin therapy.  Results are not   accurate in patients with prolonged INR due to factor deficiency.         ASSESSMENT / PLAN  INR assessment SUB    Recheck INR In: 3 DAYS    INR Location Outside lab      Anticoagulation Summary  As of 2020    INR goal:   2.0-3.0   TTR:   --   INR used for dosin.30! (2020)   Warfarin maintenance plan:   5 mg (5 mg x 1) every day   Full warfarin instructions:   : 7.5 mg; : 7.5 mg; Otherwise 5 mg every day   Weekly warfarin total:   35 mg   Plan last modified:   Janeth Mccarthy RN (2020)   Next INR check:   2020   Target end date:   Indefinite    Indications    Pulmonary embolism without acute cor pulmonale  unspecified chronicity  unspecified pulmonary embolism type (H) [I26.99]             Anticoagulation Episode Summary     INR check location:       Preferred lab:       Send INR reminders to:   [facility]    Comments:   PE/ DVT, 5 mg tabs, PM dose      Anticoagulation Care Providers     Provider Role Specialty Phone number    Hamlet Roberts PA-C Referring Physician Assistant [phone]            See the Encounter Report to view Anticoagulation Flowsheet and Dosing Calendar (Go to Encounters tab in chart review, and find the Anticoagulation Therapy Visit)    Dosage adjustment made based on physician directed care plan.    Janeth Mccarthy RN

## 2020-04-07 ENCOUNTER — INFUSION THERAPY VISIT (OUTPATIENT)
Dept: INFUSION THERAPY | Facility: CLINIC | Age: 65
End: 2020-04-07
Payer: COMMERCIAL

## 2020-04-07 VITALS
WEIGHT: 223.4 LBS | SYSTOLIC BLOOD PRESSURE: 158 MMHG | BODY MASS INDEX: 32.98 KG/M2 | DIASTOLIC BLOOD PRESSURE: 101 MMHG

## 2020-04-07 DIAGNOSIS — D75.1 POLYCYTHEMIA: Primary | ICD-10-CM

## 2020-04-07 DIAGNOSIS — I26.99 PULMONARY EMBOLISM WITHOUT ACUTE COR PULMONALE, UNSPECIFIED CHRONICITY, UNSPECIFIED PULMONARY EMBOLISM TYPE (H): ICD-10-CM

## 2020-04-07 DIAGNOSIS — D75.1 POLYCYTHEMIA: ICD-10-CM

## 2020-04-07 DIAGNOSIS — Z79.01 LONG TERM CURRENT USE OF ANTICOAGULANT THERAPY: ICD-10-CM

## 2020-04-07 LAB
BASOPHILS # BLD AUTO: 0.1 10E9/L (ref 0–0.2)
BASOPHILS NFR BLD AUTO: 1.1 %
DIFFERENTIAL METHOD BLD: ABNORMAL
EOSINOPHIL # BLD AUTO: 0.2 10E9/L (ref 0–0.7)
EOSINOPHIL NFR BLD AUTO: 2.8 %
ERYTHROCYTE [DISTWIDTH] IN BLOOD BY AUTOMATED COUNT: 13.3 % (ref 10–15)
HCT VFR BLD AUTO: 58.6 % (ref 40–53)
HGB BLD-MCNC: 19.3 G/DL (ref 13.3–17.7)
IMM GRANULOCYTES # BLD: 0 10E9/L (ref 0–0.4)
IMM GRANULOCYTES NFR BLD: 0.4 %
INR PPP: 1.47 (ref 0.86–1.14)
LYMPHOCYTES # BLD AUTO: 1.4 10E9/L (ref 0.8–5.3)
LYMPHOCYTES NFR BLD AUTO: 20.5 %
MCH RBC QN AUTO: 32.4 PG (ref 26.5–33)
MCHC RBC AUTO-ENTMCNC: 32.9 G/DL (ref 31.5–36.5)
MCV RBC AUTO: 98 FL (ref 78–100)
MONOCYTES # BLD AUTO: 0.7 10E9/L (ref 0–1.3)
MONOCYTES NFR BLD AUTO: 10.4 %
NEUTROPHILS # BLD AUTO: 4.6 10E9/L (ref 1.6–8.3)
NEUTROPHILS NFR BLD AUTO: 64.8 %
PLATELET # BLD AUTO: 213 10E9/L (ref 150–450)
RBC # BLD AUTO: 5.96 10E12/L (ref 4.4–5.9)
WBC # BLD AUTO: 7 10E9/L (ref 4–11)

## 2020-04-07 PROCEDURE — 85610 PROTHROMBIN TIME: CPT | Performed by: PHYSICIAN ASSISTANT

## 2020-04-07 PROCEDURE — 99195 PHLEBOTOMY: CPT | Performed by: NURSE PRACTITIONER

## 2020-04-07 PROCEDURE — 36415 COLL VENOUS BLD VENIPUNCTURE: CPT | Performed by: PHYSICIAN ASSISTANT

## 2020-04-07 PROCEDURE — 85025 COMPLETE CBC W/AUTO DIFF WBC: CPT | Performed by: PHYSICIAN ASSISTANT

## 2020-04-07 ASSESSMENT — PAIN SCALES - GENERAL: PAINLEVEL: MODERATE PAIN (5)

## 2020-04-07 NOTE — PROGRESS NOTES
"Prior to scheduled phlebotomy verified patient identity using patient's name and date of birth.  Lab(s) verified: hematocrit 58.6    Peripheral Access:  Accessed: right foream with 22ga autoguard instyte catheter without difficulty.  (First attempted to get blood from right antecubital with 20 g but blood return was very sluggish.) Positive blood return from right forearm.  No redness, edema or complaints of discomfort.  Pt tolerated phlebotomy, 500 mls of blood removed via gravity using whole blood collection bag and scale per MD's order. Took approximately 40 minutes.  240 mls of oral fluid replaced.  Pt tolerated procedure without adverse reactions.  Pt denies headache, nausea or discomfort.  IV flushed with 10mls 0.9% Normal Saline after completion of procedure and discontinued per policy with pressure dressing applied.      Pre Vital Signs including O2 saturation documented in \"Vitals\"    Reviewed and provided discharge instructions regarding therapeutic phlebotomy.  Pt verbalized understanding.    Ambulation steady.  Pt alert and orientated upon discharge.                                "

## 2020-04-10 ENCOUNTER — ANTICOAGULATION THERAPY VISIT (OUTPATIENT)
Dept: ANTICOAGULATION | Facility: OTHER | Age: 65
End: 2020-04-10

## 2020-04-10 ENCOUNTER — TELEPHONE (OUTPATIENT)
Dept: FAMILY MEDICINE | Facility: OTHER | Age: 65
End: 2020-04-10

## 2020-04-10 DIAGNOSIS — Z79.01 LONG TERM CURRENT USE OF ANTICOAGULANT THERAPY: ICD-10-CM

## 2020-04-10 DIAGNOSIS — I26.99 PULMONARY EMBOLISM WITHOUT ACUTE COR PULMONALE, UNSPECIFIED CHRONICITY, UNSPECIFIED PULMONARY EMBOLISM TYPE (H): ICD-10-CM

## 2020-04-10 LAB
CAPILLARY BLOOD COLLECTION: NORMAL
INR BLD: 2 (ref 0.86–1.14)

## 2020-04-10 PROCEDURE — 99207 ZZC NO CHARGE NURSE ONLY: CPT | Performed by: PHYSICIAN ASSISTANT

## 2020-04-10 PROCEDURE — 85610 PROTHROMBIN TIME: CPT | Mod: QW | Performed by: PHYSICIAN ASSISTANT

## 2020-04-10 PROCEDURE — 36416 COLLJ CAPILLARY BLOOD SPEC: CPT | Performed by: PHYSICIAN ASSISTANT

## 2020-04-10 NOTE — PROGRESS NOTES
ANTICOAGULATION FOLLOW-UP CLINIC VISIT    Patient Name:  Bucky Edgar  Date:  4/10/2020  Contact Type:  Telephone    SUBJECTIVE:  Patient Findings     Comments:   The patient was assessed for diet, medication, and activity level changes, missed or extra doses, bruising or bleeding, with no problem findings.  Melvina Carrillo RN          Clinical Outcomes     Comments:   The patient was assessed for diet, medication, and activity level changes, missed or extra doses, bruising or bleeding, with no problem findings.  Melvina Carrillo RN             OBJECTIVE    INR Point of Care   Date Value Ref Range Status   04/10/2020 2.0 (H) 0.86 - 1.14 Final     Comment:     This test is intended for monitoring Coumadin therapy.  Results are not   accurate in patients with prolonged INR due to factor deficiency.         ASSESSMENT / PLAN  INR assessment THER    Recheck INR In: 1 WEEK    INR Location Outside lab      Anticoagulation Summary  As of 4/10/2020    INR goal:   2.0-3.0   TTR:   --   INR used for dosin.0 (4/10/2020)   Warfarin maintenance plan:   7.5 mg (5 mg x 1.5) every Mon, Fri; 5 mg (5 mg x 1) all other days   Full warfarin instructions:   7.5 mg every Mon, Fri; 5 mg all other days   Weekly warfarin total:   40 mg   Plan last modified:   Melvina Carrillo RN (4/10/2020)   Next INR check:   2020   Target end date:   Indefinite    Indications    Pulmonary embolism without acute cor pulmonale  unspecified chronicity  unspecified pulmonary embolism type (H) [I26.99]             Anticoagulation Episode Summary     INR check location:       Preferred lab:       Send INR reminders to:   [facility]    Comments:   PE/ DVT, 5 mg tabs, PM dose      Anticoagulation Care Providers     Provider Role Specialty Phone number    Hamlet Roberts PA-C Referring Physician Assistant [phone]            See the Encounter Report to view Anticoagulation Flowsheet and Dosing Calendar (Go to Encounters tab  in chart review, and find the Anticoagulation Therapy Visit)    Dosage adjustment made based on physician directed care plan.    Melvina Carrillo RN

## 2020-04-10 NOTE — TELEPHONE ENCOUNTER
Provided arrival instructions:   1. Park on the WEST side of our building, near the wheelchair ramp (there is an orange cone near the door at the top of this ramp).  2. Stay in your car and call the  upon arrival to be check in for your lab appointment: [phone].  3. The  will notify the lab that you are checked in. Walk to the top of the ramp and wait for the .  4. The  will open the door and escort you into the closest room, where your lab will be collected.     Deyanira Willis, MSN, RN

## 2020-04-17 ENCOUNTER — ANTICOAGULATION THERAPY VISIT (OUTPATIENT)
Dept: ANTICOAGULATION | Facility: OTHER | Age: 65
End: 2020-04-17

## 2020-04-17 ENCOUNTER — TELEPHONE (OUTPATIENT)
Dept: FAMILY MEDICINE | Facility: OTHER | Age: 65
End: 2020-04-17

## 2020-04-17 DIAGNOSIS — I26.99 PULMONARY EMBOLISM WITHOUT ACUTE COR PULMONALE, UNSPECIFIED CHRONICITY, UNSPECIFIED PULMONARY EMBOLISM TYPE (H): ICD-10-CM

## 2020-04-17 DIAGNOSIS — Z79.01 LONG TERM CURRENT USE OF ANTICOAGULANT THERAPY: ICD-10-CM

## 2020-04-17 LAB
CAPILLARY BLOOD COLLECTION: NORMAL
INR BLD: 2.2 (ref 0.86–1.14)

## 2020-04-17 PROCEDURE — 85610 PROTHROMBIN TIME: CPT | Mod: QW | Performed by: PHYSICIAN ASSISTANT

## 2020-04-17 PROCEDURE — 99207 ZZC NO CHARGE NURSE ONLY: CPT | Performed by: PHYSICIAN ASSISTANT

## 2020-04-17 PROCEDURE — 36416 COLLJ CAPILLARY BLOOD SPEC: CPT | Performed by: PHYSICIAN ASSISTANT

## 2020-04-17 NOTE — TELEPHONE ENCOUNTER
Huddling with administrators regarding chronic cough and INR lab appointment today.    Swati Resendez, BSN, RN, PHN

## 2020-04-17 NOTE — TELEPHONE ENCOUNTER
Per PCS, ok for patient to come to clinic and use west entrance today.    Called patient, provided arrival instructions:   1. Park on the WEST side of our building, near the wheelchair ramp (there is an orange cone near the door at the top of this ramp).  2. Stay in your car and call the  upon arrival to be check in for your lab appointment: [phone].  3. The  will notify the lab that you are checked in. Walk to the top of the ramp and wait for the .  4. The  will open the door and escort you into the closest room, where your lab will be collected.    Patient verbalizes understanding of these instructions and denies questions.  Notified KS and lab personnel.    Swati Resendez, DREAN, RN, PHN

## 2020-04-17 NOTE — PROGRESS NOTES
ANTICOAGULATION FOLLOW-UP CLINIC VISIT    Patient Name:  Bucky Edgar  Date:  2020  Contact Type:  Telephone    SUBJECTIVE:  Patient Findings     Comments:   The patient was assessed for diet, medication, and activity level changes, missed or extra doses, bruising or bleeding, with no problem findings.  Melvina Carrillo RN          Clinical Outcomes     Negatives:   Major bleeding event, Thromboembolic event, Anticoagulation-related hospital admission, Anticoagulation-related ED visit, Anticoagulation-related fatality    Comments:   The patient was assessed for diet, medication, and activity level changes, missed or extra doses, bruising or bleeding, with no problem findings.  Melvina Carrillo RN             OBJECTIVE    INR Point of Care   Date Value Ref Range Status   2020 2.2 (H) 0.86 - 1.14 Final     Comment:     This test is intended for monitoring Coumadin therapy.  Results are not   accurate in patients with prolonged INR due to factor deficiency.         ASSESSMENT / PLAN  INR assessment THER    Recheck INR In: 2 WEEKS    INR Location Outside lab      Anticoagulation Summary  As of 2020    INR goal:   2.0-3.0   TTR:   100.0 % (5 d)   INR used for dosin.2 (2020)   Warfarin maintenance plan:   7.5 mg (5 mg x 1.5) every Mon, Fri; 5 mg (5 mg x 1) all other days   Full warfarin instructions:   7.5 mg every Mon, Fri; 5 mg all other days   Weekly warfarin total:   40 mg   No change documented:   Melvina Carrillo RN   Plan last modified:   Melvina Carrillo RN (4/10/2020)   Next INR check:   2020   Target end date:   Indefinite    Indications    Pulmonary embolism without acute cor pulmonale  unspecified chronicity  unspecified pulmonary embolism type (H) [I26.99]             Anticoagulation Episode Summary     INR check location:       Preferred lab:       Send INR reminders to:   [facility]    Comments:   PE/ DVT, 5 mg tabs, PM dose      Anticoagulation Care  Providers     Provider Role Specialty Phone number    Hamlet Roberts PA-C Referring Physician Assistant [phone]            See the Encounter Report to view Anticoagulation Flowsheet and Dosing Calendar (Go to Encounters tab in chart review, and find the Anticoagulation Therapy Visit)    Dosage adjustment made based on physician directed care plan.      Melvina Carrillo RN

## 2020-04-17 NOTE — TELEPHONE ENCOUNTER
Reason for Call:  Other appointment    Detailed comments: Patient has an appointment today - COPD (chronic cough)     Phone Number Patient can be reached at: Home number on file [phone] (home)    Best Time: any    Can we leave a detailed message on this number? YES    Call taken on 4/17/2020 at 8:48 AM by Chris Caceres

## 2020-04-24 DIAGNOSIS — I26.99 PULMONARY EMBOLISM WITHOUT ACUTE COR PULMONALE, UNSPECIFIED CHRONICITY, UNSPECIFIED PULMONARY EMBOLISM TYPE (H): ICD-10-CM

## 2020-04-24 RX ORDER — WARFARIN SODIUM 5 MG/1
5 TABLET ORAL DAILY
Qty: 45 TABLET | Refills: 0 | Status: SHIPPED | OUTPATIENT
Start: 2020-04-24 | End: 2020-11-13

## 2020-04-24 NOTE — TELEPHONE ENCOUNTER
Reason for Call:  Medication or medication refill:    Do you use a [facility]?  Name of the pharmacy and phone number for the current request:  [facility]    Name of the medication requested: WARFARIN    Other request: Patient needs refill. Please contact when done.     Can we leave a detailed message on this number? YES    Phone number patient can be reached at: Home number on file [phone] (home)    Best Time: any    Call taken on 4/24/2020 at 1:35 PM by Radha Arellano

## 2020-05-01 ENCOUNTER — ANTICOAGULATION THERAPY VISIT (OUTPATIENT)
Dept: ANTICOAGULATION | Facility: OTHER | Age: 65
End: 2020-05-01

## 2020-05-01 DIAGNOSIS — I26.99 PULMONARY EMBOLISM WITHOUT ACUTE COR PULMONALE, UNSPECIFIED CHRONICITY, UNSPECIFIED PULMONARY EMBOLISM TYPE (H): ICD-10-CM

## 2020-05-01 DIAGNOSIS — Z79.01 LONG TERM CURRENT USE OF ANTICOAGULANT THERAPY: ICD-10-CM

## 2020-05-01 LAB
CAPILLARY BLOOD COLLECTION: NORMAL
INR BLD: 2.4 (ref 0.86–1.14)

## 2020-05-01 PROCEDURE — 36416 COLLJ CAPILLARY BLOOD SPEC: CPT | Performed by: PHYSICIAN ASSISTANT

## 2020-05-01 PROCEDURE — 99207 ZZC NO CHARGE NURSE ONLY: CPT | Performed by: PHYSICIAN ASSISTANT

## 2020-05-01 PROCEDURE — 85610 PROTHROMBIN TIME: CPT | Mod: QW | Performed by: PHYSICIAN ASSISTANT

## 2020-05-01 NOTE — PROGRESS NOTES
ANTICOAGULATION FOLLOW-UP CLINIC VISIT    Patient Name:  Bucky Edgar  Date:  2020  Contact Type:  Telephone    SUBJECTIVE:  Patient Findings         Clinical Outcomes     Negatives:   Major bleeding event, Thromboembolic event, Anticoagulation-related hospital admission, Anticoagulation-related ED visit, Anticoagulation-related fatality           OBJECTIVE    INR Point of Care   Date Value Ref Range Status   2020 2.4 (H) 0.86 - 1.14 Final     Comment:     This test is intended for monitoring Coumadin therapy.  Results are not   accurate in patients with prolonged INR due to factor deficiency.         ASSESSMENT / PLAN  INR assessment THER    Recheck INR In: 3 WEEKS    INR Location Outside lab      Anticoagulation Summary  As of 2020    INR goal:   2.0-3.0   TTR:   100.0 % (2.7 wk)   INR used for dosin.4 (2020)   Warfarin maintenance plan:   7.5 mg (5 mg x 1.5) every Mon, Fri; 5 mg (5 mg x 1) all other days   Full warfarin instructions:   7.5 mg every Mon, Fri; 5 mg all other days   Weekly warfarin total:   40 mg   No change documented:   Allie Menard RN   Plan last modified:   Melvina Carrillo RN (4/10/2020)   Next INR check:   2020   Target end date:   Indefinite    Indications    Pulmonary embolism without acute cor pulmonale  unspecified chronicity  unspecified pulmonary embolism type (H) [I26.99]             Anticoagulation Episode Summary     INR check location:       Preferred lab:       Send INR reminders to:   [facility]    Comments:   PE/ DVT, 5 mg tabs, PM dose      Anticoagulation Care Providers     Provider Role Specialty Phone number    Hamlet Roberts PA-C Referring Physician Assistant [phone]            See the Encounter Report to view Anticoagulation Flowsheet and Dosing Calendar (Go to Encounters tab in chart review, and find the Anticoagulation Therapy Visit)    Dosage adjustment made based on physician directed care plan.    Allie LITTLEJOHN  Derian RN

## 2020-05-08 DIAGNOSIS — M1A.09X0 IDIOPATHIC CHRONIC GOUT OF MULTIPLE SITES WITHOUT TOPHUS: ICD-10-CM

## 2020-05-08 LAB
ALBUMIN SERPL-MCNC: 3.3 G/DL (ref 3.4–5)
ALP SERPL-CCNC: 104 U/L (ref 40–150)
ALT SERPL W P-5'-P-CCNC: 24 U/L (ref 0–70)
ANION GAP SERPL CALCULATED.3IONS-SCNC: 4 MMOL/L (ref 3–14)
AST SERPL W P-5'-P-CCNC: 16 U/L (ref 0–45)
BILIRUB SERPL-MCNC: 0.7 MG/DL (ref 0.2–1.3)
BUN SERPL-MCNC: 8 MG/DL (ref 7–30)
CALCIUM SERPL-MCNC: 8 MG/DL (ref 8.5–10.1)
CHLORIDE SERPL-SCNC: 111 MMOL/L (ref 94–109)
CO2 SERPL-SCNC: 28 MMOL/L (ref 20–32)
CREAT SERPL-MCNC: 0.9 MG/DL (ref 0.66–1.25)
GFR SERPL CREATININE-BSD FRML MDRD: >90 ML/MIN/{1.73_M2}
GLUCOSE SERPL-MCNC: 113 MG/DL (ref 70–99)
POTASSIUM SERPL-SCNC: 3.7 MMOL/L (ref 3.4–5.3)
PROT SERPL-MCNC: 7 G/DL (ref 6.8–8.8)
SODIUM SERPL-SCNC: 143 MMOL/L (ref 133–144)
URATE SERPL-MCNC: 1.5 MG/DL (ref 3.5–7.2)

## 2020-05-08 PROCEDURE — 36415 COLL VENOUS BLD VENIPUNCTURE: CPT | Performed by: INTERNAL MEDICINE

## 2020-05-08 PROCEDURE — 80053 COMPREHEN METABOLIC PANEL: CPT | Performed by: INTERNAL MEDICINE

## 2020-05-08 PROCEDURE — 84550 ASSAY OF BLOOD/URIC ACID: CPT | Performed by: INTERNAL MEDICINE

## 2020-05-11 NOTE — PROGRESS NOTES
"Bucky Edgar is a 64 year old male who is being evaluated via a billable telephone visit.      The patient has been notified of following:     \"This telephone visit will be conducted via a call between you and your physician/provider. We have found that certain health care needs can be provided without the need for a physical exam.  This service lets us provide the care you need with a short phone conversation.  If a prescription is necessary we can send it directly to your pharmacy.  If lab work is needed we can place an order for that and you can then stop by our lab to have the test done at a later time.    Telephone visits are billed at different rates depending on your insurance coverage. During this emergency period, for some insurers they may be billed the same as an in-person visit.  Please reach out to your insurance provider with any questions.    If during the course of the call the physician/provider feels a telephone visit is not appropriate, you will not be charged for this service.\"    Patient has given verbal consent for Telephone visit?  Yes    What phone number would you like to be contacted at? [phone]    How would you like to obtain your AVS? Mail a copy    Rheumatology Telephone/Telehealth  Visit      Bucky Edgar MRN# [id number]   YOB: 1955 Age: 64 year old      Date of visit: 5/12/20   PCP: Dr. Percy Deshpande    Chief Complaint   Patient presents with:  Arthritis: gout. has not had any flares recently    Assessment and Plan     1.  Gout: Started having gout flares in approximately early 2017, acute onset and resolved with prednisone; joints involved include his left ankle, left first MTP, and left wrist.  Colchicine was cost prohibitive.  Currently on allopurinol 700 mg daily and is doing well with no gout flares.  Note that he was still flaring when on allopurinol 600mg daily.   - Continue allopurinol 700 mg daily (two 300mg tablets + one 100mg tablet)  - For gout " flare only: prednisone 60mg daily x2days, then 40mg daily x5days, then 20mg daily x5days, then stop.  - Labs in 6 months: CBC, CMP, Uric Acid    2. Elevated Hgb: thought to be polycythemia vera by Dr. Muñoz (hematology/oncology) secondary to heavy smoking and underlying COPD as well as Gaisbock syndrome. Advised to continue baby aspirin beebe by Dr. Muñoz. Phlebotomy was also started to try to keep the hematocrit <50.     3. DVT and PE: currently on anticoagulation.     # Relevant labs and imaging were reviewed with the patient    # Note that this is a virtual visit to reduce the risk of COVID-19 exposure during this current pandemic.      Mr. Edgar verbalized agreement with and understanding of the rational for the diagnosis and treatment plan.  All questions were answered to best of my ability and the patient's satisfaction. Mr. Edgar was advised to contact the clinic with any questions that may arise after the clinic visit.     Thank you for involving me in the care of the patient    Return to clinic: 6 months      HPI   Bucky Edgar is a 64 year old male with a past medical history significant for COPD, dyslipidemia, vitamin D deficiency, history of rib fractures, DVT/PE on anticoagulation, hypertension, who is seen for follow-up of gout.    Today, Mr. Edgar reports that he is doing great.  Tolerating allopurinol 700 mg daily and has not had a flare since then.     Denies fevers, chills, nausea, vomiting, constipation, diarrhea. No abdominal pain. No chest pain/pressure, palpitations, or shortness of breath currently. No LE swelling.  No rash.   No history of inflammatory eye disease or inflammatory bowel disease.      Tobacco: Quit in 2017  EtOH: 12 pack of beer every 2 weeks  Drugs: None    ROS   GEN: No fevers, chills, or night sweats  SKIN: No itching, rashes, sores  HEENT: No oral or nasal ulcers.  CV: No chest pain, pressure, palpitations, or dyspnea on exertion.  PULM: No SOB, wheeze, cough.  GI: No  nausea, vomiting, constipation, diarrhea. No blood in stool. No abdominal pain.  : No blood in urine.  MSK: See HPI.  NEURO: No numbness, tingling, or weakness.  EXT: No LE swelling  PSYCH: Negative    Active Problem List     Patient Active Problem List   Diagnosis     Displacement of lumbar intervertebral disc without myelopathy     CARDIOVASCULAR SCREENING; LDL GOAL LESS THAN 130     HTN, goal below 140/90     Advanced directives, counseling/discussion     COPD (chronic obstructive pulmonary disease) (H)     Impaired fasting glucose     Hypertriglyceridemia     Vitamin D deficiency     Chronic bronchitis with COPD (chronic obstructive pulmonary disease) (H)     Closed fracture of multiple ribs of left side with routine healing, subsequent encounter     Chronic pain due to trauma     Long-term (current) use of anticoagulants [Z79.01]     Elevated serum creatinine     History of deep venous thrombosis (DVT) of distal vein of left lower extremity     Elevated liver enzymes     Renal atrophy, right     Abdominal aortic aneurysm (AAA) without rupture (H)     Hepatomegaly     Fatty liver     Polycythemia     Pulmonary embolism without acute cor pulmonale, unspecified chronicity, unspecified pulmonary embolism type (H)     Past Medical History     Past Medical History:   Diagnosis Date     COPD (chronic obstructive pulmonary disease) (H)      Displacement of lumbar intervertebral disc without myelopathy 1995, 2002     HTN, goal below 140/90      Past Surgical History     Past Surgical History:   Procedure Laterality Date     C DECOMPRESS SPINAL CORD,1 SEG  1995, 4/2002    leftL4-L5, 2nd right L4-L5     C SPINE FUSION,ANTER,3 SGMTS  2/9/2004    ant and post fusion L4-S1     HC REPAIR ROTATOR CUFF,CHRONIC  1989     Allergy     Allergies   Allergen Reactions     Chlorthalidone Other (See Comments)     Excessive lowering of blood pressure     Amlodipine Other (See Comments)     Intractable headache with use of medication  as well as noting a small tremor of the upper extremities     Current Medication List     Current Outpatient Medications   Medication Sig     acetaminophen (TYLENOL) 325 MG tablet Take 650 mg by mouth every 6 hours     allopurinol (ZYLOPRIM) 100 MG tablet Allopurinol 700mg daily (two 300mg tablets + one 100mg tablet)     allopurinol (ZYLOPRIM) 300 MG tablet Allopurinol 700mg daily (two 300mg tablets + one 100mg tablet)     carvedilol (COREG) 25 MG tablet TAKE ONE TABLET BY MOUTH TWICE DAILY WITH MEALS     hydrALAZINE (APRESOLINE) 25 MG tablet Take 1 tablet (25 mg) by mouth 3 times daily     hydrALAZINE (APRESOLINE) 50 MG tablet Take 1 tablet (50 mg) by mouth 3 times daily     lisinopril (PRINIVIL/ZESTRIL) 20 MG tablet Take 1 tablet (20 mg) by mouth 2 times daily     nicotine polacrilex (NICORETTE) 2 MG lozenge Place 2 mg inside cheek every hour as needed for smoking cessation     predniSONE (DELTASONE) 20 MG tablet PRN gout flare: prednisone 60mg daily x2days, then 40mg daily x5days, then 20mg daily x5days, then stop.     warfarin ANTICOAGULANT (COUMADIN) 5 MG tablet Take 1 tablet (5 mg) by mouth daily Take 7.5 mg Mon, Fri and 5 mg all other days or as directed by the coumadin clinic.     albuterol (PROAIR HFA/PROVENTIL HFA/VENTOLIN HFA) 108 (90 Base) MCG/ACT inhaler Inhale 2 puffs into the lungs every 6 hours as needed for shortness of breath / dyspnea (Patient not taking: Reported on 4/7/2020)     HYDROcodone-acetaminophen (NORCO) 5-325 MG tablet Take 1 tablet by mouth every 6 hours as needed for pain (Patient not taking: Reported on 4/7/2020)     No current facility-administered medications for this visit.          Social History   See HPI    Family History     Family History   Problem Relation Age of Onset     Family History Negative Other      Diabetes No family hx of      Coronary Artery Disease No family hx of      Hypertension No family hx of      Hyperlipidemia No family hx of      Breast Cancer No family  "hx of      Prostate Cancer No family hx of      Anxiety Disorder No family hx of      Substance Abuse No family hx of      Asthma No family hx of      Thyroid Disease No family hx of      Unknown/Adopted No family hx of      Genetic Disorder No family hx of      Osteoporosis No family hx of      Anesthesia Reaction No family hx of      Mental Illness No family hx of      Depression No family hx of      Other Cancer No family hx of      Colon Cancer No family hx of      Cerebrovascular Disease No family hx of      Obesity No family hx of        Physical Exam     Temp Readings from Last 3 Encounters:   04/07/20 (P) 98.9  F (37.2  C) ((P) Oral)   03/09/20 98.3  F (36.8  C) (Oral)   02/10/20 98.9  F (37.2  C) (Oral)     BP Readings from Last 5 Encounters:   04/07/20 (!) 158/101   03/09/20 (!) 162/97   02/10/20 (!) 154/96   02/03/20 (!) 205/111   01/28/20 (!) 154/98     Pulse Readings from Last 1 Encounters:   04/07/20 (P) 65     Resp Readings from Last 1 Encounters:   04/07/20 (P) 16     Estimated body mass index is 32.98 kg/m  as calculated from the following:    Height as of 1/28/20: 1.753 m (5' 9.02\").    Weight as of 4/7/20: 101.3 kg (223 lb 6.4 oz).    Telephone visit    Labs / Imaging (select studies)   CBC  Recent Labs   Lab Test 04/07/20  1154 03/09/20  0757 02/10/20  0802   WBC 7.0 7.0 8.5   RBC 5.96* 5.96* 5.75   HGB 19.3* 19.6* 19.2*   HCT 58.6* 60.8* 57.6*   MCV 98 102* 100   RDW 13.3 14.6 13.9    174 193   MCH 32.4 32.9 33.4*   MCHC 32.9 32.2 33.3   NEUTROPHIL 64.8 66.9 67.7   LYMPH 20.5 19.5 17.8   MONOCYTE 10.4 8.9 10.6   EOSINOPHIL 2.8 3.3 2.4   BASOPHIL 1.1 1.0 0.8   ANEU 4.6 4.7 5.8   ALYM 1.4 1.4 1.5   LIZZ 0.7 0.6 0.9   AEOS 0.2 0.2 0.2   ABAS 0.1 0.1 0.1     CMP  Recent Labs   Lab Test 05/08/20  0959 12/13/19  1518 11/07/19  0900 05/08/19  1041    141  --  139   POTASSIUM 3.7 4.2  --  4.4   CHLORIDE 111* 110*  --  107   CO2 28 27  --  27   ANIONGAP 4 4  --  5   * 101*  --  100* "   BUN 8 14  --  15   CR 0.90 1.08 1.08 1.04   GFRESTIMATED >90 73 73 77   GFRESTBLACK >90 85 85 89   JUSTIN 8.0* 9.1  --  9.2   BILITOTAL 0.7 0.9 1.1 1.5*   ALBUMIN 3.3* 3.6 3.6 3.6   PROTTOTAL 7.0 6.9 6.9 7.0   ALKPHOS 104 99 103 105   AST 16 19 19 20   ALT 24 28 27 36     Uric Acid  Recent Labs   Lab Test 05/08/20  0959 11/07/19  0900 05/08/19  1041 08/07/18  0926 05/02/18  1039 03/23/18  0918   URIC 1.5* 4.0 2.3* 2.0* 3.3* 5.0       Immunization History     Immunization History   Administered Date(s) Administered     Influenza Quad, Recombinant, p-free (RIV4) 11/12/2019     Influenza Vaccine IM > 6 months Valent IIV4 11/27/2013, 10/29/2015, 12/12/2016, 10/23/2017, 11/01/2018     Mantoux Tuberculin Skin Test 07/17/2012     Pneumo Conj 13-V (2010&after) 01/22/2016     Pneumococcal 23 valent 11/27/2013     TDAP Vaccine (Adacel) 08/25/2009, 11/25/2019     TDAP Vaccine (Boostrix) 08/25/2009          Chart documentation done in part with Dragon Voice recognition Software. Although reviewed after completion, some word and grammatical error may remain.    Phone call start time: 10:10 AM  Phone call end time: 10:17 AM    This visit is equivalent to a [id number] visit    Location of patient: home  Location of provider: home    Follow up:  follow up appointment scheduled to be in 6 mo    Coy Bolaños MD  5/12/2020

## 2020-05-12 ENCOUNTER — VIRTUAL VISIT (OUTPATIENT)
Dept: RHEUMATOLOGY | Facility: CLINIC | Age: 65
End: 2020-05-12
Payer: COMMERCIAL

## 2020-05-12 DIAGNOSIS — M1A.09X0 IDIOPATHIC CHRONIC GOUT OF MULTIPLE SITES WITHOUT TOPHUS: ICD-10-CM

## 2020-05-12 PROCEDURE — 99213 OFFICE O/P EST LOW 20 MIN: CPT | Mod: 95 | Performed by: INTERNAL MEDICINE

## 2020-05-12 RX ORDER — ALLOPURINOL 300 MG/1
TABLET ORAL
Qty: 180 TABLET | Refills: 3 | Status: SHIPPED | OUTPATIENT
Start: 2020-05-12 | End: 2020-11-17

## 2020-05-12 RX ORDER — ALLOPURINOL 100 MG/1
TABLET ORAL
Qty: 90 TABLET | Refills: 3 | Status: SHIPPED | OUTPATIENT
Start: 2020-05-12 | End: 2020-11-17

## 2020-05-26 ENCOUNTER — TELEPHONE (OUTPATIENT)
Dept: FAMILY MEDICINE | Facility: OTHER | Age: 65
End: 2020-05-26

## 2020-05-26 NOTE — TELEPHONE ENCOUNTER
I spoke with the pt who states he was recently in the hospital for coughing up blood. Saturday started to cough up some blood, dark syrup color. Seems like mucus and blood. Mostly in the morning. Breathing fine, speaking in full sentences. Pt was in the hospital at [facility], unable to see any information. Pt will reach out to [facility]    Deyanira Willis, MSN, RN

## 2020-05-26 NOTE — TELEPHONE ENCOUNTER
Patient called again, he is asking to speak to Deyanira.  He said his records are in Care everywhere.

## 2020-05-26 NOTE — TELEPHONE ENCOUNTER
Reason for call:  Patient reporting a symptom    Symptom or request: coughing up blood     Duration (how long have symptoms been present): today    Have you been treated for this before? Yes, seen in ER on Tuesday 5/19    Additional comments: Patient is coughing up blood again and takes blood thinners    Phone Number patient can be reached at:  Home number on file [phone] (home)    Best Time:  any    Can we leave a detailed message on this number:  YES    Call taken on 5/26/2020 at 9:49 AM by Chris Caceres

## 2020-05-26 NOTE — TELEPHONE ENCOUNTER
Tried to call pt, no answer, just keeps ringing. Huddled with JM, reviewed care everywhere. Recommend going to ED.    Deyanira Willis, MSN, RN

## 2020-06-02 ENCOUNTER — INFUSION THERAPY VISIT (OUTPATIENT)
Dept: INFUSION THERAPY | Facility: CLINIC | Age: 65
End: 2020-06-02
Payer: COMMERCIAL

## 2020-06-02 VITALS
SYSTOLIC BLOOD PRESSURE: 136 MMHG | TEMPERATURE: 98.8 F | OXYGEN SATURATION: 97 % | BODY MASS INDEX: 33.06 KG/M2 | HEART RATE: 65 BPM | WEIGHT: 224 LBS | DIASTOLIC BLOOD PRESSURE: 83 MMHG | RESPIRATION RATE: 16 BRPM

## 2020-06-02 DIAGNOSIS — D75.1 POLYCYTHEMIA: Primary | ICD-10-CM

## 2020-06-02 DIAGNOSIS — M1A.09X0 IDIOPATHIC CHRONIC GOUT OF MULTIPLE SITES WITHOUT TOPHUS: ICD-10-CM

## 2020-06-02 LAB
ALBUMIN SERPL-MCNC: 3.4 G/DL (ref 3.4–5)
ALP SERPL-CCNC: 95 U/L (ref 40–150)
ALT SERPL W P-5'-P-CCNC: 31 U/L (ref 0–70)
ANION GAP SERPL CALCULATED.3IONS-SCNC: 5 MMOL/L (ref 3–14)
AST SERPL W P-5'-P-CCNC: 19 U/L (ref 0–45)
BASOPHILS # BLD AUTO: 0.1 10E9/L (ref 0–0.2)
BASOPHILS NFR BLD AUTO: 1.1 %
BILIRUB SERPL-MCNC: 0.9 MG/DL (ref 0.2–1.3)
BUN SERPL-MCNC: 20 MG/DL (ref 7–30)
CALCIUM SERPL-MCNC: 9 MG/DL (ref 8.5–10.1)
CHLORIDE SERPL-SCNC: 112 MMOL/L (ref 94–109)
CO2 SERPL-SCNC: 23 MMOL/L (ref 20–32)
CREAT SERPL-MCNC: 1.07 MG/DL (ref 0.66–1.25)
DIFFERENTIAL METHOD BLD: NORMAL
EOSINOPHIL # BLD AUTO: 0.3 10E9/L (ref 0–0.7)
EOSINOPHIL NFR BLD AUTO: 4 %
ERYTHROCYTE [DISTWIDTH] IN BLOOD BY AUTOMATED COUNT: 14.1 % (ref 10–15)
GFR SERPL CREATININE-BSD FRML MDRD: 74 ML/MIN/{1.73_M2}
GLUCOSE SERPL-MCNC: 113 MG/DL (ref 70–99)
HCT VFR BLD AUTO: 47.6 % (ref 40–53)
HGB BLD-MCNC: 15.6 G/DL (ref 13.3–17.7)
IMM GRANULOCYTES # BLD: 0 10E9/L (ref 0–0.4)
IMM GRANULOCYTES NFR BLD: 0.5 %
LYMPHOCYTES # BLD AUTO: 1.5 10E9/L (ref 0.8–5.3)
LYMPHOCYTES NFR BLD AUTO: 18.8 %
MCH RBC QN AUTO: 30.2 PG (ref 26.5–33)
MCHC RBC AUTO-ENTMCNC: 32.8 G/DL (ref 31.5–36.5)
MCV RBC AUTO: 92 FL (ref 78–100)
MONOCYTES # BLD AUTO: 0.7 10E9/L (ref 0–1.3)
MONOCYTES NFR BLD AUTO: 8.3 %
NEUTROPHILS # BLD AUTO: 5.3 10E9/L (ref 1.6–8.3)
NEUTROPHILS NFR BLD AUTO: 67.3 %
PLATELET # BLD AUTO: 257 10E9/L (ref 150–450)
POTASSIUM SERPL-SCNC: 4.2 MMOL/L (ref 3.4–5.3)
PROT SERPL-MCNC: 6.8 G/DL (ref 6.8–8.8)
RBC # BLD AUTO: 5.16 10E12/L (ref 4.4–5.9)
SODIUM SERPL-SCNC: 140 MMOL/L (ref 133–144)
URATE SERPL-MCNC: 2.2 MG/DL (ref 3.5–7.2)
WBC # BLD AUTO: 7.9 10E9/L (ref 4–11)

## 2020-06-02 PROCEDURE — 85025 COMPLETE CBC W/AUTO DIFF WBC: CPT | Performed by: INTERNAL MEDICINE

## 2020-06-02 PROCEDURE — 36415 COLL VENOUS BLD VENIPUNCTURE: CPT | Performed by: INTERNAL MEDICINE

## 2020-06-02 PROCEDURE — 99207 ZZC NO CHARGE LOS: CPT

## 2020-06-02 PROCEDURE — 80053 COMPREHEN METABOLIC PANEL: CPT | Performed by: INTERNAL MEDICINE

## 2020-06-02 PROCEDURE — 84550 ASSAY OF BLOOD/URIC ACID: CPT | Performed by: INTERNAL MEDICINE

## 2020-06-02 ASSESSMENT — PAIN SCALES - GENERAL: PAINLEVEL: MODERATE PAIN (5)

## 2020-06-02 NOTE — PROGRESS NOTES
Infusion Nursing Note:  Bucky Edgar presents today for Phlebotomy - NOT GIVEN.    Patient seen by provider today: No   present during visit today: Not Applicable.    Note: Pt Hematocrit <50 today. Hematocrit was 47.6. Did not meet parameters for phlebotomy and pt agreed with plan. Will return in two weeks.    Intravenous Access:  No Intravenous access/labs at this visit.    Treatment Conditions:  Lab Results   Component Value Date    HGB 15.6 06/02/2020     Lab Results   Component Value Date    WBC 7.9 06/02/2020      Lab Results   Component Value Date    ANEU 5.3 06/02/2020     Lab Results   Component Value Date     06/02/2020      Lab Results   Component Value Date     06/02/2020                   Lab Results   Component Value Date    POTASSIUM 4.2 06/02/2020           No results found for: MAG         Lab Results   Component Value Date    CR 1.07 06/02/2020                   Lab Results   Component Value Date    JUSTIN 9.0 06/02/2020                Lab Results   Component Value Date    BILITOTAL 0.9 06/02/2020           Lab Results   Component Value Date    ALBUMIN 3.4 06/02/2020                    Lab Results   Component Value Date    ALT 31 06/02/2020           Lab Results   Component Value Date    AST 19 06/02/2020       Discharge Plan:   Patient will return in two weeks for next appointment.   Patient discharged in stable condition accompanied by: self.  Departure Mode: Ambulatory.    Mireya Mann RN

## 2020-06-16 ENCOUNTER — INFUSION THERAPY VISIT (OUTPATIENT)
Dept: INFUSION THERAPY | Facility: CLINIC | Age: 65
End: 2020-06-16
Payer: COMMERCIAL

## 2020-06-16 DIAGNOSIS — D75.1 POLYCYTHEMIA: ICD-10-CM

## 2020-06-16 DIAGNOSIS — D75.1 POLYCYTHEMIA: Primary | ICD-10-CM

## 2020-06-16 LAB
BASOPHILS # BLD AUTO: 0.1 10E9/L (ref 0–0.2)
BASOPHILS NFR BLD AUTO: 1.2 %
DIFFERENTIAL METHOD BLD: ABNORMAL
EOSINOPHIL # BLD AUTO: 0.3 10E9/L (ref 0–0.7)
EOSINOPHIL NFR BLD AUTO: 4.2 %
ERYTHROCYTE [DISTWIDTH] IN BLOOD BY AUTOMATED COUNT: 16 % (ref 10–15)
HCT VFR BLD AUTO: 49.1 % (ref 40–53)
HGB BLD-MCNC: 15.8 G/DL (ref 13.3–17.7)
IMM GRANULOCYTES # BLD: 0 10E9/L (ref 0–0.4)
IMM GRANULOCYTES NFR BLD: 0.5 %
LYMPHOCYTES # BLD AUTO: 1.5 10E9/L (ref 0.8–5.3)
LYMPHOCYTES NFR BLD AUTO: 19.7 %
MCH RBC QN AUTO: 30 PG (ref 26.5–33)
MCHC RBC AUTO-ENTMCNC: 32.2 G/DL (ref 31.5–36.5)
MCV RBC AUTO: 93 FL (ref 78–100)
MONOCYTES # BLD AUTO: 0.6 10E9/L (ref 0–1.3)
MONOCYTES NFR BLD AUTO: 8.2 %
NEUTROPHILS # BLD AUTO: 5.1 10E9/L (ref 1.6–8.3)
NEUTROPHILS NFR BLD AUTO: 66.2 %
PLATELET # BLD AUTO: 207 10E9/L (ref 150–450)
RBC # BLD AUTO: 5.26 10E12/L (ref 4.4–5.9)
WBC # BLD AUTO: 7.7 10E9/L (ref 4–11)

## 2020-06-16 PROCEDURE — 85025 COMPLETE CBC W/AUTO DIFF WBC: CPT | Performed by: INTERNAL MEDICINE

## 2020-06-16 PROCEDURE — 99207 ZZC NO CHARGE LOS: CPT

## 2020-06-16 PROCEDURE — 36415 COLL VENOUS BLD VENIPUNCTURE: CPT | Performed by: INTERNAL MEDICINE

## 2020-06-16 NOTE — PROGRESS NOTES
Infusion Nursing Note:  Bucky Edgar presents today for Phlebotomy.    Patient seen by provider today: No   present during visit today: Not Applicable.    Note: No concerns or symptoms from patient today.    Intravenous Access:  No Intravenous access/labs at this visit.    Treatment Conditions:  Lab Results   Component Value Date    HGB 15.8 06/16/2020     Lab Results   Component Value Date    WBC 7.7 06/16/2020      Lab Results   Component Value Date    ANEU 5.1 06/16/2020     Lab Results   Component Value Date     06/16/2020      Results reviewed, labs did NOT meet treatment parameters: Hematocrit 49.1%, phlebotomy not done. Explained       Post Infusion Assessment:  N/A.       Discharge Plan:   Today's results reviewed and given to patient.  He will return 6/30 for next appointment.  Patient discharged in stable condition accompanied by: self.  Departure Mode: Ambulatory.    Lauren Tinsley RN

## 2020-06-30 ENCOUNTER — INFUSION THERAPY VISIT (OUTPATIENT)
Dept: INFUSION THERAPY | Facility: CLINIC | Age: 65
End: 2020-06-30
Payer: COMMERCIAL

## 2020-06-30 VITALS
TEMPERATURE: 99 F | HEART RATE: 67 BPM | BODY MASS INDEX: 32.96 KG/M2 | DIASTOLIC BLOOD PRESSURE: 98 MMHG | RESPIRATION RATE: 18 BRPM | WEIGHT: 223.3 LBS | OXYGEN SATURATION: 98 % | SYSTOLIC BLOOD PRESSURE: 187 MMHG

## 2020-06-30 DIAGNOSIS — D75.1 POLYCYTHEMIA: Primary | ICD-10-CM

## 2020-06-30 DIAGNOSIS — D75.1 POLYCYTHEMIA: ICD-10-CM

## 2020-06-30 LAB
BASOPHILS # BLD AUTO: 0.1 10E9/L (ref 0–0.2)
BASOPHILS NFR BLD AUTO: 0.8 %
DIFFERENTIAL METHOD BLD: ABNORMAL
EOSINOPHIL # BLD AUTO: 0.3 10E9/L (ref 0–0.7)
EOSINOPHIL NFR BLD AUTO: 3.4 %
ERYTHROCYTE [DISTWIDTH] IN BLOOD BY AUTOMATED COUNT: 17.8 % (ref 10–15)
HCT VFR BLD AUTO: 53.4 % (ref 40–53)
HGB BLD-MCNC: 17.3 G/DL (ref 13.3–17.7)
IMM GRANULOCYTES # BLD: 0.1 10E9/L (ref 0–0.4)
IMM GRANULOCYTES NFR BLD: 0.6 %
LYMPHOCYTES # BLD AUTO: 1.6 10E9/L (ref 0.8–5.3)
LYMPHOCYTES NFR BLD AUTO: 17.9 %
MCH RBC QN AUTO: 30.6 PG (ref 26.5–33)
MCHC RBC AUTO-ENTMCNC: 32.4 G/DL (ref 31.5–36.5)
MCV RBC AUTO: 95 FL (ref 78–100)
MONOCYTES # BLD AUTO: 0.8 10E9/L (ref 0–1.3)
MONOCYTES NFR BLD AUTO: 9.2 %
NEUTROPHILS # BLD AUTO: 6.2 10E9/L (ref 1.6–8.3)
NEUTROPHILS NFR BLD AUTO: 68.1 %
PLATELET # BLD AUTO: 231 10E9/L (ref 150–450)
RBC # BLD AUTO: 5.65 10E12/L (ref 4.4–5.9)
WBC # BLD AUTO: 9.1 10E9/L (ref 4–11)

## 2020-06-30 PROCEDURE — 85025 COMPLETE CBC W/AUTO DIFF WBC: CPT | Performed by: INTERNAL MEDICINE

## 2020-06-30 PROCEDURE — 36415 COLL VENOUS BLD VENIPUNCTURE: CPT | Performed by: INTERNAL MEDICINE

## 2020-06-30 PROCEDURE — 99195 PHLEBOTOMY: CPT

## 2020-06-30 ASSESSMENT — PAIN SCALES - GENERAL
PAINLEVEL: MODERATE PAIN (4)
PAINLEVEL: NO PAIN (0)

## 2020-06-30 NOTE — PROGRESS NOTES
Infusion Nursing Note:  Bucky Edgar presents today for Phlebotomy.    Patient seen by provider today: No   present during visit today: Not Applicable.    Note: Patient denies new symptoms or concerns today. Hospitalized with PE on 5/17/20. Coumadin stopped. Feeling better since. B/P elevated per norm, 187/98 on recheck. Chronic LBP, mild today.     Intravenous Access:  Tolerx 20gauge IV catheter placed in R medial forearm for phlebotomy.    Treatment Conditions:  Lab Results   Component Value Date    HGB 17.3 06/30/2020     Lab Results   Component Value Date    WBC 9.1 06/30/2020      Lab Results   Component Value Date    ANEU 6.2 06/30/2020     Lab Results   Component Value Date     06/30/2020      Results reviewed, labs MET treatment parameters, ok to proceed with treatment. hematocrit level 53.4% today.      Post Infusion Assessment:  Patient tolerated phlebotomy without incident. 500 mls blood removed.  Site asymptomatic.  Access discontinued per protocol.  Gauze pressure drsg applied for 2 minutes. No further bleeding.       Discharge Plan:   Copy of AVS reviewed with patient and/or family.  Patient will return 7/14 for next appointment.  Patient discharged in stable condition accompanied by: self.  Departure Mode: Ambulatory.    Lauren Tinsley RN

## 2020-07-14 ENCOUNTER — INFUSION THERAPY VISIT (OUTPATIENT)
Dept: INFUSION THERAPY | Facility: CLINIC | Age: 65
End: 2020-07-14
Payer: COMMERCIAL

## 2020-07-14 VITALS
BODY MASS INDEX: 32.77 KG/M2 | TEMPERATURE: 99.2 F | WEIGHT: 222 LBS | HEART RATE: 73 BPM | OXYGEN SATURATION: 97 % | RESPIRATION RATE: 16 BRPM | DIASTOLIC BLOOD PRESSURE: 87 MMHG | SYSTOLIC BLOOD PRESSURE: 153 MMHG

## 2020-07-14 DIAGNOSIS — D75.1 POLYCYTHEMIA: Primary | ICD-10-CM

## 2020-07-14 DIAGNOSIS — D75.1 POLYCYTHEMIA: ICD-10-CM

## 2020-07-14 LAB
BASOPHILS # BLD AUTO: 0.1 10E9/L (ref 0–0.2)
BASOPHILS NFR BLD AUTO: 1 %
DIFFERENTIAL METHOD BLD: ABNORMAL
EOSINOPHIL # BLD AUTO: 0.2 10E9/L (ref 0–0.7)
EOSINOPHIL NFR BLD AUTO: 2.8 %
ERYTHROCYTE [DISTWIDTH] IN BLOOD BY AUTOMATED COUNT: 17.4 % (ref 10–15)
HCT VFR BLD AUTO: 48.9 % (ref 40–53)
HGB BLD-MCNC: 15.5 G/DL (ref 13.3–17.7)
IMM GRANULOCYTES # BLD: 0 10E9/L (ref 0–0.4)
IMM GRANULOCYTES NFR BLD: 0.6 %
LYMPHOCYTES # BLD AUTO: 1.4 10E9/L (ref 0.8–5.3)
LYMPHOCYTES NFR BLD AUTO: 20.2 %
MCH RBC QN AUTO: 30.4 PG (ref 26.5–33)
MCHC RBC AUTO-ENTMCNC: 31.7 G/DL (ref 31.5–36.5)
MCV RBC AUTO: 96 FL (ref 78–100)
MONOCYTES # BLD AUTO: 0.6 10E9/L (ref 0–1.3)
MONOCYTES NFR BLD AUTO: 8.6 %
NEUTROPHILS # BLD AUTO: 4.8 10E9/L (ref 1.6–8.3)
NEUTROPHILS NFR BLD AUTO: 66.8 %
PLATELET # BLD AUTO: 210 10E9/L (ref 150–450)
RBC # BLD AUTO: 5.1 10E12/L (ref 4.4–5.9)
WBC # BLD AUTO: 7.1 10E9/L (ref 4–11)

## 2020-07-14 PROCEDURE — 85025 COMPLETE CBC W/AUTO DIFF WBC: CPT | Performed by: INTERNAL MEDICINE

## 2020-07-14 PROCEDURE — 36415 COLL VENOUS BLD VENIPUNCTURE: CPT | Performed by: INTERNAL MEDICINE

## 2020-07-14 PROCEDURE — 99207 ZZC NO CHARGE NURSE ONLY: CPT

## 2020-07-14 ASSESSMENT — PAIN SCALES - GENERAL: PAINLEVEL: EXTREME PAIN (8)

## 2020-07-14 NOTE — PROGRESS NOTES
Infusion Nursing Note:  Bucky KING Tala presents today for Phlebotomy - NOT NEEDED.    Patient seen by provider today: No   present during visit today: Not Applicable.    Note: N/A.    Intravenous Access:  No Intravenous access/labs at this visit.    Treatment Conditions:  Lab Results   Component Value Date    HGB 15.5 07/14/2020     Lab Results   Component Value Date    WBC 7.1 07/14/2020      Lab Results   Component Value Date    ANEU 4.8 07/14/2020     Lab Results   Component Value Date     07/14/2020        Pt did not meet parameters to receive Phlebotomy today. hematocrit <50      Discharge Plan:   Patient will return in 2 weeks for next appointment.   Patient discharged in stable condition accompanied by: self.  Departure Mode: Ambulatory.    Mireya Mann RN

## 2020-07-28 ENCOUNTER — INFUSION THERAPY VISIT (OUTPATIENT)
Dept: INFUSION THERAPY | Facility: CLINIC | Age: 65
End: 2020-07-28
Payer: COMMERCIAL

## 2020-07-28 VITALS
RESPIRATION RATE: 16 BRPM | TEMPERATURE: 99.1 F | BODY MASS INDEX: 32.62 KG/M2 | OXYGEN SATURATION: 99 % | WEIGHT: 221 LBS | HEART RATE: 61 BPM | SYSTOLIC BLOOD PRESSURE: 130 MMHG | DIASTOLIC BLOOD PRESSURE: 79 MMHG

## 2020-07-28 DIAGNOSIS — D75.1 POLYCYTHEMIA: ICD-10-CM

## 2020-07-28 DIAGNOSIS — D75.1 POLYCYTHEMIA: Primary | ICD-10-CM

## 2020-07-28 LAB
BASOPHILS # BLD AUTO: 0.1 10E9/L (ref 0–0.2)
BASOPHILS NFR BLD AUTO: 0.8 %
DIFFERENTIAL METHOD BLD: ABNORMAL
EOSINOPHIL # BLD AUTO: 0.2 10E9/L (ref 0–0.7)
EOSINOPHIL NFR BLD AUTO: 2.8 %
ERYTHROCYTE [DISTWIDTH] IN BLOOD BY AUTOMATED COUNT: 16.3 % (ref 10–15)
HCT VFR BLD AUTO: 51.9 % (ref 40–53)
HGB BLD-MCNC: 16.5 G/DL (ref 13.3–17.7)
IMM GRANULOCYTES # BLD: 0.1 10E9/L (ref 0–0.4)
IMM GRANULOCYTES NFR BLD: 0.6 %
LYMPHOCYTES # BLD AUTO: 1.6 10E9/L (ref 0.8–5.3)
LYMPHOCYTES NFR BLD AUTO: 18.6 %
MCH RBC QN AUTO: 30.1 PG (ref 26.5–33)
MCHC RBC AUTO-ENTMCNC: 31.8 G/DL (ref 31.5–36.5)
MCV RBC AUTO: 95 FL (ref 78–100)
MONOCYTES # BLD AUTO: 0.7 10E9/L (ref 0–1.3)
MONOCYTES NFR BLD AUTO: 8.5 %
NEUTROPHILS # BLD AUTO: 5.7 10E9/L (ref 1.6–8.3)
NEUTROPHILS NFR BLD AUTO: 68.7 %
PLATELET # BLD AUTO: 242 10E9/L (ref 150–450)
RBC # BLD AUTO: 5.48 10E12/L (ref 4.4–5.9)
WBC # BLD AUTO: 8.4 10E9/L (ref 4–11)

## 2020-07-28 PROCEDURE — 36415 COLL VENOUS BLD VENIPUNCTURE: CPT | Performed by: INTERNAL MEDICINE

## 2020-07-28 PROCEDURE — 85025 COMPLETE CBC W/AUTO DIFF WBC: CPT | Performed by: INTERNAL MEDICINE

## 2020-07-28 PROCEDURE — 99195 PHLEBOTOMY: CPT | Performed by: NURSE PRACTITIONER

## 2020-07-28 ASSESSMENT — PAIN SCALES - GENERAL: PAINLEVEL: NO PAIN (0)

## 2020-08-08 ENCOUNTER — DOCUMENTATION ONLY (OUTPATIENT)
Dept: LAB | Facility: CLINIC | Age: 65
End: 2020-08-08

## 2020-08-10 DIAGNOSIS — D75.1 POLYCYTHEMIA: Primary | ICD-10-CM

## 2020-08-25 DIAGNOSIS — D75.1 POLYCYTHEMIA: ICD-10-CM

## 2020-08-25 LAB
BASOPHILS # BLD AUTO: 0.1 10E9/L (ref 0–0.2)
BASOPHILS NFR BLD AUTO: 1 %
DIFFERENTIAL METHOD BLD: NORMAL
EOSINOPHIL # BLD AUTO: 0.2 10E9/L (ref 0–0.7)
EOSINOPHIL NFR BLD AUTO: 2.7 %
ERYTHROCYTE [DISTWIDTH] IN BLOOD BY AUTOMATED COUNT: 14.6 % (ref 10–15)
HCT VFR BLD AUTO: 48.3 % (ref 40–53)
HGB BLD-MCNC: 15.4 G/DL (ref 13.3–17.7)
IMM GRANULOCYTES # BLD: 0 10E9/L (ref 0–0.4)
IMM GRANULOCYTES NFR BLD: 0.2 %
LYMPHOCYTES # BLD AUTO: 1.2 10E9/L (ref 0.8–5.3)
LYMPHOCYTES NFR BLD AUTO: 14.2 %
MCH RBC QN AUTO: 29.1 PG (ref 26.5–33)
MCHC RBC AUTO-ENTMCNC: 31.9 G/DL (ref 31.5–36.5)
MCV RBC AUTO: 91 FL (ref 78–100)
MONOCYTES # BLD AUTO: 0.7 10E9/L (ref 0–1.3)
MONOCYTES NFR BLD AUTO: 8.9 %
NEUTROPHILS # BLD AUTO: 5.9 10E9/L (ref 1.6–8.3)
NEUTROPHILS NFR BLD AUTO: 73 %
PLATELET # BLD AUTO: 259 10E9/L (ref 150–450)
RBC # BLD AUTO: 5.29 10E12/L (ref 4.4–5.9)
WBC # BLD AUTO: 8.1 10E9/L (ref 4–11)

## 2020-08-25 PROCEDURE — 85025 COMPLETE CBC W/AUTO DIFF WBC: CPT | Performed by: NURSE PRACTITIONER

## 2020-08-25 PROCEDURE — 36415 COLL VENOUS BLD VENIPUNCTURE: CPT | Performed by: NURSE PRACTITIONER

## 2020-10-30 ENCOUNTER — TELEPHONE (OUTPATIENT)
Dept: ANTICOAGULATION | Facility: OTHER | Age: 65
End: 2020-10-30

## 2020-10-30 NOTE — TELEPHONE ENCOUNTER
Attempted to contact pt to inform him that he is overdue for INR. Unable to leave a VM. Will need to try again later       Melvina Carrillo RN

## 2020-11-02 NOTE — TELEPHONE ENCOUNTER
Attempted to contact pt that he is overdue for an INR. No answer at this time. Allie Menard RN, BSN

## 2020-11-13 NOTE — TELEPHONE ENCOUNTER
I spoke with pt who stated he has no longer taking Coumadin.   ANTICOAGULATION  MANAGEMENT    Bucky Edgar is being discharged from the [facility] ([facility]).    Reason for discharge: warfarin therapy completed    Anticoagulation episode resolved, [facility] referral closed and INR Standing order discontinued    If patient needs warfarin management in the future, please send a new referral  Melvina Carrillo RN

## 2020-11-17 ENCOUNTER — VIRTUAL VISIT (OUTPATIENT)
Dept: RHEUMATOLOGY | Facility: CLINIC | Age: 65
End: 2020-11-17
Payer: COMMERCIAL

## 2020-11-17 DIAGNOSIS — M1A.09X0 IDIOPATHIC CHRONIC GOUT OF MULTIPLE SITES WITHOUT TOPHUS: Primary | ICD-10-CM

## 2020-11-17 PROCEDURE — 99213 OFFICE O/P EST LOW 20 MIN: CPT | Mod: 95 | Performed by: INTERNAL MEDICINE

## 2020-11-17 RX ORDER — ALLOPURINOL 100 MG/1
TABLET ORAL
Qty: 90 TABLET | Refills: 2 | Status: SHIPPED | OUTPATIENT
Start: 2020-11-17 | End: 2021-05-18

## 2020-11-17 RX ORDER — ALLOPURINOL 300 MG/1
TABLET ORAL
Qty: 180 TABLET | Refills: 2 | Status: SHIPPED | OUTPATIENT
Start: 2020-11-17 | End: 2021-05-18

## 2020-11-17 NOTE — PROGRESS NOTES
"Bucky Edgar is a 65 year old male who is being evaluated via a billable telephone visit.      The patient has been notified of following:     \"This telephone visit will be conducted via a call between you and your physician/provider. We have found that certain health care needs can be provided without the need for a physical exam.  This service lets us provide the care you need with a short phone conversation.  If a prescription is necessary we can send it directly to your pharmacy.  If lab work is needed we can place an order for that and you can then stop by our lab to have the test done at a later time.    Telephone visits are billed at different rates depending on your insurance coverage. During this emergency period, for some insurers they may be billed the same as an in-person visit.  Please reach out to your insurance provider with any questions.    If during the course of the call the physician/provider feels a telephone visit is not appropriate, you will not be charged for this service.\"    Patient has given verbal consent for Telephone visit?  Yes    What phone number would you like to be contacted at? [phone]    How would you like to obtain your AVS? Mail a copy     Saba Patricia CMA Rheumatology  11/17/2020 7:55 AM      Bucky Edgar is a 64 year old male who is being evaluated via a billable telephone visit.      The patient has been notified of following:     \"This telephone visit will be conducted via a call between you and your physician/provider. We have found that certain health care needs can be provided without the need for a physical exam.  This service lets us provide the care you need with a short phone conversation.  If a prescription is necessary we can send it directly to your pharmacy.  If lab work is needed we can place an order for that and you can then stop by our lab to have the test done at a later time.    Telephone visits are billed at different rates depending on your " "insurance coverage. During this emergency period, for some insurers they may be billed the same as an in-person visit.  Please reach out to your insurance provider with any questions.    If during the course of the call the physician/provider feels a telephone visit is not appropriate, you will not be charged for this service.\"    Patient has given verbal consent for Telephone visit?  Yes    What phone number would you like to be contacted at? [phone]    How would you like to obtain your AVS? Mail a copy    Rheumatology Telephone/Telehealth  Visit      Bucky Edgar MRN# [id number]   YOB: 1955 Age: 65 year old      Date of visit: 11/17/20   PCP: Dr. Percy Deshpande    Chief Complaint   Patient presents with:  Arthritis: Gout, has not had any flares since seeing Dr. Bolaños    Assessment and Plan     1.  Gout: Started having gout flares in early 2017, acute onset and resolved with prednisone; joints involved include his left ankle, left first MTP, and left wrist.  Colchicine was cost prohibitive.  Currently on allopurinol 700 mg daily and is doing well with no gout flares.  Note that he was still flaring when on allopurinol 600mg daily.  Discussed that if he does have a gout flare requiring prednisone that he is strictly limited social interactions; I explained that a low-dose of prednisone would unlikely help a gout flare but a high dose of prednisone will increase his risk with regard to COVID-19 and therefore strict quarantining during prednisone use is advised  - Continue allopurinol 700 mg daily (two 300mg tablets + one 100mg tablet)  - For gout flare only: prednisone 60mg daily x2days, then 40mg daily x5days, then 20mg daily x5days, then stop.  - Labs in 6 months: CBC, CMP, Uric Acid    2. Elevated Hgb: thought to be polycythemia vera by Dr. Muñoz (hematology/oncology) secondary to heavy smoking and underlying COPD as well as [place] syndrome. Advised to continue baby aspirin beebe by  " [place]. Phlebotomy was also started to try to keep the hematocrit <50.     3. EtOH use: advised cutting down. Discussed relation between EtOH and gout     # Relevant labs and imaging were reviewed with the patient    # Note that this is a virtual visit to reduce the risk of COVID-19 exposure during this current pandemic.      Mr. Edgar verbalized agreement with and understanding of the rational for the diagnosis and treatment plan.  All questions were answered to best of my ability and the patient's satisfaction. Mr. Edgar was advised to contact the clinic with any questions that may arise after the clinic visit.     Thank you for involving me in the care of the patient    Return to clinic: 6 months      HPI   Bucky Edgar is a 65 year old male with a past medical history significant for COPD, dyslipidemia, vitamin D deficiency, history of rib fractures, DVT/PE on anticoagulation, hypertension, who is seen for follow-up of gout.    Today, Mr. Edgar reports that he is doing well with regard to gout.  No flares since last seen.  Tolerating allopurinol 700 mg daily.  He says he is so happy that he has not having gout flares anymore.     Denies fevers, chills, nausea, vomiting, constipation, diarrhea. No abdominal pain. No chest pain/pressure, palpitations, or shortness of breath currently. No LE swelling.  No rash.      Tobacco: Quit in 2017  EtOH: 12 pack of beer every 2 weeks  Drugs: None    ROS   GEN: No fevers or chills  SKIN: No itching, rashes, sores  HEENT: No oral or nasal ulcers.  CV: No chest pain, pressure, palpitations, or dyspnea on exertion.  PULM: No SOB, wheeze, cough.  GI: No nausea, vomiting, constipation, diarrhea. No blood in stool. No abdominal pain.  MSK: See HPI.  NEURO: No numbness, tingling, or weakness.  EXT: No LE swelling   PSYCH: Negative    Active Problem List     Patient Active Problem List   Diagnosis     Displacement of lumbar intervertebral disc without myelopathy      CARDIOVASCULAR SCREENING; LDL GOAL LESS THAN 130     HTN, goal below 140/90     Advanced directives, counseling/discussion     COPD (chronic obstructive pulmonary disease) (H)     Impaired fasting glucose     Hypertriglyceridemia     Vitamin D deficiency     Chronic bronchitis with COPD (chronic obstructive pulmonary disease) (H)     Closed fracture of multiple ribs of left side with routine healing, subsequent encounter     Chronic pain due to trauma     Long-term (current) use of anticoagulants [Z79.01]     Elevated serum creatinine     History of deep venous thrombosis (DVT) of distal vein of left lower extremity     Elevated liver enzymes     Renal atrophy, right     Abdominal aortic aneurysm (AAA) without rupture (H)     Hepatomegaly     Fatty liver     Polycythemia     Pulmonary embolism without acute cor pulmonale, unspecified chronicity, unspecified pulmonary embolism type (H)     Past Medical History     Past Medical History:   Diagnosis Date     COPD (chronic obstructive pulmonary disease) (H)      Displacement of lumbar intervertebral disc without myelopathy 1995, 2002     HTN, goal below 140/90      Past Surgical History     Past Surgical History:   Procedure Laterality Date     C DECOMPRESS SPINAL CORD,1 SEG  1995, 4/2002    leftL4-L5, 2nd right L4-L5     C SPINE FUSION,ANTER,3 SGMTS  2/9/2004    ant and post fusion L4-S1     HC REPAIR ROTATOR CUFF,CHRONIC  1989     Allergy     Allergies   Allergen Reactions     Chlorthalidone Other (See Comments)     Excessive lowering of blood pressure     Amlodipine Other (See Comments)     Intractable headache with use of medication as well as noting a small tremor of the upper extremities     Current Medication List     Current Outpatient Medications   Medication Sig     acetaminophen (TYLENOL) 325 MG tablet Take 650 mg by mouth every 6 hours     albuterol (PROAIR HFA/PROVENTIL HFA/VENTOLIN HFA) 108 (90 Base) MCG/ACT inhaler Inhale 2 puffs into the lungs every 6  hours as needed for shortness of breath / dyspnea     allopurinol (ZYLOPRIM) 100 MG tablet Allopurinol 700mg daily (two 300mg tablets + one 100mg tablet)     allopurinol (ZYLOPRIM) 300 MG tablet Allopurinol 700mg daily (two 300mg tablets + one 100mg tablet)     carvedilol (COREG) 25 MG tablet TAKE ONE TABLET BY MOUTH TWICE DAILY WITH MEALS     hydrALAZINE (APRESOLINE) 25 MG tablet Take 1 tablet (25 mg) by mouth 3 times daily     hydrALAZINE (APRESOLINE) 50 MG tablet Take 1 tablet (50 mg) by mouth 3 times daily     HYDROcodone-acetaminophen (NORCO) 5-325 MG tablet Take 1 tablet by mouth every 6 hours as needed for pain     lisinopril (PRINIVIL/ZESTRIL) 20 MG tablet Take 1 tablet (20 mg) by mouth 2 times daily     nicotine polacrilex (NICORETTE) 2 MG lozenge Place 2 mg inside cheek every hour as needed for smoking cessation     predniSONE (DELTASONE) 20 MG tablet PRN gout flare: prednisone 60mg daily x2days, then 40mg daily x5days, then 20mg daily x5days, then stop.     No current facility-administered medications for this visit.          Social History   See HPI    Family History     Family History   Problem Relation Age of Onset     Family History Negative Other      Diabetes No family hx of      Coronary Artery Disease No family hx of      Hypertension No family hx of      Hyperlipidemia No family hx of      Breast Cancer No family hx of      Prostate Cancer No family hx of      Anxiety Disorder No family hx of      Substance Abuse No family hx of      Asthma No family hx of      Thyroid Disease No family hx of      Unknown/Adopted No family hx of      Genetic Disorder No family hx of      Osteoporosis No family hx of      Anesthesia Reaction No family hx of      Mental Illness No family hx of      Depression No family hx of      Other Cancer No family hx of      Colon Cancer No family hx of      Cerebrovascular Disease No family hx of      Obesity No family hx of        Physical Exam     Temp Readings from Last 3  "Encounters:   07/28/20 99.1  F (37.3  C) (Oral)   07/14/20 99.2  F (37.3  C) (Oral)   06/30/20 99  F (37.2  C) (Oral)     BP Readings from Last 5 Encounters:   07/28/20 130/79   07/14/20 (!) 153/87   06/30/20 (!) 187/98   06/02/20 136/83   04/07/20 (!) 158/101     Pulse Readings from Last 1 Encounters:   07/28/20 61     Resp Readings from Last 1 Encounters:   07/28/20 16     Estimated body mass index is 32.62 kg/m  as calculated from the following:    Height as of 1/28/20: 1.753 m (5' 9.02\").    Weight as of 7/28/20: 100.2 kg (221 lb).    Telephone visit    Labs / Imaging (select studies)     CBC  Recent Labs   Lab Test 08/25/20  0940 07/28/20  1040 07/14/20  1030   WBC 8.1 8.4 7.1   RBC 5.29 5.48 5.10   HGB 15.4 16.5 15.5   HCT 48.3 51.9 48.9   MCV 91 95 96   RDW 14.6 16.3* 17.4*    242 210   MCH 29.1 30.1 30.4   MCHC 31.9 31.8 31.7   NEUTROPHIL 73.0 68.7 66.8   LYMPH 14.2 18.6 20.2   MONOCYTE 8.9 8.5 8.6   EOSINOPHIL 2.7 2.8 2.8   BASOPHIL 1.0 0.8 1.0   ANEU 5.9 5.7 4.8   ALYM 1.2 1.6 1.4   LIZZ 0.7 0.7 0.6   AEOS 0.2 0.2 0.2   ABAS 0.1 0.1 0.1     CMP  Recent Labs   Lab Test 06/02/20  0903 05/08/20  0959 12/13/19  1518    143 141   POTASSIUM 4.2 3.7 4.2   CHLORIDE 112* 111* 110*   CO2 23 28 27   ANIONGAP 5 4 4   * 113* 101*   BUN 20 8 14   CR 1.07 0.90 1.08   GFRESTIMATED 74 >90 73   GFRESTBLACK 85 >90 85   JUSTIN 9.0 8.0* 9.1   BILITOTAL 0.9 0.7 0.9   ALBUMIN 3.4 3.3* 3.6   PROTTOTAL 6.8 7.0 6.9   ALKPHOS 95 104 99   AST 19 16 19   ALT 31 24 28     Uric Acid  Recent Labs   Lab Test 06/02/20  0903 05/08/20  0959 11/07/19  0900 05/08/19  1041 08/07/18  0926 05/02/18  1039   URIC 2.2* 1.5* 4.0 2.3* 2.0* 3.3*       Immunization History     Immunization History   Administered Date(s) Administered     Influenza Quad, Recombinant, p-free (RIV4) 11/12/2019     Influenza Vaccine IM > 6 months Valent IIV4 11/27/2013, 10/29/2015, 12/12/2016, 10/23/2017, 11/01/2018     Mantoux Tuberculin Skin Test " 07/17/2012     Pneumo Conj 13-V (2010&after) 01/22/2016     Pneumococcal 23 valent 11/27/2013     TDAP Vaccine (Adacel) 08/25/2009, 11/25/2019     TDAP Vaccine (Boostrix) 08/25/2009          Chart documentation done in part with Dragon Voice recognition Software. Although reviewed after completion, some word and grammatical error may remain.    Phone call start time: 8:36 AM  Phone call end time: 8:43 AM    This visit is equivalent to a 50639 visit    Location of patient: home, [place]  Location of provider: [place]    Follow up:  follow up appointment scheduled to be in May 2021    Coy Bolaños MD

## 2021-02-05 DIAGNOSIS — I10 ESSENTIAL HYPERTENSION: ICD-10-CM

## 2021-02-08 RX ORDER — LISINOPRIL 20 MG/1
20 TABLET ORAL 2 TIMES DAILY
Qty: 180 TABLET | Refills: 0 | Status: SHIPPED | OUTPATIENT
Start: 2021-02-08 | End: 2021-03-22

## 2021-02-08 RX ORDER — CARVEDILOL 25 MG/1
TABLET ORAL
Qty: 180 TABLET | Refills: 0 | Status: SHIPPED | OUTPATIENT
Start: 2021-02-08 | End: 2021-03-22

## 2021-02-10 NOTE — TELEPHONE ENCOUNTER
Patient is scheduled.    Olive Gomez XRO/  
Pending Prescriptions:                       Disp   Refills    lisinopril (ZESTRIL) 20 MG tablet          180 ta*3        Sig: Take 1 tablet (20 mg) by mouth 2 times daily    carvedilol (COREG) 25 MG tablet            180 ta*3        Sig: TAKE ONE TABLET BY MOUTH TWICE DAILY WITH MEALS        Routing refill request to provider for review/approval because:  A break in medication  Last Seen: 04/2020  Last Refilled: 11/25/19   Next Visit: ZACH Marcelo RN, BSN  [place]  February 8, 2021          
Refills sent. Due for annual physical.    Hamlet Roberts PA-C    
English

## 2021-02-12 NOTE — PROGRESS NOTES
"SUBJECTIVE:   Bucky Edgar is a 65 year old male who presents for Preventive Visit.    {Split Bill scripting  The purpose of this visit is to discuss your medical history and prevent health problems before you are sick. You may be responsible for a co-pay, coinsurance, or deductible if your visit today includes services such as checking on a sore throat, having an x-ray or lab test, or treating and evaluating a new or existing condition :836080}  Patient has been advised of split billing requirements and indicates understanding: {Yes and No:382011}   Are you in the first 12 months of your Medicare coverage?  { :007119::\"No\"}    HPI  Do you feel safe in your environment? { :387940}    Have you ever done Advance Care Planning? (For example, a Health Directive, POLST, or a discussion with a medical provider or your loved ones about your wishes): { :103659}    {Hearing Test Done (Optional):810000}   Fall risk  { :823988}  {If any of the above assessments are answered yes, consider ordering appropriate referrals (Optional):798248::\"click delete button to remove this line now\"}  Cognitive Screening { :728491}    {Do you have sleep apnea, excessive snoring or daytime drowsiness? (Optional):878264}    Reviewed and updated as needed this visit by clinical staff                 Reviewed and updated as needed this visit by Provider                Social History     Tobacco Use     Smoking status: Former Smoker     Packs/day: 1.25     Years: 41.00     Pack years: 51.25     Types: Cigarettes     Quit date: 10/1/2017     Years since quitting: 3.3     Smokeless tobacco: Never Used   Substance Use Topics     Alcohol use: Yes     Alcohol/week: 0.0 standard drinks     Comment: 12 pack of beer every 2 weeks     {Rooming Staff- Complete this question if Prescreen response is not shown below for today's visit. If you drink alcohol do you typically have >3 drinks per day or >7 drinks per week? (Optional):805285}    No flowsheet data " found.{add AUDIT responses (Optional) (A score of 7 for adult men is an indication of hazardous drinking; a score of 8 or more is an indication of an alcohol use disorder.  A score of 7 or more for adult women is an indication of hazardous drinking or an alchohol use disorder):899095}    {Outside tests to abstract? :031962}    {additional problems to add (Optional):274149}    Current providers sharing in care for this patient include: {Rooming staff:  Please update Care Team in Rooming Activity, refresh this note and then delete this statement}  Patient Care Team:  Hamlet Roberts PA-C as PCP - General (Physician Assistant)  Coy Bolaños MD as MD (Rheumatology)  Germain Muñoz MD as MD (Hematology & Oncology)  Hamlet Roberts PA-C as Assigned PCP  Germain Muñoz MD as Assigned Cancer Care Provider    The following health maintenance items are reviewed in Epic and correct as of today:  Health Maintenance   Topic Date Due     ZOSTER IMMUNIZATION (1 of 2) 09/11/2007     MEDICARE ANNUAL WELLNESS VISIT  08/25/2010     COLORECTAL CANCER SCREENING  07/26/2019     LUNG CANCER SCREENING ANNUAL  05/06/2020     INFLUENZA VACCINE (1) 09/01/2020     PHQ-2  01/01/2021     BMP  06/02/2021     ADVANCE CARE PLANNING  11/01/2023     LIPID  05/08/2024     DTAP/TDAP/TD IMMUNIZATION (3 - Td) 11/25/2029     SPIROMETRY  Completed     HIV SCREENING  Completed     COPD ACTION PLAN  Completed     HEPATITIS C SCREENING  Addressed     Pneumococcal Vaccine: Pediatrics (0 to 5 Years) and At-Risk Patients (6 to 64 Years)  Aged Out     IPV IMMUNIZATION  Aged Out     MENINGITIS IMMUNIZATION  Aged Out     HEPATITIS B IMMUNIZATION  Aged Out     {Chronicprobdata (optional):504474}  {Mammo Decision Support :205555}    Review of Systems  {ROS COMP (Optional):176468}    OBJECTIVE:   There were no vitals taken for this visit. Estimated body mass index is 32.62 kg/m  as calculated from the following:    Height as of 1/28/20: 1.753 m (5'  "9.02\").    Weight as of 20: 100.2 kg (221 lb).  Physical Exam  {Exam (Optional) :982106}    {Diagnostic Test Results (Optional):282439::\"Diagnostic Test Results:\",\"Labs reviewed in Epic\"}    ASSESSMENT / PLAN:   {Diag Picklist:294859}    Patient has been advised of split billing requirements and indicates understanding: {YES / NO:118864::\"Yes\"}  COUNSELING:  {Medicare Counselin}    Estimated body mass index is 32.62 kg/m  as calculated from the following:    Height as of 20: 1.753 m (5' 9.02\").    Weight as of 20: 100.2 kg (221 lb).    {Weight Management Plan (ACO) Complete if BMI is abnormal-  Ages 18-64  BMI >24.9.  Age 65+ with BMI <23 or >30 (Optional):499118}    He reports that he quit smoking about 3 years ago. His smoking use included cigarettes. He has a 51.25 pack-year smoking history. He has never used smokeless tobacco.      Appropriate preventive services were discussed with this patient, including applicable screening as appropriate for cardiovascular disease, diabetes, osteopenia/osteoporosis, and glaucoma.  As appropriate for age/gender, discussed screening for colorectal cancer, prostate cancer, breast cancer, and cervical cancer. Checklist reviewing preventive services available has been given to the patient.    Reviewed patients plan of care and provided an AVS. The {CarePlan:719556} for Bucky meets the Care Plan requirement. This Care Plan has been established and reviewed with the {PATIENT, FAMILY MEMBER, CAREGIVER:561692}.    Counseling Resources:  ATP IV Guidelines  Pooled Cohorts Equation Calculator  Breast Cancer Risk Calculator  Breast Cancer: Medication to Reduce Risk  FRAX Risk Assessment  ICSI Preventive Guidelines  Dietary Guidelines for Americans, 2010  USDA's MyPlate  ASA Prophylaxis  Lung CA Screening    LISA [facility]    Identified Health Risks:  "

## 2021-02-18 ENCOUNTER — OFFICE VISIT (OUTPATIENT)
Dept: FAMILY MEDICINE | Facility: OTHER | Age: 66
End: 2021-02-18
Payer: COMMERCIAL

## 2021-02-18 ENCOUNTER — TELEPHONE (OUTPATIENT)
Dept: FAMILY MEDICINE | Facility: OTHER | Age: 66
End: 2021-02-18

## 2021-02-18 VITALS
DIASTOLIC BLOOD PRESSURE: 92 MMHG | TEMPERATURE: 98.7 F | OXYGEN SATURATION: 98 % | SYSTOLIC BLOOD PRESSURE: 170 MMHG | RESPIRATION RATE: 16 BRPM | HEART RATE: 70 BPM

## 2021-02-18 DIAGNOSIS — I26.99 PULMONARY EMBOLISM WITHOUT ACUTE COR PULMONALE, UNSPECIFIED CHRONICITY, UNSPECIFIED PULMONARY EMBOLISM TYPE (H): ICD-10-CM

## 2021-02-18 DIAGNOSIS — K13.70 LESION OF MOUTH: ICD-10-CM

## 2021-02-18 DIAGNOSIS — Z20.822 PERSON UNDER INVESTIGATION FOR COVID-19: Primary | ICD-10-CM

## 2021-02-18 DIAGNOSIS — D75.1 POLYCYTHEMIA: ICD-10-CM

## 2021-02-18 LAB
LABORATORY COMMENT REPORT: NORMAL
SARS-COV-2 RNA RESP QL NAA+PROBE: NEGATIVE
SARS-COV-2 RNA RESP QL NAA+PROBE: NORMAL
SPECIMEN SOURCE: NORMAL
SPECIMEN SOURCE: NORMAL

## 2021-02-18 PROCEDURE — U0003 INFECTIOUS AGENT DETECTION BY NUCLEIC ACID (DNA OR RNA); SEVERE ACUTE RESPIRATORY SYNDROME CORONAVIRUS 2 (SARS-COV-2) (CORONAVIRUS DISEASE [COVID-19]), AMPLIFIED PROBE TECHNIQUE, MAKING USE OF HIGH THROUGHPUT TECHNOLOGIES AS DESCRIBED BY CMS-2020-01-R: HCPCS | Performed by: PHYSICIAN ASSISTANT

## 2021-02-18 PROCEDURE — 99214 OFFICE O/P EST MOD 30 MIN: CPT | Performed by: PHYSICIAN ASSISTANT

## 2021-02-18 PROCEDURE — U0005 INFEC AGEN DETEC AMPLI PROBE: HCPCS | Performed by: PHYSICIAN ASSISTANT

## 2021-02-18 NOTE — Clinical Note
Please reschedule annual physical in 2 weeks.  Schedule appt with ENT for a new mouth lesion.  Schedule follow-up with hematology for polycythemia and history of pulmonary embolism. Thanks.    Gilberto

## 2021-03-08 ENCOUNTER — VIRTUAL VISIT (OUTPATIENT)
Dept: ONCOLOGY | Facility: CLINIC | Age: 66
End: 2021-03-08
Payer: COMMERCIAL

## 2021-03-08 DIAGNOSIS — Q27.32 ARTERIOVENOUS MALFORMATION OF VESSEL OF LOWER LIMB: ICD-10-CM

## 2021-03-08 DIAGNOSIS — I82.5Y2 CHRONIC DEEP VEIN THROMBOSIS (DVT) OF PROXIMAL VEIN OF LEFT LOWER EXTREMITY (H): ICD-10-CM

## 2021-03-08 DIAGNOSIS — D75.1 POLYCYTHEMIA: Primary | ICD-10-CM

## 2021-03-08 DIAGNOSIS — I87.1 MAY-THURNER SYNDROME: ICD-10-CM

## 2021-03-08 PROCEDURE — 99215 OFFICE O/P EST HI 40 MIN: CPT | Mod: 95 | Performed by: INTERNAL MEDICINE

## 2021-03-08 NOTE — PATIENT INSTRUCTIONS
As you have been off anticoagulation for several months, we will not restart that now but lets check US Left LE extremity and MRA to rule out My Thurner syndrome    Check labs in the next few days and phlebotomize as needed to keep hematocrit below 50%    Cont aspirin    We will determine follow up accordingly

## 2021-03-08 NOTE — NURSING NOTE
Bucky is a 65 year old who is being evaluated via a billable video visit.      How would you like to obtain your AVS? Mail a copy  If the video visit is dropped, the invitation should be resent by: Text to cell phone: [phone]  Will anyone else be joining your video visit? No      Video-Visit Details    Type of service:  Video Visit    Originating Location (pt. Location): Home    Distant Location (provider location):  [facility]     Platform used for Video Visit: Rody Claros CMA

## 2021-03-08 NOTE — PROGRESS NOTES
Hematology follow-up visit:  Date on this visit: 3/8/2021     Bucky Edgar  is referred by Dr.Kyle Bolaños for a hematology consultation. He requires evaluation for new diagnosis of elevated hemoglobin.    Primary Physician: Hamlet Roberts     History Of Present Illness:  Please see my previous note for details.  I have copied and updated from prior note.    Mr. Edgar is a 65 year old male who has hx of COPD, gout was referred for elevated hemoglobin which was noticed in August 2018.  Prior to that in 2015 and 2016 he had couple of readings when his hemoglobin was 18.1 and 18.5.  After that his hemoglobin was in the normal range but since August 2018 it has been persistently elevated.  Most recent hemoglobin on 11/7/2019 was 20.1 with hematocrit of 59.5 and RBC 6.09.    When I saw him in December 2019, he was feeling well.  He has some limitation due to chronic back pain and back surgeries.  He denies any B symptoms.  No erythromelalgia.  No weight loss.  Denies any nausea vomiting diarrhea constipation or bleeding issues.  Overall energy is stable.  Denies any change in his breathing but he has COPD so has dyspnea on exertion.  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    I suspected that he could have polycythemia vera.  I did further testing including peripheral blood smear which did not show any unusual findings.  Erythropoietin level was 5 in the low normal range and peripheral blood NGS panel for myeloproliferative neoplasms was also unremarkable.  LDH was normal.  Ultrasound abdomen in May 2019 did not reveal splenomegaly.  We did a bone marrow biopsy on 1/14/2020 which was essentially unremarkable showing normocellular marrow for age (30-40%) with trilineage hematopoiesis without increase in blasts and no dysplasia and no increase in fibrosis and no ring sideroblasts.  As the morphology was completely normal, FISH and cytogenetics was not  done.    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    I recommended that he continues with baby aspirin daily to decrease the risk of clot.  As the hemoglobin is significantly high I recommended that it would be reasonable to phlebotomize him and try to keep his hematocrit below 50.   He had phlebotomy on 2/10/2020 and 3/9/2020    On 2/3/2020 he was noted to have a small right lower lobe segmental pulmonary artery embolism and he was started on Xarelto.  Later on he switched to Coumadin because of insurance issues.    Interval history.    This is a video visit.  He last saw me in April 2020.  Since then he had an admission to [facility] for hemoptysis in May 2020 and I reviewed the records from there.  At that time he was on anticoagulation which was stopped.  He was noted to have possibility of pneumonia so he was given antibiotics.  At that time he was also noted to have persistence of the pulmonary embolism.  He had lower extremity ultrasound which showed chronic appearing thrombus in the left femoral vein and left popliteal vein.  No occlusive thrombus.   Looking back in 2017 he developed extensive left lower extremity DVT which was treated with thrombolytics and he also had angioplasty and balloon dilation to 7 mm of the chronic occlusion of left femoral vein.  I believe no stent was placed.  He took warfarin for a few months after that.    On 7/1/2020 he had CT of the chest which showed resolution of the pneumonia.  It was a noncontrast CT scan so it did not comment on the blood clot.  He has not restarted Coumadin after that.    Otherwise he feels well.  He denies any difficulty breathing apart from his chronic COPD.  No new swellings.  Left leg feels normal.  Otherwise he is doing  well.      ROS:  Rest of the review of the system was essentially unremarkable.    I reviewed other history in epic as below.    Past Medical/Surgical History:  Past Medical History:   Diagnosis Date     COPD (chronic obstructive pulmonary disease) (H)      Displacement of lumbar intervertebral disc without myelopathy 1995, 2002     HTN, goal below 140/90    Gout  He has history of DVT of the left leg in August 2017 and he required thrombolysis and there was suspicion for May Thurner syndrome with chronic obstruction of the left femoral vein and angioplasty was done.  He required anticoagulation for 6 months.    Past Surgical History:   Procedure Laterality Date     C DECOMPRESS SPINAL CORD,1 SEG  1995, 4/2002    leftL4-L5, 2nd right L4-L5     C SPINE FUSION,ANTER,3 SGMTS  2/9/2004    ant and post fusion L4-S1     HC REPAIR ROTATOR CUFF,CHRONIC  1989     Allergies:  Allergies as of 03/08/2021 - Reviewed 03/08/2021   Allergen Reaction Noted     Chlorthalidone Other (See Comments) 11/08/2016     Amlodipine Other (See Comments) 12/01/2015     Current Medications:  Current Outpatient Medications   Medication Sig Dispense Refill     acetaminophen (TYLENOL) 325 MG tablet Take 650 mg by mouth every 6 hours       albuterol (PROAIR HFA/PROVENTIL HFA/VENTOLIN HFA) 108 (90 Base) MCG/ACT inhaler Inhale 2 puffs into the lungs every 6 hours as needed for shortness of breath / dyspnea 18 g 0     allopurinol (ZYLOPRIM) 100 MG tablet Allopurinol 700mg daily (two 300mg tablets + one 100mg tablet) 90 tablet 2     allopurinol (ZYLOPRIM) 300 MG tablet Allopurinol 700mg daily (two 300mg tablets + one 100mg tablet) 180 tablet 2     carvedilol (COREG) 25 MG tablet TAKE ONE TABLET BY MOUTH TWICE DAILY WITH MEALS 180 tablet 0     hydrALAZINE (APRESOLINE) 25 MG tablet Take 1 tablet (25 mg) by mouth 3 times daily 270 tablet 1     hydrALAZINE (APRESOLINE) 50 MG tablet Take 1 tablet (50 mg) by mouth 3 times daily 90 tablet 5      HYDROcodone-acetaminophen (NORCO) 5-325 MG tablet Take 1 tablet by mouth every 6 hours as needed for pain 24 tablet 0     lisinopril (ZESTRIL) 20 MG tablet Take 1 tablet (20 mg) by mouth 2 times daily 180 tablet 0     nicotine (NICORETTE) 4 MG lozenge Place 4 mg inside cheek as needed       predniSONE (DELTASONE) 20 MG tablet PRN gout flare: prednisone 60mg daily x2days, then 40mg daily x5days, then 20mg daily x5days, then stop. 21 tablet 0      Family History:  Family History   Problem Relation Age of Onset     Family History Negative Other      Diabetes No family hx of      Coronary Artery Disease No family hx of      Hypertension No family hx of      Hyperlipidemia No family hx of      Breast Cancer No family hx of      Prostate Cancer No family hx of      Anxiety Disorder No family hx of      Substance Abuse No family hx of      Asthma No family hx of      Thyroid Disease No family hx of      Unknown/Adopted No family hx of      Genetic Disorder No family hx of      Osteoporosis No family hx of      Anesthesia Reaction No family hx of      Mental Illness No family hx of      Depression No family hx of      Other Cancer No family hx of      Colon Cancer No family hx of      Cerebrovascular Disease No family hx of      Obesity No family hx of    His father had blood clot.  1 brother had pancreatic cancer and he will of blood clots.  Another brother had blood clots and he had hepatocellular carcinoma.  He has 2 children both daughters who are healthy.        Social History:  Social History     Socioeconomic History     Marital status:      Spouse name: Not on file     Number of children: Not on file     Years of education: Not on file     Highest education level: Not on file   Occupational History     Not on file   Social Needs     Financial resource strain: Not on file     Food insecurity     Worry: Not on file     Inability: Not on file     Transportation needs     Medical: Not on file     Non-medical:  Not on file   Tobacco Use     Smoking status: Former Smoker     Packs/day: 1.25     Years: 41.00     Pack years: 51.25     Types: Cigarettes     Quit date: 10/1/2017     Years since quitting: 3.4     Smokeless tobacco: Never Used   Substance and Sexual Activity     Alcohol use: Yes     Alcohol/week: 0.0 standard drinks     Comment: 12 pack of beer every 2 weeks     Drug use: No     Sexual activity: Not Currently     Partners: Female   Lifestyle     Physical activity     Days per week: Not on file     Minutes per session: Not on file     Stress: Not on file   Relationships     Social connections     Talks on phone: Not on file     Gets together: Not on file     Attends Taoism service: Not on file     Active member of club or organization: Not on file     Attends meetings of clubs or organizations: Not on file     Relationship status: Not on file     Intimate partner violence     Fear of current or ex partner: Not on file     Emotionally abused: Not on file     Physically abused: Not on file     Forced sexual activity: Not on file   Other Topics Concern     Parent/sibling w/ CABG, MI or angioplasty before 65F 55M? No   Social History Narrative     Not on file   He has 45-pack-year smoking history but quit 1 year ago.  Drinks 3-4 beer a day.  Lives alone.  He is not working because of disability from the back.    Physical Exam:    Wt Readings from Last 4 Encounters:   07/28/20 100.2 kg (221 lb)   07/14/20 100.7 kg (222 lb)   06/30/20 101.3 kg (223 lb 4.8 oz)   06/02/20 101.6 kg (224 lb)         Constitutional.  Does not seem to be in any acute distress.  Eyes.  No redness or discharge noted.  Respiratory.  Speaking in full sentences.  Breathing seems comfortable without any accessory use of muscles.    Skin.  Visualized his skin does not show any obvious rashes.  Musculoskeletal.  Range of motion for visualized areas is intact.  Neurological.  Alert and oriented x3.  Psychiatric.  Mood, mentation and affect are  normal.  Decision making capacity is intact.      The rest of a comprehensive physical examination is deferred due to Public Health Emergency video visit restrictions.      Laboratory/Imaging Studies    Reviewed    CBC in August 2020 was unremarkable.    Ultrasound of the left lower extremity and CT of the chest is as mentioned above.      ASSESSMENT/PLAN:    Polycythemia with elevated hemoglobin, hematocrit and RBC.   He was noticed to have elevated hemoglobin in 2018 and prior to that in 2015 and 2016 he had couple of readings when his hemoglobin was 18.1 and 18.5.  After that his hemoglobin was in the normal range but since August 2018 it has been persistently elevated.  Recent hemoglobin on 11/7/2019 was 20.1 with hematocrit of 59.5 and RBC 6.09.  Hemoglobin on 1/14/2020 was 19.2 with a hematocrit of 57.3.    I suspected that he could have polycythemia vera.  I did further testing including peripheral blood smear which did not show any unusual findings.  Erythropoietin level was 5 in the low normal range and peripheral blood NGS panel for myeloproliferative neoplasms was also unremarkable.  LDH was normal.  Ultrasound abdomen in May 2019 did not reveal splenomegaly.  We did a bone marrow biopsy on 1/14/2020 which was essentially unremarkable showing normocellular marrow for age (30-40%) with trilineage hematopoiesis without increase in blasts and no dysplasia and no increase in fibrosis and no ring sideroblasts.  As the morphology was completely normal, FISH and cytogenetics was not done.    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    I had recommended phlebotomies to keep his hematocrit below 50%.  He started phlebotomies in February 2020 and the last one was in July 2020.  Last hemoglobin and  hematocrit were in the normal range in August 2020.    I recommend that he should repeat labs and try to keep hematocrit less than 50%.  Continue aspirin for now.      History of venous thromboembolism.    Upon extensive review of outside records, he has a history of extensive DVT of the left lower extremity in 2017 and at that time he required thrombolytics and there was chronic occlusion of common femoral vein possibly from May Thurner syndrome and it was balloon dilated to 7 mm.  He took warfarin for several months.    In May 2020, he had repeat ultrasound which showed chronic appearing nonocclusive thrombus involving the femoral and popliteal vein.  He is asymptomatic.    He also had a subsegmental pulmonary embolism in February 2020 and it took anticoagulation for about 3 months.    Then he had hemoptysis likely from pneumonia and the hemoptysis resolved.  He never restarted the blood thinners.      We discussed the situation in detail.  Previously I had recommended a total of 6 months of anticoagulation.  Now with this additional information that I have, I recommend that we repeat ultrasound of the left lower extremity and MRA to assess the May Thurner syndrome.  If there is a still significant obstruction to blood flow, then I would recommend evaluation by IR because it is important that the cause of blood clot be corrected.  As he has been off anticoagulation for about 10 months or so, I have not restarted anticoagulation at this time.  He will continue with aspirin.      Chronic gout.  Continue allopurinol.  Now he is taking 700 mg daily to prevent frequent flares.  Last uric acid was 2.2 in June 2020.   I reviewed notes from Dr. Bolaños.    We will determine the follow-up accordingly.    I answered all of his questions to his satisfaction.  He is agreeable and comfortable with the plan.    Germain Muñoz MD    Video start time. 11:20 AM  Video stop time.  11:46 AM.    I spent 40 minutes on this visit  including the video time, reviewing records and labs and imaging and placing new orders as well as coordination of care and documentation.

## 2021-03-08 NOTE — LETTER
3/8/2021         RE: Bucky Edgar  [address] [place] [address]        Dear Colleague,    Thank you for referring your patient, Bucky Edgar, to the [facility]. Please see a copy of my visit note below.    Hematology follow-up visit:  Date on this visit: 3/8/2021     Bucky Edgar  is referred by Dr.Kyle Bolaños for a hematology consultation. He requires evaluation for new diagnosis of elevated hemoglobin.    Primary Physician: Hamlet Roberts     History Of Present Illness:  Please see my previous note for details.  I have copied and updated from prior note.    Mr. Edgar is a 65 year old male who has hx of COPD, gout was referred for elevated hemoglobin which was noticed in August 2018.  Prior to that in 2015 and 2016 he had couple of readings when his hemoglobin was 18.1 and 18.5.  After that his hemoglobin was in the normal range but since August 2018 it has been persistently elevated.  Most recent hemoglobin on 11/7/2019 was 20.1 with hematocrit of 59.5 and RBC 6.09.    When I saw him in December 2019, he was feeling well.  He has some limitation due to chronic back pain and back surgeries.  He denies any B symptoms.  No erythromelalgia.  No weight loss.  Denies any nausea vomiting diarrhea constipation or bleeding issues.  Overall energy is stable.  Denies any change in his breathing but he has COPD so has dyspnea on exertion.  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    I suspected that he could have polycythemia vera.  I did further testing including peripheral blood smear which did not show any unusual findings.  Erythropoietin level was 5 in the low normal range and peripheral blood NGS panel for myeloproliferative neoplasms was also unremarkable.  LDH was normal.  Ultrasound abdomen in May 2019 did not reveal splenomegaly.  We did a bone marrow biopsy on 1/14/2020 which was essentially  unremarkable showing normocellular marrow for age (30-40%) with trilineage hematopoiesis without increase in blasts and no dysplasia and no increase in fibrosis and no ring sideroblasts.  As the morphology was completely normal, FISH and cytogenetics was not done.    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    I recommended that he continues with baby aspirin daily to decrease the risk of clot.  As the hemoglobin is significantly high I recommended that it would be reasonable to phlebotomize him and try to keep his hematocrit below 50.   He had phlebotomy on 2/10/2020 and 3/9/2020    On 2/3/2020 he was noted to have a small right lower lobe segmental pulmonary artery embolism and he was started on Xarelto.  Later on he switched to Coumadin because of insurance issues.    Interval history.    This is a video visit.  He last saw me in April 2020.  Since then he had an admission to [facility] for hemoptysis in May 2020 and I reviewed the records from there.  At that time he was on anticoagulation which was stopped.  He was noted to have possibility of pneumonia so he was given antibiotics.  At that time he was also noted to have persistence of the pulmonary embolism.  He had lower extremity ultrasound which showed chronic appearing thrombus in the left femoral vein and left popliteal vein.  No occlusive thrombus.   Looking back in 2017 he developed extensive left lower extremity DVT which was treated with thrombolytics and he also had angioplasty and balloon dilation to 7 mm of the chronic occlusion of left femoral vein.  I believe no stent was placed.  He took warfarin for a few months after that.    On 7/1/2020 he had CT of the chest which showed resolution of the pneumonia.  It was a  noncontrast CT scan so it did not comment on the blood clot.  He has not restarted Coumadin after that.    Otherwise he feels well.  He denies any difficulty breathing apart from his chronic COPD.  No new swellings.  Left leg feels normal.  Otherwise he is doing well.      ROS:  Rest of the review of the system was essentially unremarkable.    I reviewed other history in epic as below.    Past Medical/Surgical History:  Past Medical History:   Diagnosis Date     COPD (chronic obstructive pulmonary disease) (H)      Displacement of lumbar intervertebral disc without myelopathy 1995, 2002     HTN, goal below 140/90    Gout  He has history of DVT of the left leg in August 2017 and he required thrombolysis and there was suspicion for May Thurner syndrome with chronic obstruction of the left femoral vein and angioplasty was done.  He required anticoagulation for 6 months.    Past Surgical History:   Procedure Laterality Date     C DECOMPRESS SPINAL CORD,1 SEG  1995, 4/2002    leftL4-L5, 2nd right L4-L5     C SPINE FUSION,ANTER,3 SGMTS  2/9/2004    ant and post fusion L4-S1     HC REPAIR ROTATOR CUFF,CHRONIC  1989     Allergies:  Allergies as of 03/08/2021 - Reviewed 03/08/2021   Allergen Reaction Noted     Chlorthalidone Other (See Comments) 11/08/2016     Amlodipine Other (See Comments) 12/01/2015     Current Medications:  Current Outpatient Medications   Medication Sig Dispense Refill     acetaminophen (TYLENOL) 325 MG tablet Take 650 mg by mouth every 6 hours       albuterol (PROAIR HFA/PROVENTIL HFA/VENTOLIN HFA) 108 (90 Base) MCG/ACT inhaler Inhale 2 puffs into the lungs every 6 hours as needed for shortness of breath / dyspnea 18 g 0     allopurinol (ZYLOPRIM) 100 MG tablet Allopurinol 700mg daily (two 300mg tablets + one 100mg tablet) 90 tablet 2     allopurinol (ZYLOPRIM) 300 MG tablet Allopurinol 700mg daily (two 300mg tablets + one 100mg tablet) 180 tablet 2     carvedilol (COREG) 25 MG tablet TAKE ONE TABLET  BY MOUTH TWICE DAILY WITH MEALS 180 tablet 0     hydrALAZINE (APRESOLINE) 25 MG tablet Take 1 tablet (25 mg) by mouth 3 times daily 270 tablet 1     hydrALAZINE (APRESOLINE) 50 MG tablet Take 1 tablet (50 mg) by mouth 3 times daily 90 tablet 5     HYDROcodone-acetaminophen (NORCO) 5-325 MG tablet Take 1 tablet by mouth every 6 hours as needed for pain 24 tablet 0     lisinopril (ZESTRIL) 20 MG tablet Take 1 tablet (20 mg) by mouth 2 times daily 180 tablet 0     nicotine (NICORETTE) 4 MG lozenge Place 4 mg inside cheek as needed       predniSONE (DELTASONE) 20 MG tablet PRN gout flare: prednisone 60mg daily x2days, then 40mg daily x5days, then 20mg daily x5days, then stop. 21 tablet 0      Family History:  Family History   Problem Relation Age of Onset     Family History Negative Other      Diabetes No family hx of      Coronary Artery Disease No family hx of      Hypertension No family hx of      Hyperlipidemia No family hx of      Breast Cancer No family hx of      Prostate Cancer No family hx of      Anxiety Disorder No family hx of      Substance Abuse No family hx of      Asthma No family hx of      Thyroid Disease No family hx of      Unknown/Adopted No family hx of      Genetic Disorder No family hx of      Osteoporosis No family hx of      Anesthesia Reaction No family hx of      Mental Illness No family hx of      Depression No family hx of      Other Cancer No family hx of      Colon Cancer No family hx of      Cerebrovascular Disease No family hx of      Obesity No family hx of    His father had blood clot.  1 brother had pancreatic cancer and he will of blood clots.  Another brother had blood clots and he had hepatocellular carcinoma.  He has 2 children both daughters who are healthy.        Social History:  Social History     Socioeconomic History     Marital status:      Spouse name: Not on file     Number of children: Not on file     Years of education: Not on file     Highest education level:  Not on file   Occupational History     Not on file   Social Needs     Financial resource strain: Not on file     Food insecurity     Worry: Not on file     Inability: Not on file     Transportation needs     Medical: Not on file     Non-medical: Not on file   Tobacco Use     Smoking status: Former Smoker     Packs/day: 1.25     Years: 41.00     Pack years: 51.25     Types: Cigarettes     Quit date: 10/1/2017     Years since quitting: 3.4     Smokeless tobacco: Never Used   Substance and Sexual Activity     Alcohol use: Yes     Alcohol/week: 0.0 standard drinks     Comment: 12 pack of beer every 2 weeks     Drug use: No     Sexual activity: Not Currently     Partners: Female   Lifestyle     Physical activity     Days per week: Not on file     Minutes per session: Not on file     Stress: Not on file   Relationships     Social connections     Talks on phone: Not on file     Gets together: Not on file     Attends Che[employer] service: Not on file     Active member of club or organization: Not on file     Attends meetings of clubs or organizations: Not on file     Relationship status: Not on file     Intimate partner violence     Fear of current or ex partner: Not on file     Emotionally abused: Not on file     Physically abused: Not on file     Forced sexual activity: Not on file   Other Topics Concern     Parent/sibling w/ CABG, MI or angioplasty before 65F 55M? No   Social History Narrative     Not on file   He has 45-pack-year smoking history but quit 1 year ago.  Drinks 3-4 beer a day.  Lives alone.  He is not working because of disability from the back.    Physical Exam:    Wt Readings from Last 4 Encounters:   07/28/20 100.2 kg (221 lb)   07/14/20 100.7 kg (222 lb)   06/30/20 101.3 kg (223 lb 4.8 oz)   06/02/20 101.6 kg (224 lb)         Constitutional.  Does not seem to be in any acute distress.  Eyes.  No redness or discharge noted.  Respiratory.  Speaking in full sentences.  Breathing seems comfortable without any  accessory use of muscles.    Skin.  Visualized his skin does not show any obvious rashes.  Musculoskeletal.  Range of motion for visualized areas is intact.  Neurological.  Alert and oriented x3.  Psychiatric.  Mood, mentation and affect are normal.  Decision making capacity is intact.      The rest of a comprehensive physical examination is deferred due to Public Health Emergency video visit restrictions.      Laboratory/Imaging Studies    Reviewed    CBC in August 2020 was unremarkable.    Ultrasound of the left lower extremity and CT of the chest is as mentioned above.      ASSESSMENT/PLAN:    Polycythemia with elevated hemoglobin, hematocrit and RBC.   He was noticed to have elevated hemoglobin in 2018 and prior to that in 2015 and 2016 he had couple of readings when his hemoglobin was 18.1 and 18.5.  After that his hemoglobin was in the normal range but since August 2018 it has been persistently elevated.  Recent hemoglobin on 11/7/2019 was 20.1 with hematocrit of 59.5 and RBC 6.09.  Hemoglobin on 1/14/2020 was 19.2 with a hematocrit of 57.3.    I suspected that he could have polycythemia vera.  I did further testing including peripheral blood smear which did not show any unusual findings.  Erythropoietin level was 5 in the low normal range and peripheral blood NGS panel for myeloproliferative neoplasms was also unremarkable.  LDH was normal.  Ultrasound abdomen in May 2019 did not reveal splenomegaly.  We did a bone marrow biopsy on 1/14/2020 which was essentially unremarkable showing normocellular marrow for age (30-40%) with trilineage hematopoiesis without increase in blasts and no dysplasia and no increase in fibrosis and no ring sideroblasts.  As the morphology was completely normal, FISH and cytogenetics was not done.    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    I had recommended phlebotomies to keep his hematocrit below 50%.  He started phlebotomies in February 2020 and the last one was in July 2020.  Last hemoglobin and hematocrit were in the normal range in August 2020.    I recommend that he should repeat labs and try to keep hematocrit less than 50%.  Continue aspirin for now.      History of venous thromboembolism.    Upon extensive review of outside records, he has a history of extensive DVT of the left lower extremity in 2017 and at that time he required thrombolytics and there was chronic occlusion of common femoral vein possibly from May Thurner syndrome and it was balloon dilated to 7 mm.  He took warfarin for several months.    In May 2020, he had repeat ultrasound which showed chronic appearing nonocclusive thrombus involving the femoral and popliteal vein.  He is asymptomatic.    He also had a subsegmental pulmonary embolism in February 2020 and it took anticoagulation for about 3 months.    Then he had hemoptysis likely from pneumonia and the hemoptysis resolved.  He never restarted the blood thinners.      We discussed the situation in detail.  Previously I had recommended a total of 6 months of anticoagulation.  Now with this additional information that I have, I recommend that we repeat ultrasound of the left lower extremity and MRA to assess the May Thurner syndrome.  If there is a still significant obstruction to blood flow, then I would recommend evaluation by IR because it is important that the cause of blood clot be corrected.  As he has been off anticoagulation for about 10 months or so, I have not restarted anticoagulation at this time.  He will continue with aspirin.      Chronic gout.  Continue allopurinol.  Now he is taking 700 mg daily to prevent frequent flares.  Last uric acid was 2.2 in June 2020.   I reviewed notes from Dr. Bolaños.    We will determine the follow-up  accordingly.    I answered all of his questions to his satisfaction.  He is agreeable and comfortable with the plan.    Germain Muñoz MD    Video start time. 11:20 AM  Video stop time.  11:46 AM.    I spent 40 minutes on this visit including the video time, reviewing records and labs and imaging and placing new orders as well as coordination of care and documentation.        Again, thank you for allowing me to participate in the care of your patient.        Sincerely,        Germain Muñoz MD

## 2021-03-12 NOTE — PROGRESS NOTES
ENT Consultation    Bucky Edgar who is a 65 year old male seen in consultation at the request of LARISSA Chan.      History of Present Illness - Bucky Edgar is a 65 year old male with multiple medical problems including hepatomegaly fatty liver, polycythemia, COPD amongst other disorders presents with several lumps that he noticed.  But 3 months ago noticed a lump over his right eye as well as on the dye in the nasolabial fold area on the right side.  Nose lesions have not grown since.  They are not ulcerated and bleeds.  She also noticed the lump in his mouth on the right side granted he endorses biting his lower lip about 6 months ago and this has been there for couple months not enlarged no ulceration no bleeding.  Patient was a smoker but quit.      Body mass index is 33.29 kg/m .    Weight management plan: Patient was referred to their PCP to discuss a diet and exercise plan.    BP Readings from Last 1 Encounters:   03/17/21 (!) 152/92       BP noted to be elevated today in office.  Patient to follow up with Primary Care provider regarding elevated blood pressure.    Bucky IS a smoker/uses chewing tobacco.  Bucky already quit      Past Medical History -   Past Medical History:   Diagnosis Date     COPD (chronic obstructive pulmonary disease) (H)      Displacement of lumbar intervertebral disc without myelopathy 1995, 2002     HTN, goal below 140/90        Current Medications -   Current Outpatient Medications:      acetaminophen (TYLENOL) 325 MG tablet, Take 650 mg by mouth every 6 hours, Disp: , Rfl:      allopurinol (ZYLOPRIM) 100 MG tablet, Allopurinol 700mg daily (two 300mg tablets + one 100mg tablet), Disp: 90 tablet, Rfl: 2     allopurinol (ZYLOPRIM) 300 MG tablet, Allopurinol 700mg daily (two 300mg tablets + one 100mg tablet), Disp: 180 tablet, Rfl: 2     carvedilol (COREG) 25 MG tablet, TAKE ONE TABLET BY MOUTH TWICE DAILY WITH MEALS, Disp: 180 tablet, Rfl: 0     lisinopril (ZESTRIL)  20 MG tablet, Take 1 tablet (20 mg) by mouth 2 times daily, Disp: 180 tablet, Rfl: 0     nicotine (NICORETTE) 4 MG lozenge, Place 4 mg inside cheek as needed, Disp: , Rfl:      albuterol (PROAIR HFA/PROVENTIL HFA/VENTOLIN HFA) 108 (90 Base) MCG/ACT inhaler, Inhale 2 puffs into the lungs every 6 hours as needed for shortness of breath / dyspnea (Patient not taking: Reported on 3/17/2021), Disp: 18 g, Rfl: 0     hydrALAZINE (APRESOLINE) 25 MG tablet, Take 1 tablet (25 mg) by mouth 3 times daily (Patient not taking: Reported on 3/17/2021), Disp: 270 tablet, Rfl: 1     hydrALAZINE (APRESOLINE) 50 MG tablet, Take 1 tablet (50 mg) by mouth 3 times daily (Patient not taking: Reported on 3/17/2021), Disp: 90 tablet, Rfl: 5     HYDROcodone-acetaminophen (NORCO) 5-325 MG tablet, Take 1 tablet by mouth every 6 hours as needed for pain (Patient not taking: Reported on 3/17/2021), Disp: 24 tablet, Rfl: 0    Allergies -   Allergies   Allergen Reactions     Chlorthalidone Other (See Comments)     Excessive lowering of blood pressure     Amlodipine Other (See Comments)     Intractable headache with use of medication as well as noting a small tremor of the upper extremities       Social History -   Social History     Socioeconomic History     Marital status:      Spouse name: Not on file     Number of children: Not on file     Years of education: Not on file     Highest education level: Not on file   Occupational History     Not on file   Social Needs     Financial resource strain: Not on file     Food insecurity     Worry: Not on file     Inability: Not on file     Transportation needs     Medical: Not on file     Non-medical: Not on file   Tobacco Use     Smoking status: Former Smoker     Packs/day: 1.25     Years: 41.00     Pack years: 51.25     Types: Cigarettes     Quit date: 10/1/2017     Years since quitting: 3.4     Smokeless tobacco: Never Used   Substance and Sexual Activity     Alcohol use: Yes     Alcohol/week:  "0.0 standard drinks     Comment: 12 pack of beer every 2 weeks     Drug use: No     Sexual activity: Not Currently     Partners: Female   Lifestyle     Physical activity     Days per week: Not on file     Minutes per session: Not on file     Stress: Not on file   Relationships     Social connections     Talks on phone: Not on file     Gets together: Not on file     Attends Protestant service: Not on file     Active member of club or organization: Not on file     Attends meetings of clubs or organizations: Not on file     Relationship status: Not on file     Intimate partner violence     Fear of current or ex partner: Not on file     Emotionally abused: Not on file     Physically abused: Not on file     Forced sexual activity: Not on file   Other Topics Concern     Parent/sibling w/ CABG, MI or angioplasty before 65F 55M? No   Social History Narrative     Not on file       Family History -   Family History   Problem Relation Age of Onset     Family History Negative Other      Diabetes No family hx of      Coronary Artery Disease No family hx of      Hypertension No family hx of      Hyperlipidemia No family hx of      Breast Cancer No family hx of      Prostate Cancer No family hx of      Anxiety Disorder No family hx of      Substance Abuse No family hx of      Asthma No family hx of      Thyroid Disease No family hx of      Unknown/Adopted No family hx of      Genetic Disorder No family hx of      Osteoporosis No family hx of      Anesthesia Reaction No family hx of      Mental Illness No family hx of      Depression No family hx of      Other Cancer No family hx of      Colon Cancer No family hx of      Cerebrovascular Disease No family hx of      Obesity No family hx of        Review of Systems - As per HPI and PMHx, otherwise review of system review of the head and neck negative. Otherwise 10+ review of system is negative    Physical Exam  BP (!) 152/92   Temp 96.2  F (35.7  C) (Temporal)   Ht 1.778 m (5' 10\")  "  Wt 105.2 kg (232 lb)   BMI 33.29 kg/m    BMI: Body mass index is 33.29 kg/m .    General - The patient is well nourished and well developed, and appears to have good nutritional status.  Alert and oriented to person and place, answers questions and cooperates with examination appropriately.    SKIN -xanthomatous appearing lesion above medial aspect of the upper eyelid on the right.  3 mm lesion skin and subcutaneous area right nasal labial fold superiorly.  Respiration - No respiratory distress.  Head and Face - Normocephalic and atraumatic, with no gross asymmetry noted of the contour of the facial features.  The facial nerve is intact, with strong symmetric movements.    Voice and Breathing - The patient was breathing comfortably without the use of accessory muscles. The patients voice was clear and strong, and had appropriate pitch and quality.    Ears - Bilateral pinna and EACs with normal appearing overlying skin. Tympanic membrane intact with good mobility on pneumatic otoscopy bilaterally. Bony landmarks of the ossicular chain are normal. The tympanic membranes are normal in appearance. No retraction, perforation, or masses.  No fluid or purulence was seen in the external canal or the middle ear.     Eyes - Extraocular movements intact.  Sclera were not icteric or injected, conjunctiva were pink and moist.    Mouth - Examination of the oral cavity showed pink, healthy oral mucosa.  Soft 1 cm mucosally covered lesion inner lip right side.  Also retromolar trigone right side some irregular tissue noted..  The tongue was mobile and midline, and the dentition were in good condition.      Throat - The walls of the oropharynx were smooth, pink, moist, symmetric, and had no lesions or ulcerations.  The tonsillar pillars and soft palate were symmetric.  The uvula was midline on elevation.    Neck - Normal midline excursion of the laryngotracheal complex during swallowing.  Full range of motion on passive movement.   Palpation of the occipital, submental, submandibular, internal jugular chain, and supraclavicular nodes did not demonstrate any abnormal lymph nodes or masses.  The carotid pulse was palpable bilaterally.  Palpation of the thyroid was soft and smooth, with no nodules or goiter appreciated.  The trachea was mobile and midline.    Nose - External contour is symmetric, no gross deflection or scars.  Nasal mucosa is pink and moist with no abnormal mucus.  The septum was midline and non-obstructive, turbinates of normal size and position.  No polyps, masses, or purulence noted on examination.    Neuro - Nonfocal neuro exam is normal, CN 2 through 12 intact, normal gait and muscle tone.      Performed in clinic today:  We discussed with this patient the appearance of the lesions in the potential need to find a diagnosis.  Risks and benefits of the biopsy site discussed.  He wished to have done.  Therefore he is consented for biopsy of the cutaneous lesion in the nasolabial fold area on the right, biopsy of intraoral lesion on the inner lip and biopsy of the retromolar trigone area lesion.  He is prepped and draped in sterile fashion.  Local anesthetic is performed on the cutaneous lesion first followed by topical anesthetic intraorally with Cetacaine and then followed by a local aesthetic of the inner lip 1 cm lesion with 1% lidocaine with 100,000 epinephrine.  Using a #15 blade the first excised the subcutaneous lesion nasolabial fold on the right below the skin that is definitely organized material came out.  Both were submitted to pathology and hemostasis was controlled silver nitrate bacitracin ointment was applied.  Intraorally I used small plastic scissors to excise about a 1 mm of the retromolar trigone lesion and removed it with cup forceps.  Hemostasis controlled silver nitrate.  The inner lip lesion was excised with #15 blade and plastic scissors submucosal appears to be also soft almost fatty in appearance  over the minor saliva glands.  Defect is cauterized with silver nitrate control hemostasis.  Postoperative care is discussed applying bacitracin for 3 days to the cutaneous biopsy site followed by Vaseline daily.  Intraorally patient will use saline to gargle after meals to keep clean.  Ibuprofen can be used to control discomfort.    A/P - Bucky Edgar is a 65 year old male with multiple lesions along skin oral cavity on the right side that were biopsied.  He tolerated well.  Instructions were given.  He will await the results of the biopsies and see us back in 1 week.      Samir Carroll MD

## 2021-03-16 ENCOUNTER — TELEPHONE (OUTPATIENT)
Dept: ONCOLOGY | Facility: CLINIC | Age: 66
End: 2021-03-16
Payer: COMMERCIAL

## 2021-03-16 NOTE — TELEPHONE ENCOUNTER
Patient spoke with his insurance providers. The MRI 3T will not be covered 100% by his insurance. The patient is responsible for 30% of the amount, and he may also be responsible for this amount for the  ultrasound.    The insurance provider wants to know why the patient needs to have the MRI on the 3T?    Patient is scheduled for 4/2/2021 at the [facility].    Please contact the patient to discuss further.    Thank you.    Suellen Vasquez  [facility]  Cancer/Infusion Center   [phone]

## 2021-03-17 ENCOUNTER — OFFICE VISIT (OUTPATIENT)
Dept: OTOLARYNGOLOGY | Facility: OTHER | Age: 66
End: 2021-03-17
Attending: PHYSICIAN ASSISTANT
Payer: COMMERCIAL

## 2021-03-17 VITALS
SYSTOLIC BLOOD PRESSURE: 152 MMHG | DIASTOLIC BLOOD PRESSURE: 92 MMHG | TEMPERATURE: 96.2 F | WEIGHT: 232 LBS | BODY MASS INDEX: 33.21 KG/M2 | HEIGHT: 70 IN

## 2021-03-17 DIAGNOSIS — L98.9 SKIN LESION OF FACE: Primary | ICD-10-CM

## 2021-03-17 DIAGNOSIS — K13.70 ORAL MUCOSAL LESION: ICD-10-CM

## 2021-03-17 PROCEDURE — 99203 OFFICE O/P NEW LOW 30 MIN: CPT | Mod: 25 | Performed by: OTOLARYNGOLOGY

## 2021-03-17 PROCEDURE — 88305 TISSUE EXAM BY PATHOLOGIST: CPT | Performed by: PATHOLOGY

## 2021-03-17 PROCEDURE — 11102 TANGNTL BX SKIN SINGLE LES: CPT | Mod: 59 | Performed by: OTOLARYNGOLOGY

## 2021-03-17 PROCEDURE — 40808 BIOPSY OF MOUTH LESION: CPT | Performed by: OTOLARYNGOLOGY

## 2021-03-17 ASSESSMENT — MIFFLIN-ST. JEOR: SCORE: 1853.6

## 2021-03-17 NOTE — LETTER
3/17/2021         RE: Bucky Edgar  [address] [place] [address]        Dear Colleague,    Thank you for referring your patient, Bucky Edgar, to the [facility]. Please see a copy of my visit note below.    ENT Consultation    Bucky Edgar who is a 65 year old male seen in consultation at the request of LARISSA Chan.      History of Present Illness - Bucky Edgar is a 65 year old male with multiple medical problems including hepatomegaly fatty liver, polycythemia, COPD amongst other disorders presents with several lumps that he noticed.  But 3 months ago noticed a lump over his right eye as well as on the dye in the nasolabial fold area on the right side.  Nose lesions have not grown since.  They are not ulcerated and bleeds.  She also noticed the lump in his mouth on the right side granted he endorses biting his lower lip about 6 months ago and this has been there for couple months not enlarged no ulceration no bleeding.  Patient was a smoker but quit.      Body mass index is 33.29 kg/m .    Weight management plan: Patient was referred to their PCP to discuss a diet and exercise plan.    BP Readings from Last 1 Encounters:   03/17/21 (!) 152/92       BP noted to be elevated today in office.  Patient to follow up with Primary Care provider regarding elevated blood pressure.    Bucky IS a smoker/uses chewing tobacco.  Bucky already quit      Past Medical History -   Past Medical History:   Diagnosis Date     COPD (chronic obstructive pulmonary disease) (H)      Displacement of lumbar intervertebral disc without myelopathy 1995, 2002     HTN, goal below 140/90        Current Medications -   Current Outpatient Medications:      acetaminophen (TYLENOL) 325 MG tablet, Take 650 mg by mouth every 6 hours, Disp: , Rfl:      allopurinol (ZYLOPRIM) 100 MG tablet, Allopurinol 700mg daily (two 300mg tablets + one 100mg tablet), Disp: 90 tablet, Rfl: 2      allopurinol (ZYLOPRIM) 300 MG tablet, Allopurinol 700mg daily (two 300mg tablets + one 100mg tablet), Disp: 180 tablet, Rfl: 2     carvedilol (COREG) 25 MG tablet, TAKE ONE TABLET BY MOUTH TWICE DAILY WITH MEALS, Disp: 180 tablet, Rfl: 0     lisinopril (ZESTRIL) 20 MG tablet, Take 1 tablet (20 mg) by mouth 2 times daily, Disp: 180 tablet, Rfl: 0     nicotine (NICORETTE) 4 MG lozenge, Place 4 mg inside cheek as needed, Disp: , Rfl:      albuterol (PROAIR HFA/PROVENTIL HFA/VENTOLIN HFA) 108 (90 Base) MCG/ACT inhaler, Inhale 2 puffs into the lungs every 6 hours as needed for shortness of breath / dyspnea (Patient not taking: Reported on 3/17/2021), Disp: 18 g, Rfl: 0     hydrALAZINE (APRESOLINE) 25 MG tablet, Take 1 tablet (25 mg) by mouth 3 times daily (Patient not taking: Reported on 3/17/2021), Disp: 270 tablet, Rfl: 1     hydrALAZINE (APRESOLINE) 50 MG tablet, Take 1 tablet (50 mg) by mouth 3 times daily (Patient not taking: Reported on 3/17/2021), Disp: 90 tablet, Rfl: 5     HYDROcodone-acetaminophen (NORCO) 5-325 MG tablet, Take 1 tablet by mouth every 6 hours as needed for pain (Patient not taking: Reported on 3/17/2021), Disp: 24 tablet, Rfl: 0    Allergies -   Allergies   Allergen Reactions     Chlorthalidone Other (See Comments)     Excessive lowering of blood pressure     Amlodipine Other (See Comments)     Intractable headache with use of medication as well as noting a small tremor of the upper extremities       Social History -   Social History     Socioeconomic History     Marital status:      Spouse name: Not on file     Number of children: Not on file     Years of education: Not on file     Highest education level: Not on file   Occupational History     Not on file   Social Needs     Financial resource strain: Not on file     Food insecurity     Worry: Not on file     Inability: Not on file     Transportation needs     Medical: Not on file     Non-medical: Not on file   Tobacco Use     Smoking  status: Former Smoker     Packs/day: 1.25     Years: 41.00     Pack years: 51.25     Types: Cigarettes     Quit date: 10/1/2017     Years since quitting: 3.4     Smokeless tobacco: Never Used   Substance and Sexual Activity     Alcohol use: Yes     Alcohol/week: 0.0 standard drinks     Comment: 12 pack of beer every 2 weeks     Drug use: No     Sexual activity: Not Currently     Partners: Female   Lifestyle     Physical activity     Days per week: Not on file     Minutes per session: Not on file     Stress: Not on file   Relationships     Social connections     Talks on phone: Not on file     Gets together: Not on file     Attends Ca[employer] service: Not on file     Active member of club or organization: Not on file     Attends meetings of clubs or organizations: Not on file     Relationship status: Not on file     Intimate partner violence     Fear of current or ex partner: Not on file     Emotionally abused: Not on file     Physically abused: Not on file     Forced sexual activity: Not on file   Other Topics Concern     Parent/sibling w/ CABG, MI or angioplasty before 65F 55M? No   Social History Narrative     Not on file       Family History -   Family History   Problem Relation Age of Onset     Family History Negative Other      Diabetes No family hx of      Coronary Artery Disease No family hx of      Hypertension No family hx of      Hyperlipidemia No family hx of      Breast Cancer No family hx of      Prostate Cancer No family hx of      Anxiety Disorder No family hx of      Substance Abuse No family hx of      Asthma No family hx of      Thyroid Disease No family hx of      Unknown/Adopted No family hx of      Genetic Disorder No family hx of      Osteoporosis No family hx of      Anesthesia Reaction No family hx of      Mental Illness No family hx of      Depression No family hx of      Other Cancer No family hx of      Colon Cancer No family hx of      Cerebrovascular Disease No family hx of      Obesity  "No family hx of        Review of Systems - As per HPI and PMHx, otherwise review of system review of the head and neck negative. Otherwise 10+ review of system is negative    Physical Exam  BP (!) 152/92   Temp 96.2  F (35.7  C) (Temporal)   Ht 1.778 m (5' 10\")   Wt 105.2 kg (232 lb)   BMI 33.29 kg/m    BMI: Body mass index is 33.29 kg/m .    General - The patient is well nourished and well developed, and appears to have good nutritional status.  Alert and oriented to person and place, answers questions and cooperates with examination appropriately.    SKIN -xanthomatous appearing lesion above medial aspect of the upper eyelid on the right.  3 mm lesion skin and subcutaneous area right nasal labial fold superiorly.  Respiration - No respiratory distress.  Head and Face - Normocephalic and atraumatic, with no gross asymmetry noted of the contour of the facial features.  The facial nerve is intact, with strong symmetric movements.    Voice and Breathing - The patient was breathing comfortably without the use of accessory muscles. The patients voice was clear and strong, and had appropriate pitch and quality.    Ears - Bilateral pinna and EACs with normal appearing overlying skin. Tympanic membrane intact with good mobility on pneumatic otoscopy bilaterally. Bony landmarks of the ossicular chain are normal. The tympanic membranes are normal in appearance. No retraction, perforation, or masses.  No fluid or purulence was seen in the external canal or the middle ear.     Eyes - Extraocular movements intact.  Sclera were not icteric or injected, conjunctiva were pink and moist.    Mouth - Examination of the oral cavity showed pink, healthy oral mucosa.  Soft 1 cm mucosally covered lesion inner lip right side.  Also retromolar trigone right side some irregular tissue noted..  The tongue was mobile and midline, and the dentition were in good condition.      Throat - The walls of the oropharynx were smooth, pink, moist, " symmetric, and had no lesions or ulcerations.  The tonsillar pillars and soft palate were symmetric.  The uvula was midline on elevation.    Neck - Normal midline excursion of the laryngotracheal complex during swallowing.  Full range of motion on passive movement.  Palpation of the occipital, submental, submandibular, internal jugular chain, and supraclavicular nodes did not demonstrate any abnormal lymph nodes or masses.  The carotid pulse was palpable bilaterally.  Palpation of the thyroid was soft and smooth, with no nodules or goiter appreciated.  The trachea was mobile and midline.    Nose - External contour is symmetric, no gross deflection or scars.  Nasal mucosa is pink and moist with no abnormal mucus.  The septum was midline and non-obstructive, turbinates of normal size and position.  No polyps, masses, or purulence noted on examination.    Neuro - Nonfocal neuro exam is normal, CN 2 through 12 intact, normal gait and muscle tone.      Performed in clinic today:  We discussed with this patient the appearance of the lesions in the potential need to find a diagnosis.  Risks and benefits of the biopsy site discussed.  He wished to have done.  Therefore he is consented for biopsy of the cutaneous lesion in the nasolabial fold area on the right, biopsy of intraoral lesion on the inner lip and biopsy of the retromolar trigone area lesion.  He is prepped and draped in sterile fashion.  Local anesthetic is performed on the cutaneous lesion first followed by topical anesthetic intraorally with Cetacaine and then followed by a local aesthetic of the inner lip 1 cm lesion with 1% lidocaine with 100,000 epinephrine.  Using a #15 blade the first excised the subcutaneous lesion nasolabial fold on the right below the skin that is definitely organized material came out.  Both were submitted to pathology and hemostasis was controlled silver nitrate bacitracin ointment was applied.  Intraorally I used small plastic  scissors to excise about a 1 mm of the retromolar trigone lesion and removed it with cup forceps.  Hemostasis controlled silver nitrate.  The inner lip lesion was excised with #15 blade and plastic scissors submucosal appears to be also soft almost fatty in appearance over the minor saliva glands.  Defect is cauterized with silver nitrate control hemostasis.  Postoperative care is discussed applying bacitracin for 3 days to the cutaneous biopsy site followed by Vaseline daily.  Intraorally patient will use saline to gargle after meals to keep clean.  Ibuprofen can be used to control discomfort.    A/P - Bucky Edgar is a 65 year old male with multiple lesions along skin oral cavity on the right side that were biopsied.  He tolerated well.  Instructions were given.  He will await the results of the biopsies and see us back in 1 week.      Samir Carroll MD      Again, thank you for allowing me to participate in the care of your patient.        Sincerely,        Samir Carroll MD, MD

## 2021-03-19 LAB
COPATH REPORT: NORMAL
COPATH REPORT: NORMAL

## 2021-03-19 NOTE — TELEPHONE ENCOUNTER
Faisal Malone.    I think we will need to be informed by the ordering provider before giving this information to insurance/patient.      Dr. Muñoz, can you help clarify this?

## 2021-03-21 ENCOUNTER — TRANSFERRED RECORDS (OUTPATIENT)
Dept: HEALTH INFORMATION MANAGEMENT | Facility: CLINIC | Age: 66
End: 2021-03-21

## 2021-03-22 ENCOUNTER — TELEPHONE (OUTPATIENT)
Dept: FAMILY MEDICINE | Facility: OTHER | Age: 66
End: 2021-03-22

## 2021-03-22 ENCOUNTER — OFFICE VISIT (OUTPATIENT)
Dept: FAMILY MEDICINE | Facility: OTHER | Age: 66
End: 2021-03-22
Payer: COMMERCIAL

## 2021-03-22 VITALS
BODY MASS INDEX: 34.66 KG/M2 | HEIGHT: 69 IN | HEART RATE: 69 BPM | DIASTOLIC BLOOD PRESSURE: 90 MMHG | OXYGEN SATURATION: 96 % | TEMPERATURE: 98.4 F | SYSTOLIC BLOOD PRESSURE: 140 MMHG | RESPIRATION RATE: 14 BRPM | WEIGHT: 234 LBS

## 2021-03-22 DIAGNOSIS — Z12.11 COLON CANCER SCREENING: ICD-10-CM

## 2021-03-22 DIAGNOSIS — D75.1 POLYCYTHEMIA: ICD-10-CM

## 2021-03-22 DIAGNOSIS — J44.89 CHRONIC BRONCHITIS WITH COPD (CHRONIC OBSTRUCTIVE PULMONARY DISEASE) (H): ICD-10-CM

## 2021-03-22 DIAGNOSIS — Z00.00 ENCOUNTER FOR ROUTINE ADULT HEALTH EXAMINATION WITHOUT ABNORMAL FINDINGS: Primary | ICD-10-CM

## 2021-03-22 DIAGNOSIS — D58.2 ELEVATED HEMOGLOBIN (H): ICD-10-CM

## 2021-03-22 DIAGNOSIS — R91.8 PULMONARY NODULES: ICD-10-CM

## 2021-03-22 DIAGNOSIS — Z13.6 CARDIOVASCULAR SCREENING; LDL GOAL LESS THAN 130: ICD-10-CM

## 2021-03-22 DIAGNOSIS — I10 HTN, GOAL BELOW 140/90: ICD-10-CM

## 2021-03-22 DIAGNOSIS — I71.40 ABDOMINAL AORTIC ANEURYSM (AAA) WITHOUT RUPTURE (H): ICD-10-CM

## 2021-03-22 DIAGNOSIS — I26.99 PULMONARY EMBOLISM WITHOUT ACUTE COR PULMONALE, UNSPECIFIED CHRONICITY, UNSPECIFIED PULMONARY EMBOLISM TYPE (H): ICD-10-CM

## 2021-03-22 LAB
ANION GAP SERPL CALCULATED.3IONS-SCNC: 2 MMOL/L (ref 3–14)
BUN SERPL-MCNC: 11 MG/DL (ref 7–30)
CALCIUM SERPL-MCNC: 9.1 MG/DL (ref 8.5–10.1)
CHLORIDE SERPL-SCNC: 108 MMOL/L (ref 94–109)
CHOLEST SERPL-MCNC: 188 MG/DL
CO2 SERPL-SCNC: 29 MMOL/L (ref 20–32)
CREAT SERPL-MCNC: 0.94 MG/DL (ref 0.66–1.25)
GFR SERPL CREATININE-BSD FRML MDRD: 86 ML/MIN/{1.73_M2}
GLUCOSE SERPL-MCNC: 100 MG/DL (ref 70–99)
HDLC SERPL-MCNC: 45 MG/DL
LDLC SERPL CALC-MCNC: 109 MG/DL
NONHDLC SERPL-MCNC: 143 MG/DL
POTASSIUM SERPL-SCNC: 4.2 MMOL/L (ref 3.4–5.3)
SODIUM SERPL-SCNC: 139 MMOL/L (ref 133–144)
TRIGL SERPL-MCNC: 169 MG/DL

## 2021-03-22 PROCEDURE — 99213 OFFICE O/P EST LOW 20 MIN: CPT | Mod: 25 | Performed by: PHYSICIAN ASSISTANT

## 2021-03-22 PROCEDURE — 80048 BASIC METABOLIC PNL TOTAL CA: CPT | Performed by: PHYSICIAN ASSISTANT

## 2021-03-22 PROCEDURE — G0438 PPPS, INITIAL VISIT: HCPCS | Performed by: PHYSICIAN ASSISTANT

## 2021-03-22 PROCEDURE — 80061 LIPID PANEL: CPT | Performed by: PHYSICIAN ASSISTANT

## 2021-03-22 PROCEDURE — 36415 COLL VENOUS BLD VENIPUNCTURE: CPT | Performed by: PHYSICIAN ASSISTANT

## 2021-03-22 RX ORDER — CARVEDILOL 25 MG/1
TABLET ORAL
Qty: 180 TABLET | Refills: 0 | Status: SHIPPED | OUTPATIENT
Start: 2021-03-22 | End: 2021-09-02

## 2021-03-22 RX ORDER — HYDRALAZINE HYDROCHLORIDE 50 MG/1
50 TABLET, FILM COATED ORAL 3 TIMES DAILY
Qty: 90 TABLET | Refills: 5 | Status: SHIPPED | OUTPATIENT
Start: 2021-03-22 | End: 2021-10-20

## 2021-03-22 RX ORDER — LISINOPRIL 20 MG/1
20 TABLET ORAL 2 TIMES DAILY
Qty: 180 TABLET | Refills: 3 | Status: SHIPPED | OUTPATIENT
Start: 2021-03-22 | End: 2022-03-28

## 2021-03-22 ASSESSMENT — ENCOUNTER SYMPTOMS
PALPITATIONS: 0
HEMATOCHEZIA: 0
HEADACHES: 0
DIARRHEA: 0
MYALGIAS: 1
CHILLS: 0
NERVOUS/ANXIOUS: 0
COUGH: 0
HEMATURIA: 0
EYE PAIN: 0
ABDOMINAL PAIN: 0
PARESTHESIAS: 0
ARTHRALGIAS: 1
WEAKNESS: 0
SHORTNESS OF BREATH: 1
NAUSEA: 0
FEVER: 0
JOINT SWELLING: 0
DIZZINESS: 0
HEARTBURN: 0
DYSURIA: 0
SORE THROAT: 0
FREQUENCY: 0

## 2021-03-22 ASSESSMENT — ACTIVITIES OF DAILY LIVING (ADL): CURRENT_FUNCTION: NO ASSISTANCE NEEDED

## 2021-03-22 ASSESSMENT — MIFFLIN-ST. JEOR: SCORE: 1851.41

## 2021-03-22 NOTE — TELEPHONE ENCOUNTER
Hamlet Roberts PA-C [employer]             Please help schedule COVID vaccine as he qualifies due to COPD.

## 2021-03-22 NOTE — PATIENT INSTRUCTIONS
Preventive Health Recommendations  Male Ages 50 - 64    Yearly exam:             See your health care provider every year in order to  o   Review health changes.   o   Discuss preventive care.    o   Review your medicines if your doctor has prescribed any.     Have a cholesterol test every 5 years, or more frequently if you are at risk for high cholesterol/heart disease.     Have a diabetes test (fasting glucose) every three years. If you are at risk for diabetes, you should have this test more often.     Have a colonoscopy at age 50, or have a yearly FIT test (stool test). These exams will check for colon cancer.      Talk with your health care provider about whether or not a prostate cancer screening test (PSA) is right for you.    You should be tested each year for STDs (sexually transmitted diseases), if you re at risk.     Shots: Get a flu shot each year. Get a tetanus shot every 10 years.     Nutrition:    Eat at least 5 servings of fruits and vegetables daily.     Eat whole-grain bread, whole-wheat pasta and brown rice instead of white grains and rice.     Get adequate Calcium and Vitamin D.     Lifestyle    Exercise for at least 150 minutes a week (30 minutes a day, 5 days a week). This will help you control your weight and prevent disease.     Limit alcohol to one drink per day.     No smoking.     Wear sunscreen to prevent skin cancer.     See your dentist every six months for an exam and cleaning.     See your eye doctor every 1 to 2 years.    Will restart hydralazine 50 mg 3 times daily to help lower your blood pressure.  Will recheck blood pressure in 2 weeks. Eat a low salt diet and stay active.    Will check an updated CT to recheck the lung nodules.    We should recheck an abdominal ultrasound at some point to recheck the small aortic aneurysm.    I would recommend the US and MRA of the leg.    Follow up annually.

## 2021-03-22 NOTE — PROGRESS NOTES
"SUBJECTIVE:   Bucky Edgar is a 65 year old male who presents for Preventive Visit.      Patient has been advised of split billing requirements and indicates understanding: Yes   Are you in the first 12 months of your Medicare coverage?  No    Healthy Habits:     In general, how would you rate your overall health?  Fair    Frequency of exercise:  2-3 days/week    Duration of exercise:  Less than 15 minutes    Do you usually eat at least 4 servings of fruit and vegetables a day, include whole grains    & fiber and avoid regularly eating high fat or \"junk\" foods?  Yes    Taking medications regularly:  Yes    Barriers to taking medications:  None    Medication side effects:  None    Ability to successfully perform activities of daily living:  No assistance needed    Home Safety:  No safety concerns identified    Hearing Impairment:  No hearing concerns    In the past 6 months, have you been bothered by leaking of urine?  No    In general, how would you rate your overall mental or emotional health?  Good      PHQ-2 Total Score: 0    Additional concerns today:  No     He is concerned that the procedures recommended by hematology for his left leg are going to be too expensive and is wondering if they are necessary. He has a history of two, left lower extremity DVTs since 2017 along with a pulmonary embolism last year. He was only on anticoagulation for a few months rather than the 6 months that was recommended. He denies any new leg swelling or shortness of breath. He his on a daily aspirin and is following with hematology.    He has a 45+ year PPD smoking history but quit in 2017. He is due for an updated low dose chest CT for pulmonary nodule follow up. He has COPD but breathing has been stable and he has not used an inhaler in 2 years.     He has a history of a small abdominal aortic aneurysm.     His blood pressure has been consistently running 140+ systolic at home.      Do you feel safe in your environment? " Yes    Have you ever done Advance Care Planning? (For example, a Health Directive, POLST, or a discussion with a medical provider or your loved ones about your wishes): No, advance care planning information given to patient to review.  Patient plans to discuss their wishes with loved ones or provider.      Cognitive Screening   1) Repeat 3 items (Leader, Season, Table)    2) Clock draw: NORMAL  3) 3 item recall: Recalls NO objects   Results: 0 items recalled: PROBABLE COGNITIVE IMPAIRMENT, **INFORM PROVIDER**    Mini-CogTM Copyright [employer]. Licensed by the author for use in [facility] [employer]; reprinted with permission (rosio@[employer]). All rights reserved.      Do you have sleep apnea, excessive snoring or daytime drowsiness?: yes    Reviewed and updated as needed this visit by clinical staff  Tobacco  Allergies  Meds  Problems  Med Hx  Surg Hx  Fam Hx  Soc Hx          Reviewed and updated as needed this visit by Provider   Allergies  Meds  Problems            Social History     Tobacco Use     Smoking status: Former Smoker     Packs/day: 1.25     Years: 41.00     Pack years: 51.25     Types: Cigarettes     Quit date: 10/1/2017     Years since quitting: 3.4     Smokeless tobacco: Never Used   Substance Use Topics     Alcohol use: Yes     Alcohol/week: 0.0 standard drinks     Comment: 12 pack of beer every 2 weeks     If you drink alcohol do you typically have >3 drinks per day or >7 drinks per week? No    Alcohol Use 3/22/2021   Prescreen: >3 drinks/day or >7 drinks/week? No   Prescreen: >3 drinks/day or >7 drinks/week? -       Current providers sharing in care for this patient include:  Patient Care Team:  Hamlet Roberts PA-C as PCP - General (Physician Assistant)  Coy Bolaños MD as MD (Rheumatology)  Germain Muñoz MD as MD (Hematology & Oncology)  Hamlet Roberts PA-C as Assigned PCP  Germain Muñoz MD as Assigned Cancer Care Provider    The following health maintenance items are  "reviewed in Epic and correct as of today:  Health Maintenance   Topic Date Due     COVID-19 Vaccine (1) Never done     ZOSTER IMMUNIZATION (1 of 2) Never done     COLORECTAL CANCER SCREENING  07/26/2019     LUNG CANCER SCREENING ANNUAL  05/06/2020     INFLUENZA VACCINE (1) 09/01/2020     MEDICARE ANNUAL WELLNESS VISIT  03/22/2022     BMP  03/22/2022     LIPID  03/22/2026     ADVANCE CARE PLANNING  03/22/2026     DTAP/TDAP/TD IMMUNIZATION (3 - Td) 11/25/2029     SPIROMETRY  Completed     HIV SCREENING  Completed     COPD ACTION PLAN  Completed     PHQ-2  Completed     HEPATITIS C SCREENING  Addressed     Pneumococcal Vaccine: Pediatrics (0 to 5 Years) and At-Risk Patients (6 to 64 Years)  Aged Out     IPV IMMUNIZATION  Aged Out     MENINGITIS IMMUNIZATION  Aged Out     HEPATITIS B IMMUNIZATION  Aged Out       Review of Systems   Constitutional: Negative for chills and fever.   HENT: Positive for congestion. Negative for ear pain, hearing loss and sore throat.    Eyes: Negative for pain and visual disturbance.   Respiratory: Positive for shortness of breath. Negative for cough.    Cardiovascular: Negative for chest pain, palpitations and peripheral edema.   Gastrointestinal: Negative for abdominal pain, diarrhea, heartburn, hematochezia and nausea.   Genitourinary: Negative for discharge, dysuria, frequency, genital sores, hematuria, impotence and urgency.   Musculoskeletal: Positive for arthralgias and myalgias. Negative for joint swelling.   Neurological: Negative for dizziness, weakness, headaches and paresthesias.   Psychiatric/Behavioral: Negative for mood changes. The patient is not nervous/anxious.        OBJECTIVE:   BP (!) 140/90   Pulse 69   Temp 98.4  F (36.9  C) (Temporal)   Resp 14   Ht 1.76 m (5' 9.29\")   Wt 106.1 kg (234 lb)   SpO2 96%   BMI 34.27 kg/m   Estimated body mass index is 34.27 kg/m  as calculated from the following:    Height as of this encounter: 1.76 m (5' 9.29\").    Weight as of " this encounter: 106.1 kg (234 lb).  Physical Exam  GENERAL: healthy, alert and no distress  EYES: Eyes grossly normal to inspection, PERRL and conjunctivae and sclerae normal  HENT: ear canals and TM's normal, nose and mouth without ulcers or lesions  NECK: no adenopathy, no asymmetry, masses, or scars and thyroid normal to palpation  RESP: some harsh breath sounds throughout but no rales, rhonchi or wheezes  CV: regular rate and rhythm, normal S1 S2, no S3 or S4, no murmur, click or rub, no peripheral edema and peripheral pulses strong  ABDOMEN: soft, nontender, no hepatosplenomegaly, no masses and bowel sounds normal   (male): normal male circumcised genitalia without lesions or urethral discharge, no hernia. Large, left hydrocele (grapefruit sized). Nontender.   MS: no gross musculoskeletal defects noted, no edema. FROM to all extremities. No spinal tenderness.   SKIN: no suspicious lesions or rashes  NEURO: Normal strength and tone, mentation intact and speech normal. Cranial nerves II-XII are grossly intact. DTRs are 2+/4 throughout and symmetric. Gait is stable.  PSYCH: mentation appears normal, affect normal/bright    ASSESSMENT / PLAN:       ICD-10-CM    1. Encounter for routine adult health examination without abnormal findings  Z00.00    2. HTN, goal below 140/90  I10 hydrALAZINE (APRESOLINE) 50 MG tablet     Basic metabolic panel  (Ca, Cl, CO2, Creat, Gluc, K, Na, BUN)     lisinopril (ZESTRIL) 20 MG tablet     carvedilol (COREG) 25 MG tablet   3. Chronic bronchitis with COPD (chronic obstructive pulmonary disease) (H)  J44.9    4. Elevated hemoglobin (H)  D58.2    5. Polycythemia  D75.1    6. Pulmonary embolism without acute cor pulmonale, unspecified chronicity, unspecified pulmonary embolism type (H)  I26.99    7. Pulmonary nodules  R91.8 CT Chest Lung Cancer Scrn Low Dose wo   8. Abdominal aortic aneurysm (AAA) without rupture (H)  I71.4    9. CARDIOVASCULAR SCREENING; LDL GOAL LESS THAN 130  Z13.6  Lipid panel reflex to direct LDL Fasting   10. Colon cancer screening  Z12.11 HUMA([employer])       2. Blood pressure is elevated during both readings today. It looks like he stopped his hydralazine after his hospitalization last summer and never restarted this so will restart this at 50 mg 3 times daily and he will continue with current doses of Coreg and lisinopril. Will plan on nursing BP recheck in 2 weeks. Will check non-fasting BMP today.    3. He has not needed an inhaler for the past few years and breathing has been stable. Can refill inhaler when needed. He remains smoke free.  I discussed new recommendations for alpha-1 antitrypsin deficiency screening in all patients with COPD. This is supported by the GOLD Guidelines and the American Thoracic Society. This deficiency can mimic COPD symptoms and may need different treatment than COPD depending on the severity of the deficiency. I discussed that follow up blood testing is warranted if the initial screening is positive. He is in agreement so the free DNA swab through [employer] was completed today after written consent was obtained. Will notify patient of results when available.      4-6. He is following with hematology for management of his previous blood clots (DVTs and PE) and is to undergo an updated left lower extremity ultrasound along with an MRA to rule out a blood flow issues as hematology is concerned about May Thurner syndrome as a cause for his recurrent clots. I discussed with patient that I feel these studies are appropriate to determine the source of clots and determine if anything else needs to be done such as an IR procedure or chronic anticoagulation. I understand the financial burden of these tests but highly recommend he keep the appointment. I recommend he continue with phlebotomies for his polycythemia as well.     7. Will order updated low dose CT.    8. History of 3.3 cm AAA noted in 2019. I discussed an updated US to ensure  "stability but he would like to hold off for now.    9. Updated non-fasting lipid panel ordered.    10. Discussed continued FIT testing versus Cologuard and he prefers Cologuard.    Follow up annually.      Patient has been advised of split billing requirements and indicates understanding: Yes  COUNSELING:  Reviewed preventive health counseling, as reflected in patient instructions       Regular exercise       Healthy diet/nutrition    Estimated body mass index is 34.27 kg/m  as calculated from the following:    Height as of this encounter: 1.76 m (5' 9.29\").    Weight as of this encounter: 106.1 kg (234 lb).    Weight management plan: Discussed healthy diet and exercise guidelines    He reports that he quit smoking about 3 years ago. His smoking use included cigarettes. He has a 51.25 pack-year smoking history. He has never used smokeless tobacco.      Appropriate preventive services were discussed with this patient, including applicable screening as appropriate for cardiovascular disease, diabetes, osteopenia/osteoporosis, and glaucoma.  As appropriate for age/gender, discussed screening for colorectal cancer, prostate cancer, breast cancer, and cervical cancer. Checklist reviewing preventive services available has been given to the patient.    Reviewed patients plan of care and provided an AVS. The Basic Care Plan (routine screening as documented in Health Maintenance) for Bucky meets the Care Plan requirement. This Care Plan has been established and reviewed with the Patient.    Counseling Resources:  ATP IV Guidelines  Pooled Cohorts Equation Calculator  Breast Cancer Risk Calculator  Breast Cancer: Medication to Reduce Risk  FRAX Risk Assessment  ICSI Preventive Guidelines  Dietary Guidelines for Americans, 2010  USDA's MyPlate  ASA Prophylaxis  Lung CA Screening    [facility]    Identified Health Risks:  "

## 2021-03-23 DIAGNOSIS — I82.5Y2 CHRONIC DEEP VEIN THROMBOSIS (DVT) OF PROXIMAL VEIN OF LEFT LOWER EXTREMITY (H): Primary | ICD-10-CM

## 2021-03-23 NOTE — PROGRESS NOTES
History of Present Illness - Bucky Edgar is a 65 year old male presenting in clinic today for a recheck on Patient presents with:  Follow Up: mouth lesion    Patient status post biopsy of 3 lesions 1 on the right side of the face than the right inner lip and right retromolar trigone.  Pathology results are specified below.  SPECIMEN(S):   A: Inner lip biopsy   B: Retomaolar trigone biopsy     FINAL DIAGNOSIS:   A: Oral mucosa, inner lip, biopsy:   - Benign submucosal lipoma.     B: Oral mucosa, retromolar trigone, biopsy:   - Benign fibroma.       SPECIMEN(S):   Skin, face     FINAL DIAGNOSIS:   Skin, face, site otherwise not specified, excision:   -Benign schwannoma   Overall patient is feeling well without much pain discomfort.      Body mass index is 34.41 kg/m .    Weight management plan: Patient was referred to their PCP to discuss a diet and exercise plan.    BP Readings from Last 1 Encounters:   03/24/21 (!) 150/80       BP noted to be elevated today in office.  Patient to follow up with Primary Care provider regarding elevated blood pressure.    Bucky IS NOT a smoker/uses chewing tobacco.  Bucky already quit      Past Medical History -   Past Medical History:   Diagnosis Date     COPD (chronic obstructive pulmonary disease) (H)      Displacement of lumbar intervertebral disc without myelopathy 1995, 2002     HTN, goal below 140/90        Current Medications -   Current Outpatient Medications:      acetaminophen (TYLENOL) 325 MG tablet, Take 650 mg by mouth every 6 hours, Disp: , Rfl:      allopurinol (ZYLOPRIM) 100 MG tablet, Allopurinol 700mg daily (two 300mg tablets + one 100mg tablet), Disp: 90 tablet, Rfl: 2     allopurinol (ZYLOPRIM) 300 MG tablet, Allopurinol 700mg daily (two 300mg tablets + one 100mg tablet), Disp: 180 tablet, Rfl: 2     atorvastatin (LIPITOR) 10 MG tablet, Take 1 tablet (10 mg) by mouth daily, Disp: 90 tablet, Rfl: 3     carvedilol (COREG) 25 MG tablet, TAKE ONE TABLET BY  MOUTH TWICE DAILY WITH MEALS, Disp: 180 tablet, Rfl: 0     hydrALAZINE (APRESOLINE) 50 MG tablet, Take 1 tablet (50 mg) by mouth 3 times daily, Disp: 90 tablet, Rfl: 5     lisinopril (ZESTRIL) 20 MG tablet, Take 1 tablet (20 mg) by mouth 2 times daily, Disp: 180 tablet, Rfl: 3     nicotine (NICORETTE) 4 MG lozenge, Place 4 mg inside cheek as needed, Disp: , Rfl:     Allergies -   Allergies   Allergen Reactions     Chlorthalidone Other (See Comments)     Excessive lowering of blood pressure     Amlodipine Other (See Comments)     Intractable headache with use of medication as well as noting a small tremor of the upper extremities       Social History -   Social History     Socioeconomic History     Marital status:      Spouse name: Not on file     Number of children: Not on file     Years of education: Not on file     Highest education level: Not on file   Occupational History     Not on file   Social Needs     Financial resource strain: Not on file     Food insecurity     Worry: Not on file     Inability: Not on file     Transportation needs     Medical: Not on file     Non-medical: Not on file   Tobacco Use     Smoking status: Former Smoker     Packs/day: 1.25     Years: 41.00     Pack years: 51.25     Types: Cigarettes     Quit date: 10/1/2017     Years since quitting: 3.4     Smokeless tobacco: Never Used   Substance and Sexual Activity     Alcohol use: Yes     Alcohol/week: 0.0 standard drinks     Comment: 12 pack of beer every 2 weeks     Drug use: No     Sexual activity: Not Currently     Partners: Female   Lifestyle     Physical activity     Days per week: Not on file     Minutes per session: Not on file     Stress: Not on file   Relationships     Social connections     Talks on phone: Not on file     Gets together: Not on file     Attends [employer] service: Not on file     Active member of club or organization: Not on file     Attends meetings of clubs or organizations: Not on file     Relationship  "status: Not on file     Intimate partner violence     Fear of current or ex partner: Not on file     Emotionally abused: Not on file     Physically abused: Not on file     Forced sexual activity: Not on file   Other Topics Concern     Parent/sibling w/ CABG, MI or angioplasty before 65F 55M? No   Social History Narrative     Not on file       Family History -   Family History   Problem Relation Age of Onset     Family History Negative Other      Diabetes No family hx of      Coronary Artery Disease No family hx of      Hypertension No family hx of      Hyperlipidemia No family hx of      Breast Cancer No family hx of      Prostate Cancer No family hx of      Anxiety Disorder No family hx of      Substance Abuse No family hx of      Asthma No family hx of      Thyroid Disease No family hx of      Unknown/Adopted No family hx of      Genetic Disorder No family hx of      Osteoporosis No family hx of      Anesthesia Reaction No family hx of      Mental Illness No family hx of      Depression No family hx of      Other Cancer No family hx of      Colon Cancer No family hx of      Cerebrovascular Disease No family hx of      Obesity No family hx of        Review of Systems - As per HPI and PMHx, otherwise review of system review of the head and neck negative. Otherwise 10+ review of system is negative    Physical Exam  BP (!) 150/80   Temp 96.6  F (35.9  C) (Temporal)   Ht 1.753 m (5' 9\")   Wt 105.7 kg (233 lb)   BMI 34.41 kg/m    BMI: Body mass index is 34.41 kg/m .    General - The patient is well nourished and well developed, and appears to have good nutritional status.  Alert and oriented to person and place, answers questions and cooperates with examination appropriately.    SKIN - No suspicious lesions or rashes.  Facial excision site is healing well feeling in with minimal skin defect.  Still small xanthoma appreciated in the upper eyelid possibly related to patient's liver issues.  Respiration - No " respiratory distress.  Head and Face - Normocephalic and atraumatic, with no gross asymmetry noted of the contour of the facial features.  The facial nerve is intact, with strong symmetric movements.    Voice and Breathing - The patient was breathing comfortably without the use of accessory muscles. The patients voice was clear and strong, and had appropriate pitch and quality.    Ears - Bilateral pinna and EACs with normal appearing overlying skin. Tympanic membrane intact with good mobility on pneumatic otoscopy bilaterally. Bony landmarks of the ossicular chain are normal. The tympanic membranes are normal in appearance. No retraction, perforation, or masses.  No fluid or purulence was seen in the external canal or the middle ear.     Eyes - Extraocular movements intact.  Sclera were not icteric or injected, conjunctiva were pink and moist.    Mouth - Examination of the oral cavity showed pink, healthy oral mucosa.  Biopsy sites healing well almost completely closed in the inner lip and pain which healed in the retromolar trigone area.  The tongue was mobile and midline, and the dentition were in good condition.      Throat - The walls of the oropharynx were smooth, pink, moist, symmetric, and had no lesions or ulcerations.  The tonsillar pillars and soft palate were symmetric.  The uvula was midline on elevation.    Neck - Normal midline excursion of the laryngotracheal complex during swallowing.  Full range of motion on passive movement.  Palpation of the occipital, submental, submandibular, internal jugular chain, and supraclavicular nodes did not demonstrate any abnormal lymph nodes or masses.  The carotid pulse was palpable bilaterally.  Palpation of the thyroid was soft and smooth, with no nodules or goiter appreciated.  The trachea was mobile and midline.    Nose - External contour is symmetric, no gross deflection or scars.  Nasal mucosa is pink and moist with no abnormal mucus.  The septum was midline  and non-obstructive, turbinates of normal size and position.  No polyps, masses, or purulence noted on examination.    Neuro - Nonfocal neuro exam is normal, CN 2 through 12 intact, normal gait and muscle tone.      Performed in clinic today:  No procedures preformed in clinic today      A/P - Bucky Edgar is a 65 year old male Patient presents with:  Follow Up: mouth lesion    Patient is doing well after procedures that were done last time.  Surgical sites are healing well.  He will see me back as needed.    Bucky should follow up as needed.          Samir Carroll MD

## 2021-03-24 ENCOUNTER — OFFICE VISIT (OUTPATIENT)
Dept: OTOLARYNGOLOGY | Facility: OTHER | Age: 66
End: 2021-03-24
Payer: COMMERCIAL

## 2021-03-24 VITALS
WEIGHT: 233 LBS | HEIGHT: 69 IN | SYSTOLIC BLOOD PRESSURE: 150 MMHG | BODY MASS INDEX: 34.51 KG/M2 | TEMPERATURE: 96.6 F | DIASTOLIC BLOOD PRESSURE: 80 MMHG

## 2021-03-24 DIAGNOSIS — E78.5 HYPERLIPIDEMIA LDL GOAL <130: Primary | ICD-10-CM

## 2021-03-24 DIAGNOSIS — Z98.890 POSTOPERATIVE STATE: Primary | ICD-10-CM

## 2021-03-24 PROCEDURE — 99024 POSTOP FOLLOW-UP VISIT: CPT | Performed by: OTOLARYNGOLOGY

## 2021-03-24 RX ORDER — ATORVASTATIN CALCIUM 10 MG/1
10 TABLET, FILM COATED ORAL DAILY
Qty: 90 TABLET | Refills: 3 | Status: SHIPPED | OUTPATIENT
Start: 2021-03-24 | End: 2022-03-31

## 2021-03-24 ASSESSMENT — MIFFLIN-ST. JEOR: SCORE: 1842.26

## 2021-03-24 NOTE — LETTER
3/24/2021         RE: Bucky Edgar  [address] [place] [address]        Dear Colleague,    Thank you for referring your patient, Bucky Edgar, to the [facility]. Please see a copy of my visit note below.    History of Present Illness - Bucky Edgar is a 65 year old male presenting in clinic today for a recheck on Patient presents with:  Follow Up: mouth lesion    Patient status post biopsy of 3 lesions 1 on the right side of the face than the right inner lip and right retromolar trigone.  Pathology results are specified below.  SPECIMEN(S):   A: Inner lip biopsy   B: Retomaolar trigone biopsy     FINAL DIAGNOSIS:   A: Oral mucosa, inner lip, biopsy:   - Benign submucosal lipoma.     B: Oral mucosa, retromolar trigone, biopsy:   - Benign fibroma.       SPECIMEN(S):   Skin, face     FINAL DIAGNOSIS:   Skin, face, site otherwise not specified, excision:   -Benign schwannoma   Overall patient is feeling well without much pain discomfort.      Body mass index is 34.41 kg/m .    Weight management plan: Patient was referred to their PCP to discuss a diet and exercise plan.    BP Readings from Last 1 Encounters:   03/24/21 (!) 150/80       BP noted to be elevated today in office.  Patient to follow up with Primary Care provider regarding elevated blood pressure.    Bucky IS NOT a smoker/uses chewing tobacco.  Bucky already quit      Past Medical History -   Past Medical History:   Diagnosis Date     COPD (chronic obstructive pulmonary disease) (H)      Displacement of lumbar intervertebral disc without myelopathy 1995, 2002     HTN, goal below 140/90        Current Medications -   Current Outpatient Medications:      acetaminophen (TYLENOL) 325 MG tablet, Take 650 mg by mouth every 6 hours, Disp: , Rfl:      allopurinol (ZYLOPRIM) 100 MG tablet, Allopurinol 700mg daily (two 300mg tablets + one 100mg tablet), Disp: 90 tablet, Rfl: 2     allopurinol (ZYLOPRIM) 300 MG  tablet, Allopurinol 700mg daily (two 300mg tablets + one 100mg tablet), Disp: 180 tablet, Rfl: 2     atorvastatin (LIPITOR) 10 MG tablet, Take 1 tablet (10 mg) by mouth daily, Disp: 90 tablet, Rfl: 3     carvedilol (COREG) 25 MG tablet, TAKE ONE TABLET BY MOUTH TWICE DAILY WITH MEALS, Disp: 180 tablet, Rfl: 0     hydrALAZINE (APRESOLINE) 50 MG tablet, Take 1 tablet (50 mg) by mouth 3 times daily, Disp: 90 tablet, Rfl: 5     lisinopril (ZESTRIL) 20 MG tablet, Take 1 tablet (20 mg) by mouth 2 times daily, Disp: 180 tablet, Rfl: 3     nicotine (NICORETTE) 4 MG lozenge, Place 4 mg inside cheek as needed, Disp: , Rfl:     Allergies -   Allergies   Allergen Reactions     Chlorthalidone Other (See Comments)     Excessive lowering of blood pressure     Amlodipine Other (See Comments)     Intractable headache with use of medication as well as noting a small tremor of the upper extremities       Social History -   Social History     Socioeconomic History     Marital status:      Spouse name: Not on file     Number of children: Not on file     Years of education: Not on file     Highest education level: Not on file   Occupational History     Not on file   Social Needs     Financial resource strain: Not on file     Food insecurity     Worry: Not on file     Inability: Not on file     Transportation needs     Medical: Not on file     Non-medical: Not on file   Tobacco Use     Smoking status: Former Smoker     Packs/day: 1.25     Years: 41.00     Pack years: 51.25     Types: Cigarettes     Quit date: 10/1/2017     Years since quitting: 3.4     Smokeless tobacco: Never Used   Substance and Sexual Activity     Alcohol use: Yes     Alcohol/week: 0.0 standard drinks     Comment: 12 pack of beer every 2 weeks     Drug use: No     Sexual activity: Not Currently     Partners: Female   Lifestyle     Physical activity     Days per week: Not on file     Minutes per session: Not on file     Stress: Not on file   Relationships      "Social connections     Talks on phone: Not on file     Gets together: Not on file     Attends [employer] service: Not on file     Active member of club or organization: Not on file     Attends meetings of clubs or organizations: Not on file     Relationship status: Not on file     Intimate partner violence     Fear of current or ex partner: Not on file     Emotionally abused: Not on file     Physically abused: Not on file     Forced sexual activity: Not on file   Other Topics Concern     Parent/sibling w/ CABG, MI or angioplasty before 65F 55M? No   Social History Narrative     Not on file       Family History -   Family History   Problem Relation Age of Onset     Family History Negative Other      Diabetes No family hx of      Coronary Artery Disease No family hx of      Hypertension No family hx of      Hyperlipidemia No family hx of      Breast Cancer No family hx of      Prostate Cancer No family hx of      Anxiety Disorder No family hx of      Substance Abuse No family hx of      Asthma No family hx of      Thyroid Disease No family hx of      Unknown/Adopted No family hx of      Genetic Disorder No family hx of      Osteoporosis No family hx of      Anesthesia Reaction No family hx of      Mental Illness No family hx of      Depression No family hx of      Other Cancer No family hx of      Colon Cancer No family hx of      Cerebrovascular Disease No family hx of      Obesity No family hx of        Review of Systems - As per HPI and PMHx, otherwise review of system review of the head and neck negative. Otherwise 10+ review of system is negative    Physical Exam  BP (!) 150/80   Temp 96.6  F (35.9  C) (Temporal)   Ht 1.753 m (5' 9\")   Wt 105.7 kg (233 lb)   BMI 34.41 kg/m    BMI: Body mass index is 34.41 kg/m .    General - The patient is well nourished and well developed, and appears to have good nutritional status.  Alert and oriented to person and place, answers questions and cooperates with examination " appropriately.    SKIN - No suspicious lesions or rashes.  Facial excision site is healing well feeling in with minimal skin defect.  Still small xanthoma appreciated in the upper eyelid possibly related to patient's liver issues.  Respiration - No respiratory distress.  Head and Face - Normocephalic and atraumatic, with no gross asymmetry noted of the contour of the facial features.  The facial nerve is intact, with strong symmetric movements.    Voice and Breathing - The patient was breathing comfortably without the use of accessory muscles. The patients voice was clear and strong, and had appropriate pitch and quality.    Ears - Bilateral pinna and EACs with normal appearing overlying skin. Tympanic membrane intact with good mobility on pneumatic otoscopy bilaterally. Bony landmarks of the ossicular chain are normal. The tympanic membranes are normal in appearance. No retraction, perforation, or masses.  No fluid or purulence was seen in the external canal or the middle ear.     Eyes - Extraocular movements intact.  Sclera were not icteric or injected, conjunctiva were pink and moist.    Mouth - Examination of the oral cavity showed pink, healthy oral mucosa.  Biopsy sites healing well almost completely closed in the inner lip and pain which healed in the retromolar trigone area.  The tongue was mobile and midline, and the dentition were in good condition.      Throat - The walls of the oropharynx were smooth, pink, moist, symmetric, and had no lesions or ulcerations.  The tonsillar pillars and soft palate were symmetric.  The uvula was midline on elevation.    Neck - Normal midline excursion of the laryngotracheal complex during swallowing.  Full range of motion on passive movement.  Palpation of the occipital, submental, submandibular, internal jugular chain, and supraclavicular nodes did not demonstrate any abnormal lymph nodes or masses.  The carotid pulse was palpable bilaterally.  Palpation of the thyroid  was soft and smooth, with no nodules or goiter appreciated.  The trachea was mobile and midline.    Nose - External contour is symmetric, no gross deflection or scars.  Nasal mucosa is pink and moist with no abnormal mucus.  The septum was midline and non-obstructive, turbinates of normal size and position.  No polyps, masses, or purulence noted on examination.    Neuro - Nonfocal neuro exam is normal, CN 2 through 12 intact, normal gait and muscle tone.      Performed in clinic today:  No procedures preformed in clinic today      A/P - Bucky Edgar is a 65 year old male Patient presents with:  Follow Up: mouth lesion    Patient is doing well after procedures that were done last time.  Surgical sites are healing well.  He will see me back as needed.    Bucky should follow up as needed.          Samir Carroll MD          Again, thank you for allowing me to participate in the care of your patient.        Sincerely,        Samir Carroll MD, MD

## 2021-03-25 ENCOUNTER — TELEPHONE (OUTPATIENT)
Dept: ONCOLOGY | Facility: CLINIC | Age: 66
End: 2021-03-25

## 2021-03-25 NOTE — TELEPHONE ENCOUNTER
Writer spoke with the patient. He had an appointment with his PCP 3/22/2021.    The patient has decided to wait to schedule any future appointments for phlebotomies or labs until after his life settles down a little bit. He says he has way too many appointments at this time.    Patient will call to schedule when he is ready.    [facility]   [phone]

## 2021-03-29 ENCOUNTER — ANCILLARY PROCEDURE (OUTPATIENT)
Dept: CT IMAGING | Facility: CLINIC | Age: 66
End: 2021-03-29
Attending: PHYSICIAN ASSISTANT
Payer: COMMERCIAL

## 2021-03-29 DIAGNOSIS — R91.8 PULMONARY NODULES: ICD-10-CM

## 2021-03-29 PROCEDURE — 71271 CT THORAX LUNG CANCER SCR C-: CPT | Mod: GC | Performed by: RADIOLOGY

## 2021-03-31 ENCOUNTER — IMMUNIZATION (OUTPATIENT)
Dept: PEDIATRICS | Facility: CLINIC | Age: 66
End: 2021-03-31
Payer: COMMERCIAL

## 2021-03-31 ENCOUNTER — TELEPHONE (OUTPATIENT)
Dept: FAMILY MEDICINE | Facility: OTHER | Age: 66
End: 2021-03-31

## 2021-03-31 DIAGNOSIS — R91.8 ABNORMAL CT LUNG SCREENING: ICD-10-CM

## 2021-03-31 PROCEDURE — 0001A PR COVID VAC PFIZER DIL RECON 30 MCG/0.3 ML IM: CPT

## 2021-03-31 PROCEDURE — 91300 PR COVID VAC PFIZER DIL RECON 30 MCG/0.3 ML IM: CPT

## 2021-03-31 NOTE — TELEPHONE ENCOUNTER
[facility] Radiology Lung Cancer Screening CT result notification:     LDCT/Lung Cancer Screening CT Exam date: 3/29/21  Radiologist Impression AND Recommendations:    ACR Assessment Category (v1.1):  Lung-RADS Category 3. Probably  benign finding(s)    Recommendation:  Lung-RADS Category 3. Probably benign finding(s)-  short term follow up suggested 6 month low dose CT Chest without  contrast (please order exam code [id number]).    Pertinent Findings:  Nodules: 5-10  The largest of these nodule(s) are as follows:     - New 5  mm solid nodule in the right upper lobe on series:  5 image: 92.  - 4   mm solid nodule in the left upper lobe on series:  5 image: 99.  - 2 mm solid nodule in the right upper lobe on series:  5 image:  112.     Ordering Provider: Hamlet Roberts PA-C Gregory J Raeker did receive the remaining radiology results from her provider.     Lung Nodule Program Protocol recommendation [Pertaining to lung nodules]:    Lung RADS 3 Protocol: Results RN to notify Patient of results/recommendations and place order for 6 month CT ([id number]) - MD cal[place]   CT Chest order placed to be completed within 6 months (Yes/No):  Yes, CT due on or after 9/30/21.  Image Scheduling will contact him 1 month prior to due days    RN communicated the lung nodule finding to the patient (Yes/No):  Yes  The patient had the following questions: more explanation about what are lung nodules  Correct letter sent as per Lung nodule protocol (Yes/No):  Yes  Did Patient have any CT's of chest previous? (inquire only if no CT's were used for comparison) (Yes/No/NA) Yes    If scheduling LDCT only, RN will contact Image Scheduling Team  Hours available (all sites below):  Mon-Fri 7A to 8P; Sat 7A to 3P.  No schedulers available on Sunday.    [facility] ([place] and [place]): [phone]    [place] ([place], [place], [place]): [phone]    [place] ([facility]): [phone]    Luis Salas  RN  [facility] Result RN  Lung Nodule and Emergency Dept Lab Result F/u RN  Ph# [phone]

## 2021-04-02 ENCOUNTER — ANCILLARY PROCEDURE (OUTPATIENT)
Dept: ULTRASOUND IMAGING | Facility: CLINIC | Age: 66
End: 2021-04-02
Attending: INTERNAL MEDICINE
Payer: COMMERCIAL

## 2021-04-02 ENCOUNTER — ANCILLARY PROCEDURE (OUTPATIENT)
Dept: CT IMAGING | Facility: CLINIC | Age: 66
End: 2021-04-02
Attending: INTERNAL MEDICINE
Payer: COMMERCIAL

## 2021-04-02 DIAGNOSIS — D75.1 POLYCYTHEMIA: ICD-10-CM

## 2021-04-02 DIAGNOSIS — I82.5Y2 CHRONIC DEEP VEIN THROMBOSIS (DVT) OF PROXIMAL VEIN OF LEFT LOWER EXTREMITY (H): ICD-10-CM

## 2021-04-02 DIAGNOSIS — I87.1 MAY-THURNER SYNDROME: ICD-10-CM

## 2021-04-02 PROCEDURE — 93971 EXTREMITY STUDY: CPT | Mod: LT | Performed by: RADIOLOGY

## 2021-04-02 PROCEDURE — 72191 CT ANGIOGRAPH PELV W/O&W/DYE: CPT | Performed by: RADIOLOGY

## 2021-04-02 RX ORDER — IOPAMIDOL 755 MG/ML
100 INJECTION, SOLUTION INTRAVASCULAR ONCE
Status: COMPLETED | OUTPATIENT
Start: 2021-04-02 | End: 2021-04-02

## 2021-04-02 RX ADMIN — IOPAMIDOL 100 ML: 755 INJECTION, SOLUTION INTRAVASCULAR at 08:24

## 2021-04-05 ENCOUNTER — ALLIED HEALTH/NURSE VISIT (OUTPATIENT)
Dept: FAMILY MEDICINE | Facility: OTHER | Age: 66
End: 2021-04-05
Payer: COMMERCIAL

## 2021-04-05 ENCOUNTER — TELEPHONE (OUTPATIENT)
Dept: FAMILY MEDICINE | Facility: OTHER | Age: 66
End: 2021-04-05

## 2021-04-05 VITALS — SYSTOLIC BLOOD PRESSURE: 136 MMHG | DIASTOLIC BLOOD PRESSURE: 90 MMHG | HEART RATE: 66 BPM

## 2021-04-05 DIAGNOSIS — I10 HTN, GOAL BELOW 140/90: Primary | ICD-10-CM

## 2021-04-05 PROCEDURE — 99207 PR NO CHARGE NURSE ONLY: CPT

## 2021-04-05 NOTE — TELEPHONE ENCOUNTER
BP is better but still slightly elevated. The diarrhea on the hydralazine should improve so give it a few more weeks and if still bothersome, can try a different medications. Recommend nursing BP recheck again in 2 weeks.    Hamlet Roberts PA-C

## 2021-04-05 NOTE — PROGRESS NOTES
"Bucky Edgar is a 65 year old patient who comes in today for a Blood Pressure check.  Initial BP:  BP (!) 136/90   Pulse 66      66  Disposition: follow-up as previously indicated by provider and results routed to provider    Patient reports a possible side effect to the hydralazine medication. He states he does take it as prescribed, however, he says it makes his stomach \"gurgly\" and has diarrhea every morning. This started about a week ago when he started the medication.     He said he does not have any other symptoms - lightheaded/dizzy, chest pain, heart palpitations, etc.    Mahsa Winkler MA      "

## 2021-04-07 ENCOUNTER — PATIENT OUTREACH (OUTPATIENT)
Dept: ONCOLOGY | Facility: CLINIC | Age: 66
End: 2021-04-07

## 2021-04-07 DIAGNOSIS — I87.1 MAY-THURNER SYNDROME: Primary | ICD-10-CM

## 2021-04-07 NOTE — PROGRESS NOTES
Spoke with patient re:  Recommendations from Dr. Muñoz for IR consult for diagnosis (CT 4/2) of May Thurner syndrome.  Writer explained to patient what this is and reviewed impression:                                                                       IMPRESSION:      1. Narrowing of the left common iliac vein between the right common  iliac artery and L4 vertebral body measuring approximately 3-4 mm  compatible with May Thurner syndrome.     2. Proximal sigmoid diverticula with bowel wall thickening which may  be related to incomplete distention versus mild diverticulitis.    He has agreed to proceed with appointments and advised scheduling should be reaching out to him.  Advised he would need to go to the [facility] for this consult/procedure.

## 2021-04-09 NOTE — TELEPHONE ENCOUNTER
DIAGNOSIS: New pt consult for May Thurner   DATE RECEIVED: 4.12.21   NOTES STATUS DETAILS   OFFICE NOTE from referring provider Internal 4.7.21, 3.8.21  Dr. Germain Muñoz  [facility]   OFFICE NOTE from other specialist na    OPERATIVE REPORT na    MEDICATION LIST Internal    PERTINENT LABS Internal    CTA (CT ANGIOGRAPHY) Internal 4.2.21  CTV Pelvis   CT na    MRI na    ULTRASOUND Internal 4.2.21  US Low Ext Venous Duplex Left

## 2021-04-12 ENCOUNTER — PRE VISIT (OUTPATIENT)
Dept: VASCULAR SURGERY | Facility: CLINIC | Age: 66
End: 2021-04-12

## 2021-04-12 ENCOUNTER — VIRTUAL VISIT (OUTPATIENT)
Dept: VASCULAR SURGERY | Facility: CLINIC | Age: 66
End: 2021-04-12
Payer: COMMERCIAL

## 2021-04-12 DIAGNOSIS — Z86.718 HISTORY OF DEEP VENOUS THROMBOSIS (DVT) OF DISTAL VEIN OF LEFT LOWER EXTREMITY: ICD-10-CM

## 2021-04-12 DIAGNOSIS — I87.1 MAY-THURNER SYNDROME: Primary | ICD-10-CM

## 2021-04-12 DIAGNOSIS — I82.402 DEEP VEIN THROMBOSIS (DVT) OF LEFT LOWER EXTREMITY, UNSPECIFIED CHRONICITY, UNSPECIFIED VEIN (H): ICD-10-CM

## 2021-04-12 PROCEDURE — 99204 OFFICE O/P NEW MOD 45 MIN: CPT | Mod: 95 | Performed by: RADIOLOGY

## 2021-04-12 ASSESSMENT — PAIN SCALES - GENERAL: PAINLEVEL: MODERATE PAIN (5)

## 2021-04-12 NOTE — PATIENT INSTRUCTIONS
Preventive Care:    Colorectal Cancer Screening: During our visit today, we discussed that it is recommended you receive colorectal cancer screening. Please call or make an appointment with your primary care provider to discuss this. You may also call the [facility] scheduling line ([phone]) to set up a colonoscopy appointment.

## 2021-04-12 NOTE — LETTER
4/12/2021       RE: Bucky Edgar  [address]  [place] [address]     Dear Colleague,    Thank you for referring your patient, Bucky Edgar, to the [facility] [place] at [facility]. Please see a copy of my visit note below.    Bucky is a 65 year old who is being evaluated via a billable telephone visit.      What phone number would you like to be contacted at? [phone]  How would you like to obtain your AVS? Mail a copy        INTERVENTIONAL RADIOLOGY CONSULTATION    Name: Bucky Edgar  Age: 65 year old   Referring Physician: Dr. Muñoz   REASON FOR REFERRAL: Left lower extremity DVT and PE, possible May Thurner disease    HPI: Bucky Edgar is very pleasant 65-year-old gentleman who is here for evaluation and assessment of recurrent left lower extremity DVT and PE with suspicion of May Thurner syndrome.  His additional past medical history is most significant for COPD, continues elevation of hemoglobin, chronic heavy smoking and hypertension.  His hematology work-up did not reveal polycythemia vera to be the cause of his increased hemoglobin level.  This was rather attributed to chronic heavy smoking, underlying COPD and Gaisbock syndrome.  He has undergone phlebectomies under the care of Dr. Mclean.  He has had episodes of left lower extremity DVT involving the iliofemoral veins in 2017, 2020 and 2021.  Subsegmental PE in February 2020.  His DVT was extensive in 2017 requiring catheter directed thrombolysis and venoplasty of the left femoral vein.  He was on anticoagulation for nearly 6 months during that episode.  No stent was placed.  He has had an episode of hemoptysis while on anticoagulation in 2020.  He is currently asymptomatic without swelling or pain in the left lower extremity.  No significant collaterals are present or varicosities.  CTV of the pelvis does not demonstrate any remaining pelvic thrombus, however a  classic May Thurner lesion is identified within the left common iliac vein.  He denies cough, fever, shortness of breath, pleuritic chest pain, left or right leg swelling, skin changes, or varicosities.    PAST MEDICAL HISTORY:   Past Medical History:   Diagnosis Date     COPD (chronic obstructive pulmonary disease) (H)      Displacement of lumbar intervertebral disc without myelopathy 1995, 2002     HTN, goal below 140/90        PAST SURGICAL HISTORY:   Past Surgical History:   Procedure Laterality Date     C DECOMPRESS SPINAL CORD,1 SEG  1995, 4/2002    leftL4-L5, 2nd right L4-L5     C SPINE FUSION,ANTER,3 SGMTS  2/9/2004    ant and post fusion L4-S1     HC REPAIR ROTATOR CUFF,CHRONIC  1989       FAMILY HISTORY:   Family History   Problem Relation Age of Onset     Family History Negative Other      Diabetes No family hx of      Coronary Artery Disease No family hx of      Hypertension No family hx of      Hyperlipidemia No family hx of      Breast Cancer No family hx of      Prostate Cancer No family hx of      Anxiety Disorder No family hx of      Substance Abuse No family hx of      Asthma No family hx of      Thyroid Disease No family hx of      Unknown/Adopted No family hx of      Genetic Disorder No family hx of      Osteoporosis No family hx of      Anesthesia Reaction No family hx of      Mental Illness No family hx of      Depression No family hx of      Other Cancer No family hx of      Colon Cancer No family hx of      Cerebrovascular Disease No family hx of      Obesity No family hx of        SOCIAL HISTORY:   Social History     Tobacco Use     Smoking status: Former Smoker     Packs/day: 1.25     Years: 41.00     Pack years: 51.25     Types: Cigarettes     Quit date: 10/1/2017     Years since quitting: 3.6     Smokeless tobacco: Never Used   Substance Use Topics     Alcohol use: Yes     Alcohol/week: 0.0 standard drinks     Comment: 12 pack of beer every 2 weeks       PROBLEM LIST:   Patient Active  Problem List    Diagnosis Date Noted     Abnormal CT lung screening 03/31/2021     Priority: Medium     Currently managed with follow up imaging by [facility] Results Team.         Elevated hemoglobin (H) 03/22/2021     Priority: Medium     Pulmonary embolism without acute cor pulmonale, unspecified chronicity, unspecified pulmonary embolism type (H) 04/02/2020     Priority: Medium     Polycythemia 01/28/2020     Priority: Medium     Renal atrophy, right 05/16/2019     Priority: Medium     Abdominal aortic aneurysm (AAA) without rupture (H) 05/16/2019     Priority: Medium     Hepatomegaly 05/16/2019     Priority: Medium     Fatty liver 05/16/2019     Priority: Medium     partial alcohol induced       Elevated liver enzymes 05/09/2019     Priority: Medium     History of deep venous thrombosis (DVT) of distal vein of left lower extremity 11/01/2018     Priority: Medium     Elevated serum creatinine 02/28/2018     Priority: Medium     Long-term (current) use of anticoagulants [Z79.01] 09/01/2017     Priority: Medium     Chronic pain due to trauma 02/23/2017     Priority: Medium     Closed fracture of multiple ribs of left side with routine healing, subsequent encounter 11/29/2016     Priority: Medium     Chronic bronchitis with COPD (chronic obstructive pulmonary disease) (H) 12/09/2015     Priority: Medium     Vitamin D deficiency 02/26/2014     Priority: Medium     Problem list name updated by automated process. Provider to review       Hypertriglyceridemia 01/30/2014     Priority: Medium     Impaired fasting glucose 11/27/2013     Priority: Medium     Advanced directives, counseling/discussion 09/26/2012     Priority: Medium     Information given for [facility].  Percy Deshpande MD  November 1, 2018         COPD (chronic obstructive pulmonary disease) (H) 09/26/2012     Priority: Medium     HTN, goal below 140/90 07/26/2012     Priority: Medium     CARDIOVASCULAR SCREENING; LDL GOAL LESS THAN 130  10/31/2010     Priority: Medium     Displacement of lumbar intervertebral disc without myelopathy 10/08/2002     Priority: Medium       MEDICATIONS:   Prescription Medications as of 5/15/2021       Rx Number Disp Refills Start End Last Dispensed Date Next Fill Date Owning Pharmacy    acetaminophen (TYLENOL) 325 MG tablet    10/13/2016        Sig: Take 650 mg by mouth every 6 hours    Class: Historical    Route: Oral    allopurinol (ZYLOPRIM) 100 MG tablet  90 tablet 2 11/17/2020    [facility] [address] [place] [id number] [facility] AT [facility] [address]    Sig: Allopurinol 700mg daily (two 300mg tablets + one 100mg tablet)    Class: E-Prescribe    allopurinol (ZYLOPRIM) 300 MG tablet  180 tablet 2 11/17/2020    [facility] [address] [place] [address] [facility] AT [facility] [address]    Sig: Allopurinol 700mg daily (two 300mg tablets + one 100mg tablet)    Class: E-Prescribe    atorvastatin (LIPITOR) 10 MG tablet  90 tablet 3 3/24/2021    [facility] [address] [place] [id number] [facility] AT [facility] [address]    Sig: Take 1 tablet (10 mg) by mouth daily    Class: E-Prescribe    Route: Oral    carvedilol (COREG) 25 MG tablet  180 tablet 0 3/22/2021    [facility] [address] [place] [id number] [facility] AT [facility] [address]    Sig: TAKE ONE TABLET BY MOUTH TWICE DAILY WITH MEALS    Class: E-Prescribe    hydrALAZINE (APRESOLINE) 50 MG tablet  90 tablet 5 3/22/2021    [facility] [address] [place] [id number] [facility] AT [facility] [address]    Sig: Take 1 tablet (50 mg) by mouth 3 times daily    Class: E-Prescribe    Route: Oral    lisinopril (ZESTRIL) 20 MG tablet  180 tablet 3 3/22/2021    [facility] [address] [place] [id number] [facility] AT [facility] [address]    Sig: Take 1 tablet (20 mg) by mouth 2 times daily    Class: E-Prescribe    Route: Oral    nicotine (NICORETTE) [id number] [place], [place] - [address] AT [facility]  [address]    Sig: Place 4 mg inside cheek as needed    Class: Historical    Route: Buccal    Rivaroxaban ANTICOAGULANT 15 & 20 MG TBPK  90 each 3 5/4/2021 11/4/2021       Sig: Xarelto is dosed twice-daily at 15 mg with food for the first 21 days, after which patients transition to once-daily doses at 20 mg with food for the remainder of their treatment    Class: Local Print          ALLERGIES:   Chlorthalidone and Amlodipine    ROS:  An 11 point review of system was performed and pertinent negative and positives are mentioned in HPI.        Physical Examination:   VITALS:   There were no vitals taken for this visit.  No physical exam was performed, as this was a phone telemedicine visit.    Labs:    BMP RESULTS:  Lab Results   Component Value Date     05/04/2021    POTASSIUM 4.2 05/04/2021    CHLORIDE 111 (H) 05/04/2021    CO2 22 05/04/2021    ANIONGAP 6 05/04/2021     (H) 05/04/2021    BUN 16 05/04/2021    CR 1.00 05/04/2021    GFRESTIMATED 79 05/04/2021    GFRESTBLACK >90 05/04/2021    JUSTIN 9.5 05/04/2021        CBC RESULTS:  Lab Results   Component Value Date    WBC 9.5 05/04/2021    RBC 5.53 05/04/2021    HGB 17.0 05/04/2021    HCT 52.2 05/04/2021    MCV 94 05/04/2021    MCH 30.7 05/04/2021    MCHC 32.6 05/04/2021    RDW 16.3 (H) 05/04/2021     05/04/2021       INR/PTT:  Lab Results   Component Value Date    INR 0.92 05/04/2021    PTT 28 05/04/2021       Diagnostic studies: I have personally reviewed and interpreted the imaging studies.    DVT study dated 4/2/2021 reveals age indeterminate likely chronic clot involving the femoropopliteal veins.    Multiphasic CT venogram of the abdomen and pelvis dated 4/2/2021 reveals no pelvic DVT.  There is a high-grade stenosis of the left common iliac vein as it travels between the left common iliac artery and lumbar spine, a classic May Thurner lesion.    Assessment Bucky is a patient with  multiple episodes of significant left lower extremity DVT including one episode of iliofemoral thrombosis requiring thrombolysis, pulmonary embolism, and a classic left vein stenosis suggestive of May Thurner syndrome.  He is currently asymptomatic.  I do believe given the recurrence of this issue and constellation of imaging findings and extent of the DVT treatment for may Thurner disease would be warranted.  We will validate CT findings with catheter directed angiography and intravascular ultrasound.  Plan left lower extremity angiography with plan for popliteal vein access, thrombectomy and intravascular ultrasound.  Intention for placement of a left common iliac stent.  Patient will be initiated on anticoagulation using Xeralto based on my discussion with Dr. Muñoz. Our plan is to continue this for 3 months.    I, Dr Zeferino Escobar, have personally performed the entirety of this telemedicine phone visit. I have documented the findings as well as the assessment and plan.    Preparation time including image interpretation, review of the chart, discussion with patient's hematologist: 30 minutes  Phone visit duration: 25 minutes  Documentation time: 10 minutes      Zeferino Escobar MD    Vascular and Interventional Radiolgy  [facility]  Patient Care Team:  Hamlet Bullock PA-C as PCP - General (Physician Assistant)  Coy Bolaños MD as MD (Rheumatology)  Germain Muñoz MD as MD (Hematology & Oncology)  Hamlte Bullock PA-C as Assigned PCP  Germain Muñoz MD as Assigned Cancer Care Provider  Samir Carroll MD as Assigned Surgical Provider  HAMLET BULLOCK

## 2021-04-12 NOTE — PROGRESS NOTES
Bucky is a 65 year old who is being evaluated via a billable telephone visit.      What phone number would you like to be contacted at? [phone]  How would you like to obtain your AVS? Mail a copy        INTERVENTIONAL RADIOLOGY CONSULTATION    Name: Bucky Edgar  Age: 65 year old   Referring Physician: Dr. Muñoz   REASON FOR REFERRAL: Left lower extremity DVT and PE, possible May Thurner disease    HPI: Bucky Edgar is very pleasant 65-year-old gentleman who is here for evaluation and assessment of recurrent left lower extremity DVT and PE with suspicion of May Thurner syndrome.  His additional past medical history is most significant for COPD, continues elevation of hemoglobin, chronic heavy smoking and hypertension.  His hematology work-up did not reveal polycythemia vera to be the cause of his increased hemoglobin level.  This was rather attributed to chronic heavy smoking, underlying COPD and Gaisbock syndrome.  He has undergone phlebectomies under the care of Dr. Mclean.  He has had episodes of left lower extremity DVT involving the iliofemoral veins in 2017, 2020 and 2021.  Subsegmental PE in February 2020.  His DVT was extensive in 2017 requiring catheter directed thrombolysis and venoplasty of the left femoral vein.  He was on anticoagulation for nearly 6 months during that episode.  No stent was placed.  He has had an episode of hemoptysis while on anticoagulation in 2020.  He is currently asymptomatic without swelling or pain in the left lower extremity.  No significant collaterals are present or varicosities.  CTV of the pelvis does not demonstrate any remaining pelvic thrombus, however a classic May Thurner lesion is identified within the left common iliac vein.  He denies cough, fever, shortness of breath, pleuritic chest pain, left or right leg swelling, skin changes, or varicosities.    PAST MEDICAL HISTORY:   Past Medical History:   Diagnosis Date     COPD (chronic obstructive pulmonary  disease) (H)      Displacement of lumbar intervertebral disc without myelopathy 1995, 2002     HTN, goal below 140/90        PAST SURGICAL HISTORY:   Past Surgical History:   Procedure Laterality Date     C DECOMPRESS SPINAL CORD,1 SEG  1995, 4/2002    leftL4-L5, 2nd right L4-L5     C SPINE FUSION,ANTER,3 SGMTS  2/9/2004    ant and post fusion L4-S1     HC REPAIR ROTATOR CUFF,CHRONIC  1989       FAMILY HISTORY:   Family History   Problem Relation Age of Onset     Family History Negative Other      Diabetes No family hx of      Coronary Artery Disease No family hx of      Hypertension No family hx of      Hyperlipidemia No family hx of      Breast Cancer No family hx of      Prostate Cancer No family hx of      Anxiety Disorder No family hx of      Substance Abuse No family hx of      Asthma No family hx of      Thyroid Disease No family hx of      Unknown/Adopted No family hx of      Genetic Disorder No family hx of      Osteoporosis No family hx of      Anesthesia Reaction No family hx of      Mental Illness No family hx of      Depression No family hx of      Other Cancer No family hx of      Colon Cancer No family hx of      Cerebrovascular Disease No family hx of      Obesity No family hx of        SOCIAL HISTORY:   Social History     Tobacco Use     Smoking status: Former Smoker     Packs/day: 1.25     Years: 41.00     Pack years: 51.25     Types: Cigarettes     Quit date: 10/1/2017     Years since quitting: 3.6     Smokeless tobacco: Never Used   Substance Use Topics     Alcohol use: Yes     Alcohol/week: 0.0 standard drinks     Comment: 12 pack of beer every 2 weeks       PROBLEM LIST:   Patient Active Problem List    Diagnosis Date Noted     Abnormal CT lung screening 03/31/2021     Priority: Medium     Currently managed with follow up imaging by [facility] [employer] [facility] Results Team.         Elevated hemoglobin (H) 03/22/2021     Priority: Medium     Pulmonary embolism without acute cor pulmonale,  unspecified chronicity, unspecified pulmonary embolism type (H) 04/02/2020     Priority: Medium     Polycythemia 01/28/2020     Priority: Medium     Renal atrophy, right 05/16/2019     Priority: Medium     Abdominal aortic aneurysm (AAA) without rupture (H) 05/16/2019     Priority: Medium     Hepatomegaly 05/16/2019     Priority: Medium     Fatty liver 05/16/2019     Priority: Medium     partial alcohol induced       Elevated liver enzymes 05/09/2019     Priority: Medium     History of deep venous thrombosis (DVT) of distal vein of left lower extremity 11/01/2018     Priority: Medium     Elevated serum creatinine 02/28/2018     Priority: Medium     Long-term (current) use of anticoagulants [Z79.01] 09/01/2017     Priority: Medium     Chronic pain due to trauma 02/23/2017     Priority: Medium     Closed fracture of multiple ribs of left side with routine healing, subsequent encounter 11/29/2016     Priority: Medium     Chronic bronchitis with COPD (chronic obstructive pulmonary disease) (H) 12/09/2015     Priority: Medium     Vitamin D deficiency 02/26/2014     Priority: Medium     Problem list name updated by automated process. Provider to review       Hypertriglyceridemia 01/30/2014     Priority: Medium     Impaired fasting glucose 11/27/2013     Priority: Medium     Advanced directives, counseling/discussion 09/26/2012     Priority: Medium     Information given for [facility].  Percy Deshpande MD  November 1, 2018         COPD (chronic obstructive pulmonary disease) (H) 09/26/2012     Priority: Medium     HTN, goal below 140/90 07/26/2012     Priority: Medium     CARDIOVASCULAR SCREENING; LDL GOAL LESS THAN 130 10/31/2010     Priority: Medium     Displacement of lumbar intervertebral disc without myelopathy 10/08/2002     Priority: Medium       MEDICATIONS:   Prescription Medications as of 5/15/2021       Rx Number Disp Refills Start End Last Dispensed Date Next Fill Date Owning Pharmacy    acetaminophen  (TYLENOL) 325 MG tablet    10/13/2016        Sig: Take 650 mg by mouth every 6 hours    Class: Historical    Route: Oral    allopurinol (ZYLOPRIM) 100 MG tablet  90 tablet 2 11/17/2020    [facility] [address] [place] [address], [place] - [address] [facility] [address] AT [facility] [address]    Sig: Allopurinol 700mg daily (two 300mg tablets + one 100mg tablet)    Class: E-Prescribe    allopurinol (ZYLOPRIM) 300 MG tablet  180 tablet 2 11/17/2020    [facility] [address] [place] [id number] [facility] [address] AT [facility] [address]    Sig: Allopurinol 700mg daily (two 300mg tablets + one 100mg tablet)    Class: E-Prescribe    atorvastatin (LIPITOR) 10 MG tablet  90 tablet 3 3/24/2021    [facility] [address] [place] [address] [facility] [address] AT [place] [address] [address]    Sig: Take 1 tablet (10 mg) by mouth daily    Class: E-Prescribe    Route: Oral    carvedilol (COREG) 25 MG tablet  180 tablet 0 3/22/2021    [facility] [address] [place] [id number] [facility] [address] AT [place] [address] [address]    Sig: TAKE ONE TABLET BY MOUTH TWICE DAILY WITH MEALS    Class: E-Prescribe    hydrALAZINE (APRESOLINE) 50 MG tablet  90 tablet 5 3/22/2021    [facility] [address] [place] [address] [facility] [address] AT [facility] [address]    Sig: Take 1 tablet (50 mg) by mouth 3 times daily    Class: E-Prescribe    Route: Oral    lisinopril (ZESTRIL) 20 MG tablet  180 tablet 3 3/22/2021    [facility] [address] [place] [id number] [facility] [address] AT [facility] [address]    Sig: Take 1 tablet (20 mg) by mouth 2 times daily    Class: E-Prescribe    Route: Oral    nicotine (NICORETTE) 4 MG lozenge        [facility] [address] [place] [address] [facility] [address] AT [facility] [address]    Sig: Place 4 mg inside cheek as needed    Class: Historical    Route: Buccal    Rivaroxaban ANTICOAGULANT 15 & 20 MG TBPK  90 each 3 5/4/2021 11/4/2021       Sig: Xarelto is dosed twice-daily at 15 mg with food for the first 21  days, after which patients transition to once-daily doses at 20 mg with food for the remainder of their treatment    Class: Local Print          ALLERGIES:   Chlorthalidone and Amlodipine    ROS:  An 11 point review of system was performed and pertinent negative and positives are mentioned in HPI.        Physical Examination:   VITALS:   There were no vitals taken for this visit.  No physical exam was performed, as this was a phone telemedicine visit.    Labs:    BMP RESULTS:  Lab Results   Component Value Date     05/04/2021    POTASSIUM 4.2 05/04/2021    CHLORIDE 111 (H) 05/04/2021    CO2 22 05/04/2021    ANIONGAP 6 05/04/2021     (H) 05/04/2021    BUN 16 05/04/2021    CR 1.00 05/04/2021    GFRESTIMATED 79 05/04/2021    GFRESTBLACK >90 05/04/2021    JUSTIN 9.5 05/04/2021        CBC RESULTS:  Lab Results   Component Value Date    WBC 9.5 05/04/2021    RBC 5.53 05/04/2021    HGB 17.0 05/04/2021    HCT 52.2 05/04/2021    MCV 94 05/04/2021    MCH 30.7 05/04/2021    MCHC 32.6 05/04/2021    RDW 16.3 (H) 05/04/2021     05/04/2021       INR/PTT:  Lab Results   Component Value Date    INR 0.92 05/04/2021    PTT 28 05/04/2021       Diagnostic studies: I have personally reviewed and interpreted the imaging studies.    DVT study dated 4/2/2021 reveals age indeterminate likely chronic clot involving the femoropopliteal veins.    Multiphasic CT venogram of the abdomen and pelvis dated 4/2/2021 reveals no pelvic DVT.  There is a high-grade stenosis of the left common iliac vein as it travels between the left common iliac artery and lumbar spine, a classic May Thurner lesion.    Assessment Bucky is a patient with multiple episodes of significant left lower extremity DVT including one episode of iliofemoral thrombosis requiring thrombolysis, pulmonary embolism, and a classic left vein stenosis suggestive of May Thurner syndrome.  He is currently asymptomatic.  I do believe given the recurrence of this issue and  constellation of imaging findings and extent of the DVT treatment for may Thurner disease would be warranted.  We will validate CT findings with catheter directed angiography and intravascular ultrasound.  Plan left lower extremity angiography with plan for popliteal vein access, thrombectomy and intravascular ultrasound.  Intention for placement of a left common iliac stent.  Patient will be initiated on anticoagulation using Xeralto based on my discussion with Dr. Muñoz. Our plan is to continue this for 3 months.    I, Dr Zeferino Escobar, have personally performed the entirety of this telemedicine phone visit. I have documented the findings as well as the assessment and plan.    Preparation time including image interpretation, review of the chart, discussion with patient's hematologist: 30 minutes  Phone visit duration: 25 minutes  Documentation time: 10 minutes      Zeferino Escobar MD    Vascular and Interventional Radiolgy  [facility]      CC  Patient Care Team:  Hamlet Bullock PA-C as PCP - General (Physician Assistant)  Coy Bolaños MD as MD (Rheumatology)  Germain Muñoz MD as MD (Hematology & Oncology)  Hamlet Bullock PA-C as Assigned PCP  Germain Muñoz MD as Assigned Cancer Care Provider  Samir Carroll MD as Assigned Surgical Provider  HAMLET BULLOCK

## 2021-04-12 NOTE — LETTER
April 13, 2021    Bucky Edgar  [address] [place] [address]    Bucky,     You are scheduled for your procedure with Dr Escobar on Tuesday May 4th 2 11:30 am.  We have you check in 1.5 hours early for preparation.  So please check in at 10 am to the [facility] waiting room at the [facility], [address] [facility].    -Nothing to eat or drink for 8 hours prior to the procedure.  You may have clear liquids up until 2 hours prior.  -You are ok to take your morning medications with sips of water  -You may be admitted, that will be determined day of the procedure.  You will need a      COVID TESTING:  It is required that you get  a COVID test no sooner than 4 days prior to your procedure.      The [facility] COVID-19 scheduling team will call you to schedule your pre-procedure screening as your testing window approaches. If you would like to schedule at your convenience, the COVID-19 scheduling line is [phone]     - COVID-19 tests performed outside of the [facility] network are also accepted, but must be collected and resulted within the 4-day window prior to your procedure. Clinics have varying test turnaround times, so be sure to let your provider know your turnaround time needs. Please have COVID-19 test results faxed to [phone] ASAP to avoid cancellation of your procedure or repeat COVID-19 screening.     ABRAM Chaves, RN, BSN  Interventional Radiology Nurse Coordinator   Phone:  [phone]

## 2021-04-12 NOTE — NURSING NOTE
Vascular Rooming Note     Bucky Edgar's goals for this visit include:   Chief Complaint   Patient presents with     Consult     Bucky, is participating in a virtual visit today for a consult regrding May Thurner, feeling fine, no concerns at this time, as reported by patient.     Yuli Olivas LPN

## 2021-04-14 ENCOUNTER — TELEPHONE (OUTPATIENT)
Dept: FAMILY MEDICINE | Facility: OTHER | Age: 66
End: 2021-04-14

## 2021-04-14 NOTE — TELEPHONE ENCOUNTER
Please call and notify patient that the alpha-1 antritrypsin deficiency genetic testing that we completed at his last visit is completely normal. Thanks. Will send results to scanning.    Hamlet Roberts PA-C

## 2021-04-19 ENCOUNTER — ALLIED HEALTH/NURSE VISIT (OUTPATIENT)
Dept: FAMILY MEDICINE | Facility: OTHER | Age: 66
End: 2021-04-19
Payer: COMMERCIAL

## 2021-04-19 VITALS — SYSTOLIC BLOOD PRESSURE: 138 MMHG | DIASTOLIC BLOOD PRESSURE: 78 MMHG

## 2021-04-19 DIAGNOSIS — I10 HTN, GOAL BELOW 140/90: Primary | ICD-10-CM

## 2021-04-19 PROCEDURE — 99207 PR NO CHARGE NURSE ONLY: CPT

## 2021-04-21 ENCOUNTER — IMMUNIZATION (OUTPATIENT)
Dept: PEDIATRICS | Facility: CLINIC | Age: 66
End: 2021-04-21
Attending: INTERNAL MEDICINE
Payer: COMMERCIAL

## 2021-04-21 DIAGNOSIS — Z11.59 ENCOUNTER FOR SCREENING FOR OTHER VIRAL DISEASES: ICD-10-CM

## 2021-04-21 PROCEDURE — 0002A PR COVID VAC PFIZER DIL RECON 30 MCG/0.3 ML IM: CPT

## 2021-04-21 PROCEDURE — 91300 PR COVID VAC PFIZER DIL RECON 30 MCG/0.3 ML IM: CPT

## 2021-04-26 ENCOUNTER — TELEPHONE (OUTPATIENT)
Dept: VASCULAR SURGERY | Facility: CLINIC | Age: 66
End: 2021-04-26

## 2021-04-26 NOTE — TELEPHONE ENCOUNTER
Returning urgent page.   Pt states that he never got a letter for his upcoming procedure.     I informed him that I do see the letter sent out but will mail out another copy for him    We talked about covid testing in which I did provide scheduling number for him to call and make an appt today    Will re-mail out letter again with map.    Flores VOGLE RN, BSN  Interventional Radiology/Vascular  Nurse Coordinator   Phone: [phone]  Fax: [phone]

## 2021-04-30 DIAGNOSIS — Z11.59 ENCOUNTER FOR SCREENING FOR OTHER VIRAL DISEASES: ICD-10-CM

## 2021-04-30 LAB
SARS-COV-2 RNA RESP QL NAA+PROBE: NORMAL
SPECIMEN SOURCE: NORMAL

## 2021-04-30 PROCEDURE — U0003 INFECTIOUS AGENT DETECTION BY NUCLEIC ACID (DNA OR RNA); SEVERE ACUTE RESPIRATORY SYNDROME CORONAVIRUS 2 (SARS-COV-2) (CORONAVIRUS DISEASE [COVID-19]), AMPLIFIED PROBE TECHNIQUE, MAKING USE OF HIGH THROUGHPUT TECHNOLOGIES AS DESCRIBED BY CMS-2020-01-R: HCPCS | Performed by: RADIOLOGY

## 2021-04-30 PROCEDURE — U0005 INFEC AGEN DETEC AMPLI PROBE: HCPCS | Performed by: RADIOLOGY

## 2021-05-01 LAB
LABORATORY COMMENT REPORT: NORMAL
SARS-COV-2 RNA RESP QL NAA+PROBE: NEGATIVE
SPECIMEN SOURCE: NORMAL

## 2021-05-04 ENCOUNTER — HOSPITAL ENCOUNTER (OUTPATIENT)
Facility: CLINIC | Age: 66
Discharge: HOME | End: 2021-05-04
Attending: RADIOLOGY | Admitting: RADIOLOGY
Payer: COMMERCIAL

## 2021-05-04 ENCOUNTER — APPOINTMENT (OUTPATIENT)
Dept: MEDSURG UNIT | Facility: CLINIC | Age: 66
End: 2021-05-04
Attending: RADIOLOGY
Payer: COMMERCIAL

## 2021-05-04 ENCOUNTER — APPOINTMENT (OUTPATIENT)
Dept: INTERVENTIONAL RADIOLOGY/VASCULAR | Facility: CLINIC | Age: 66
End: 2021-05-04
Attending: RADIOLOGY
Payer: COMMERCIAL

## 2021-05-04 VITALS
WEIGHT: 233 LBS | TEMPERATURE: 99 F | DIASTOLIC BLOOD PRESSURE: 98 MMHG | HEIGHT: 69 IN | HEART RATE: 76 BPM | OXYGEN SATURATION: 97 % | SYSTOLIC BLOOD PRESSURE: 170 MMHG | RESPIRATION RATE: 18 BRPM | BODY MASS INDEX: 34.51 KG/M2

## 2021-05-04 DIAGNOSIS — I82.409 DVT (DEEP VENOUS THROMBOSIS) (H): ICD-10-CM

## 2021-05-04 DIAGNOSIS — I87.1 MAY-THURNER SYNDROME: Primary | ICD-10-CM

## 2021-05-04 LAB
ANION GAP SERPL CALCULATED.3IONS-SCNC: 6 MMOL/L (ref 3–14)
APTT PPP: 28 SEC (ref 22–37)
BUN SERPL-MCNC: 16 MG/DL (ref 7–30)
CALCIUM SERPL-MCNC: 9.5 MG/DL (ref 8.5–10.1)
CHLORIDE SERPL-SCNC: 111 MMOL/L (ref 94–109)
CO2 SERPL-SCNC: 22 MMOL/L (ref 20–32)
CREAT SERPL-MCNC: 1 MG/DL (ref 0.66–1.25)
ERYTHROCYTE [DISTWIDTH] IN BLOOD BY AUTOMATED COUNT: 16.3 % (ref 10–15)
GFR SERPL CREATININE-BSD FRML MDRD: 79 ML/MIN/{1.73_M2}
GLUCOSE SERPL-MCNC: 100 MG/DL (ref 70–99)
HCT VFR BLD AUTO: 52.2 % (ref 40–53)
HGB BLD-MCNC: 17 G/DL (ref 13.3–17.7)
INR PPP: 0.92 (ref 0.86–1.14)
KCT BLD-ACNC: 221 SEC (ref 75–150)
MCH RBC QN AUTO: 30.7 PG (ref 26.5–33)
MCHC RBC AUTO-ENTMCNC: 32.6 G/DL (ref 31.5–36.5)
MCV RBC AUTO: 94 FL (ref 78–100)
PLATELET # BLD AUTO: 220 10E9/L (ref 150–450)
POTASSIUM SERPL-SCNC: 4.2 MMOL/L (ref 3.4–5.3)
RBC # BLD AUTO: 5.53 10E12/L (ref 4.4–5.9)
SODIUM SERPL-SCNC: 139 MMOL/L (ref 133–144)
WBC # BLD AUTO: 9.5 10E9/L (ref 4–11)

## 2021-05-04 PROCEDURE — 75825 VEIN X-RAY TRUNK: CPT

## 2021-05-04 PROCEDURE — 999N000134 HC STATISTIC PP CARE STAGE 3

## 2021-05-04 PROCEDURE — 99153 MOD SED SAME PHYS/QHP EA: CPT

## 2021-05-04 PROCEDURE — 99152 MOD SED SAME PHYS/QHP 5/>YRS: CPT | Mod: GC | Performed by: RADIOLOGY

## 2021-05-04 PROCEDURE — 80048 BASIC METABOLIC PNL TOTAL CA: CPT | Performed by: NURSE PRACTITIONER

## 2021-05-04 PROCEDURE — 258N000003 HC RX IP 258 OP 636: Performed by: NURSE PRACTITIONER

## 2021-05-04 PROCEDURE — 250N000009 HC RX 250: Performed by: STUDENT IN AN ORGANIZED HEALTH CARE EDUCATION/TRAINING PROGRAM

## 2021-05-04 PROCEDURE — 85347 COAGULATION TIME ACTIVATED: CPT

## 2021-05-04 PROCEDURE — 250N000013 HC RX MED GY IP 250 OP 250 PS 637: Performed by: STUDENT IN AN ORGANIZED HEALTH CARE EDUCATION/TRAINING PROGRAM

## 2021-05-04 PROCEDURE — 85027 COMPLETE CBC AUTOMATED: CPT | Performed by: NURSE PRACTITIONER

## 2021-05-04 PROCEDURE — 37252 INTRVASC US NONCORONARY 1ST: CPT

## 2021-05-04 PROCEDURE — 37238 OPEN/PERQ PLACE STENT SAME: CPT

## 2021-05-04 PROCEDURE — C1769 GUIDE WIRE: HCPCS

## 2021-05-04 PROCEDURE — 96375 TX/PRO/DX INJ NEW DRUG ADDON: CPT

## 2021-05-04 PROCEDURE — C1753 CATH, INTRAVAS ULTRASOUND: HCPCS

## 2021-05-04 PROCEDURE — 76937 US GUIDE VASCULAR ACCESS: CPT | Mod: 26 | Performed by: RADIOLOGY

## 2021-05-04 PROCEDURE — 999N000054 HC STATISTIC EKG NON-CHARGEABLE

## 2021-05-04 PROCEDURE — 272N000116 HC CATH CR1

## 2021-05-04 PROCEDURE — 250N000011 HC RX IP 250 OP 636: Performed by: RADIOLOGY

## 2021-05-04 PROCEDURE — 96374 THER/PROPH/DIAG INJ IV PUSH: CPT | Mod: XU

## 2021-05-04 PROCEDURE — 36010 PLACE CATHETER IN VEIN: CPT

## 2021-05-04 PROCEDURE — 37252 INTRVASC US NONCORONARY 1ST: CPT | Mod: GC | Performed by: RADIOLOGY

## 2021-05-04 PROCEDURE — 272N000504 HC NEEDLE CR4

## 2021-05-04 PROCEDURE — 36012 PLACE CATHETER IN VEIN: CPT

## 2021-05-04 PROCEDURE — 96376 TX/PRO/DX INJ SAME DRUG ADON: CPT | Mod: XU

## 2021-05-04 PROCEDURE — 250N000011 HC RX IP 250 OP 636: Performed by: STUDENT IN AN ORGANIZED HEALTH CARE EDUCATION/TRAINING PROGRAM

## 2021-05-04 PROCEDURE — 75820 VEIN X-RAY ARM/LEG: CPT

## 2021-05-04 PROCEDURE — 250N000011 HC RX IP 250 OP 636: Performed by: NURSE PRACTITIONER

## 2021-05-04 PROCEDURE — C1887 CATHETER, GUIDING: HCPCS

## 2021-05-04 PROCEDURE — 255N000002 HC RX 255 OP 636: Performed by: RADIOLOGY

## 2021-05-04 PROCEDURE — 272N000143 HC KIT CR3

## 2021-05-04 PROCEDURE — 93005 ELECTROCARDIOGRAM TRACING: CPT

## 2021-05-04 PROCEDURE — 85610 PROTHROMBIN TIME: CPT | Performed by: NURSE PRACTITIONER

## 2021-05-04 PROCEDURE — 76937 US GUIDE VASCULAR ACCESS: CPT

## 2021-05-04 PROCEDURE — 99152 MOD SED SAME PHYS/QHP 5/>YRS: CPT

## 2021-05-04 PROCEDURE — 37238 OPEN/PERQ PLACE STENT SAME: CPT | Mod: GC | Performed by: RADIOLOGY

## 2021-05-04 PROCEDURE — C1725 CATH, TRANSLUMIN NON-LASER: HCPCS

## 2021-05-04 PROCEDURE — 85730 THROMBOPLASTIN TIME PARTIAL: CPT | Performed by: NURSE PRACTITIONER

## 2021-05-04 PROCEDURE — 272N000302 HC DEVICE INFLATION CR5

## 2021-05-04 PROCEDURE — C1876 STENT, NON-COA/NON-COV W/DEL: HCPCS

## 2021-05-04 RX ORDER — HYDRALAZINE HYDROCHLORIDE 20 MG/ML
10 INJECTION INTRAMUSCULAR; INTRAVENOUS ONCE
Status: COMPLETED | OUTPATIENT
Start: 2021-05-04 | End: 2021-05-04

## 2021-05-04 RX ORDER — ACETAMINOPHEN 500 MG
500 TABLET ORAL EVERY 6 HOURS PRN
Status: DISCONTINUED | OUTPATIENT
Start: 2021-05-04 | End: 2021-05-04 | Stop reason: HOSPADM

## 2021-05-04 RX ORDER — LIDOCAINE 40 MG/G
CREAM TOPICAL
Status: DISCONTINUED | OUTPATIENT
Start: 2021-05-04 | End: 2021-05-04 | Stop reason: HOSPADM

## 2021-05-04 RX ORDER — IODIXANOL 320 MG/ML
150 INJECTION, SOLUTION INTRAVASCULAR ONCE
Status: COMPLETED | OUTPATIENT
Start: 2021-05-04 | End: 2021-05-04

## 2021-05-04 RX ORDER — HYDROMORPHONE HCL IN WATER/PF 6 MG/30 ML
0.2 PATIENT CONTROLLED ANALGESIA SYRINGE INTRAVENOUS ONCE
Status: COMPLETED | OUTPATIENT
Start: 2021-05-04 | End: 2021-05-04

## 2021-05-04 RX ORDER — NALOXONE HYDROCHLORIDE 0.4 MG/ML
0.4 INJECTION, SOLUTION INTRAMUSCULAR; INTRAVENOUS; SUBCUTANEOUS
Status: DISCONTINUED | OUTPATIENT
Start: 2021-05-04 | End: 2021-05-04 | Stop reason: HOSPADM

## 2021-05-04 RX ORDER — OXYCODONE AND ACETAMINOPHEN 5; 325 MG/1; MG/1
1 TABLET ORAL EVERY 6 HOURS PRN
Qty: 12 TABLET | Refills: 0 | Status: SHIPPED | OUTPATIENT
Start: 2021-05-04 | End: 2021-05-07

## 2021-05-04 RX ORDER — FLUMAZENIL 0.1 MG/ML
0.2 INJECTION, SOLUTION INTRAVENOUS
Status: DISCONTINUED | OUTPATIENT
Start: 2021-05-04 | End: 2021-05-04 | Stop reason: HOSPADM

## 2021-05-04 RX ORDER — SODIUM CHLORIDE 9 MG/ML
INJECTION, SOLUTION INTRAVENOUS CONTINUOUS
Status: DISCONTINUED | OUTPATIENT
Start: 2021-05-04 | End: 2021-05-04 | Stop reason: HOSPADM

## 2021-05-04 RX ORDER — HEPARIN SODIUM 1000 [USP'U]/ML
500-7000 INJECTION, SOLUTION INTRAVENOUS; SUBCUTANEOUS
Status: DISCONTINUED | OUTPATIENT
Start: 2021-05-04 | End: 2021-05-04 | Stop reason: HOSPADM

## 2021-05-04 RX ORDER — HEPARIN SODIUM 200 [USP'U]/100ML
1 INJECTION, SOLUTION INTRAVENOUS CONTINUOUS PRN
Status: DISCONTINUED | OUTPATIENT
Start: 2021-05-04 | End: 2021-05-04 | Stop reason: HOSPADM

## 2021-05-04 RX ORDER — OXYCODONE AND ACETAMINOPHEN 5; 325 MG/1; MG/1
1 TABLET ORAL EVERY 4 HOURS PRN
Status: DISCONTINUED | OUTPATIENT
Start: 2021-05-04 | End: 2021-05-04 | Stop reason: HOSPADM

## 2021-05-04 RX ORDER — FENTANYL CITRATE 50 UG/ML
25-50 INJECTION, SOLUTION INTRAMUSCULAR; INTRAVENOUS EVERY 5 MIN PRN
Status: DISCONTINUED | OUTPATIENT
Start: 2021-05-04 | End: 2021-05-04 | Stop reason: HOSPADM

## 2021-05-04 RX ORDER — NALOXONE HYDROCHLORIDE 0.4 MG/ML
0.2 INJECTION, SOLUTION INTRAMUSCULAR; INTRAVENOUS; SUBCUTANEOUS
Status: DISCONTINUED | OUTPATIENT
Start: 2021-05-04 | End: 2021-05-04 | Stop reason: HOSPADM

## 2021-05-04 RX ADMIN — HEPARIN SODIUM 1 BAG: 200 INJECTION, SOLUTION INTRAVENOUS at 13:52

## 2021-05-04 RX ADMIN — MIDAZOLAM 0.5 MG: 1 INJECTION INTRAMUSCULAR; INTRAVENOUS at 14:48

## 2021-05-04 RX ADMIN — MIDAZOLAM 1 MG: 1 INJECTION INTRAMUSCULAR; INTRAVENOUS at 13:50

## 2021-05-04 RX ADMIN — HYDROMORPHONE HYDROCHLORIDE 0.2 MG: 0.2 INJECTION, SOLUTION INTRAMUSCULAR; INTRAVENOUS; SUBCUTANEOUS at 15:30

## 2021-05-04 RX ADMIN — FENTANYL CITRATE 50 MCG: 50 INJECTION, SOLUTION INTRAMUSCULAR; INTRAVENOUS at 14:59

## 2021-05-04 RX ADMIN — FENTANYL CITRATE 25 MCG: 50 INJECTION, SOLUTION INTRAMUSCULAR; INTRAVENOUS at 14:34

## 2021-05-04 RX ADMIN — HEPARIN SODIUM 7000 UNITS: 1000 INJECTION INTRAVENOUS; SUBCUTANEOUS at 13:55

## 2021-05-04 RX ADMIN — LIDOCAINE HYDROCHLORIDE 8 ML: 10 INJECTION, SOLUTION EPIDURAL; INFILTRATION; INTRACAUDAL; PERINEURAL at 13:52

## 2021-05-04 RX ADMIN — SODIUM CHLORIDE: 9 INJECTION, SOLUTION INTRAVENOUS at 10:38

## 2021-05-04 RX ADMIN — MIDAZOLAM 0.5 MG: 1 INJECTION INTRAMUSCULAR; INTRAVENOUS at 14:34

## 2021-05-04 RX ADMIN — FENTANYL CITRATE 25 MCG: 50 INJECTION, SOLUTION INTRAMUSCULAR; INTRAVENOUS at 14:01

## 2021-05-04 RX ADMIN — FENTANYL CITRATE 50 MCG: 50 INJECTION, SOLUTION INTRAMUSCULAR; INTRAVENOUS at 14:53

## 2021-05-04 RX ADMIN — HYDRALAZINE HYDROCHLORIDE 10 MG: 20 INJECTION INTRAMUSCULAR; INTRAVENOUS at 17:05

## 2021-05-04 RX ADMIN — FENTANYL CITRATE 25 MCG: 50 INJECTION, SOLUTION INTRAMUSCULAR; INTRAVENOUS at 14:10

## 2021-05-04 RX ADMIN — FENTANYL CITRATE 25 MCG: 50 INJECTION, SOLUTION INTRAMUSCULAR; INTRAVENOUS at 14:25

## 2021-05-04 RX ADMIN — MIDAZOLAM 0.5 MG: 1 INJECTION INTRAMUSCULAR; INTRAVENOUS at 14:25

## 2021-05-04 RX ADMIN — MIDAZOLAM 0.5 MG: 1 INJECTION INTRAMUSCULAR; INTRAVENOUS at 14:01

## 2021-05-04 RX ADMIN — OXYCODONE HYDROCHLORIDE AND ACETAMINOPHEN 1 TABLET: 5; 325 TABLET ORAL at 18:00

## 2021-05-04 RX ADMIN — FENTANYL CITRATE 50 MCG: 50 INJECTION, SOLUTION INTRAMUSCULAR; INTRAVENOUS at 14:48

## 2021-05-04 RX ADMIN — FENTANYL CITRATE 50 MCG: 50 INJECTION, SOLUTION INTRAMUSCULAR; INTRAVENOUS at 13:50

## 2021-05-04 RX ADMIN — HYDRALAZINE HYDROCHLORIDE 10 MG: 20 INJECTION INTRAMUSCULAR; INTRAVENOUS at 15:45

## 2021-05-04 RX ADMIN — IODIXANOL 100 ML: 320 INJECTION, SOLUTION INTRAVASCULAR at 15:04

## 2021-05-04 RX ADMIN — RIVAROXABAN 15 MG: 15 TABLET, FILM COATED ORAL at 18:00

## 2021-05-04 RX ADMIN — MIDAZOLAM 0.5 MG: 1 INJECTION INTRAMUSCULAR; INTRAVENOUS at 14:10

## 2021-05-04 RX ADMIN — MIDAZOLAM 0.5 MG: 1 INJECTION INTRAMUSCULAR; INTRAVENOUS at 14:54

## 2021-05-04 ASSESSMENT — MIFFLIN-ST. JEOR: SCORE: 1842.26

## 2021-05-04 NOTE — IP AVS SNAPSHOT
[facility] [address] [address]                                    After Visit Summary   5/4/2021    Bucky Edgar    MRN: [id number]           After Visit Summary Signature Page    I have received my discharge instructions, and my questions have been answered. I have discussed any challenges I see with this plan with the nurse or doctor.    ..........................................................................................................................................  Patient/Patient Representative Signature      ..........................................................................................................................................  Patient Representative Print Name and Relationship to Patient    ..................................................               ................................................  Date                                   Time    ..........................................................................................................................................  Reviewed by Signature/Title    ...................................................              ..............................................  Date                                               Time          22EPIC Rev 08/18

## 2021-05-04 NOTE — PROCEDURES
[facility]    Procedure: LLE venogram and Left EIV stenting    Date/Time: 5/4/2021 3:20 PM  Performed by: Eriberto Arroyo MD  Authorized by: Eriberto Arroyo MD   IR Fellow Physician: Eriberto Arroyo    UNIVERSAL PROTOCOL   Site Marked: NA  Prior Images Obtained and Reviewed:  Yes  Required items: Required blood products, implants, devices and special equipment available    Patient identity confirmed:  Verbally with patient, arm band, provided demographic data and hospital-assigned identification number  Patient was reevaluated immediately before administering moderate or deep sedation or anesthesia  Confirmation Checklist:  Patient's identity using two indicators, relevant allergies, procedure was appropriate and matched the consent or emergent situation and correct equipment/implants were available  Time out: Immediately prior to the procedure a time out was called    Universal Protocol: the Joint Commission Universal Protocol was followed    Preparation: Patient was prepped and draped in usual sterile fashion    ESBL (mL):  5         ANESTHESIA    Anesthesia: Local infiltration  Local Anesthetic:  Lidocaine 1% without epinephrine  Anesthetic Total (mL):  10      SEDATION    Patient Sedated: Yes    Sedation Type:  Moderate (conscious) sedation  Vital signs: Vital signs monitored during sedation    See dictated procedure note for full details.  Findings: Left EVV compression by the right AHSAN s/o May thurner morphology.   Pressure measurements performed across the left EIV.  Left EIV stenting performed.  No thrombus identified    Specimens: none    Complications: None    Condition: Stable    Plan: Bed rest for 2 hrs.    PROCEDURE   Patient Tolerance:  Patient tolerated the procedure well with no immediate complications    Length of time physician/provider present for 1:1 monitoring during sedation: 90

## 2021-05-04 NOTE — PRE-PROCEDURE
GENERAL PRE-PROCEDURE:   Procedure:  LLE venogram, thrombectomy and further interventions  Date/Time:  5/4/2021 1:00 PM    Written consent obtained?: Yes    Risks and benefits: Risks, benefits and alternatives were discussed    Consent given by:  Patient  Patient states understanding of procedure being performed: Yes    Patient's understanding of procedure matches consent: Yes    Procedure consent matches procedure scheduled: Yes    Expected level of sedation:  Moderate  Appropriately NPO:  Yes  ASA Class:  Class 2- mild systemic disease, no acute problems, no functional limitations  Mallampati  :  Grade 2- soft palate, base of uvula, tonsillar pillars, and portion of posterior pharyngeal wall visible  Lungs:  Lungs clear with good breath sounds bilaterally  Heart:  Normal heart sounds and rate  History & Physical reviewed:  History and physical reviewed and no updates needed  Statement of review:  I have reviewed the lab findings, diagnostic data, medications, and the plan for sedation

## 2021-05-04 NOTE — PROGRESS NOTES
Pt on 2A from IR s/p left lower extremity venogram with stenting.  B/P very high, pt c/o severe pain lower abd.  Popliteal site F/D/I.  Dr Escobar over to see pt.  Updated pt and his family.  1534-IV dilaudid ordered and being given.

## 2021-05-04 NOTE — DISCHARGE INSTRUCTIONS
[facility]   Interventional Radiology  Discharge Instructions Post Peripheral Venogram with Stenting  Left behind the knee procedure site.    AFTER YOU GO HOME          Do relax and take it easy for 24 hours.       Do drink plenty of fluids.       Do resume your regular diet, unless otherwise instructed by your Primary Physician.       DO NOT smoke for at least 24 hours, if you were given any sedation.       DO NOT drink alcoholic beverages the day of your procedure.       Do not drive or operate machinery at home or at work for 24 hours.          DO NOT do any strenuous exercise or lifting for at least 3 days following your Procedure.       DO NOT take a bath or shower for at least 12 hours following your procedure.       DO NOT make any important or legal decisions for 24 hours following your procedure.    CALL THE PHYSICIAN IF:      - You start bleeding from the procedure site.  If you do start to bleed from the site, lie down flat and hold pressure on the site. A small lump or bruise is common at the puncture site.Your physician will tell you if you need to return to the hospital.      - You develop numbness, coolness or a change in color of the leg that was punctured.  Left Leg      - You experience increased pain or redness at the puncture site.      - You develop hives or a rash or unexplained itching.      - You develop a temperature of 101 degrees F or greater  Additional Information:            No tub bath, hot tubs, or swimming for 5 days    No lotion or powder to the puncture site for 5 days            Starting Xarelto watch for any bleeding from any body opening:  Nose, mouth, rectum, penis with urinating.  Please call # below for any noted bleeding.         Follow  Up appointment in 1 month in Clinic with Dr. Escobar.   The clinic will call you to set up the appointment.             Stent Card was given to patient upon discharge.             Please take your regular 2  BP meds as  soon as you get home & monitor your BP if  Possible.                [facility] INTERVENTIONAL RADIOLOGY DEPARTMENT         Procedure Physician: Dr. Zeferino Escobar                             Date of Procedure: May 4, 2021  Tuesday         Telephone Numbers:   [phone]......Monday-Friday 8:00 to 4:30 pm                                          [phone].....After 4:30 pm Monday-Friday, Weekends and  Holidays. Ask for the Interventional Radiologist on call. Someone is on call 24 hrs/day.

## 2021-05-04 NOTE — PROGRESS NOTES
Venous Pressures:     Suprarenal IVC: 16  Infrarenal IVC: 17  LEFT common Iliac vein: 18  LEFT External Iliac vein: 18

## 2021-05-04 NOTE — PROGRESS NOTES
1545  Hydralazine 10 mg IV given now for elevated BP, 190/107.  States that his abd pain has improved a lot.  Rating pain a 6/10  Now.  Daughter remains at bedside.  CTRN  1615  Resting,  Rates lower abd pain at a  3/10 now.  Declines oral intake except for water now.  CTRN  1655  BP is higher again now, spoke with Dr. Escobar .  Will repeat 10 mg of Hydralazine.  Given at 1705.  Ismael states that abd pain is completely gone now.  CTRN  1730  BP is now  176/101.  Dozing at intervals.  1750  States he is ready to be discharged.  Up, ambulated in yin & to BR.  Voided without difficulty.   Daughter has gone to [facility] to  meds.  1800  Xarelto 15mg & Percocet 5-325mg given now per order.  Pt is getting dressed now.  1815  Discharge Instructions reviewed with pt.  Verbally demonstrates understanding of instructions. Re-enforced to pt that he should watch for any bleeding from any bodily opening, nose, mouth, rectum, penis, coughing up blood, due to starting blood thinner. Call with any problems or bleeding.  Follow up in IR Clinic with Dr. Escobar in 1 month.  Last BP is 170/98.  He has 2 BP meds to take yet this evening & states he will take them as soon as he gets home.  1825  Discharged to care of daughter per ambulation accompanied by staff.  CTRN

## 2021-05-04 NOTE — IP AVS SNAPSHOT
After Visit Summary Template Not Found    This Print Group is only intended to be used in the After Visit Summary and can only be used in a report that uses a released After Visit Summary Template.                       MRN:[id number]                      After Visit Summary   5/4/2021    Bucky Edgar    MRN: [id number]           Visit Information        Department      5/4/2021  9:30 AM [facility] [address] [place]          Review of your medicines      START taking       Dose / Directions   oxyCODONE-acetaminophen 5-325 MG tablet  Commonly known as: PERCOCET  Used for: May-Thurner syndrome      Dose: 1 tablet  Take 1 tablet by mouth every 6 hours as needed for pain  Quantity: 12 tablet  Refills: 0     Rivaroxaban ANTICOAGULANT 15 & 20 MG Tbpk  Used for: May-Thurner syndrome      Xarelto is dosed twice-daily at 15 mg with food for the first 21 days, after which patients transition to once-daily doses at 20 mg with food for the remainder of their treatment  Quantity: 90 each  Refills: 3        CONTINUE these medicines which have NOT CHANGED       Dose / Directions   acetaminophen 325 MG tablet  Commonly known as: TYLENOL      Dose: 650 mg  Take 650 mg by mouth every 6 hours  Refills: 0     * allopurinol 100 MG tablet  Commonly known as: ZYLOPRIM  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      Allopurinol 700mg daily (two 300mg tablets + one 100mg tablet)  Quantity: 90 tablet  Refills: 2     * allopurinol 300 MG tablet  Commonly known as: ZYLOPRIM  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      Allopurinol 700mg daily (two 300mg tablets + one 100mg tablet)  Quantity: 180 tablet  Refills: 2     atorvastatin 10 MG tablet  Commonly known as: LIPITOR  Used for: Hyperlipidemia LDL goal <130      Dose: 10 mg  Take 1 tablet (10 mg) by mouth daily  Quantity: 90 tablet  Refills: 3     carvedilol 25 MG tablet  Commonly known as: COREG  Used for: HTN, goal below  140/90      TAKE ONE TABLET BY MOUTH TWICE DAILY WITH MEALS  Quantity: 180 tablet  Refills: 0     hydrALAZINE 50 MG tablet  Commonly known as: APRESOLINE  Used for: HTN, goal below 140/90      Dose: 50 mg  Take 1 tablet (50 mg) by mouth 3 times daily  Quantity: 90 tablet  Refills: 5     lisinopril 20 MG tablet  Commonly known as: ZESTRIL  Used for: HTN, goal below 140/90      Dose: 20 mg  Take 1 tablet (20 mg) by mouth 2 times daily  Quantity: 180 tablet  Refills: 3     nicotine 4 MG lozenge  Commonly known as: NICORETTE      Dose: 4 mg  Place 4 mg inside cheek as needed  Refills: 0         * This list has 2 medication(s) that are the same as other medications prescribed for you. Read the directions carefully, and ask your doctor or other care provider to review them with you.               Where to get your medicines      Some of these will need a paper prescription and others can be bought over the counter. Ask your nurse if you have questions.    Bring a paper prescription for each of these medications  oxyCODONE-acetaminophen 5-325 MG tablet  Rivaroxaban ANTICOAGULANT 15 & 20 MG Tbpk           Prescriptions were sent or printed at these locations (2 Prescriptions)            [id number] - [place], [place] - [address] AT [facility] [address]   [address] [address] [address]    Telephone: [phone]   Fax: [phone]   Hours:                 Printed at Department/Unit printer (1 of 1)         oxyCODONE-acetaminophen (PERCOCET) 5-325 MG tablet                     Other Prescriptions                Printed at Department/Unit printer (1 of 1)         Rivaroxaban ANTICOAGULANT 15 & 20 MG TBPK                Protect others around you: Learn how to safely use, store and throw away your medicines at www.disposemymeds.org.       Follow-ups after your visit       Your next 10 appointments already scheduled    May 18, 2021  8:00 AM  Telephone Visit with Coy Bolaños MD  [facility]  [facility] ([facility] ) [address] [id number] [phone]   Please Note: This is a virtual visit; there is no need to come to the facility.       May 24, 2021 10:45 AM  LAB with [facility]  [facility] ([facility] ) [address] [id number] [phone]   Please do not eat 10-12 hours before your appointment if you are coming in fasting for labs on lipids, cholesterol, or glucose (sugar). Does not apply to pregnant women. Water, tea and black coffee (with nothing added) is okay. Do not drink other fluids, diet soda or gum. If you have concerns about taking your medications, please send a message by clicking on Secure Messaging, Message Your Care Team.        Future tests that were ordered for you     US Lower Extremity Venous Duplex Left        Care Instructions       Further instructions from your care team       [facility]   Interventional Radiology  Discharge Instructions Post Peripheral Venogram with Stenting  Left behind the knee procedure site.    AFTER YOU GO HOME          Do relax and take it easy for 24 hours.       Do drink plenty of fluids.       Do resume your regular diet, unless otherwise instructed by your Primary Physician.       DO NOT smoke for at least 24 hours, if you were given any sedation.       DO NOT drink alcoholic beverages the day of your procedure.       Do not drive or operate machinery at home or at work for 24 hours.          DO NOT do any strenuous exercise or lifting for at least 3 days following your Procedure.       DO NOT take a bath or shower for at least 12 hours following your procedure.       DO NOT make any important or legal decisions for 24 hours following your procedure.    CALL THE PHYSICIAN IF:      - You start bleeding from the procedure site.  If you do start to bleed from the site, lie down flat and hold  pressure on the site. A small lump or bruise is common at the puncture site.Your physician will tell you if you need to return to the hospital.      - You develop numbness, coolness or a change in color of the leg that was punctured.  Left Leg      - You experience increased pain or redness at the puncture site.      - You develop hives or a rash or unexplained itching.      - You develop a temperature of 101 degrees F or greater  Additional Information:            No tub bath, hot tubs, or swimming for 5 days    No lotion or powder to the puncture site for 5 days            Starting Xarelto watch for any bleeding from any body opening:  Nose, mouth, rectum, penis with urinating.  Please call # below for any noted bleeding.         Follow  Up appointment in 1 month in Clinic with Dr. Escobar.   The clinic will call you to set up the appointment.             Stent Card was given to patient upon discharge.             Please take your regular 2  BP meds as soon as you get home & monitor your BP if  Possible.                [facility] INTERVENTIONAL RADIOLOGY DEPARTMENT         Procedure Physician: Dr. Zeferino Escobar                             Date of Procedure: May 4, 2021  Tuesday         Telephone Numbers:   [phone]......Monday-Friday 8:00 to 4:30 pm                                          [phone].....After 4:30 pm Monday-Friday, Weekends and  Holidays. Ask for the Interventional Radiologist on call. Someone is on call 24 hrs/day.             Information about OPIOIDS    PRESCRIPTION OPIOIDS: WHAT YOU NEED TO KNOW   We gave you an opioid (narcotic) pain medicine. It is important to manage your pain, but opioids are not always the best choice. You should first try all the other options your care team gave you. Take this medicine for as short a time (and as few doses) as possible.    Some activities can increase your pain, such as bandage changes or therapy sessions. It may help to take your pain medicine 30 to 60  minutes before these activities. Reduce your stress by getting enough sleep, working on hobbies you enjoy and practicing relaxation or meditation. Talk to your care team about ways to manage your pain beyond prescription opioids.    These medicines have risks:    DO NOT drive when on new or higher doses of pain medicine. These medicines can affect your alertness and reaction times, and you could be arrested for driving under the influence (DUI). If you need to use opioids long-term, talk to your care team about driving.    DO NOT operate heavy machinery    DO NOT do any other dangerous activities while taking these medicines.    DO NOT drink any alcohol while taking these medicines.     If the opioid prescribed includes acetaminophen, DO NOT take with any other medicines that contain acetaminophen. Read all labels carefully. Look for the word  acetaminophen  or  Tylenol.  Ask your pharmacist if you have questions or are unsure.    You can get addicted to pain medicines, especially if you have a history of addiction (chemical, alcohol or substance dependence). Talk to your care team about ways to reduce this risk.    All opioids tend to cause constipation. Drink plenty of water and eat foods that have a lot of fiber, such as fruits, vegetables, prune juice, apple juice and high-fiber cereal. Take a laxative (Miralax, milk of magnesia, Colace, Senna) if you don t move your bowels at least every other day. Other side effects include upset stomach, sleepiness, dizziness, throwing up, tolerance (needing more of the medicine to have the same effect), physical dependence and slowed breathing.    Store your pills in a secure place, locked if possible. We will not replace any lost or stolen medicine. If you don t finish your medicine, please throw away (dispose) as directed by your pharmacist. Medication waste collection kiosks are conveniently located at all [facility] retail pharmacy locations.  The  "[place] Pollution Control Agency has more information about safe disposal: https://[email or web address].[facility].[place]/living-green/managing-unwanted-medications       Additional Information About Your Visit       MyChart Information    MyChart lets you send messages to your doctor, view your test results, renew your prescriptions, schedule appointments and more. To sign up, go to [email or web address] . Click on \"Log in\" on the left side of the screen, which will take you to the Welcome page. Then click on \"Sign up Now\" on the right side of the page.     You will be asked to enter the access code listed below, as well as some personal information. Please follow the directions to create your username and password.     Your access code is: [email or web address]  Expires: 2021 12:01 PM     Your access code will  in 60 days. If you need help or a new code, please call your   [facility] clinic or [phone].       Care EveryWhere ID    This is your Care EveryWhere ID. This could be used by other organizations to access your [place] medical records  [id number]       Your Vitals Were  Most recent update: 2021  5:35 PM    Blood Pressure   176/101            Pulse   74          Temperature   99  F (37.2  C) (Oral)          Respirations   18          Height   1.753 m (5' 9\")             Weight   105.7 kg (233 lb)    Pulse Oximetry   97%    BMI (Body Mass Index)   34.41 kg/m           Primary Care Provider Office Phone # Fax #    Hamlet Roberts PA-C [phone]      Equal Access to Services    [facility] : Hadii lynn cortez Sohung, waaxda luqadaha, qaybta kaalmachelsy acevedo. So [facility] [phone].    ATENCIÓN: Si habla español, tiene a garcia disposición servicios gratuitos de asistencia lingüística. Nicoleame al [phone].    We comply with applicable federal and state civil rights laws, including the [place] Human Rights Act. We do not " discriminate on the basis of race, color, creed, Mormon, national origin, marital status, age, disability, sex, sexual orientation, or gender identity.    If you would like an itemization of your charges they will now be available in [employer] 30 days after discharge. To access the itemized statements in [employer] go to billing/billing summary. From there select view account. There will be multiple tabs showing an overview of your account, detail, payments, and communications. From the communications tab you can see your monthly statements, your itemized statements, and any billing letters generated for your account. If you do not have a [employer] account and need help getting access please contact [employer] support at [phone].  If you would prefer to have your itemized statements mailed please contact our automated itemized bill request line at [phone] option  2.       Thank you!    Thank you for choosing [facility] for your care. Our goal is always to provide you with excellent care. Hearing back from our patients is one way we can continue to improve our services. Please take a few minutes to complete the written survey that you may receive in the mail after you visit with us. Thank you!            Medication List      Medications          Morning Afternoon Evening Bedtime As Needed    acetaminophen 325 MG tablet  Also known as: TYLENOL  INSTRUCTIONS: Take 650 mg by mouth every 6 hours                     * allopurinol 100 MG tablet  Also known as: ZYLOPRIM  INSTRUCTIONS: Allopurinol 700mg daily (two 300mg tablets + one 100mg tablet)                     * allopurinol 300 MG tablet  Also known as: ZYLOPRIM  INSTRUCTIONS: Allopurinol 700mg daily (two 300mg tablets + one 100mg tablet)                     atorvastatin 10 MG tablet  Also known as: LIPITOR  INSTRUCTIONS: Take 1 tablet (10 mg) by mouth daily                     carvedilol 25 MG tablet  Also known as: COREG  INSTRUCTIONS: TAKE ONE TABLET BY MOUTH TWICE  DAILY WITH MEALS                     hydrALAZINE 50 MG tablet  Also known as: APRESOLINE  INSTRUCTIONS: Take 1 tablet (50 mg) by mouth 3 times daily  LAST TAKEN: Ask your nurse or doctor                     lisinopril 20 MG tablet  Also known as: ZESTRIL  INSTRUCTIONS: Take 1 tablet (20 mg) by mouth 2 times daily                     nicotine 4 MG lozenge  Also known as: NICORETTE  INSTRUCTIONS: Place 4 mg inside cheek as needed                     oxyCODONE-acetaminophen 5-325 MG tablet  Also known as: PERCOCET  INSTRUCTIONS: Take 1 tablet by mouth every 6 hours as needed for pain                     Rivaroxaban ANTICOAGULANT 15 & 20 MG Tbpk  INSTRUCTIONS: Xarelto is dosed twice-daily at 15 mg with food for the first 21 days, after which patients transition to once-daily doses at 20 mg with food for the remainder of their treatment                        * This list has 2 medication(s) that are the same as other medications prescribed for you. Read the directions carefully, and ask your doctor or other care provider to review them with you.

## 2021-05-04 NOTE — PROGRESS NOTES
1045 Prep is complete for procedure except for consent.  Ismael states that he has chronic lower back pain for many, many years.  He has had surgery, but still has the pain.  Today he rates this pain as aching at a 4-5.  Daughter, Hamida, is here as  home.  CTRN

## 2021-05-04 NOTE — IR NOTE
Patient Name: Bucky Edgar  Medical Record Number: [id number]  Today's Date: 5/4/2021    Procedure: LLE Venogram and Intervention  Proceduralist: Zeferino Escobar MD; Eriberto Arroyo MD (On-Call Pager #[phone])    Patient in room: 1325  Procedure Start: 1344  Procedure end: 1500  Sedation medications administered: 4 mg midazolam, 300 mcg fentanyl, 7,000 unit(s) heparin    Report given to: Misti CASTANON on 2A at 1512  : n/a    Other Notes: Pt arrived to IR room 4 from . Consent reviewed. Pt denies any questions or concerns regarding procedure. Pt positioned supine and monitored per protocol. Pt tolerated procedure without any noted complications. Pt transferred back to 2A.    Access obtained through L popliteal vein. Manual pressure held. Site dry and clean.    MD aware of hypertension and continued pain on end of procedure.     ACT: 221 at 1420

## 2021-05-05 LAB — INTERPRETATION ECG - MUSE: NORMAL

## 2021-05-07 ENCOUNTER — TELEPHONE (OUTPATIENT)
Dept: VASCULAR SURGERY | Facility: CLINIC | Age: 66
End: 2021-05-07

## 2021-05-07 DIAGNOSIS — I87.1 MAY-THURNER SYNDROME: ICD-10-CM

## 2021-05-07 DIAGNOSIS — I82.409 DVT (DEEP VENOUS THROMBOSIS) (H): Primary | ICD-10-CM

## 2021-05-18 ENCOUNTER — VIRTUAL VISIT (OUTPATIENT)
Dept: RHEUMATOLOGY | Facility: CLINIC | Age: 66
End: 2021-05-18
Payer: COMMERCIAL

## 2021-05-18 DIAGNOSIS — M1A.09X0 IDIOPATHIC CHRONIC GOUT OF MULTIPLE SITES WITHOUT TOPHUS: Primary | ICD-10-CM

## 2021-05-18 PROCEDURE — 99214 OFFICE O/P EST MOD 30 MIN: CPT | Mod: 95 | Performed by: INTERNAL MEDICINE

## 2021-05-18 RX ORDER — ALLOPURINOL 100 MG/1
TABLET ORAL
Qty: 90 TABLET | Refills: 2 | Status: SHIPPED | OUTPATIENT
Start: 2021-05-18 | End: 2022-02-01

## 2021-05-18 RX ORDER — ALLOPURINOL 300 MG/1
TABLET ORAL
Qty: 180 TABLET | Refills: 2 | Status: SHIPPED | OUTPATIENT
Start: 2021-05-18 | End: 2022-02-01

## 2021-05-18 NOTE — PATIENT INSTRUCTIONS
RHEUMATOLOGY    Dr. Coy Bolaños    [facility]  [address] [place] [address]    Our new phone number for Rheumatology is [phone], this number will be able to help you schedule appointments for Dr. Bolaños or if you have any message you would like sent to us.    Thank you for choosing [facility]!    Saba Patricia [facility]

## 2021-05-18 NOTE — PROGRESS NOTES
Bucky Edgar is a 65 year old year old male who is being evaluated via a billable telephone visit.      What state will you be in during your phone visit?  [place]  What phone number would you like to be contacted at? [phone]  How would you like to obtain your AVS? Mail a copy    Rheumatology Telephone/Telehealth  Visit      Bucky Edgar MRN# [id number]   YOB: 1955 Age: 65 year old      Date of visit: 5/18/21   PCP: Dr. Percy Deshpande    Chief Complaint   Patient presents with:  Arthritis: Gout.No recent flares, staying good    Assessment and Plan     1.  Gout: Started having gout flares in early 2017, acute onset and resolved with prednisone; joints involved include his left ankle, left first MTP, and left wrist.  Colchicine was cost prohibitive.  Currently on allopurinol 700 mg daily and is doing well with no gout flares.  Note that he was still flaring when on allopurinol 600mg daily and at that time he also has a low uric acid level.   Chronic illness, stable.    - Continue allopurinol 700 mg daily (two 300mg tablets + one 100mg tablet)  - For gout flare only: prednisone 60mg daily x2days, then 40mg daily x5days, then 20mg daily x5days, then stop.  - Labs within the next 2 months: Hepatic panel, uric acid (CBC and creatinine were already checked recently)  - Labs in 6 months: CBC, CMP, Uric Acid    2. Elevated Hgb: thought to be polycythemia vera by Dr. Muñoz (hematology/oncology) secondary to heavy smoking and underlying COPD as well as G[place] syndrome. Advised to continue baby aspirin beebe by Dr. Muñoz. Phlebotomy was also started to try to keep the hematocrit <50.  This is documented here for historical significance only     # Status-post 2 doses of the Pfizer COVID-19 vaccine, last received 4/21/2021    Total minutes spent in evaluation with patient, documentation, , and review of pertinent studies and chart notes: 10     Mr. Edgar verbalized agreement with and  understanding of the rational for the diagnosis and treatment plan.  All questions were answered to best of my ability and the patient's satisfaction. Mr. Edgar was advised to contact the clinic with any questions that may arise after the clinic visit.     Thank you for involving me in the care of the patient    Return to clinic: 6 months      HPI   Bucky Edgar is a 65 year old male with a past medical history significant for COPD, dyslipidemia, vitamin D deficiency, history of rib fractures, DVT/PE on anticoagulation, hypertension, who is seen for follow-up of gout.    Today, 5/18/2021: Doing well with regard to gout.  No gout flares since last seen.  Tolerating allopurinol well.  Has not missed any allopurinol doses.  Has not required prednisone since last seen.ore.     Denies fevers, chills, nausea, vomiting, constipation, diarrhea. No abdominal pain. No chest pain/pressure, palpitations, or shortness of breath currently. No LE swelling.  No rash.      Tobacco: Quit in 2017  EtOH: 6 beers per week  Drugs: None    ROS   12 point review of system was completed and negative except as noted in the HPI     Active Problem List     Patient Active Problem List   Diagnosis     Displacement of lumbar intervertebral disc without myelopathy     CARDIOVASCULAR SCREENING; LDL GOAL LESS THAN 130     HTN, goal below 140/90     Advanced directives, counseling/discussion     COPD (chronic obstructive pulmonary disease) (H)     Impaired fasting glucose     Hypertriglyceridemia     Vitamin D deficiency     Chronic bronchitis with COPD (chronic obstructive pulmonary disease) (H)     Closed fracture of multiple ribs of left side with routine healing, subsequent encounter     Chronic pain due to trauma     Long-term (current) use of anticoagulants [Z79.01]     Elevated serum creatinine     History of deep venous thrombosis (DVT) of distal vein of left lower extremity     Elevated liver enzymes     Renal atrophy, right      Abdominal aortic aneurysm (AAA) without rupture (H)     Hepatomegaly     Fatty liver     Polycythemia     Pulmonary embolism without acute cor pulmonale, unspecified chronicity, unspecified pulmonary embolism type (H)     Elevated hemoglobin (H)     Abnormal CT lung screening     Past Medical History     Past Medical History:   Diagnosis Date     COPD (chronic obstructive pulmonary disease) (H)      Displacement of lumbar intervertebral disc without myelopathy 1995, 2002     HTN, goal below 140/90      Past Surgical History     Past Surgical History:   Procedure Laterality Date     C DECOMPRESS SPINAL CORD,1 SEG  1995, 4/2002    leftL4-L5, 2nd right L4-L5     C SPINE FUSION,ANTER,3 SGMTS  2/9/2004    ant and post fusion L4-S1     HC REPAIR ROTATOR CUFF,CHRONIC  1989     Allergy     Allergies   Allergen Reactions     Chlorthalidone Other (See Comments)     Excessive lowering of blood pressure     Amlodipine Other (See Comments)     Intractable headache with use of medication as well as noting a small tremor of the upper extremities     Current Medication List     Current Outpatient Medications   Medication Sig     acetaminophen (TYLENOL) 325 MG tablet Take 650 mg by mouth every 6 hours     allopurinol (ZYLOPRIM) 100 MG tablet Allopurinol 700mg daily (two 300mg tablets + one 100mg tablet)     allopurinol (ZYLOPRIM) 300 MG tablet Allopurinol 700mg daily (two 300mg tablets + one 100mg tablet)     atorvastatin (LIPITOR) 10 MG tablet Take 1 tablet (10 mg) by mouth daily     carvedilol (COREG) 25 MG tablet TAKE ONE TABLET BY MOUTH TWICE DAILY WITH MEALS     hydrALAZINE (APRESOLINE) 50 MG tablet Take 1 tablet (50 mg) by mouth 3 times daily     lisinopril (ZESTRIL) 20 MG tablet Take 1 tablet (20 mg) by mouth 2 times daily     nicotine (NICORETTE) 4 MG lozenge Place 4 mg inside cheek as needed     Rivaroxaban ANTICOAGULANT 15 & 20 MG TBPK Xarelto is dosed twice-daily at 15 mg with food for the first 21 days, after which  "patients transition to once-daily doses at 20 mg with food for the remainder of their treatment     No current facility-administered medications for this visit.          Social History   See HPI    Family History     Family History   Problem Relation Age of Onset     Family History Negative Other      Diabetes No family hx of      Coronary Artery Disease No family hx of      Hypertension No family hx of      Hyperlipidemia No family hx of      Breast Cancer No family hx of      Prostate Cancer No family hx of      Anxiety Disorder No family hx of      Substance Abuse No family hx of      Asthma No family hx of      Thyroid Disease No family hx of      Unknown/Adopted No family hx of      Genetic Disorder No family hx of      Osteoporosis No family hx of      Anesthesia Reaction No family hx of      Mental Illness No family hx of      Depression No family hx of      Other Cancer No family hx of      Colon Cancer No family hx of      Cerebrovascular Disease No family hx of      Obesity No family hx of        Physical Exam     Temp Readings from Last 3 Encounters:   05/04/21 99  F (37.2  C) (Oral)   03/24/21 96.6  F (35.9  C) (Temporal)   03/22/21 98.4  F (36.9  C) (Temporal)     BP Readings from Last 5 Encounters:   05/04/21 (!) 170/98   04/19/21 138/78   04/05/21 (!) 136/90   03/24/21 (!) 150/80   03/22/21 (!) 140/90     Pulse Readings from Last 1 Encounters:   05/04/21 76     Resp Readings from Last 1 Encounters:   05/04/21 18     Estimated body mass index is 34.41 kg/m  as calculated from the following:    Height as of 5/4/21: 1.753 m (5' 9\").    Weight as of 5/4/21: 105.7 kg (233 lb).    GEN: alert and no distress  PSYCH: Alert; coherent speech, normal rate and volume, able to articulate logical thoughts, able   to abstract reason, no tangential thoughts. Normal affect.   RESP: No cough, no audible wheezing, able to talk in full sentences  Remainder of exam unable to be completed due to telephone visits       Labs " / Imaging (select studies)     CBC  Recent Labs   Lab Test 05/04/21  1030 08/25/20  0940 07/28/20  1040 07/14/20  1030   WBC 9.5 8.1 8.4 7.1   RBC 5.53 5.29 5.48 5.10   HGB 17.0 15.4 16.5 15.5   HCT 52.2 48.3 51.9 48.9   MCV 94 91 95 96   RDW 16.3* 14.6 16.3* 17.4*    259 242 210   MCH 30.7 29.1 30.1 30.4   MCHC 32.6 31.9 31.8 31.7   NEUTROPHIL  --  73.0 68.7 66.8   LYMPH  --  14.2 18.6 20.2   MONOCYTE  --  8.9 8.5 8.6   EOSINOPHIL  --  2.7 2.8 2.8   BASOPHIL  --  1.0 0.8 1.0   ANEU  --  5.9 5.7 4.8   ALYM  --  1.2 1.6 1.4   LIZZ  --  0.7 0.7 0.6   AEOS  --  0.2 0.2 0.2   ABAS  --  0.1 0.1 0.1     CMP  Recent Labs   Lab Test 05/04/21  1030 03/22/21  1219 06/02/20  0903 05/08/20  0959 12/13/19  1518    139 140 143 141   POTASSIUM 4.2 4.2 4.2 3.7 4.2   CHLORIDE 111* 108 112* 111* 110*   CO2 22 29 23 28 27   ANIONGAP 6 2* 5 4 4   * 100* 113* 113* 101*   BUN 16 11 20 8 14   CR 1.00 0.94 1.07 0.90 1.08   GFRESTIMATED 79 86 74 >90 73   GFRESTBLACK >90 >90 85 >90 85   JUSTIN 9.5 9.1 9.0 8.0* 9.1   BILITOTAL  --   --  0.9 0.7 0.9   ALBUMIN  --   --  3.4 3.3* 3.6   PROTTOTAL  --   --  6.8 7.0 6.9   ALKPHOS  --   --  95 104 99   AST  --   --  19 16 19   ALT  --   --  31 24 28     Uric Acid  Recent Labs   Lab Test 06/02/20  0903 05/08/20  0959 11/07/19  0900 05/08/19  1041 08/07/18  0926 05/02/18  1039   URIC 2.2* 1.5* 4.0 2.3* 2.0* 3.3*     Immunization History     Immunization History   Administered Date(s) Administered     COVID-19,PF,Pfizer 03/31/2021, 04/21/2021     Influenza Quad, Recombinant, p-free (RIV4) 11/12/2019     Influenza Vaccine IM > 6 months Valent IIV4 11/27/2013, 10/29/2015, 12/12/2016, 10/23/2017, 11/01/2018     Mantoux Tuberculin Skin Test 07/17/2012     Pneumo Conj 13-V (2010&after) 01/22/2016     Pneumococcal 23 valent 11/27/2013     TDAP Vaccine (Adacel) 08/25/2009, 11/25/2019     TDAP Vaccine (Boostrix) 08/25/2009          Chart documentation done in part with Dragon Voice recognition  Software. Although reviewed after completion, some word and grammatical error may remain.      Phone call duration with patient (in minutes): 5    Location of patient: [place], [place]   Location of provider: dhruv Bolaños [place]

## 2021-05-24 DIAGNOSIS — D75.1 POLYCYTHEMIA: ICD-10-CM

## 2021-05-24 DIAGNOSIS — M1A.09X0 IDIOPATHIC CHRONIC GOUT OF MULTIPLE SITES WITHOUT TOPHUS: ICD-10-CM

## 2021-05-24 DIAGNOSIS — E78.5 HYPERLIPIDEMIA LDL GOAL <130: ICD-10-CM

## 2021-05-24 LAB
ALBUMIN SERPL-MCNC: 3.4 G/DL (ref 3.4–5)
ALP SERPL-CCNC: 98 U/L (ref 40–150)
ALT SERPL W P-5'-P-CCNC: 28 U/L (ref 0–70)
ANION GAP SERPL CALCULATED.3IONS-SCNC: 5 MMOL/L (ref 3–14)
AST SERPL W P-5'-P-CCNC: 18 U/L (ref 0–45)
BASOPHILS # BLD AUTO: 0.1 10E9/L (ref 0–0.2)
BASOPHILS NFR BLD AUTO: 0.7 %
BILIRUB DIRECT SERPL-MCNC: 0.2 MG/DL (ref 0–0.2)
BILIRUB SERPL-MCNC: 0.7 MG/DL (ref 0.2–1.3)
BUN SERPL-MCNC: 13 MG/DL (ref 7–30)
CALCIUM SERPL-MCNC: 8.5 MG/DL (ref 8.5–10.1)
CHLORIDE SERPL-SCNC: 110 MMOL/L (ref 94–109)
CHOLEST SERPL-MCNC: 155 MG/DL
CO2 SERPL-SCNC: 25 MMOL/L (ref 20–32)
CREAT SERPL-MCNC: 0.95 MG/DL (ref 0.66–1.25)
DIFFERENTIAL METHOD BLD: ABNORMAL
EOSINOPHIL # BLD AUTO: 0.3 10E9/L (ref 0–0.7)
EOSINOPHIL NFR BLD AUTO: 3.9 %
ERYTHROCYTE [DISTWIDTH] IN BLOOD BY AUTOMATED COUNT: 15.8 % (ref 10–15)
GFR SERPL CREATININE-BSD FRML MDRD: 84 ML/MIN/{1.73_M2}
GLUCOSE SERPL-MCNC: 112 MG/DL (ref 70–99)
HCT VFR BLD AUTO: 48.8 % (ref 40–53)
HDLC SERPL-MCNC: 51 MG/DL
HGB BLD-MCNC: 16 G/DL (ref 13.3–17.7)
LDLC SERPL CALC-MCNC: 74 MG/DL
LYMPHOCYTES # BLD AUTO: 1.6 10E9/L (ref 0.8–5.3)
LYMPHOCYTES NFR BLD AUTO: 18.2 %
MCH RBC QN AUTO: 30.9 PG (ref 26.5–33)
MCHC RBC AUTO-ENTMCNC: 32.8 G/DL (ref 31.5–36.5)
MCV RBC AUTO: 94 FL (ref 78–100)
MONOCYTES # BLD AUTO: 0.7 10E9/L (ref 0–1.3)
MONOCYTES NFR BLD AUTO: 7.8 %
NEUTROPHILS # BLD AUTO: 6.1 10E9/L (ref 1.6–8.3)
NEUTROPHILS NFR BLD AUTO: 69.4 %
NONHDLC SERPL-MCNC: 104 MG/DL
PLATELET # BLD AUTO: 217 10E9/L (ref 150–450)
POTASSIUM SERPL-SCNC: 4.4 MMOL/L (ref 3.4–5.3)
PROT SERPL-MCNC: 6.9 G/DL (ref 6.8–8.8)
RBC # BLD AUTO: 5.18 10E12/L (ref 4.4–5.9)
SODIUM SERPL-SCNC: 140 MMOL/L (ref 133–144)
TRIGL SERPL-MCNC: 151 MG/DL
URATE SERPL-MCNC: 2.1 MG/DL (ref 3.5–7.2)
WBC # BLD AUTO: 8.8 10E9/L (ref 4–11)

## 2021-05-24 PROCEDURE — 80061 LIPID PANEL: CPT | Performed by: INTERNAL MEDICINE

## 2021-05-24 PROCEDURE — 82248 BILIRUBIN DIRECT: CPT | Performed by: INTERNAL MEDICINE

## 2021-05-24 PROCEDURE — 36415 COLL VENOUS BLD VENIPUNCTURE: CPT | Performed by: INTERNAL MEDICINE

## 2021-05-24 PROCEDURE — 80053 COMPREHEN METABOLIC PANEL: CPT | Performed by: INTERNAL MEDICINE

## 2021-05-24 PROCEDURE — 85025 COMPLETE CBC W/AUTO DIFF WBC: CPT | Performed by: INTERNAL MEDICINE

## 2021-05-24 PROCEDURE — 84550 ASSAY OF BLOOD/URIC ACID: CPT | Performed by: INTERNAL MEDICINE

## 2021-05-26 NOTE — RESULT ENCOUNTER NOTE
RN: Please call to notify Bucky Edgar that labs are stable. Uric acid is at goal.     Coy Bolaños MD  5/25/2021 11:30 PM

## 2021-06-03 ENCOUNTER — ANCILLARY PROCEDURE (OUTPATIENT)
Dept: ULTRASOUND IMAGING | Facility: CLINIC | Age: 66
End: 2021-06-03
Attending: RADIOLOGY
Payer: COMMERCIAL

## 2021-06-03 DIAGNOSIS — I82.409 DVT (DEEP VENOUS THROMBOSIS) (H): ICD-10-CM

## 2021-06-03 DIAGNOSIS — I87.1 MAY-THURNER SYNDROME: ICD-10-CM

## 2021-06-03 PROCEDURE — 93971 EXTREMITY STUDY: CPT | Mod: LT | Performed by: RADIOLOGY

## 2021-06-03 PROCEDURE — 93978 VASCULAR STUDY: CPT | Performed by: RADIOLOGY

## 2021-06-21 ENCOUNTER — OFFICE VISIT (OUTPATIENT)
Dept: VASCULAR SURGERY | Facility: CLINIC | Age: 66
End: 2021-06-21
Payer: COMMERCIAL

## 2021-06-21 VITALS — OXYGEN SATURATION: 96 % | HEART RATE: 66 BPM | DIASTOLIC BLOOD PRESSURE: 93 MMHG | SYSTOLIC BLOOD PRESSURE: 141 MMHG

## 2021-06-21 DIAGNOSIS — I87.1 MAY-THURNER SYNDROME: Primary | ICD-10-CM

## 2021-06-21 PROCEDURE — 99214 OFFICE O/P EST MOD 30 MIN: CPT | Mod: 95 | Performed by: RADIOLOGY

## 2021-06-21 ASSESSMENT — PAIN SCALES - GENERAL: PAINLEVEL: EXTREME PAIN (8)

## 2021-06-21 NOTE — NURSING NOTE
Vascular Rooming Note     Bucky Edgar's goals for this visit include:   Chief Complaint   Patient presents with     RECHLILY Lawler, is being seen today for a one month follow up may thurner/guillermina, feeling good, no concerns at this time, as reported by patient.     Yuli Olivas LPN

## 2021-06-21 NOTE — LETTER
6/21/2021       RE: Bucky Edgar  [address] [place] [address]     Dear Colleague,    Thank you for referring your patient, Bucky Edgar, to the [facility] [place] [facility]. Please see a copy of my visit note below.        INTERVENTIONAL RADIOLOGY CONSULTATION    Name: Bucky Edgar  Age: 65 year old   Referring Physician: Dr. Muñoz   REASON FOR REFERRAL: May Thurner disease, status post stent placement on 5/4/2021    HPI: Bucky is a very pleasant 65-year-old male with extensive/recurrent left lower extremity DVT and PE with may Thurner syndrome, status post stent placement in the left common iliac vein on 5/4/2021.  He has done very well post procedurally.  No new bouts of DVT no swelling of the left leg.  No PE.  In the postsurgically he had some pain, likely related to self-expandable stents, and his back which has resolved.  He has stopped taking Xarelto after he ran out of sample.  No new complaints.    PAST MEDICAL HISTORY:   Past Medical History:   Diagnosis Date     COPD (chronic obstructive pulmonary disease) (H)      Displacement of lumbar intervertebral disc without myelopathy 1995, 2002     HTN, goal below 140/90        PAST SURGICAL HISTORY:   Past Surgical History:   Procedure Laterality Date     C DECOMPRESS SPINAL CORD,1 SEG  1995, 4/2002    leftL4-L5, 2nd right L4-L5     C SPINE FUSION,ANTER,3 SGMTS  2/9/2004    ant and post fusion L4-S1     HC REPAIR ROTATOR CUFF,CHRONIC  1989     IR LOWER EXTREMITY VENOGRAM LEFT  5/4/2021     IR VENOUS STENT  5/4/2021       FAMILY HISTORY:   Family History   Problem Relation Age of Onset     Family History Negative Other      Diabetes No family hx of      Coronary Artery Disease No family hx of      Hypertension No family hx of      Hyperlipidemia No family hx of      Breast Cancer No family hx of      Prostate Cancer No family hx of      Anxiety Disorder No family hx of       Substance Abuse No family hx of      Asthma No family hx of      Thyroid Disease No family hx of      Unknown/Adopted No family hx of      Genetic Disorder No family hx of      Osteoporosis No family hx of      Anesthesia Reaction No family hx of      Mental Illness No family hx of      Depression No family hx of      Other Cancer No family hx of      Colon Cancer No family hx of      Cerebrovascular Disease No family hx of      Obesity No family hx of        SOCIAL HISTORY:   Social History     Tobacco Use     Smoking status: Former Smoker     Packs/day: 1.25     Years: 41.00     Pack years: 51.25     Types: Cigarettes     Quit date: 10/1/2017     Years since quitting: 3.7     Smokeless tobacco: Never Used   Substance Use Topics     Alcohol use: Yes     Alcohol/week: 0.0 standard drinks     Comment: 12 pack of beer every 2 weeks       PROBLEM LIST:   Patient Active Problem List    Diagnosis Date Noted     Abnormal CT lung screening 03/31/2021     Priority: Medium     Currently managed with follow up imaging by [facility] Results Team.         Elevated hemoglobin (H) 03/22/2021     Priority: Medium     Pulmonary embolism without acute cor pulmonale, unspecified chronicity, unspecified pulmonary embolism type (H) 04/02/2020     Priority: Medium     Polycythemia 01/28/2020     Priority: Medium     Renal atrophy, right 05/16/2019     Priority: Medium     Abdominal aortic aneurysm (AAA) without rupture (H) 05/16/2019     Priority: Medium     Hepatomegaly 05/16/2019     Priority: Medium     Fatty liver 05/16/2019     Priority: Medium     partial alcohol induced       Elevated liver enzymes 05/09/2019     Priority: Medium     History of deep venous thrombosis (DVT) of distal vein of left lower extremity 11/01/2018     Priority: Medium     Elevated serum creatinine 02/28/2018     Priority: Medium     Long-term (current) use of anticoagulants [Z79.01] 09/01/2017     Priority: Medium     Chronic pain due to  trauma 02/23/2017     Priority: Medium     Closed fracture of multiple ribs of left side with routine healing, subsequent encounter 11/29/2016     Priority: Medium     Chronic bronchitis with COPD (chronic obstructive pulmonary disease) (H) 12/09/2015     Priority: Medium     Vitamin D deficiency 02/26/2014     Priority: Medium     Problem list name updated by automated process. Provider to review       Hypertriglyceridemia 01/30/2014     Priority: Medium     Impaired fasting glucose 11/27/2013     Priority: Medium     Advanced directives, counseling/discussion 09/26/2012     Priority: Medium     Information given for [facility].  Percy Deshpande MD  November 1, 2018         COPD (chronic obstructive pulmonary disease) (H) 09/26/2012     Priority: Medium     HTN, goal below 140/90 07/26/2012     Priority: Medium     CARDIOVASCULAR SCREENING; LDL GOAL LESS THAN 130 10/31/2010     Priority: Medium     Displacement of lumbar intervertebral disc without myelopathy 10/08/2002     Priority: Medium       MEDICATIONS:   Prescription Medications as of 6/21/2021       Rx Number Disp Refills Start End Last Dispensed Date Next Fill Date Owning Pharmacy    acetaminophen (TYLENOL) 325 MG tablet    10/13/2016        Sig: Take 650 mg by mouth every 6 hours    Class: Historical    Route: Oral    allopurinol (ZYLOPRIM) 100 MG tablet  90 tablet 2 5/18/2021    [facility] [address] [place] [id number] [facility] [address]    Sig: Allopurinol 700mg daily (two 300mg tablets + one 100mg tablet)    Class: E-Prescribe    allopurinol (ZYLOPRIM) 300 MG tablet  180 tablet 2 5/18/2021    [facility] [address] [place] [id number] [facility] AT [facility] [address]    Sig: Allopurinol 700mg daily (two 300mg tablets + one 100mg tablet)    Class: E-Prescribe    carvedilol (COREG) 25 MG tablet  180 tablet 0 3/22/2021    [facility] [address] [place] [id number] [facility] AT [facility] [address]    Sig: TAKE ONE TABLET BY MOUTH TWICE DAILY WITH MEALS    Class: E-Prescribe    hydrALAZINE (APRESOLINE) 50 MG tablet  90 tablet 5 3/22/2021    [facility] [id number] [facility] [address] [facility] AT [facility] [address]    Sig: Take 1 tablet (50 mg) by mouth 3 times daily    Class: E-Prescribe    Route: Oral    lisinopril (ZESTRIL) 20 MG tablet  180 tablet 3 3/22/2021    [facility] [address] [place] [id number] [facility] AT [facility] [address]    Sig: Take 1 tablet (20 mg) by mouth 2 times daily    Class: E-Prescribe    Route: Oral    nicotine (NICORETTE) 4 MG lozenge        [facility] #[id number] [facility] [address] [facility] AT [facility] [address]    Sig: Place 4 mg inside cheek as needed    Class: Historical    Route: Buccal    atorvastatin (LIPITOR) 10 MG tablet  90 tablet 3 3/24/2021    [facility] #[id number] [facility] [address] [facility] AT [facility] [address]    Sig: Take 1 tablet (10 mg) by mouth daily    Class: E-Prescribe    Route: Oral    Rivaroxaban ANTICOAGULANT 15 & 20 MG TBPK  90 each 3 5/4/2021 11/4/2021       Sig: Xarelto is dosed twice-daily at 15 mg with food for the first 21 days, after which patients transition to once-daily doses at 20 mg with food for the remainder of their treatment    Class: Local Print          ALLERGIES:   Chlorthalidone and Amlodipine    ROS:  An 11 point review of system was performed and pertinent negative and positives are mentioned in HPI.        Physical Examination:   VITALS:   BP (!) 141/93 (BP Location: Left arm, Patient Position: Chair, Cuff Size: Adult Large)   Pulse 66   SpO2 96%   General:  Pt sitting in chair comfortably.  No acute distress  HEENT: normocephalic.  Neck supple  Neck: no JVD  Resp: nonlabored.  CTAb  CV: RRR.  S1 & S2.  No rub  Abd: nontender, soft, normoactive bowel sounds  Vascular: 2+ PT and DP pulses.  No venous stasis changes.  No ulcerations.  Normal capillary  refill.  Lymph: no pedal edema  Neuro: Oriented x3.  No evidence of focal motor deficits  Mood: appropriate affect      Labs:    BMP RESULTS:  Lab Results   Component Value Date     05/24/2021    POTASSIUM 4.4 05/24/2021    CHLORIDE 110 (H) 05/24/2021    CO2 25 05/24/2021    ANIONGAP 5 05/24/2021     (H) 05/24/2021    BUN 13 05/24/2021    CR 0.95 05/24/2021    GFRESTIMATED 84 05/24/2021    GFRESTBLACK >90 05/24/2021    JUSTIN 8.5 05/24/2021        CBC RESULTS:  Lab Results   Component Value Date    WBC 8.8 05/24/2021    RBC 5.18 05/24/2021    HGB 16.0 05/24/2021    HCT 48.8 05/24/2021    MCV 94 05/24/2021    MCH 30.9 05/24/2021    MCHC 32.8 05/24/2021    RDW 15.8 (H) 05/24/2021     05/24/2021       INR/PTT:  Lab Results   Component Value Date    INR 0.92 05/04/2021    PTT 28 05/04/2021       Diagnostic studies: DVT study/venous duplex study on 6/30/2021:Nonocclusive thrombus in one of the paired mid left  femoral veins and the popliteal vein, with improvement as detailed  above compared to 4/2/2021. There has been interval resolution of  thrombus in the distal left femoral vein. No worsening or new deep  venous thrombosis demonstrated in the LEFT leg.    Assessment 66 yo pt with MTS, s/p stenting on 5/5/2021, doing well post procedurally. US reveals widely patent stents and decreased clot burden despite not taking his anticoagulation. We will start him on Coumadin with help from Dr. Muñoz (his hematologist). I did discuss the importance of compliance with anticoagulation with the pt (possibility of stent occlusion or worsened DVT) and he expressed understanding.  Plan   - Coumadin for anticoagulation (further management by pt's hematologist)  - RTC in 2 months with LLE and Pelvic venous duplex studies    Zeferino Escobar MD    I spent a total of 20 minutes face-to-face with Bucky Edgar during today's office visit. Over 50% of this time was spent counseling the patient and/or coordinating care. See  note for details.     An additional 25 minutes spent on the date of the encounter doing chart review, history and exam, documentation and further activities as noted above      CC  Patient Care Team:  Hamlet Roberts PA-C as PCP - General (Physician Assistant)  Coy Bolaños MD as MD (Rheumatology)  Germain Muñoz MD as MD (Hematology & Oncology)  Hamlet Roberts PA-C as Assigned PCP  Germain Muñoz MD as Assigned Cancer Care Provider  Samir Carroll MD as Assigned Surgical Provider  SELF, REFERRED          Again, thank you for allowing me to participate in the care of your patient.      Sincerely,    Zeferino Escobar MD

## 2021-06-21 NOTE — PROGRESS NOTES
INTERVENTIONAL RADIOLOGY CONSULTATION    Name: Bucky Edgar  Age: 65 year old   Referring Physician: Dr. Muñoz   REASON FOR REFERRAL: May Thurner disease, status post stent placement on 5/4/2021    HPI: Bucky is a very pleasant 65-year-old male with extensive/recurrent left lower extremity DVT and PE with may Thurner syndrome, status post stent placement in the left common iliac vein on 5/4/2021.  He has done very well post procedurally.  No new bouts of DVT no swelling of the left leg.  No PE.  In the postsurgically he had some pain, likely related to self-expandable stents, and his back which has resolved.  He has stopped taking Xarelto after he ran out of sample.  No new complaints.    PAST MEDICAL HISTORY:   Past Medical History:   Diagnosis Date     COPD (chronic obstructive pulmonary disease) (H)      Displacement of lumbar intervertebral disc without myelopathy 1995, 2002     HTN, goal below 140/90        PAST SURGICAL HISTORY:   Past Surgical History:   Procedure Laterality Date     C DECOMPRESS SPINAL CORD,1 SEG  1995, 4/2002    leftL4-L5, 2nd right L4-L5     C SPINE FUSION,ANTER,3 SGMTS  2/9/2004    ant and post fusion L4-S1     HC REPAIR ROTATOR CUFF,CHRONIC  1989     IR LOWER EXTREMITY VENOGRAM LEFT  5/4/2021     IR VENOUS STENT  5/4/2021       FAMILY HISTORY:   Family History   Problem Relation Age of Onset     Family History Negative Other      Diabetes No family hx of      Coronary Artery Disease No family hx of      Hypertension No family hx of      Hyperlipidemia No family hx of      Breast Cancer No family hx of      Prostate Cancer No family hx of      Anxiety Disorder No family hx of      Substance Abuse No family hx of      Asthma No family hx of      Thyroid Disease No family hx of      Unknown/Adopted No family hx of      Genetic Disorder No family hx of      Osteoporosis No family hx of      Anesthesia Reaction No family hx of      Mental Illness No family hx of      Depression No  family hx of      Other Cancer No family hx of      Colon Cancer No family hx of      Cerebrovascular Disease No family hx of      Obesity No family hx of        SOCIAL HISTORY:   Social History     Tobacco Use     Smoking status: Former Smoker     Packs/day: 1.25     Years: 41.00     Pack years: 51.25     Types: Cigarettes     Quit date: 10/1/2017     Years since quitting: 3.7     Smokeless tobacco: Never Used   Substance Use Topics     Alcohol use: Yes     Alcohol/week: 0.0 standard drinks     Comment: 12 pack of beer every 2 weeks       PROBLEM LIST:   Patient Active Problem List    Diagnosis Date Noted     Abnormal CT lung screening 03/31/2021     Priority: Medium     Currently managed with follow up imaging by [facility] Results Team.         Elevated hemoglobin (H) 03/22/2021     Priority: Medium     Pulmonary embolism without acute cor pulmonale, unspecified chronicity, unspecified pulmonary embolism type (H) 04/02/2020     Priority: Medium     Polycythemia 01/28/2020     Priority: Medium     Renal atrophy, right 05/16/2019     Priority: Medium     Abdominal aortic aneurysm (AAA) without rupture (H) 05/16/2019     Priority: Medium     Hepatomegaly 05/16/2019     Priority: Medium     Fatty liver 05/16/2019     Priority: Medium     partial alcohol induced       Elevated liver enzymes 05/09/2019     Priority: Medium     History of deep venous thrombosis (DVT) of distal vein of left lower extremity 11/01/2018     Priority: Medium     Elevated serum creatinine 02/28/2018     Priority: Medium     Long-term (current) use of anticoagulants [Z79.01] 09/01/2017     Priority: Medium     Chronic pain due to trauma 02/23/2017     Priority: Medium     Closed fracture of multiple ribs of left side with routine healing, subsequent encounter 11/29/2016     Priority: Medium     Chronic bronchitis with COPD (chronic obstructive pulmonary disease) (H) 12/09/2015     Priority: Medium     Vitamin D deficiency  02/26/2014     Priority: Medium     Problem list name updated by automated process. Provider to review       Hypertriglyceridemia 01/30/2014     Priority: Medium     Impaired fasting glucose 11/27/2013     Priority: Medium     Advanced directives, counseling/discussion 09/26/2012     Priority: Medium     Information given for [facility].  Percy Deshpande MD  November 1, 2018         COPD (chronic obstructive pulmonary disease) (H) 09/26/2012     Priority: Medium     HTN, goal below 140/90 07/26/2012     Priority: Medium     CARDIOVASCULAR SCREENING; LDL GOAL LESS THAN 130 10/31/2010     Priority: Medium     Displacement of lumbar intervertebral disc without myelopathy 10/08/2002     Priority: Medium       MEDICATIONS:   Prescription Medications as of 6/21/2021       Rx Number Disp Refills Start End Last Dispensed Date Next Fill Date Owning Pharmacy    acetaminophen (TYLENOL) 325 MG tablet    10/13/2016        Sig: Take 650 mg by mouth every 6 hours    Class: Historical    Route: Oral    allopurinol (ZYLOPRIM) 100 MG tablet  90 tablet 2 5/18/2021    [facility] [address] [place] [id number] [facility] [address]    Sig: Allopurinol 700mg daily (two 300mg tablets + one 100mg tablet)    Class: E-Prescribe    allopurinol (ZYLOPRIM) 300 MG tablet  180 tablet 2 5/18/2021    [facility] [address] [place] [id number] [facility] [address]    Sig: Allopurinol 700mg daily (two 300mg tablets + one 100mg tablet)    Class: E-Prescribe    carvedilol (COREG) 25 MG tablet  180 tablet 0 3/22/2021    [facility] [address] [place] [id number] [facility] [address]    Sig: TAKE ONE TABLET BY MOUTH TWICE DAILY WITH MEALS    Class: E-Prescribe    hydrALAZINE (APRESOLINE) 50 MG tablet  90 tablet 5 3/22/2021    [facility] [address] [place] [id number] [facility] [address]    Sig: Take 1 tablet (50 mg) by mouth 3 times daily     Class: E-Prescribe    Route: Oral    lisinopril (ZESTRIL) 20 MG tablet  180 tablet 3 3/22/2021    [id number] - [place], [place] - [address] [facility] AT [facility] [address]    Sig: Take 1 tablet (20 mg) by mouth 2 times daily    Class: E-Prescribe    Route: Oral    nicotine (NICORETTE) 4 MG lozenge        [facility][id number] - [place], [place] - [address] [facility] AT [facility] [address]    Sig: Place 4 mg inside cheek as needed    Class: Historical    Route: Buccal    atorvastatin (LIPITOR) 10 MG tablet  90 tablet 3 3/24/2021    [id number] [place], [place] - [address] [facility] AT [facility] [address]    Sig: Take 1 tablet (10 mg) by mouth daily    Class: E-Prescribe    Route: Oral    Rivaroxaban ANTICOAGULANT 15 & 20 MG TBPK  90 each 3 5/4/2021 11/4/2021       Sig: Xarelto is dosed twice-daily at 15 mg with food for the first 21 days, after which patients transition to once-daily doses at 20 mg with food for the remainder of their treatment    Class: Local Print          ALLERGIES:   Chlorthalidone and Amlodipine    ROS:  An 11 point review of system was performed and pertinent negative and positives are mentioned in HPI.        Physical Examination:   VITALS:   BP (!) 141/93 (BP Location: Left arm, Patient Position: Chair, Cuff Size: Adult Large)   Pulse 66   SpO2 96%   General:  Pt sitting in chair comfortably.  No acute distress  HEENT: normocephalic.  Neck supple  Neck: no JVD  Resp: nonlabored.  CTAb  CV: RRR.  S1 & S2.  No rub  Abd: nontender, soft, normoactive bowel sounds  Vascular: 2+ PT and DP pulses.  No venous stasis changes.  No ulcerations.  Normal capillary refill.  Lymph: no pedal edema  Neuro: Oriented x3.  No evidence of focal motor deficits  Mood: appropriate affect      Labs:    BMP RESULTS:  Lab Results   Component Value Date     05/24/2021    POTASSIUM 4.4 05/24/2021    CHLORIDE 110 (H) 05/24/2021    CO2 25 05/24/2021    ANIONGAP 5 05/24/2021      (H) 05/24/2021    BUN 13 05/24/2021    CR 0.95 05/24/2021    GFRESTIMATED 84 05/24/2021    GFRESTBLACK >90 05/24/2021    JUSTIN 8.5 05/24/2021        CBC RESULTS:  Lab Results   Component Value Date    WBC 8.8 05/24/2021    RBC 5.18 05/24/2021    HGB 16.0 05/24/2021    HCT 48.8 05/24/2021    MCV 94 05/24/2021    MCH 30.9 05/24/2021    MCHC 32.8 05/24/2021    RDW 15.8 (H) 05/24/2021     05/24/2021       INR/PTT:  Lab Results   Component Value Date    INR 0.92 05/04/2021    PTT 28 05/04/2021       Diagnostic studies: DVT study/venous duplex study on 6/30/2021:Nonocclusive thrombus in one of the paired mid left  femoral veins and the popliteal vein, with improvement as detailed  above compared to 4/2/2021. There has been interval resolution of  thrombus in the distal left femoral vein. No worsening or new deep  venous thrombosis demonstrated in the LEFT leg.    Assessment 64 yo pt with MTS, s/p stenting on 5/5/2021, doing well post procedurally. US reveals widely patent stents and decreased clot burden despite not taking his anticoagulation. We will start him on Coumadin with help from Dr. Muñoz (his hematologist). I did discuss the importance of compliance with anticoagulation with the pt (possibility of stent occlusion or worsened DVT) and he expressed understanding.  Plan   - Coumadin for anticoagulation (further management by pt's hematologist)  - RTC in 2 months with LLE and Pelvic venous duplex studies    Zeferino Escobar MD    I spent a total of 20 minutes face-to-face with Bucky Edgar during today's office visit. Over 50% of this time was spent counseling the patient and/or coordinating care. See note for details.     An additional 25 minutes spent on the date of the encounter doing chart review, history and exam, documentation and further activities as noted above      CC  Patient Care Team:  Hamlet Roberts PA-C as PCP - General (Physician Assistant)  Coy Bolaños MD as MD  (Rheumatology)  Germain Muñoz MD as MD (Hematology & Oncology)  Hamlet Roberts PA-C as Assigned PCP  Germain Muñoz MD as Assigned Cancer Care Provider  Samir Carroll MD as Assigned Surgical Provider  SELF, REFERRED

## 2021-06-29 ENCOUNTER — PATIENT OUTREACH (OUTPATIENT)
Dept: ONCOLOGY | Facility: CLINIC | Age: 66
End: 2021-06-29

## 2021-06-29 DIAGNOSIS — I87.1 MAY-THURNER SYNDROME: Primary | ICD-10-CM

## 2021-06-29 DIAGNOSIS — I82.5Y2 CHRONIC DEEP VEIN THROMBOSIS (DVT) OF PROXIMAL VEIN OF LEFT LOWER EXTREMITY (H): ICD-10-CM

## 2021-06-29 RX ORDER — WARFARIN SODIUM 5 MG/1
5 TABLET ORAL DAILY
Qty: 5 TABLET | Refills: 0 | Status: SHIPPED | OUTPATIENT
Start: 2021-06-29 | End: 2022-03-31

## 2021-06-29 NOTE — PROGRESS NOTES
Dr. Muñoz has agreed to ordering Warfarin, but only 5 tablets; then patient should be seen by INR clinic and management through his PCP.

## 2021-06-29 NOTE — PROGRESS NOTES
Ismael was prescribed Eliquis by Dr. Muñoz, but would cost him $344.00 out of pocket.  No funding available; he states he had samples when discharged from the hospital.  Writer contacted pharmacy to check on any funds available - pharmacist did not know of any and she said patient should check if he already met his deductible.  When writer spoke to patient about this, he states he already met his deductible.  He states he would prefer to go back on Warfarin.  It would be same for Xarelto - would need to be prescribed to know out of pocket cost - pharmacist could not say.

## 2021-07-01 NOTE — PROGRESS NOTES
Spoke with Ismael.  Confirmed with patient to follow with PCP as noted regarding INR levels and management of Warfarin.  He understands and will arrange his follow-up.

## 2021-09-01 DIAGNOSIS — I10 HTN, GOAL BELOW 140/90: ICD-10-CM

## 2021-09-02 RX ORDER — CARVEDILOL 25 MG/1
TABLET ORAL
Qty: 180 TABLET | Refills: 0 | Status: SHIPPED | OUTPATIENT
Start: 2021-09-02 | End: 2021-10-20

## 2021-09-02 NOTE — TELEPHONE ENCOUNTER
Pending Prescriptions:                       Disp   Refills    carvedilol (COREG) 25 MG tablet [Pharmacy *180 ta*0        Sig: TAKE 1 TABLET BY MOUTH TWICE DAILY WITH MEALS      Routing refill request to provider for review/approval because:  Labs out of range:  blood pressure    Carmen Monson RN on 9/2/2021 at 3:19 PM

## 2021-09-10 ENCOUNTER — ALLIED HEALTH/NURSE VISIT (OUTPATIENT)
Dept: FAMILY MEDICINE | Facility: OTHER | Age: 66
End: 2021-09-10
Payer: COMMERCIAL

## 2021-09-10 VITALS — HEART RATE: 64 BPM | SYSTOLIC BLOOD PRESSURE: 160 MMHG | DIASTOLIC BLOOD PRESSURE: 94 MMHG

## 2021-09-10 DIAGNOSIS — Z01.30 BP CHECK: Primary | ICD-10-CM

## 2021-09-10 PROCEDURE — 99207 PR NO CHARGE NURSE ONLY: CPT

## 2021-09-10 NOTE — PROGRESS NOTES
Bucky Edgar is a 65 year old patient who comes in today for a Blood Pressure check.  Initial BP:  BP (!) 160/94   Pulse 64      Second reading 162/100    Disposition: results routed to provider  Pt states he has had an URI for the last couple of weeks. Which has caused a cough. The cough has aggravated his back and he feels this is the reason his b/p's have been running high. The pt also stated that he had low grade temp last week (normal this week) and continues to have watery eyes. Suggested patient make appt but he would like Gilberto to know this. Please advise  Leatha Mock MA

## 2021-09-27 ENCOUNTER — TELEPHONE (OUTPATIENT)
Dept: FAMILY MEDICINE | Facility: OTHER | Age: 66
End: 2021-09-27

## 2021-09-27 DIAGNOSIS — Z87.891 PERSONAL HISTORY OF TOBACCO USE, PRESENTING HAZARDS TO HEALTH: ICD-10-CM

## 2021-09-27 DIAGNOSIS — R91.8 PULMONARY NODULES: Primary | ICD-10-CM

## 2021-09-27 NOTE — TELEPHONE ENCOUNTER
Patient due for updated low dose chest CT for lung cancer screening to check on his lung nodules. New order placed.    Hamlet Roberts PA-C

## 2021-09-30 ENCOUNTER — TELEPHONE (OUTPATIENT)
Dept: FAMILY MEDICINE | Facility: OTHER | Age: 66
End: 2021-09-30

## 2021-09-30 DIAGNOSIS — R91.8 PULMONARY NODULES: Primary | ICD-10-CM

## 2021-09-30 NOTE — TELEPHONE ENCOUNTER
Good morning Hamlet,    I am reaching out to you regarding the CT Chest Lung Cancer Screening. I received a notification from [facility] stating that a CT Chest without contrast would be considered medically necessary for this patient due to the reason being a more diagnostic reason vs a screening. Please see the letter from [facility] below:        If you are okay with  a CT Chest without contrast, please place a new order and respond back to this encounter.    Thank you for your time and attention on this,    Holley Ledesma  Senior Intake Financial Counselor  [place] [address] [address]  [place] [address]  Ph: [phone]  Fax: [phone]

## 2021-10-05 ENCOUNTER — ANCILLARY PROCEDURE (OUTPATIENT)
Dept: CT IMAGING | Facility: CLINIC | Age: 66
End: 2021-10-05
Attending: PHYSICIAN ASSISTANT
Payer: COMMERCIAL

## 2021-10-05 DIAGNOSIS — R91.8 PULMONARY NODULES: ICD-10-CM

## 2021-10-05 PROCEDURE — 71250 CT THORAX DX C-: CPT | Mod: GC | Performed by: RADIOLOGY

## 2021-10-18 DIAGNOSIS — I10 HTN, GOAL BELOW 140/90: ICD-10-CM

## 2021-10-18 RX ORDER — HYDRALAZINE HYDROCHLORIDE 50 MG/1
TABLET, FILM COATED ORAL
Qty: 90 TABLET | Refills: 5 | Status: CANCELLED | OUTPATIENT
Start: 2021-10-18

## 2021-10-20 RX ORDER — CARVEDILOL 25 MG/1
TABLET ORAL
Qty: 180 TABLET | Refills: 0 | Status: SHIPPED | OUTPATIENT
Start: 2021-10-20 | End: 2022-03-25

## 2021-10-20 RX ORDER — HYDRALAZINE HYDROCHLORIDE 50 MG/1
TABLET, FILM COATED ORAL
Qty: 90 TABLET | Refills: 2 | Status: SHIPPED | OUTPATIENT
Start: 2021-10-20 | End: 2022-03-31

## 2021-10-20 NOTE — TELEPHONE ENCOUNTER
"Pending Prescriptions:                       Disp   Refills    hydrALAZINE (APRESOLINE) 50 MG tablet [Pha*90 tab*5        Sig: TAKE 1 TABLET(50 MG) BY MOUTH THREE TIMES DAILY    carvedilol (COREG) 25 MG tablet [Pharmacy *180 ta*0        Sig: TAKE 1 TABLET BY MOUTH TWICE DAILY WITH MEALS    hydrALAZINE (APRESOLINE) 50 MG tablet [Pha*90 tab*5        Sig: TAKE 1 TABLET(50 MG) BY MOUTH THREE TIMES DAILY        Routing refill request to provider for review/approval because:  Beta-Blockers Protocol Failed    Rerun Protocol (10/18/2021 12:58 PM)      Blood pressure under 140/90 in past 12 months        BP Readings from Last 3 Encounters:   09/10/21 (!) 160/94   06/21/21 (!) 141/93   05/04/21 (!) 170/98             Patient is age 6 or older          Recent (12 mo) or future (30 days) visit within the authorizing provider's specialty    Patient has had an office visit with the authorizing provider or a provider within the authorizing providers department within the previous 12 mos or has a future within next 30 days. See \"Patient Info\" tab in inbasket, or \"Choose Columns\" in Meds & Orders section of the refill encounter.           Medication is active on med list      NEVA Simons, RN, PHN  [facility]  October 20, 2021      "

## 2021-11-16 ENCOUNTER — ALLIED HEALTH/NURSE VISIT (OUTPATIENT)
Dept: FAMILY MEDICINE | Facility: OTHER | Age: 66
End: 2021-11-16
Payer: COMMERCIAL

## 2021-11-16 VITALS — DIASTOLIC BLOOD PRESSURE: 80 MMHG | HEART RATE: 70 BPM | SYSTOLIC BLOOD PRESSURE: 117 MMHG

## 2021-11-16 DIAGNOSIS — Z01.30 BP CHECK: Primary | ICD-10-CM

## 2021-11-16 PROCEDURE — 99207 PR NO CHARGE NURSE ONLY: CPT

## 2021-11-16 NOTE — PROGRESS NOTES
Bucky Edgar is a 66 year old patient who comes in today for a Blood Pressure check.  Initial BP:  /80   Pulse 70      70  Disposition: follow-up as previously indicated by provider

## 2022-01-10 ENCOUNTER — TELEPHONE (OUTPATIENT)
Dept: INTERVENTIONAL RADIOLOGY/VASCULAR | Facility: CLINIC | Age: 67
End: 2022-01-10

## 2022-01-10 NOTE — TELEPHONE ENCOUNTER
I am unable to leave a voicemail the pts phone continued to ring.  Eyal Sandoval on 1/10/2022 at 4:23 PM

## 2022-01-10 NOTE — TELEPHONE ENCOUNTER
----- Message from Keri Chaves RN sent at 1/5/2022 12:03 PM CST -----  Regarding: FW: 6 month f/up May Thurner  HI    Pt cancelled  his US  already and needs to resched  both US and the IR PA clinic follow up.     Imaging first and then PA visit.  Due anytime now per pt  preference.    Thanks,  ABRAM Chaves RN, BSN  Interventional Radiology Nurse Coordinator   Phone:  [phone]  ----- Message -----  From: eKri Chaves, RN  Sent: 10/5/2021  12:00 AM CST  To: Clinic Coordinators-Derm-Vascular-  Subject: 6 month f/up May Thurner                         PLease reach out to patient to schedule his 6 month f/up needs IVC/aorta/iliac US first and then in person visit with Dr Escobar after imaging.  Due approx 11/20/20    Thanks,  ABRAM Chaves RN, BSN  Interventional Radiology Nurse Coordinator   Phone:  [phone]

## 2022-01-11 NOTE — TELEPHONE ENCOUNTER
The daughter was very shocked that I called her because there is no indication to call the pts daughter for appointments.  The pts daughter states that she will have the pt call me back to schedule.  Eyal Sandoval on 1/11/2022 at 4:19 PM

## 2022-01-12 NOTE — TELEPHONE ENCOUNTER
The pt is scheduled on 1/20 at the [facility] for imaging and with ALEX Sandoval on 1/12/2022 at 10:09 AM     The pt was on the phone during scheduling of the above appointments and agreed to the dates times and locations of the appointments.

## 2022-02-01 ENCOUNTER — VIRTUAL VISIT (OUTPATIENT)
Dept: RHEUMATOLOGY | Facility: CLINIC | Age: 67
End: 2022-02-01
Payer: COMMERCIAL

## 2022-02-01 DIAGNOSIS — M1A.09X0 IDIOPATHIC CHRONIC GOUT OF MULTIPLE SITES WITHOUT TOPHUS: ICD-10-CM

## 2022-02-01 PROCEDURE — 99214 OFFICE O/P EST MOD 30 MIN: CPT | Mod: 95 | Performed by: INTERNAL MEDICINE

## 2022-02-01 RX ORDER — ALLOPURINOL 300 MG/1
600 TABLET ORAL DAILY
Qty: 180 TABLET | Refills: 1 | Status: SHIPPED | OUTPATIENT
Start: 2022-02-01 | End: 2022-06-07

## 2022-02-01 NOTE — PATIENT INSTRUCTIONS
RHEUMATOLOGY    Dr. Coy Bolaños    [address] [place] [address]  Phone number: [phone]  Fax number: [phone]      Thank you for choosing [facility]!    Saba Patricia CMA Rheumatology

## 2022-02-01 NOTE — PROGRESS NOTES
Bucky Edgar  is a 66 year old year old male who is being evaluated via a billable video visit.      How would you like to obtain your AVS? Mail a copy  If the video visit is dropped, the invitation should be resent by: Text to cell phone: [phone]  Will anyone else be joining your video visit? No    Rheumatology Video Visit      Bucky Edgar MRN# [id number]   YOB: 1955 Age: 66 year old      Date of visit: 2/01/22   PCP: Dr. Percy Deshpande    Chief Complaint   Patient presents with:  Idiopathic chronic gout    Assessment and Plan     1.  Gout: Started having gout flares in early 2017, acute onset and resolved with prednisone; joints involved include his left ankle, left first MTP, and left wrist.  Colchicine was cost prohibitive.  Currently on allopurinol 700 mg daily and is doing well with no gout flares.  Note that he was still flaring when on allopurinol 600mg daily and at that time he also had a low uric acid level.  I question if the persistent flares despite low uric acid previously could have been mobilization flares for tophi not seen on exam and if so then hopefully this tophi are smaller and he would tolerate lowering the dose of allopurinol.  His most recent uric acid in May 2021 was 2.1.  Therefore, reduce allopurinol to 600 mg daily.  If he starts having flares again then will increase allopurinol back to 700mg daily.   Chronic illness, stable.    - Reduce allopurinol from 700 mg daily to 600 mg daily  - For gout flare only: prednisone 60mg daily x2days, then 40mg daily x5days, then 20mg daily x5days, then stop.  - Labs now and 2-3 days prior to 4 mo f/u:  CBC, CMP, Uric Acid    2. Hx of Elevated Hgb: thought to be polycythemia vera by Dr. Muñoz (hematology/oncology) secondary to heavy smoking and underlying COPD as well as G[place] syndrome. Advised to continue baby aspirin beebe by Dr. Muñoz. Phlebotomy was also started to try to keep the hematocrit <50.  This is documented here  for historical significance only and was not discussed today.      # Status-post 2 doses of the Pfizer COVID-19 vaccine, last received 4/21/2021; advise getting the COVID-19 booster dose and the influenza vaccination    Total minutes spent in evaluation with patient, documentation, , and review of pertinent studies and chart notes: 14     Mr. Edgar verbalized agreement with and understanding of the rational for the diagnosis and treatment plan.  All questions were answered to best of my ability and the patient's satisfaction. Mr. Edgar was advised to contact the clinic with any questions that may arise after the clinic visit.     Thank you for involving me in the care of the patient    Return to clinic: 4 months      HPI   Bucky Edgar is a 66 year old male with a past medical history significant for COPD, dyslipidemia, vitamin D deficiency, history of rib fractures, DVT/PE on anticoagulation, hypertension, who is seen for follow-up of gout.    Today, 2/1/2022: Gout is controlled.  No gout flares since last seen.  Tolerating allopurinol 700 mg daily without any issue.  Has not required prednisone.  Overall happy with how well he is doing per patient.  He says that he does not go anywhere so has not had the COVID-19 booster dose or the flu shot yet    Denies fevers, chills, nausea, vomiting, constipation, diarrhea. No abdominal pain. No chest pain/pressure, palpitations, or shortness of breath currently. No LE swelling.  No rash.      Tobacco: Quit in 2017  EtOH: 6 beers per week  Drugs: None    ROS   12 point review of system was completed and negative except as noted in the HPI     Active Problem List     Patient Active Problem List   Diagnosis     Displacement of lumbar intervertebral disc without myelopathy     CARDIOVASCULAR SCREENING; LDL GOAL LESS THAN 130     HTN, goal below 140/90     Advanced directives, counseling/discussion     COPD (chronic obstructive pulmonary disease) (H)     Impaired  fasting glucose     Hypertriglyceridemia     Vitamin D deficiency     Chronic bronchitis with COPD (chronic obstructive pulmonary disease) (H)     Closed fracture of multiple ribs of left side with routine healing, subsequent encounter     Chronic pain due to trauma     Long-term (current) use of anticoagulants [Z79.01]     Elevated serum creatinine     History of deep venous thrombosis (DVT) of distal vein of left lower extremity     Elevated liver enzymes     Renal atrophy, right     Abdominal aortic aneurysm (AAA) without rupture (H)     Hepatomegaly     Fatty liver     Polycythemia     Pulmonary embolism without acute cor pulmonale, unspecified chronicity, unspecified pulmonary embolism type (H)     Elevated hemoglobin (H)     Abnormal CT lung screening     Past Medical History     Past Medical History:   Diagnosis Date     COPD (chronic obstructive pulmonary disease) (H)      Displacement of lumbar intervertebral disc without myelopathy 1995, 2002     HTN, goal below 140/90      Past Surgical History     Past Surgical History:   Procedure Laterality Date     HC REPAIR ROTATOR CUFF,CHRONIC  1989     IR LOWER EXTREMITY VENOGRAM LEFT  5/4/2021     IR VENOUS STENT  5/4/2021     ZZC DECOMPRESS SPINAL CORD,1 SEG  1995, 4/2002    leftL4-L5, 2nd right L4-L5     ZZC SPINE FUSION,ANTER,3 SGMTS  2/9/2004    ant and post fusion L4-S1     Allergy     Allergies   Allergen Reactions     Chlorthalidone Other (See Comments)     Excessive lowering of blood pressure     Amlodipine Other (See Comments)     Intractable headache with use of medication as well as noting a small tremor of the upper extremities     Current Medication List     Current Outpatient Medications   Medication Sig     allopurinol (ZYLOPRIM) 100 MG tablet Allopurinol 700mg daily (two 300mg tablets + one 100mg tablet)     allopurinol (ZYLOPRIM) 300 MG tablet Allopurinol 700mg daily (two 300mg tablets + one 100mg tablet)     atorvastatin (LIPITOR) 10 MG tablet  Take 1 tablet (10 mg) by mouth daily     carvedilol (COREG) 25 MG tablet TAKE 1 TABLET BY MOUTH TWICE DAILY WITH MEALS     hydrALAZINE (APRESOLINE) 50 MG tablet TAKE 1 TABLET(50 MG) BY MOUTH THREE TIMES DAILY     lisinopril (ZESTRIL) 20 MG tablet Take 1 tablet (20 mg) by mouth 2 times daily     acetaminophen (TYLENOL) 325 MG tablet Take 650 mg by mouth every 6 hours (Patient not taking: Reported on 2/1/2022)     apixaban ANTICOAGULANT (ELIQUIS) 5 MG tablet Take 1 tablet (5 mg) by mouth 2 times daily (Patient not taking: Reported on 2/1/2022)     nicotine (NICORETTE) 4 MG lozenge Place 4 mg inside cheek as needed     Rivaroxaban ANTICOAGULANT 15 & 20 MG TBPK Xarelto is dosed twice-daily at 15 mg with food for the first 21 days, after which patients transition to once-daily doses at 20 mg with food for the remainder of their treatment (Patient not taking: Reported on 6/21/2021)     warfarin ANTICOAGULANT (COUMADIN) 5 MG tablet Take 1 tablet (5 mg) by mouth daily (Patient not taking: Reported on 2/1/2022)     No current facility-administered medications for this visit.         Social History   See HPI    Family History     Family History   Problem Relation Age of Onset     Family History Negative Other      Diabetes No family hx of      Coronary Artery Disease No family hx of      Hypertension No family hx of      Hyperlipidemia No family hx of      Breast Cancer No family hx of      Prostate Cancer No family hx of      Anxiety Disorder No family hx of      Substance Abuse No family hx of      Asthma No family hx of      Thyroid Disease No family hx of      Unknown/Adopted No family hx of      Genetic Disorder No family hx of      Osteoporosis No family hx of      Anesthesia Reaction No family hx of      Mental Illness No family hx of      Depression No family hx of      Other Cancer No family hx of      Colon Cancer No family hx of      Cerebrovascular Disease No family hx of      Obesity No family hx of   "      Physical Exam     Temp Readings from Last 3 Encounters:   05/04/21 99  F (37.2  C) (Oral)   03/24/21 96.6  F (35.9  C) (Temporal)   03/22/21 98.4  F (36.9  C) (Temporal)     BP Readings from Last 5 Encounters:   11/16/21 117/80   09/10/21 (!) 160/94   06/21/21 (!) 141/93   05/04/21 (!) 170/98   04/19/21 138/78     Pulse Readings from Last 1 Encounters:   11/16/21 70     Resp Readings from Last 1 Encounters:   05/04/21 18     Estimated body mass index is 34.41 kg/m  as calculated from the following:    Height as of 5/4/21: 1.753 m (5' 9\").    Weight as of 5/4/21: 105.7 kg (233 lb).      GEN: NAD.   HEENT:  No obvious external lesions of the ear and nose. Hearing intact.  PULM: No increased work of breathing  MSK:  Hands and wrists without swelling.    SKIN: No rash or jaundice seen  PSYCH: Alert. Appropriate.        Labs / Imaging (select studies)     CBC  Recent Labs   Lab Test 05/24/21  1035 05/04/21  1030 08/25/20  0940 07/28/20  1040   WBC 8.8 9.5 8.1 8.4   RBC 5.18 5.53 5.29 5.48   HGB 16.0 17.0 15.4 16.5   HCT 48.8 52.2 48.3 51.9   MCV 94 94 91 95   RDW 15.8* 16.3* 14.6 16.3*    220 259 242   MCH 30.9 30.7 29.1 30.1   MCHC 32.8 32.6 31.9 31.8   NEUTROPHIL 69.4  --  73.0 68.7   LYMPH 18.2  --  14.2 18.6   MONOCYTE 7.8  --  8.9 8.5   EOSINOPHIL 3.9  --  2.7 2.8   BASOPHIL 0.7  --  1.0 0.8   ANEU 6.1  --  5.9 5.7   ALYM 1.6  --  1.2 1.6   LIZZ 0.7  --  0.7 0.7   AEOS 0.3  --  0.2 0.2   ABAS 0.1  --  0.1 0.1     CMP  Recent Labs   Lab Test 05/24/21  1035 05/04/21  1030 03/22/21  1219 06/02/20  0903 05/08/20  0959    139 139 140 143   POTASSIUM 4.4 4.2 4.2 4.2 3.7   CHLORIDE 110* 111* 108 112* 111*   CO2 25 22 29 23 28   ANIONGAP 5 6 2* 5 4   * 100* 100* 113* 113*   BUN 13 16 11 20 8   CR 0.95 1.00 0.94 1.07 0.90   GFRESTIMATED 84 79 86 74 >90   GFRESTBLACK >90 >90 >90 85 >90   JUSTIN 8.5 9.5 9.1 9.0 8.0*   BILITOTAL 0.7  --   --  0.9 0.7   ALBUMIN 3.4  --   --  3.4 3.3*   PROTTOTAL 6.9  --   " --  6.8 7.0   ALKPHOS 98  --   --  95 104   AST 18  --   --  19 16   ALT 28  --   --  31 24     Uric Acid  Recent Labs   Lab Test 05/24/21  1035 06/02/20  0903 05/08/20  0959 11/07/19  0900 05/08/19  1041 08/07/18  0926   URIC 2.1* 2.2* 1.5* 4.0 2.3* 2.0*     Immunization History     Immunization History   Administered Date(s) Administered     COVID-19,PF,Pfizer (12+ Yrs) 03/31/2021, 04/21/2021     Influenza Quad, Recombinant, pf(RIV4) (Flublok) 11/12/2019     Influenza Vaccine IM > 6 months Valent IIV4 (Alfuria,Fluzone) 11/27/2013, 10/29/2015, 12/12/2016, 10/23/2017, 11/01/2018     Mantoux Tuberculin Skin Test 07/17/2012     Pneumo Conj 13-V (2010&after) 01/22/2016     Pneumococcal 23 valent 11/27/2013     TDAP Vaccine (Adacel) 08/25/2009, 11/25/2019     TDAP Vaccine (Boostrix) 08/25/2009          Chart documentation done in part with Dragon Voice recognition Software. Although reviewed after completion, some word and grammatical error may remain.    Video-Visit Details    Type of service:  Video Visit    Video Start Time: 9:41 AM    Video End Time:9:47 AM    Originating Location (pt. Location): [place], [place]    Distant Location (provider location):  Home    Platform used for Video Visit: Asim Bolaños MD

## 2022-02-10 ENCOUNTER — ANCILLARY PROCEDURE (OUTPATIENT)
Dept: ULTRASOUND IMAGING | Facility: CLINIC | Age: 67
End: 2022-02-10
Attending: RADIOLOGY
Payer: COMMERCIAL

## 2022-02-10 ENCOUNTER — OFFICE VISIT (OUTPATIENT)
Dept: INTERVENTIONAL RADIOLOGY/VASCULAR | Facility: CLINIC | Age: 67
End: 2022-02-10
Payer: COMMERCIAL

## 2022-02-10 VITALS — OXYGEN SATURATION: 94 % | DIASTOLIC BLOOD PRESSURE: 94 MMHG | SYSTOLIC BLOOD PRESSURE: 143 MMHG | HEART RATE: 63 BPM

## 2022-02-10 DIAGNOSIS — I71.40 ABDOMINAL AORTIC ANEURYSM (AAA) WITHOUT RUPTURE (H): ICD-10-CM

## 2022-02-10 DIAGNOSIS — I87.1 MAY-THURNER SYNDROME: ICD-10-CM

## 2022-02-10 DIAGNOSIS — I87.1 MAY-THURNER SYNDROME: Primary | ICD-10-CM

## 2022-02-10 LAB — RADIOLOGIST FLAGS: ABNORMAL

## 2022-02-10 PROCEDURE — 99213 OFFICE O/P EST LOW 20 MIN: CPT | Performed by: PHYSICIAN ASSISTANT

## 2022-02-10 PROCEDURE — 93978 VASCULAR STUDY: CPT | Mod: GC | Performed by: RADIOLOGY

## 2022-02-10 NOTE — NURSING NOTE
Chief Complaint   Patient presents with     Consult     6 mo F/U for may geovanyer.  US Aorta/Ivc/Iliac Duplex Complete completed today.  Pt c/o LLE back of ankle and heal pain that is present daily.       Medications and allergies reconciled.  Vitals collected.    Wendy Kan LPN

## 2022-02-10 NOTE — PATIENT INSTRUCTIONS
May Thurner stent and vein look good 9 months out  Incidentally found rapidly enlarging abdominal aortic aneurysm (AAA) needs more attention  Will set you up with aortic specialist Dr. Carson Presley to discuss treatment options for AAA in the next few weeks  Continue abstinence from tobacco  Continue good control of blood pressure    Will hear from Dr. Presley's clinic soon about appts:  ABRAM Chaves, RN, BSN   Interventional Radiology Nurse Coordinator   Phone: [phone]    Patient Education     Understanding Abdominal Aortic Aneurysm  You may have been told that you have an aneurysm . This is when a weakened part of a blood vessel expands like a balloon. An aneurysm in the main blood vessel in your stomach area is called an abdominal aortic aneurysm (AAA).  What is AAA?     An aneurysm happens when a weakened part of the aorta wall stretches and expands.   The aorta is the large artery that carries blood from the heart to the rest of the body. With AAA, part of the aorta weakens and expands. If an aneurysm gets large enough, it may burst. This is very serious, and usually fatal.  How is an aneurysm found?  AAA usually causes no symptoms. It's often found when tests (such as an X-ray, MRI, or CT scan) are done for an unrelated problem. Or your healthcare provider may find it while feeling your stomach during a routine exam.  Who develops AAA?  These things increase your chances of having AAA:    AAA runs in your family    Your age. AAA is more likely as you get older.    Men are more likely than women to have AAA    Smoking    High blood pressure    High cholesterol level. This is a buildup of fat and other materials in the blood.    Injury, such as a car accident  What can be done?  Surgery can be done to remove an aneurysm. Your healthcare provider will weigh the chances that the aneurysm will burst against the risks of treatment. Because a small and slow growing aneurysm is not likely to burst, it may  be watched for a while. When it reaches a certain size, you may have surgery to replace that section of your aorta.  [employer] last reviewed this educational content on 4/1/2019 2000-2021 [employer]. All rights reserved. This information is not intended as a substitute for professional medical care. Always follow your healthcare professional's instructions.

## 2022-02-10 NOTE — PROGRESS NOTES
VASCULAR INTERVENTIONAL RADIOLOGY FOLLOW  UP    Impression/Plan:  1. May-Thurner syndrome s/p left common iliac vein stenting 5/4/21  -asymptomatic, ultrasound shows patent stent without clot    2. Abdominal aortic aneurysm, without rupture  -rapid growth from prior exams, however no exact prior measurement as no exam dedicated to AAA, currently 5.7 cm (prior was ~3.3 cm 5/2019), still <6cm and does not indicate treatment urgently per guidelines but rapid growth is concerning  -CTA abdomen pelvis to get baseline and formal measurements  -Follow up in Dr. Presley's clinic to discuss treatment options for AAA    History:  Bucky Edgar is a 66 year old male with history of polycythemia, chronic DVT, May-Thurner syndrome s/p left common iliac vein stenting 5/4/21 with Dr. Escobar. Doing well, no leg swelling or pain, redness. However, he did not have symptoms leading up to diagnosis of May-Thurner. Does complain of left heel pain related to activity, worse in the morning over his Achilles tendon.     No abdominal pain. HTN controlled with lisinopril, carvedilol and hydralazine. Former smoker, not currently smoking.    Relevant Imaging:  US Aorta/IVC/Iliac Duplex Complete 2/10/2022  1. Distal infrarenal abdominal aortic aneurysm measuring up to 5.2 x 4.5 cm transversely. Aneurysm was incompletely visualized in previous studies. CTA suggested to better evaluate the aneurysm and aortic anatomy.     2. Patent left common iliac vein stent.    Exam:  Abdomen: Soft, non-tender, not able to palpate aneurysm  Lower extremities: Normal DP/PT pulses bilaterally, no swelling bilaterally    Yoni Reed PA-C  Interventional Radiology  [phone] (pager)

## 2022-02-16 ENCOUNTER — ANCILLARY PROCEDURE (OUTPATIENT)
Dept: CT IMAGING | Facility: CLINIC | Age: 67
End: 2022-02-16
Attending: PHYSICIAN ASSISTANT
Payer: COMMERCIAL

## 2022-02-16 DIAGNOSIS — I71.40 ABDOMINAL AORTIC ANEURYSM (AAA) WITHOUT RUPTURE (H): ICD-10-CM

## 2022-02-16 PROCEDURE — 74174 CTA ABD&PLVS W/CONTRAST: CPT | Mod: GC | Performed by: RADIOLOGY

## 2022-02-16 RX ORDER — IOPAMIDOL 755 MG/ML
100 INJECTION, SOLUTION INTRAVASCULAR ONCE
Status: COMPLETED | OUTPATIENT
Start: 2022-02-16 | End: 2022-02-16

## 2022-02-16 RX ADMIN — IOPAMIDOL 100 ML: 755 INJECTION, SOLUTION INTRAVASCULAR at 09:48

## 2022-02-17 NOTE — TELEPHONE ENCOUNTER
DIAGNOSIS: PLEASE SEND LINK -[phone]  AAA new pt   DATE RECEIVED: 3.3.22   NOTES STATUS DETAILS   OFFICE NOTE from referring provider Internal 2.10.22, 6.21.21  Dr. Escobar  [facility] IR   MEDICATION LIST Internal    PERTINENT LABS Internal    CTA (CT ANGIOGRAPHY) Internal 2.16.22  CTA Abd/Pelvis   CT Internal 10.5.21  CT Chest   ULTRASOUND Internal 2.10.22, 6.3.21  US Aorta/IVC/Iliac Duplex

## 2022-03-03 ENCOUNTER — VIRTUAL VISIT (OUTPATIENT)
Dept: VASCULAR SURGERY | Facility: CLINIC | Age: 67
End: 2022-03-03
Payer: COMMERCIAL

## 2022-03-03 ENCOUNTER — PRE VISIT (OUTPATIENT)
Dept: VASCULAR SURGERY | Facility: CLINIC | Age: 67
End: 2022-03-03

## 2022-03-03 DIAGNOSIS — I71.40 AAA (ABDOMINAL AORTIC ANEURYSM) WITHOUT RUPTURE (H): Primary | ICD-10-CM

## 2022-03-03 PROCEDURE — 99214 OFFICE O/P EST MOD 30 MIN: CPT | Mod: 95 | Performed by: RADIOLOGY

## 2022-03-03 NOTE — LETTER
"3/3/2022       RE: Bucky Edgar  [address] [place] [address]     Dear Colleague,    Thank you for referring your patient, Bucky Edgar, to the [facility] [place] at [facility]. Please see a copy of my visit note below.    [facility]      Type of Visit: Virtual: Rody    Patient is here for a new visit to discuss Abdominal aortic aneurysm (AAA)..     Vitals - Patient Reported  Weight (Patient Reported): 200 lb  Height (Patient Reported): 5' 11\"  BMI (Based on Pt Reported Ht/Wt): 27.89  Pain Score: No Pain (0)        Questions patient would like addressed today are: Patient verbalized no questions/concerns, this has been communicated to the provider.     Refills are needed: No    How would you like to obtain your AVS? Mail a copy    Jatin Marino Facilitator/JESUS, 3/3/2022           INTERVENTIONAL RADIOLOGY ESTABLISHED PATIENT PATIENT      Name: Bucky Edgar  Age: 66 year old   Referring Physician: Dr. Gonzalez ref. provider found   REASON FOR REFERRAL: new AAA management    HPI: Bucky Edgar is a 66 year old male with history of former tobacco use (quit ~2020, 2 ppd for almost 50 years), COPD, HTN (controlled on carvedilol and lisinopril), hyperlipidemia (controlled on lipitor), hepatomegaly, NAFLD, May Thurner syndrome s/p iliac stent placement (follows with Dr. Escobar and [facility] clinic) and recently dx AAA here for discussion of AAA management. Seen most recently in IR clinic 2/10/22 where f/u US reported 5.2x4.5cm AAA, incompletely visualized in previous studies however demonstrating potential rapid growth (was ~3.3cm 5/2019). CTA was obtained to get baseline and formal measurements; 4.4cm x 4.4cm.     Patient is doing well today and denies any abdominal pain or any symptoms that are new for him. He endorses chronic 5-6/10 mid-lower back pain s/p \"5 operations.\" Back pain very occasionally radiates into his " legs, but mainly stays in his back.     Denies any family hx of aneurysms.      PAST MEDICAL HISTORY:   Past Medical History:   Diagnosis Date     COPD (chronic obstructive pulmonary disease) (H)      Displacement of lumbar intervertebral disc without myelopathy ,      HTN, goal below 140/90        PAST SURGICAL HISTORY:   Past Surgical History:   Procedure Laterality Date     HC REPAIR ROTATOR CUFF,CHRONIC       IR LOWER EXTREMITY VENOGRAM LEFT  2021     IR VENOUS STENT  2021     ZZC DECOMPRESS SPINAL CORD,1 SEG  , 2002    leftL4-L5, 2nd right L4-L5     ZZC SPINE FUSION,ANTER,3 SGMTS  2004    ant and post fusion L4-S1       FAMILY HISTORY:   Family History   Problem Relation Age of Onset     Family History Negative Other      Diabetes No family hx of      Coronary Artery Disease No family hx of      Hypertension No family hx of      Hyperlipidemia No family hx of      Breast Cancer No family hx of      Prostate Cancer No family hx of      Anxiety Disorder No family hx of      Substance Abuse No family hx of      Asthma No family hx of      Thyroid Disease No family hx of      Unknown/Adopted No family hx of      Genetic Disorder No family hx of      Osteoporosis No family hx of      Anesthesia Reaction No family hx of      Mental Illness No family hx of      Depression No family hx of      Other Cancer No family hx of      Colon Cancer No family hx of      Cerebrovascular Disease No family hx of      Obesity No family hx of        SOCIAL HISTORY:   Social History     Tobacco Use     Smoking status: Former Smoker     Packs/day: 1.25     Years: 41.00     Pack years: 51.25     Types: Cigarettes     Quit date: 10/1/2017     Years since quittin.4     Smokeless tobacco: Never Used   Substance Use Topics     Alcohol use: Yes     Alcohol/week: 0.0 standard drinks     Comment: 12 pack of beer every 2 weeks       PROBLEM LIST:   Patient Active Problem List    Diagnosis Date Noted      Abnormal CT lung screening 03/31/2021     Priority: Medium     Currently managed with follow up imaging by [facility] Results Team.         Elevated hemoglobin (H) 03/22/2021     Priority: Medium     Pulmonary embolism without acute cor pulmonale, unspecified chronicity, unspecified pulmonary embolism type (H) 04/02/2020     Priority: Medium     Polycythemia 01/28/2020     Priority: Medium     Renal atrophy, right 05/16/2019     Priority: Medium     Abdominal aortic aneurysm (AAA) without rupture (H) 05/16/2019     Priority: Medium     Hepatomegaly 05/16/2019     Priority: Medium     Fatty liver 05/16/2019     Priority: Medium     partial alcohol induced       Elevated liver enzymes 05/09/2019     Priority: Medium     History of deep venous thrombosis (DVT) of distal vein of left lower extremity 11/01/2018     Priority: Medium     Elevated serum creatinine 02/28/2018     Priority: Medium     Long-term (current) use of anticoagulants [Z79.01] 09/01/2017     Priority: Medium     Chronic pain due to trauma 02/23/2017     Priority: Medium     Closed fracture of multiple ribs of left side with routine healing, subsequent encounter 11/29/2016     Priority: Medium     Chronic bronchitis with COPD (chronic obstructive pulmonary disease) (H) 12/09/2015     Priority: Medium     Vitamin D deficiency 02/26/2014     Priority: Medium     Problem list name updated by automated process. Provider to review       Hypertriglyceridemia 01/30/2014     Priority: Medium     Impaired fasting glucose 11/27/2013     Priority: Medium     Advanced directives, counseling/discussion 09/26/2012     Priority: Medium     Information given for [facility].  Percy Deshpande MD  November 1, 2018         COPD (chronic obstructive pulmonary disease) (H) 09/26/2012     Priority: Medium     HTN, goal below 140/90 07/26/2012     Priority: Medium     CARDIOVASCULAR SCREENING; LDL GOAL LESS THAN 130 10/31/2010     Priority: Medium      Displacement of lumbar intervertebral disc without myelopathy 10/08/2002     Priority: Medium       MEDICATIONS:   Prescription Medications as of 3/2/2022       Rx Number Disp Refills Start End Last Dispensed Date Next Fill Date Owning Pharmacy    acetaminophen (TYLENOL) 325 MG tablet    10/13/2016        Sig: Take 650 mg by mouth every 6 hours     Class: Historical    Route: Oral    allopurinol (ZYLOPRIM) 300 MG tablet  180 tablet 1 2/1/2022    [facility] [address] [place] [address] [address] AT [place] [address] [address]    Sig: Take 2 tablets (600 mg) by mouth daily    Class: E-Prescribe    Notes to Pharmacy: This is a dose reduction and replaces all previous allopurinol prescriptions    Route: Oral    apixaban ANTICOAGULANT (ELIQUIS) 5 MG tablet  60 tablet 4 6/29/2021    [facility] [address] [place] [address], [place] - [address] AT [place] [address] [address]    Sig: Take 1 tablet (5 mg) by mouth 2 times daily    Class: E-Prescribe    Route: Oral    atorvastatin (LIPITOR) 10 MG tablet  90 tablet 3 3/24/2021    [facility] [address] [place] [address] [address] AT [place] [address] [address]    Sig: Take 1 tablet (10 mg) by mouth daily    Class: E-Prescribe    Route: Oral    carvedilol (COREG) 25 MG tablet  180 tablet 0 10/20/2021    [facility] [address] [place] [address], [place] - [address] AT [place] [address] [address]    Sig: TAKE 1 TABLET BY MOUTH TWICE DAILY WITH MEALS    Class: E-Prescribe    hydrALAZINE (APRESOLINE) 50 MG tablet  90 tablet 2 10/20/2021    [facility] [address] [place] [address] - [address] AT [place] [address] [address]    Sig: TAKE 1 TABLET(50 MG) BY MOUTH THREE TIMES DAILY    Class: E-Prescribe    lisinopril (ZESTRIL) 20 MG tablet  180 tablet 3 3/22/2021    [facility] [address] [place] [address], [place] - [address] AT [place] [address] [address]    Sig: Take 1 tablet (20 mg) by mouth 2 times daily    Class: E-Prescribe    Route: Oral    nicotine (NICORETTE) 4  MG lozenge        [id number] [place], [place] - [address] AT [facility] [address]    Sig: Place 4 mg inside cheek as needed    Class: Historical    Route: Buccal    Rivaroxaban ANTICOAGULANT 15 & 20 MG TBPK  90 each 3 2021       Sig: Xarelto is dosed twice-daily at 15 mg with food for the first 21 days, after which patients transition to once-daily doses at 20 mg with food for the remainder of their treatment    Class: Local Print    warfarin ANTICOAGULANT (COUMADIN) 5 MG tablet  5 tablet 0 2021    [id number] - [place], [place] - [address] AT [facility] [address]    Sig: Take 1 tablet (5 mg) by mouth daily    Class: E-Prescribe    Route: Oral          ALLERGIES:   Chlorthalidone and Amlodipine    ROS:  A brief review of systems was negative as detailed in the HPI.     Physical Examination: Virtual visit  VITALS:   There were no vitals taken for this visit.  A limited virtual physical exam was performed.   General: Awake, alert, NAD  Resp: Breathing comfortably on room air  Mentation: Clear.  Affect: Normal    Labs:  BMP RESULTS:  Lab Results   Component Value Date     2021    POTASSIUM 4.4 2021    CHLORIDE 110 (H) 2021    CO2 25 2021    ANIONGAP 5 2021     (H) 2021    BUN 13 2021    CR 0.95 2021    GFRESTIMATED 84 2021    GFRESTBLACK >90 2021    JUSTIN 8.5 2021        CBC RESULTS:  Lab Results   Component Value Date    WBC 8.8 2021    RBC 5.18 2021    HGB 16.0 2021    HCT 48.8 2021    MCV 94 2021    MCH 30.9 2021    MCHC 32.8 2021    RDW 15.8 (H) 2021     2021       INR/PTT:  Lab Results   Component Value Date    INR 0.92 2021    PTT 28 2021       Diagnostic studies: I personally reviewed and interpreted the following imagin.  Computed tomographic angiography of the abdomen and pelvis  without and  with contrast, including 3D reformations dated 2/16/2022  10:07 AM:  Study demonstrates a 4.4 cm greatest diameter infrarenal fusiform type abdominal aortic aneurysm.  When compared to preceding ultrasound from 2/10/2022, the ultrasound measurement of 5.2 cm was erroneous and overestimated due to tortuosity of the aorta.    2.  Duplex ultrasound abdominal aorta 2/10/2022: Infrarenal abdominal aortic aneurysm measures 4.5 cm maximal diameter AP longitudinal.  This correlates fairly well with the CT measurement above.    3.  Duplex ultrasound abdominal aorta 5/16/2019: Study demonstrates a 3.3 cm infrarenal abdominal aortic aneurysm.    Assessment: Bucky Edgar is a 66 year old with history of  recently dx AAA here for discussion of AAA management. CTA demonstrates a 4.4 cm infrarenal abdominal aortic aneurysm.  Report on the prior ultrasound from 2/10/2022 overestimated the size of the aneurysm due to tortuosity of the aorta.    I discussed definition of and possible sequelae of AAA.  At 4.4 cm, patient's aneurysm has less than or equal to a 1% risk of rupture per year.  In addition, Mr. Edgar's aneurysm has grown at a typical rate in the range of 0.5 cm/year.  I explained that typically, we do not intervene upon an abdominal aortic aneurysm until it becomes 5.5 cm in size at which point, the risk of rupture begins to increase where the benefit of treatment would outweigh the risk of treatment.    We discussed the importance of controlling HTN and HLD as well as remaining active with regular walking. Treatment at this point is not indicated due to the size of the aneurysm, however continued surveillance is necessary with next follow-up in 1 year. Pt amenable to the plan.     Plan:  - F/u in 1 year with PA clinic visit and repeat US of the abdominal aorta.  Note that the aortic ultrasound anteroposterior measurement in the longitudinal plane correlates well with the size by CT imaging.  - Continue to maintain good  blood pressure control.     Aida Soria, MS3  [facility] [employer]  March 3, 2022      Physician Attestation   I, Jaylen Presley MD, saw this patient and agree with the findings and plan of care as documented in the note.      Items personally reviewed/procedural attestation: imaging and agree with the interpretation documented in the note.    Jaylen Presley MD  Interventional Radiology and Vascular Imaging Attending  Department of Radiology  [facility]       Review of the result(s) of each unique test - CT angiogram abdomen and pelvis 2/16/2022; ultrasound abdominal aorta 2/10/2022; ultrasound abdominal aorta 5/16/2019  Independent interpretation of a test performed by another physician/other qualified health care professional (not separately reported) - CT angiogram abdomen and pelvis 2/16/2022; ultrasound abdominal aorta 2/10/2022; ultrasound abdominal aorta 5/16/2019              CC  Patient Care Team:  Hamlet Roberts PA-C as PCP - General (Physician Assistant)  Coy Bolaños MD as MD (Rheumatology)  Germain Muñoz MD as MD (Hematology & Oncology)  Hamlet Roberts PA-C as Assigned PCP  Germain Muñoz MD as Assigned Cancer Care Provider  Samir Carroll MD as Assigned Surgical Provider  Coy Bolaños MD as Assigned Rheumatology Provider          Jaylen Presley MD

## 2022-03-03 NOTE — PROGRESS NOTES
"[facility]      Type of Visit: Virtual: Rody    Patient is here for a new visit to discuss Abdominal aortic aneurysm (AAA)..     Vitals - Patient Reported  Weight (Patient Reported): 200 lb  Height (Patient Reported): 5' 11\"  BMI (Based on Pt Reported Ht/Wt): 27.89  Pain Score: No Pain (0)        Questions patient would like addressed today are: Patient verbalized no questions/concerns, this has been communicated to the provider.     Refills are needed: No    How would you like to obtain your AVS? Mail a copy    Shae Virgen, Virtual Facilitator/CMA, 3/3/2022           INTERVENTIONAL RADIOLOGY ESTABLISHED PATIENT PATIENT      Name: Bucky Edgar  Age: 66 year old   Referring Physician: Dr. Gonzalez ref. provider found   REASON FOR REFERRAL: new AAA management    HPI: Bucky Edgar is a 66 year old male with history of former tobacco use (quit ~2020, 2 ppd for almost 50 years), COPD, HTN (controlled on carvedilol and lisinopril), hyperlipidemia (controlled on lipitor), hepatomegaly, NAFLD, May Thurner syndrome s/p iliac stent placement (follows with Dr. Escobar and PA clinic) and recently dx AAA here for discussion of AAA management. Seen most recently in IR clinic 2/10/22 where f/u US reported 5.2x4.5cm AAA, incompletely visualized in previous studies however demonstrating potential rapid growth (was ~3.3cm 5/2019). CTA was obtained to get baseline and formal measurements; 4.4cm x 4.4cm.     Patient is doing well today and denies any abdominal pain or any symptoms that are new for him. He endorses chronic 5-6/10 mid-lower back pain s/p \"5 operations.\" Back pain very occasionally radiates into his legs, but mainly stays in his back.     Denies any family hx of aneurysms.      PAST MEDICAL HISTORY:   Past Medical History:   Diagnosis Date     COPD (chronic obstructive pulmonary disease) (H)      Displacement of lumbar intervertebral disc without myelopathy 1995, 2002     HTN, goal below 140/90  "       PAST SURGICAL HISTORY:   Past Surgical History:   Procedure Laterality Date     HC REPAIR ROTATOR CUFF,CHRONIC       IR LOWER EXTREMITY VENOGRAM LEFT  2021     IR VENOUS STENT  2021     ZZC DECOMPRESS SPINAL CORD,1 SEG  , 2002    leftL4-L5, 2nd right L4-L5     ZZC SPINE FUSION,ANTER,3 SGMTS  2004    ant and post fusion L4-S1       FAMILY HISTORY:   Family History   Problem Relation Age of Onset     Family History Negative Other      Diabetes No family hx of      Coronary Artery Disease No family hx of      Hypertension No family hx of      Hyperlipidemia No family hx of      Breast Cancer No family hx of      Prostate Cancer No family hx of      Anxiety Disorder No family hx of      Substance Abuse No family hx of      Asthma No family hx of      Thyroid Disease No family hx of      Unknown/Adopted No family hx of      Genetic Disorder No family hx of      Osteoporosis No family hx of      Anesthesia Reaction No family hx of      Mental Illness No family hx of      Depression No family hx of      Other Cancer No family hx of      Colon Cancer No family hx of      Cerebrovascular Disease No family hx of      Obesity No family hx of        SOCIAL HISTORY:   Social History     Tobacco Use     Smoking status: Former Smoker     Packs/day: 1.25     Years: 41.00     Pack years: 51.25     Types: Cigarettes     Quit date: 10/1/2017     Years since quittin.4     Smokeless tobacco: Never Used   Substance Use Topics     Alcohol use: Yes     Alcohol/week: 0.0 standard drinks     Comment: 12 pack of beer every 2 weeks       PROBLEM LIST:   Patient Active Problem List    Diagnosis Date Noted     Abnormal CT lung screening 2021     Priority: Medium     Currently managed with follow up imaging by [facility] Results Team.         Elevated hemoglobin (H) 2021     Priority: Medium     Pulmonary embolism without acute cor pulmonale, unspecified chronicity, unspecified pulmonary  embolism type (H) 04/02/2020     Priority: Medium     Polycythemia 01/28/2020     Priority: Medium     Renal atrophy, right 05/16/2019     Priority: Medium     Abdominal aortic aneurysm (AAA) without rupture (H) 05/16/2019     Priority: Medium     Hepatomegaly 05/16/2019     Priority: Medium     Fatty liver 05/16/2019     Priority: Medium     partial alcohol induced       Elevated liver enzymes 05/09/2019     Priority: Medium     History of deep venous thrombosis (DVT) of distal vein of left lower extremity 11/01/2018     Priority: Medium     Elevated serum creatinine 02/28/2018     Priority: Medium     Long-term (current) use of anticoagulants [Z79.01] 09/01/2017     Priority: Medium     Chronic pain due to trauma 02/23/2017     Priority: Medium     Closed fracture of multiple ribs of left side with routine healing, subsequent encounter 11/29/2016     Priority: Medium     Chronic bronchitis with COPD (chronic obstructive pulmonary disease) (H) 12/09/2015     Priority: Medium     Vitamin D deficiency 02/26/2014     Priority: Medium     Problem list name updated by automated process. Provider to review       Hypertriglyceridemia 01/30/2014     Priority: Medium     Impaired fasting glucose 11/27/2013     Priority: Medium     Advanced directives, counseling/discussion 09/26/2012     Priority: Medium     Information given for [facility].  Percy Deshpande MD  November 1, 2018         COPD (chronic obstructive pulmonary disease) (H) 09/26/2012     Priority: Medium     HTN, goal below 140/90 07/26/2012     Priority: Medium     CARDIOVASCULAR SCREENING; LDL GOAL LESS THAN 130 10/31/2010     Priority: Medium     Displacement of lumbar intervertebral disc without myelopathy 10/08/2002     Priority: Medium       MEDICATIONS:   Prescription Medications as of 3/2/2022       Rx Number Disp Refills Start End Last Dispensed Date Next Fill Date Owning Pharmacy    acetaminophen (TYLENOL) 325 MG tablet    10/13/2016        Sig:  Take 650 mg by mouth every 6 hours     Class: Historical    Route: Oral    allopurinol (ZYLOPRIM) 300 MG tablet  180 tablet 1 2/1/2022    [facility] [address] [place] [address], [place] - [id number] [facility] [address] [place] [address] [address]    Sig: Take 2 tablets (600 mg) by mouth daily    Class: E-Prescribe    Notes to Pharmacy: This is a dose reduction and replaces all previous allopurinol prescriptions    Route: Oral    apixaban ANTICOAGULANT (ELIQUIS) 5 MG tablet  60 tablet 4 6/29/2021    [facility] [address] [place] [address], [place] - [id number] [facility] [address] [place] [address] [address]    Sig: Take 1 tablet (5 mg) by mouth 2 times daily    Class: E-Prescribe    Route: Oral    atorvastatin (LIPITOR) 10 MG tablet  90 tablet 3 3/24/2021    [facility] [address] [place] [id number] [facility] [address] [place] [address] [address]    Sig: Take 1 tablet (10 mg) by mouth daily    Class: E-Prescribe    Route: Oral    carvedilol (COREG) 25 MG tablet  180 tablet 0 10/20/2021    [facility] [address] [place] [id number] [facility] [address] [place] [address] [address]    Sig: TAKE 1 TABLET BY MOUTH TWICE DAILY WITH MEALS    Class: E-Prescribe    hydrALAZINE (APRESOLINE) 50 MG tablet  90 tablet 2 10/20/2021    [facility] [address] [place] [id number] [facility] [address] [place] [address] [address]    Sig: TAKE 1 TABLET(50 MG) BY MOUTH THREE TIMES DAILY    Class: E-Prescribe    lisinopril (ZESTRIL) 20 MG tablet  180 tablet 3 3/22/2021    [facility] [address] [place] [id number] [facility] [address] [place] [address] [address]    Sig: Take 1 tablet (20 mg) by mouth 2 times daily    Class: E-Prescribe    Route: Oral    nicotine (NICORETTE) 4 MG lozenge        [facility] [address] [place] [phone] [facility] [address] [place] [address] [address]    Sig: Place 4 mg inside cheek as needed    Class: Historical    Route: Buccal    Rivaroxaban ANTICOAGULANT 15 & 20 MG TBPK  90 each 3 5/4/2021 11/4/2021       Sig: Xarelto is dosed twice-daily at 15 mg  with food for the first 21 days, after which patients transition to once-daily doses at 20 mg with food for the remainder of their treatment    Class: Local Print    warfarin ANTICOAGULANT (COUMADIN) 5 MG tablet  5 tablet 0 2021    [id number] - [place] - [id number] AT [facility] [address]  [address]    Sig: Take 1 tablet (5 mg) by mouth daily    Class: E-Prescribe    Route: Oral          ALLERGIES:   Chlorthalidone and Amlodipine    ROS:  A brief review of systems was negative as detailed in the HPI.     Physical Examination: Virtual visit  VITALS:   There were no vitals taken for this visit.  A limited virtual physical exam was performed.   General: Awake, alert, NAD  Resp: Breathing comfortably on room air  Mentation: Clear.  Affect: Normal    Labs:  BMP RESULTS:  Lab Results   Component Value Date     2021    POTASSIUM 4.4 2021    CHLORIDE 110 (H) 2021    CO2 25 2021    ANIONGAP 5 2021     (H) 2021    BUN 13 2021    CR 0.95 2021    GFRESTIMATED 84 2021    GFRESTBLACK >90 2021    JUSTIN 8.5 2021        CBC RESULTS:  Lab Results   Component Value Date    WBC 8.8 2021    RBC 5.18 2021    HGB 16.0 2021    HCT 48.8 2021    MCV 94 2021    MCH 30.9 2021    MCHC 32.8 2021    RDW 15.8 (H) 2021     2021       INR/PTT:  Lab Results   Component Value Date    INR 0.92 2021    PTT 28 2021       Diagnostic studies: I personally reviewed and interpreted the following imagin.  Computed tomographic angiography of the abdomen and pelvis  without and with contrast, including 3D reformations dated 2022  10:07 AM:  Study demonstrates a 4.4 cm greatest diameter infrarenal fusiform type abdominal aortic aneurysm.  When compared to preceding ultrasound from 2/10/2022, the ultrasound measurement of 5.2 cm was erroneous and overestimated due to tortuosity of  the aorta.    2.  Duplex ultrasound abdominal aorta 2/10/2022: Infrarenal abdominal aortic aneurysm measures 4.5 cm maximal diameter AP longitudinal.  This correlates fairly well with the CT measurement above.    3.  Duplex ultrasound abdominal aorta 5/16/2019: Study demonstrates a 3.3 cm infrarenal abdominal aortic aneurysm.    Assessment: Bucky Edgar is a 66 year old with history of  recently dx AAA here for discussion of AAA management. CTA demonstrates a 4.4 cm infrarenal abdominal aortic aneurysm.  Report on the prior ultrasound from 2/10/2022 overestimated the size of the aneurysm due to tortuosity of the aorta.    I discussed definition of and possible sequelae of AAA.  At 4.4 cm, patient's aneurysm has less than or equal to a 1% risk of rupture per year.  In addition, Mr. Edgar's aneurysm has grown at a typical rate in the range of 0.5 cm/year.  I explained that typically, we do not intervene upon an abdominal aortic aneurysm until it becomes 5.5 cm in size at which point, the risk of rupture begins to increase where the benefit of treatment would outweigh the risk of treatment.    We discussed the importance of controlling HTN and HLD as well as remaining active with regular walking. Treatment at this point is not indicated due to the size of the aneurysm, however continued surveillance is necessary with next follow-up in 1 year. Pt amenable to the plan.     Plan:  - F/u in 1 year with PA clinic visit and repeat US of the abdominal aorta.  Note that the aortic ultrasound anteroposterior measurement in the longitudinal plane correlates well with the size by CT imaging.  - Continue to maintain good blood pressure control.     Aida Soria, MS3  [employer]  March 3, 2022      Physician Attestation   I, Jaylen Presley MD, saw this patient and agree with the findings and plan of care as documented in the note.      Items personally reviewed/procedural attestation:  imaging and agree with the interpretation documented in the note.    Jaylen Presley MD  Interventional Radiology and Vascular Imaging Attending  Department of Radiology  [facility]       Review of the result(s) of each unique test - CT angiogram abdomen and pelvis 2/16/2022; ultrasound abdominal aorta 2/10/2022; ultrasound abdominal aorta 5/16/2019  Independent interpretation of a test performed by another physician/other qualified health care professional (not separately reported) - CT angiogram abdomen and pelvis 2/16/2022; ultrasound abdominal aorta 2/10/2022; ultrasound abdominal aorta 5/16/2019                  Video-Visit Details     Type of service:  Video Visit     Video Start and End Time:Video start time 9:15 AM.  Video end time 9:30 AM.    Originating Location (pt. Location): Home     Distant Location (provider location):  [facility] VASCULAR [facility]      Platform used for Video Visit: "1,2,3 Listo"  Patient Care Team:  Hamlet Roberts PA-C as PCP - General (Physician Assistant)  Coy Bolaños MD as MD (Rheumatology)  Germain Muñoz MD as MD (Hematology & Oncology)  Hamlet Roberts PA-C as Assigned PCP  Germain Muñoz MD as Assigned Cancer Care Provider  Samir Carroll MD as Assigned Surgical Provider  Coy Bolaños MD as Assigned Rheumatology Provider

## 2022-03-03 NOTE — NURSING NOTE
Chief Complaint   Patient presents with     Consult       Patient confirms medications and allergies are accurate via patients echeck in completion, and or denies any changes since last reviewed/verified.     Shae Virgen, Virtual Facilitator

## 2022-03-03 NOTE — PATIENT INSTRUCTIONS
Ismael you have had your virtual consult today with Dr Presley IR/Vascular regarding your new diagnosis of Abdominal aortic aneursym.  Your CT scan completed on 2/16/22 has been reviewed with you.     Plan:    We will continue to follow your AAA with Ultrasound and follow up visit with IR PA, no treatment offered at this time due to size of aneurysm.  We will contact you closer to the date for your follow up appointments.    Please don't hesitate to call with questions or concerns.    ABRAM Chaves RN, BSN  Interventional Radiology Nurse Coordinator   Phone:  [phone]

## 2022-03-25 DIAGNOSIS — I10 HTN, GOAL BELOW 140/90: ICD-10-CM

## 2022-03-28 DIAGNOSIS — I10 HTN, GOAL BELOW 140/90: ICD-10-CM

## 2022-03-28 RX ORDER — CARVEDILOL 25 MG/1
25 TABLET ORAL 2 TIMES DAILY WITH MEALS
Qty: 180 TABLET | Refills: 3 | Status: SHIPPED | OUTPATIENT
Start: 2022-03-28 | End: 2023-04-04

## 2022-03-28 RX ORDER — LISINOPRIL 20 MG/1
20 TABLET ORAL 2 TIMES DAILY
Qty: 60 TABLET | Refills: 0 | Status: SHIPPED | OUTPATIENT
Start: 2022-03-28 | End: 2022-03-31

## 2022-03-28 NOTE — TELEPHONE ENCOUNTER
Short refill sent. Overdue for annual physical. Okay for approval only spot.    Hamlet Roberts PA-C

## 2022-03-28 NOTE — TELEPHONE ENCOUNTER
Pending Prescriptions:                       Disp   Refills    carvedilol (COREG) 25 MG tablet            180 ta*0          Routing refill request to provider for review/approval because:  Labs out of range:    BP Readings from Last 3 Encounters:   02/10/22 (!) 143/94   11/16/21 117/80   09/10/21 (!) 160/94   A break in medication

## 2022-03-28 NOTE — TELEPHONE ENCOUNTER
Called patient and he couldn't hear me. When I called back it had a busy signal.    Please try again

## 2022-03-29 NOTE — TELEPHONE ENCOUNTER
Pt calls regarding refill request. He states that pharmacy states that he has the lisinopril but not the coreg. Advised pt that prescription was sent yesterday to [facility] and it shows that they confirmed this. Advised pt that RN will call and he can check back later with pharmacy.     Contacted [facility] they confirm that they have both Lisinopril and Coreg ready for pt.     Called pt and updated him.    Trish Deal, DREAN, RN, PHN  Registered Nurse-Clinic Triage  [facility]  3/29/2022 at 11:08 AM

## 2022-03-31 ENCOUNTER — OFFICE VISIT (OUTPATIENT)
Dept: FAMILY MEDICINE | Facility: OTHER | Age: 67
End: 2022-03-31
Payer: COMMERCIAL

## 2022-03-31 VITALS
SYSTOLIC BLOOD PRESSURE: 138 MMHG | WEIGHT: 226 LBS | OXYGEN SATURATION: 96 % | HEIGHT: 69 IN | DIASTOLIC BLOOD PRESSURE: 82 MMHG | BODY MASS INDEX: 33.47 KG/M2 | HEART RATE: 73 BPM | TEMPERATURE: 96.8 F | RESPIRATION RATE: 20 BRPM

## 2022-03-31 DIAGNOSIS — J44.89 CHRONIC BRONCHITIS WITH COPD (CHRONIC OBSTRUCTIVE PULMONARY DISEASE) (H): ICD-10-CM

## 2022-03-31 DIAGNOSIS — I10 HTN, GOAL BELOW 140/90: ICD-10-CM

## 2022-03-31 DIAGNOSIS — D22.5 MELANOCYTIC NEVUS OF TRUNK: ICD-10-CM

## 2022-03-31 DIAGNOSIS — Z00.00 ENCOUNTER FOR ROUTINE ADULT HEALTH EXAMINATION WITHOUT ABNORMAL FINDINGS: Primary | ICD-10-CM

## 2022-03-31 DIAGNOSIS — D58.2 ELEVATED HEMOGLOBIN (H): ICD-10-CM

## 2022-03-31 DIAGNOSIS — R09.81 CONGESTION OF PARANASAL SINUS: ICD-10-CM

## 2022-03-31 DIAGNOSIS — I26.99 PULMONARY EMBOLISM WITHOUT ACUTE COR PULMONALE, UNSPECIFIED CHRONICITY, UNSPECIFIED PULMONARY EMBOLISM TYPE (H): ICD-10-CM

## 2022-03-31 DIAGNOSIS — Z12.11 SCREEN FOR COLON CANCER: ICD-10-CM

## 2022-03-31 DIAGNOSIS — E78.5 HYPERLIPIDEMIA LDL GOAL <130: ICD-10-CM

## 2022-03-31 PROBLEM — I82.402 ACUTE DEEP VEIN THROMBOSIS (DVT) OF LEFT LOWER EXTREMITY (H): Status: ACTIVE | Noted: 2017-08-24

## 2022-03-31 PROBLEM — J18.9 PNEUMONIA: Status: ACTIVE | Noted: 2020-05-17

## 2022-03-31 PROCEDURE — 99214 OFFICE O/P EST MOD 30 MIN: CPT | Mod: 25 | Performed by: PHYSICIAN ASSISTANT

## 2022-03-31 PROCEDURE — 99396 PREV VISIT EST AGE 40-64: CPT | Performed by: PHYSICIAN ASSISTANT

## 2022-03-31 RX ORDER — ATORVASTATIN CALCIUM 10 MG/1
10 TABLET, FILM COATED ORAL DAILY
Qty: 90 TABLET | Refills: 3 | Status: SHIPPED | OUTPATIENT
Start: 2022-03-31 | End: 2023-04-05

## 2022-03-31 RX ORDER — LISINOPRIL 20 MG/1
20 TABLET ORAL 2 TIMES DAILY
Qty: 90 TABLET | Refills: 3 | Status: SHIPPED | OUTPATIENT
Start: 2022-03-31 | End: 2022-08-08

## 2022-03-31 RX ORDER — ALBUTEROL SULFATE 90 UG/1
2 AEROSOL, METERED RESPIRATORY (INHALATION) EVERY 6 HOURS PRN
Qty: 18 G | Refills: 0 | Status: SHIPPED | OUTPATIENT
Start: 2022-03-31 | End: 2024-07-18

## 2022-03-31 RX ORDER — HYDRALAZINE HYDROCHLORIDE 50 MG/1
TABLET, FILM COATED ORAL
Qty: 270 TABLET | Refills: 3 | Status: SHIPPED | OUTPATIENT
Start: 2022-03-31 | End: 2023-04-05

## 2022-03-31 ASSESSMENT — ENCOUNTER SYMPTOMS
HEMATOLOGIC/LYMPHATIC NEGATIVE: 1
CONSTITUTIONAL NEGATIVE: 1
DYSURIA: 0
PARESTHESIAS: 0
ALLERGIC/IMMUNOLOGIC NEGATIVE: 1
WHEEZING: 1
CONSTIPATION: 0
SHORTNESS OF BREATH: 1
MUSCULOSKELETAL NEGATIVE: 1
HEADACHES: 0
EYE DISCHARGE: 1
NERVOUS/ANXIOUS: 0
EYE PAIN: 0
NAUSEA: 0
WEAKNESS: 0
ENDOCRINE NEGATIVE: 1
DIARRHEA: 0
SORE THROAT: 0
ARTHRALGIAS: 0
PALPITATIONS: 0
NUMBNESS: 1
PSYCHIATRIC NEGATIVE: 1
HEMATOCHEZIA: 0
COUGH: 0
HEARTBURN: 0
FEVER: 0
JOINT SWELLING: 0
CHILLS: 0
MYALGIAS: 0
ABDOMINAL PAIN: 0
GASTROINTESTINAL NEGATIVE: 1
HEMATURIA: 0
FREQUENCY: 0
DIZZINESS: 0

## 2022-03-31 ASSESSMENT — ACTIVITIES OF DAILY LIVING (ADL): CURRENT_FUNCTION: NO ASSISTANCE NEEDED

## 2022-03-31 ASSESSMENT — PAIN SCALES - GENERAL: PAINLEVEL: MODERATE PAIN (5)

## 2022-03-31 NOTE — PROGRESS NOTES
SUBJECTIVE:   Bucky Edgar is a 66 year old male who presents for Preventive Visit.  Patient has been advised of split billing requirements and indicates understanding: Yes  Are you in the first 12 months of your Medicare coverage?  No    History of Present Illness       Reason for visit:  Checkup    He eats 0-1 servings of fruits and vegetables daily.He consumes 1 sweetened beverage(s) daily.He exercises with enough effort to increase his heart rate 10 to 19 minutes per day.  He exercises with enough effort to increase his heart rate 7 days per week.   He is taking medications regularly.  He is not taking prescribed medications regularly due to None.  Healthy Habits:     In general, how would you rate your overall health?  Fair    Frequency of exercise:  None    Duration of exercise:  Less than 15 minutes    Taking medications regularly:  0    Barriers to taking medications:  None    Medication side effects:  None    Ability to successfully perform activities of daily living:  No assistance needed    Home Safety:  No safety concerns identified    Hearing Impairment:  No hearing concerns    In the past 6 months, have you been bothered by leaking of urine?  No    In general, how would you rate your overall mental or emotional health?  Good      PHQ-2 Total Score: 0    Additional concerns today:  No    He has noticed some clear and yellow nasal drainage over the past week with some tearing of his eyes as well. No cough or new shortness of breath. No fevers or chills.    He had a left common iliac vein stenting last year and also has an abdominal aortic aneurysm that is being managed by his vascular specialist. He has a history of a chronic left lower extremity DVT along with a PE in the past. He was on anticoagulation for awhile, including after his stenting for 3 months but it sounds like hematology was okay just keeping him on a baby aspirin moving forward. However, he states he has not been taking aspirin for  quite a few months.    He has chronic COPD but quit smoking a few years ago. He rarely uses his albuterol inhaler.      Do you feel safe in your environment? Yes  Have you ever done Advance Care Planning? (For example, a Health Directive, POLST, or a discussion with a medical provider or your loved ones about your wishes): No, advance care planning information given to patient to review.  Patient declined advance care planning discussion at this time.       Fall risk  Fallen 2 or more times in the past year?: No  Any fall with injury in the past year?: No    Cognitive Screening   1) Repeat 3 items (Leader, Season, Table)    2) Clock draw: NORMAL  3) 3 item recall: Recalls 1 object   Results: NORMAL clock, 1-2 items recalled: COGNITIVE IMPAIRMENT LESS LIKELY    Mini-CogTM Copyright S [employer]. Licensed by the author for use in [facility]; reprinted with permission (rosio@[facility]). All rights reserved.      Do you have sleep apnea, excessive snoring or daytime drowsiness?: yes    Reviewed and updated as needed this visit by clinical staff   Tobacco  Allergies  Meds  Problems  Med Hx  Surg Hx  Fam Hx  Soc   Hx        Reviewed and updated as needed this visit by Provider  Tobacco  Allergies  Meds  Problems  Med Hx  Surg Hx  Fam Hx      Social History     Tobacco Use     Smoking status: Former Smoker     Packs/day: 1.25     Years: 41.00     Pack years: 51.25     Types: Cigarettes     Quit date: 10/1/2017     Years since quittin.4     Smokeless tobacco: Never Used   Substance Use Topics     Alcohol use: Yes     Alcohol/week: 0.0 standard drinks     Comment: 12 pack of beer every 2 weeks       Alcohol Use 3/22/2021   Prescreen: >3 drinks/day or >7 drinks/week? No   Prescreen: >3 drinks/day or >7 drinks/week? -         Current providers sharing in care for this patient include:   Patient Care Team:  Hamlet Roberts PA-C as PCP - General (Physician Assistant)  Coy Bolaños MD as MD  "(Rheumatology)  Germain Muñoz MD as MD (Hematology & Oncology)  Hamlet Roberts PA-C as Assigned PCP  Germain Muñoz MD as Assigned Cancer Care Provider  Samir Carroll MD as Assigned Surgical Provider  Coy Bolaños MD as Assigned Rheumatology Provider    The following health maintenance items are reviewed in Epic and correct as of today:  Health Maintenance Due   Topic Date Due     ZOSTER IMMUNIZATION (1 of 2) Never done     COLORECTAL CANCER SCREENING  07/26/2019     INFLUENZA VACCINE (1) 09/01/2021     COVID-19 Vaccine (3 - Booster for Pfizer series) 09/21/2021         Review of Systems   Constitutional: Negative.  Negative for chills and fever.   HENT: Negative for congestion, ear discharge, ear pain, hearing loss and sore throat.    Eyes: Positive for discharge. Negative for pain and visual disturbance.   Respiratory: Positive for shortness of breath and wheezing. Negative for cough.    Cardiovascular: Negative for chest pain, palpitations and peripheral edema.   Gastrointestinal: Negative.  Negative for abdominal pain, constipation, diarrhea, heartburn, hematochezia and nausea.   Endocrine: Negative.    Genitourinary: Negative.  Negative for dysuria, frequency, genital sores, hematuria, impotence, penile discharge and urgency.   Musculoskeletal: Negative.  Negative for arthralgias, joint swelling and myalgias.   Skin: Negative.  Negative for rash.   Allergic/Immunologic: Negative.    Neurological: Positive for numbness. Negative for dizziness, weakness, headaches and paresthesias.   Hematological: Negative.    Psychiatric/Behavioral: Negative.  Negative for mood changes. The patient is not nervous/anxious.    All other systems reviewed and are negative.      OBJECTIVE:   /82   Pulse 73   Temp 96.8  F (36  C) (Temporal)   Resp 20   Ht 1.753 m (5' 9.02\")   Wt 102.5 kg (226 lb)   SpO2 96%   BMI 33.36 kg/m   Estimated body mass index is 33.36 kg/m  as calculated from the following:    " "Height as of this encounter: 1.753 m (5' 9.02\").    Weight as of this encounter: 102.5 kg (226 lb).  Physical Exam  GENERAL: healthy, alert and no distress  EYES: Eyes grossly normal to inspection, PERRL and conjunctivae and sclerae normal  HENT: ear canals and TM's normal, nasal mucosa is mildly erythematous and edematous without obvious drainage, pharynx is clear.   NECK: no adenopathy, no asymmetry, masses, or scars and thyroid normal to palpation  RESP: lungs with right sided rhonchi but no rales or wheezing.   CV: regular rate and rhythm, normal S1 S2, no S3 or S4, no murmur, click or rub, no peripheral edema and peripheral pulses strong  ABDOMEN: soft, nontender, no hepatosplenomegaly, no masses and bowel sounds normal   (male): male circumcised genitalia without lesions or urethral discharge, no hernia. Large left hydrocele.  MS: no gross musculoskeletal defects noted, no edema. FROM to all extremities. No spinal tenderness.   SKIN: 0.5 x 0.4 cm dark brown macule with irregular borders over right upper abdomen. Photo taken for patient's chart.   NEURO: Normal strength and tone, mentation intact and speech normal. Cranial nerves II-XII are grossly intact. DTRs are 2+/4 throughout and symmetric. Gait is stable.   PSYCH: mentation appears normal, affect normal/bright    ASSESSMENT / PLAN:       ICD-10-CM    1. Encounter for routine adult health examination without abnormal findings  Z00.00    2. Hyperlipidemia LDL goal <130  E78.5 atorvastatin (LIPITOR) 10 MG tablet     Lipid panel reflex to direct LDL Fasting   3. HTN, goal below 140/90  I10 hydrALAZINE (APRESOLINE) 50 MG tablet     lisinopril (ZESTRIL) 20 MG tablet   4. Chronic bronchitis with COPD (chronic obstructive pulmonary disease) (H)  J44.9 albuterol (PROAIR HFA/PROVENTIL HFA/VENTOLIN HFA) 108 (90 Base) MCG/ACT inhaler   5. Elevated hemoglobin (H)  D58.2    6. Melanocytic nevus of trunk  D22.5    7. Congestion of paranasal sinus  R09.81    8. " "Pulmonary embolism without acute cor pulmonale, unspecified chronicity, unspecified pulmonary embolism type (H)  I26.99    9. Screen for colon cancer  Z12.11 [employer]IC[employer]ANGEL[employer])       2. Continue Lipitor. He will complete updated fasting lipid panel in May when he comes in for his rheumatologic labs.    3. Blood pressure is stable. Continue current medications.     4, 7. COPD overall stable with rare use of albuterol. He does have some rhonchi on exam but has had some sinus congestion over the past week, likely allergy related. Recommend a daily Claritin and/or Flonase. He remains smoke free with stable chest CT scan in October.    5. Chronic due to COPD/smoking. Will be rechecked in [place].    6. Right abdominal melanocytic nevus with irregular borders. He states this has been present for many years so I recommend continued close monitoring for any changes. Photo taken for his chart with permission.    8. History of chronic left leg DVT along s/p stenting along with a PE. He saw hematology last year and had a fairly normal work up but was recommended to continue a daily 81 mg aspirin so he will restart this.    9. Cologuard ordered.    Follow up annually.         Patient has been advised of split billing requirements and indicates understanding: Yes    COUNSELING:  Reviewed preventive health counseling, as reflected in patient instructions       Regular exercise       Healthy diet/nutrition    Estimated body mass index is 33.36 kg/m  as calculated from the following:    Height as of this encounter: 1.753 m (5' 9.02\").    Weight as of this encounter: 102.5 kg (226 lb).    Weight management plan: Discussed healthy diet and exercise guidelines    He reports that he quit smoking about 4 years ago. His smoking use included cigarettes. He has a 51.25 pack-year smoking history. He has never used smokeless tobacco.      Appropriate preventive services were discussed with this patient, including applicable screening as " appropriate for cardiovascular disease, diabetes, osteopenia/osteoporosis, and glaucoma.  As appropriate for age/gender, discussed screening for colorectal cancer, prostate cancer, breast cancer, and cervical cancer. Checklist reviewing preventive services available has been given to the patient.    Reviewed patients plan of care and provided an AVS. The Basic Care Plan (routine screening as documented in Health Maintenance) for Bucky meets the Care Plan requirement. This Care Plan has been established and reviewed with the Patient.    Counseling Resources:  ATP IV Guidelines  Pooled Cohorts Equation Calculator  Breast Cancer Risk Calculator  Breast Cancer: Medication to Reduce Risk  FRAX Risk Assessment  ICSI Preventive Guidelines  Dietary Guidelines for Americans, 2010  USDA's MyPlate  ASA Prophylaxis  Lung CA Screening    LISA [facility]    Identified Health Risks:

## 2022-03-31 NOTE — PATIENT INSTRUCTIONS
Preventive Health Recommendations  Male Ages 50 - 64    Yearly exam:             See your health care provider every year in order to  o   Review health changes.   o   Discuss preventive care.    o   Review your medicines if your doctor has prescribed any.     Have a cholesterol test every 5 years, or more frequently if you are at risk for high cholesterol/heart disease.     Have a diabetes test (fasting glucose) every three years. If you are at risk for diabetes, you should have this test more often.     Have a colonoscopy at age 50, or have a yearly FIT test (stool test). These exams will check for colon cancer.      Talk with your health care provider about whether or not a prostate cancer screening test (PSA) is right for you.    You should be tested each year for STDs (sexually transmitted diseases), if you re at risk.     Shots: Get a flu shot each year. Get a tetanus shot every 10 years.     Nutrition:    Eat at least 5 servings of fruits and vegetables daily.     Eat whole-grain bread, whole-wheat pasta and brown rice instead of white grains and rice.     Get adequate Calcium and Vitamin D.     Lifestyle    Exercise for at least 150 minutes a week (30 minutes a day, 5 days a week). This will help you control your weight and prevent disease.     Limit alcohol to one drink per day.     No smoking.     Wear sunscreen to prevent skin cancer.     See your dentist every six months for an exam and cleaning.     See your eye doctor every 1 to 2 years.    Continue current medications.    Try a daily Claritin for the congestion along with Flonase nasal spray.    Follow up annually.

## 2022-05-31 ENCOUNTER — LAB (OUTPATIENT)
Dept: LAB | Facility: CLINIC | Age: 67
End: 2022-05-31
Payer: COMMERCIAL

## 2022-05-31 DIAGNOSIS — E78.5 HYPERLIPIDEMIA LDL GOAL <130: ICD-10-CM

## 2022-05-31 DIAGNOSIS — M1A.09X0 IDIOPATHIC CHRONIC GOUT OF MULTIPLE SITES WITHOUT TOPHUS: ICD-10-CM

## 2022-05-31 LAB
ALBUMIN SERPL-MCNC: 3 G/DL (ref 3.4–5)
ALP SERPL-CCNC: 111 U/L (ref 40–150)
ALT SERPL W P-5'-P-CCNC: 27 U/L (ref 0–70)
ANION GAP SERPL CALCULATED.3IONS-SCNC: 4 MMOL/L (ref 3–14)
AST SERPL W P-5'-P-CCNC: 26 U/L (ref 0–45)
BASOPHILS # BLD AUTO: 0.1 10E3/UL (ref 0–0.2)
BASOPHILS NFR BLD AUTO: 1 %
BILIRUB SERPL-MCNC: 0.6 MG/DL (ref 0.2–1.3)
BUN SERPL-MCNC: 14 MG/DL (ref 7–30)
CALCIUM SERPL-MCNC: 9.1 MG/DL (ref 8.5–10.1)
CHLORIDE BLD-SCNC: 114 MMOL/L (ref 94–109)
CHOLEST SERPL-MCNC: 131 MG/DL
CO2 SERPL-SCNC: 26 MMOL/L (ref 20–32)
CREAT SERPL-MCNC: 1.09 MG/DL (ref 0.66–1.25)
EOSINOPHIL # BLD AUTO: 0.4 10E3/UL (ref 0–0.7)
EOSINOPHIL NFR BLD AUTO: 5 %
ERYTHROCYTE [DISTWIDTH] IN BLOOD BY AUTOMATED COUNT: 14.6 % (ref 10–15)
FASTING STATUS PATIENT QL REPORTED: YES
GFR SERPL CREATININE-BSD FRML MDRD: 76 ML/MIN/1.73M2
GLUCOSE BLD-MCNC: 95 MG/DL (ref 70–99)
HCT VFR BLD AUTO: 50.3 % (ref 40–53)
HDLC SERPL-MCNC: 52 MG/DL
HGB BLD-MCNC: 16.4 G/DL (ref 13.3–17.7)
IMM GRANULOCYTES # BLD: 0.1 10E3/UL
IMM GRANULOCYTES NFR BLD: 1 %
LDLC SERPL CALC-MCNC: 59 MG/DL
LYMPHOCYTES # BLD AUTO: 1.5 10E3/UL (ref 0.8–5.3)
LYMPHOCYTES NFR BLD AUTO: 18 %
MCH RBC QN AUTO: 32.2 PG (ref 26.5–33)
MCHC RBC AUTO-ENTMCNC: 32.6 G/DL (ref 31.5–36.5)
MCV RBC AUTO: 99 FL (ref 78–100)
MONOCYTES # BLD AUTO: 0.6 10E3/UL (ref 0–1.3)
MONOCYTES NFR BLD AUTO: 7 %
NEUTROPHILS # BLD AUTO: 5.9 10E3/UL (ref 1.6–8.3)
NEUTROPHILS NFR BLD AUTO: 68 %
NONHDLC SERPL-MCNC: 79 MG/DL
NRBC # BLD AUTO: 0 10E3/UL
NRBC BLD AUTO-RTO: 0 /100
PLATELET # BLD AUTO: 269 10E3/UL (ref 150–450)
POTASSIUM BLD-SCNC: 4.3 MMOL/L (ref 3.4–5.3)
PROT SERPL-MCNC: 6.8 G/DL (ref 6.8–8.8)
RBC # BLD AUTO: 5.1 10E6/UL (ref 4.4–5.9)
SODIUM SERPL-SCNC: 144 MMOL/L (ref 133–144)
TRIGL SERPL-MCNC: 99 MG/DL
URATE SERPL-MCNC: 2.1 MG/DL (ref 3.5–7.2)
WBC # BLD AUTO: 8.5 10E3/UL (ref 4–11)

## 2022-05-31 PROCEDURE — 84550 ASSAY OF BLOOD/URIC ACID: CPT

## 2022-05-31 PROCEDURE — 80053 COMPREHEN METABOLIC PANEL: CPT

## 2022-05-31 PROCEDURE — 80061 LIPID PANEL: CPT

## 2022-05-31 PROCEDURE — 85025 COMPLETE CBC W/AUTO DIFF WBC: CPT

## 2022-05-31 PROCEDURE — 36415 COLL VENOUS BLD VENIPUNCTURE: CPT

## 2022-06-07 ENCOUNTER — VIRTUAL VISIT (OUTPATIENT)
Dept: RHEUMATOLOGY | Facility: CLINIC | Age: 67
End: 2022-06-07
Payer: COMMERCIAL

## 2022-06-07 DIAGNOSIS — M1A.09X0 IDIOPATHIC CHRONIC GOUT OF MULTIPLE SITES WITHOUT TOPHUS: ICD-10-CM

## 2022-06-07 PROCEDURE — 99213 OFFICE O/P EST LOW 20 MIN: CPT | Mod: 95 | Performed by: INTERNAL MEDICINE

## 2022-06-07 RX ORDER — ALLOPURINOL 300 MG/1
TABLET ORAL
Qty: 90 TABLET | Refills: 1 | Status: SHIPPED | OUTPATIENT
Start: 2022-06-07 | End: 2022-10-04

## 2022-06-07 RX ORDER — ALLOPURINOL 100 MG/1
TABLET ORAL
Qty: 180 TABLET | Refills: 1 | Status: SHIPPED | OUTPATIENT
Start: 2022-06-07 | End: 2022-10-04

## 2022-06-07 NOTE — PATIENT INSTRUCTIONS
RHEUMATOLOGY    Dr. Coy Bolaños    [address] [place] [address]  Phone number: [phone]  Fax number: [phone]      Thank you for choosing [facility]!    Saba Patricia CMA Rheumatology

## 2022-06-07 NOTE — PROGRESS NOTES
Bucky Edgar  is a 66 year old year old who is being evaluated via a billable video visit.      How would you like to obtain your AVS? MyChart  If the video visit is dropped, the invitation should be resent by: Text to cell phone: [phone]   Will anyone else be joining your video visit? No     Rheumatology Video Visit      Bucky Edgar MRN# [id number]   YOB: 1955 Age: 66 year old      Date of visit: 6/07/22   PCP: Dr. Percy Deshpande    Chief Complaint   Patient presents with:  Idiopathic chronic gout    Assessment and Plan     1.  Gout: Started having gout flares in early 2017, acute onset and resolved with prednisone; joints involved include his left ankle, left first MTP, and left wrist.  Colchicine was cost prohibitive.  Was doing well on allopurinol 700mg daily, uric acid 2.1, tophi had resolved, and he was not having any flares.  Note that in the past he was still flaring when he was on allopurinol 600 mg daily but that could have also been related to mobilization of tophi, and the tophi appear to have resolved.  He is now taking allopurinol 600 mg daily and his uric acid remains well controlled at 2.1.  Therefore, reduce allopurinol in an effort to get to a lower allopurinol dose with still well-controlled gout.  Chronic illness, stable.    - Reduce allopurinol from 600 mg daily to 500 mg daily (one 300mg tablet, and two 100mg tablets)  - For gout flare only: prednisone 60mg daily x2days, then 40mg daily x5days, then 20mg daily x5days, then stop.  - Lab 2-4 days prior to 4 mo rheumatology follow-up appointment:  Uric Acid    2. Hx of Elevated Hgb: thought to be polycythemia vera by Dr. Muñoz (hematology/oncology) secondary to heavy smoking and underlying COPD as well as Gaisbock syndrome. Advised to continue baby aspirin beebe by Dr. Muñoz. Phlebotomy was also started to try to keep the hematocrit <50.  This is documented here for historical significance only and was not discussed today.       Total minutes spent in evaluation with patient, documentation, , and review of pertinent studies and chart notes: 11     Mr. Edgar verbalized agreement with and understanding of the rational for the diagnosis and treatment plan.  All questions were answered to best of my ability and the patient's satisfaction. Mr. Edgar was advised to contact the clinic with any questions that may arise after the clinic visit.     Thank you for involving me in the care of the patient    Return to clinic: 4 months      HPI   Bucky Edgar is a 66 year old male with a past medical history significant for COPD, dyslipidemia, vitamin D deficiency, history of rib fractures, DVT/PE on anticoagulation, hypertension, who is seen for follow-up of gout.    Today, 6/7/2022: Gout is well controlled.  No gout flares since last seen.  Taking allopurinol 600 mg daily.  Has not required prednisone for gout since last seen.      Denies fevers, chills, nausea, vomiting, constipation, diarrhea. No abdominal pain. No chest pain/pressure, palpitations, or shortness of breath currently. No LE swelling.  No rash.      Tobacco: Quit in 2017  EtOH: 6 beers per week  Drugs: None    ROS   12 point review of system was completed and negative except as noted in the HPI     Active Problem List     Patient Active Problem List   Diagnosis     Displacement of lumbar intervertebral disc without myelopathy     CARDIOVASCULAR SCREENING; LDL GOAL LESS THAN 130     HTN, goal below 140/90     Advanced directives, counseling/discussion     COPD (chronic obstructive pulmonary disease) (H)     Impaired fasting glucose     Hypertriglyceridemia     Vitamin D deficiency     Chronic bronchitis with COPD (chronic obstructive pulmonary disease) (H)     Closed fracture of multiple ribs of left side with routine healing, subsequent encounter     Chronic pain due to trauma     Long-term (current) use of anticoagulants [Z79.01]     Elevated serum creatinine      History of deep venous thrombosis (DVT) of distal vein of left lower extremity     Elevated liver enzymes     Renal atrophy, right     Abdominal aortic aneurysm (AAA) without rupture (H)     Hepatomegaly     Fatty liver     Polycythemia     Pulmonary embolism without acute cor pulmonale, unspecified chronicity, unspecified pulmonary embolism type (H)     Elevated hemoglobin (H)     Abnormal CT lung screening     Accidental fall from ladder     Acute deep vein thrombosis (DVT) of left lower extremity (H)     Acute-on-chronic renal failure (H)     Cellulitis of left foot     Ectatic abdominal aorta (H)     Pneumonia     Acute kidney injury (H)     Past Medical History     Past Medical History:   Diagnosis Date     COPD (chronic obstructive pulmonary disease) (H)      Displacement of lumbar intervertebral disc without myelopathy 1995, 2002     HTN, goal below 140/90      Past Surgical History     Past Surgical History:   Procedure Laterality Date     HC REPAIR ROTATOR CUFF,CHRONIC  1989     IR LOWER EXTREMITY VENOGRAM LEFT  5/4/2021     IR VENOUS STENT  5/4/2021     ZZC DECOMPRESS SPINAL CORD,1 SEG  1995, 4/2002    leftL4-L5, 2nd right L4-L5     ZZC SPINE FUSION,ANTER,3 SGMTS  2/9/2004    ant and post fusion L4-S1     Allergy     Allergies   Allergen Reactions     Chlorthalidone Other (See Comments)     Excessive lowering of blood pressure     Amlodipine Other (See Comments)     Intractable headache with use of medication as well as noting a small tremor of the upper extremities     Current Medication List     Current Outpatient Medications   Medication Sig     albuterol (PROAIR HFA/PROVENTIL HFA/VENTOLIN HFA) 108 (90 Base) MCG/ACT inhaler Inhale 2 puffs into the lungs every 6 hours as needed for shortness of breath / dyspnea     allopurinol (ZYLOPRIM) 300 MG tablet Take 2 tablets (600 mg) by mouth daily     aspirin (ASA) 81 MG EC tablet Take 1 tablet (81 mg) by mouth daily     atorvastatin (LIPITOR) 10 MG tablet Take  "1 tablet (10 mg) by mouth daily     carvedilol (COREG) 25 MG tablet Take 1 tablet (25 mg) by mouth 2 times daily (with meals)     hydrALAZINE (APRESOLINE) 50 MG tablet TAKE 1 TABLET(50 MG) BY MOUTH THREE TIMES DAILY     lisinopril (ZESTRIL) 20 MG tablet Take 1 tablet (20 mg) by mouth 2 times daily     nicotine (NICORETTE) 4 MG lozenge Place 4 mg inside cheek as needed     acetaminophen (TYLENOL) 325 MG tablet Take 650 mg by mouth every 6 hours      No current facility-administered medications for this visit.         Social History   See HPI    Family History     Family History   Problem Relation Age of Onset     Family History Negative Other      Diabetes No family hx of      Coronary Artery Disease No family hx of      Hypertension No family hx of      Hyperlipidemia No family hx of      Breast Cancer No family hx of      Prostate Cancer No family hx of      Anxiety Disorder No family hx of      Substance Abuse No family hx of      Asthma No family hx of      Thyroid Disease No family hx of      Unknown/Adopted No family hx of      Genetic Disorder No family hx of      Osteoporosis No family hx of      Anesthesia Reaction No family hx of      Mental Illness No family hx of      Depression No family hx of      Other Cancer No family hx of      Colon Cancer No family hx of      Cerebrovascular Disease No family hx of      Obesity No family hx of        Physical Exam     Temp Readings from Last 3 Encounters:   03/31/22 96.8  F (36  C) (Temporal)   05/04/21 99  F (37.2  C) (Oral)   03/24/21 96.6  F (35.9  C) (Temporal)     BP Readings from Last 5 Encounters:   03/31/22 138/82   02/10/22 (!) 143/94   11/16/21 117/80   09/10/21 (!) 160/94   06/21/21 (!) 141/93     Pulse Readings from Last 1 Encounters:   03/31/22 73     Resp Readings from Last 1 Encounters:   03/31/22 20     Estimated body mass index is 33.36 kg/m  as calculated from the following:    Height as of 3/31/22: 1.753 m (5' 9.02\").    Weight as of 3/31/22: " 102.5 kg (226 lb).      GEN: NAD.   HEENT:  No obvious external lesions of the ear and nose. Hearing intact.  PULM: No increased work of breathing  SKIN: No rash or jaundice seen  PSYCH: Alert. Appropriate.        Labs / Imaging (select studies)     CBC  Recent Labs   Lab Test 05/31/22  1014 05/24/21  1035 05/04/21  1030 08/25/20  0940 07/28/20  1040   WBC 8.5 8.8 9.5 8.1 8.4   RBC 5.10 5.18 5.53 5.29 5.48   HGB 16.4 16.0 17.0 15.4 16.5   HCT 50.3 48.8 52.2 48.3 51.9   MCV 99 94 94 91 95   RDW 14.6 15.8* 16.3* 14.6 16.3*    217 220 259 242   MCH 32.2 30.9 30.7 29.1 30.1   MCHC 32.6 32.8 32.6 31.9 31.8   NEUTROPHIL 68 69.4  --  73.0 68.7   LYMPH 18 18.2  --  14.2 18.6   MONOCYTE 7 7.8  --  8.9 8.5   EOSINOPHIL 5 3.9  --  2.7 2.8   BASOPHIL 1 0.7  --  1.0 0.8   ANEU  --  6.1  --  5.9 5.7   ALYM  --  1.6  --  1.2 1.6   LIZZ  --  0.7  --  0.7 0.7   AEOS  --  0.3  --  0.2 0.2   ABAS  --  0.1  --  0.1 0.1     CMP  Recent Labs   Lab Test 05/31/22  1014 05/24/21  1035 05/04/21  1030 03/22/21  1219 06/02/20  0903    140 139 139 140   POTASSIUM 4.3 4.4 4.2 4.2 4.2   CHLORIDE 114* 110* 111* 108 112*   CO2 26 25 22 29 23   ANIONGAP 4 5 6 2* 5   GLC 95 112* 100* 100* 113*   BUN 14 13 16 11 20   CR 1.09 0.95 1.00 0.94 1.07   GFRESTIMATED 76 84 79 86 74   GFRESTBLACK  --  >90 >90 >90 85   JUSTIN 9.1 8.5 9.5 9.1 9.0   BILITOTAL 0.6 0.7  --   --  0.9   ALBUMIN 3.0* 3.4  --   --  3.4   PROTTOTAL 6.8 6.9  --   --  6.8   ALKPHOS 111 98  --   --  95   AST 26 18  --   --  19   ALT 27 28  --   --  31     Uric Acid  Recent Labs   Lab Test 05/31/22  1014 05/24/21  1035 06/02/20  0903 05/08/20  0959 11/07/19  0900 05/08/19  1041   URIC 2.1* 2.1* 2.2* 1.5* 4.0 2.3*       Immunization History     Immunization History   Administered Date(s) Administered     COVID-19,PF,Pfizer (12+ Yrs) 03/31/2021, 04/21/2021     Influenza Quad, Recombinant, pf(RIV4) (Flublok) 11/12/2019     Influenza Vaccine IM > 6 months Valent IIV4 (JulyFluzone)  11/27/2013, 10/29/2015, 12/12/2016, 10/23/2017, 11/01/2018     Mantoux Tuberculin Skin Test 07/17/2012     Pneumo Conj 13-V (2010&after) 01/22/2016     Pneumococcal 23 valent 11/27/2013     TDAP Vaccine (Adacel) 08/25/2009, 11/25/2019     TDAP Vaccine (Boostrix) 08/25/2009          Chart documentation done in part with Dragon Voice recognition Software. Although reviewed after completion, some word and grammatical error may remain.      Video-Visit Details    Type of service:  Video Visit    Video Start Time: 9:37 AM    Video End Time:9:42 AM    Originating Location (pt. Location): Home, [place]    Distant Location (provider location):  [place]    Platform used for Video Visit: Asim Bolaños MD

## 2022-08-08 ENCOUNTER — TELEPHONE (OUTPATIENT)
Dept: FAMILY MEDICINE | Facility: OTHER | Age: 67
End: 2022-08-08

## 2022-08-08 DIAGNOSIS — I10 HTN, GOAL BELOW 140/90: ICD-10-CM

## 2022-08-08 RX ORDER — LISINOPRIL 20 MG/1
20 TABLET ORAL 2 TIMES DAILY
Qty: 180 TABLET | Refills: 2 | Status: SHIPPED | OUTPATIENT
Start: 2022-08-08 | End: 2023-04-05

## 2022-08-08 NOTE — TELEPHONE ENCOUNTER
Drug Change Request    Contacts       Type Contact Phone/Fax    08/08/2022 09:48 AM CDT Phone (Incoming) Bucky Edgar (Self) [phone] (H)        What is the current medication?:  Lisinopril 20mg     What alternative is being requested?: lisinopril 20mg    Why the request to change?:  Insurance wont cover as it is written for a 45 day supply. 90 tablets but 2x a day. Wondering if you can send 180 tablets @ 2x day.     Preferred Pharmacy:       [id number] [address]  AT [facility] [address]  [address] [address] [facility] [address]  Phone: [phone] Fax: [phone]      Okay to leave a detailed message?: Yes at Home number on file [phone] (home)

## 2022-10-04 ENCOUNTER — VIRTUAL VISIT (OUTPATIENT)
Dept: RHEUMATOLOGY | Facility: CLINIC | Age: 67
End: 2022-10-04
Payer: COMMERCIAL

## 2022-10-04 DIAGNOSIS — M1A.09X0 IDIOPATHIC CHRONIC GOUT OF MULTIPLE SITES WITHOUT TOPHUS: ICD-10-CM

## 2022-10-04 PROCEDURE — 99214 OFFICE O/P EST MOD 30 MIN: CPT | Mod: 95 | Performed by: INTERNAL MEDICINE

## 2022-10-04 RX ORDER — ALLOPURINOL 100 MG/1
TABLET ORAL
Qty: 90 TABLET | Refills: 1 | Status: SHIPPED | OUTPATIENT
Start: 2022-10-04 | End: 2023-04-03

## 2022-10-04 RX ORDER — ALLOPURINOL 300 MG/1
TABLET ORAL
Qty: 90 TABLET | Refills: 1 | Status: SHIPPED | OUTPATIENT
Start: 2022-10-04 | End: 2023-04-03

## 2022-10-04 NOTE — PATIENT INSTRUCTIONS
RHEUMATOLOGY    Dr. Coy Bolaños    [facility]  [address] [place] [address]  [place] [address]  Phone number: [phone]  Fax number: [phone]    You may schedule your FLU shot by calling [phone] or if you would like to get your shot at a [facility] pharmacy you may schedule online at [email or web address].    Thank you for choosing [facility]!    Saba Patricia CMA Rheumatology

## 2022-10-04 NOTE — PROGRESS NOTES
Bucky Edgar  is a 67 year old year old who is being evaluated via a billable video visit.      How would you like to obtain your AVS? MyChart  If the video visit is dropped, the invitation should be resent by: Text to cell phone: [phone]   Will anyone else be joining your video visit? No     Rheumatology Video Visit      Bucky Edgar MRN# [id number]   YOB: 1955 Age: 67 year old      Date of visit: 10/04/22   PCP: Dr. Percy Deshpande    Chief Complaint   Patient presents with:  Arthritis: Gout, no flares    Assessment and Plan     1.  Gout: Started having gout flares in early 2017, acute onset and resolved with prednisone; joints involved include his left ankle, left first MTP, and left wrist.  Colchicine was cost prohibitive.  Was doing well on allopurinol 700mg daily, uric acid 2.1, tophi had resolved, and he was not having any flares.  Note that in the past he was still flaring when he was on allopurinol 600 mg daily but that could have also been related to mobilization of tophi, and the tophi appear to have resolved.  With resolution of visible tophi, allopurinol has been reduced over time while maintaining a low uric acid, but no uric acid checked most recent.  No flare since last seen.  Therefore, reduce allopurinol again and have labs checked a few days before the next appointment in April 2023.  Chronic illness, stable.    - Reduce allopurinol from 500 mg daily to 400 mg daily (one 300mg tablet, and one 100mg tablet)  - For gout flare only: prednisone 60mg daily x2days, then 40mg daily x5days, then 20mg daily x5days, then stop.  - Lab 2-4 days prior to 6 mo rheumatology follow-up appointment: CBC, CMP, Uric Acid    2. Hx of Elevated Hgb: thought to be polycythemia vera by Dr. Muñoz (hematology/oncology) secondary to heavy smoking and underlying COPD as well as G[place] syndrome. Advised to continue baby aspirin beebe by Dr. Muñoz. Phlebotomy was also started to try to keep the  hematocrit <50.  This is documented here for historical significance only and was not discussed today.      Total minutes spent in evaluation with patient, documentation, , and review of pertinent studies and chart notes: 10     Mr. Edgar verbalized agreement with and understanding of the rational for the diagnosis and treatment plan.  All questions were answered to best of my ability and the patient's satisfaction. Mr. Edgar was advised to contact the clinic with any questions that may arise after the clinic visit.     Thank you for involving me in the care of the patient    Return to clinic: 6 months      HPI   Bucky Edgar is a 67 year old male with a past medical history significant for COPD, dyslipidemia, vitamin D deficiency, history of rib fractures, DVT/PE on anticoagulation, hypertension, who is seen for follow-up of gout.    6/7/2022: Gout is well controlled.  No gout flares since last seen.  Taking allopurinol 600 mg daily.  Has not required prednisone for gout since last seen.      Today, 10/4/2022: Gout is well controlled.  No gout flares since last seen.  Taking allopurinol 500 mg daily without any missed doses.  Has not required prednisone since last seen.  He would like to try to reduce allopurinol again.    Denies fevers, chills, nausea, vomiting, constipation, diarrhea. No abdominal pain. No chest pain/pressure, palpitations, or shortness of breath currently. No LE swelling.  No rash.      Tobacco: Quit in 2017  EtOH: 6 beers per week  Drugs: None    ROS   12 point review of system was completed and negative except as noted in the HPI     Active Problem List     Patient Active Problem List   Diagnosis     Displacement of lumbar intervertebral disc without myelopathy     CARDIOVASCULAR SCREENING; LDL GOAL LESS THAN 130     HTN, goal below 140/90     Advanced directives, counseling/discussion     COPD (chronic obstructive pulmonary disease) (H)     Impaired fasting glucose      Hypertriglyceridemia     Vitamin D deficiency     Chronic bronchitis with COPD (chronic obstructive pulmonary disease) (H)     Closed fracture of multiple ribs of left side with routine healing, subsequent encounter     Chronic pain due to trauma     Long-term (current) use of anticoagulants [Z79.01]     Elevated serum creatinine     History of deep venous thrombosis (DVT) of distal vein of left lower extremity     Elevated liver enzymes     Renal atrophy, right     Abdominal aortic aneurysm (AAA) without rupture     Hepatomegaly     Fatty liver     Polycythemia     Pulmonary embolism without acute cor pulmonale, unspecified chronicity, unspecified pulmonary embolism type (H)     Elevated hemoglobin (H)     Abnormal CT lung screening     Accidental fall from ladder     Acute deep vein thrombosis (DVT) of left lower extremity (H)     Acute-on-chronic renal failure (H)     Cellulitis of left foot     Ectatic abdominal aorta (H)     Pneumonia     Acute kidney injury (H)     Past Medical History     Past Medical History:   Diagnosis Date     COPD (chronic obstructive pulmonary disease) (H)      Displacement of lumbar intervertebral disc without myelopathy 1995, 2002     HTN, goal below 140/90      Past Surgical History     Past Surgical History:   Procedure Laterality Date     HC REPAIR ROTATOR CUFF,CHRONIC  1989     IR LOWER EXTREMITY VENOGRAM LEFT  5/4/2021     IR VENOUS STENT  5/4/2021     [facility] DECOMPRESS SPINAL CORD,1 SEG  1995, 4/2002    leftL4-L5, 2nd right L4-L5     ZZC SPINE FUSION,ANTER,3 SGMTS  2/9/2004    ant and post fusion L4-S1     Allergy     Allergies   Allergen Reactions     Chlorthalidone Other (See Comments)     Excessive lowering of blood pressure     Amlodipine Other (See Comments)     Intractable headache with use of medication as well as noting a small tremor of the upper extremities     Current Medication List     Current Outpatient Medications   Medication Sig     albuterol (PROAIR HFA/PROVENTIL  HFA/VENTOLIN HFA) 108 (90 Base) MCG/ACT inhaler Inhale 2 puffs into the lungs every 6 hours as needed for shortness of breath / dyspnea     allopurinol (ZYLOPRIM) 100 MG tablet Allopurinol 500 mg daily (one 300mg tablet, and two 100mg tablets)     allopurinol (ZYLOPRIM) 300 MG tablet Allopurinol 500 mg daily (one 300mg tablet, and two 100mg tablets)     aspirin (ASA) 81 MG EC tablet Take 1 tablet (81 mg) by mouth daily     atorvastatin (LIPITOR) 10 MG tablet Take 1 tablet (10 mg) by mouth daily     carvedilol (COREG) 25 MG tablet Take 1 tablet (25 mg) by mouth 2 times daily (with meals)     hydrALAZINE (APRESOLINE) 50 MG tablet TAKE 1 TABLET(50 MG) BY MOUTH THREE TIMES DAILY     lisinopril (ZESTRIL) 20 MG tablet Take 1 tablet (20 mg) by mouth 2 times daily     nicotine (NICORETTE) 4 MG lozenge Place 4 mg inside cheek as needed     acetaminophen (TYLENOL) 325 MG tablet Take 650 mg by mouth every 6 hours      No current facility-administered medications for this visit.         Social History   See HPI    Family History     Family History   Problem Relation Age of Onset     Family History Negative Other      Diabetes No family hx of      Coronary Artery Disease No family hx of      Hypertension No family hx of      Hyperlipidemia No family hx of      Breast Cancer No family hx of      Prostate Cancer No family hx of      Anxiety Disorder No family hx of      Substance Abuse No family hx of      Asthma No family hx of      Thyroid Disease No family hx of      Unknown/Adopted No family hx of      Genetic Disorder No family hx of      Osteoporosis No family hx of      Anesthesia Reaction No family hx of      Mental Illness No family hx of      Depression No family hx of      Other Cancer No family hx of      Colon Cancer No family hx of      Cerebrovascular Disease No family hx of      Obesity No family hx of        Physical Exam     Temp Readings from Last 3 Encounters:   03/31/22 96.8  F (36  C) (Temporal)   05/04/21 99  " F (37.2  C) (Oral)   03/24/21 96.6  F (35.9  C) (Temporal)     BP Readings from Last 5 Encounters:   03/31/22 138/82   02/10/22 (!) 143/94   11/16/21 117/80   09/10/21 (!) 160/94   06/21/21 (!) 141/93     Pulse Readings from Last 1 Encounters:   03/31/22 73     Resp Readings from Last 1 Encounters:   03/31/22 20     Estimated body mass index is 33.36 kg/m  as calculated from the following:    Height as of 3/31/22: 1.753 m (5' 9.02\").    Weight as of 3/31/22: 102.5 kg (226 lb).    GEN: NAD.   HEENT:  No obvious external lesions of the ear and nose. Hearing intact.  PULM: No increased work of breathing  SKIN: No rash or jaundice seen  PSYCH: Alert. Appropriate.     Labs / Imaging (select studies)     CBC  Recent Labs   Lab Test 05/31/22  1014 05/24/21  1035 05/04/21  1030 08/25/20  0940 07/28/20  1040   WBC 8.5 8.8 9.5 8.1 8.4   RBC 5.10 5.18 5.53 5.29 5.48   HGB 16.4 16.0 17.0 15.4 16.5   HCT 50.3 48.8 52.2 48.3 51.9   MCV 99 94 94 91 95   RDW 14.6 15.8* 16.3* 14.6 16.3*    217 220 259 242   MCH 32.2 30.9 30.7 29.1 30.1   MCHC 32.6 32.8 32.6 31.9 31.8   NEUTROPHIL 68 69.4  --  73.0 68.7   LYMPH 18 18.2  --  14.2 18.6   MONOCYTE 7 7.8  --  8.9 8.5   EOSINOPHIL 5 3.9  --  2.7 2.8   BASOPHIL 1 0.7  --  1.0 0.8   ANEU  --  6.1  --  5.9 5.7   ALYM  --  1.6  --  1.2 1.6   LIZZ  --  0.7  --  0.7 0.7   AEOS  --  0.3  --  0.2 0.2   ABAS  --  0.1  --  0.1 0.1     CMP  Recent Labs   Lab Test 05/31/22  1014 05/24/21  1035 05/04/21  1030 03/22/21  1219 06/02/20  0903    140 139 139 140   POTASSIUM 4.3 4.4 4.2 4.2 4.2   CHLORIDE 114* 110* 111* 108 112*   CO2 26 25 22 29 23   ANIONGAP 4 5 6 2* 5   GLC 95 112* 100* 100* 113*   BUN 14 13 16 11 20   CR 1.09 0.95 1.00 0.94 1.07   GFRESTIMATED 76 84 79 86 74   GFRESTBLACK  --  >90 >90 >90 85   JUSTIN 9.1 8.5 9.5 9.1 9.0   BILITOTAL 0.6 0.7  --   --  0.9   ALBUMIN 3.0* 3.4  --   --  3.4   PROTTOTAL 6.8 6.9  --   --  6.8   ALKPHOS 111 98  --   --  95   AST 26 18  --   --  19 "   ALT 27 28  --   --  31     Uric Acid  Recent Labs   Lab Test 05/31/22  1014 05/24/21  1035 06/02/20  0903 05/08/20  0959 11/07/19  0900 05/08/19  1041   URIC 2.1* 2.1* 2.2* 1.5* 4.0 2.3*       Immunization History     Immunization History   Administered Date(s) Administered     COVID-19,PF,Pfizer (12+ Yrs) 03/31/2021, 04/21/2021     Influenza Quad, Recombinant, pf(RIV4) (Flublok) 11/12/2019     Influenza Vaccine IM > 6 months Valent IIV4 (Alfuria,Fluzone) 11/27/2013, 10/29/2015, 12/12/2016, 10/23/2017, 11/01/2018     Mantoux Tuberculin Skin Test 07/17/2012     Pneumo Conj 13-V (2010&after) 01/22/2016     Pneumococcal 23 valent 11/27/2013     TDAP Vaccine (Adacel) 08/25/2009, 11/25/2019     TDAP Vaccine (Boostrix) 08/25/2009          Chart documentation done in part with Dragon Voice recognition Software. Although reviewed after completion, some word and grammatical error may remain.      Video-Visit Details    Type of service:  Video Visit    Video Start Time: 10:15 AM    Video End Time: 10:21 AM    Originating Location (pt. Location): [place], [place]    Distant Location (provider location):  [place]    Platform used for Video Visit: Rody Bolaños MD

## 2023-01-27 DIAGNOSIS — I87.1 MAY-THURNER SYNDROME: Primary | ICD-10-CM

## 2023-01-27 DIAGNOSIS — I71.43 INFRARENAL ABDOMINAL AORTIC ANEURYSM (AAA) WITHOUT RUPTURE (H): ICD-10-CM

## 2023-03-16 ENCOUNTER — ANCILLARY PROCEDURE (OUTPATIENT)
Dept: ULTRASOUND IMAGING | Facility: CLINIC | Age: 68
End: 2023-03-16
Attending: RADIOLOGY
Payer: COMMERCIAL

## 2023-03-16 ENCOUNTER — OFFICE VISIT (OUTPATIENT)
Dept: VASCULAR SURGERY | Facility: CLINIC | Age: 68
End: 2023-03-16
Payer: COMMERCIAL

## 2023-03-16 VITALS — SYSTOLIC BLOOD PRESSURE: 169 MMHG | OXYGEN SATURATION: 92 % | HEART RATE: 61 BPM | DIASTOLIC BLOOD PRESSURE: 98 MMHG

## 2023-03-16 DIAGNOSIS — Z86.718 HISTORY OF DEEP VENOUS THROMBOSIS (DVT) OF DISTAL VEIN OF LEFT LOWER EXTREMITY: ICD-10-CM

## 2023-03-16 DIAGNOSIS — I87.1 MAY-THURNER SYNDROME: ICD-10-CM

## 2023-03-16 DIAGNOSIS — M79.89 LEFT LEG SWELLING: Primary | ICD-10-CM

## 2023-03-16 DIAGNOSIS — I71.43 INFRARENAL ABDOMINAL AORTIC ANEURYSM (AAA) WITHOUT RUPTURE (H): ICD-10-CM

## 2023-03-16 PROCEDURE — 93978 VASCULAR STUDY: CPT | Mod: GC | Performed by: SURGERY

## 2023-03-16 PROCEDURE — 99214 OFFICE O/P EST MOD 30 MIN: CPT | Performed by: PHYSICIAN ASSISTANT

## 2023-03-16 PROCEDURE — 76775 US EXAM ABDO BACK WALL LIM: CPT | Performed by: RADIOLOGY

## 2023-03-16 NOTE — PROGRESS NOTES
"    INTERVENTIONAL RADIOLOGY ESTABLISHED PATIENT   AAA and Basia Gomezer annual follow up    HPI: Bucky Edgar is a 66 year old male with history of former tobacco use (quit ~2020, 2 ppd for almost 50 years), COPD, HTN (controlled on carvedilol and lisinopril), hyperlipidemia (controlled on lipitor), hepatomegaly, NAFLD, May Thurner syndrome s/p iliac stent placement and AAA here for follow up.     Patient is doing well today and denies any abdominal pain or any symptoms that are new for him. He does say his left leg is more swollen, \"socks are getting tight\" over the past few weeks. He declined US but his daughter wanted to get one to make sure there isn't any new blood clot.    Physical Examination:   VITALS:   BP (!) 169/98 (BP Location: Right arm, Patient Position: Sitting, Cuff Size: Adult Regular)   Pulse 61   SpO2 92%   Comfortably sitting conversing  Left leg with trace swelling noticed when his socks were removed  Right leg without swelling    Diagnostic studies:   1.  Venous duplex of the abdomen and pelvis 3/16/2023: Left common iliac vein stent is widely patent.    2.  Duplex ultrasound abdominal aorta 2/10/2022: Infrarenal abdominal aortic aneurysm measures 4.5 cm maximal diameter AP longitudinal.  This correlates fairly well with the CT measurement above.    3.  Duplex ultrasound abdominal aorta 3/16/2023: Slight interval growth in ultrasound measurements of infrarenal abdominal aortic aneurysm now measuring up to 4.8 cm. Moderate exact evaluation can be obtained with CT angiogram of the aortoiliac system. Ectatic bilateral common iliac arteries both still  under 20 mm.    Assessment: Bucky Edgar is a 66 year old with history of LLE DVT, May Thurner and left iliac vein stent and AAA here for follow-up    May Thurner: Patient with newer left leg swelling past few weeks, daughter is concerned about another blood clot so I have ordered US of the leg. Otherwise, return to clinic with follow up US of " the iliac vein stent in 1 year.    AAA management: US demonstrates the infrarenal abdominal aortic aneurysm has grown from 4.4 to 4.8.  Report on the prior ultrasound from 2/10/2022 overestimated the size of the aneurysm due to tortuosity of the aorta.    At 4.8 cm, patient's aneurysm has less than or equal to a 1% risk of rupture per year.  In addition, Mr. Edgar's aneurysm has grown at a typical rate in the range of 0.5 cm/year.  I explained that typically, we do not intervene upon an abdominal aortic aneurysm until it becomes 5.5 cm in size at which point, the risk of rupture begins to increase where the benefit of treatment would outweigh the risk of treatment.    We discussed the importance of controlling HTN and HLD as well as remaining active with regular walking. Treatment at this point is not indicated due to the size of the aneurysm, however continued surveillance is necessary with next follow-up in 1 year. Pt amenable to the plan.     Plan:  - LLE US tomorrow due to new left leg swelling  - F/u in 1 year with PA clinic visit and repeat US of the abdominal aorta and left iliac stent. Note that the aortic ultrasound anteroposterior measurement in the longitudinal plane correlates well with the size by CT imaging.  - Continue to maintain good blood pressure control.

## 2023-03-17 ENCOUNTER — ANCILLARY PROCEDURE (OUTPATIENT)
Dept: ULTRASOUND IMAGING | Facility: CLINIC | Age: 68
End: 2023-03-17
Attending: PHYSICIAN ASSISTANT
Payer: COMMERCIAL

## 2023-03-17 DIAGNOSIS — M79.89 LEFT LEG SWELLING: ICD-10-CM

## 2023-03-17 DIAGNOSIS — Z86.718 HISTORY OF DEEP VENOUS THROMBOSIS (DVT) OF DISTAL VEIN OF LEFT LOWER EXTREMITY: ICD-10-CM

## 2023-03-17 PROCEDURE — 93971 EXTREMITY STUDY: CPT | Mod: LT | Performed by: RADIOLOGY

## 2023-03-25 ENCOUNTER — HEALTH MAINTENANCE LETTER (OUTPATIENT)
Age: 68
End: 2023-03-25

## 2023-03-27 ENCOUNTER — LAB (OUTPATIENT)
Dept: LAB | Facility: OTHER | Age: 68
End: 2023-03-27
Payer: COMMERCIAL

## 2023-03-27 DIAGNOSIS — M1A.09X0 IDIOPATHIC CHRONIC GOUT OF MULTIPLE SITES WITHOUT TOPHUS: ICD-10-CM

## 2023-03-27 LAB
ALBUMIN SERPL BCG-MCNC: 3.7 G/DL (ref 3.5–5.2)
ALP SERPL-CCNC: 110 U/L (ref 40–129)
ALT SERPL W P-5'-P-CCNC: 11 U/L (ref 10–50)
ANION GAP SERPL CALCULATED.3IONS-SCNC: 4 MMOL/L (ref 7–15)
AST SERPL W P-5'-P-CCNC: 19 U/L (ref 10–50)
BASOPHILS # BLD AUTO: 0 10E3/UL (ref 0–0.2)
BASOPHILS NFR BLD AUTO: 1 %
BILIRUB SERPL-MCNC: 0.6 MG/DL
BUN SERPL-MCNC: 13.5 MG/DL (ref 8–23)
CALCIUM SERPL-MCNC: 9.3 MG/DL (ref 8.8–10.2)
CHLORIDE SERPL-SCNC: 108 MMOL/L (ref 98–107)
CREAT SERPL-MCNC: 1.06 MG/DL (ref 0.67–1.17)
DEPRECATED HCO3 PLAS-SCNC: 30 MMOL/L (ref 22–29)
EOSINOPHIL # BLD AUTO: 0.3 10E3/UL (ref 0–0.7)
EOSINOPHIL NFR BLD AUTO: 3 %
ERYTHROCYTE [DISTWIDTH] IN BLOOD BY AUTOMATED COUNT: 14.9 % (ref 10–15)
GFR SERPL CREATININE-BSD FRML MDRD: 78 ML/MIN/1.73M2
GLUCOSE SERPL-MCNC: 99 MG/DL (ref 70–99)
HCT VFR BLD AUTO: 51.5 % (ref 40–53)
HGB BLD-MCNC: 16.7 G/DL (ref 13.3–17.7)
LYMPHOCYTES # BLD AUTO: 1.2 10E3/UL (ref 0.8–5.3)
LYMPHOCYTES NFR BLD AUTO: 14 %
MCH RBC QN AUTO: 32.9 PG (ref 26.5–33)
MCHC RBC AUTO-ENTMCNC: 32.4 G/DL (ref 31.5–36.5)
MCV RBC AUTO: 101 FL (ref 78–100)
MONOCYTES # BLD AUTO: 0.8 10E3/UL (ref 0–1.3)
MONOCYTES NFR BLD AUTO: 10 %
NEUTROPHILS # BLD AUTO: 6.2 10E3/UL (ref 1.6–8.3)
NEUTROPHILS NFR BLD AUTO: 73 %
PLATELET # BLD AUTO: 201 10E3/UL (ref 150–450)
POTASSIUM SERPL-SCNC: 4.3 MMOL/L (ref 3.4–5.3)
PROT SERPL-MCNC: 6.7 G/DL (ref 6.4–8.3)
RBC # BLD AUTO: 5.08 10E6/UL (ref 4.4–5.9)
SODIUM SERPL-SCNC: 142 MMOL/L (ref 136–145)
URATE SERPL-MCNC: 3.6 MG/DL (ref 3.4–7)
WBC # BLD AUTO: 8.4 10E3/UL (ref 4–11)

## 2023-03-27 PROCEDURE — 36415 COLL VENOUS BLD VENIPUNCTURE: CPT

## 2023-03-27 PROCEDURE — 80053 COMPREHEN METABOLIC PANEL: CPT

## 2023-03-27 PROCEDURE — 84550 ASSAY OF BLOOD/URIC ACID: CPT

## 2023-03-27 PROCEDURE — 85025 COMPLETE CBC W/AUTO DIFF WBC: CPT

## 2023-04-03 ENCOUNTER — VIRTUAL VISIT (OUTPATIENT)
Dept: RHEUMATOLOGY | Facility: CLINIC | Age: 68
End: 2023-04-03
Payer: COMMERCIAL

## 2023-04-03 DIAGNOSIS — M1A.09X0 IDIOPATHIC CHRONIC GOUT OF MULTIPLE SITES WITHOUT TOPHUS: Primary | ICD-10-CM

## 2023-04-03 PROCEDURE — 99213 OFFICE O/P EST LOW 20 MIN: CPT | Mod: VID | Performed by: INTERNAL MEDICINE

## 2023-04-03 RX ORDER — ALLOPURINOL 300 MG/1
300 TABLET ORAL DAILY
Qty: 90 TABLET | Refills: 2 | Status: SHIPPED | OUTPATIENT
Start: 2023-04-03 | End: 2023-10-06

## 2023-04-03 NOTE — PROGRESS NOTES
Bucky Edgar  is a 67 year old year old who is being evaluated via a billable video visit.      How would you like to obtain your AVS? MyChart  If the video visit is dropped, the invitation should be resent by: Text to cell phone: [phone]   Will anyone else be joining your video visit? No     Rheumatology Video Visit      Bucky Edgar MRN# [id number]   YOB: 1955 Age: 67 year old      Date of visit: 4/03/23   PCP: Dr. Percy Deshpande    Chief Complaint   Patient presents with:  Idiopathic chronic gout of multiple: Doing good, no recent flare    Assessment and Plan     1.  Gout: Started having gout flares in early 2017, acute onset and resolved with prednisone; joints involved include his left ankle, left first MTP, and left wrist.  Colchicine was cost prohibitive.  Was doing well on allopurinol 700mg daily, uric acid 2.1, tophi had resolved, and he was not having any flares.  Note that in the past he was still flaring when he was on allopurinol 600 mg daily but that could have also been related to mobilization of tophi, and the tophi appear to have resolved.  With resolution of visible tophi, allopurinol has been reduced over time while maintaining a low uric acid and has not had gout flares.  Therefore, reduce allopurinol again as noted below.  Recheck uric acid in 6 months.  Chronic illness  - Reduce allopurinol from 400 mg daily to 300 mg daily   - For gout flare only: prednisone 60mg daily x2days, then 40mg daily x5days, then 20mg daily x5days, then stop.  - Lab 2-4 days prior to 6 mo rheumatology follow-up appointment: Uric Acid    2. Hx of Elevated Hgb: thought to be polycythemia vera by Dr. Muñoz (hematology/oncology) secondary to heavy smoking and underlying COPD as well as [place] syndrome. Advised to continue baby aspirin beebe by Dr. Muñoz. Phlebotomy was also started to try to keep the hematocrit <50.  This is documented here for historical significance only and was not discussed  today.      3. Elevated blood pressure:  Bucky to follow up with Primary Care provider regarding elevated blood pressure.      Total minutes spent in evaluation with patient, documentation, , and review of pertinent studies and chart notes: 12     Mr. Edgar verbalized agreement with and understanding of the rational for the diagnosis and treatment plan.  All questions were answered to best of my ability and the patient's satisfaction. Mr. Edgar was advised to contact the clinic with any questions that may arise after the clinic visit.     Thank you for involving me in the care of the patient    Return to clinic: 6 months      HPI   Bucky Edgar is a 67 year old male with a past medical history significant for COPD, dyslipidemia, vitamin D deficiency, history of rib fractures, DVT/PE on anticoagulation, hypertension, who is seen for follow-up of gout.    6/7/2022: Gout is well controlled.  No gout flares since last seen.  Taking allopurinol 600 mg daily.  Has not required prednisone for gout since last seen.      10/4/2022: Gout is well controlled.  No gout flares since last seen.  Taking allopurinol 500 mg daily without any missed doses.  Has not required prednisone since last seen.  He would like to try to reduce allopurinol again.    Today, 4/3/2023: Taking allopurinol 400 mg daily.  No gout flares since last seen.  Has not required prednisone since last seen.  No tophi.    Denies fevers, chills, nausea, vomiting, constipation, diarrhea. No abdominal pain. No chest pain/pressure, palpitations, or shortness of breath currently. No LE swelling.  No rash.      Tobacco: Quit in 2017  EtOH: 6 beers per week  Drugs: None    ROS   12 point review of system was completed and negative except as noted in the HPI     Active Problem List     Patient Active Problem List   Diagnosis     Displacement of lumbar intervertebral disc without myelopathy     CARDIOVASCULAR SCREENING; LDL GOAL LESS THAN 130     HTN,  goal below 140/90     Advanced directives, counseling/discussion     COPD (chronic obstructive pulmonary disease) (H)     Impaired fasting glucose     Hypertriglyceridemia     Vitamin D deficiency     Chronic bronchitis with COPD (chronic obstructive pulmonary disease) (H)     Closed fracture of multiple ribs of left side with routine healing, subsequent encounter     Chronic pain due to trauma     Long-term (current) use of anticoagulants [Z79.01]     Elevated serum creatinine     History of deep venous thrombosis (DVT) of distal vein of left lower extremity     Elevated liver enzymes     Renal atrophy, right     Abdominal aortic aneurysm (AAA) without rupture (H)     Hepatomegaly     Fatty liver     Polycythemia     Pulmonary embolism without acute cor pulmonale, unspecified chronicity, unspecified pulmonary embolism type (H)     Elevated hemoglobin (H)     Abnormal CT lung screening     Accidental fall from ladder     Acute deep vein thrombosis (DVT) of left lower extremity (H)     Acute-on-chronic renal failure (H)     Cellulitis of left foot     Ectatic abdominal aorta (H)     Pneumonia     Acute kidney injury (H)     Past Medical History     Past Medical History:   Diagnosis Date     COPD (chronic obstructive pulmonary disease) (H)      Displacement of lumbar intervertebral disc without myelopathy 1995, 2002     HTN, goal below 140/90      Past Surgical History     Past Surgical History:   Procedure Laterality Date     HC REPAIR ROTATOR CUFF,CHRONIC  1989     IR LOWER EXTREMITY VENOGRAM LEFT  5/4/2021     IR VENOUS STENT  5/4/2021     ZZC DECOMPRESS SPINAL CORD,1 SEG  1995, 4/2002    leftL4-L5, 2nd right L4-L5     ZZC SPINE FUSION,ANTER,3 SGMTS  2/9/2004    ant and post fusion L4-S1     Allergy     Allergies   Allergen Reactions     Chlorthalidone Other (See Comments)     Excessive lowering of blood pressure     Amlodipine Other (See Comments)     Intractable headache with use of medication as well as noting a  small tremor of the upper extremities     Current Medication List     Current Outpatient Medications   Medication Sig     acetaminophen (TYLENOL) 325 MG tablet Take 650 mg by mouth every 6 hours      albuterol (PROAIR HFA/PROVENTIL HFA/VENTOLIN HFA) 108 (90 Base) MCG/ACT inhaler Inhale 2 puffs into the lungs every 6 hours as needed for shortness of breath / dyspnea     allopurinol (ZYLOPRIM) 100 MG tablet Allopurinol 400 mg daily (one 300mg tablet, and one 100mg tablets)     allopurinol (ZYLOPRIM) 300 MG tablet Allopurinol 400 mg daily (one 300mg tablet, and one 100mg tablets)     aspirin (ASA) 81 MG EC tablet Take 1 tablet (81 mg) by mouth daily     atorvastatin (LIPITOR) 10 MG tablet Take 1 tablet (10 mg) by mouth daily     carvedilol (COREG) 25 MG tablet Take 1 tablet (25 mg) by mouth 2 times daily (with meals)     hydrALAZINE (APRESOLINE) 50 MG tablet TAKE 1 TABLET(50 MG) BY MOUTH THREE TIMES DAILY     lisinopril (ZESTRIL) 20 MG tablet Take 1 tablet (20 mg) by mouth 2 times daily     nicotine (NICORETTE) 4 MG lozenge Place 4 mg inside cheek as needed     No current facility-administered medications for this visit.         Social History   See HPI    Family History     Family History   Problem Relation Age of Onset     Family History Negative Other      Diabetes No family hx of      Coronary Artery Disease No family hx of      Hypertension No family hx of      Hyperlipidemia No family hx of      Breast Cancer No family hx of      Prostate Cancer No family hx of      Anxiety Disorder No family hx of      Substance Abuse No family hx of      Asthma No family hx of      Thyroid Disease No family hx of      Unknown/Adopted No family hx of      Genetic Disorder No family hx of      Osteoporosis No family hx of      Anesthesia Reaction No family hx of      Mental Illness No family hx of      Depression No family hx of      Other Cancer No family hx of      Colon Cancer No family hx of      Cerebrovascular Disease No  "family hx of      Obesity No family hx of        Physical Exam     Temp Readings from Last 3 Encounters:   03/31/22 96.8  F (36  C) (Temporal)   05/04/21 99  F (37.2  C) (Oral)   03/24/21 96.6  F (35.9  C) (Temporal)     BP Readings from Last 5 Encounters:   03/16/23 (!) 169/98   03/31/22 138/82   02/10/22 (!) 143/94   11/16/21 117/80   09/10/21 (!) 160/94     Pulse Readings from Last 1 Encounters:   03/16/23 61     Resp Readings from Last 1 Encounters:   03/31/22 20     Estimated body mass index is 33.36 kg/m  as calculated from the following:    Height as of 3/31/22: 1.753 m (5' 9.02\").    Weight as of 3/31/22: 102.5 kg (226 lb).    GEN: NAD.   HEENT:  No obvious external lesions of the ear and nose. Hearing intact.  PULM: No increased work of breathing  SKIN: No rash or jaundice seen  PSYCH: Alert. Appropriate.     Labs / Imaging (select studies)     CBC  Recent Labs   Lab Test 03/27/23  1138 05/31/22  1014 05/24/21  1035 05/04/21  1030 08/25/20  0940 07/28/20  1040   WBC 8.4 8.5 8.8   < > 8.1 8.4   RBC 5.08 5.10 5.18   < > 5.29 5.48   HGB 16.7 16.4 16.0   < > 15.4 16.5   HCT 51.5 50.3 48.8   < > 48.3 51.9   * 99 94   < > 91 95   RDW 14.9 14.6 15.8*   < > 14.6 16.3*    269 217   < > 259 242   MCH 32.9 32.2 30.9   < > 29.1 30.1   MCHC 32.4 32.6 32.8   < > 31.9 31.8   NEUTROPHIL 73 68 69.4  --  73.0 68.7   LYMPH 14 18 18.2  --  14.2 18.6   MONOCYTE 10 7 7.8  --  8.9 8.5   EOSINOPHIL 3 5 3.9  --  2.7 2.8   BASOPHIL 1 1 0.7  --  1.0 0.8   ANEU  --   --  6.1  --  5.9 5.7   ALYM  --   --  1.6  --  1.2 1.6   LIZZ  --   --  0.7  --  0.7 0.7   AEOS  --   --  0.3  --  0.2 0.2   ABAS  --   --  0.1  --  0.1 0.1    < > = values in this interval not displayed.     CMP  Recent Labs   Lab Test 03/27/23  1138 05/31/22  1014 05/24/21  1035 05/04/21  1030 03/22/21  1219    144 140 139 139   POTASSIUM 4.3 4.3 4.4 4.2 4.2   CHLORIDE 108* 114* 110* 111* 108   CO2 30* 26 25 22 29   ANIONGAP 4* 4 5 6 2*   GLC 99 95 " 112* 100* 100*   BUN 13.5 14 13 16 11   CR 1.06 1.09 0.95 1.00 0.94   GFRESTIMATED 78 76 84 79 86   GFRESTBLACK  --   --  >90 >90 >90   JUSTIN 9.3 9.1 8.5 9.5 9.1   BILITOTAL 0.6 0.6 0.7  --   --    ALBUMIN 3.7 3.0* 3.4  --   --    PROTTOTAL 6.7 6.8 6.9  --   --    ALKPHOS 110 111 98  --   --    AST 19 26 18  --   --    ALT 11 27 28  --   --      Uric Acid  Recent Labs   Lab Test 03/27/23  1138 05/31/22  1014 05/24/21  1035 06/02/20  0903 05/08/20  0959 11/07/19  0900   URIC 3.6 2.1* 2.1* 2.2* 1.5* 4.0       Immunization History     Immunization History   Administered Date(s) Administered     COVID-19 Vaccine 12+ (Pfizer) 03/31/2021, 04/21/2021     Influenza Vaccine 50-64 or 18-64 w/egg allergy (Flublok) 11/12/2019     Influenza Vaccine >6 months (Alfuria,Fluzone) 11/27/2013, 10/29/2015, 12/12/2016, 10/23/2017, 11/01/2018     Mantoux Tuberculin Skin Test 07/17/2012     Pneumo Conj 13-V (2010&after) 01/22/2016     Pneumococcal 23 valent 11/27/2013     TDAP Vaccine (Adacel) 08/25/2009, 11/25/2019     TDAP Vaccine (Boostrix) 08/25/2009          Chart documentation done in part with Dragon Voice recognition Software. Although reviewed after completion, some word and grammatical error may remain.      Video-Visit Details    Type of service:  Video Visit    Video Start Time: 9:12 AM    Video End Time: 9:18 AM    Originating Location (pt. Location): [place], [place]    Distant Location (provider location):  [place]    Platform used for Video Visit: audrey Bolaños MD

## 2023-04-03 NOTE — PATIENT INSTRUCTIONS
RHEUMATOLOGY    Dr. Coy Bolaños    [facility]  [address] [place] [id number]  Phone number: [phone]  Fax number: [phone]      Thank you for choosing [facility]!

## 2023-04-04 DIAGNOSIS — E78.5 HYPERLIPIDEMIA LDL GOAL <130: ICD-10-CM

## 2023-04-04 DIAGNOSIS — I10 HTN, GOAL BELOW 140/90: ICD-10-CM

## 2023-04-04 RX ORDER — CARVEDILOL 25 MG/1
TABLET ORAL
Qty: 180 TABLET | Refills: 0 | Status: SHIPPED | OUTPATIENT
Start: 2023-04-04 | End: 2023-07-10

## 2023-04-04 NOTE — TELEPHONE ENCOUNTER
Pending Prescriptions:                       Disp   Refills    carvedilol (COREG) 25 MG tablet [Pharmacy *180 ta*3        Sig: TAKE 1 TABLET(25 MG) BY MOUTH TWICE DAILY WITH MEALS    Routing refill request to provider for review/approval because:  Labs out of range:    BP Readings from Last 3 Encounters:   03/16/23 (!) 169/98   03/31/22 138/82   02/10/22 (!) 143/94   Patient needs to be seen because it has been more than 1 year since last office visit.   PLEASE CONTACT PATIENT AND ASSIST IN SCHEDULING APPOINTMENT

## 2023-04-05 RX ORDER — LISINOPRIL 20 MG/1
20 TABLET ORAL 2 TIMES DAILY
Qty: 180 TABLET | Refills: 0 | Status: SHIPPED | OUTPATIENT
Start: 2023-04-05 | End: 2023-09-07

## 2023-04-05 RX ORDER — ATORVASTATIN CALCIUM 10 MG/1
10 TABLET, FILM COATED ORAL DAILY
Qty: 90 TABLET | Refills: 0 | Status: SHIPPED | OUTPATIENT
Start: 2023-04-05 | End: 2023-10-10

## 2023-04-05 RX ORDER — HYDRALAZINE HYDROCHLORIDE 50 MG/1
TABLET, FILM COATED ORAL
Qty: 270 TABLET | Refills: 0 | Status: SHIPPED | OUTPATIENT
Start: 2023-04-05 | End: 2023-07-26

## 2023-05-15 ENCOUNTER — OFFICE VISIT (OUTPATIENT)
Dept: FAMILY MEDICINE | Facility: OTHER | Age: 68
End: 2023-05-15
Payer: COMMERCIAL

## 2023-05-15 ENCOUNTER — TELEPHONE (OUTPATIENT)
Dept: FAMILY MEDICINE | Facility: OTHER | Age: 68
End: 2023-05-15

## 2023-05-15 ENCOUNTER — ANCILLARY PROCEDURE (OUTPATIENT)
Dept: GENERAL RADIOLOGY | Facility: CLINIC | Age: 68
End: 2023-05-15
Attending: PHYSICIAN ASSISTANT
Payer: COMMERCIAL

## 2023-05-15 VITALS
DIASTOLIC BLOOD PRESSURE: 98 MMHG | RESPIRATION RATE: 24 BRPM | HEART RATE: 67 BPM | HEIGHT: 69 IN | WEIGHT: 226 LBS | SYSTOLIC BLOOD PRESSURE: 150 MMHG | OXYGEN SATURATION: 90 % | BODY MASS INDEX: 33.47 KG/M2 | TEMPERATURE: 97.8 F

## 2023-05-15 DIAGNOSIS — E78.5 HYPERLIPIDEMIA LDL GOAL <130: ICD-10-CM

## 2023-05-15 DIAGNOSIS — M79.89 LEG SWELLING: ICD-10-CM

## 2023-05-15 DIAGNOSIS — D75.1 POLYCYTHEMIA: ICD-10-CM

## 2023-05-15 DIAGNOSIS — N18.2 STAGE 2 CHRONIC KIDNEY DISEASE: ICD-10-CM

## 2023-05-15 DIAGNOSIS — J90 PLEURAL EFFUSION: ICD-10-CM

## 2023-05-15 DIAGNOSIS — J44.9 CHRONIC OBSTRUCTIVE PULMONARY DISEASE, UNSPECIFIED COPD TYPE (H): ICD-10-CM

## 2023-05-15 DIAGNOSIS — R06.01 ORTHOPNEA: ICD-10-CM

## 2023-05-15 DIAGNOSIS — E78.1 HYPERTRIGLYCERIDEMIA: ICD-10-CM

## 2023-05-15 DIAGNOSIS — Z00.00 MEDICARE ANNUAL WELLNESS VISIT, SUBSEQUENT: Primary | ICD-10-CM

## 2023-05-15 DIAGNOSIS — R06.02 SOB (SHORTNESS OF BREATH): ICD-10-CM

## 2023-05-15 DIAGNOSIS — I70.1 RENAL ARTERY STENOSIS (H): ICD-10-CM

## 2023-05-15 DIAGNOSIS — R79.89 ELEVATED D-DIMER: ICD-10-CM

## 2023-05-15 DIAGNOSIS — I26.99 PULMONARY EMBOLISM WITHOUT ACUTE COR PULMONALE, UNSPECIFIED CHRONICITY, UNSPECIFIED PULMONARY EMBOLISM TYPE (H): ICD-10-CM

## 2023-05-15 DIAGNOSIS — R53.83 FATIGUE, UNSPECIFIED TYPE: ICD-10-CM

## 2023-05-15 DIAGNOSIS — I10 HTN, GOAL BELOW 140/90: ICD-10-CM

## 2023-05-15 DIAGNOSIS — R06.02 SOB (SHORTNESS OF BREATH): Primary | ICD-10-CM

## 2023-05-15 DIAGNOSIS — M25.50 POLYARTHRALGIA: ICD-10-CM

## 2023-05-15 LAB
ALBUMIN SERPL BCG-MCNC: 3.5 G/DL (ref 3.5–5.2)
ALP SERPL-CCNC: 123 U/L (ref 40–129)
ALT SERPL W P-5'-P-CCNC: 12 U/L (ref 10–50)
ANION GAP SERPL CALCULATED.3IONS-SCNC: 7 MMOL/L (ref 7–15)
AST SERPL W P-5'-P-CCNC: 18 U/L (ref 10–50)
BASOPHILS # BLD AUTO: 0 10E3/UL (ref 0–0.2)
BASOPHILS NFR BLD AUTO: 1 %
BILIRUB SERPL-MCNC: 1.1 MG/DL
BUN SERPL-MCNC: 10.1 MG/DL (ref 8–23)
CALCIUM SERPL-MCNC: 8.9 MG/DL (ref 8.8–10.2)
CHLORIDE SERPL-SCNC: 107 MMOL/L (ref 98–107)
CHOLEST SERPL-MCNC: 120 MG/DL
CREAT SERPL-MCNC: 0.92 MG/DL (ref 0.67–1.17)
CREAT UR-MCNC: 223.6 MG/DL
CRP SERPL-MCNC: 13.48 MG/L
D DIMER PPP FEU-MCNC: 2.25 UG/ML FEU (ref 0–0.5)
DEPRECATED HCO3 PLAS-SCNC: 27 MMOL/L (ref 22–29)
EOSINOPHIL # BLD AUTO: 0.3 10E3/UL (ref 0–0.7)
EOSINOPHIL NFR BLD AUTO: 3 %
ERYTHROCYTE [DISTWIDTH] IN BLOOD BY AUTOMATED COUNT: 16.2 % (ref 10–15)
ERYTHROCYTE [SEDIMENTATION RATE] IN BLOOD BY WESTERGREN METHOD: 6 MM/HR (ref 0–20)
GFR SERPL CREATININE-BSD FRML MDRD: >90 ML/MIN/1.73M2
GLUCOSE SERPL-MCNC: 102 MG/DL (ref 70–99)
HCT VFR BLD AUTO: 48.4 % (ref 40–53)
HDLC SERPL-MCNC: 72 MG/DL
HGB BLD-MCNC: 15.5 G/DL (ref 13.3–17.7)
LDLC SERPL CALC-MCNC: 38 MG/DL
LYMPHOCYTES # BLD AUTO: 1.1 10E3/UL (ref 0.8–5.3)
LYMPHOCYTES NFR BLD AUTO: 14 %
MCH RBC QN AUTO: 31.3 PG (ref 26.5–33)
MCHC RBC AUTO-ENTMCNC: 32 G/DL (ref 31.5–36.5)
MCV RBC AUTO: 98 FL (ref 78–100)
MICROALBUMIN UR-MCNC: 69.1 MG/L
MICROALBUMIN/CREAT UR: 30.9 MG/G CR (ref 0–17)
MONOCYTES # BLD AUTO: 0.8 10E3/UL (ref 0–1.3)
MONOCYTES NFR BLD AUTO: 10 %
NEUTROPHILS # BLD AUTO: 5.9 10E3/UL (ref 1.6–8.3)
NEUTROPHILS NFR BLD AUTO: 73 %
NONHDLC SERPL-MCNC: 48 MG/DL
NT-PROBNP SERPL-MCNC: 3029 PG/ML (ref 0–900)
PLATELET # BLD AUTO: 180 10E3/UL (ref 150–450)
POTASSIUM SERPL-SCNC: 4.4 MMOL/L (ref 3.4–5.3)
PROT SERPL-MCNC: 6.3 G/DL (ref 6.4–8.3)
RBC # BLD AUTO: 4.95 10E6/UL (ref 4.4–5.9)
SODIUM SERPL-SCNC: 141 MMOL/L (ref 136–145)
TRIGL SERPL-MCNC: 52 MG/DL
TSH SERPL DL<=0.005 MIU/L-ACNC: 0.97 UIU/ML (ref 0.3–4.2)
WBC # BLD AUTO: 8.1 10E3/UL (ref 4–11)

## 2023-05-15 PROCEDURE — 85025 COMPLETE CBC W/AUTO DIFF WBC: CPT | Performed by: PHYSICIAN ASSISTANT

## 2023-05-15 PROCEDURE — G0439 PPPS, SUBSEQ VISIT: HCPCS | Performed by: PHYSICIAN ASSISTANT

## 2023-05-15 PROCEDURE — 80053 COMPREHEN METABOLIC PANEL: CPT | Performed by: PHYSICIAN ASSISTANT

## 2023-05-15 PROCEDURE — 82043 UR ALBUMIN QUANTITATIVE: CPT | Performed by: PHYSICIAN ASSISTANT

## 2023-05-15 PROCEDURE — 80061 LIPID PANEL: CPT | Performed by: PHYSICIAN ASSISTANT

## 2023-05-15 PROCEDURE — 83880 ASSAY OF NATRIURETIC PEPTIDE: CPT | Performed by: PHYSICIAN ASSISTANT

## 2023-05-15 PROCEDURE — 99215 OFFICE O/P EST HI 40 MIN: CPT | Mod: 25 | Performed by: PHYSICIAN ASSISTANT

## 2023-05-15 PROCEDURE — 86140 C-REACTIVE PROTEIN: CPT | Performed by: PHYSICIAN ASSISTANT

## 2023-05-15 PROCEDURE — 71046 X-RAY EXAM CHEST 2 VIEWS: CPT | Mod: TC | Performed by: RADIOLOGY

## 2023-05-15 PROCEDURE — 82088 ASSAY OF ALDOSTERONE: CPT | Performed by: PHYSICIAN ASSISTANT

## 2023-05-15 PROCEDURE — 84443 ASSAY THYROID STIM HORMONE: CPT | Performed by: PHYSICIAN ASSISTANT

## 2023-05-15 PROCEDURE — 82570 ASSAY OF URINE CREATININE: CPT | Performed by: PHYSICIAN ASSISTANT

## 2023-05-15 PROCEDURE — 84244 ASSAY OF RENIN: CPT | Mod: 90 | Performed by: PHYSICIAN ASSISTANT

## 2023-05-15 PROCEDURE — 93000 ELECTROCARDIOGRAM COMPLETE: CPT | Performed by: PHYSICIAN ASSISTANT

## 2023-05-15 PROCEDURE — 99000 SPECIMEN HANDLING OFFICE-LAB: CPT | Performed by: PHYSICIAN ASSISTANT

## 2023-05-15 PROCEDURE — 36415 COLL VENOUS BLD VENIPUNCTURE: CPT | Performed by: PHYSICIAN ASSISTANT

## 2023-05-15 PROCEDURE — 86618 LYME DISEASE ANTIBODY: CPT | Performed by: PHYSICIAN ASSISTANT

## 2023-05-15 PROCEDURE — 85379 FIBRIN DEGRADATION QUANT: CPT | Performed by: PHYSICIAN ASSISTANT

## 2023-05-15 PROCEDURE — 85652 RBC SED RATE AUTOMATED: CPT | Performed by: PHYSICIAN ASSISTANT

## 2023-05-15 RX ORDER — FUROSEMIDE 20 MG
20 TABLET ORAL DAILY
Qty: 30 TABLET | Refills: 0 | Status: SHIPPED | OUTPATIENT
Start: 2023-05-15 | End: 2023-05-31

## 2023-05-15 ASSESSMENT — ENCOUNTER SYMPTOMS
HEARTBURN: 0
NAUSEA: 0
PALPITATIONS: 0
EYE PAIN: 0
NERVOUS/ANXIOUS: 0
ABDOMINAL PAIN: 1
CHILLS: 1
WEAKNESS: 1
MYALGIAS: 0
CONSTIPATION: 0
FEVER: 0
SORE THROAT: 0
DIARRHEA: 0
DYSURIA: 0
DIZZINESS: 0
FREQUENCY: 1
HEADACHES: 1
SHORTNESS OF BREATH: 1
HEMATURIA: 0
HEMATOCHEZIA: 0
JOINT SWELLING: 1
PARESTHESIAS: 0
ARTHRALGIAS: 1
COUGH: 1

## 2023-05-15 ASSESSMENT — PAIN SCALES - GENERAL: PAINLEVEL: EXTREME PAIN (8)

## 2023-05-15 ASSESSMENT — ACTIVITIES OF DAILY LIVING (ADL): CURRENT_FUNCTION: NO ASSISTANCE NEEDED

## 2023-05-15 NOTE — TELEPHONE ENCOUNTER
I called with his x-ray results earlier but now his D-dimer result is back. It is slightly elevated, a little more elevated than I would expect with just a chronic left leg DVT so given his symptoms, I recommend a chest CT scan in [place] to rule out a pulmonary embolus. Please help schedule today or tomorrow.    Hamlet Roberts PA-C

## 2023-05-15 NOTE — Clinical Note
I saw Ismael today for new CHF symptoms. His blood pressure remains elevated and I see that he was found to have severe right renal artery stenosis last year. I know you are focusing on his AAA but just wondering if you can address his ELOY as well when you see him next? I am also going to send him to nephrology. Thanks!  Hamlet Roberts PA-C

## 2023-05-15 NOTE — TELEPHONE ENCOUNTER
Called and informed patient of lab results and provider wanting chest CT completed.   Patient requesting to have this done tomorrow in [place].     Contacted imaging and scheduled for CT tomorrow at 5:15/5:30 pm in [place].     Areli SHEPARDN, RN  [facility]

## 2023-05-15 NOTE — PATIENT INSTRUCTIONS
Will check a chest x-ray today along with labs.  I am concerned about possible heart failure.  Depending on x-ray results, I may put you on furosemide which is a diuretic to help with excess fluid and help with your heart.  May need to consider a chest CT scan to rule out a blood clot.    Continue inhaler.    Eat a low salt diet.  Monitor home weights and if you gain more than 2 pounds in 1 day or 5 in 1 week, let me know.

## 2023-05-15 NOTE — PROGRESS NOTES
"SUBJECTIVE:   Bucky is a 67 year old who presents for Preventive Visit.      5/15/2023     9:39 AM   Additional Questions   Roomed by Janeth       Patient has been advised of split billing requirements and indicates understanding: Yes  Are you in the first 12 months of your Medicare coverage?  No    Shortness of Breath  Associated symptoms include abdominal pain, arthralgias, chills, congestion, coughing, headaches, joint swelling and weakness. Pertinent negatives include no chest pain, fever, myalgias, nausea, rash or sore throat.   Healthy Habits:     In general, how would you rate your overall health?  Fair    Frequency of exercise:  None    Do you usually eat at least 4 servings of fruit and vegetables a day, include whole grains    & fiber and avoid regularly eating high fat or \"junk\" foods?  Yes    Taking medications regularly:  Yes    Medication side effects:  None    Ability to successfully perform activities of daily living:  No assistance needed    Home Safety:  No safety concerns identified    Hearing Impairment:  No hearing concerns    In the past 6 months, have you been bothered by leaking of urine?  No    In general, how would you rate your overall mental or emotional health?  Fair      PHQ-2 Total Score: 0    Additional concerns today:  No    He has noticed increased body aches over the past few months along with increased shortness of breath, especially with activity. He is very easily winded and is a lot more tired throughout the day. He cannot lie flat on his back without becoming short of breath so often sleeps with multiple pillows or in his recliner. He denies any weight gain but has noticed more leg swelling lately, left > right. He has been using his inhaler more frequently with minimal relief. He remains tobacco free. He has occasional lightheadedness but denies dizziness, palpitations or chest pain. He is not sure why he is having more pain but it is in his shoulders, low back, hips and " lower legs. He has been using Tylenol with minimal benefit.     Have you ever done Advance Care Planning? (For example, a Health Directive, POLST, or a discussion with a medical provider or your loved ones about your wishes): No, advance care planning information given to patient to review.  Patient plans to discuss their wishes with loved ones or provider.       Fall risk  Fallen 2 or more times in the past year?: Yes  Any fall with injury in the past year?: No    Cognitive Screening   1) Repeat 3 items (Leader, Season, Table)    2) Clock draw: NORMAL  3) 3 item recall: Recalls 2 objects   Results: NORMAL clock, 1-2 items recalled: COGNITIVE IMPAIRMENT LESS LIKELY    Mini-CogTM Copyright S [employer]. Licensed by the author for use in [facility]; reprinted with permission (rosio@[facility]). All rights reserved.      Do you have sleep apnea, excessive snoring or daytime drowsiness?: yes    Reviewed and updated as needed this visit by clinical staff   Tobacco  Allergies  Meds  Problems  Med Hx  Surg Hx  Fam Hx          Reviewed and updated as needed this visit by Provider   Tobacco  Allergies  Meds  Problems  Med Hx  Surg Hx  Fam Hx         Social History     Tobacco Use     Smoking status: Former     Packs/day: 1.25     Years: 41.00     Pack years: 51.25     Types: Cigarettes     Quit date: 10/1/2017     Years since quittin.6     Smokeless tobacco: Never   Vaping Use     Vaping status: Never Used   Substance Use Topics     Alcohol use: Yes     Alcohol/week: 0.0 standard drinks of alcohol     Comment: 12 pack of beer every 2 weeks           5/15/2023     9:23 AM   Alcohol Use   Prescreen: >3 drinks/day or >7 drinks/week? Yes     Do you have a current opioid prescription? No  Do you use any other controlled substances or medications that are not prescribed by a provider? None    COPD Follow-Up    Overall, how are your COPD symptoms since your last clinic visit?  Much worse    How much fatigue  or shortness of breath do you have when you are walking?  A lot more than usual    How much shortness of breath do you have when you are resting?  More than usual    How often do you cough? Often    Have you noticed any change in your sputum/phlegm?  No    Have you experienced a recent fever? Yes    Please describe how far you can walk without stopping to rest:  Less than 10 feet    How many flights of stairs are you able to walk up without stopping?  None    Have you had any Emergency Room Visits, Urgent Care Visits, or Hospital Admissions because of your COPD since your last office visit?  No    History   Smoking Status     Former     Packs/day: 1.25     Years: 41.00     Types: Cigarettes     Quit date: 10/1/2017   Smokeless Tobacco     Never     Lab Results   Component Value Date    FEV1 2.89 11/27/2013    UZP0BPY 60% 11/27/2013     Current providers sharing in care for this patient include:   Patient Care Team:  Hamlet Roberts PA-C as PCP - General (Physician Assistant)  Coy Bolaños MD as MD (Rheumatology)  Germain Muñoz MD as MD (Hematology & Oncology)  Hamlet Roberts PA-C as Assigned PCP  Coy Bolaños MD as Assigned Rheumatology Provider    The following health maintenance items are reviewed in Epic and correct as of today:  Health Maintenance   Topic Date Due     ZOSTER IMMUNIZATION (1 of 2) Never done     MICROALBUMIN  11/16/2018     COLORECTAL CANCER SCREENING  07/26/2019     COVID-19 Vaccine (3 - Pfizer series) 06/16/2021     INFLUENZA VACCINE (1) 09/01/2022     Pneumococcal Vaccine: 65+ Years (3 - PPSV23 if available, else PCV20) 09/11/2022     LUNG CANCER SCREENING  10/05/2022     LIPID  05/31/2023     BMP  03/27/2024     MEDICARE ANNUAL WELLNESS VISIT  05/15/2024     ANNUAL REVIEW OF HM ORDERS  05/15/2024     FALL RISK ASSESSMENT  05/15/2024     ADVANCE CARE PLANNING  03/31/2027     DTAP/TDAP/TD IMMUNIZATION (4 - Td or Tdap) 11/25/2029     SPIROMETRY  Completed     HIV SCREENING   "Completed     COPD ACTION PLAN  Completed     PHQ-2 (once per calendar year)  Completed     URINALYSIS  Completed     AORTIC ANEURYSM SCREENING (SYSTEM ASSIGNED)  Completed     HEPATITIS C SCREENING  Addressed     IPV IMMUNIZATION  Aged Out     MENINGITIS IMMUNIZATION  Aged Out           Review of Systems   Constitutional: Positive for chills. Negative for fever.   HENT: Positive for congestion. Negative for ear pain, hearing loss and sore throat.    Eyes: Negative for pain and visual disturbance.   Respiratory: Positive for cough and shortness of breath.    Cardiovascular: Positive for peripheral edema. Negative for chest pain and palpitations.   Gastrointestinal: Positive for abdominal pain. Negative for constipation, diarrhea, heartburn, hematochezia and nausea.   Genitourinary: Positive for frequency. Negative for dysuria, genital sores, hematuria, impotence, penile discharge and urgency.   Musculoskeletal: Positive for arthralgias and joint swelling. Negative for myalgias.   Skin: Negative for rash.   Neurological: Positive for weakness and headaches. Negative for dizziness and paresthesias.   Psychiatric/Behavioral: Negative for mood changes. The patient is not nervous/anxious.        OBJECTIVE:   BP (!) 150/98 (BP Location: Right arm, Patient Position: Sitting, Cuff Size: Adult Large)   Pulse 67   Temp 97.8  F (36.6  C) (Temporal)   Resp 24   Ht 1.753 m (5' 9.02\")   Wt 102.5 kg (226 lb)   SpO2 90%   BMI 33.36 kg/m   Estimated body mass index is 33.36 kg/m  as calculated from the following:    Height as of this encounter: 1.753 m (5' 9.02\").    Weight as of this encounter: 102.5 kg (226 lb).  Physical Exam  GENERAL: alert and no distress  EYES: Eyes grossly normal to inspection, PERRL and conjunctivae and sclerae normal  HENT: ear canals and TM's normal, nose and mouth without ulcers or lesions  NECK: no adenopathy, no asymmetry, masses, or scars and thyroid normal to palpation  RESP: lungs with right " lower lobe rales, otherwise normal breath sounds throughout  CV: regular rate and rhythm, normal S1 S2, no S3 or S4, no murmur, click or rub, 1+ peripheral edema and peripheral pulses strong  ABDOMEN: soft, nontender, no hepatosplenomegaly, no masses and bowel sounds normal   (male): male circumcised genitalia with large left hydrocele, no masses or urethral discharge, no hernia  MS: no gross musculoskeletal defects noted, no edema. FROM to all extremities. Tenderness over the lumbar spinous processes. Mild bilateral calf tenderness without warmth or erythema.   SKIN: no suspicious lesions or rashes  NEURO: Normal strength and tone, mentation intact and speech normal. Cranial nerves II-XII are grossly intact. DTRs are 2+/4 throughout and symmetric. Gait is stable.  PSYCH: mentation appears normal, affect normal/bright    EKG: sinus rhythm, rightward axis, no acute ST/T wave changes, no LVH by voltage criteria, unchanged from previous.      Results for orders placed or performed in visit on 05/15/23   XR Chest 2 Views     Status: None    Narrative    CHEST TWO VIEWS 5/15/2023 11:47 AM     HISTORY: Increasing shortness of breath, right lower lobe rales,  decreased breath sounds left lower lobe, heavy smoker for many years  but quit 2017; Orthopnea; Fatigue, unspecified type; SOB (shortness of  breath)    COMPARISON: CT dated 10/5/2021       Impression    IMPRESSION: Cardiac silhouette is at the upper limits of normal.  Pulmonary venous hypertension and mild edema is seen consistent with  CHF. Elevation of the right hemidiaphragm is noted. Small right  pleural effusion with right basilar atelectasis. No pneumothorax. No  significant bony abnormalities.    PHOEBE SLOAN MD         SYSTEM ID:  [id number]   Results for orders placed or performed in visit on 05/15/23   ESR: Erythrocyte sedimentation rate     Status: Normal   Result Value Ref Range    Erythrocyte Sedimentation Rate 6 0 - 20 mm/hr   CBC with platelets  and differential     Status: Abnormal   Result Value Ref Range    WBC Count 8.1 4.0 - 11.0 10e3/uL    RBC Count 4.95 4.40 - 5.90 10e6/uL    Hemoglobin 15.5 13.3 - 17.7 g/dL    Hematocrit 48.4 40.0 - 53.0 %    MCV 98 78 - 100 fL    MCH 31.3 26.5 - 33.0 pg    MCHC 32.0 31.5 - 36.5 g/dL    RDW 16.2 (H) 10.0 - 15.0 %    Platelet Count 180 150 - 450 10e3/uL    % Neutrophils 73 %    % Lymphocytes 14 %    % Monocytes 10 %    % Eosinophils 3 %    % Basophils 1 %    Absolute Neutrophils 5.9 1.6 - 8.3 10e3/uL    Absolute Lymphocytes 1.1 0.8 - 5.3 10e3/uL    Absolute Monocytes 0.8 0.0 - 1.3 10e3/uL    Absolute Eosinophils 0.3 0.0 - 0.7 10e3/uL    Absolute Basophils 0.0 0.0 - 0.2 10e3/uL   CBC with platelets and differential     Status: Abnormal    Narrative    The following orders were created for panel order CBC with platelets and differential.  Procedure                               Abnormality         Status                     ---------                               -----------         ------                     CBC with platelets and d...[[id number]]  Abnormal            Final result                 Please view results for these tests on the individual orders.   Aldosterone Renin Ratio     Status: None (In process)    Narrative    The following orders were created for panel order Aldosterone Renin Ratio.  Procedure                               Abnormality         Status                     ---------                               -----------         ------                     Aldosterone[[id number]]                                      In process                 Renin activity[[id number]]                                   In process                 Aldosterone Renin Ratio[[id number]]                          In process                   Please view results for these tests on the individual orders.       ASSESSMENT / PLAN:       ICD-10-CM    1. Medicare annual wellness visit, subsequent  Z00.00       2. Orthopnea  R06.01 D  dimer, quantitative     BNP-N terminal pro     XR Chest 2 Views     furosemide (LASIX) 20 MG tablet     D dimer, quantitative     BNP-N terminal pro     Echocardiogram Complete      3. SOB (shortness of breath)  R06.02 D dimer, quantitative     EKG 12-lead complete w/read - Clinics     XR Chest 2 Views     D dimer, quantitative     Echocardiogram Complete      4. Fatigue, unspecified type  R53.83 Comprehensive metabolic panel (BMP + Alb, Alk Phos, ALT, AST, Total. Bili, TP)     CBC with platelets and differential     TSH with free T4 reflex     Lyme Disease Total Abs Bld with Reflex to Confirm CLIA     ESR: Erythrocyte sedimentation rate     CRP, inflammation     XR Chest 2 Views     Comprehensive metabolic panel (BMP + Alb, Alk Phos, ALT, AST, Total. Bili, TP)     CBC with platelets and differential     TSH with free T4 reflex     Lyme Disease Total Abs Bld with Reflex to Confirm CLIA     ESR: Erythrocyte sedimentation rate     CRP, inflammation     Echocardiogram Complete      5. Polyarthralgia  M25.50 Comprehensive metabolic panel (BMP + Alb, Alk Phos, ALT, AST, Total. Bili, TP)     CBC with platelets and differential     TSH with free T4 reflex     Lyme Disease Total Abs Bld with Reflex to Confirm CLIA     ESR: Erythrocyte sedimentation rate     CRP, inflammation     Comprehensive metabolic panel (BMP + Alb, Alk Phos, ALT, AST, Total. Bili, TP)     CBC with platelets and differential     TSH with free T4 reflex     Lyme Disease Total Abs Bld with Reflex to Confirm CLIA     ESR: Erythrocyte sedimentation rate     CRP, inflammation      6. Leg swelling  M79.89 BNP-N terminal pro     furosemide (LASIX) 20 MG tablet     BNP-N terminal pro      7. Chronic obstructive pulmonary disease, unspecified COPD type (H)  J44.9       8. Pulmonary embolism without acute cor pulmonale, unspecified chronicity, unspecified pulmonary embolism type (H)  I26.99       9. Hypertriglyceridemia  E78.1       10. Stage 2 chronic kidney  disease  N18.2 Albumin Random Urine Quantitative with Creat Ratio     Albumin Random Urine Quantitative with Creat Ratio      11. Hyperlipidemia LDL goal <130  E78.5 Lipid panel reflex to direct LDL Fasting     Lipid panel reflex to direct LDL Fasting      12. Polycythemia  D75.1       13. HTN, goal below 140/90  I10 Aldosterone     Renin activity     Aldosterone Renin Ratio     Potassium     Aldosterone Renin Ratio     CANCELED: Aldosterone     CANCELED: Renin activity     CANCELED: Potassium      14. Renal artery stenosis (H)  I70.1           1. Medicare wellness visit completed.    2-8. Increased polyarthralgia and shortness of breath over the past few months, including orthopnea. There is concern for acute on chronic CHF which is confirmed on chest x-ray today. Still awaiting BNP results. EKG is stable. Will start furosemide 20 mg daily and plan on recheck in clinic in 2 weeks. I recommend a low salt diet along with close monitoring of home weights. If he gains more than 2 pounds in 1 day or 5 pounds in 1 week, he will contact the clinic. Will place an order for an echocardiogram. I have also ordered a D-dimer given his DVT and PE history. He does have a chronic, non-occlusive LLE DVT and remains on aspirin. I would have a very low suspicion for a PE unless the D-dimer was significantly elevated and in that case, would order a chest CT. Will check inflammatory markers along with Lyme disease testing given his body aches as [place]. He denies any recent rashes. Continue with albuterol inhaler as needed. We can consider a daily inhaler if needed but will see how he responds to the diuretic. Will monitor potassium closely while on this.    9, 11. Updated lipid panel ordered. Continue Lipitor.    10,, 13-14. He has early CKD with history of albuminuria. He also has severe right renal artery stenosis on CTA from last year but it does not appear this has been addressed. I will send a  "message to his vascular surgeon and will also place a referral to nephrology. His blood pressure is elevated during both readings today but should improve on the furosemide.    12. His hemoglobin and hematocrit are both normal today.    Follow-up in 2 weeks as directed.    An additional 45 minutes on top of the annual wellness visit spent on reviewing history, completing exam, discussing plan, reviewing x-ray and completing note.     Patient has been advised of split billing requirements and indicates understanding: Yes      COUNSELING:  Reviewed preventive health counseling, as reflected in patient instructions       Regular exercise       Healthy diet/nutrition      BMI:   Estimated body mass index is 33.36 kg/m  as calculated from the following:    Height as of this encounter: 1.753 m (5' 9.02\").    Weight as of this encounter: 102.5 kg (226 lb).   Weight management plan: Discussed healthy diet and exercise guidelines      He reports that he quit smoking about 5 years ago. His smoking use included cigarettes. He has a 51.25 pack-year smoking history. He has never used smokeless tobacco.      Appropriate preventive services were discussed with this patient, including applicable screening as appropriate for cardiovascular disease, diabetes, osteopenia/osteoporosis, and glaucoma.  As appropriate for age/gender, discussed screening for colorectal cancer, prostate cancer, breast cancer, and cervical cancer. Checklist reviewing preventive services available has been given to the patient.    Reviewed patients plan of care and provided an AVS. The Basic Care Plan (routine screening as documented in Health Maintenance) for Bucky meets the Care Plan requirement. This Care Plan has been established and reviewed with the Patient.      LISA Osuna [facility]    Identified Health Risks:    I have reviewed Opioid Use Disorder and Substance Use Disorder risk factors and made any needed " referrals.

## 2023-05-16 ENCOUNTER — ANCILLARY PROCEDURE (OUTPATIENT)
Dept: CT IMAGING | Facility: CLINIC | Age: 68
End: 2023-05-16
Attending: PHYSICIAN ASSISTANT
Payer: COMMERCIAL

## 2023-05-16 ENCOUNTER — TELEPHONE (OUTPATIENT)
Dept: NEPHROLOGY | Facility: CLINIC | Age: 68
End: 2023-05-16

## 2023-05-16 DIAGNOSIS — J90 PLEURAL EFFUSION: ICD-10-CM

## 2023-05-16 DIAGNOSIS — R06.02 SOB (SHORTNESS OF BREATH): ICD-10-CM

## 2023-05-16 DIAGNOSIS — R06.01 ORTHOPNEA: ICD-10-CM

## 2023-05-16 DIAGNOSIS — R79.89 ELEVATED D-DIMER: ICD-10-CM

## 2023-05-16 DIAGNOSIS — N18.2 CHRONIC KIDNEY DISEASE, STAGE II (MILD): Primary | ICD-10-CM

## 2023-05-16 LAB — B BURGDOR IGG+IGM SER QL: 0.61

## 2023-05-16 PROCEDURE — 71275 CT ANGIOGRAPHY CHEST: CPT | Performed by: RADIOLOGY

## 2023-05-16 RX ORDER — IOPAMIDOL 755 MG/ML
78 INJECTION, SOLUTION INTRAVASCULAR ONCE
Status: COMPLETED | OUTPATIENT
Start: 2023-05-16 | End: 2023-05-16

## 2023-05-16 RX ADMIN — IOPAMIDOL 78 ML: 755 INJECTION, SOLUTION INTRAVASCULAR at 17:34

## 2023-05-16 NOTE — TELEPHONE ENCOUNTER
[facility] Call Center    Phone Message    May a detailed message be left on voicemail: yes     Reason for Call: Order(s): New patient labs  Reason for requested: New patient VV scheduled with Dr. Torres on 9/18/23, with labs at [place] on 9/12/23  Date needed: 9/1/23  Provider name: Brian    Action Taken: Message routed to:  [facility] ([facility] Nephrology    Travel Screening: Not Applicable

## 2023-05-17 DIAGNOSIS — I27.20 PULMONARY HYPERTENSION (H): ICD-10-CM

## 2023-05-17 DIAGNOSIS — I50.9 ACUTE ON CHRONIC CONGESTIVE HEART FAILURE, UNSPECIFIED HEART FAILURE TYPE (H): Primary | ICD-10-CM

## 2023-05-18 ENCOUNTER — TELEPHONE (OUTPATIENT)
Dept: CARDIOLOGY | Facility: CLINIC | Age: 68
End: 2023-05-18
Payer: COMMERCIAL

## 2023-05-18 NOTE — TELEPHONE ENCOUNTER
[facility] Call Center    Phone Message    May a detailed message be left on voicemail: yes     Reason for Call: Other: patient is calling to schedule new heart failure appt, patient would like to be seen in [place] please call patient to coordinate,thank you     Action Taken: Other: cardiology    Travel Screening: Not Applicable   Thank you!  [facility]

## 2023-05-19 LAB — ALDOST SERPL-MCNC: 4.4 NG/DL (ref 0–31)

## 2023-05-22 NOTE — TELEPHONE ENCOUNTER
Called patient and offered 05/25/23 with Dr. Vazquez, patient is not able to make it to that appointment. Offered next available 06/15/23 at 1:00 pm and patient agreed. Informed patient that provider does require labs prior and that lab appointment will be scheduled at 12:40 pm prior to appointment and that it is non-fasting. Patient verbalized understanding and will come get labs at 12:40 pm on 06/15/23 and come up to the second floor for appointment with Dr. Vazquez at 1:00 pm.      DAHLIA Hamilton   Cardiology Team  [facility]

## 2023-05-24 LAB
ALDOST/RENIN PLAS-RTO: 14.7 {RATIO} (ref 0–25)
RENIN PLAS-CCNC: 0.3 NG/ML/HR

## 2023-05-31 ENCOUNTER — OFFICE VISIT (OUTPATIENT)
Dept: FAMILY MEDICINE | Facility: OTHER | Age: 68
End: 2023-05-31
Payer: COMMERCIAL

## 2023-05-31 VITALS
HEIGHT: 69 IN | SYSTOLIC BLOOD PRESSURE: 132 MMHG | OXYGEN SATURATION: 93 % | RESPIRATION RATE: 22 BRPM | BODY MASS INDEX: 31.18 KG/M2 | DIASTOLIC BLOOD PRESSURE: 88 MMHG | TEMPERATURE: 97.4 F | HEART RATE: 69 BPM | WEIGHT: 210.5 LBS

## 2023-05-31 DIAGNOSIS — D58.2 ELEVATED HEMOGLOBIN (H): ICD-10-CM

## 2023-05-31 DIAGNOSIS — I50.9 ACUTE ON CHRONIC CONGESTIVE HEART FAILURE, UNSPECIFIED HEART FAILURE TYPE (H): Primary | ICD-10-CM

## 2023-05-31 DIAGNOSIS — I27.20 PULMONARY HYPERTENSION (H): ICD-10-CM

## 2023-05-31 LAB
ANION GAP SERPL CALCULATED.3IONS-SCNC: 11 MMOL/L (ref 7–15)
BUN SERPL-MCNC: 14.8 MG/DL (ref 8–23)
CALCIUM SERPL-MCNC: 9 MG/DL (ref 8.8–10.2)
CHLORIDE SERPL-SCNC: 102 MMOL/L (ref 98–107)
CREAT SERPL-MCNC: 0.93 MG/DL (ref 0.67–1.17)
DEPRECATED HCO3 PLAS-SCNC: 28 MMOL/L (ref 22–29)
GFR SERPL CREATININE-BSD FRML MDRD: >90 ML/MIN/1.73M2
GLUCOSE SERPL-MCNC: 99 MG/DL (ref 70–99)
POTASSIUM SERPL-SCNC: 4 MMOL/L (ref 3.4–5.3)
SODIUM SERPL-SCNC: 141 MMOL/L (ref 136–145)

## 2023-05-31 PROCEDURE — 99213 OFFICE O/P EST LOW 20 MIN: CPT | Performed by: PHYSICIAN ASSISTANT

## 2023-05-31 PROCEDURE — 36415 COLL VENOUS BLD VENIPUNCTURE: CPT | Performed by: PHYSICIAN ASSISTANT

## 2023-05-31 PROCEDURE — 80048 BASIC METABOLIC PNL TOTAL CA: CPT | Performed by: PHYSICIAN ASSISTANT

## 2023-05-31 RX ORDER — FUROSEMIDE 20 MG
20 TABLET ORAL DAILY
Qty: 30 TABLET | Refills: 5 | Status: SHIPPED | OUTPATIENT
Start: 2023-05-31 | End: 2023-11-20

## 2023-05-31 ASSESSMENT — PAIN SCALES - GENERAL: PAINLEVEL: NO PAIN (0)

## 2023-05-31 NOTE — PROGRESS NOTES
Assessment & Plan       ICD-10-CM    1. Acute on chronic congestive heart failure, unspecified heart failure type (H)  I50.9 Basic metabolic panel  (Ca, Cl, CO2, Creat, Gluc, K, Na, BUN)     furosemide (LASIX) 20 MG tablet     Basic metabolic panel  (Ca, Cl, CO2, Creat, Gluc, K, Na, BUN)      2. Pulmonary hypertension (H)  I27.20 furosemide (LASIX) 20 MG tablet      3. Elevated hemoglobin (H)  D58.2           1-2. He is feeling much better since starting Lasix a few weeks ago and is down 16 pounds. His leg swelling is virtually resolved and he is breathing much better. Will check updated BMP today to make sure kidneys and electrolytes are stable and he will continue with Lasix 20 mg daily. He has an appointment with cardiology in a few weeks along with an echocardiogram. He will continue to work on a low salt diet and I recommend he avoid more than 1.5 L of fluids daily. He will contact the clinic if he gains more than 2 pounds in 1 day or 5 pounds in 1 week. Recheck in 6 months.    Wt Readings from Last 2 Encounters:   05/31/23 95.5 kg (210 lb 8 oz)   05/15/23 102.5 kg (226 lb)     3. History of erythrocytosis but most recent hemoglobin was normal.       LISA Osuna [facility]    David Lawler is a 67 year old, presenting for the following health issues:  Breathing Problem        5/15/2023     9:39 AM   Additional Questions   Roomed by Janeth     History of Present Illness       Reason for visit:  Follow up on breathing    He eats 2-3 servings of fruits and vegetables daily.He consumes 1 sweetened beverage(s) daily.He exercises with enough effort to increase his heart rate 9 or less minutes per day.  He exercises with enough effort to increase his heart rate 3 or less days per week.   He is taking medications regularly.     He was seen 2 weeks ago for increased shortness of breath and fatigue and was started on Lasix for acute on chronic congestive heart failure. His BNP  "and chest x-ray indicated CHF. His chest CT did not reveal a pulmonary embolus despite an elevated D-dimer but did reveal pulmonary hypertension. He is feeling much better with 15+ pounds of weight loss and improved leg swelling and breathing. He has an appointment with cardiology in 2 weeks. He is trying to monitor his salt intake more closely. He denies any side effects on the Lasix besides increased urination.     Review of Systems   Constitutional, HEENT, cardiovascular, pulmonary, gi and gu systems are negative, except as otherwise noted.      Objective    /88   Pulse 69   Temp 97.4  F (36.3  C) (Temporal)   Resp 22   Ht 1.753 m (5' 9\")   Wt 95.5 kg (210 lb 8 oz)   SpO2 93%   BMI 31.09 kg/m    Body mass index is 31.09 kg/m .  Physical Exam   GENERAL: healthy, alert and no distress  RESP: lungs with minimal rhonchi throughout - no rales or wheezes  CV: regular rate and rhythm, normal S1 S2, no S3 or S4, no murmur, click or rub, no peripheral edema and peripheral pulses strong  NEURO: Normal strength and tone, mentation intact and speech normal. Gait is stable.   PSYCH: mentation appears normal, affect normal/bright            "

## 2023-05-31 NOTE — PATIENT INSTRUCTIONS
Continue Lasix for the excess fluid.  Eat a low salt diet and limits fluids to 1.5 L daily.  Follow-up in 6 months for a recheck.

## 2023-06-06 ENCOUNTER — TELEPHONE (OUTPATIENT)
Dept: CARDIOLOGY | Facility: CLINIC | Age: 68
End: 2023-06-06
Payer: COMMERCIAL

## 2023-06-13 ENCOUNTER — DOCUMENTATION ONLY (OUTPATIENT)
Dept: LAB | Facility: CLINIC | Age: 68
End: 2023-06-13

## 2023-06-13 DIAGNOSIS — I50.9 ACUTE ON CHRONIC CONGESTIVE HEART FAILURE, UNSPECIFIED HEART FAILURE TYPE (H): Primary | ICD-10-CM

## 2023-06-13 NOTE — PROGRESS NOTES
Bucky CHRISTINE Edgar has an upcoming lab appointment with no lab orders. Please place future lab orders for appointment.  Thank you   Mandy [facility]LL     Future Appointments   Date Time Provider Department Center   6/15/2023 12:40 PM LAB FIRST FLOOR [facility] [place]   6/15/2023  1:00 PM Marion Vazquez MD [facility]

## 2023-06-15 ENCOUNTER — LAB (OUTPATIENT)
Dept: LAB | Facility: CLINIC | Age: 68
End: 2023-06-15
Payer: COMMERCIAL

## 2023-06-15 ENCOUNTER — TELEPHONE (OUTPATIENT)
Dept: CARDIOLOGY | Facility: CLINIC | Age: 68
End: 2023-06-15

## 2023-06-15 ENCOUNTER — OFFICE VISIT (OUTPATIENT)
Dept: CARDIOLOGY | Facility: CLINIC | Age: 68
End: 2023-06-15
Payer: COMMERCIAL

## 2023-06-15 VITALS
WEIGHT: 202.3 LBS | SYSTOLIC BLOOD PRESSURE: 113 MMHG | OXYGEN SATURATION: 94 % | DIASTOLIC BLOOD PRESSURE: 72 MMHG | BODY MASS INDEX: 29.87 KG/M2 | HEART RATE: 79 BPM

## 2023-06-15 DIAGNOSIS — I50.9 OTHER CONGESTIVE HEART FAILURE (H): Primary | ICD-10-CM

## 2023-06-15 DIAGNOSIS — I50.9 ACUTE ON CHRONIC CONGESTIVE HEART FAILURE, UNSPECIFIED HEART FAILURE TYPE (H): ICD-10-CM

## 2023-06-15 LAB
ANION GAP SERPL CALCULATED.3IONS-SCNC: 12 MMOL/L (ref 7–15)
BUN SERPL-MCNC: 22.7 MG/DL (ref 8–23)
CALCIUM SERPL-MCNC: 9.7 MG/DL (ref 8.8–10.2)
CHLORIDE SERPL-SCNC: 103 MMOL/L (ref 98–107)
CREAT SERPL-MCNC: 1.41 MG/DL (ref 0.67–1.17)
DEPRECATED HCO3 PLAS-SCNC: 24 MMOL/L (ref 22–29)
GFR SERPL CREATININE-BSD FRML MDRD: 55 ML/MIN/1.73M2
GLUCOSE SERPL-MCNC: 94 MG/DL (ref 70–99)
NT-PROBNP SERPL-MCNC: 717 PG/ML (ref 0–900)
POTASSIUM SERPL-SCNC: 6.1 MMOL/L (ref 3.4–5.3)
SODIUM SERPL-SCNC: 139 MMOL/L (ref 136–145)

## 2023-06-15 PROCEDURE — 36415 COLL VENOUS BLD VENIPUNCTURE: CPT

## 2023-06-15 PROCEDURE — 83880 ASSAY OF NATRIURETIC PEPTIDE: CPT

## 2023-06-15 PROCEDURE — 99204 OFFICE O/P NEW MOD 45 MIN: CPT | Performed by: INTERNAL MEDICINE

## 2023-06-15 PROCEDURE — 80048 BASIC METABOLIC PNL TOTAL CA: CPT

## 2023-06-15 RX ORDER — SPIRONOLACTONE 25 MG/1
25 TABLET ORAL DAILY
Qty: 90 TABLET | Refills: 3 | Status: SHIPPED | OUTPATIENT
Start: 2023-06-15 | End: 2023-07-26

## 2023-06-15 RX ORDER — DAPAGLIFLOZIN 10 MG/1
10 TABLET, FILM COATED ORAL DAILY
Qty: 90 TABLET | Refills: 3 | Status: SHIPPED | OUTPATIENT
Start: 2023-06-15 | End: 2023-07-26 | Stop reason: SINTOL

## 2023-06-15 NOTE — TELEPHONE ENCOUNTER
Call received from the lab with lab results ( 6.1) per staff specimen was hemolyzed thus potassium could be falsely elevated. RN contacted patient he is already home. Dr Vazquez notified.     <Date: 6/15/2023    Time of Call: 4:10 PM     Diagnosis:  Hyperkalemia     [ VORB ] Ordering provider: Dr Vazquez   Order: Recheck potassium in 2 weeks. In the meantime ok to start Aldactone as planned.      Order received by: Heather Frank RN       Follow-up/additional notes: Recommendations reviewed with patient.Patient expressed understanding and agreeable with the plan. Scheduling was asked to contact patient to set that up.

## 2023-06-15 NOTE — NURSING NOTE
Cardiac Testing: Patient given instructions regarding  echocardiogram . Discussed purpose, preparation, procedure and when to expect results reported back to the patient. Patient demonstrated understanding of this information and agreed to call with further questions or concerns.    Med Reconcile: Reviewed and verified all current medications with the patient. The updated medication list was printed and given to the patient.    New Medication: Farxiga 10 mg onbce daily, if not covered patient is agreeable to apply for assistance program. Patient was educated regarding newly prescribed medication, including discussion of  the indication, administration, side effects, and when to report to MD or RN. Patient demonstrated understanding of this information and agreed to call with further questions or concerns.    Return Appointment: Patient given instructions regarding scheduling next clinic visit. Patient demonstrated understanding of this information and agreed to call with further questions or concerns. Follow-up in 3 months, referral placed for sleep study.     Patient stated he understood all health information given and agreed to call with further questions or concerns.

## 2023-06-15 NOTE — PROGRESS NOTES
Cardiology Clinic Note    HPI    Dear colleagues,     I had the pleasure of seeing Mr. Bucky Edgar in the Cardiology clinic.  As you know, Mr. Bucky Edgar is a pleasant 67 year old male with a past medical history of heart failure who I am seeing as a new patient evaluation.  I first met Rosemarie 15, 2023.    Patient was having symptoms of weight gain, volume overload, lower extremity edema, shortness of breath.  He is seeing his primary care provider Dr. Roberts who prescribed a diuretic.  Patient has lost approximately 17 pounds since and feels much improved.  He does not have any notable prior cardiac history other than hypertension, dyslipidemia, peripheral vascular disease.  Does have a prior history of DVTs but no pulmonary emboli.  He is a prior smoker and does have COPD.    Patient presents today alone.  He has no symptoms of chest pain, lightheadedness, dizziness, syncope, bleeding issues, palpitations.  His weight is improved down to 205, previously was 225.    PAST MEDICAL HISTORY:  Patient Active Problem List   Diagnosis     Displacement of lumbar intervertebral disc without myelopathy     CARDIOVASCULAR SCREENING; LDL GOAL LESS THAN 130     HTN, goal below 140/90     Advanced directives, counseling/discussion     COPD (chronic obstructive pulmonary disease) (H)     Impaired fasting glucose     Hypertriglyceridemia     Vitamin D deficiency     Chronic bronchitis with COPD (chronic obstructive pulmonary disease) (H)     Closed fracture of multiple ribs of left side with routine healing, subsequent encounter     Chronic pain due to trauma     Long-term (current) use of anticoagulants [Z79.01]     Elevated serum creatinine     History of deep venous thrombosis (DVT) of distal vein of left lower extremity     Elevated liver enzymes     Renal atrophy, right     Abdominal aortic aneurysm (AAA) without rupture (H)     Hepatomegaly     Fatty liver     Polycythemia     Pulmonary embolism without acute cor  pulmonale, unspecified chronicity, unspecified pulmonary embolism type (H)     Elevated hemoglobin (H)     Abnormal CT lung screening     Accidental fall from ladder     Acute deep vein thrombosis (DVT) of left lower extremity (H)     Acute-on-chronic renal failure (H)     Cellulitis of left foot     Ectatic abdominal aorta (H)     Pneumonia     Acute kidney injury (H)     Stage 2 chronic kidney disease     Acute on chronic congestive heart failure, unspecified heart failure type (H)     Pulmonary hypertension (H)        FAMILY HISTORY:  Family History   Problem Relation Age of Onset     Family History Negative Other      Diabetes No family hx of      Coronary Artery Disease No family hx of      Hypertension No family hx of      Hyperlipidemia No family hx of      Breast Cancer No family hx of      Prostate Cancer No family hx of      Anxiety Disorder No family hx of      Substance Abuse No family hx of      Asthma No family hx of      Thyroid Disease No family hx of      Unknown/Adopted No family hx of      Genetic Disorder No family hx of      Osteoporosis No family hx of      Anesthesia Reaction No family hx of      Mental Illness No family hx of      Depression No family hx of      Other Cancer No family hx of      Colon Cancer No family hx of      Cerebrovascular Disease No family hx of      Obesity No family hx of        SOCIAL HISTORY:  Social History     Tobacco Use     Smoking status: Former     Packs/day: 1.25     Years: 41.00     Pack years: 51.25     Types: Cigarettes     Quit date: 10/1/2017     Years since quittin.7     Smokeless tobacco: Never   Vaping Use     Vaping status: Never Used   Substance Use Topics     Alcohol use: Yes     Alcohol/week: 0.0 standard drinks of alcohol     Comment: 12 pack of beer every 2 weeks     Drug use: No        CURRENT MEDICATIONS:  Current Outpatient Medications   Medication Sig Dispense Refill     acetaminophen (TYLENOL) 325 MG tablet Take 650 mg by mouth every 6  hours        albuterol (PROAIR HFA/PROVENTIL HFA/VENTOLIN HFA) 108 (90 Base) MCG/ACT inhaler Inhale 2 puffs into the lungs every 6 hours as needed for shortness of breath / dyspnea 18 g 0     allopurinol (ZYLOPRIM) 300 MG tablet Take 1 tablet (300 mg) by mouth daily 90 tablet 2     aspirin (ASA) 81 MG EC tablet Take 1 tablet (81 mg) by mouth daily       atorvastatin (LIPITOR) 10 MG tablet Take 1 tablet (10 mg) by mouth daily 90 tablet 0     carvedilol (COREG) 25 MG tablet TAKE 1 TABLET(25 MG) BY MOUTH TWICE DAILY WITH MEALS 180 tablet 0     furosemide (LASIX) 20 MG tablet Take 1 tablet (20 mg) by mouth daily 30 tablet 5     hydrALAZINE (APRESOLINE) 50 MG tablet TAKE 1 TABLET(50 MG) BY MOUTH THREE TIMES DAILY 270 tablet 0     lisinopril (ZESTRIL) 20 MG tablet Take 1 tablet (20 mg) by mouth 2 times daily 180 tablet 0     nicotine (NICORETTE) 4 MG lozenge Place 4 mg inside cheek as needed         EXAM:  /72 (BP Location: Left arm, Patient Position: Sitting, Cuff Size: Adult Regular)   Pulse 79   Wt 91.8 kg (202 lb 4.8 oz)   SpO2 94%   BMI 29.87 kg/m      General: appears comfortable, alert and articulate  Heart: regular, S1/S2, no murmur, estimated JVP 10  Lungs: clear, no rales or wheezing  Extremities: no edema  Neurological: normal speech and affect, no gross motor deficits    Weight  Wt Readings from Last 10 Encounters:   05/31/23 95.5 kg (210 lb 8 oz)   05/15/23 102.5 kg (226 lb)   03/31/22 102.5 kg (226 lb)   05/04/21 105.7 kg (233 lb)   03/24/21 105.7 kg (233 lb)   03/22/21 106.1 kg (234 lb)   03/17/21 105.2 kg (232 lb)   07/28/20 100.2 kg (221 lb)   07/14/20 100.7 kg (222 lb)   06/30/20 101.3 kg (223 lb 4.8 oz)       Testing/Procedures:  I personally visualized and interpreted:  EKG sinus rhythm    Outside results of note:  Outside records from [facility] via Care Everywhere were obtained, and relevant results/notes have been incorporated into HPI.    Assessment and Plan:     In summary, very  pleasant 67-year-old male with heart failure who presents for evaluation.    Symptoms have improved with diuretics.  ACC/AHA stage C, NYHA class II  We will get a screening transthoracic echogram to ensure no underlying structural heart disease, namely reduced EF, chamber enlargement, valvular disease  We will start on spironolactone and SGLT2 inhibitor, as these are both approved for both HFpEF and HFrEF  He is already on carvedilol and lisinopril at max doses for his hypertension.  He also takes hydralazine  He may continue his Lasix as needed  Pending results of echocardiogram, may need further testing which could include heart catheterization versus stress testing    Subclinical coronary artery disease, calcification seen on his chest imaging throughout his coronary arteries and his aorta: Atorvastatin 10 mg daily, aspirin 81 mg daily.  Last LDL 38, total 120.    Referral for sleep study for sleep apnea diagnosis    I will see him back in 3 months.    The patient states understanding and is agreeable with plan.   Feel free to contact myself regarding questions or concerns.  It was a pleasure to see this patient today.    Marion Vazquez MD   of Medicine  Advanced Heart Failure and Transplant Cardiology    CC      Today's clinic visit entailed:  45 minutes spent on the date of the encounter doing chart review, history and exam, documentation and further activities per the note    The level of medical decision making during this visit was of moderate complexity.

## 2023-06-15 NOTE — PATIENT INSTRUCTIONS
Take your medicines every day, as directed     Changes made today:  Start taking  Farxiga 10 mg by mouth once daily  Start taking spironolactone 25 mg by mouth once daily  Complete echocardiogram   (ultra sound of your heart) at next available)       Cardiology Care Coordinators:      Wendi PHOENIX RN     Cardiology Rooming staff:  Abrahan VILLAGOMEZ    Phone  [phone]      Fax [phone]    To Contact us     During Business Hours:  [phone]     If you are needing refills please contact your pharmacy.     For urgent after hour care please call the [facility] at [phone] or the [facility] at [phone] and ask to speak to the cardiologist on call.            HOW TO CHECK YOUR BLOOD PRESSURE AT HOME:     Avoid eating, smoking, and exercising for at least 30 minutes before taking a reading.     Be sure you have taken your BP medication at least 2-3 hours before you check it.      Sit quietly for 10 minutes before a reading.      Sit in a chair with your feet flat on the floor. Rest your  arm on a table so that the arm cuff is at the same level as your heart.     Remain still during the reading.  Record your blood pressure and pulse in a log and bring to your next appointment.       Use [employer] allows you to communicate directly with your heart team through secure messaging.  [employer] can be accessed any time on your phone, computer, or tablet.  If you need assistance, we'd be happy to help!             Keep your Heart Appointments:  Follow-up in 3 months   Referral placed for sleep study

## 2023-06-15 NOTE — PROGRESS NOTES
Bucky Edgar's goals for this visit include:     He requests these members of his care team be copied on today's visit information: PCP    PCP: Hamlet Roberts    Referring Provider:  No referring provider defined for this encounter.    /72 (BP Location: Left arm, Patient Position: Sitting, Cuff Size: Adult Regular)   Pulse 79   Wt 91.8 kg (202 lb 4.8 oz)   SpO2 94%   BMI 29.87 kg/m      Do you need any medication refills at today's visit? No.    Abrahan Saucedo, EMT  Clinic Support  [facility]    [phone]    Employed by [facility]

## 2023-06-16 ENCOUNTER — TELEPHONE (OUTPATIENT)
Dept: CARDIOLOGY | Facility: CLINIC | Age: 68
End: 2023-06-16
Payer: COMMERCIAL

## 2023-06-16 NOTE — TELEPHONE ENCOUNTER
"Date: 6/16/2023    Time of Call: 4:00 PM     Diagnosis:  See last clinic notes from 6/15/2023     [ VORB ] Ordering provider: Dr Vazquez  Order: \"Patient should get another BMP to to ensure potassium is normal prior to starting spironolactone if he is concerned. I wasn't concerned because the specimen was hemolyzed.\"     Order received by: Heather Frank RN       Follow-up/additional notes: Recommendations reviewed with patient, he expressed understanding and agreeable with the plan to recheck another BMP prior starting apironolatone. Lab order already in [facility], appointment set for Monday.   Heather Frank RN        "

## 2023-06-16 NOTE — TELEPHONE ENCOUNTER
[facility] Call Center    Phone Message    May a detailed message be left on voicemail: yes     Reason for Call: Other: Nisha calling from [facility], states the spironolactone will interact with Lisinopril. Wants to ensure potassiums levels and kidney function will be monitored or, does provider wish to order a different medication?      Action Taken: Message routed to:  Other: Cardio MG    Travel Screening: Not Applicable                                                                      Thank you!  [facility]

## 2023-06-19 ENCOUNTER — LAB (OUTPATIENT)
Dept: LAB | Facility: OTHER | Age: 68
End: 2023-06-19
Payer: COMMERCIAL

## 2023-06-19 DIAGNOSIS — I50.9 OTHER CONGESTIVE HEART FAILURE (H): ICD-10-CM

## 2023-06-19 LAB
ANION GAP SERPL CALCULATED.3IONS-SCNC: 12 MMOL/L (ref 7–15)
BUN SERPL-MCNC: 30.7 MG/DL (ref 8–23)
CALCIUM SERPL-MCNC: 9 MG/DL (ref 8.8–10.2)
CHLORIDE SERPL-SCNC: 104 MMOL/L (ref 98–107)
CREAT SERPL-MCNC: 1.62 MG/DL (ref 0.67–1.17)
DEPRECATED HCO3 PLAS-SCNC: 22 MMOL/L (ref 22–29)
GFR SERPL CREATININE-BSD FRML MDRD: 47 ML/MIN/1.73M2
GLUCOSE SERPL-MCNC: 106 MG/DL (ref 70–99)
POTASSIUM SERPL-SCNC: 4.3 MMOL/L (ref 3.4–5.3)
SODIUM SERPL-SCNC: 138 MMOL/L (ref 136–145)

## 2023-06-19 PROCEDURE — 80048 BASIC METABOLIC PNL TOTAL CA: CPT

## 2023-06-19 PROCEDURE — 36415 COLL VENOUS BLD VENIPUNCTURE: CPT

## 2023-06-20 ENCOUNTER — TELEPHONE (OUTPATIENT)
Dept: FAMILY MEDICINE | Facility: OTHER | Age: 68
End: 2023-06-20
Payer: COMMERCIAL

## 2023-06-20 NOTE — TELEPHONE ENCOUNTER
"See office visit dated 5/31/23. Notes states \"He is feeling much better since starting Lasix a few weeks ago and is down 16 pounds\".    Trish Deal, DREAN, RN, PHN  Registered Nurse-Clinic Triage  [facility]  6/20/2023 at 8:55 AM    "

## 2023-06-20 NOTE — TELEPHONE ENCOUNTER
Spoke to patient, he said he does not meet with his cardiologist this week . He met with one last week. He is feeling kind of tired lately after the weight loss. Is down from 226 to 202 in a month. He wants to make sure he isnt loosing too much too quickly and wondering fi this is causing his fatigue and should stop taking medication.

## 2023-06-20 NOTE — TELEPHONE ENCOUNTER
"  BMP results reviewed by Dr Vazquez:   Date: 6/20/2023    Time of Call: 2:36 PM     Diagnosis:  See last clinic visit from 6/15/2023     [ TORB ] Ordering provider: Dr Vazquez   Order: \" Marion Vazquez MD Clerge, Ingrid, RN; P [facility] Cardiology Adult [place]  He can start spironolactone.  Can you order repeat BMP in 2 weeks.  He should only take lasix as needed. \"       Order received by: Heather Frank RN       Follow-up/additional notes: Patient contacted to review the above recommendations. According to patient report he was told to stop his lasix earlier today. Also noted patient has an up-coming appointment schedule with a cardiologist over at [facility] for heart failure.         "

## 2023-06-20 NOTE — TELEPHONE ENCOUNTER
If he had that much fluid on him yes it can be significant. He has appt with Cardiology this week so I'd recommend following up with them as well.      Boubacar Mixon PA-C

## 2023-06-20 NOTE — TELEPHONE ENCOUNTER
If he is having symptoms or concerns with the weight loss I'd recommend scheduling a follow-up to discuss with JM.     Boubacar Mixon PA-C

## 2023-06-20 NOTE — TELEPHONE ENCOUNTER
Pt was notified of cardiologist note, no further questions. Will wait for cardiologist to call back. :    He should stop his lasix, especially with the other HF meds he will be starting.  His last Cr was a bit higher, likely from dehydration

## 2023-06-20 NOTE — TELEPHONE ENCOUNTER
Patient is calling to ask provider if it is normal to be losing this much weight. He lost 13 pounds the first week and has lost a total of 23 pounds since his appointment and he wants to know if this is normal and ok to have lost this much weight. Ok to call patient at mobile# with reply.

## 2023-06-21 DIAGNOSIS — I50.9 ACUTE ON CHRONIC CONGESTIVE HEART FAILURE, UNSPECIFIED HEART FAILURE TYPE (H): Primary | ICD-10-CM

## 2023-06-21 NOTE — CONFIDENTIAL NOTE
DIAGNOSIS:    Stage 2 chronic kidney disease   Renal artery stenosis (H)      DATE RECEIVED: 09.18.2023   NOTES STATUS DETAILS   OFFICE NOTE from referring provider Internal 05.15.2023 Hamlet Roberts PA-C   OFFICE NOTE from other specialist      *Only VASCULITIS or LUPUS gather office notes for the following     *PULMONARY       *ENT     *DERMATOLOGY     *RHEUMATOLOGY     DISCHARGE SUMMARY from hospital     DISCHARGE REPORT from the ER     MEDICATION LIST Internal    IMAGING  (NEED IMAGES AND REPORTS)     KIDNEY CT SCAN     KIDNEY ULTRASOUND     MR ABDOMEN     NUCLEAR MEDICINE RENAL     LABS     CBC Internal 05.15.2023   CMP Internal 05.15.2023   BMP Internal 06.19.2023   UA Internal 05.10.2023   URINE PROTEIN Internal 05.10.2023   RENAL PANEL     BIOPSY     KIDNEY BIOPSY

## 2023-06-21 NOTE — TELEPHONE ENCOUNTER
"Situation  Dr Vazquez was notified that patient was asked to stop his lasix.     Date: 6/21/2023    Time of Call: 10:19 AM     Diagnosis:  See last clinic notes from 6/15/2023     [ TORB ] Ordering provider: Dr Vazquez   Order: \"  I think he is probably dry and said to stop lasix too.  The other medications (metoprolol, lisinopril, aldactone, SGLT2i) he should maintain\"        Order received by: Heather Frank RN       Follow-up/additional notes: Recommendations reviewed with patient. Pt expressed understanding and agreeable with plan. Pt is planning to start taking his Spironolactone tomorrow. Scheduling notified to contact patient to arrange lab appointment in 2 weeks. Pt was asked to continue to keep track of his daily weight and to notify the clinic if any concerns arise.   Pt mailed back [facility] application, RN will monitor office mails.       "

## 2023-06-22 ENCOUNTER — HOSPITAL ENCOUNTER (OUTPATIENT)
Dept: CARDIOLOGY | Facility: CLINIC | Age: 68
Discharge: HOME | End: 2023-06-22
Attending: PHYSICIAN ASSISTANT | Admitting: PHYSICIAN ASSISTANT
Payer: COMMERCIAL

## 2023-06-22 DIAGNOSIS — R06.01 ORTHOPNEA: ICD-10-CM

## 2023-06-22 DIAGNOSIS — R53.83 FATIGUE, UNSPECIFIED TYPE: ICD-10-CM

## 2023-06-22 DIAGNOSIS — R06.02 SOB (SHORTNESS OF BREATH): ICD-10-CM

## 2023-06-22 LAB — LVEF ECHO: NORMAL

## 2023-06-22 PROCEDURE — 93306 TTE W/DOPPLER COMPLETE: CPT | Mod: 26 | Performed by: INTERNAL MEDICINE

## 2023-06-22 PROCEDURE — 93306 TTE W/DOPPLER COMPLETE: CPT

## 2023-06-26 ENCOUNTER — TELEPHONE (OUTPATIENT)
Dept: CARDIOLOGY | Facility: CLINIC | Age: 68
End: 2023-06-26

## 2023-06-26 NOTE — TELEPHONE ENCOUNTER
Received patient's AZ&ME form, placed on RN's desk to review.     DAHLIA Hamilton   Cardiology Team  [facility]

## 2023-06-27 NOTE — TELEPHONE ENCOUNTER
Az&ME application received and faxed to [phone]. Fax conformation received. Patient notified via [employer].

## 2023-07-05 ENCOUNTER — TELEPHONE (OUTPATIENT)
Dept: FAMILY MEDICINE | Facility: OTHER | Age: 68
End: 2023-07-05

## 2023-07-05 ENCOUNTER — LAB (OUTPATIENT)
Dept: LAB | Facility: OTHER | Age: 68
End: 2023-07-05
Payer: COMMERCIAL

## 2023-07-05 DIAGNOSIS — I50.9 ACUTE ON CHRONIC CONGESTIVE HEART FAILURE, UNSPECIFIED HEART FAILURE TYPE (H): ICD-10-CM

## 2023-07-05 LAB
ANION GAP SERPL CALCULATED.3IONS-SCNC: 9 MMOL/L (ref 7–15)
BUN SERPL-MCNC: 17.5 MG/DL (ref 8–23)
CALCIUM SERPL-MCNC: 9.1 MG/DL (ref 8.8–10.2)
CHLORIDE SERPL-SCNC: 106 MMOL/L (ref 98–107)
CREAT SERPL-MCNC: 1.36 MG/DL (ref 0.67–1.17)
DEPRECATED HCO3 PLAS-SCNC: 22 MMOL/L (ref 22–29)
GFR SERPL CREATININE-BSD FRML MDRD: 58 ML/MIN/1.73M2
GLUCOSE SERPL-MCNC: 111 MG/DL (ref 70–99)
POTASSIUM SERPL-SCNC: 4.8 MMOL/L (ref 3.4–5.3)
SODIUM SERPL-SCNC: 137 MMOL/L (ref 136–145)

## 2023-07-05 PROCEDURE — 36415 COLL VENOUS BLD VENIPUNCTURE: CPT

## 2023-07-05 PROCEDURE — 80048 BASIC METABOLIC PNL TOTAL CA: CPT

## 2023-07-05 NOTE — TELEPHONE ENCOUNTER
This was ordered by cardiology but kidney function is improving and electrolytes are normal.    Hamlet Roberts PA-C

## 2023-07-05 NOTE — TELEPHONE ENCOUNTER
Patient is calling for recent BMP results.     DREA SimonsN, RN, PHN  [place]  July 5, 2023

## 2023-07-07 ENCOUNTER — NURSE TRIAGE (OUTPATIENT)
Dept: CARDIOLOGY | Facility: CLINIC | Age: 68
End: 2023-07-07
Payer: COMMERCIAL

## 2023-07-07 NOTE — TELEPHONE ENCOUNTER
"Patient spoke to Triage stating since taking spironolactone 25mg per Dr. Vazquez he has been having some side effects. He has been taking the med for two weeks. Patient says he has been having headaches, nausea, weakness, tiredness. No C/O any rash or difficulty with breathing. Patient feels worried about dizziness. Routing to patient's care team with Dr. Vazquez for follow-up.    1. NAME of MEDICATION: \"What medicine are you calling about?\" Spironolactone 25mg.  2. QUESTION: \"What is your question?\" (e.g., double dose of medicine, side effect) Side effects.  3. PRESCRIBING HCP: \"Who prescribed it?\" Reason: if prescribed by specialist, call should be referred to that group. Patient says from Dr. Vazquez.  4. SYMPTOMS: \"Do you have any symptoms?\" Headaches, nausea, weakness, tiredness, dizziness.  5. SEVERITY: If symptoms are present, ask \"Are they mild, moderate or severe?\" Moderate.    "

## 2023-07-07 NOTE — TELEPHONE ENCOUNTER
Called and spoke to patient. He states that since starting the spironolactone two weeks ago he has noticed headaches, nausea, wekaness and dizziness. He gets these symptoms daily on and off. He has not checked his BP during the dizziness, but says he checks it BID and it runs 130's/80's.   His weight is 201 #  Denies swelling. Breathing is stable, has COPD which is worse during hot spells.   States he drinks water (up 64 oz ), but even that makes him nauseated.   Taking carvedilol 25 BID, lisinopril 20 mg BID, spironolactone 25 mg daily. Has stopped the lasix. Has not started the Farxiga yet due to cost.     He would also like Dr Vazquez to review his echocardiogram recently done, advised of result note from his PCP.   Will route to Dr Vazquez and call patient back with recommendations.     Wendi Kaiser RN  Cardiology Care Coordinator  [facility]  Phone: [phone]

## 2023-07-10 DIAGNOSIS — I10 HTN, GOAL BELOW 140/90: ICD-10-CM

## 2023-07-10 NOTE — TELEPHONE ENCOUNTER
Marion Vazquez MD Balcome, Debbie A, RN  Cc: P [facility] Cardiology Adult [place]  Caller: Unspecified (3 days ago,  1:51 PM)  His echo actually looks very normal, which is reassuring.   Can stop spironolactone and see how he feels.   If no fluid retention issues, fine with him being off of it   He also had slight CANDACE when starting it   If fluid issues return again, then call us and we will probably try resuming it.               Called and relayed information to patient. Patient stated he did stop the spironolactone and feels much better. Patient reports no issues with fluid retention at this time. Informed patient to notify clinic if he starts having fluid issues again. Patient verbalized understanding.    Christina Negro RN, BSN  Cardiology RN Care Coordinator   [place]   Phone: [phone]  Fax: [phone] ([place]) [phone] ([place])

## 2023-07-11 RX ORDER — CARVEDILOL 25 MG/1
25 TABLET ORAL 2 TIMES DAILY WITH MEALS
Qty: 180 TABLET | Refills: 0 | Status: SHIPPED | OUTPATIENT
Start: 2023-07-11 | End: 2023-11-13

## 2023-07-12 ENCOUNTER — TELEPHONE (OUTPATIENT)
Dept: FAMILY MEDICINE | Facility: OTHER | Age: 68
End: 2023-07-12
Payer: COMMERCIAL

## 2023-07-12 NOTE — TELEPHONE ENCOUNTER
"Spoke to Bucky and read back verbatim what Dr Shea his cardiologist wrote as stated below:    \" Its quite unlikely that farxiga worked that fast to cause those symptoms.   In the trials the only adverse event different than the placebo was fungal urinary infections   My take would be hold the medication until he feels better, than restart and see if symptoms return.   Takes about 5 days for it to reach steady state in the system.  If symptoms are still present after a week or two on the medication, then I think its probably related.\"    Patient understood what was read to him and had no further questions or concerns.    Era Rush RN  [facility] ~ Registered Nurse  Clinic Triage ~ [place] [facility] [place]  July 12, 2023    "

## 2023-07-12 NOTE — TELEPHONE ENCOUNTER
"Farxiga prescribed 3 weeks ago and needed  for this medication.    He just received this medication yesterday and started the medication today and started having the dizziness, headaches and stomach pain soon after taking this.    He states, \"I was feeling better all week until I took the Farxiga today.\"    Please advise on next steps.    Era Rush RN  [facility] ~ Registered Nurse  Clinic Triage ~ [place] [facility] [place]  July 12, 2023    "

## 2023-07-18 ENCOUNTER — NURSE TRIAGE (OUTPATIENT)
Dept: FAMILY MEDICINE | Facility: OTHER | Age: 68
End: 2023-07-18
Payer: COMMERCIAL

## 2023-07-18 NOTE — TELEPHONE ENCOUNTER
SITUATION: FYI to PCP    Was informed to take dietary medications from Cardiology. Saw .     1. Furosemide 20 mg  2. Spirolactone 25 mg  2. Farxiga 10 mg once daily - Stopped taking a week ago.    Told to stop taking medications because kidney function was going down.   He wasn't feeling well. He felt dizzy, weak, blurry vision, headaches, and nausea.     Currently feeling slightly nauseated and dizzy. However, he can eat again.     79/59 Pulse 78  Normal 120s/80    BACKGROUND:   Last saw cardiology on 06/15/23.     HOME TREATMENTS:  Nothing    HEALTH HISTORY:  HX of CHF    NURSE RECOMMENDATION:       PLAN:   Patient was advised to go into the ER.     Routed to provider    Does the patient meet one of the following criteria for ADS visit consideration? No     RECOMMENDED DISPOSITION:  To ED/UC for evaluation, another person to drive - Patient is going to head into [place].   Will comply with recommendation: yes   If further questions/concerns or if Sx do not improve, worsen or new Sx develop, call your PCP or [place] [facility] as soon as possible.    NOTES:  Disposition was determined by the first positive assessment question, therefore all previous assessment questions were negative.       DREA SimonsN, RN, PHN  [place]  July 18, 2023    Reason for Disposition    Systolic BP < 80 and NOT dizzy, lightheaded or weak (feels normal)    Additional Information    Negative: Systolic BP < 90 and feeling dizzy, lightheaded, or weak    Negative: Started suddenly after an allergic medicine, an allergic food, or bee sting    Negative: Shock suspected (e.g., cold/pale/clammy skin, too weak to stand, low BP, rapid pulse)    Negative: Difficult to awaken or acting confused (e.g., disoriented, slurred speech)    Negative: Fainted    Negative: Chest pain    Negative: Bleeding (e.g., vomiting blood, rectal bleeding or tarry stools, severe vaginal bleeding)    Negative: Extra heart  beats or heart is beating fast (i.e., 'palpitations')    Negative: Sounds like a life-threatening emergency to the triager    Protocols used: BLOOD PRESSURE - LOW-A-OH

## 2023-07-26 ENCOUNTER — VIRTUAL VISIT (OUTPATIENT)
Dept: FAMILY MEDICINE | Facility: OTHER | Age: 68
End: 2023-07-26
Payer: COMMERCIAL

## 2023-07-26 ENCOUNTER — LAB (OUTPATIENT)
Dept: FAMILY MEDICINE | Facility: OTHER | Age: 68
End: 2023-07-26

## 2023-07-26 DIAGNOSIS — Z12.11 COLON CANCER SCREENING: ICD-10-CM

## 2023-07-26 DIAGNOSIS — I95.2 HYPOTENSION DUE TO DRUGS: Primary | ICD-10-CM

## 2023-07-26 DIAGNOSIS — R06.83 SNORING: ICD-10-CM

## 2023-07-26 DIAGNOSIS — I10 HTN, GOAL BELOW 140/90: ICD-10-CM

## 2023-07-26 DIAGNOSIS — R42 LIGHTHEADEDNESS: ICD-10-CM

## 2023-07-26 DIAGNOSIS — I50.9 ACUTE ON CHRONIC CONGESTIVE HEART FAILURE, UNSPECIFIED HEART FAILURE TYPE (H): ICD-10-CM

## 2023-07-26 PROCEDURE — 99214 OFFICE O/P EST MOD 30 MIN: CPT | Mod: 95 | Performed by: PHYSICIAN ASSISTANT

## 2023-07-26 NOTE — Clinical Note
Please reschedule his cardiology visit in September with a cardiology provider in [place] instead like Dr. Still as he does not want to see the other provider again.  Hamlet Roberts PA-C

## 2023-07-26 NOTE — PROGRESS NOTES
Bucky is a 67 year old who is being evaluated via a billable telephone visit.      What phone number would you like to be contacted at? [phone]  How would you like to obtain your AVS? Mail a copy    Distant Location (provider location):  Off-site    Assessment & Plan       ICD-10-CM    1. Hypotension due to drugs  I95.2       2. Lightheadedness  R42       3. HTN, goal below 140/90  I10 Adult Sleep Eval & Management  Referral      4. Acute on chronic congestive heart failure, unspecified heart failure type (H)  I50.9       5. Snoring  R06.83 Adult Sleep Eval & Management  Referral      6. Colon cancer screening  Z12.11 COLOGUAANGELO([employer])          1-4. Hypotensive home blood pressure readings with symptoms of headaches, lightheadedness and stomach upset over the past month or so since he started the Farxiga and spironolactone. He has now been off of these for 2 weeks and has been off furosemide and hydralazine for the past week. Pressures and symptoms are starting to improve. He has two fairly new home cuffs and they both have very similar readings so he feels the readings are accurate. Will not make any other medication changes at this time. However, if he starts to go below 100/60 again, he can take half the dose of lisinopril for a few days. I recommend he keep furosemide on hand to use if he has increase leg swelling/weight gain but overall, his home weights have been stable around 198. He has a follow-up with cardiology in September but prefers to see a different provider so will get him set up with a provider in [place]. He will contact the clinic with any new or worsening symptoms or if he has further questions. He is in agreement with this plan.     5. He denies receiving a call from sleep medicine after cardiology recommended a sleep study given his snoring, hypertension and body habitus so I discussed the importance of this and placed a new referral.     6. New Cologmary ellen  screening ordered.        LISA Osuna [facility] AZALEA Lawler is a 67 year old, presenting for the following health issues:  Hypotension        5/15/2023     9:39 AM   Additional Questions   Roomed by Janeth     History of Present Illness       Hypertension: He presents for follow up of hypertension.  He does check blood pressure  regularly outside of the clinic. Outside blood pressures have been over 140/90. He follows a low salt diet.       He was started on Farxiga and spironolactone last month per cardiology but states his blood pressures started to go low on these medications and he noticed headaches, nausea, lightheadedness and stomach upset. He has since stopped both of these but was still getting low blood pressures, last week as low as 60/40 per his home cuff so he was seen in the ED last Tuesday in [place]. He had already stopped his Lasix as well and he states they told him to hold his hydralazine until pressures were improved so he stopped this 6 days ago as well. His blood pressures have been improving since then and he states this morning it was 118/70. It has not been below 90 systolic in at least 3 days. There are still instances where he feels lightheaded when he is standing but this is happening less frequently. His stomach upset is also improving. His home weight is stable at 198 and his breathing has been great.      ED Summary [place] ED 7/18    MDM:   Bucky Edgar is a 67 y.o. male who presents to the emergency department for evaluation of low blood pressure. The differential diagnosis that I considered included hypotension related to his medications, dehydration, sepsis, electrolyte abnormality. The patient notes associated lightheadedness, but denies syncope. He has not had any fevers, chills or acute infectious symptoms to suggest sepsis. His basic metabolic panel showed no evidence of metabolic acidosis. His EKG showed no acute  ischemic changes. His creatinine was normal, improved from previous. His CBC was within normal limits with a white count of 10.9. His BNP returned minimally elevated and he denied any shortness of breath.His alkaline phosphatase was minimally elevated but the rest of his LFTs were normal. Orthostatic vital signs were performed and he did not have a change in his blood pressure or heart rate from lying to standing. The patient was monitored in the emergency department for 3 hours. He was never hypotensive or tachycardic. I spoke with the [place] cardiologist on-call and she reviewed the patient's previous records. She recommended not changing any of his current medications because he was never hypotensive here in the emergency department. She recommended that he have his home blood pressure monitor calibrated. She thought he would also benefit from an ambulatory blood pressure monitoring and agreed to send a message to his cardiologist. Patient was ambulatory and asymptomatic at the time of discharge.     Review of Systems   Constitutional, HEENT, cardiovascular, pulmonary, gi and gu systems are negative, except as otherwise noted.      Objective    Vitals - Patient Reported  Systolic (Patient Reported): 99  Diastolic (Patient Reported): 70  Blood pressure taken with manual cuff (will exclude from quality measure): Yes      Vitals:  No vitals were obtained today due to virtual visit.    Physical Exam   healthy, alert, and no distress  PSYCH: Alert and oriented times 3; coherent speech, normal   rate and volume, able to articulate logical thoughts, able   to abstract reason, no tangential thoughts, no hallucinations   or delusions  His affect is normal  RESP: No cough, no audible wheezing, able to talk in full sentences  Remainder of exam unable to be completed due to telephone visits            Phone call duration: 13 minutes

## 2023-07-26 NOTE — PATIENT INSTRUCTIONS
Continue current medications.    If blood pressure starts to go below 100/60 again, you can cut the lisinopril in half for a few days.    Monitor home weights closely and keep furosemide on hand to use as needed for increased weight or leg swelling.    I will have you see a new cardiologist.     A new Cologuard kit will be sent.

## 2023-08-04 ENCOUNTER — TELEPHONE (OUTPATIENT)
Dept: FAMILY MEDICINE | Facility: OTHER | Age: 68
End: 2023-08-04
Payer: COMMERCIAL

## 2023-08-04 NOTE — TELEPHONE ENCOUNTER
Patient returned call, transferred to [facility] to schedule with Cardiology.     Closing encounter.

## 2023-08-04 NOTE — TELEPHONE ENCOUNTER
Ismael would like to talk to Gilberto Roberts before scheduling with cardiology in [place]. He said Gilberto mentioned the name of a cardiologist he liked but Ismael couldn't remember his name. Please call Ismael.

## 2023-08-04 NOTE — TELEPHONE ENCOUNTER
Pt came in and was asking about the referral you were suppose to send for Cardiology. Seen a provider over in [place] which he did not like and would to see someone in [place] in cardiology which you referred someone but no one has got a hold of him to schedule and it was the same time you put the referral in for a sleep study.I will call pt and let them know the number to call for scheduling.

## 2023-08-04 NOTE — TELEPHONE ENCOUNTER
Called patient and could not leave a VM. Please give patient the number for scheduling [phone].

## 2023-09-07 DIAGNOSIS — I10 HTN, GOAL BELOW 140/90: ICD-10-CM

## 2023-09-07 RX ORDER — LISINOPRIL 20 MG/1
20 TABLET ORAL 2 TIMES DAILY
Qty: 180 TABLET | Refills: 0 | Status: SHIPPED | OUTPATIENT
Start: 2023-09-07 | End: 2023-12-06

## 2023-09-12 ENCOUNTER — LAB (OUTPATIENT)
Dept: LAB | Facility: OTHER | Age: 68
End: 2023-09-12
Payer: COMMERCIAL

## 2023-09-12 DIAGNOSIS — N18.2 CHRONIC KIDNEY DISEASE, STAGE II (MILD): ICD-10-CM

## 2023-09-12 DIAGNOSIS — M1A.09X0 IDIOPATHIC CHRONIC GOUT OF MULTIPLE SITES WITHOUT TOPHUS: ICD-10-CM

## 2023-09-12 LAB
ALBUMIN MFR UR ELPH: 28.3 MG/DL
ALBUMIN SERPL BCG-MCNC: 3.7 G/DL (ref 3.5–5.2)
ALBUMIN UR-MCNC: 30 MG/DL
ANION GAP SERPL CALCULATED.3IONS-SCNC: 10 MMOL/L (ref 7–15)
APPEARANCE UR: CLEAR
BACTERIA #/AREA URNS HPF: ABNORMAL /HPF
BILIRUB UR QL STRIP: ABNORMAL
BUN SERPL-MCNC: 17.3 MG/DL (ref 8–23)
CALCIUM SERPL-MCNC: 9 MG/DL (ref 8.8–10.2)
CHLORIDE SERPL-SCNC: 102 MMOL/L (ref 98–107)
COLOR UR AUTO: YELLOW
CREAT SERPL-MCNC: 1.06 MG/DL (ref 0.67–1.17)
CREAT UR-MCNC: 293.6 MG/DL
CREAT UR-MCNC: 293.6 MG/DL
DEPRECATED HCO3 PLAS-SCNC: 28 MMOL/L (ref 22–29)
EGFRCR SERPLBLD CKD-EPI 2021: 77 ML/MIN/1.73M2
GLUCOSE SERPL-MCNC: 102 MG/DL (ref 70–99)
GLUCOSE UR STRIP-MCNC: NEGATIVE MG/DL
HGB BLD-MCNC: 16.5 G/DL (ref 13.3–17.7)
HGB UR QL STRIP: NEGATIVE
KETONES UR STRIP-MCNC: NEGATIVE MG/DL
LEUKOCYTE ESTERASE UR QL STRIP: NEGATIVE
MICROALBUMIN UR-MCNC: 14 MG/L
MICROALBUMIN/CREAT UR: 4.77 MG/G CR (ref 0–17)
MUCOUS THREADS #/AREA URNS LPF: PRESENT /LPF
NITRATE UR QL: NEGATIVE
PH UR STRIP: 5 [PH] (ref 5–7)
PHOSPHATE SERPL-MCNC: 2.8 MG/DL (ref 2.5–4.5)
POTASSIUM SERPL-SCNC: 4.1 MMOL/L (ref 3.4–5.3)
PROT/CREAT 24H UR: 0.1 MG/MG CR (ref 0–0.2)
RBC #/AREA URNS AUTO: ABNORMAL /HPF
SODIUM SERPL-SCNC: 140 MMOL/L (ref 136–145)
SP GR UR STRIP: 1.02 (ref 1–1.03)
SQUAMOUS #/AREA URNS AUTO: ABNORMAL /LPF
URATE SERPL-MCNC: 4.8 MG/DL (ref 3.4–7)
UROBILINOGEN UR STRIP-ACNC: 0.2 E.U./DL
WBC #/AREA URNS AUTO: ABNORMAL /HPF

## 2023-09-12 PROCEDURE — 36415 COLL VENOUS BLD VENIPUNCTURE: CPT

## 2023-09-12 PROCEDURE — 85018 HEMOGLOBIN: CPT

## 2023-09-12 PROCEDURE — 82043 UR ALBUMIN QUANTITATIVE: CPT

## 2023-09-12 PROCEDURE — 84156 ASSAY OF PROTEIN URINE: CPT

## 2023-09-12 PROCEDURE — 82570 ASSAY OF URINE CREATININE: CPT

## 2023-09-12 PROCEDURE — 80069 RENAL FUNCTION PANEL: CPT

## 2023-09-12 PROCEDURE — 84550 ASSAY OF BLOOD/URIC ACID: CPT

## 2023-09-12 PROCEDURE — 81001 URINALYSIS AUTO W/SCOPE: CPT

## 2023-09-14 ENCOUNTER — OFFICE VISIT (OUTPATIENT)
Dept: CARDIOLOGY | Facility: CLINIC | Age: 68
End: 2023-09-14
Attending: PHYSICIAN ASSISTANT
Payer: COMMERCIAL

## 2023-09-14 VITALS
DIASTOLIC BLOOD PRESSURE: 64 MMHG | SYSTOLIC BLOOD PRESSURE: 102 MMHG | RESPIRATION RATE: 18 BRPM | WEIGHT: 204.7 LBS | OXYGEN SATURATION: 97 % | HEIGHT: 69 IN | HEART RATE: 71 BPM | BODY MASS INDEX: 30.32 KG/M2

## 2023-09-14 DIAGNOSIS — I50.9 ACUTE ON CHRONIC CONGESTIVE HEART FAILURE, UNSPECIFIED HEART FAILURE TYPE (H): ICD-10-CM

## 2023-09-14 DIAGNOSIS — I27.20 PULMONARY HYPERTENSION (H): ICD-10-CM

## 2023-09-14 PROCEDURE — 99214 OFFICE O/P EST MOD 30 MIN: CPT | Performed by: INTERNAL MEDICINE

## 2023-09-14 ASSESSMENT — PAIN SCALES - GENERAL
PAINLEVEL: SEVERE PAIN (6)
PAINLEVEL: NO PAIN (0)

## 2023-09-14 NOTE — LETTER
9/14/2023    Hamlet Roberts PA-C  [address]  [place] [address]    RE: Bucky CHRISTINE Edgar       Dear Colleague,     I had the pleasure of seeing Bucky Edgar in the [facility].  HISTORY:    Bucky Edgar is a pleasant 68-year-old gentleman with a past medical history of hypertension, dyslipidemia, peripheral vascular disease, and COPD due to a 100-pack-year smoking history.  He presented to [facility] in mid June with complaints of progressive dyspnea and was found to have heart failure.  He was started on furosemide 20 mg daily with excellent results.  He lost about 20 pounds of weight and felt that his breathing and stamina improved considerably.  He was seen by Dr. Marion Vazquez in [place] and was started on Farxiga and spironolactone.  He is here for follow-up of that visit.    Bucky reports that within a day or 2 of starting his new medications he became dizzy nauseated and had a lot of diarrhea.  He stopped both of his medications.  He applied for patient assistance on the Farxiga, but the cost still amounted to $100 a month which is more than he can afford.  Also of note, he developed renal insufficiency from his diuresis and has cut back considerably on his dose of Lasix, currently using the medication about every 2 weeks for a day or 2 when his weight goes up 204 or more.  He then stays on this medication until his weight goes below 200 and stops it.  He thinks he probably uses approximately 1 tablet/week.    Bucky feels that he is back to baseline.  He has not been having problems with PND or orthopnea, dyspnea on exertion, syncope or near syncope, chest pain, or peripheral edema.  He denies cardiovascular symptoms.  Does have a lot of back pain which limits his ability to exercise.  He has a great deal of difficulty walking up a flight of steps not only because of dyspnea but more importantly because of severe back pain.  He takes his Lasix  when his weight approaches 204 pounds, but he does not notice any difference in his breathing between his lower weights and his higher weights.    The echocardiogram was done showed that he had a normal ejection fraction and normal diastology was reported, but this study was done when he was profoundly diuresed.      ASSESSMENT/PLAN:    1.  HFpEF.  This patient came in with clear signs of volume overload to [facility] and was started on Lasix.  He did not tolerate 20 mg daily with significant deterioration in renal function but he is now using Lasix approximately 1 tablet a week on average with good control of his weight and resolution of his symptoms of edema and dyspnea (at least back to his baseline).  He had side effects related started on Farxiga and spironolactone, unclear which of these 2 medications might be causing the side effects but they have now resolved.  He cannot afford the Farxiga, even with patient assistance.  Spironolactone benefits are probably marginal in this situation.  For this reason I will leave well enough alone and continue his current medical regimen.  I did suggest that he try just using his Lasix automatically once or twice a week to see if he could maintain his weight at a stable level.  His renal function has normalized now that he is on his lower dose of diuretics.  2.  Hypertension.  Very well controlled using carvedilol 25 mg twice daily and lisinopril 20 mg twice daily, continue same.  3.  Hyperlipidemia.  Also very well controlled using atorvastatin 10 mg daily with last LDL measured at just 38.  Continue same.    Thank you for inviting me to participate in the care of your patient.  Please don't hesitate to call if I can be of further assistance.  38 minutes were spent today reviewing the chart and other records, interviewing and examining the patient, and documenting our visit.  A 6-month follow-up visit will be planned.    Chart documentation was completed, in part, with  Dragon voice-recognition software. Even though reviewed, some grammatical, spelling, and word errors may remain.       Orders Placed This Encounter   Procedures    Follow-Up with Cardiology ROBBY     No orders of the defined types were placed in this encounter.    There are no discontinued medications.    10 year ASCVD risk: The ASCVD Risk score (Brandon PIERSON, et al., 2019) failed to calculate for the following reasons:    The valid total cholesterol range is 130 to 320 mg/dL    Encounter Diagnoses   Name Primary?    Acute on chronic congestive heart failure, unspecified heart failure type (H)     Pulmonary hypertension (H)        CURRENT MEDICATIONS:  Current Outpatient Medications   Medication Sig Dispense Refill    acetaminophen (TYLENOL) 325 MG tablet Take 650 mg by mouth every 6 hours       albuterol (PROAIR HFA/PROVENTIL HFA/VENTOLIN HFA) 108 (90 Base) MCG/ACT inhaler Inhale 2 puffs into the lungs every 6 hours as needed for shortness of breath / dyspnea 18 g 0    allopurinol (ZYLOPRIM) 300 MG tablet Take 1 tablet (300 mg) by mouth daily 90 tablet 2    aspirin (ASA) 81 MG EC tablet Take 1 tablet (81 mg) by mouth daily      atorvastatin (LIPITOR) 10 MG tablet Take 1 tablet (10 mg) by mouth daily 90 tablet 0    carvedilol (COREG) 25 MG tablet Take 1 tablet (25 mg) by mouth 2 times daily (with meals) 180 tablet 0    furosemide (LASIX) 20 MG tablet Take 1 tablet (20 mg) by mouth daily 30 tablet 5    lisinopril (ZESTRIL) 20 MG tablet Take 1 tablet (20 mg) by mouth 2 times daily 180 tablet 0    nicotine (NICORETTE) 4 MG lozenge Place 4 mg inside cheek as needed         ALLERGIES     Allergies   Allergen Reactions    Chlorthalidone Other (See Comments)     Excessive lowering of blood pressure    Amlodipine Other (See Comments)     Intractable headache with use of medication as well as noting a small tremor of the upper extremities       PAST MEDICAL HISTORY:  Past Medical History:   Diagnosis Date    COPD (chronic  obstructive pulmonary disease) (H)     Displacement of lumbar intervertebral disc without myelopathy ,     HTN, goal below 140/90        PAST SURGICAL HISTORY:  Past Surgical History:   Procedure Laterality Date    HC REPAIR ROTATOR CUFF,CHRONIC      IR LOWER EXTREMITY VENOGRAM LEFT  2021    IR VENOUS STENT  2021    ZZC DECOMPRESS SPINAL CORD,1 SEG  , 2002    leftL4-L5, 2nd right L4-L5    ZZC SPINE FUSION,ANTER,3 SGMTS  2004    ant and post fusion L4-S1       FAMILY HISTORY:  Family History   Problem Relation Age of Onset    Family History Negative Other     Diabetes No family hx of     Coronary Artery Disease No family hx of     Hypertension No family hx of     Hyperlipidemia No family hx of     Breast Cancer No family hx of     Prostate Cancer No family hx of     Anxiety Disorder No family hx of     Substance Abuse No family hx of     Asthma No family hx of     Thyroid Disease No family hx of     Unknown/Adopted No family hx of     Genetic Disorder No family hx of     Osteoporosis No family hx of     Anesthesia Reaction No family hx of     Mental Illness No family hx of     Depression No family hx of     Other Cancer No family hx of     Colon Cancer No family hx of     Cerebrovascular Disease No family hx of     Obesity No family hx of        SOCIAL HISTORY:  Social History     Socioeconomic History    Marital status:      Spouse name: None    Number of children: None    Years of education: None    Highest education level: None   Tobacco Use    Smoking status: Former     Packs/day: 1.25     Years: 41.00     Pack years: 51.25     Types: Cigarettes     Quit date: 10/1/2017     Years since quittin.9    Smokeless tobacco: Never   Vaping Use    Vaping Use: Never used   Substance and Sexual Activity    Alcohol use: Yes     Alcohol/week: 0.0 standard drinks of alcohol     Comment: 12 pack of beer every 2 weeks    Drug use: No    Sexual activity: Not Currently     Partners: Female  "  Other Topics Concern    Parent/sibling w/ CABG, MI or angioplasty before 65F 55M? No       Review of Systems:  Skin:        Eyes:  Positive for glasses  ENT:       Respiratory:  Positive for shortness of breath;dyspnea on exertion;cough;wheezing  Cardiovascular:  Negative;palpitations;chest pain;lightheadedness;dizziness;syncope or near-syncope Positive for;heaviness;edema  Gastroenterology:      Genitourinary:       Musculoskeletal:       Neurologic:  Positive for numbness or tingling of hands;numbness or tingling of feet  Psychiatric:       Heme/Lymph/Imm:  Positive for allergies  Endocrine:         Physical Exam:  Vitals: /64 (BP Location: Left arm, Patient Position: Sitting, Cuff Size: Adult Regular)   Pulse 71   Resp 18   Ht 1.753 m (5' 9\")   Wt 92.9 kg (204 lb 11.2 oz)   SpO2 97%   BMI 30.23 kg/m      Constitutional:  cooperative, alert and oriented, well developed, well nourished, in no acute distress        Skin:  warm and dry to the touch, no apparent skin lesions or masses noted        Head:  normocephalic, no masses or lesions        Eyes:  pupils equal and round, conjunctivae and lids unremarkable, sclera white, no xanthalasma, EOMS intact, no nystagmus        ENT:  no pallor or cyanosis poor dentition      Neck:  carotid pulses are full and equal bilaterally, JVP normal, no carotid bruit        Chest:      Diffuse mild expiratory wheezes    Cardiac: regular rhythm;normal S1 and S2;no S3 or S4;apical impulse not displaced         systolic murmur;grade 1;apical        Abdomen:  abdomen soft;BS normoactive        Vascular: pulses full and equal, no bruits auscultated                                      Extremities and Muscular Skeletal:  no edema           Neurological:  no gross motor deficits        Psych:  affect appropriate, oriented to time, person and place     Recent Lab Results:  LIPID RESULTS:  Lab Results   Component Value Date    CHOL 120 05/15/2023    CHOL 155 05/24/2021    HDL " 72 05/15/2023    HDL 51 05/24/2021    LDL 38 05/15/2023    LDL 74 05/24/2021    TRIG 52 05/15/2023    TRIG 151 (H) 05/24/2021    CHOLHDLRATIO 4.4 11/12/2015       LIVER ENZYME RESULTS:  Lab Results   Component Value Date    AST 18 05/15/2023    AST 18 05/24/2021    ALT 12 05/15/2023    ALT 28 05/24/2021       CBC RESULTS:  Lab Results   Component Value Date    WBC 8.1 05/15/2023    WBC 8.8 05/24/2021    RBC 4.95 05/15/2023    RBC 5.18 05/24/2021    HGB 16.5 09/12/2023    HGB 16.0 05/24/2021    HCT 48.4 05/15/2023    HCT 48.8 05/24/2021    MCV 98 05/15/2023    MCV 94 05/24/2021    MCH 31.3 05/15/2023    MCH 30.9 05/24/2021    MCHC 32.0 05/15/2023    MCHC 32.8 05/24/2021    RDW 16.2 (H) 05/15/2023    RDW 15.8 (H) 05/24/2021     05/15/2023     05/24/2021       BMP RESULTS:  Lab Results   Component Value Date     09/12/2023     05/24/2021    POTASSIUM 4.1 09/12/2023    POTASSIUM 4.3 05/31/2022    POTASSIUM 4.4 05/24/2021    CHLORIDE 102 09/12/2023    CHLORIDE 114 (H) 05/31/2022    CHLORIDE 110 (H) 05/24/2021    CO2 28 09/12/2023    CO2 26 05/31/2022    CO2 25 05/24/2021    ANIONGAP 10 09/12/2023    ANIONGAP 4 05/31/2022    ANIONGAP 5 05/24/2021     (H) 09/12/2023    GLC 95 05/31/2022     (H) 05/24/2021    BUN 17.3 09/12/2023    BUN 14 05/31/2022    BUN 13 05/24/2021    CR 1.06 09/12/2023    CR 0.95 05/24/2021    GFRESTIMATED 77 09/12/2023    GFRESTIMATED 84 05/24/2021    GFRESTBLACK >90 05/24/2021    JUSTIN 9.0 09/12/2023    JUSTIN 8.5 05/24/2021        A1C RESULTS:  Lab Results   Component Value Date    A1C 5.3 05/08/2019       INR RESULTS:  Lab Results   Component Value Date    INR 0.92 05/04/2021    INR 2.4 (H) 05/01/2020    INR 2.2 (H) 04/17/2020    INR 1.47 (H) 04/07/2020         Jaylen Still MD, FACC    CC  Hamlet Roberts, PA-C  [address]  [place] [address]                    Thank you for allowing me to participate in the care of your  patient.      Sincerely,     Jaylen Still MD     [facility]

## 2023-09-14 NOTE — PROGRESS NOTES
HISTORY:    Bucky Edgar is a pleasant 68-year-old gentleman with a past medical history of hypertension, dyslipidemia, peripheral vascular disease, and COPD due to a 100-pack-year smoking history.  He presented to [facility] in mid June with complaints of progressive dyspnea and was found to have heart failure.  He was started on furosemide 20 mg daily with excellent results.  He lost about 20 pounds of weight and felt that his breathing and stamina improved considerably.  He was seen by Dr. Marion Vazquez in [place] and was started on Farxiga and spironolactone.  He is here for follow-up of that visit.    Bucky reports that within a day or 2 of starting his new medications he became dizzy nauseated and had a lot of diarrhea.  He stopped both of his medications.  He applied for patient assistance on the Farxiga, but the cost still amounted to $100 a month which is more than he can afford.  Also of note, he developed renal insufficiency from his diuresis and has cut back considerably on his dose of Lasix, currently using the medication about every 2 weeks for a day or 2 when his weight goes up 204 or more.  He then stays on this medication until his weight goes below 200 and stops it.  He thinks he probably uses approximately 1 tablet/week.    Bucky feels that he is back to baseline.  He has not been having problems with PND or orthopnea, dyspnea on exertion, syncope or near syncope, chest pain, or peripheral edema.  He denies cardiovascular symptoms.  Does have a lot of back pain which limits his ability to exercise.  He has a great deal of difficulty walking up a flight of steps not only because of dyspnea but more importantly because of severe back pain.  He takes his Lasix when his weight approaches 204 pounds, but he does not notice any difference in his breathing between his lower weights and his higher weights.    The echocardiogram was done showed that he had a normal ejection  fraction and normal diastology was reported, but this study was done when he was profoundly diuresed.      ASSESSMENT/PLAN:    1.  HFpEF.  This patient came in with clear signs of volume overload to [facility] and was started on Lasix.  He did not tolerate 20 mg daily with significant deterioration in renal function but he is now using Lasix approximately 1 tablet a week on average with good control of his weight and resolution of his symptoms of edema and dyspnea (at least back to his baseline).  He had side effects related started on Farxiga and spironolactone, unclear which of these 2 medications might be causing the side effects but they have now resolved.  He cannot afford the Farxiga, even with patient assistance.  Spironolactone benefits are probably marginal in this situation.  For this reason I will leave well enough alone and continue his current medical regimen.  I did suggest that he try just using his Lasix automatically once or twice a week to see if he could maintain his weight at a stable level.  His renal function has normalized now that he is on his lower dose of diuretics.  2.  Hypertension.  Very well controlled using carvedilol 25 mg twice daily and lisinopril 20 mg twice daily, continue same.  3.  Hyperlipidemia.  Also very well controlled using atorvastatin 10 mg daily with last LDL measured at just 38.  Continue same.    Thank you for inviting me to participate in the care of your patient.  Please don't hesitate to call if I can be of further assistance.  38 minutes were spent today reviewing the chart and other records, interviewing and examining the patient, and documenting our visit.  A 6-month follow-up visit will be planned.    Chart documentation was completed, in part, with [employer] voice-recognition software. Even though reviewed, some grammatical, spelling, and word errors may remain.       Orders Placed This Encounter   Procedures    Follow-Up with Cardiology ROBBY     No orders of  the defined types were placed in this encounter.    There are no discontinued medications.    10 year ASCVD risk: The ASCVD Risk score (Brandon PIERSON, et al., 2019) failed to calculate for the following reasons:    The valid total cholesterol range is 130 to 320 mg/dL    Encounter Diagnoses   Name Primary?    Acute on chronic congestive heart failure, unspecified heart failure type (H)     Pulmonary hypertension (H)        CURRENT MEDICATIONS:  Current Outpatient Medications   Medication Sig Dispense Refill    acetaminophen (TYLENOL) 325 MG tablet Take 650 mg by mouth every 6 hours       albuterol (PROAIR HFA/PROVENTIL HFA/VENTOLIN HFA) 108 (90 Base) MCG/ACT inhaler Inhale 2 puffs into the lungs every 6 hours as needed for shortness of breath / dyspnea 18 g 0    allopurinol (ZYLOPRIM) 300 MG tablet Take 1 tablet (300 mg) by mouth daily 90 tablet 2    aspirin (ASA) 81 MG EC tablet Take 1 tablet (81 mg) by mouth daily      atorvastatin (LIPITOR) 10 MG tablet Take 1 tablet (10 mg) by mouth daily 90 tablet 0    carvedilol (COREG) 25 MG tablet Take 1 tablet (25 mg) by mouth 2 times daily (with meals) 180 tablet 0    furosemide (LASIX) 20 MG tablet Take 1 tablet (20 mg) by mouth daily 30 tablet 5    lisinopril (ZESTRIL) 20 MG tablet Take 1 tablet (20 mg) by mouth 2 times daily 180 tablet 0    nicotine (NICORETTE) 4 MG lozenge Place 4 mg inside cheek as needed         ALLERGIES     Allergies   Allergen Reactions    Chlorthalidone Other (See Comments)     Excessive lowering of blood pressure    Amlodipine Other (See Comments)     Intractable headache with use of medication as well as noting a small tremor of the upper extremities       PAST MEDICAL HISTORY:  Past Medical History:   Diagnosis Date    COPD (chronic obstructive pulmonary disease) (H)     Displacement of lumbar intervertebral disc without myelopathy 1995, 2002    HTN, goal below 140/90        PAST SURGICAL HISTORY:  Past Surgical History:   Procedure Laterality  Date    HC REPAIR ROTATOR CUFF,CHRONIC      IR LOWER EXTREMITY VENOGRAM LEFT  2021    IR VENOUS STENT  2021    ZZC DECOMPRESS SPINAL CORD,1 SEG  , 2002    leftL4-L5, 2nd right L4-L5    ZZC SPINE FUSION,ANTER,3 SGMTS  2004    ant and post fusion L4-S1       FAMILY HISTORY:  Family History   Problem Relation Age of Onset    Family History Negative Other     Diabetes No family hx of     Coronary Artery Disease No family hx of     Hypertension No family hx of     Hyperlipidemia No family hx of     Breast Cancer No family hx of     Prostate Cancer No family hx of     Anxiety Disorder No family hx of     Substance Abuse No family hx of     Asthma No family hx of     Thyroid Disease No family hx of     Unknown/Adopted No family hx of     Genetic Disorder No family hx of     Osteoporosis No family hx of     Anesthesia Reaction No family hx of     Mental Illness No family hx of     Depression No family hx of     Other Cancer No family hx of     Colon Cancer No family hx of     Cerebrovascular Disease No family hx of     Obesity No family hx of        SOCIAL HISTORY:  Social History     Socioeconomic History    Marital status:      Spouse name: None    Number of children: None    Years of education: None    Highest education level: None   Tobacco Use    Smoking status: Former     Packs/day: 1.25     Years: 41.00     Pack years: 51.25     Types: Cigarettes     Quit date: 10/1/2017     Years since quittin.9    Smokeless tobacco: Never   Vaping Use    Vaping Use: Never used   Substance and Sexual Activity    Alcohol use: Yes     Alcohol/week: 0.0 standard drinks of alcohol     Comment: 12 pack of beer every 2 weeks    Drug use: No    Sexual activity: Not Currently     Partners: Female   Other Topics Concern    Parent/sibling w/ CABG, MI or angioplasty before 65F 55M? No       Review of Systems:  Skin:        Eyes:  Positive for glasses  ENT:       Respiratory:  Positive for shortness of  "breath;dyspnea on exertion;cough;wheezing  Cardiovascular:  Negative;palpitations;chest pain;lightheadedness;dizziness;syncope or near-syncope Positive for;heaviness;edema  Gastroenterology:      Genitourinary:       Musculoskeletal:       Neurologic:  Positive for numbness or tingling of hands;numbness or tingling of feet  Psychiatric:       Heme/Lymph/Imm:  Positive for allergies  Endocrine:         Physical Exam:  Vitals: /64 (BP Location: Left arm, Patient Position: Sitting, Cuff Size: Adult Regular)   Pulse 71   Resp 18   Ht 1.753 m (5' 9\")   Wt 92.9 kg (204 lb 11.2 oz)   SpO2 97%   BMI 30.23 kg/m      Constitutional:  cooperative, alert and oriented, well developed, well nourished, in no acute distress        Skin:  warm and dry to the touch, no apparent skin lesions or masses noted        Head:  normocephalic, no masses or lesions        Eyes:  pupils equal and round, conjunctivae and lids unremarkable, sclera white, no xanthalasma, EOMS intact, no nystagmus        ENT:  no pallor or cyanosis poor dentition      Neck:  carotid pulses are full and equal bilaterally, JVP normal, no carotid bruit        Chest:      Diffuse mild expiratory wheezes    Cardiac: regular rhythm;normal S1 and S2;no S3 or S4;apical impulse not displaced         systolic murmur;grade 1;apical        Abdomen:  abdomen soft;BS normoactive        Vascular: pulses full and equal, no bruits auscultated                                      Extremities and Muscular Skeletal:  no edema           Neurological:  no gross motor deficits        Psych:  affect appropriate, oriented to time, person and place     Recent Lab Results:  LIPID RESULTS:  Lab Results   Component Value Date    CHOL 120 05/15/2023    CHOL 155 05/24/2021    HDL 72 05/15/2023    HDL 51 05/24/2021    LDL 38 05/15/2023    LDL 74 05/24/2021    TRIG 52 05/15/2023    TRIG 151 (H) 05/24/2021    CHOLHDLRATIO 4.4 11/12/2015       LIVER ENZYME RESULTS:  Lab Results "   Component Value Date    AST 18 05/15/2023    AST 18 05/24/2021    ALT 12 05/15/2023    ALT 28 05/24/2021       CBC RESULTS:  Lab Results   Component Value Date    WBC 8.1 05/15/2023    WBC 8.8 05/24/2021    RBC 4.95 05/15/2023    RBC 5.18 05/24/2021    HGB 16.5 09/12/2023    HGB 16.0 05/24/2021    HCT 48.4 05/15/2023    HCT 48.8 05/24/2021    MCV 98 05/15/2023    MCV 94 05/24/2021    MCH 31.3 05/15/2023    MCH 30.9 05/24/2021    MCHC 32.0 05/15/2023    MCHC 32.8 05/24/2021    RDW 16.2 (H) 05/15/2023    RDW 15.8 (H) 05/24/2021     05/15/2023     05/24/2021       BMP RESULTS:  Lab Results   Component Value Date     09/12/2023     05/24/2021    POTASSIUM 4.1 09/12/2023    POTASSIUM 4.3 05/31/2022    POTASSIUM 4.4 05/24/2021    CHLORIDE 102 09/12/2023    CHLORIDE 114 (H) 05/31/2022    CHLORIDE 110 (H) 05/24/2021    CO2 28 09/12/2023    CO2 26 05/31/2022    CO2 25 05/24/2021    ANIONGAP 10 09/12/2023    ANIONGAP 4 05/31/2022    ANIONGAP 5 05/24/2021     (H) 09/12/2023    GLC 95 05/31/2022     (H) 05/24/2021    BUN 17.3 09/12/2023    BUN 14 05/31/2022    BUN 13 05/24/2021    CR 1.06 09/12/2023    CR 0.95 05/24/2021    GFRESTIMATED 77 09/12/2023    GFRESTIMATED 84 05/24/2021    GFRESTBLACK >90 05/24/2021    JUSTIN 9.0 09/12/2023    JUSTIN 8.5 05/24/2021        A1C RESULTS:  Lab Results   Component Value Date    A1C 5.3 05/08/2019       INR RESULTS:  Lab Results   Component Value Date    INR 0.92 05/04/2021    INR 2.4 (H) 05/01/2020    INR 2.2 (H) 04/17/2020    INR 1.47 (H) 04/07/2020         Jaylen Still MD, FACC    CC  Hamlet Roberts, PA-C  [address]  [place] [address]

## 2023-09-18 ENCOUNTER — VIRTUAL VISIT (OUTPATIENT)
Dept: NEPHROLOGY | Facility: CLINIC | Age: 68
End: 2023-09-18
Attending: PHYSICIAN ASSISTANT
Payer: COMMERCIAL

## 2023-09-18 ENCOUNTER — PRE VISIT (OUTPATIENT)
Dept: NEPHROLOGY | Facility: CLINIC | Age: 68
End: 2023-09-18
Payer: COMMERCIAL

## 2023-09-18 VITALS
HEIGHT: 69 IN | BODY MASS INDEX: 30.21 KG/M2 | WEIGHT: 204 LBS | DIASTOLIC BLOOD PRESSURE: 64 MMHG | SYSTOLIC BLOOD PRESSURE: 102 MMHG

## 2023-09-18 DIAGNOSIS — I70.1 RENAL ARTERY STENOSIS (H): ICD-10-CM

## 2023-09-18 DIAGNOSIS — N18.2 STAGE 2 CHRONIC KIDNEY DISEASE: ICD-10-CM

## 2023-09-18 PROCEDURE — 99204 OFFICE O/P NEW MOD 45 MIN: CPT | Mod: VID | Performed by: INTERNAL MEDICINE

## 2023-09-18 NOTE — LETTER
9/18/2023       RE: Bucky Edgar  [address] [place] [address]     Dear Colleague,    Thank you for referring your patient, Bucky Edgar, to the [facility]. Please see a copy of my visit note below.        Nephrology Initial Consult  September 18, 2023      Bucky Edgar MRN:[id number] YOB: 1955  Date of Admission:(Not on file)  Primary care provider: Hamlet Roberts  Requesting physician: Hamlet Roberts PA*      REASON FOR CONSULT: elevated creatinine       HISTORY OF PRESENT ILLNESS:    Mr Edgar had maintained a baseline creatinine of 0.9-1.1 mg/dl until 6/23 when he had an CANDACE with creatinine as high as 1.4 mg/dl, increased to 1.6 mg/dl. K was 6.1 but the sample was hemolyzed.He was seen in the ER in 7/23 for hypotension where his blood pressures were down to 60-80's systolics and he was orthostatic and had fatigue. At the time, he had been on lasix 20 mg daily, hydralazine 50 mg TID, lisinopril 40 mg daily, coreg 25 mg BID. In addition he was on aldactone and farxiga which were started recently at the cardiology appointment that the patient had stopped before his ER visit.   He is now off hydralazine,hydrochlorothiazide,lasix, aldactone, farxiga and he has decreased his coreg down to 25 mg daily and lisinopril down to 20 mg daily. He has lasix handy for PRN basis. His blood pressures have improved back to 130's/80's and he feels significantly better, his creatinine is also now back to his baseline.   Unfortunately, now he reports that his blood pressures are variable again and he has seen some readings as high as 190/110 mm of Hg. When that happens, he takes an extra tablet of lisinopril and coreg which brings his blood pressures back down to 130's systolics. He reports of using the same blood pressure machines consistently and that he has three of them on which he  "confirms his blood pressures.   He reports that he has had hypertension for a long time, but about 5 years ago, his pressures have been particularly uncontrolled where they were as high as 200's and several medications were added. He also has been diagnosed with HFpEF recently and has been following up with cardiology. There was a period where he had significant volume overload with LE edema, shortness of breath. He was prescribed lasix and had significant weight loss. He reports of being euvolemic now.   Patient denies any LE edema, exertional dyspnea/orthopnea/PND, dizziness, lightheadedness, nausea, vomiting or diarrhea.   Denies use of OTC NSAIDS or other non prescribed meds, recent exposure to abx or contrast, obstructive urinary symptoms, skin rashes, joint pains, fevers, passing kidney stones, recurrent sinus infections or UTI's         PAST MEDICAL HISTORY:  Reviewed with patient on 09/18/2023   As per HPI    MEDICATIONS:  Reviewed with the patient in detail    ALLERGIES:    Reviewed with the patient in detail    REVIEW OF SYSTEMS:  A comprehensive of systems was negative except as noted above.    SOCIAL HISTORY:   Reviewed with patient.   Past smoker, quit 4 years ago/     FAMILY MEDICAL HISTORY:   Reviewed, no family history of need for dialysis, transplant or CKD    PHYSICAL EXAM:   Vital signs:/64   Ht 1.753 m (5' 9\")   Wt 92.5 kg (204 lb)   BMI 30.13 kg/m      Physical Exam    ASSESSMENT AND RECOMMENDATIONS:     # CANDACE   # CKD stage 2   # Hypertension  # HFpEF  # COPD     CANDACE was pre renal from significant hypotension particularly after addition of aldactone and farxiga. He likely has some degree of underlying CKD from long standing hypertension, also has rt severe renal A stenosis at the ostium and slight atrophy of the right kidney. However he maintains a baseline serum creatinine of ~ 1-1.1 mg/dl. UA without active sediment, does not have proteinuria.   He reports his blood pressures have " improved back to 130's/80's and he feels significantly better most of the timed but has readings that are variable again and he has seen some readings as high as 190/110 mm of Hg. Currently, he has decreased his coreg down to 25 mg daily and lisinopril down to 20 mg daily. He has lasix handy for PRN basis.   He does not have other metabolic complications of CKD.     We discussed that 24 hour ambulatory blood pressure monitoring would be the best way to assess what his blood pressures have been like. Until then, I urged that he continues his current regimen that is working to keep his pressures at goal most of the times and take extra lisinopril if his blood pressures are higher than 180/100 mm of Hg.     F/up in clinic in 4 weeks and will adjust his antihypertensives as needed.    Bhavya Torres MD

## 2023-09-18 NOTE — PROGRESS NOTES
Virtual Visit Details    Type of service:  Telephone - video visit converted to telephone as the patient could not connect to the video     Originating Location (pt. Location): Home    Distant Location (provider location):  On-site  Platform used for Video Visit: Telephone       Nephrology Initial Consult  September 18, 2023      Bucky Edgar MRN:[id number] YOB: 1955  Date of Admission:(Not on file)  Primary care provider: Hamlet Roberts  Requesting physician: Hamlet Roberts PA*      REASON FOR CONSULT: elevated creatinine       HISTORY OF PRESENT ILLNESS:    Mr Edgar had maintained a baseline creatinine of 0.9-1.1 mg/dl until 6/23 when he had an CANDACE with creatinine as high as 1.4 mg/dl, increased to 1.6 mg/dl. K was 6.1 but the sample was hemolyzed.He was seen in the ER in 7/23 for hypotension where his blood pressures were down to 60-80's systolics and he was orthostatic and had fatigue. At the time, he had been on lasix 20 mg daily, hydralazine 50 mg TID, lisinopril 40 mg daily, coreg 25 mg BID. In addition he was on aldactone and farxiga which were started recently at the cardiology appointment that the patient had stopped before his ER visit.   He is now off hydralazine,hydrochlorothiazide,lasix, aldactone, farxiga and he has decreased his coreg down to 25 mg daily and lisinopril down to 20 mg daily. He has lasix handy for PRN basis. His blood pressures have improved back to 130's/80's and he feels significantly better, his creatinine is also now back to his baseline.   Unfortunately, now he reports that his blood pressures are variable again and he has seen some readings as high as 190/110 mm of Hg. When that happens, he takes an extra tablet of lisinopril and coreg which brings his blood pressures back down to 130's systolics. He reports of using the same blood pressure machines consistently and that he has three of them on which he confirms his blood pressures.   He reports  "that he has had hypertension for a long time, but about 5 years ago, his pressures have been particularly uncontrolled where they were as high as 200's and several medications were added. He also has been diagnosed with HFpEF recently and has been following up with cardiology. There was a period where he had significant volume overload with LE edema, shortness of breath. He was prescribed lasix and had significant weight loss. He reports of being euvolemic now.   Patient denies any LE edema, exertional dyspnea/orthopnea/PND, dizziness, lightheadedness, nausea, vomiting or diarrhea.   Denies use of OTC NSAIDS or other non prescribed meds, recent exposure to abx or contrast, obstructive urinary symptoms, skin rashes, joint pains, fevers, passing kidney stones, recurrent sinus infections or UTI's         PAST MEDICAL HISTORY:  Reviewed with patient on 09/18/2023   As per HPI    MEDICATIONS:  Reviewed with the patient in detail    ALLERGIES:    Reviewed with the patient in detail    REVIEW OF SYSTEMS:  A comprehensive of systems was negative except as noted above.    SOCIAL HISTORY:   Reviewed with patient.   Past smoker, quit 4 years ago/     FAMILY MEDICAL HISTORY:   Reviewed, no family history of need for dialysis, transplant or CKD    PHYSICAL EXAM:   Vital signs:/64   Ht 1.753 m (5' 9\")   Wt 92.5 kg (204 lb)   BMI 30.13 kg/m      Physical Exam    ASSESSMENT AND RECOMMENDATIONS:     # CANDACE   # CKD stage 2   # Hypertension  # HFpEF  # COPD     CANDACE was pre renal from significant hypotension particularly after addition of aldactone and farxiga. He likely has some degree of underlying CKD from long standing hypertension, also has rt severe renal A stenosis at the ostium and slight atrophy of the right kidney. However he maintains a baseline serum creatinine of ~ 1-1.1 mg/dl. UA without active sediment, does not have proteinuria.   He reports his blood pressures have improved back to 130's/80's and he feels " significantly better most of the timed but has readings that are variable again and he has seen some readings as high as 190/110 mm of Hg. Currently, he has decreased his coreg down to 25 mg daily and lisinopril down to 20 mg daily. He has lasix handy for PRN basis.   He does not have other metabolic complications of CKD.     We discussed that 24 hour ambulatory blood pressure monitoring would be the best way to assess what his blood pressures have been like. Until then, I urged that he continues his current regimen that is working to keep his pressures at goal most of the times and take extra lisinopril if his blood pressures are higher than 180/100 mm of Hg.     F/up in clinic in 4 weeks and will adjust his antihypertensives as needed.    Bhavya Torres MD

## 2023-09-18 NOTE — NURSING NOTE
Is the patient currently in the state of [place]? YES    Visit mode:VIDEO    If the visit is dropped, the patient can be reconnected by: VIDEO VISIT: Text to cell phone:   Telephone Information:   Mobile [phone]       Will anyone else be joining the visit? NO  (If patient encounters technical issues they should call [phone])    How would you like to obtain your AVS? MyChart    Are changes needed to the allergy or medication list? No    Reason for visit: Consult    Elena DOTSON

## 2023-09-18 NOTE — PATIENT INSTRUCTIONS
--please send me the blood pressure readings in 2 weeks   --take extra lisinopril if your blood pressure goes above 180/100 mm of Hg

## 2023-09-19 DIAGNOSIS — N18.2 STAGE 2 CHRONIC KIDNEY DISEASE: Primary | ICD-10-CM

## 2023-09-19 DIAGNOSIS — R03.0 ELEVATED BLOOD-PRESSURE READING, WITHOUT DIAGNOSIS OF HYPERTENSION: ICD-10-CM

## 2023-09-20 ENCOUNTER — DOCUMENTATION ONLY (OUTPATIENT)
Dept: CARDIOLOGY | Facility: CLINIC | Age: 68
End: 2023-09-20

## 2023-09-20 NOTE — PROGRESS NOTES
Bucky Edgar has an upcoming lab appointment:    Future Appointments   Date Time Provider Department Center   9/21/2023  9[facility]40 [facility]   10/6/2023  9:00 AM Coy Bolaños MD [facility]   12/13/2023  1:00 PM Jaya Hoover PA-C [facility]   3/14/2024 11:00 AM Jaylen Still MD [facility]     Patient is scheduled for the following lab(s): Dr. Vazquez    There is no order available. Please review and place either future orders or HMPO (Review of Health Maintenance Protocol Orders), as appropriate.    There are no preventive care reminders to display for this patient.  Aniya Everett

## 2023-10-06 ENCOUNTER — TELEPHONE (OUTPATIENT)
Dept: RHEUMATOLOGY | Facility: CLINIC | Age: 68
End: 2023-10-06

## 2023-10-06 DIAGNOSIS — M1A.09X0 IDIOPATHIC CHRONIC GOUT OF MULTIPLE SITES WITHOUT TOPHUS: ICD-10-CM

## 2023-10-06 RX ORDER — ALLOPURINOL 300 MG/1
300 TABLET ORAL DAILY
Qty: 90 TABLET | Refills: 1 | Status: SHIPPED | OUTPATIENT
Start: 2023-10-06 | End: 2023-11-03

## 2023-10-06 NOTE — TELEPHONE ENCOUNTER
Saba: Bucky Edgar did not answer video visit invitation and you notified me that he was having difficulty connecting.  Please notify him that the uric acid was in the goal range.  Continue allopurinol 300 mg daily.  Follow-up in clinic (not video) in the first half of 2024, with labs 2-3 days prior to that appointment.    Coy Bolaños MD  10/6/2023

## 2023-10-09 ENCOUNTER — TELEPHONE (OUTPATIENT)
Dept: NEPHROLOGY | Facility: CLINIC | Age: 68
End: 2023-10-09
Payer: COMMERCIAL

## 2023-10-09 NOTE — TELEPHONE ENCOUNTER
unable to LVM & sent mychart // pt needs to schedule 4 week Return Neph with Dr. Torres around 10.16.23 - or next available - with labs prior // first attempt, AN 10.9.23

## 2023-10-10 DIAGNOSIS — E78.5 HYPERLIPIDEMIA LDL GOAL <130: ICD-10-CM

## 2023-10-10 DIAGNOSIS — I10 HTN, GOAL BELOW 140/90: ICD-10-CM

## 2023-10-10 RX ORDER — ATORVASTATIN CALCIUM 10 MG/1
10 TABLET, FILM COATED ORAL DAILY
Qty: 90 TABLET | Refills: 0 | Status: SHIPPED | OUTPATIENT
Start: 2023-10-10 | End: 2024-01-11

## 2023-10-10 RX ORDER — HYDRALAZINE HYDROCHLORIDE 50 MG/1
TABLET, FILM COATED ORAL
Qty: 270 TABLET | Refills: 0 | OUTPATIENT
Start: 2023-10-10

## 2023-10-10 NOTE — TELEPHONE ENCOUNTER
[employer] message sent to patient, tried calling but phone just kept ringing.  Saba Patricia, [facility]  10/10/2023

## 2023-10-16 ENCOUNTER — TELEPHONE (OUTPATIENT)
Dept: NEPHROLOGY | Facility: CLINIC | Age: 68
End: 2023-10-16
Payer: COMMERCIAL

## 2023-10-17 ENCOUNTER — MYC MEDICAL ADVICE (OUTPATIENT)
Dept: NEPHROLOGY | Facility: CLINIC | Age: 68
End: 2023-10-17
Payer: COMMERCIAL

## 2023-10-17 ENCOUNTER — DOCUMENTATION ONLY (OUTPATIENT)
Dept: MULTI SPECIALTY CLINIC | Facility: CLINIC | Age: 68
End: 2023-10-17

## 2023-10-17 DIAGNOSIS — N18.2 STAGE 2 CHRONIC KIDNEY DISEASE: Primary | ICD-10-CM

## 2023-10-17 NOTE — PROGRESS NOTES
This patient has an appointment for lab work but does not have any orders. Please place some future orders as needed.    Thank You,  Melvina Welsh MLT (ASCP)

## 2023-10-19 NOTE — TELEPHONE ENCOUNTER
10/17 spoke to patient to schedule 24 hour ABPM- was driving  10/18 spoke to patient to schedule 24 hour ABPM- wanted to call Medicare to find out eligibility.  10/19- called and no answer/ 3rd attempt, max attempts exceeded// 10.19.2023 MCE

## 2023-10-19 NOTE — TELEPHONE ENCOUNTER
Phone kept ringing. 3rd attempt.  Saba Patricia [facility]  10/19/2023

## 2023-10-24 ENCOUNTER — LAB (OUTPATIENT)
Dept: LAB | Facility: OTHER | Age: 68
End: 2023-10-24
Payer: COMMERCIAL

## 2023-10-24 DIAGNOSIS — N18.2 STAGE 2 CHRONIC KIDNEY DISEASE: ICD-10-CM

## 2023-10-24 LAB
ALBUMIN MFR UR ELPH: 41.5 MG/DL
ALBUMIN SERPL BCG-MCNC: 3.7 G/DL (ref 3.5–5.2)
ALBUMIN UR-MCNC: 30 MG/DL
ANION GAP SERPL CALCULATED.3IONS-SCNC: 11 MMOL/L (ref 7–15)
APPEARANCE UR: CLEAR
BILIRUB UR QL STRIP: ABNORMAL
BUN SERPL-MCNC: 10.9 MG/DL (ref 8–23)
CALCIUM SERPL-MCNC: 9 MG/DL (ref 8.8–10.2)
CHLORIDE SERPL-SCNC: 106 MMOL/L (ref 98–107)
COLOR UR AUTO: YELLOW
CREAT SERPL-MCNC: 0.96 MG/DL (ref 0.67–1.17)
CREAT UR-MCNC: 339 MG/DL
DEPRECATED HCO3 PLAS-SCNC: 25 MMOL/L (ref 22–29)
EGFRCR SERPLBLD CKD-EPI 2021: 87 ML/MIN/1.73M2
ERYTHROCYTE [DISTWIDTH] IN BLOOD BY AUTOMATED COUNT: 13.7 % (ref 10–15)
GLUCOSE SERPL-MCNC: 91 MG/DL (ref 70–99)
GLUCOSE UR STRIP-MCNC: NEGATIVE MG/DL
HCT VFR BLD AUTO: 49.2 % (ref 40–53)
HGB BLD-MCNC: 16.1 G/DL (ref 13.3–17.7)
HGB UR QL STRIP: NEGATIVE
KETONES UR STRIP-MCNC: NEGATIVE MG/DL
LEUKOCYTE ESTERASE UR QL STRIP: NEGATIVE
MCH RBC QN AUTO: 34 PG (ref 26.5–33)
MCHC RBC AUTO-ENTMCNC: 32.7 G/DL (ref 31.5–36.5)
MCV RBC AUTO: 104 FL (ref 78–100)
NITRATE UR QL: NEGATIVE
PH UR STRIP: 5 [PH] (ref 5–7)
PHOSPHATE SERPL-MCNC: 2.7 MG/DL (ref 2.5–4.5)
PLATELET # BLD AUTO: 247 10E3/UL (ref 150–450)
POTASSIUM SERPL-SCNC: 4 MMOL/L (ref 3.4–5.3)
PROT/CREAT 24H UR: 0.12 MG/MG CR (ref 0–0.2)
RBC # BLD AUTO: 4.73 10E6/UL (ref 4.4–5.9)
RBC #/AREA URNS AUTO: ABNORMAL /HPF
SODIUM SERPL-SCNC: 142 MMOL/L (ref 135–145)
SP GR UR STRIP: >=1.03 (ref 1–1.03)
SQUAMOUS #/AREA URNS AUTO: ABNORMAL /LPF
UROBILINOGEN UR STRIP-ACNC: 1 E.U./DL
WBC # BLD AUTO: 10.4 10E3/UL (ref 4–11)
WBC #/AREA URNS AUTO: ABNORMAL /HPF

## 2023-10-24 PROCEDURE — 84156 ASSAY OF PROTEIN URINE: CPT

## 2023-10-24 PROCEDURE — 36415 COLL VENOUS BLD VENIPUNCTURE: CPT

## 2023-10-24 PROCEDURE — 82306 VITAMIN D 25 HYDROXY: CPT

## 2023-10-24 PROCEDURE — 85027 COMPLETE CBC AUTOMATED: CPT

## 2023-10-24 PROCEDURE — 83970 ASSAY OF PARATHORMONE: CPT

## 2023-10-24 PROCEDURE — 80069 RENAL FUNCTION PANEL: CPT

## 2023-10-24 PROCEDURE — 81001 URINALYSIS AUTO W/SCOPE: CPT

## 2023-10-25 LAB
PTH-INTACT SERPL-MCNC: 67 PG/ML (ref 15–65)
VIT D+METAB SERPL-MCNC: 12 NG/ML (ref 20–50)

## 2023-10-26 ENCOUNTER — ANCILLARY PROCEDURE (OUTPATIENT)
Dept: CARDIOLOGY | Facility: CLINIC | Age: 68
End: 2023-10-26
Attending: INTERNAL MEDICINE
Payer: COMMERCIAL

## 2023-10-26 DIAGNOSIS — N18.2 STAGE 2 CHRONIC KIDNEY DISEASE: ICD-10-CM

## 2023-10-26 DIAGNOSIS — R03.0 ELEVATED BLOOD-PRESSURE READING, WITHOUT DIAGNOSIS OF HYPERTENSION: ICD-10-CM

## 2023-10-26 PROCEDURE — 93784 AMBL BP MNTR W/SOFTWARE: CPT | Performed by: INTERNAL MEDICINE

## 2023-10-26 NOTE — PATIENT INSTRUCTIONS
Patient presents for 24hr ABPM placement ordered by MD. Patient was hooked up to the monitor and instructions were given regarding how long to wear the monitor, how often readings will be taken, how to place the sleeve for optimal results, when and where to return the unit and how results are provided.     Patient was provided with ABPM letter reiterating the above along with our hours for the return of the device.   Patient education was provided about cardiology interpretation and that provider will be notified of the results.    Diagnosis taken from MD order.

## 2023-10-30 ENCOUNTER — VIRTUAL VISIT (OUTPATIENT)
Dept: NEPHROLOGY | Facility: CLINIC | Age: 68
End: 2023-10-30
Attending: INTERNAL MEDICINE
Payer: COMMERCIAL

## 2023-10-30 DIAGNOSIS — R03.0 ELEVATED BLOOD-PRESSURE READING, WITHOUT DIAGNOSIS OF HYPERTENSION: Primary | ICD-10-CM

## 2023-10-30 PROCEDURE — 99214 OFFICE O/P EST MOD 30 MIN: CPT | Mod: VID | Performed by: INTERNAL MEDICINE

## 2023-10-30 NOTE — PROGRESS NOTES
Virtual Visit Details    Type of service:  Video Visit     Total time: 10 min     Originating Location (pt. Location): Home    Distant Location (provider location):  Off-site  Platform used for Video Visit: [facility]      Nephrology Clinic Progress Note   10/30/23    Bucky Edgar MRN:[id number] YOB: 1955  Date of Admission:(Not on file)  Primary care provider: Hamlet Roberts  Requesting physician: Hamlet Roberts PA*      REASON FOR CONSULT: elevated creatinine       HISTORY OF PRESENT ILLNESS: Please refer to consult note     PAST MEDICAL HISTORY:  Reviewed with patient on 10/30/2023   As per HPI    MEDICATIONS:  Reviewed with the patient in detail    ALLERGIES:    Reviewed with the patient in detail    REVIEW OF SYSTEMS:  A comprehensive of systems was negative except as noted above.    SOCIAL HISTORY:   Reviewed with patient.   Past smoker, quit 4 years ago/     FAMILY MEDICAL HISTORY:   Reviewed, no family history of need for dialysis, transplant or CKD    PHYSICAL EXAM:   Vital signs:There were no vitals taken for this visit.    Physical Exam    Interval history: He reports that he is doing well overall however still has some concerns with fluid retention which he describes is in his lungs and that he has lower extremity edema. He says he likes his weight around 204 lb and occasionally sees that it goes up. He has been taking lasix once a week or so. He describes his blood pressures to still be variable from 99/60 mm of Hg to 158/83 mm of Hg. He denies any symptoms when his pressures are low, however when high, he reports that he has difficulty breathing. He has 3 different blood pressure cuffs at home which he uses to check his blood pressures. His blood pressures today are 158/83 mm of Hg.   He has completed his 24 hour ambulatory blood pressure monitoring last week.      ASSESSMENT AND RECOMMENDATIONS:     # CANDACE   # CKD stage 2   # Hypertension  # HFpEF  # COPD     CANDACE was pre  renal from significant hypotension particularly after addition of aldactone and farxiga. He likely has some degree of underlying CKD from long standing hypertension, also has rt severe renal A stenosis at the ostium and slight atrophy of the right kidney. However he maintains a baseline serum creatinine of ~ 1-1.1 mg/dl. UA without active sediment, does not have proteinuria.   Currently, he has decreased his coreg down to 25 mg daily and lisinopril down to 20 mg daily. He has lasix handy for PRN basis. He sometimes takes half of his Coreg and half of his lisinopril if his blood pressures are low.   He does not have other metabolic complications of CKD.     --we discussed that I will reach out to him once his ambulatory blood pressures are read. In the meantime, he can continue to take his lasix on PRN basis if his weight is above what he likes, or if he has LE edema and shortness of breath.   --Also encouraged him to continue to follow a low Na diet.     F/up in clinic in 6 months     Bhavya Torres MD

## 2023-10-30 NOTE — LETTER
10/30/2023       RE: Bucky Edgar  [address] [place] [address]     Dear Colleague,    Thank you for referring your patient, Bucky Edgar, to the [facility]. Please see a copy of my visit note below.      Nephrology Clinic Progress Note   10/30/23    Bucky Edgar MRN:[id number] YOB: 1955  Date of Admission:(Not on file)  Primary care provider: Hamlet Roberts  Requesting physician: Hamlet Roberts PA*      REASON FOR CONSULT: elevated creatinine       HISTORY OF PRESENT ILLNESS: Please refer to consult note     PAST MEDICAL HISTORY:  Reviewed with patient on 10/30/2023   As per HPI    MEDICATIONS:  Reviewed with the patient in detail    ALLERGIES:    Reviewed with the patient in detail    REVIEW OF SYSTEMS:  A comprehensive of systems was negative except as noted above.    SOCIAL HISTORY:   Reviewed with patient.   Past smoker, quit 4 years ago/     FAMILY MEDICAL HISTORY:   Reviewed, no family history of need for dialysis, transplant or CKD    PHYSICAL EXAM:   Vital signs:There were no vitals taken for this visit.    Physical Exam    Interval history: He reports that he is doing well overall however still has some concerns with fluid retention which he describes is in his lungs and that he has lower extremity edema. He says he likes his weight around 204 lb and occasionally sees that it goes up. He has been taking lasix once a week or so. He describes his blood pressures to still be variable from 99/60 mm of Hg to 158/83 mm of Hg. He denies any symptoms when his pressures are low, however when high, he reports that he has difficulty breathing. He has 3 different blood pressure cuffs at home which he uses to check his blood pressures. His blood pressures today are 158/83 mm of Hg.   He has completed his 24 hour ambulatory blood pressure monitoring last  week.      ASSESSMENT AND RECOMMENDATIONS:     # CANDACE   # CKD stage 2   # Hypertension  # HFpEF  # COPD     CANDACE was pre renal from significant hypotension particularly after addition of aldactone and farxiga. He likely has some degree of underlying CKD from long standing hypertension, also has rt severe renal A stenosis at the ostium and slight atrophy of the right kidney. However he maintains a baseline serum creatinine of ~ 1-1.1 mg/dl. UA without active sediment, does not have proteinuria.   Currently, he has decreased his coreg down to 25 mg daily and lisinopril down to 20 mg daily. He has lasix handy for PRN basis. He sometimes takes half of his Coreg and half of his lisinopril if his blood pressures are low.   He does not have other metabolic complications of CKD.     --we discussed that I will reach out to him once his ambulatory blood pressures are read. In the meantime, he can continue to take his lasix on PRN basis if his weight is above what he likes, or if he has LE edema and shortness of breath.   --Also encouraged him to continue to follow a low Na diet.     F/up in clinic in 6 months     Bhavya Torres MD

## 2023-10-30 NOTE — NURSING NOTE
Is the patient currently in the state of [place]? YES    Visit mode:VIDEO    If the visit is dropped, the patient can be reconnected by: VIDEO VISIT: Text to cell phone:   Telephone Information:   Mobile [phone]       Will anyone else be joining the visit? NO  (If patient encounters technical issues they should call [phone]    How would you like to obtain your AVS? MyChart    Are changes needed to the allergy or medication list? No    Reason for visit: RECHECK    Aaron DOTSON

## 2023-11-03 ENCOUNTER — VIRTUAL VISIT (OUTPATIENT)
Dept: RHEUMATOLOGY | Facility: CLINIC | Age: 68
End: 2023-11-03
Payer: COMMERCIAL

## 2023-11-03 DIAGNOSIS — M1A.09X0 IDIOPATHIC CHRONIC GOUT OF MULTIPLE SITES WITHOUT TOPHUS: Primary | ICD-10-CM

## 2023-11-03 PROCEDURE — 99214 OFFICE O/P EST MOD 30 MIN: CPT | Mod: VID | Performed by: INTERNAL MEDICINE

## 2023-11-03 RX ORDER — ALLOPURINOL 300 MG/1
300 TABLET ORAL DAILY
Qty: 90 TABLET | Refills: 2 | Status: SHIPPED | OUTPATIENT
Start: 2023-11-03 | End: 2024-01-18

## 2023-11-03 NOTE — PROGRESS NOTES
Bucky Edgar  is a 68 year old year old who is being evaluated via a billable video visit.      How would you like to obtain your AVS? MyChart  If the video visit is dropped, the invitation should be resent by: Text to cell phone: [phone]   Will anyone else be joining your video visit? No     Rheumatology Video Visit      Bucky Edgar MRN# [id number]   YOB: 1955 Age: 68 year old      Date of visit: 11/03/23   PCP: Dr. Percy Deshpande    Chief Complaint   Patient presents with:  Idiopathic chronic gout     Assessment and Plan     1.  Gout: Started having gout flares in early 2017, acute onset and resolved with prednisone; joints involved include his left ankle, left first MTP, and left wrist.  Colchicine was cost prohibitive.  Was doing well on allopurinol 700mg daily, uric acid 2.1, tophi had resolved, and he was not having any flares.  Note that in the past he was still flaring when he was on allopurinol 600 mg daily but that could have also been related to mobilization of tophi, and the tophi appear to have resolved.  With resolution of visible tophi, allopurinol has been reduced over time while maintaining a low uric acid and has not had gout flares.  Currently on allopurinol 300 mg daily with uric acid of 4.8.  Will remain on this dose of allopurinol for longer period of time before considering dose reduction.  However, considering change of uric acid when allopurinol was reduced from 400 mg daily to 300 mg daily therefore, reduce allopurinol again as noted below.  Recheck uric acid in 6 months.  Chronic illness  - Continue allopurinol 300 mg daily   - For gout flare only: prednisone 60mg daily x2days, then 40mg daily x5days, then 20mg daily x5days, then stop.  - Labs in 3 months: CBC, Creatinine, Hepatic Panel, uric acid    2. Hx of Elevated Hgb: thought to be polycythemia vera by Dr. Muñoz (hematology/oncology) secondary to heavy smoking and underlying COPD as well as Gaisbock syndrome.  Advised to continue baby aspirin beebe by Dr. Muñoz. Phlebotomy was also started to try to keep the hematocrit <50.  This is documented here for historical significance only and was not discussed today.      3.  Chronic low back pain: Patient reports 4 spine surgeries, including spine fusion.  Pain radiates to his legs.  Chronic and unchanged for years per patient.  He will follow-up with a spine surgeon as needed    Total minutes spent in evaluation with patient, documentation, , and review of pertinent studies and chart notes: 16     Mr. Edgar verbalized agreement with and understanding of the rational for the diagnosis and treatment plan.  All questions were answered to best of my ability and the patient's satisfaction. Mr. Edgar was advised to contact the clinic with any questions that may arise after the clinic visit.     Thank you for involving me in the care of the patient    Return to clinic: 6 months, in office      HPI   Bucky Edgar is a 68 year old male with a past medical history significant for COPD, dyslipidemia, vitamin D deficiency, history of rib fractures, DVT/PE on anticoagulation, hypertension, who is seen for follow-up of gout.    6/7/2022: Gout is well controlled.  No gout flares since last seen.  Taking allopurinol 600 mg daily.  Has not required prednisone for gout since last seen.      10/4/2022: Gout is well controlled.  No gout flares since last seen.  Taking allopurinol 500 mg daily without any missed doses.  Has not required prednisone since last seen.  He would like to try to reduce allopurinol again.    4/3/2023: Taking allopurinol 400 mg daily.  No gout flares since last seen.  Has not required prednisone since last seen.  No tophi.    Today, 11/3/2023: Taking allopurinol 300 mg daily.  No gout flares since last seen.  Has not required prednisone since last seen.  No tophi.  Working with cardiology and nephrology regarding blood pressure management and diuretic  use.    Denies fevers, chills, nausea, vomiting, constipation, diarrhea. No abdominal pain. No chest pain/pressure, palpitations, or shortness of breath currently. No LE swelling.  No rash.      Tobacco: Quit in 2017  EtOH: 6 beers per week  Drugs: None    ROS   12 point review of system was completed and negative except as noted in the HPI     Active Problem List     Patient Active Problem List   Diagnosis    Displacement of lumbar intervertebral disc without myelopathy    CARDIOVASCULAR SCREENING; LDL GOAL LESS THAN 130    HTN, goal below 140/90    Advanced directives, counseling/discussion    COPD (chronic obstructive pulmonary disease) (H)    Impaired fasting glucose    Hypertriglyceridemia    Vitamin D deficiency    Chronic bronchitis with COPD (chronic obstructive pulmonary disease)    Closed fracture of multiple ribs of left side with routine healing, subsequent encounter    Chronic pain due to trauma    Long-term (current) use of anticoagulants [Z79.01]    Elevated serum creatinine    History of deep venous thrombosis (DVT) of distal vein of left lower extremity    Elevated liver enzymes    Renal atrophy, right    Abdominal aortic aneurysm (AAA) without rupture (H24)    Hepatomegaly    Fatty liver    Polycythemia    Pulmonary embolism without acute cor pulmonale, unspecified chronicity, unspecified pulmonary embolism type (H)    Elevated hemoglobin (H24)    Abnormal CT lung screening    Accidental fall from ladder    Acute deep vein thrombosis (DVT) of left lower extremity (H)    Acute-on-chronic renal failure     Cellulitis of left foot    Ectatic abdominal aorta (H24)    Pneumonia    Acute kidney injury (H24)    Stage 2 chronic kidney disease    Acute on chronic congestive heart failure, unspecified heart failure type (H)    Pulmonary hypertension (H)     Past Medical History     Past Medical History:   Diagnosis Date    COPD (chronic obstructive pulmonary disease) (H)     Displacement of lumbar  intervertebral disc without myelopathy 1995, 2002    HTN, goal below 140/90      Past Surgical History     Past Surgical History:   Procedure Laterality Date    HC REPAIR ROTATOR CUFF,CHRONIC  1989    IR LOWER EXTREMITY VENOGRAM LEFT  5/4/2021    IR VENOUS STENT  5/4/2021    ZZC DECOMPRESS SPINAL CORD,1 SEG  1995, 4/2002    leftL4-L5, 2nd right L4-L5    ZZC SPINE FUSION,ANTER,3 SGMTS  2/9/2004    ant and post fusion L4-S1     Allergy     Allergies   Allergen Reactions    Chlorthalidone Other (See Comments)     Excessive lowering of blood pressure    Amlodipine Other (See Comments)     Intractable headache with use of medication as well as noting a small tremor of the upper extremities     Current Medication List     Current Outpatient Medications   Medication Sig    albuterol (PROAIR HFA/PROVENTIL HFA/VENTOLIN HFA) 108 (90 Base) MCG/ACT inhaler Inhale 2 puffs into the lungs every 6 hours as needed for shortness of breath / dyspnea    allopurinol (ZYLOPRIM) 300 MG tablet Take 1 tablet (300 mg) by mouth daily    aspirin (ASA) 81 MG EC tablet Take 1 tablet (81 mg) by mouth daily    atorvastatin (LIPITOR) 10 MG tablet Take 1 tablet (10 mg) by mouth daily    carvedilol (COREG) 25 MG tablet Take 1 tablet (25 mg) by mouth 2 times daily (with meals)    furosemide (LASIX) 20 MG tablet Take 1 tablet (20 mg) by mouth daily    lisinopril (ZESTRIL) 20 MG tablet Take 1 tablet (20 mg) by mouth 2 times daily    nicotine (NICORETTE) 4 MG lozenge Place 4 mg inside cheek as needed    acetaminophen (TYLENOL) 325 MG tablet Take 650 mg by mouth every 6 hours      No current facility-administered medications for this visit.         Social History   See HPI    Family History     Family History   Problem Relation Age of Onset    Family History Negative Other     Diabetes No family hx of     Coronary Artery Disease No family hx of     Hypertension No family hx of     Hyperlipidemia No family hx of     Breast Cancer No family hx of      "Prostate Cancer No family hx of     Anxiety Disorder No family hx of     Substance Abuse No family hx of     Asthma No family hx of     Thyroid Disease No family hx of     Unknown/Adopted No family hx of     Genetic Disorder No family hx of     Osteoporosis No family hx of     Anesthesia Reaction No family hx of     Mental Illness No family hx of     Depression No family hx of     Other Cancer No family hx of     Colon Cancer No family hx of     Cerebrovascular Disease No family hx of     Obesity No family hx of        Physical Exam     Temp Readings from Last 3 Encounters:   05/31/23 97.4  F (36.3  C) (Temporal)   05/15/23 97.8  F (36.6  C) (Temporal)   03/31/22 96.8  F (36  C) (Temporal)     BP Readings from Last 5 Encounters:   09/14/23 102/64   09/14/23 102/64   06/15/23 113/72   05/31/23 132/88   05/15/23 (!) 150/98     Pulse Readings from Last 1 Encounters:   09/14/23 71     Resp Readings from Last 1 Encounters:   09/14/23 18     Estimated body mass index is 30.23 kg/m  as calculated from the following:    Height as of 9/14/23: 1.753 m (5' 9\").    Weight as of 9/14/23: 92.9 kg (204 lb 11.2 oz).    GEN: NAD.   HEENT:  No obvious external lesions of the ear and nose. Hearing intact.  PULM: No increased work of breathing  SKIN: No rash or jaundice seen  PSYCH: Alert. Appropriate.     Labs / Imaging (select studies)     CBC  Recent Labs   Lab Test 10/24/23  1237 09/12/23  1033 05/15/23  1103 03/27/23  1138 05/31/22  1014 05/31/22  1014 05/24/21  1035 05/04/21  1030 08/25/20  0940 07/28/20  1040   WBC 10.4  --  8.1 8.4   < > 8.5 8.8   < > 8.1 8.4   RBC 4.73  --  4.95 5.08   < > 5.10 5.18   < > 5.29 5.48   HGB 16.1 16.5 15.5 16.7   < > 16.4 16.0   < > 15.4 16.5   HCT 49.2  --  48.4 51.5   < > 50.3 48.8   < > 48.3 51.9   *  --  98 101*   < > 99 94   < > 91 95   RDW 13.7  --  16.2* 14.9   < > 14.6 15.8*   < > 14.6 16.3*     --  180 201   < > 269 217   < > 259 242   MCH 34.0*  --  31.3 32.9   < > 32.2 " 30.9   < > 29.1 30.1   MCHC 32.7  --  32.0 32.4   < > 32.6 32.8   < > 31.9 31.8   NEUTROPHIL  --   --  73 73  --  68 69.4  --  73.0 68.7   LYMPH  --   --  14 14  --  18 18.2  --  14.2 18.6   MONOCYTE  --   --  10 10  --  7 7.8  --  8.9 8.5   EOSINOPHIL  --   --  3 3  --  5 3.9  --  2.7 2.8   BASOPHIL  --   --  1 1  --  1 0.7  --  1.0 0.8   ANEU  --   --   --   --   --   --  6.1  --  5.9 5.7   ALYM  --   --   --   --   --   --  1.6  --  1.2 1.6   LIZZ  --   --   --   --   --   --  0.7  --  0.7 0.7   AEOS  --   --   --   --   --   --  0.3  --  0.2 0.2   ABAS  --   --   --   --   --   --  0.1  --  0.1 0.1    < > = values in this interval not displayed.     CMP  Recent Labs   Lab Test 10/24/23  1237 09/12/23  1033 07/05/23  1003 05/31/23 ronel burroughs  1534 05/15/23  1103 03/27/23  1138 05/31/22  1014 05/31/22  1014 05/24/21  1035 05/04/21  1030 03/22/21  1219    140 137   < > 141 142   < > 144 140 139 139   POTASSIUM 4.0 4.1 4.8   < > 4.4 4.3   < > 4.3 4.4 4.2 4.2   CHLORIDE 106 102 106   < > 107 108*   < > 114* 110* 111* 108   CO2 25 28 22   < > 27 30*   < > 26 25 22 29   ANIONGAP 11 10 9   < > 7 4*   < > 4 5 6 2*   GLC 91 102* 111*   < > 102* 99   < > 95 112* 100* 100*   BUN 10.9 17.3 17.5   < > 10.1 13.5   < > 14 13 16 11   CR 0.96 1.06 1.36*   < > 0.92 1.06   < > 1.09 0.95 1.00 0.94   GFRESTIMATED 87 77 58*   < > >90 78   < > 76 84 79 86   GFRESTBLACK  --   --   --   --   --   --   --   --  >90 >90 >90   JUSTIN 9.0 9.0 9.1   < > 8.9 9.3   < > 9.1 8.5 9.5 9.1   BILITOTAL  --   --   --   --  1.1 0.6  --  0.6 0.7  --   --    ALBUMIN 3.7 3.7  --   --  3.5 3.7   < > 3.0* 3.4  --   --    PROTTOTAL  --   --   --   --  6.3* 6.7  --  6.8 6.9  --   --    ALKPHOS  --   --   --   --  123 110  --  111 98  --   --    AST  --   --   --   --  18 19  --  26 18  --   --    ALT  --   --   --   --  12 11  --  27 28  --   --     < > = values in this interval not displayed.     Uric Acid  Recent Labs   Lab Test 09/12/23  3227  03/27/23  1138 05/31/22  1014 05/24/21  1035 06/02/20  0903 05/08/20  0959   URIC 4.8 3.6 2.1* 2.1* 2.2* 1.5*     Immunization History     Immunization History   Administered Date(s) Administered    COVID-19 MONOVALENT 12+ (Pfizer) 03/31/2021, 04/21/2021    Influenza Vaccine 18-64 (Flublok) 11/12/2019    Influenza Vaccine >6 months (Alfuria,Fluzone) 11/27/2013, 10/29/2015, 12/12/2016, 10/23/2017, 11/01/2018    Mantoux Tuberculin Skin Test 07/17/2012    Pneumo Conj 13-V (2010&after) 01/22/2016    Pneumococcal 23 valent 11/27/2013    TDAP Vaccine (Adacel) 08/25/2009, 11/25/2019    TDAP Vaccine (Boostrix) 08/25/2009          Chart documentation done in part with Dragon Voice recognition Software. Although reviewed after completion, some word and grammatical error may remain.      Video-Visit Details    Type of service:  Video Visit    Video Start Time: 8:09 AM    Video End Time: 8:23 AM    Originating Location (pt. Location): Home, [place]    Distant Location (provider location):  off site, [place]    Platform used for Video Visit: Rody Bolaños MD

## 2023-11-03 NOTE — PATIENT INSTRUCTIONS
RHEUMATOLOGY    [facility] [place]  [address] [place] [address]  [place] [address]    Phone number: [phone]  Fax number: [phone]    If you need a medication refill, please contact us as you may need lab work and/or a follow up visit prior to your refill.      Thank you for choosing [facility]!    Saba Patricia CMA Rheumatology

## 2023-11-10 ENCOUNTER — TELEPHONE (OUTPATIENT)
Dept: NEPHROLOGY | Facility: CLINIC | Age: 68
End: 2023-11-10
Payer: COMMERCIAL

## 2023-11-13 DIAGNOSIS — I10 HTN, GOAL BELOW 140/90: ICD-10-CM

## 2023-11-13 RX ORDER — CARVEDILOL 25 MG/1
25 TABLET ORAL 2 TIMES DAILY WITH MEALS
Qty: 180 TABLET | Refills: 1 | Status: SHIPPED | OUTPATIENT
Start: 2023-11-13 | End: 2024-01-18

## 2023-11-14 ENCOUNTER — MYC REFILL (OUTPATIENT)
Dept: FAMILY MEDICINE | Facility: OTHER | Age: 68
End: 2023-11-14
Payer: COMMERCIAL

## 2023-11-14 DIAGNOSIS — I10 HTN, GOAL BELOW 140/90: ICD-10-CM

## 2023-11-14 RX ORDER — CARVEDILOL 25 MG/1
25 TABLET ORAL 2 TIMES DAILY WITH MEALS
Qty: 180 TABLET | Refills: 1 | OUTPATIENT
Start: 2023-11-14

## 2023-11-17 NOTE — COMMUNITY RESOURCES LIST (ENGLISH)
11/17/2023   [facility] [employer]  N/A  For questions about this resource list or additional care needs, please contact your primary care clinic or care manager.  Phone: [phone]   Email: N/A   Address: [address] [place] [id number]   Hours: N/A        Financial Stability       Utility payment assistance  1  [employer] [facility] [place] ([employer]) - Emergency Assistance - Utility payment assistance Distance: 3.84 miles      In-Person   [address] [facility] [place] [address]  Language: English, Bangladeshi  Hours: Mon 10:00 AM - 1:30 PM Appt. Only, Wed 10:00 AM - 1:30 PM Appt. Only, Thu 4:30 PM - 6:30 PM Appt. Only, Fri 10:00 AM - 1:30 PM Appt. Only  Fees: Free   Phone: (168) 916-9272 Email: info@[employer] Website: http://www.[employer].Sitrion     2  [facility] Distance: 12.32 miles      In-Person   [address] [place] [address]  Language: English  Hours: Mon - Fri 8:00 AM - 12:00 PM , Mon - Fri 1:00 PM - 4:30 PM  Fees: Free, Self Pay   Phone: (414) 437-9013 Email: [email or web address] Website: [email or web address] Centerservices-[facility]/          Important Numbers & Websites       Emergency Services   911  [facility] Services   311  Poison Control   (797) 276-2386  Suicide Prevention Lifeline   (109) 949-3667 (TALK)  Child Abuse Hotline   (874) 506-3683 (4-A-Child)  Sexual Assault Hotline   (452) 279-1849 (HOPE)  National Runaway Safeline   (716) 303-4944 (RUNAWAY)  All-Options Talkline   (270) 258-6925  Substance Abuse Referral   (244) 752-6374 (HELP)

## 2023-11-20 ENCOUNTER — OFFICE VISIT (OUTPATIENT)
Dept: FAMILY MEDICINE | Facility: OTHER | Age: 68
End: 2023-11-20
Payer: COMMERCIAL

## 2023-11-20 VITALS
RESPIRATION RATE: 18 BRPM | SYSTOLIC BLOOD PRESSURE: 144 MMHG | BODY MASS INDEX: 31.01 KG/M2 | HEART RATE: 64 BPM | DIASTOLIC BLOOD PRESSURE: 88 MMHG | OXYGEN SATURATION: 95 % | TEMPERATURE: 97.5 F | WEIGHT: 210 LBS

## 2023-11-20 DIAGNOSIS — Z23 NEED FOR SHINGLES VACCINE: ICD-10-CM

## 2023-11-20 DIAGNOSIS — J43.1 PANLOBULAR EMPHYSEMA (H): ICD-10-CM

## 2023-11-20 DIAGNOSIS — Z29.11 NEED FOR VACCINATION AGAINST RESPIRATORY SYNCYTIAL VIRUS: ICD-10-CM

## 2023-11-20 DIAGNOSIS — I27.20 PULMONARY HYPERTENSION (H): ICD-10-CM

## 2023-11-20 DIAGNOSIS — E55.9 VITAMIN D DEFICIENCY: ICD-10-CM

## 2023-11-20 DIAGNOSIS — I50.9 ACUTE ON CHRONIC CONGESTIVE HEART FAILURE, UNSPECIFIED HEART FAILURE TYPE (H): ICD-10-CM

## 2023-11-20 DIAGNOSIS — I10 HTN, GOAL BELOW 140/90: Primary | ICD-10-CM

## 2023-11-20 PROCEDURE — 99214 OFFICE O/P EST MOD 30 MIN: CPT | Performed by: PHYSICIAN ASSISTANT

## 2023-11-20 RX ORDER — RESPIRATORY SYNCYTIAL VIRUS VACCINE 120MCG/0.5
0.5 KIT INTRAMUSCULAR ONCE
Qty: 1 EACH | Refills: 0 | Status: CANCELLED | OUTPATIENT
Start: 2023-11-20 | End: 2023-11-20

## 2023-11-20 RX ORDER — CHOLECALCIFEROL (VITAMIN D3) 50 MCG
1 TABLET ORAL DAILY
Qty: 90 TABLET | Refills: 1 | Status: SHIPPED | OUTPATIENT
Start: 2023-11-20 | End: 2024-07-18

## 2023-11-20 RX ORDER — FUROSEMIDE 20 MG
20 TABLET ORAL EVERY OTHER DAY
Qty: 30 TABLET | Refills: 0 | Status: SHIPPED | OUTPATIENT
Start: 2023-11-20 | End: 2024-01-18

## 2023-11-20 ASSESSMENT — PAIN SCALES - GENERAL: PAINLEVEL: SEVERE PAIN (7)

## 2023-11-20 NOTE — COMMUNITY RESOURCES LIST (ENGLISH)
11/20/2023   [facility]  N/A  For additional resource needs, please contact your health insurance member services or your primary care team.  Phone: [phone]   Email: N/A   Address: [facility] [place] [address]   Hours: N/A        Financial Stability       Utility payment assistance  1  [place] [facility] - Energy and Utilities Distance: 33.01 miles      In-Person, Phone/Virtual   [address] [place] [address]  Language: English  Hours: Mon - Fri 8:30 AM - 4:30 PM  Fees: Free   Phone: (562) 329-6117 Website: [place]://[place]azzy/energy/consumer-assistance/energy-assistance-program/     2  [place] [facility] [place] [facility] - [place]'s Telephone Assistance Plan (TAP) and [facility] Lifeline and Affordable Connectivity Program (ACP) Distance: 37.03 miles      Phone/Virtual   [address] [place] [address]  Language: English  Fees: Free   Phone: (361) 513-2558 Email: nano.cuate@[facility].mn. Website: [place]://[place]puc/consumers/telephone/          Important Numbers & Websites       [facility]   211 211unitedway.org  Poison Control   (773) 266-6861 [place]  Suicide and Crisis Lifeline   [address] [place], [place] [address]line.org  Childhelp [place] Child Abuse Hotline   827.151.1006 Childhelphotline.org  National Sexual Assault Hotline   (367) 806-8686 (HOPE) Rainn.org  National Runaway Safeline   (109) 575-3449 (RUNAWAY) 1800runaway.org  Pregnancy & Postpartum Support [place]   Call/text 644-566-2269 Ppsupportmn.org  Substance Abuse National Helpline ([facility]   779-044-HELP (4435) Findtreatment.gov  Emergency Services   911

## 2023-11-20 NOTE — PROGRESS NOTES
"  Assessment & Plan     HTN, goal below 140/90  Pulmonary hypertension (H)  Acute on chronic congestive heart failure, unspecified heart failure type (H)  For now we will increase his Lasix to every other day and have him follow-up with primary care provider in the near future.  Discussed with his primary care provider and we advised him to change his lisinopril to 40 mg in the morning and following up in 3 to 4 weeks.  - furosemide (LASIX) 20 MG tablet; Take 1 tablet (20 mg) by mouth every other day    Panlobular emphysema (H)  Historically noted no new concerns.    Need for vaccination against respiratory syncytial virus  Need for shingles vaccine  We will hold off on immunizations for now until he can follow-up in the near future.    Vitamin D deficiency  Reports that he should be on a supplement but no baseline.  Prescription to the pharmacy for him.  Advised medications as noted below and following up in 2 to 3 months.  - vitamin D3 (CHOLECALCIFEROL) 50 mcg (2000 units) tablet; Take 1 tablet (50 mcg) by mouth daily    BMI:   Estimated body mass index is 31.01 kg/m  as calculated from the following:    Height as of 9/14/23: 1.753 m (5' 9\").    Weight as of this encounter: 95.3 kg (210 lb).   Weight management plan: Patient was referred to their PCP to discuss a diet and exercise plan.    LISA Giraldo [facility]    David Lawler is a 68 year old, presenting for the following health issues:  Hypertension        11/20/2023     9:12 AM   Additional Questions   Roomed by Maddi   Accompanied by Self       History of Present Illness       Back Pain:  He presents for follow up of back pain. Patient's back pain is a chronic problem.  Location of back pain:  Right lower back, left lower back and right hip  Description of back pain: cramping and gnawing  Back pain spreads: right foot    Since patient first noticed back pain, pain is: always present, but gets better and worse  Does " back pain interfere with his job:  Not applicable       COPD:  He presents for follow up of COPD.  Overall, COPD symptoms are stable since last visit.  He has more than usual fatigue or shortness of breath with exertion and more than usual shortness of breath at rest.  He often coughs and does have change in sputum. No recent fever. He can walk the length of 3-5 rooms without stopping to rest. He can walk 1 flights of stairs without resting. The patient has had no ED, urgent care, or hospital admissions because of COPD since the last visit.     Hypertension: He presents for follow up of hypertension.  He does check blood pressure  regularly outside of the clinic. Outside blood pressures have been over 140/90. He follows a low salt diet.     He eats 2-3 servings of fruits and vegetables daily.He consumes 2 sweetened beverage(s) daily.He exercises with enough effort to increase his heart rate 10 to 19 minutes per day.  He exercises with enough effort to increase his heart rate 3 or less days per week.   He is taking medications regularly.     Review of Systems   Constitutional, HEENT, cardiovascular, pulmonary, GI, , musculoskeletal, neuro, skin, endocrine and psych systems are negative, except as otherwise noted.      Objective    BP (!) 144/88   Pulse 64   Temp 97.5  F (36.4  C) (Temporal)   Resp 18   Wt 95.3 kg (210 lb)   SpO2 95%   BMI 31.01 kg/m    Body mass index is 31.01 kg/m .  Physical Exam   GENERAL: healthy, alert and no distress  EYES: Eyes grossly normal to inspection, PERRL and conjunctivae and sclerae normal  HENT: ear canals and TM's normal, nose and mouth without ulcers or lesions  NECK: no adenopathy, no asymmetry, masses, or scars and thyroid normal to palpation  RESP: lungs clear to auscultation - no rales, rhonchi or wheezes  CV: regular rate and rhythm, normal S1 S2, no S3 or S4, no murmur, click or rub, no peripheral edema and peripheral pulses strong  ABDOMEN: soft, nontender, no  hepatosplenomegaly, no masses and bowel sounds normal  MS: no gross musculoskeletal defects noted, no edema  SKIN: no suspicious lesions or rashes  NEURO: Normal strength and tone, mentation intact and speech normal  PSYCH: mentation appears normal, affect normal/bright    No results found for this or any previous visit (from the past 24 hour(s)).

## 2023-12-06 DIAGNOSIS — I10 HTN, GOAL BELOW 140/90: ICD-10-CM

## 2023-12-06 RX ORDER — LISINOPRIL 20 MG/1
20 TABLET ORAL 2 TIMES DAILY
Qty: 180 TABLET | Refills: 0 | Status: SHIPPED | OUTPATIENT
Start: 2023-12-06 | End: 2024-04-18 | Stop reason: DRUGHIGH

## 2023-12-18 NOTE — TELEPHONE ENCOUNTER
Reason for call:  Symptom  Reason for call:  Patient reporting a symptom    Symptom or request: gout     Duration (how long have symptoms been present): flare up over the weekend    Have you been treated for this before? Yes    Additional comments: pt has been treated for this about a month or two ago and was wondering if he can get another prescription because he is having a flare up.     Phone Number patient can be reached at:  Cell number on file:    Telephone Information:   Mobile [phone]       Best Time:  anytime    Can we leave a detailed message on this number:  YES    Call taken on 4/17/2017 at 8:26 AM by Albertina Velazquez   No

## 2024-01-11 DIAGNOSIS — E78.5 HYPERLIPIDEMIA LDL GOAL <130: ICD-10-CM

## 2024-01-11 RX ORDER — ATORVASTATIN CALCIUM 10 MG/1
10 TABLET, FILM COATED ORAL DAILY
Qty: 90 TABLET | Refills: 0 | Status: SHIPPED | OUTPATIENT
Start: 2024-01-11 | End: 2024-01-18

## 2024-01-11 NOTE — COMMUNITY RESOURCES LIST (ENGLISH)
01/11/2024   [facility]  N/A  For questions about this resource list or additional care needs, please contact your primary care clinic or care manager.  Phone: [phone]   Email: N/A   Address: [address] [place] [address]   Hours: N/A        Financial Stability       Utility payment assistance  1  [facility] - Emergency Assistance - Utility payment assistance Distance: 3.84 miles      In-Person   [address] [facility] [place] [address]  Language: English, Bolivian  Hours: Mon 10:00 AM - 1:30 PM Appt. Only, Wed 10:00 AM - 1:30 PM Appt. Only, Thu 4:30 PM - 6:30 PM Appt. Only, Fri 10:00 AM - 1:30 PM Appt. Only  Fees: Free   Phone: (580) 520-8910 Email: info@AutoShag.ivi.ru Website: http://www.caShook.org     2  [facility] Distance: 12.32 miles      In-Person   [address] [place] [address]  Language: English  Hours: Mon - Fri 8:00 AM - 12:00 PM , Mon - Fri 1:00 PM - 4:30 PM  Fees: Free, Self Pay   Phone: (399) 391-6433 Email: [email or web address] Website: [email or web address]/          Important Numbers & Websites       Emergency Services   911  [facility] Services   311  Poison Control   (237) 558-3931  Suicide Prevention Lifeline   (641) 662-3979 (TALK)  Child Abuse Hotline   (958) 388-2722 (4-A-Child)  Sexual Assault Hotline   (642) 164-9959 (HOPE)  National Runaway Safeline   (533) 354-6317 (RUNAWAY)  All-Options Talkline   (775) 920-3414  Substance Abuse Referral   (538) 692-1557 (HELP)

## 2024-01-18 ENCOUNTER — OFFICE VISIT (OUTPATIENT)
Dept: FAMILY MEDICINE | Facility: OTHER | Age: 69
End: 2024-01-18
Payer: COMMERCIAL

## 2024-01-18 VITALS
BODY MASS INDEX: 30.96 KG/M2 | WEIGHT: 209 LBS | SYSTOLIC BLOOD PRESSURE: 102 MMHG | DIASTOLIC BLOOD PRESSURE: 60 MMHG | OXYGEN SATURATION: 92 % | HEIGHT: 69 IN | HEART RATE: 67 BPM | RESPIRATION RATE: 18 BRPM | TEMPERATURE: 97.1 F

## 2024-01-18 DIAGNOSIS — M1A.09X0 IDIOPATHIC CHRONIC GOUT OF MULTIPLE SITES WITHOUT TOPHUS: ICD-10-CM

## 2024-01-18 DIAGNOSIS — I50.9 ACUTE ON CHRONIC CONGESTIVE HEART FAILURE, UNSPECIFIED HEART FAILURE TYPE (H): ICD-10-CM

## 2024-01-18 DIAGNOSIS — I26.99 PULMONARY EMBOLISM WITHOUT ACUTE COR PULMONALE, UNSPECIFIED CHRONICITY, UNSPECIFIED PULMONARY EMBOLISM TYPE (H): ICD-10-CM

## 2024-01-18 DIAGNOSIS — E78.5 HYPERLIPIDEMIA LDL GOAL <130: ICD-10-CM

## 2024-01-18 DIAGNOSIS — R09.89 RALES: ICD-10-CM

## 2024-01-18 DIAGNOSIS — N18.2 STAGE 2 CHRONIC KIDNEY DISEASE: ICD-10-CM

## 2024-01-18 DIAGNOSIS — D58.2 ELEVATED HEMOGLOBIN (H): ICD-10-CM

## 2024-01-18 DIAGNOSIS — J44.9 CHRONIC OBSTRUCTIVE PULMONARY DISEASE, UNSPECIFIED COPD TYPE (H): Primary | ICD-10-CM

## 2024-01-18 DIAGNOSIS — I27.20 PULMONARY HYPERTENSION (H): ICD-10-CM

## 2024-01-18 DIAGNOSIS — I10 HTN, GOAL BELOW 140/90: ICD-10-CM

## 2024-01-18 LAB
ANION GAP SERPL CALCULATED.3IONS-SCNC: 9 MMOL/L (ref 7–15)
BUN SERPL-MCNC: 21.8 MG/DL (ref 8–23)
CALCIUM SERPL-MCNC: 9.4 MG/DL (ref 8.8–10.2)
CHLORIDE SERPL-SCNC: 102 MMOL/L (ref 98–107)
CREAT SERPL-MCNC: 1.24 MG/DL (ref 0.67–1.17)
DEPRECATED HCO3 PLAS-SCNC: 30 MMOL/L (ref 22–29)
EGFRCR SERPLBLD CKD-EPI 2021: 64 ML/MIN/1.73M2
GLUCOSE SERPL-MCNC: 110 MG/DL (ref 70–99)
POTASSIUM SERPL-SCNC: 4.8 MMOL/L (ref 3.4–5.3)
SODIUM SERPL-SCNC: 141 MMOL/L (ref 135–145)

## 2024-01-18 PROCEDURE — 99214 OFFICE O/P EST MOD 30 MIN: CPT | Performed by: PHYSICIAN ASSISTANT

## 2024-01-18 PROCEDURE — 80048 BASIC METABOLIC PNL TOTAL CA: CPT | Performed by: PHYSICIAN ASSISTANT

## 2024-01-18 PROCEDURE — 36415 COLL VENOUS BLD VENIPUNCTURE: CPT | Performed by: PHYSICIAN ASSISTANT

## 2024-01-18 RX ORDER — ALLOPURINOL 300 MG/1
300 TABLET ORAL DAILY
Qty: 90 TABLET | Refills: 2 | Status: SHIPPED | OUTPATIENT
Start: 2024-01-18 | End: 2024-05-02

## 2024-01-18 RX ORDER — CARVEDILOL 25 MG/1
25 TABLET ORAL 2 TIMES DAILY WITH MEALS
Qty: 180 TABLET | Refills: 3 | Status: SHIPPED | OUTPATIENT
Start: 2024-01-18

## 2024-01-18 RX ORDER — ATORVASTATIN CALCIUM 10 MG/1
10 TABLET, FILM COATED ORAL DAILY
Qty: 90 TABLET | Refills: 3 | Status: SHIPPED | OUTPATIENT
Start: 2024-01-18 | End: 2024-07-09

## 2024-01-18 RX ORDER — LISINOPRIL 40 MG/1
40 TABLET ORAL DAILY
Qty: 90 TABLET | Refills: 3 | Status: SHIPPED | OUTPATIENT
Start: 2024-01-18 | End: 2024-04-18

## 2024-01-18 RX ORDER — FUROSEMIDE 20 MG
20 TABLET ORAL EVERY OTHER DAY
Qty: 45 TABLET | Refills: 3 | Status: SHIPPED | OUTPATIENT
Start: 2024-01-18

## 2024-01-18 RX ORDER — LISINOPRIL 20 MG/1
20 TABLET ORAL 2 TIMES DAILY
Qty: 180 TABLET | Refills: 0 | Status: CANCELLED | OUTPATIENT
Start: 2024-01-18

## 2024-01-18 RX ORDER — RESPIRATORY SYNCYTIAL VIRUS VACCINE 120MCG/0.5
0.5 KIT INTRAMUSCULAR ONCE
Qty: 1 EACH | Refills: 0 | Status: CANCELLED | OUTPATIENT
Start: 2024-01-18 | End: 2024-01-18

## 2024-01-18 ASSESSMENT — PAIN SCALES - GENERAL: PAINLEVEL: NO PAIN (0)

## 2024-01-18 NOTE — PATIENT INSTRUCTIONS
Will start steroid inhaler to help with your breathing and wheezing.  Rinse your mouth after each use.    Continue other medications.    Follow-up in 3 months for a recheck.

## 2024-01-18 NOTE — PROGRESS NOTES
Assessment & Plan     ICD-10-CM    1. Chronic obstructive pulmonary disease, unspecified COPD type (H)  J44.9 fluticasone (ARNUITY ELLIPTA) 100 MCG/ACT inhaler      2. Rales  R09.89 XR Chest 2 Views      3. Acute on chronic congestive heart failure, unspecified heart failure type (H)  I50.9 furosemide (LASIX) 20 MG tablet      4. Pulmonary hypertension (H)  I27.20 furosemide (LASIX) 20 MG tablet      5. HTN, goal below 140/90  I10 carvedilol (COREG) 25 MG tablet     lisinopril (ZESTRIL) 40 MG tablet     Basic metabolic panel  (Ca, Cl, CO2, Creat, Gluc, K, Na, BUN)     Basic metabolic panel  (Ca, Cl, CO2, Creat, Gluc, K, Na, BUN)      6. Hyperlipidemia LDL goal <130  E78.5 atorvastatin (LIPITOR) 10 MG tablet      7. Idiopathic chronic gout of multiple sites without tophus  M1A.09X0 allopurinol (ZYLOPRIM) 300 MG tablet      8. Stage 2 chronic kidney disease  N18.2 Basic metabolic panel  (Ca, Cl, CO2, Creat, Gluc, K, Na, BUN)     Basic metabolic panel  (Ca, Cl, CO2, Creat, Gluc, K, Na, BUN)     Basic metabolic panel  (Ca, Cl, CO2, Creat, Gluc, K, Na, BUN)      9. Pulmonary embolism without acute cor pulmonale, unspecified chronicity, unspecified pulmonary embolism type (H)  I26.99       10. Elevated hemoglobin (H24)  D58.2            1-4: Prescribed fluticasone inhaler to be used daily assist with symptom management and reduce inflammation and irritation of the airways given his continued daily cough and wheezing. Instructed on the proper use of inhaler and advised to rinse mouth after each use to prevent oral candidiasis. Continue albuterol as needed. Also, recommended to continue using the furosemide every other day to reduce edema. Rales heard on lung auscultation of bilateral lower lobes, so ordered chest x-ray to rule out pneumonia as the cause of his symptoms. He left before this was completed so will have him come back in within the next week. Vitals are normal which is reassuring.     5, 8. Current dosing of  lisinopril and carvedilol are effective in reducing hypertension, so will continue with current regimen. Ordered BMP to check kidney function, which showed elevation in creatinine of 1.24. Will advise patient to reduce salt intake and recheck BMP in one month.     6-7. Refill of atorvastatin and allopurinol.     9. Previous anticoagulation. Remains on aspirin.    10. Most recently normal.    Follow-up in 3 months for a recheck.    David Lawler is a 68 year old, presenting for the following health issues:  Hypertension    History of Present Illness       Hypertension: He presents for follow up of hypertension.  He does check blood pressure  regularly outside of the clinic. Outside blood pressures have been over 140/90. He follows a low salt diet.     He eats 2-3 servings of fruits and vegetables daily.He consumes 1 sweetened beverage(s) daily.He exercises with enough effort to increase his heart rate 9 or less minutes per day.  He exercises with enough effort to increase his heart rate 3 or less days per week.   He is taking medications regularly.       Patient presents to the clinic today for blood pressure check due to history of hypertension. He is currently taking lisinopril 40 MG (changed from 20 mg BID in November) and carvedilol 25 MG and he states that he has no concerns regarding the medications and has no side effects. He has been measuring his blood pressure at home daily the last 3 weeks and it is usually around 125/80. He wants to continue current dosing since it effectively controls his pressure.    He also states that he continues to have dyspnea that is usually with exertion, but sometimes is at rest also. He has a history of COPD, pulmonary hypertension, and pleural effusion. Has a 51.25 pack year cigarette smoking history. He has chronic dyspnea with associated cough and phlegm production. He states that he has audible gurgling with talking. He has been taking furosemide every other day,  "which has improved some of his symptoms but has not eliminated them.         Objective    /60 (BP Location: Right arm, Patient Position: Sitting, Cuff Size: Adult Regular)   Pulse 67   Temp 97.1  F (36.2  C) (Temporal)   Resp 18   Ht 1.753 m (5' 9\")   Wt 94.8 kg (209 lb)   SpO2 92%   BMI 30.86 kg/m    Body mass index is 30.86 kg/m .    Review of Systems  Constitutional, HEENT, cardiovascular, pulmonary, gi and gu systems are negative, except as otherwise noted.  Physical Exam  Constitutional:       General: He is not in acute distress.     Appearance: Normal appearance. He is not ill-appearing, toxic-appearing or diaphoretic.   HENT:      Head: Normocephalic.   Cardiovascular:      Rate and Rhythm: Normal rate.      Pulses: Normal pulses.      Heart sounds: Normal heart sounds. No murmur heard.     No friction rub.   Pulmonary:      Effort: No tachypnea, bradypnea, accessory muscle usage, prolonged expiration, respiratory distress or retractions.      Breath sounds: No stridor. Rales present. No decreased breath sounds.   Musculoskeletal:         General: Normal range of motion.      Cervical back: Normal range of motion.   Skin:     General: Skin is warm.   Neurological:      Mental Status: He is alert and oriented to person, place, and time.   Psychiatric:         Mood and Affect: Mood normal.         Behavior: Behavior normal.         Thought Content: Thought content normal.        Results for orders placed or performed in visit on 01/18/24   Basic metabolic panel  (Ca, Cl, CO2, Creat, Gluc, K, Na, BUN)     Status: Abnormal   Result Value Ref Range    Sodium 141 135 - 145 mmol/L    Potassium 4.8 3.4 - 5.3 mmol/L    Chloride 102 98 - 107 mmol/L    Carbon Dioxide (CO2) 30 (H) 22 - 29 mmol/L    Anion Gap 9 7 - 15 mmol/L    Urea Nitrogen 21.8 8.0 - 23.0 mg/dL    Creatinine 1.24 (H) 0.67 - 1.17 mg/dL    GFR Estimate 64 >60 mL/min/1.73m2    Calcium 9.4 8.8 - 10.2 mg/dL    Glucose 110 (H) 70 - 99 mg/dL "             Mukul WATSON    Signed Electronically by: Hamlet Roberts PA-C

## 2024-01-18 NOTE — Clinical Note
I forgot to have him get a chest x-ray today before he left. Can we scheduled this within the next week?  Gilberto

## 2024-01-22 ENCOUNTER — ANCILLARY PROCEDURE (OUTPATIENT)
Dept: GENERAL RADIOLOGY | Facility: CLINIC | Age: 69
End: 2024-01-22
Attending: PHYSICIAN ASSISTANT
Payer: COMMERCIAL

## 2024-01-22 DIAGNOSIS — R09.89 RALES: ICD-10-CM

## 2024-01-22 PROCEDURE — 71046 X-RAY EXAM CHEST 2 VIEWS: CPT | Mod: GC | Performed by: RADIOLOGY

## 2024-02-20 ENCOUNTER — TELEPHONE (OUTPATIENT)
Dept: FAMILY MEDICINE | Facility: OTHER | Age: 69
End: 2024-02-20
Payer: COMMERCIAL

## 2024-02-21 NOTE — TELEPHONE ENCOUNTER
My chart message sent to schedule just a lab appointment. Postponing to see if patient views message.
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
## 2024-03-06 ENCOUNTER — LAB (OUTPATIENT)
Dept: LAB | Facility: OTHER | Age: 69
End: 2024-03-06
Payer: COMMERCIAL

## 2024-03-06 DIAGNOSIS — M1A.09X0 IDIOPATHIC CHRONIC GOUT OF MULTIPLE SITES WITHOUT TOPHUS: ICD-10-CM

## 2024-03-06 DIAGNOSIS — N18.2 STAGE 2 CHRONIC KIDNEY DISEASE: ICD-10-CM

## 2024-03-06 LAB
ALBUMIN SERPL BCG-MCNC: 3.6 G/DL (ref 3.5–5.2)
ALP SERPL-CCNC: 131 U/L (ref 40–150)
ALT SERPL W P-5'-P-CCNC: 9 U/L (ref 0–70)
ANION GAP SERPL CALCULATED.3IONS-SCNC: 11 MMOL/L (ref 7–15)
AST SERPL W P-5'-P-CCNC: 16 U/L (ref 0–45)
BASOPHILS # BLD AUTO: 0.1 10E3/UL (ref 0–0.2)
BASOPHILS NFR BLD AUTO: 1 %
BILIRUB SERPL-MCNC: 0.8 MG/DL
BUN SERPL-MCNC: 13.3 MG/DL (ref 8–23)
CALCIUM SERPL-MCNC: 9.3 MG/DL (ref 8.8–10.2)
CHLORIDE SERPL-SCNC: 104 MMOL/L (ref 98–107)
CREAT SERPL-MCNC: 1.25 MG/DL (ref 0.67–1.17)
DEPRECATED HCO3 PLAS-SCNC: 24 MMOL/L (ref 22–29)
EGFRCR SERPLBLD CKD-EPI 2021: 64 ML/MIN/1.73M2
EOSINOPHIL # BLD AUTO: 0.4 10E3/UL (ref 0–0.7)
EOSINOPHIL NFR BLD AUTO: 4 %
ERYTHROCYTE [DISTWIDTH] IN BLOOD BY AUTOMATED COUNT: 14.4 % (ref 10–15)
GLUCOSE SERPL-MCNC: 105 MG/DL (ref 70–99)
HCT VFR BLD AUTO: 49.6 % (ref 40–53)
HGB BLD-MCNC: 16.2 G/DL (ref 13.3–17.7)
IMM GRANULOCYTES # BLD: 0 10E3/UL
IMM GRANULOCYTES NFR BLD: 0 %
LYMPHOCYTES # BLD AUTO: 1.4 10E3/UL (ref 0.8–5.3)
LYMPHOCYTES NFR BLD AUTO: 14 %
MCH RBC QN AUTO: 32.3 PG (ref 26.5–33)
MCHC RBC AUTO-ENTMCNC: 32.7 G/DL (ref 31.5–36.5)
MCV RBC AUTO: 99 FL (ref 78–100)
MONOCYTES # BLD AUTO: 0.7 10E3/UL (ref 0–1.3)
MONOCYTES NFR BLD AUTO: 7 %
NEUTROPHILS # BLD AUTO: 7.5 10E3/UL (ref 1.6–8.3)
NEUTROPHILS NFR BLD AUTO: 75 %
PLATELET # BLD AUTO: 267 10E3/UL (ref 150–450)
POTASSIUM SERPL-SCNC: 4.1 MMOL/L (ref 3.4–5.3)
PROT SERPL-MCNC: 7 G/DL (ref 6.4–8.3)
RBC # BLD AUTO: 5.01 10E6/UL (ref 4.4–5.9)
SODIUM SERPL-SCNC: 139 MMOL/L (ref 135–145)
URATE SERPL-MCNC: 3.6 MG/DL (ref 3.4–7)
WBC # BLD AUTO: 10 10E3/UL (ref 4–11)

## 2024-03-06 PROCEDURE — 84550 ASSAY OF BLOOD/URIC ACID: CPT

## 2024-03-06 PROCEDURE — 80053 COMPREHEN METABOLIC PANEL: CPT

## 2024-03-06 PROCEDURE — 36415 COLL VENOUS BLD VENIPUNCTURE: CPT

## 2024-03-06 PROCEDURE — 85025 COMPLETE CBC W/AUTO DIFF WBC: CPT

## 2024-03-14 ENCOUNTER — OFFICE VISIT (OUTPATIENT)
Dept: CARDIOLOGY | Facility: CLINIC | Age: 69
End: 2024-03-14
Payer: COMMERCIAL

## 2024-03-14 VITALS
SYSTOLIC BLOOD PRESSURE: 94 MMHG | HEART RATE: 66 BPM | BODY MASS INDEX: 30.07 KG/M2 | WEIGHT: 203 LBS | HEIGHT: 69 IN | OXYGEN SATURATION: 93 % | DIASTOLIC BLOOD PRESSURE: 64 MMHG

## 2024-03-14 DIAGNOSIS — I27.20 PULMONARY HYPERTENSION (H): ICD-10-CM

## 2024-03-14 DIAGNOSIS — I50.9 ACUTE ON CHRONIC CONGESTIVE HEART FAILURE, UNSPECIFIED HEART FAILURE TYPE (H): ICD-10-CM

## 2024-03-14 PROCEDURE — 99214 OFFICE O/P EST MOD 30 MIN: CPT | Performed by: INTERNAL MEDICINE

## 2024-03-14 ASSESSMENT — PAIN SCALES - GENERAL: PAINLEVEL: SEVERE PAIN (6)

## 2024-03-14 NOTE — PROGRESS NOTES
HISTORY:    Bucky Edgar is a pleasant 68-year-old gentleman with a history of hypertension, dyslipidemia, peripheral vascular disease and COPD due to a 100-pack-year smoking history.  In mid June 2023 he presented to [facility] with complaints of dyspnea and was found to have heart failure.  He was started on Lasix with loss of about 20 pounds of fluid resulting in improvement in his breathing and stamina.  He was started on Farxiga and spironolactone but both of these were subsequently discontinued when he had a lot of GI problems with dizziness and found that these medications were very expensive.  Both were stopped.  He also developed significant renal insufficiency from his diuretics and doses were reduced.    I first met Ismael last September at which time he was doing well and using Lasix as needed only very infrequently.  At that time he stated that his energy and stamina were normal and I suggested.he is using his Lasix on a scheduled basis rather than just as needed.  He was waiting for his weight to go up, then using a dose of Lasix to bring it down and repeating the cycle.  Shortly after our visit he apparently develops more problems with fluid retention and started using his Lasix every other day.  Since making that change his weight has stayed completely stable.  He reports today that he feels that his energy and stamina are normal.  He does have some ongoing shortness of breath from his emphysema does not have easy fatigability and denies chest pain, PND/orthopnea, syncope or near syncope, palpitations, strokelike symptoms, peripheral edema, or claudication.      ASSESSMENT/PLAN:    1.  HFpEF.  Patient had a normal echocardiogram but volume overload and was diagnosed with EF [facility] last summer.  He has been using Lasix on a scheduled basis for the last several months with slowing control of his weight with very little fluctuation.  I suggested that he continue  his current regimen and consider using an extra Lasix for unexplained weight gain and to let me know if this is a frequent occurrence.  He reasserted to me today that he cannot afford Farxiga even on the patient assistance program he was enrolled on.  2.  Hypertension very well-controlled.  In fact his blood pressure is on the low side.  He checks his own blood pressure regularly and generally finds the systolic values to be around 110, ideal for him.  We will continue his lisinopril 40 mg daily.  3.  Hyperlipidemia well-controlled using atorvastatin 10 mg daily.    Thank you for inviting me to participate in the care of your patient.  Please don't hesitate to call if I can be of further assistance.  30 minutes were spent today reviewing the chart and other records, interviewing and examining the patient, and documenting our visit.    Chart documentation was completed, in part, with [employer] voice-recognition software. Even though reviewed, some grammatical, spelling, and word errors may remain.       Orders Placed This Encounter   Procedures    Follow-Up with Cardiology ROBBY     No orders of the defined types were placed in this encounter.    There are no discontinued medications.    10 year ASCVD risk: The ASCVD Risk score ([place] DK, et al., 2019) failed to calculate for the following reasons:    The valid total cholesterol range is 130 to 320 mg/dL    Encounter Diagnoses   Name Primary?    Acute on chronic congestive heart failure, unspecified heart failure type (H)     Pulmonary hypertension (H)        CURRENT MEDICATIONS:  Current Outpatient Medications   Medication Sig Dispense Refill    acetaminophen (TYLENOL) 325 MG tablet Take 650 mg by mouth every 6 hours       albuterol (PROAIR HFA/PROVENTIL HFA/VENTOLIN HFA) 108 (90 Base) MCG/ACT inhaler Inhale 2 puffs into the lungs every 6 hours as needed for shortness of breath / dyspnea 18 g 0    allopurinol (ZYLOPRIM) 300 MG tablet Take 1 tablet (300 mg) by mouth  daily 90 tablet 2    aspirin (ASA) 81 MG EC tablet Take 1 tablet (81 mg) by mouth daily      atorvastatin (LIPITOR) 10 MG tablet Take 1 tablet (10 mg) by mouth daily 90 tablet 3    carvedilol (COREG) 25 MG tablet Take 1 tablet (25 mg) by mouth 2 times daily (with meals) 180 tablet 3    fluticasone (ARNUITY ELLIPTA) 100 MCG/ACT inhaler Inhale 1 puff into the lungs daily 30 each 2    furosemide (LASIX) 20 MG tablet Take 1 tablet (20 mg) by mouth every other day 45 tablet 3    lisinopril (ZESTRIL) 20 MG tablet Take 1 tablet (20 mg) by mouth 2 times daily 180 tablet 0    nicotine (NICORETTE) 4 MG lozenge Place 4 mg inside cheek as needed      vitamin D3 (CHOLECALCIFEROL) 50 mcg (2000 units) tablet Take 1 tablet (50 mcg) by mouth daily 90 tablet 1    lisinopril (ZESTRIL) 40 MG tablet Take 1 tablet (40 mg) by mouth daily (Patient not taking: Reported on 3/14/2024) 90 tablet 3       ALLERGIES     Allergies   Allergen Reactions    Chlorthalidone Other (See Comments)     Excessive lowering of blood pressure    Amlodipine Other (See Comments)     Intractable headache with use of medication as well as noting a small tremor of the upper extremities       PAST MEDICAL HISTORY:  Past Medical History:   Diagnosis Date    COPD (chronic obstructive pulmonary disease) (H)     Displacement of lumbar intervertebral disc without myelopathy 1995, 2002    HTN, goal below 140/90        PAST SURGICAL HISTORY:  Past Surgical History:   Procedure Laterality Date    HC REPAIR ROTATOR CUFF,CHRONIC  1989    IR LOWER EXTREMITY VENOGRAM LEFT  5/4/2021    IR VENOUS STENT  5/4/2021    [facility] DECOMPRESS SPINAL CORD,1 SEG  1995, 4/2002    leftL4-L5, 2nd right L4-L5    ZZC SPINE FUSION,ANTER,3 SGMTS  2/9/2004    ant and post fusion L4-S1       FAMILY HISTORY:  Family History   Problem Relation Age of Onset    Family History Negative Other     Diabetes No family hx of     Coronary Artery Disease No family hx of     Hypertension No family hx of      Hyperlipidemia No family hx of     Breast Cancer No family hx of     Prostate Cancer No family hx of     Anxiety Disorder No family hx of     Substance Abuse No family hx of     Asthma No family hx of     Thyroid Disease No family hx of     Unknown/Adopted No family hx of     Genetic Disorder No family hx of     Osteoporosis No family hx of     Anesthesia Reaction No family hx of     Mental Illness No family hx of     Depression No family hx of     Other Cancer No family hx of     Colon Cancer No family hx of     Cerebrovascular Disease No family hx of     Obesity No family hx of        SOCIAL HISTORY:  Social History     Socioeconomic History    Marital status:    Tobacco Use    Smoking status: Former     Packs/day: 1.25     Years: 41.00     Additional pack years: 0.00     Total pack years: 51.25     Types: Cigarettes     Quit date: 10/1/2017     Years since quittin.4    Smokeless tobacco: Never   Vaping Use    Vaping Use: Never used   Substance and Sexual Activity    Alcohol use: Yes     Alcohol/week: 0.0 standard drinks of alcohol     Comment: 12 pack of beer every 2 weeks    Drug use: No    Sexual activity: Not Currently     Partners: Female   Other Topics Concern    Parent/sibling w/ CABG, MI or angioplasty before 65F 55M? No     Social Determinants of Health     Financial Resource Strain: High Risk (2024)    Financial Resource Strain     Within the past 12 months, have you or your family members you live with been unable to get utilities (heat, electricity) when it was really needed?: Yes   Food Insecurity: Low Risk  (2024)    Food Insecurity     Within the past 12 months, did you worry that your food would run out before you got money to buy more?: No     Within the past 12 months, did the food you bought just not last and you didn t have money to get more?: No   Transportation Needs: Low Risk  (2024)    Transportation Needs     Within the past 12 months, has lack of transportation  "kept you from medical appointments, getting your medicines, non-medical meetings or appointments, work, or from getting things that you need?: No   Interpersonal Safety: Low Risk  (11/20/2023)    Interpersonal Safety     Do you feel physically and emotionally safe where you currently live?: Yes     Within the past 12 months, have you been hit, slapped, kicked or otherwise physically hurt by someone?: No     Within the past 12 months, have you been humiliated or emotionally abused in other ways by your partner or ex-partner?: No   Housing Stability: Low Risk  (1/11/2024)    Housing Stability     Do you have housing? : Yes     Are you worried about losing your housing?: No       Review of Systems:  Skin:  Negative     Eyes:  Positive for glasses  ENT:  Negative    Respiratory:  Positive for dyspnea on exertion;cough;shortness of breath  Cardiovascular:  Negative for;edema Positive for;palpitations;chest pain;lightheadedness;dizziness  Gastroenterology: Positive for heartburn  Genitourinary:  Negative    Musculoskeletal:  Positive for back pain  Neurologic:  Positive for numbness or tingling of hands;numbness or tingling of feet  Psychiatric:  Negative    Heme/Lymph/Imm:  Positive for allergies  Endocrine:  Negative      Physical Exam:  Vitals: BP 94/64 (BP Location: Right arm, Patient Position: Sitting, Cuff Size: Adult Regular)   Pulse 66   Ht 1.753 m (5' 9.02\")   Wt 92.1 kg (203 lb)   SpO2 93%   BMI 29.96 kg/m      Constitutional:  cooperative, alert and oriented, well developed, well nourished, in no acute distress        Skin:  warm and dry to the touch, no apparent skin lesions or masses noted        Head:  normocephalic, no masses or lesions        Eyes:  pupils equal and round, conjunctivae and lids unremarkable, sclera white, no xanthalasma, EOMS intact, no nystagmus        ENT:  no pallor or cyanosis poor dentition      Neck:  carotid pulses are full and equal bilaterally, JVP normal, no carotid bruit    "     Chest:  normal breath sounds, clear to auscultation, normal A-P diameter, normal symmetry, normal respiratory excursion, no use of accessory muscles        Cardiac: regular rhythm, normal S1/S2, no S3 or S4, apical impulse not displaced, no murmurs, gallops or rubs                  Abdomen:  abdomen soft        Vascular: pulses full and equal                                      Extremities and Muscular Skeletal:  no edema           Neurological:  no gross motor deficits        Psych:  affect appropriate, oriented to time, person and place     Recent Lab Results:  LIPID RESULTS:  Lab Results   Component Value Date    CHOL 120 05/15/2023    CHOL 155 05/24/2021    HDL 72 05/15/2023    HDL 51 05/24/2021    LDL 38 05/15/2023    LDL 74 05/24/2021    TRIG 52 05/15/2023    TRIG 151 (H) 05/24/2021    CHOLHDLRATIO 4.4 11/12/2015       LIVER ENZYME RESULTS:  Lab Results   Component Value Date    AST 16 03/06/2024    AST 18 05/24/2021    ALT 9 03/06/2024    ALT 28 05/24/2021       CBC RESULTS:  Lab Results   Component Value Date    WBC 10.0 03/06/2024    WBC 8.8 05/24/2021    RBC 5.01 03/06/2024    RBC 5.18 05/24/2021    HGB 16.2 03/06/2024    HGB 16.0 05/24/2021    HCT 49.6 03/06/2024    HCT 48.8 05/24/2021    MCV 99 03/06/2024    MCV 94 05/24/2021    MCH 32.3 03/06/2024    MCH 30.9 05/24/2021    MCHC 32.7 03/06/2024    MCHC 32.8 05/24/2021    RDW 14.4 03/06/2024    RDW 15.8 (H) 05/24/2021     03/06/2024     05/24/2021       BMP RESULTS:  Lab Results   Component Value Date     03/06/2024     05/24/2021    POTASSIUM 4.1 03/06/2024    POTASSIUM 4.3 05/31/2022    POTASSIUM 4.4 05/24/2021    CHLORIDE 104 03/06/2024    CHLORIDE 114 (H) 05/31/2022    CHLORIDE 110 (H) 05/24/2021    CO2 24 03/06/2024    CO2 26 05/31/2022    CO2 25 05/24/2021    ANIONGAP 11 03/06/2024    ANIONGAP 4 05/31/2022    ANIONGAP 5 05/24/2021     (H) 03/06/2024    GLC 95 05/31/2022     (H) 05/24/2021    BUN 13.3  03/06/2024    BUN 14 05/31/2022    BUN 13 05/24/2021    CR 1.25 (H) 03/06/2024    CR 0.95 05/24/2021    GFRESTIMATED 64 03/06/2024    GFRESTIMATED 84 05/24/2021    GFRESTBLACK >90 05/24/2021    JUSTIN 9.3 03/06/2024    JUSTIN 8.5 05/24/2021        A1C RESULTS:  Lab Results   Component Value Date    A1C 5.3 05/08/2019       INR RESULTS:  Lab Results   Component Value Date    INR 0.92 05/04/2021    INR 2.4 (H) 05/01/2020    INR 2.2 (H) 04/17/2020    INR 1.47 (H) 04/07/2020         Jaylen Still MD, FACC    CC  [place]let Roberts, PA-C  [address]  [place] [address]

## 2024-03-14 NOTE — LETTER
3/14/2024    Hamlet Roberts PA-C  [address] [facility] [address]  [facility] [address]    RE: Bucky KING Tala       Dear Colleague,     I had the pleasure of seeing Bucky Edgar in the [facility].  HISTORY:    Bucky Edgar is a pleasant 68-year-old gentleman with a history of hypertension, dyslipidemia, peripheral vascular disease and COPD due to a 100-pack-year smoking history.  In mid June 2023 he presented to [facility] with complaints of dyspnea and was found to have heart failure.  He was started on Lasix with loss of about 20 pounds of fluid resulting in improvement in his breathing and stamina.  He was started on Farxiga and spironolactone but both of these were subsequently discontinued when he had a lot of GI problems with dizziness and found that these medications were very expensive.  Both were stopped.  He also developed significant renal insufficiency from his diuretics and doses were reduced.    I first met Ismael last September at which time he was doing well and using Lasix as needed only very infrequently.  At that time he stated that his energy and stamina were normal and I suggested.he is using his Lasix on a scheduled basis rather than just as needed.  He was waiting for his weight to go up, then using a dose of Lasix to bring it down and repeating the cycle.  Shortly after our visit he apparently develops more problems with fluid retention and started using his Lasix every other day.  Since making that change his weight has stayed completely stable.  He reports today that he feels that his energy and stamina are normal.  He does have some ongoing shortness of breath from his emphysema does not have easy fatigability and denies chest pain, PND/orthopnea, syncope or near syncope, palpitations, strokelike symptoms, peripheral edema, or claudication.      ASSESSMENT/PLAN:    1.  HFpEF.  Patient had a normal echocardiogram but volume overload and was diagnosed with EF  [facility] last summer.  He has been using Lasix on a scheduled basis for the last several months with slowing control of his weight with very little fluctuation.  I suggested that he continue his current regimen and consider using an extra Lasix for unexplained weight gain and to let me know if this is a frequent occurrence.  He reasserted to me today that he cannot afford Farxiga even on the patient assistance program he was enrolled on.  2.  Hypertension very well-controlled.  In fact his blood pressure is on the low side.  He checks his own blood pressure regularly and generally finds the systolic values to be around 110, ideal for him.  We will continue his lisinopril 40 mg daily.  3.  Hyperlipidemia well-controlled using atorvastatin 10 mg daily.    Thank you for inviting me to participate in the care of your patient.  Please don't hesitate to call if I can be of further assistance.  30 minutes were spent today reviewing the chart and other records, interviewing and examining the patient, and documenting our visit.    Chart documentation was completed, in part, with [employer] voice-recognition software. Even though reviewed, some grammatical, spelling, and word errors may remain.       Orders Placed This Encounter   Procedures    Follow-Up with Cardiology ROBBY     No orders of the defined types were placed in this encounter.    There are no discontinued medications.    10 year ASCVD risk: The ASCVD Risk score (Brandon DK, et al., 2019) failed to calculate for the following reasons:    The valid total cholesterol range is 130 to 320 mg/dL    Encounter Diagnoses   Name Primary?    Acute on chronic congestive heart failure, unspecified heart failure type (H)     Pulmonary hypertension (H)        CURRENT MEDICATIONS:  Current Outpatient Medications   Medication Sig Dispense Refill    acetaminophen (TYLENOL) 325 MG tablet Take 650 mg by mouth every 6 hours       albuterol (PROAIR HFA/PROVENTIL  HFA/VENTOLIN HFA) 108 (90 Base) MCG/ACT inhaler Inhale 2 puffs into the lungs every 6 hours as needed for shortness of breath / dyspnea 18 g 0    allopurinol (ZYLOPRIM) 300 MG tablet Take 1 tablet (300 mg) by mouth daily 90 tablet 2    aspirin (ASA) 81 MG EC tablet Take 1 tablet (81 mg) by mouth daily      atorvastatin (LIPITOR) 10 MG tablet Take 1 tablet (10 mg) by mouth daily 90 tablet 3    carvedilol (COREG) 25 MG tablet Take 1 tablet (25 mg) by mouth 2 times daily (with meals) 180 tablet 3    fluticasone (ARNUITY ELLIPTA) 100 MCG/ACT inhaler Inhale 1 puff into the lungs daily 30 each 2    furosemide (LASIX) 20 MG tablet Take 1 tablet (20 mg) by mouth every other day 45 tablet 3    lisinopril (ZESTRIL) 20 MG tablet Take 1 tablet (20 mg) by mouth 2 times daily 180 tablet 0    nicotine (NICORETTE) 4 MG lozenge Place 4 mg inside cheek as needed      vitamin D3 (CHOLECALCIFEROL) 50 mcg (2000 units) tablet Take 1 tablet (50 mcg) by mouth daily 90 tablet 1    lisinopril (ZESTRIL) 40 MG tablet Take 1 tablet (40 mg) by mouth daily (Patient not taking: Reported on 3/14/2024) 90 tablet 3       ALLERGIES     Allergies   Allergen Reactions    Chlorthalidone Other (See Comments)     Excessive lowering of blood pressure    Amlodipine Other (See Comments)     Intractable headache with use of medication as well as noting a small tremor of the upper extremities       PAST MEDICAL HISTORY:  Past Medical History:   Diagnosis Date    COPD (chronic obstructive pulmonary disease) (H)     Displacement of lumbar intervertebral disc without myelopathy 1995, 2002    HTN, goal below 140/90        PAST SURGICAL HISTORY:  Past Surgical History:   Procedure Laterality Date    HC REPAIR ROTATOR CUFF,CHRONIC  1989    IR LOWER EXTREMITY VENOGRAM LEFT  5/4/2021    IR VENOUS STENT  5/4/2021    ZZC DECOMPRESS SPINAL CORD,1 SEG  1995, 4/2002    leftL4-L5, 2nd right L4-L5    ZZC SPINE FUSION,ANTER,3 SGMTS  2/9/2004    ant and post fusion L4-S1        FAMILY HISTORY:  Family History   Problem Relation Age of Onset    Family History Negative Other     Diabetes No family hx of     Coronary Artery Disease No family hx of     Hypertension No family hx of     Hyperlipidemia No family hx of     Breast Cancer No family hx of     Prostate Cancer No family hx of     Anxiety Disorder No family hx of     Substance Abuse No family hx of     Asthma No family hx of     Thyroid Disease No family hx of     Unknown/Adopted No family hx of     Genetic Disorder No family hx of     Osteoporosis No family hx of     Anesthesia Reaction No family hx of     Mental Illness No family hx of     Depression No family hx of     Other Cancer No family hx of     Colon Cancer No family hx of     Cerebrovascular Disease No family hx of     Obesity No family hx of        SOCIAL HISTORY:  Social History     Socioeconomic History    Marital status:    Tobacco Use    Smoking status: Former     Packs/day: 1.25     Years: 41.00     Additional pack years: 0.00     Total pack years: 51.25     Types: Cigarettes     Quit date: 10/1/2017     Years since quittin.4    Smokeless tobacco: Never   Vaping Use    Vaping Use: Never used   Substance and Sexual Activity    Alcohol use: Yes     Alcohol/week: 0.0 standard drinks of alcohol     Comment: 12 pack of beer every 2 weeks    Drug use: No    Sexual activity: Not Currently     Partners: Female   Other Topics Concern    Parent/sibling w/ CABG, MI or angioplasty before 65F 55M? No     Social Determinants of Health     Financial Resource Strain: High Risk (2024)    Financial Resource Strain     Within the past 12 months, have you or your family members you live with been unable to get utilities (heat, electricity) when it was really needed?: Yes   Food Insecurity: Low Risk  (2024)    Food Insecurity     Within the past 12 months, did you worry that your food would run out before you got money to buy more?: No     Within the past 12  "months, did the food you bought just not last and you didn t have money to get more?: No   Transportation Needs: Low Risk  (1/11/2024)    Transportation Needs     Within the past 12 months, has lack of transportation kept you from medical appointments, getting your medicines, non-medical meetings or appointments, work, or from getting things that you need?: No   Interpersonal Safety: Low Risk  (11/20/2023)    Interpersonal Safety     Do you feel physically and emotionally safe where you currently live?: Yes     Within the past 12 months, have you been hit, slapped, kicked or otherwise physically hurt by someone?: No     Within the past 12 months, have you been humiliated or emotionally abused in other ways by your partner or ex-partner?: No   Housing Stability: Low Risk  (1/11/2024)    Housing Stability     Do you have housing? : Yes     Are you worried about losing your housing?: No       Review of Systems:  Skin:  Negative     Eyes:  Positive for glasses  ENT:  Negative    Respiratory:  Positive for dyspnea on exertion;cough;shortness of breath  Cardiovascular:  Negative for;edema Positive for;palpitations;chest pain;lightheadedness;dizziness  Gastroenterology: Positive for heartburn  Genitourinary:  Negative    Musculoskeletal:  Positive for back pain  Neurologic:  Positive for numbness or tingling of hands;numbness or tingling of feet  Psychiatric:  Negative    Heme/Lymph/Imm:  Positive for allergies  Endocrine:  Negative      Physical Exam:  Vitals: BP 94/64 (BP Location: Right arm, Patient Position: Sitting, Cuff Size: Adult Regular)   Pulse 66   Ht 1.753 m (5' 9.02\")   Wt 92.1 kg (203 lb)   SpO2 93%   BMI 29.96 kg/m      Constitutional:  cooperative, alert and oriented, well developed, well nourished, in no acute distress        Skin:  warm and dry to the touch, no apparent skin lesions or masses noted        Head:  normocephalic, no masses or lesions        Eyes:  pupils equal and round, conjunctivae " and lids unremarkable, sclera white, no xanthalasma, EOMS intact, no nystagmus        ENT:  no pallor or cyanosis poor dentition      Neck:  carotid pulses are full and equal bilaterally, JVP normal, no carotid bruit        Chest:  normal breath sounds, clear to auscultation, normal A-P diameter, normal symmetry, normal respiratory excursion, no use of accessory muscles        Cardiac: regular rhythm, normal S1/S2, no S3 or S4, apical impulse not displaced, no murmurs, gallops or rubs                  Abdomen:  abdomen soft        Vascular: pulses full and equal                                      Extremities and Muscular Skeletal:  no edema           Neurological:  no gross motor deficits        Psych:  affect appropriate, oriented to time, person and place     Recent Lab Results:  LIPID RESULTS:  Lab Results   Component Value Date    CHOL 120 05/15/2023    CHOL 155 05/24/2021    HDL 72 05/15/2023    HDL 51 05/24/2021    LDL 38 05/15/2023    LDL 74 05/24/2021    TRIG 52 05/15/2023    TRIG 151 (H) 05/24/2021    CHOLHDLRATIO 4.4 11/12/2015       LIVER ENZYME RESULTS:  Lab Results   Component Value Date    AST 16 03/06/2024    AST 18 05/24/2021    ALT 9 03/06/2024    ALT 28 05/24/2021       CBC RESULTS:  Lab Results   Component Value Date    WBC 10.0 03/06/2024    WBC 8.8 05/24/2021    RBC 5.01 03/06/2024    RBC 5.18 05/24/2021    HGB 16.2 03/06/2024    HGB 16.0 05/24/2021    HCT 49.6 03/06/2024    HCT 48.8 05/24/2021    MCV 99 03/06/2024    MCV 94 05/24/2021    MCH 32.3 03/06/2024    MCH 30.9 05/24/2021    MCHC 32.7 03/06/2024    MCHC 32.8 05/24/2021    RDW 14.4 03/06/2024    RDW 15.8 (H) 05/24/2021     03/06/2024     05/24/2021       BMP RESULTS:  Lab Results   Component Value Date     03/06/2024     05/24/2021    POTASSIUM 4.1 03/06/2024    POTASSIUM 4.3 05/31/2022    POTASSIUM 4.4 05/24/2021    CHLORIDE 104 03/06/2024    CHLORIDE 114 (H) 05/31/2022    CHLORIDE 110 (H) 05/24/2021    CO2  24 03/06/2024    CO2 26 05/31/2022    CO2 25 05/24/2021    ANIONGAP 11 03/06/2024    ANIONGAP 4 05/31/2022    ANIONGAP 5 05/24/2021     (H) 03/06/2024    GLC 95 05/31/2022     (H) 05/24/2021    BUN 13.3 03/06/2024    BUN 14 05/31/2022    BUN 13 05/24/2021    CR 1.25 (H) 03/06/2024    CR 0.95 05/24/2021    GFRESTIMATED 64 03/06/2024    GFRESTIMATED 84 05/24/2021    GFRESTBLACK >90 05/24/2021    JUSTIN 9.3 03/06/2024    JUSTIN 8.5 05/24/2021        A1C RESULTS:  Lab Results   Component Value Date    A1C 5.3 05/08/2019       INR RESULTS:  Lab Results   Component Value Date    INR 0.92 05/04/2021    INR 2.4 (H) 05/01/2020    INR 2.2 (H) 04/17/2020    INR 1.47 (H) 04/07/2020         Jaylen Still MD, FACC    CC  Hamlet Roberts, PA-C  [address] [facility] [facility] [address]  [place] [address]      Thank you for allowing me to participate in the care of your patient.      Sincerely,     Jaylen Still MD     [facility]

## 2024-04-15 ENCOUNTER — PATIENT OUTREACH (OUTPATIENT)
Dept: CARE COORDINATION | Facility: CLINIC | Age: 69
End: 2024-04-15
Payer: COMMERCIAL

## 2024-04-18 ENCOUNTER — OFFICE VISIT (OUTPATIENT)
Dept: FAMILY MEDICINE | Facility: OTHER | Age: 69
End: 2024-04-18
Payer: COMMERCIAL

## 2024-04-18 VITALS
RESPIRATION RATE: 28 BRPM | HEART RATE: 57 BPM | TEMPERATURE: 97.3 F | HEIGHT: 69 IN | SYSTOLIC BLOOD PRESSURE: 80 MMHG | DIASTOLIC BLOOD PRESSURE: 60 MMHG | BODY MASS INDEX: 30.51 KG/M2 | WEIGHT: 206 LBS | OXYGEN SATURATION: 97 %

## 2024-04-18 DIAGNOSIS — N18.2 STAGE 2 CHRONIC KIDNEY DISEASE: ICD-10-CM

## 2024-04-18 DIAGNOSIS — J44.9 CHRONIC OBSTRUCTIVE PULMONARY DISEASE, UNSPECIFIED COPD TYPE (H): ICD-10-CM

## 2024-04-18 DIAGNOSIS — E78.5 HYPERLIPIDEMIA LDL GOAL <130: ICD-10-CM

## 2024-04-18 DIAGNOSIS — I71.43 INFRARENAL ABDOMINAL AORTIC ANEURYSM (AAA) WITHOUT RUPTURE (H): ICD-10-CM

## 2024-04-18 DIAGNOSIS — I10 HTN, GOAL BELOW 140/90: ICD-10-CM

## 2024-04-18 DIAGNOSIS — Z87.891 PERSONAL HISTORY OF TOBACCO USE: ICD-10-CM

## 2024-04-18 DIAGNOSIS — I50.9 ACUTE ON CHRONIC CONGESTIVE HEART FAILURE, UNSPECIFIED HEART FAILURE TYPE (H): ICD-10-CM

## 2024-04-18 DIAGNOSIS — Z00.00 MEDICARE ANNUAL WELLNESS VISIT, SUBSEQUENT: Primary | ICD-10-CM

## 2024-04-18 DIAGNOSIS — Z12.5 SCREENING FOR PROSTATE CANCER: ICD-10-CM

## 2024-04-18 LAB
CHOLEST SERPL-MCNC: 149 MG/DL
FASTING STATUS PATIENT QL REPORTED: NO
HDLC SERPL-MCNC: 67 MG/DL
LDLC SERPL CALC-MCNC: 67 MG/DL
NONHDLC SERPL-MCNC: 82 MG/DL
PSA SERPL DL<=0.01 NG/ML-MCNC: 0.48 NG/ML (ref 0–4.5)
TRIGL SERPL-MCNC: 73 MG/DL

## 2024-04-18 PROCEDURE — G0103 PSA SCREENING: HCPCS | Performed by: PHYSICIAN ASSISTANT

## 2024-04-18 PROCEDURE — 99214 OFFICE O/P EST MOD 30 MIN: CPT | Mod: 25 | Performed by: PHYSICIAN ASSISTANT

## 2024-04-18 PROCEDURE — 80061 LIPID PANEL: CPT | Performed by: PHYSICIAN ASSISTANT

## 2024-04-18 PROCEDURE — G0439 PPPS, SUBSEQ VISIT: HCPCS | Performed by: PHYSICIAN ASSISTANT

## 2024-04-18 PROCEDURE — 36415 COLL VENOUS BLD VENIPUNCTURE: CPT | Performed by: PHYSICIAN ASSISTANT

## 2024-04-18 RX ORDER — LISINOPRIL 40 MG/1
20 TABLET ORAL DAILY
Status: SHIPPED
Start: 2024-04-18 | End: 2024-06-24

## 2024-04-18 RX ORDER — RESPIRATORY SYNCYTIAL VIRUS VACCINE 120MCG/0.5
0.5 KIT INTRAMUSCULAR ONCE
Qty: 1 EACH | Refills: 0 | Status: CANCELLED | OUTPATIENT
Start: 2024-04-18 | End: 2024-04-18

## 2024-04-18 ASSESSMENT — PAIN SCALES - GENERAL: PAINLEVEL: SEVERE PAIN (7)

## 2024-04-18 NOTE — PROGRESS NOTES
Assessment & Plan       ICD-10-CM    1. Medicare annual wellness visit, subsequent  Z00.00       2. Acute on chronic congestive heart failure, unspecified heart failure type (H)  I50.9 Lipid panel reflex to direct LDL Non-fasting     Lipid panel reflex to direct LDL Non-fasting      3. Chronic obstructive pulmonary disease, unspecified COPD type (H)  J44.9       4. HTN, goal below 140/90  I10 lisinopril (ZESTRIL) 40 MG tablet      5. Stage 2 chronic kidney disease  N18.2       6. Personal history of tobacco use  Z87.891 CT Chest Lung Cancer Scrn Low Dose wo      7. Infrarenal abdominal aortic aneurysm (AAA) without rupture (H24)  I71.43 CTA Abdomen Pelvis with Contrast      8. Hyperlipidemia LDL goal <130  E78.5       9. Screening for prostate cancer  Z12.5 PSA, screen     PSA, screen            1. No concerns today. He declines vaccines today, but is aware he is due for colon cancer screening.    2, 8. Will order lipid panel to assess levels. Instructed to continue lasix as prescribed unless blood pressure is less than 100/60. He follows closely with cardiology.    3. No new concerns today, will continue to monitor. He was instructed to continue fluticasone either once daily or once every other day along with as needed rescue inhaler.    4. Due to is low blood pressure and report of occurrences at home, will decrease lisinopril to 20 mg daily. He is instructed to monitor his pressures and inform me of readings if they continue to be low or if they are over 140/90. Will touch base in 2 weeks via [employer] to see how things are going.    5. Kidney disease is stable. Will continue to monitor and encouraged a low salt diet with adequate hydration.    6-7, 9. Will obtain updated low dose chest CT for lung cancer screening and updated imaging due to history of abdominal aortic aneurysm. Will also order PSA for prostate screening.    Follow-up in 6 months.       Patient was seen in conjunction with Jae Wyatt  PA-S2.      David Lawler is a 68 year old, presenting for the following health issues:  Hypertension and Medicare Visit        4/18/2024     9:26 AM   Additional Questions   Roomed by esther acosta     History of Present Illness       Hypertension: He presents for follow up of hypertension.  He does check blood pressure  regularly outside of the clinic. Outpatient blood pressures have not been over 140/90. He follows a low salt diet. He exercises with enough effort to increase his heart rate 9 or less minutes per day.  He exercises with enough effort to increase his heart rate 3 or less days per week.   He is taking medications regularly.     States he has no questions or concerns today. His blood pressure is 80/60 in clinic today. He states he will have similar readings at home a couple times/week or every other week and that they most often occur in the morning. He denies experiences dizziness/lightheadedness, blurry vision, or syncope. He usually runs 120s/80s and sometimes is over 140s after a higher sodium meal, but he otherwise tries to limit his sodium intake. He takes his lasix daily and has been having stable weights when he weighs himself on his scale. He also states his COPD is doing well and that he only uses the fluticasone inhaler every other day which has been helpful to him. He denies using it daily because it gives him a dry mouth. He denies smoking, but does drink 1-2 beers/night. He denies numbness, tingling, new dyspnea, chest pain, rapid/racing heart, abdominal pain, nausea, vomiting, diarrhea, changes in bowel habits.    Annual Wellness Visit     Patient has been advised of split billing requirements and indicates understanding: Yes       In general, how would you rate your overall physical health? (!) FAIR   Discussed with patient their rating of physical health; information has been provided.   Do you have a special diet?  Low salt      Do you see a dentist two times every year?  (!) NO   The patient was instructed to see the dentist every 6 months.   Have you been more tired than usual lately?  No  If you drink alcohol do you typically have >3 drinks per day or >7 drinks per week? No  Do you have a current opioid prescription? No  Do you use any other controlled substances or medications that are not prescribed by a provider? None  Social History     Tobacco Use    Smoking status: Former     Current packs/day: 0.00     Average packs/day: 1.3 packs/day for 41.0 years (51.3 ttl pk-yrs)     Types: Cigarettes     Start date: 10/1/1976     Quit date: 10/1/2017     Years since quittin.5    Smokeless tobacco: Never   Vaping Use    Vaping status: Never Used   Substance Use Topics    Alcohol use: Yes     Alcohol/week: 0.0 standard drinks of alcohol     Comment: 12 pack of beer every 2 weeks    Drug use: No       Needs assistance for the following daily activities: no assistance needed  Which of the following safety concerns are present in your home?  (!) NO GRAB BARS IN THE BATHROOM   Do you (or your family members) have any concerns about your safety while driving?  No  Do you have any of the following hearing concerns?: No hearing concerns  In the past 6 months, have you been bothered by leaking of urine? No        2024   Social Factors   Worry food won't last until get money to buy more No   Food not last or not have enough money for food? No   Do you have housing?  Yes   Are you worried about losing your housing? No   Lack of transportation? No   Unable to get utilities (heat,electricity)? Yes   Want help with housing or utility concern? No          2024   Fall Risk   Fallen 2 or more times in the past year? No   Trouble with walking or balance? No        Today's PHQ-2 Score:       2024    10:25 AM   PHQ-2 (  [employer])   Q1: Little interest or pleasure in doing things 0   Q2: Feeling down, depressed or hopeless 0   PHQ-2 Score 0   Q1: Little interest or pleasure in doing things Not  at all   Q2: Feeling down, depressed or hopeless Not at all   PHQ-2 Score 0     Last PSA:   PSA   Date Value Ref Range Status   11/16/2017 0.40 0 - 4 ug/L Final     Comment:     Assay Method:  Chemiluminescence using Siemens Vista analyzer     ASCVD Risk   The ASCVD Risk score (Brandon PIERSON, et al., 2019) failed to calculate for the following reasons:    The valid systolic blood pressure range is 90 to 200 mmHg    The valid total cholesterol range is 130 to 320 mg/dL      Reviewed and updated as needed this visit by Provider   Tobacco  Allergies  Meds  Problems  Med Hx  Surg Hx  Fam Hx            Current providers sharing in care for this patient include:  Patient Care Team:  Hamlet Roberts PA-C as PCP - General (Physician Assistant)  Coy Bolaños MD as MD (Rheumatology)  Germain Muñoz MD as MD (Hematology & Oncology)  Hamlet Roberts PA-C as Assigned PCP  Coy Bolaños MD as Assigned Rheumatology Provider  Jaylen tSill MD as Assigned Heart and Vascular Provider  Bhavya Torres MD as Assigned Nephrology Provider    The following health maintenance items are reviewed in Epic and correct as of today:  Health Maintenance   Topic Date Due    HF ACTION PLAN  Never done    ZOSTER IMMUNIZATION (1 of 2) Never done    RSV VACCINE (Pregnancy & 60+) (1 - 1-dose 60+ series) Never done    COLORECTAL CANCER SCREENING  07/26/2019    Pneumococcal Vaccine: 65+ Years (3 of 3 - PPSV23 or PCV20) 09/11/2022    INFLUENZA VACCINE (1) 09/01/2023    COVID-19 Vaccine (3 - 2023-24 season) 09/01/2023    LIPID  05/15/2024    LUNG CANCER SCREENING  05/16/2024    MICROALBUMIN  09/12/2024    ALT  03/06/2025    BMP  03/06/2025    CBC  03/06/2025    MEDICARE ANNUAL WELLNESS VISIT  04/18/2025    ANNUAL REVIEW OF HM ORDERS  04/18/2025    FALL RISK ASSESSMENT  04/18/2025    GLUCOSE  03/06/2027    ADVANCE CARE PLANNING  05/15/2028    DTAP/TDAP/TD IMMUNIZATION (4 - Td or Tdap) 11/25/2029    TSH W/FREE  "T4 REFLEX  Completed    SPIROMETRY  Completed    COPD ACTION PLAN  Completed    PHQ-2 (once per calendar year)  Completed    URINALYSIS  Completed    AORTIC ANEURYSM SCREENING (SYSTEM ASSIGNED)  Completed    HEPATITIS C SCREENING  Addressed    IPV IMMUNIZATION  Aged Out    HPV IMMUNIZATION  Aged Out    MENINGITIS IMMUNIZATION  Aged Out    RSV MONOCLONAL ANTIBODY  Aged Out       Appropriate preventive services were discussed with this patient, including applicable screening as appropriate for fall prevention, nutrition, physical activity, Tobacco-use cessation, weight loss and cognition.  Checklist reviewing preventive services available has been given to the patient.           4/18/2024   Mini Cog   Clock Draw Score 2 Normal   3 Item Recall 2 objects recalled   Mini Cog Total Score 4          Review of Systems  Constitutional, HEENT, cardiovascular, pulmonary, gi and gu systems are negative, except as otherwise noted.      Objective    BP (!) 80/60 (BP Location: Right arm, Patient Position: Sitting, Cuff Size: Adult Large)   Pulse 57   Temp 97.3  F (36.3  C) (Temporal)   Resp 28   Ht 1.753 m (5' 9\")   Wt 93.4 kg (206 lb)   SpO2 97%   BMI 30.42 kg/m    Body mass index is 30.42 kg/m .  Physical Exam   GENERAL: alert and no distress  EYES: Eyes grossly normal to inspection, PERRL and conjunctivae and sclerae normal  HENT: ear canals and TM's normal, nose and mouth without ulcers or lesions  NECK: no adenopathy, no asymmetry, masses, or scars  RESP:  Rales and expiratory wheezes noted with auscultation.  CV: regular rate and rhythm, normal S1 S2, no S3 or S4, no murmur, click or rub, no peripheral edema  ABDOMEN: soft, nontender, no hepatosplenomegaly, no masses and bowel sounds normal  MS: no gross musculoskeletal defects noted, no edema. Hands noted to be cool to touch.  SKIN: no suspicious lesions or rashes  NEURO: Normal strength and tone, mentation intact and speech normal. Gait is stable.   PSYCH: mentation " appears normal, affect normal/bright          Signed Electronically by: Hamlet Roberts PA-C

## 2024-04-18 NOTE — PROGRESS NOTES
Lung Cancer Screening Shared Decision Making Visit     Bucky Edgar, a 68 year old male, is eligible for lung cancer screening    History   Smoking Status     Former     Packs/day: 1.25     Years: 41.00     Types: Cigarettes     Quit date: 10/1/2017   Smokeless Tobacco     Never       I have discussed with patient the risks and benefits of screening for lung cancer with low-dose CT.     The risks include:    radiation exposure: one low dose chest CT has as much ionizing radiation as about 15 chest x-rays, or 6 months of background radiation living in [place]      false positives: most findings/nodules are NOT cancer, but some might still require additional diagnostic evaluation, including biopsy    over-diagnosis: some slow growing cancers that might never have been clinically significant will be detected and treated unnecessarily     The benefit of early detection of lung cancer is contingent upon adherence to annual screening or more frequent follow up if indicated.     Furthermore, to benefit from screening, Bucky must be willing and able to undergo diagnostic procedures, if indicated. Although no specific guide is available for determining severity of comorbidities, it is reasonable to withhold screening in patients who have greater mortality risk from other diseases.     We did discuss that the best way to prevent lung cancer is to not smoke.    Some patients may value a numeric estimation of lung cancer risk when evaluating if lung cancer screening is right for them, here is one calculator:    ShouldIScreen

## 2024-04-18 NOTE — PATIENT INSTRUCTIONS
Continue current medications but will decrease lisinopril to 20 mg daily.  Monitor pressure closely. If consistently below 100/60, hold your water pill (furosemide) or consistently above 140/90, let me know.    Will order updated low dose chest CT for lung cancer screening and CTA of abdomen for the abdominal aortic aneurysm.    Follow-up in 6 months.    Lung Cancer Screening   Frequently Asked Questions  If you are at high-risk for lung cancer, getting screened with low-dose computed tomography (LDCT) every year can help save your life. This handout offers answers to some of the most common questions about lung cancer screening. If you have other questions, please call [phone] ([phone]).     What is it?  Lung cancer screening uses special X-ray technology to create an image of your lung tissue. The exam is quick and easy and takes less than 10 seconds. We don t give you any medicine or use any needles. You can eat before and after the exam. You don t need to change your clothes as long as the clothing on your chest doesn t contain metal. But, you do need to be able to hold your breath for at least 6 seconds during the exam.    What is the goal of lung cancer screening?  The goal of lung cancer screening is to save lives. Many times, lung cancer is not found until a person starts having physical symptoms. Lung cancer screening can help detect lung cancer in the earliest stages when it may be easier to treat.    Who should be screened for lung cancer?  We suggest lung cancer screening for anyone who is at high-risk for lung cancer. You are in the high-risk group if you:     are between the ages of 55 and 79, and   have smoked at least 1 pack of cigarettes a day for 20 or more years, and   still smoke or have quit within the past 15 years.    However, if you have a new cough or shortness of breath, you should talk to your doctor before being screened.    Why does it matter if I have symptoms?  Certain  symptoms can be a sign that you have a condition in your lungs that should be checked and treated by your doctor. These symptoms include fever, chest pain, a new or changing cough, shortness of breath that you have never felt before, coughing up blood or unexplained weight loss. Having any of these symptoms can greatly affect the results of lung cancer screening.       Should all smokers get an LDCT lung cancer screening exam?  It depends. Lung cancer screening is for a very specific group of men and women who have a history of heavy smoking over a long period of time (see  Who should be screened for lung cancer  above).  I am in the high-risk group, but have been diagnosed with cancer in the past. Is LDCT lung cancer screening right for me?  In some cases, you should not have LDCT lung screening, such as when your doctor is already following your cancer with CT scan studies. Your doctor will help you decide if LDCT lung screening is right for you.  Do I need to have a screening exam every year?  Yes. If you are in the high-risk group described earlier, you should get an LDCT lung cancer screening exam every year until you are 79, or are no longer willing or able to undergo screening and possible procedures to diagnose and treat lung cancer.  How effective is LDCT at preventing death from lung cancer?  Studies have shown that LDCT lung cancer screening can lower the risk of death from lung cancer by 20 percent in people who are at high-risk.  What are the risks?  There are some risks and limitations of LDCT lung cancer screening. We want to make sure you understand the risks and benefits, so please let us know if you have any questions. Your doctor may want to talk with you more about these risks.   Radiation exposure: As with any exam that uses radiation, there is a very small increased risk of cancer. The amount of radiation in LDCT is small--about the same amount a person would get from a mammogram. Your doctor  orders the exam when he or she feels the potential benefits outweigh the risks.   False negatives: No test is perfect, including LDCT. It is possible that you may have a medical condition, including lung cancer, that is not found during your exam. This is called a false negative result.   False positives and more testing: LDCT very often finds something in the lung that could be cancer, but in fact is not. This is called a false positive result. False positive tests often cause anxiety. To make sure these findings are not cancer, you may need to have more tests. These tests will be done only if you give us permission. Sometimes patients need a treatment that can have side effects, such as a biopsy. For more information on false positives, see  What can I expect from the results?    Findings not related to lung cancer: Your LDCT exam also takes pictures of areas of your body next to your lungs. In a very small number of cases, the CT scan will show an abnormal finding in one of these areas, such as your kidneys, adrenal glands, liver or thyroid. This finding may not be serious, but you may need more tests. Your doctor can help you decide what other tests you may need, if any.  What can I expect from the results?  About 1 out of 4 LDCT exams will find something that may need more tests. Most of the time, these findings are lung nodules. Lung nodules are very small collections of tissue in the lung. These nodules are very common, and the vast majority--more than 97 percent--are not cancer (benign). Most are normal lymph nodes or small areas of scarring from past infections.  But, if a small lung nodule is found to be cancer, the cancer can be cured more than 90 percent of the time. To know if the nodule is cancer, we may need to get more images before your next yearly screening exam. If the nodule has suspicious features (for example, it is large, has an odd shape or grows over time), we will refer you to a specialist  for further testing.  Will my doctor also get the results?  Yes. Your doctor will get a copy of your results.  Is it okay to keep smoking now that there s a cancer screening exam?  No. Tobacco is one of the strongest cancer-causing agents. It causes not only lung cancer, but other cancers and cardiovascular (heart) diseases as well. The damage caused by smoking builds over time. This means that the longer you smoke, the higher your risk of disease. While it is never too late to quit, the sooner you quit, the better.  Where can I find help to quit smoking?  The best way to prevent lung cancer is to stop smoking. If you have already quit smoking, congratulations and keep it up! For help on quitting smoking, please call [employer] at 3-225-QUITNOW (1-694.889.1007) or the American Cancer Society at 1-833.971.8469 to find local resources near you.  One-on-one health coaching:  If you d prefer to work individually with a health care provider on tobacco cessation, we offer:     Medication Therapy Management:  Our specially trained pharmacists work closely with you and your doctor to help you quit smoking.  Call [phone] or [phone] (toll free).    Preventive Care Advice   This is general advice given by our system to help you stay healthy. However, your care team may have specific advice just for you. Please talk to your care team about your preventive care needs.  Nutrition  Eat 5 or more servings of fruits and vegetables each day.  Try wheat bread, brown rice and whole grain pasta (instead of white bread, rice, and pasta).  Get enough calcium and vitamin D. Check the label on foods and aim for 100% of the RDA (recommended daily allowance).  Lifestyle  Exercise at least 150 minutes each week   (30 minutes a day, 5 days a week).  Do muscle strengthening activities 2 days a week. These help control your weight and prevent disease.  No smoking.  Wear sunscreen to prevent skin cancer.  Have a dental exam and  cleaning every 6 months.  Yearly exams  See your health care team every year to talk about:  Any changes in your health.  Any medicines your care team has prescribed.  Preventive care, family planning, and ways to prevent chronic diseases.  Shots (vaccines)   HPV shots (up to age 26), if you've never had them before.  Hepatitis B shots (up to age 59), if you've never had them before.  COVID-19 shot: Get this shot when it's due.  Flu shot: Get a flu shot every year.  Tetanus shot: Get a tetanus shot every 10 years.  Pneumococcal, hepatitis A, and RSV shots: Ask your care team if you need these based on your risk.  Shingles shot (for age 50 and up).  General health tests  Diabetes screening:  Starting at age 35, Get screened for diabetes at least every 3 years.  If you are younger than age 35, ask your care team if you should be screened for diabetes.  Cholesterol test: At age 39, start having a cholesterol test every 5 years, or more often if advised.  Bone density scan (DEXA): At age 50, ask your care team if you should have this scan for osteoporosis (brittle bones).  Hepatitis C: Get tested at least once in your life.  STIs (sexually transmitted infections)  Before age 24: Ask your care team if you should be screened for STIs.  After age 24: Get screened for STIs if you're at risk. You are at risk for STIs (including HIV) if:  You are sexually active with more than one person.  You don't use condoms every time.  You or a partner was diagnosed with a sexually transmitted infection.  If you are at risk for HIV, ask about PrEP medicine to prevent HIV.  Get tested for HIV at least once in your life, whether you are at risk for HIV or not.  Cancer screening tests  Cervical cancer screening: If you have a cervix, begin getting regular cervical cancer screening tests at age 21. Most people who have regular screenings with normal results can stop after age 65. Talk about this with your provider.  Breast cancer scan  (mammogram): If you've ever had breasts, begin having regular mammograms starting at age 40. This is a scan to check for breast cancer.  Colon cancer screening: It is important to start screening for colon cancer at age 45.  Have a colonoscopy test every 10 years (or more often if you're at risk) Or, ask your provider about stool tests like a FIT test every year or Cologuard test every 3 years.  To learn more about your testing options, visit: https://[email or web address]/[email or web address].  For help making a decision, visit: https://bit.Attensity/wi66532.  Prostate cancer screening test: If you have a prostate and are age 55 to 69, ask your provider if you would benefit from a yearly prostate cancer screening test.  Lung cancer screening: If you are a current or former smoker age 50 to 80, ask your care team if ongoing lung cancer screenings are right for you.  For informational purposes only. Not to replace the advice of your health care provider. Copyright   2023 [employer]. All rights reserved. Clinically reviewed by the [facility]. Pongo Resume 268204 - REV 01/24.

## 2024-04-19 DIAGNOSIS — N18.2 STAGE 2 CHRONIC KIDNEY DISEASE: Primary | ICD-10-CM

## 2024-04-22 ENCOUNTER — LAB (OUTPATIENT)
Dept: LAB | Facility: OTHER | Age: 69
End: 2024-04-22
Payer: COMMERCIAL

## 2024-04-22 DIAGNOSIS — M1A.09X0 IDIOPATHIC CHRONIC GOUT OF MULTIPLE SITES WITHOUT TOPHUS: ICD-10-CM

## 2024-04-22 DIAGNOSIS — N18.2 STAGE 2 CHRONIC KIDNEY DISEASE: ICD-10-CM

## 2024-04-22 LAB
ALBUMIN MFR UR ELPH: 32.6 MG/DL
ALBUMIN SERPL BCG-MCNC: 3.7 G/DL (ref 3.5–5.2)
ALP SERPL-CCNC: 108 U/L (ref 40–150)
ALT SERPL W P-5'-P-CCNC: 13 U/L (ref 0–70)
ANION GAP SERPL CALCULATED.3IONS-SCNC: 12 MMOL/L (ref 7–15)
AST SERPL W P-5'-P-CCNC: 18 U/L (ref 0–45)
BASOPHILS # BLD AUTO: 0.1 10E3/UL (ref 0–0.2)
BASOPHILS NFR BLD AUTO: 1 %
BILIRUB DIRECT SERPL-MCNC: <0.2 MG/DL (ref 0–0.3)
BILIRUB SERPL-MCNC: 0.5 MG/DL
BUN SERPL-MCNC: 27.4 MG/DL (ref 8–23)
CALCIUM SERPL-MCNC: 9.4 MG/DL (ref 8.8–10.2)
CHLORIDE SERPL-SCNC: 106 MMOL/L (ref 98–107)
CREAT SERPL-MCNC: 1.42 MG/DL (ref 0.67–1.17)
CREAT UR-MCNC: 298.9 MG/DL
DEPRECATED HCO3 PLAS-SCNC: 22 MMOL/L (ref 22–29)
EGFRCR SERPLBLD CKD-EPI 2021: 54 ML/MIN/1.73M2
EOSINOPHIL # BLD AUTO: 0.4 10E3/UL (ref 0–0.7)
EOSINOPHIL NFR BLD AUTO: 4 %
ERYTHROCYTE [DISTWIDTH] IN BLOOD BY AUTOMATED COUNT: 14.8 % (ref 10–15)
GLUCOSE SERPL-MCNC: 113 MG/DL (ref 70–99)
HCT VFR BLD AUTO: 48.3 % (ref 40–53)
HGB BLD-MCNC: 15.7 G/DL (ref 13.3–17.7)
IMM GRANULOCYTES # BLD: 0 10E3/UL
IMM GRANULOCYTES NFR BLD: 0 %
LYMPHOCYTES # BLD AUTO: 1.5 10E3/UL (ref 0.8–5.3)
LYMPHOCYTES NFR BLD AUTO: 16 %
MCH RBC QN AUTO: 32.8 PG (ref 26.5–33)
MCHC RBC AUTO-ENTMCNC: 32.5 G/DL (ref 31.5–36.5)
MCV RBC AUTO: 101 FL (ref 78–100)
MONOCYTES # BLD AUTO: 0.7 10E3/UL (ref 0–1.3)
MONOCYTES NFR BLD AUTO: 8 %
NEUTROPHILS # BLD AUTO: 6.6 10E3/UL (ref 1.6–8.3)
NEUTROPHILS NFR BLD AUTO: 71 %
PHOSPHATE SERPL-MCNC: 3.2 MG/DL (ref 2.5–4.5)
PLATELET # BLD AUTO: 261 10E3/UL (ref 150–450)
POTASSIUM SERPL-SCNC: 4.8 MMOL/L (ref 3.4–5.3)
PROT SERPL-MCNC: 7.1 G/DL (ref 6.4–8.3)
PROT/CREAT 24H UR: 0.11 MG/MG CR (ref 0–0.2)
RBC # BLD AUTO: 4.78 10E6/UL (ref 4.4–5.9)
SODIUM SERPL-SCNC: 140 MMOL/L (ref 135–145)
URATE SERPL-MCNC: 3.6 MG/DL (ref 3.4–7)
WBC # BLD AUTO: 9.3 10E3/UL (ref 4–11)

## 2024-04-22 PROCEDURE — 36415 COLL VENOUS BLD VENIPUNCTURE: CPT

## 2024-04-22 PROCEDURE — 85025 COMPLETE CBC W/AUTO DIFF WBC: CPT

## 2024-04-22 PROCEDURE — 84156 ASSAY OF PROTEIN URINE: CPT

## 2024-04-22 PROCEDURE — 82248 BILIRUBIN DIRECT: CPT

## 2024-04-22 PROCEDURE — 80053 COMPREHEN METABOLIC PANEL: CPT

## 2024-04-22 PROCEDURE — 84550 ASSAY OF BLOOD/URIC ACID: CPT

## 2024-04-22 PROCEDURE — 84100 ASSAY OF PHOSPHORUS: CPT

## 2024-04-29 ENCOUNTER — PATIENT OUTREACH (OUTPATIENT)
Dept: CARE COORDINATION | Facility: CLINIC | Age: 69
End: 2024-04-29
Payer: COMMERCIAL

## 2024-04-29 ENCOUNTER — VIRTUAL VISIT (OUTPATIENT)
Dept: NEPHROLOGY | Facility: CLINIC | Age: 69
End: 2024-04-29
Attending: INTERNAL MEDICINE
Payer: COMMERCIAL

## 2024-04-29 DIAGNOSIS — N18.2 STAGE 2 CHRONIC KIDNEY DISEASE: Primary | ICD-10-CM

## 2024-04-29 PROCEDURE — 99214 OFFICE O/P EST MOD 30 MIN: CPT | Mod: 95 | Performed by: INTERNAL MEDICINE

## 2024-04-29 NOTE — NURSING NOTE
Is the patient currently in the state of [place]? YES    Visit mode:VIDEO    If the visit is dropped, the patient can be reconnected by: VIDEO VISIT: Send to e-mail at: [email or web address]    Will anyone else be joining the visit? NO  (If patient encounters technical issues they should call [phone])    How would you like to obtain your AVS? MyChart    Are changes needed to the allergy or medication list? No    Are refills needed on medications prescribed by this physician? NO    Reason for visit: RECHECK    Aaron DOTSON

## 2024-04-29 NOTE — LETTER
4/29/2024       RE: Bucky Edgar  [address] [place] [address]     Dear Colleague,    Thank you for referring your patient, Bucky Edgar, to the [facility] [place] [facility]. Please see a copy of my visit note below.    Virtual Visit Details    Type of service:  Video Visit   Total time: 10 min     Originating Location (pt. Location): Home    Distant Location (provider location):  On-site  Platform used for Video Visit: [facility]          Nephrology Clinic Progress Note   4/29/24    Bucky Edgar MRN:[id number] YOB: 1955  Date of Admission:(Not on file)  Primary care provider: Hamlet Roberts  Requesting physician: Hamlet Roberts PA*      REASON FOR CONSULT: elevated creatinine       HISTORY OF PRESENT ILLNESS: Please refer to consult note     PAST MEDICAL HISTORY:  Reviewed with patient on 04/29/2024   As per HPI    MEDICATIONS:  Reviewed with the patient in detail    ALLERGIES:    Reviewed with the patient in detail    REVIEW OF SYSTEMS:  A comprehensive of systems was negative except as noted above.    SOCIAL HISTORY:   Reviewed with patient.   Past smoker, quit 4 years ago/     FAMILY MEDICAL HISTORY:   Reviewed, no family history of need for dialysis, transplant or CKD    PHYSICAL EXAM:   Vital signs:There were no vitals taken for this visit.    Physical Exam    10/23: Interval history: He reports that he is doing well overall however still has some concerns with fluid retention which he describes is in his lungs and that he has lower extremity edema. He says he likes his weight around 204 lb and occasionally sees that it goes up. He has been taking lasix once a week or so. He describes his blood pressures to still be variable from 99/60 mm of Hg to 158/83 mm of Hg. He denies any symptoms when his pressures are low, however when high, he reports that he has difficulty breathing. He has 3  different blood pressure cuffs at home which he uses to check his blood pressures. His blood pressures today are 158/83 mm of Hg.   He has completed his 24 hour ambulatory blood pressure monitoring last week.    4/29/24: Bucky reports of doing well and that his blood pressures are now better. He has been checking his blood pressures at home 3 times a day, 118/78 mm of Hg on avg. He denies any LE edema or orthostatic symptoms. His chronic shortness of breath is due to his COPD. He has now cut his lisinopril to 20 mg daily and takes lasix 20 mg Q other day.   He had 24 hour ambulatory blood pressures checked in 10/23 and his avg was 134/82 mm of Hg. He did have some readings with very low blood pressures in 80's systolic. Since then, he has cut down on Na in his food and has lowered his avg pressures further.       ASSESSMENT AND RECOMMENDATIONS:     # CANDACE   # CKD stage 2   # Hypertension  # HFpEF  # COPD     CANDACE was pre renal from significant hypotension particularly after addition of aldactone and farxiga. He likely has some degree of underlying CKD from long standing hypertension, also has rt severe renal A stenosis at the ostium and slight atrophy of the right kidney. After his initial CANDACE with a peak in cr to 1.6 mg/dl, his creatinine had improved back to his baseline serum creatinine of ~ 1-1.1 mg/dl but has been increasing again and now at 1.42 mg/dl.  UA without active sediment, does not have proteinuria.   It is unclear why his creatinine is higher again and I am thinking it is mostly pre renal at this time while being very sensitive to hemodynamic changes. He does not have any signs of hypervolemia, remains on lasix 20 mg Q other day, lisinopril 20 mg daily and coreg 25 mg BID.  He does not have metabolic complications of CKD.    --recommend that he discontinues his lasix at this time.    F/up in clinic in 3 months     Bhavya Torres MD

## 2024-04-29 NOTE — PROGRESS NOTES
Nephrology Clinic Progress Note   4/29/24    Bucky Edgar MRN:[id number] YOB: 1955  Date of Admission:(Not on file)  Primary care provider: Hamlet Roberts  Requesting physician: Hamlet Roberts PA*      REASON FOR CONSULT: elevated creatinine       HISTORY OF PRESENT ILLNESS: Please refer to consult note     PAST MEDICAL HISTORY:  Reviewed with patient on 04/29/2024   As per HPI    MEDICATIONS:  Reviewed with the patient in detail    ALLERGIES:    Reviewed with the patient in detail    REVIEW OF SYSTEMS:  A comprehensive of systems was negative except as noted above.    SOCIAL HISTORY:   Reviewed with patient.   Past smoker, quit 4 years ago/     FAMILY MEDICAL HISTORY:   Reviewed, no family history of need for dialysis, transplant or CKD    PHYSICAL EXAM:   Vital signs:There were no vitals taken for this visit.    Physical Exam    10/23: Interval history: He reports that he is doing well overall however still has some concerns with fluid retention which he describes is in his lungs and that he has lower extremity edema. He says he likes his weight around 204 lb and occasionally sees that it goes up. He has been taking lasix once a week or so. He describes his blood pressures to still be variable from 99/60 mm of Hg to 158/83 mm of Hg. He denies any symptoms when his pressures are low, however when high, he reports that he has difficulty breathing. He has 3 different blood pressure cuffs at home which he uses to check his blood pressures. His blood pressures today are 158/83 mm of Hg.   He has completed his 24 hour ambulatory blood pressure monitoring last week.    4/29/24: Bucky reports of doing well and that his blood pressures are now better. He has been checking his blood pressures at home 3 times a day, 118/78 mm of Hg on avg. He denies any LE edema or orthostatic symptoms. His chronic shortness of breath is due to his COPD. He has now cut his lisinopril to 20 mg daily and  takes lasix 20 mg Q other day.   He had 24 hour ambulatory blood pressures checked in 10/23 and his avg was 134/82 mm of Hg. He did have some readings with very low blood pressures in 80's systolic. Since then, he has cut down on Na in his food and has lowered his avg pressures further.       ASSESSMENT AND RECOMMENDATIONS:     # CANDACE   # CKD stage 2   # Hypertension  # HFpEF  # COPD     CANDACE was pre renal from significant hypotension particularly after addition of aldactone and farxiga. He likely has some degree of underlying CKD from long standing hypertension, also has rt severe renal A stenosis at the ostium and slight atrophy of the right kidney. After his initial CANDACE with a peak in cr to 1.6 mg/dl, his creatinine had improved back to his baseline serum creatinine of ~ 1-1.1 mg/dl but has been increasing again and now at 1.42 mg/dl.  UA without active sediment, does not have proteinuria.   It is unclear why his creatinine is higher again and I am thinking it is mostly pre renal at this time while being very sensitive to hemodynamic changes. He does not have any signs of hypervolemia, remains on lasix 20 mg Q other day, lisinopril 20 mg daily and coreg 25 mg BID.  He does not have metabolic complications of CKD.    --recommend that he discontinues his lasix at this time.    F/up in clinic in 3 months     Bhavya Torres MD

## 2024-04-29 NOTE — PROGRESS NOTES
Virtual Visit Details    Type of service:  Video Visit   Total time: 10 min     Originating Location (pt. Location): Home    Distant Location (provider location):  On-site  Platform used for Video Visit: [facility]

## 2024-05-02 ENCOUNTER — OFFICE VISIT (OUTPATIENT)
Dept: RHEUMATOLOGY | Facility: CLINIC | Age: 69
End: 2024-05-02
Payer: COMMERCIAL

## 2024-05-02 ENCOUNTER — MYC MEDICAL ADVICE (OUTPATIENT)
Dept: FAMILY MEDICINE | Facility: OTHER | Age: 69
End: 2024-05-02

## 2024-05-02 VITALS
DIASTOLIC BLOOD PRESSURE: 71 MMHG | OXYGEN SATURATION: 95 % | BODY MASS INDEX: 30.3 KG/M2 | SYSTOLIC BLOOD PRESSURE: 110 MMHG | HEART RATE: 65 BPM | WEIGHT: 205.2 LBS

## 2024-05-02 DIAGNOSIS — M1A.09X0 IDIOPATHIC CHRONIC GOUT OF MULTIPLE SITES WITHOUT TOPHUS: Primary | ICD-10-CM

## 2024-05-02 DIAGNOSIS — N18.9 CHRONIC KIDNEY DISEASE, UNSPECIFIED CKD STAGE: ICD-10-CM

## 2024-05-02 PROCEDURE — 99214 OFFICE O/P EST MOD 30 MIN: CPT | Performed by: INTERNAL MEDICINE

## 2024-05-02 PROCEDURE — G2211 COMPLEX E/M VISIT ADD ON: HCPCS | Performed by: INTERNAL MEDICINE

## 2024-05-02 RX ORDER — ALLOPURINOL 100 MG/1
200 TABLET ORAL DAILY
Qty: 180 TABLET | Refills: 1 | Status: SHIPPED | OUTPATIENT
Start: 2024-05-02 | End: 2024-08-02

## 2024-05-02 NOTE — PROGRESS NOTES
Rheumatology Clinic Visit      Bucky Edgar MRN# [id number]   YOB: 1955 Age: 68 year old      Date of visit: 5/02/24   PCP: Dr. Percy Deshpande    Chief Complaint   Patient presents with:  Idiopathic chronic gout: Has not had a flare since seeing you.    Assessment and Plan     1.  Gout: Started having gout flares in early 2017, acute onset and resolved with prednisone; joints involved include his left ankle, left first MTP, and left wrist.  Colchicine was cost prohibitive.  Was doing well on allopurinol 700mg daily, uric acid 2.1, tophi had resolved, and he was not having any flares.  Note that in the past he was still flaring when he was on allopurinol 600 mg daily but that could have also been related to mobilization of tophi, and the tophi appear to have resolved.  With resolution of visible tophi, allopurinol has been reduced over time while maintaining a low uric acid and has not had gout flares.  Currently on allopurinol 300 mg daily with uric acid of 4.8.  Not having gout flares.  Continue allopurinol dose reduction as noted below.  Recheck uric acid in 3 months.    Chronic illness  - Reduce allopurinol from 300 mg daily, to 200 mg daily  - For gout flare only: prednisone 60mg daily x2days, then 40mg daily x5days, then 20mg daily x5days, then stop.  - Labs in 3 months: Uric acid    2. Hx of Elevated Hgb: thought to be polycythemia vera by Dr. Muñoz (hematology/oncology) secondary to heavy smoking and underlying COPD as well as Gaisbock syndrome. Advised to continue baby aspirin beebe by Dr. Muñoz. Phlebotomy was also started to try to keep the hematocrit <50.  This is documented here for historical significance only and was not discussed today.      3.  Chronic low back pain: Patient reports 4 spine surgeries, including spine fusion.  Pain radiates to his legs.  Chronic and unchanged for years per patient.  He will follow-up with a spine surgeon as needed    4.  CKD: Following with  nephrology.  Patient is only drinking 2 glasses of water per day but plans to increase fluid intake.  4/29/2024 nephrology note documents that the patient is to discontinue furosemide.  He will continue following with nephrology.  Chronic illness      Total minutes spent in evaluation with patient, documentation, , and review of pertinent studies and chart notes: 14  The longitudinal plan of care for the rheumatology problem(s) were addressed during this visit.  Due to added complexity of care, we will continue to support the patient and the subsequent management of this condition with ongoing continuity of care.        Mr. Edgar verbalized agreement with and understanding of the rational for the diagnosis and treatment plan.  All questions were answered to best of my ability and the patient's satisfaction. Mr. Edgar was advised to contact the clinic with any questions that may arise after the clinic visit.     Thank you for involving me in the care of the patient    Return to clinic: 3 months      HPI   Bucky Edgar is a 68 year old male with a past medical history significant for COPD, dyslipidemia, vitamin D deficiency, history of rib fractures, DVT/PE on anticoagulation, CKD, hypertension, who is seen for follow-up of gout.    6/7/2022: Gout is well controlled.  No gout flares since last seen.  Taking allopurinol 600 mg daily.  Has not required prednisone for gout since last seen.      10/4/2022: Gout is well controlled.  No gout flares since last seen.  Taking allopurinol 500 mg daily without any missed doses.  Has not required prednisone since last seen.  He would like to try to reduce allopurinol again.    4/3/2023: Taking allopurinol 400 mg daily.  No gout flares since last seen.  Has not required prednisone since last seen.  No tophi.    11/3/2023: Taking allopurinol 300 mg daily.  No gout flares since last seen.  Has not required prednisone since last seen.  No tophi.  Working with  cardiology and nephrology regarding blood pressure management and diuretic use.    Today, 5/2/2024: No gout flares since last seen.  Taking allopurinol 3 mg daily.  Has not required prednisone since last seen.  No tophi.  Working with cardiology nephrology regarding diuretic use; only drinking 2 glasses of water per day.  Known CKD.  Patient reports that he is working on his fluid status with his nephrologist; has crackles and breathing as he does when he has too much fluid and they have been adjusting the furosemide.  No chest pain or shortness of breath.    Denies fevers, chills, nausea, vomiting, constipation, diarrhea. No abdominal pain. No chest pain/pressure, palpitations, or shortness of breath currently. No LE swelling.  No rash.      Tobacco: Quit in 2017  EtOH: 6 beers per week  Drugs: None    ROS   12 point review of system was completed and negative except as noted in the HPI     Active Problem List     Patient Active Problem List   Diagnosis    Displacement of lumbar intervertebral disc without myelopathy    CARDIOVASCULAR SCREENING; LDL GOAL LESS THAN 130    HTN, goal below 140/90    Advanced directives, counseling/discussion    COPD (chronic obstructive pulmonary disease) (H)    Impaired fasting glucose    Hypertriglyceridemia    Vitamin D deficiency    Chronic bronchitis with COPD (chronic obstructive pulmonary disease) (H)    Closed fracture of multiple ribs of left side with routine healing, subsequent encounter    Chronic pain due to trauma    Long-term (current) use of anticoagulants [Z79.01]    Elevated serum creatinine    History of deep venous thrombosis (DVT) of distal vein of left lower extremity    Elevated liver enzymes    Renal atrophy, right    Abdominal aortic aneurysm (AAA) without rupture (H24)    Hepatomegaly    Fatty liver    Polycythemia    Pulmonary embolism without acute cor pulmonale, unspecified chronicity, unspecified pulmonary embolism type (H)    Elevated hemoglobin (H24)     Abnormal CT lung screening    Accidental fall from ladder    Acute deep vein thrombosis (DVT) of left lower extremity (H)    Acute-on-chronic renal failure  (H24)    Cellulitis of left foot    Ectatic abdominal aorta (H24)    Pneumonia    Acute kidney injury (H24)    Stage 2 chronic kidney disease    Acute on chronic congestive heart failure, unspecified heart failure type (H)    Pulmonary hypertension (H)     Past Medical History     Past Medical History:   Diagnosis Date    COPD (chronic obstructive pulmonary disease) (H)     Displacement of lumbar intervertebral disc without myelopathy 1995, 2002    HTN, goal below 140/90      Past Surgical History     Past Surgical History:   Procedure Laterality Date    HC REPAIR ROTATOR CUFF,CHRONIC  1989    IR LOWER EXTREMITY VENOGRAM LEFT  5/4/2021    IR VENOUS STENT  5/4/2021    ZZC DECOMPRESS SPINAL CORD,1 SEG  1995, 4/2002    leftL4-L5, 2nd right L4-L5    ZZC SPINE FUSION,ANTER,3 SGMTS  2/9/2004    ant and post fusion L4-S1     Allergy     Allergies   Allergen Reactions    Chlorthalidone Other (See Comments)     Excessive lowering of blood pressure    Amlodipine Other (See Comments)     Intractable headache with use of medication as well as noting a small tremor of the upper extremities     Current Medication List     Current Outpatient Medications   Medication Sig Dispense Refill    acetaminophen (TYLENOL) 325 MG tablet Take 650 mg by mouth every 6 hours       albuterol (PROAIR HFA/PROVENTIL HFA/VENTOLIN HFA) 108 (90 Base) MCG/ACT inhaler Inhale 2 puffs into the lungs every 6 hours as needed for shortness of breath / dyspnea 18 g 0    allopurinol (ZYLOPRIM) 300 MG tablet Take 1 tablet (300 mg) by mouth daily 90 tablet 2    aspirin (ASA) 81 MG EC tablet Take 1 tablet (81 mg) by mouth daily      atorvastatin (LIPITOR) 10 MG tablet Take 1 tablet (10 mg) by mouth daily 90 tablet 3    carvedilol (COREG) 25 MG tablet Take 1 tablet (25 mg) by mouth 2 times daily (with meals)  "180 tablet 3    fluticasone (ARNUITY ELLIPTA) 100 MCG/ACT inhaler Inhale 1 puff into the lungs daily 30 each 2    furosemide (LASIX) 20 MG tablet Take 1 tablet (20 mg) by mouth every other day 45 tablet 3    lisinopril (ZESTRIL) 40 MG tablet Take 0.5 tablets (20 mg) by mouth daily      nicotine (NICORETTE) 4 MG lozenge Place 4 mg inside cheek as needed      vitamin D3 (CHOLECALCIFEROL) 50 mcg (2000 units) tablet Take 1 tablet (50 mcg) by mouth daily 90 tablet 1     No current facility-administered medications for this visit.         Social History   See HPI    Family History     Family History   Problem Relation Age of Onset    Family History Negative Other     Diabetes No family hx of     Coronary Artery Disease No family hx of     Hypertension No family hx of     Hyperlipidemia No family hx of     Breast Cancer No family hx of     Prostate Cancer No family hx of     Anxiety Disorder No family hx of     Substance Abuse No family hx of     Asthma No family hx of     Thyroid Disease No family hx of     Unknown/Adopted No family hx of     Genetic Disorder No family hx of     Osteoporosis No family hx of     Anesthesia Reaction No family hx of     Mental Illness No family hx of     Depression No family hx of     Other Cancer No family hx of     Colon Cancer No family hx of     Cerebrovascular Disease No family hx of     Obesity No family hx of        Physical Exam     Temp Readings from Last 3 Encounters:   04/18/24 97.3  F (36.3  C) (Temporal)   01/18/24 97.1  F (36.2  C) (Temporal)   11/20/23 97.5  F (36.4  C) (Temporal)     BP Readings from Last 5 Encounters:   05/02/24 110/71   04/18/24 (!) 80/60   03/14/24 94/64   01/18/24 102/60   11/20/23 (!) 144/88     Pulse Readings from Last 1 Encounters:   05/02/24 65     Resp Readings from Last 1 Encounters:   04/18/24 28     Estimated body mass index is 30.3 kg/m  as calculated from the following:    Height as of 4/18/24: 1.753 m (5' 9\").    Weight as of this encounter: " 93.1 kg (205 lb 3.2 oz).    GEN: NAD.  HEENT:  Anicteric, noninjected sclera. No obvious external lesions of the ear and nose. Hearing intact.  CV: S1, S2. RRR. No m/r/g  PULM: No increased work of breathing.  Crackles at the bilateral lung bases.  No wheezing.  MSK: MCPs, PIPs, DIPs without swelling or tenderness to palpation.  Wrists without swelling or tenderness to palpation.  Elbows and shoulders without swelling or tenderness to palpation.  Shoulders with normal range of motion.  Knees, ankles, and MTPs without swelling or tenderness to palpation.  No tophi.  SKIN: No rash or jaundice seen  PSYCH: Alert. Appropriate.      Labs / Imaging (select studies)     CBC  Recent Labs   Lab Test 04/22/24  1007 03/06/24  1216 10/24/23  1237 09/12/23  1033 05/15/23  1103 05/31/22  1014 05/24/21  1035 05/04/21  1030 08/25/20  0940 07/28/20  1040   WBC 9.3 10.0 10.4  --  8.1   < > 8.8   < > 8.1 8.4   RBC 4.78 5.01 4.73  --  4.95   < > 5.18   < > 5.29 5.48   HGB 15.7 16.2 16.1   < > 15.5   < > 16.0   < > 15.4 16.5   HCT 48.3 49.6 49.2  --  48.4   < > 48.8   < > 48.3 51.9   * 99 104*  --  98   < > 94   < > 91 95   RDW 14.8 14.4 13.7  --  16.2*   < > 15.8*   < > 14.6 16.3*    267 247  --  180   < > 217   < > 259 242   MCH 32.8 32.3 34.0*  --  31.3   < > 30.9   < > 29.1 30.1   MCHC 32.5 32.7 32.7  --  32.0   < > 32.8   < > 31.9 31.8   NEUTROPHIL 71 75  --   --  73   < > 69.4  --  73.0 68.7   LYMPH 16 14  --   --  14   < > 18.2  --  14.2 18.6   MONOCYTE 8 7  --   --  10   < > 7.8  --  8.9 8.5   EOSINOPHIL 4 4  --   --  3   < > 3.9  --  2.7 2.8   BASOPHIL 1 1  --   --  1   < > 0.7  --  1.0 0.8   ANEU  --   --   --   --   --   --  6.1  --  5.9 5.7   ALYM  --   --   --   --   --   --  1.6  --  1.2 1.6   LIZZ  --   --   --   --   --   --  0.7  --  0.7 0.7   AEOS  --   --   --   --   --   --  0.3  --  0.2 0.2   ABAS  --   --   --   --   --   --  0.1  --  0.1 0.1    < > = values in this interval not displayed.      CMP  Recent Labs   Lab Test 04/22/24  1007 03/06/24  1216 01/18/24  1008 10/24/23  1237 05/31/23  1534 05/15/23  1103 05/31/22  1014 05/24/21  1035 05/04/21  1030 03/22/21  1219    139 141 142   < > 141   < > 140 139 139   POTASSIUM 4.8 4.1 4.8 4.0   < > 4.4   < > 4.4 4.2 4.2   CHLORIDE 106 104 102 106   < > 107   < > 110* 111* 108   CO2 22 24 30* 25   < > 27   < > 25 22 29   ANIONGAP 12 11 9 11   < > 7   < > 5 6 2*   * 105* 110* 91   < > 102*   < > 112* 100* 100*   BUN 27.4* 13.3 21.8 10.9   < > 10.1   < > 13 16 11   CR 1.42* 1.25* 1.24* 0.96   < > 0.92   < > 0.95 1.00 0.94   GFRESTIMATED 54* 64 64 87   < > >90   < > 84 79 86   GFRESTBLACK  --   --   --   --   --   --   --  >90 >90 >90   JUSTIN 9.4 9.3 9.4 9.0   < > 8.9   < > 8.5 9.5 9.1   BILITOTAL 0.5 0.8  --   --   --  1.1   < > 0.7  --   --    ALBUMIN 3.7 3.6  --  3.7   < > 3.5   < > 3.4  --   --    PROTTOTAL 7.1 7.0  --   --   --  6.3*   < > 6.9  --   --    ALKPHOS 108 131  --   --   --  123   < > 98  --   --    AST 18 16  --   --   --  18   < > 18  --   --    ALT 13 9  --   --   --  12   < > 28  --   --     < > = values in this interval not displayed.     Uric Acid  Recent Labs   Lab Test 04/22/24  1007 03/06/24  1216 09/12/23  1033 03/27/23  1138 05/31/22  1014 05/24/21  1035   URIC 3.6 3.6 4.8 3.6 2.1* 2.1*     Immunization History     Immunization History   Administered Date(s) Administered    COVID-19 MONOVALENT 12+ (Pfizer) 03/31/2021, 04/21/2021    Influenza Vaccine 18-64 (Flublok) 11/12/2019    Influenza Vaccine >6 months,quad, PF 11/27/2013, 10/29/2015, 12/12/2016, 10/23/2017, 11/01/2018    Mantoux Tuberculin Skin Test 07/17/2012    Pneumo Conj 13-V (2010&after) 01/22/2016    Pneumococcal 23 valent 11/27/2013    TDAP Vaccine (Adacel) 08/25/2009, 11/25/2019    TDAP Vaccine (Boostrix) 08/25/2009          Chart documentation done in part with Dragon Voice recognition Software. Although reviewed after completion, some word and grammatical  error may remain.    Coy Bolaños MD

## 2024-05-02 NOTE — PATIENT INSTRUCTIONS
RHEUMATOLOGY    [facility] [place]  [address] [place] [address]  [place] [address]    Phone number: [phone]  Fax number: [phone]    If you need a medication refill, please contact us as you may need lab work and/or a follow up visit prior to your refill.      Thank you for choosing [facility]!    Saba Patricia CMA Rheumatology

## 2024-05-02 NOTE — NURSING NOTE
RAPID3 (0-30) Cumulative Score  11.7          RAPID3 Weighted Score (divide #4 by 3 and that is the weighted score)  3.9

## 2024-05-03 ENCOUNTER — ANCILLARY PROCEDURE (OUTPATIENT)
Dept: CT IMAGING | Facility: CLINIC | Age: 69
End: 2024-05-03
Attending: PHYSICIAN ASSISTANT
Payer: COMMERCIAL

## 2024-05-03 DIAGNOSIS — I71.43 INFRARENAL ABDOMINAL AORTIC ANEURYSM (AAA) WITHOUT RUPTURE (H): ICD-10-CM

## 2024-05-03 DIAGNOSIS — Z87.891 PERSONAL HISTORY OF TOBACCO USE: ICD-10-CM

## 2024-05-03 PROCEDURE — 71271 CT THORAX LUNG CANCER SCR C-: CPT | Mod: GC | Performed by: RADIOLOGY

## 2024-05-03 PROCEDURE — 74174 CTA ABD&PLVS W/CONTRAST: CPT | Mod: GC | Performed by: RADIOLOGY

## 2024-05-03 RX ORDER — IOPAMIDOL 755 MG/ML
90 INJECTION, SOLUTION INTRAVASCULAR ONCE
Status: COMPLETED | OUTPATIENT
Start: 2024-05-03 | End: 2024-05-03

## 2024-05-03 RX ADMIN — IOPAMIDOL 90 ML: 755 INJECTION, SOLUTION INTRAVASCULAR at 11:21

## 2024-05-04 ENCOUNTER — TELEPHONE (OUTPATIENT)
Dept: FAMILY MEDICINE | Facility: OTHER | Age: 69
End: 2024-05-04
Payer: COMMERCIAL

## 2024-05-04 DIAGNOSIS — R91.8 LUNG MASS: Primary | ICD-10-CM

## 2024-05-04 DIAGNOSIS — I71.43 INFRARENAL ABDOMINAL AORTIC ANEURYSM (AAA) WITHOUT RUPTURE (H): ICD-10-CM

## 2024-05-04 NOTE — TELEPHONE ENCOUNTER
Tried calling with results of CT chest and CTA abdomen pelvis but no answer. Will try again on Monday. Needs urgent referrals to vascular surgery and pulmonology for new right lower lobe lung masses and significant increase in size of his AAA.    Hamlet Roberts PA-C

## 2024-05-05 NOTE — TELEPHONE ENCOUNTER
I called and spoke with patient. He had seen his imaging results and was wondering what they meant. I discussed the findings of the potential lung masses versus atelectasis and the growing abnormal aortic aneurysm which is getting to a point that may require surgery. I have placed urgent referrals to pulmonology and vascular surgery. He had no further questions and appreciated the call.    Of note, his blood pressure has been improved since going back on 40 mg of lisinopril with no low readings.    Hamlet Roberts PA-C

## 2024-05-06 ENCOUNTER — PATIENT OUTREACH (OUTPATIENT)
Dept: ONCOLOGY | Facility: CLINIC | Age: 69
End: 2024-05-06
Payer: COMMERCIAL

## 2024-05-06 DIAGNOSIS — R91.8 LUNG MASS: Primary | ICD-10-CM

## 2024-05-06 PROBLEM — Z72.0 TOBACCO ABUSE: Status: ACTIVE | Noted: 2017-08-24

## 2024-05-06 NOTE — TELEPHONE ENCOUNTER
RECORDS STATUS - ALL OTHER DIAGNOSIS      Referring Provider/Location:   Hamlet Roberts   Dx and Code: R91.8 (ICD-10-CM) - Lung mass  Appt Date:  5.7.24  Provider: Bryn Valdovinos   Records Requested  TJ   Clinic name Comments/Action Taken   [facility] [place] 6, 2024 12:54 PM    LVM requesting chest images from 2020 be pushed      RECORDS NEEDED: Chest imaging   NOTES STATUS COMMENTS   OFFICE NOTE from referring provider     OFFICE NOTE from medical oncologist     OFFICE NOTE from other specialist     DISCHARGE SUMMARY from hospital     DISCHARGE REPORT from the ER     OPERATIVE REPORT     MEDICATION LIST     LABS     PATHOLOGY REPORTS     ANYTHING RELATED TO DIAGNOSIS     GENONOMIC TESTING     TYPE:     IMAGING (NEED IMAGES & REPORT)     CT SCANS PACS        [facility] [place]/        PACS 5.3.24; 3.29.21 Chest Lung  5.16.23 Chest Pulmonary  10.5.21 Chest    7.1.20 Chest  5.17.20; 2.3.20 Chest Pulmonary    5.6.19 Chest Lung   MRI     XRAYS     ULTRASOUND     PET

## 2024-05-06 NOTE — PROGRESS NOTES
New IP (Interventional Pulmonology) referral rec'd.  Chart reviewed.         New Patient: Interventional Pulmonary (Lung nodule) Nurse Navigator Note    Referring provider: Hamlet Roberts PA-[facility]    Referred to (specialty): Interventional Pulmonary (Lung nodule)    Requested provider (if applicable): n/a    Date Referral Received: 2024    Evaluation for :  Lung nodule-new right lung masses on follow up low dose chest CT, previous chronic smoker    Clinical History (per Nurse review of records provided):    **BOOK MARKED**    CT Low Dose Lung Cancer Screening     History:  Lung cancer screening; No chest CT for lung cancer screening  in the last year; 50-80 years; >= 20 pack-year smoking history; Former  smoker; Smoking quit date within the last 15 years; Personal history  of tobacco use Screening for lung cancer, smoking.     Number of packs-year of smokin+  Current or former smoker?: Former  If former, number of years since quit?: <15     Comparison: CT chest 2023. Multiple priors.     Technique: Helical acquisition low dose CT chest. Images reviewed in  lung, soft tissue and bone windows.  DLP: 1453 (mGy*cm)     Findings: [All follow up of nodules are based on ACR guidelines for  lung cancer screening and measurements of each nodule size must be the  mean of the longest axial plane measurement by its perpendicular  measured to the nearest decimal and rounded up to the nearest whole  number.     Nodules:  Compared to prior CT 2023. There are multiple new pleural-based  nodules within the right lung base. The nodules are partially  coalescent. The largest of these nodules are detailed below     - 24.7x34.3  mm solid mass in the right lower lobe on series:  4  image:  222.     - 70.2x26.5  mm solid mass in the right middle lobe on series:  4  image:  195.     - 5.6x3.6  mm solid nodule in the right upper lobe on series:  4  image:  65.      Evaluation of the lung parenchyma demonstrates scattered paraseptal  and centrilobular emphysematous changes, similar to prior exams,  apical predominant. In the right lung base there are multiple new  coalescing subpleural-based nodules as detailed above. These nodules  exhibit reticular interstitial thickening, a spiculated appearance  with pleural thickening. There is an associated small right pleural  effusion.     Emphysema: Moderate centrilobular emphysema     Coronary artery calcium: moderate     Additional findings: Small hiatal hernia. No acute findings visualized  upper abdomen. Several calcified splenic granulomas. Chronic appearing  deformities of the lateral left sixth-eighth ribs moderate  degenerative changes of the thoracic spine. No acute fractures, no  high-grade appearing osseous lesions. Generalized osteopenia.                                                                      Impression:   1. ACR Assessment Category (2022):  Lung-RADS Category 4X. Very  Suspicious.   -Multiple new large spiculated subpleural masses along the right  middle and lower lobe lung bases. As detailed above. Most suggestive  of rounded atelectasis though neoplasm is not excluded or sequela of  thromboembolism.     Recommendation:  Lung-RADS Category 4X. Very Suspicious.  Recommendation:  Chest CT with or without contrast, PET/CT and/or  tissue  sampling depending on the *probability of malignancy and  comorbidities. PET/CT may be used when there is a ? 8 mm  (? 268.1 mm3) solid component. For new large nodules that develop on  an  annual repeat screening CT, a1 month LDCT may be recommended to  address  potentially infectious or inflammatory conditions         2. Significant Incidental Finding(s):  Category S: No.  a.  Chronic appearing deformities of the lateral left 6th-8th ribs.      3. Any moderate or severe Emphysema or bronchial wall thickening or  mosaic attenuation? No        4. Avoidance of tobacco smoke is  strongly advised. Please consider  referral for smoking cessation to [facility] Medication Therapy Management  (MTM) if clinically appropriate.    Records Location: [facility]     RECORDS NEEDED:  Last FIVE years CHEST imaging pushed to PACS from [facility]--thank you!!    Additional testing needed prior to consult: PFT;s

## 2024-05-07 ENCOUNTER — VIRTUAL VISIT (OUTPATIENT)
Dept: PULMONOLOGY | Facility: CLINIC | Age: 69
End: 2024-05-07
Attending: PHYSICIAN ASSISTANT
Payer: COMMERCIAL

## 2024-05-07 ENCOUNTER — PRE VISIT (OUTPATIENT)
Dept: PULMONOLOGY | Facility: CLINIC | Age: 69
End: 2024-05-07
Payer: COMMERCIAL

## 2024-05-07 VITALS
DIASTOLIC BLOOD PRESSURE: 69 MMHG | HEART RATE: 65 BPM | BODY MASS INDEX: 28.63 KG/M2 | SYSTOLIC BLOOD PRESSURE: 117 MMHG | HEIGHT: 70 IN | WEIGHT: 200 LBS

## 2024-05-07 DIAGNOSIS — R91.8 LUNG MASS: ICD-10-CM

## 2024-05-07 PROCEDURE — 99204 OFFICE O/P NEW MOD 45 MIN: CPT | Mod: 95 | Performed by: INTERNAL MEDICINE

## 2024-05-07 ASSESSMENT — PAIN SCALES - GENERAL: PAINLEVEL: SEVERE PAIN (6)

## 2024-05-07 NOTE — PROGRESS NOTES
"LUNG NODULE & INTERVENTIONAL PULMONARY CLINIC  CLINICS & SURGERY CENTER, [facility], [facility]   VIDEO VISIT    Bucky Edgar MRN# [id number]   Age: 68 year old YOB: 1955     Reason for Consultation: Lung Nodule and Lung Mass    Requesting Physician: Hamlet Roberts PA-C  [address] [facility] [address]  [place] [address]       The patient has been notified of following:   \"This video visit will be conducted via a call between you and your physician/provider. We have found that certain health care needs can be provided without the need for an in-person physical exam.  This service lets us provide the care you need with a video conversation.  If a prescription is necessary we can send it directly to your pharmacy.  If lab work is needed we can place an order for that and you can then stop by our lab to have the test done at a later time.  Video visits are billed at different rates depending on your insurance coverage.  Please reach out to your insurance provider with any questions.  If during the course of the call the physician/provider feels a video visit is not appropriate, you will not be charged for this service.\"  Patient has given verbal consent for Video visit? Yes  How would you like to obtain your AVS? Please refer to rooming staff note  Patient would like the video invitation sent by: Please refer to rooming staff note   Will anyone else be joining your video visit? Please refer to rooming staff note       Video-Visit Details     Type of service:  Video Visit  Video Start Time: 328  Video End Time: 338  Provider Name: Bryn Valdovinos MD, A   Originating Location (pt. Location): Home  Provider Location: Off campus   Distant Location (provider location): Home/Clinic  Platform used for Video Visit: AmWell/Doximity    Assessment and Plan:    1. New multiple pulmonary lung nodule(s) and lung mass . Given the characteristics on current/previous imaging and risk " factors; I would classify this to be Intermediate (6-65%) risk for cancer. Had CT in 2022 demonstrating large right pleural effusion. There are no records on cytology or fluid analysis at that time. His recent CT in May shows multiple mass-like lesions in the right lung which could be malignancy vs rounded atelectasis. Will get PET-CT to evaluate    Billing: I spent a total of 45min spent on date of encounter which includes prep time, visit with the patient and post visit work including documentation and nursing communication     Bryn Valdovinos MD, MHA  Associate Professor of Medicine  Section of Interventional Pulmonology   Division of Pulmonary, Allergy, Critical Care and Sleep Medicine   [facility]  Pager: [phone]   Office: [phone]  Email: [email or web address]    Noreen Benjamin RN   Interventional Pulmonary Care Coordinator   Office: [phone]  Email: [email or web address]     Ismael Bender  Interventional Pulmonary Surgery Scheduler   Office: [phone]  Email: [email or web address]         History:     Bucky Edgar is a 68 year old male with sig h/o for COPD who is here for evaluation/followup of Lung Nodule.    - No new resp sx or complaints. Denies dyspnea or cough.   - CT chest with multiple masses. Here for eval   - Personal hx of cancer: no  - Family hx of cancer: no  - Exposure hx: Denies asbestos or radon exposure   - Tobacco hx: Past Smoker, >40pkyr and quit in 2017.   - My interpretation of the images relevant for this visit includes: masses  - My interpretation of the PFT's relevant for this visit includes: None     Culprit Nodule(s):   - 24.7x34.3  mm solid mass in the right lower lobe on series:  4  image:  222.     - 70.2x26.5  mm solid mass in the right middle lobe on series:  4  image:  195.     - 5.6x3.6  mm solid nodule in the right upper lobe on series:  4  image:  65.    Other active medical problems include:   - has COPD.     -         Past Medical History:      Past Medical History:   Diagnosis Date    COPD (chronic obstructive pulmonary disease) (H)     Displacement of lumbar intervertebral disc without myelopathy ,     HTN, goal below 140/90              Past Surgical History:      Past Surgical History:   Procedure Laterality Date    HC REPAIR ROTATOR CUFF,CHRONIC      IR LOWER EXTREMITY VENOGRAM LEFT  2021    IR VENOUS STENT  2021    ZZC DECOMPRESS SPINAL CORD,1 SEG  , 2002    leftL4-L5, 2nd right L4-L5    ZZC SPINE FUSION,ANTER,3 SGMTS  2004    ant and post fusion L4-S1            Social History:     Social History     Tobacco Use    Smoking status: Former     Current packs/day: 0.00     Average packs/day: 1.3 packs/day for 41.0 years (51.3 ttl pk-yrs)     Types: Cigarettes     Start date: 10/1/1976     Quit date: 10/1/2017     Years since quittin.6    Smokeless tobacco: Never   Substance Use Topics    Alcohol use: Yes     Alcohol/week: 0.0 standard drinks of alcohol     Comment: 12 pack of beer every 2 weeks            Family History:     Family History   Problem Relation Age of Onset    Family History Negative Other     Diabetes No family hx of     Coronary Artery Disease No family hx of     Hypertension No family hx of     Hyperlipidemia No family hx of     Breast Cancer No family hx of     Prostate Cancer No family hx of     Anxiety Disorder No family hx of     Substance Abuse No family hx of     Asthma No family hx of     Thyroid Disease No family hx of     Unknown/Adopted No family hx of     Genetic Disorder No family hx of     Osteoporosis No family hx of     Anesthesia Reaction No family hx of     Mental Illness No family hx of     Depression No family hx of     Other Cancer No family hx of     Colon Cancer No family hx of     Cerebrovascular Disease No family hx of     Obesity No family hx of              Allergies:      Allergies   Allergen Reactions    Amlodipine Headache     Tremor      Chlorthalidone      Hypotension             Medications:     Current Outpatient Medications   Medication Sig Dispense Refill    acetaminophen (TYLENOL) 325 MG tablet Take 650 mg by mouth every 6 hours       albuterol (PROAIR HFA/PROVENTIL HFA/VENTOLIN HFA) 108 (90 Base) MCG/ACT inhaler Inhale 2 puffs into the lungs every 6 hours as needed for shortness of breath / dyspnea 18 g 0    allopurinol (ZYLOPRIM) 100 MG tablet Take 2 tablets (200 mg) by mouth daily 180 tablet 1    aspirin (ASA) 81 MG EC tablet Take 1 tablet (81 mg) by mouth daily      atorvastatin (LIPITOR) 10 MG tablet Take 1 tablet (10 mg) by mouth daily 90 tablet 3    carvedilol (COREG) 25 MG tablet Take 1 tablet (25 mg) by mouth 2 times daily (with meals) 180 tablet 3    fluticasone (ARNUITY ELLIPTA) 100 MCG/ACT inhaler Inhale 1 puff into the lungs daily 30 each 2    furosemide (LASIX) 20 MG tablet Take 1 tablet (20 mg) by mouth every other day 45 tablet 3    lisinopril (ZESTRIL) 40 MG tablet Take 0.5 tablets (20 mg) by mouth daily      nicotine (NICORETTE) 4 MG lozenge Place 4 mg inside cheek as needed      vitamin D3 (CHOLECALCIFEROL) 50 mcg (2000 units) tablet Take 1 tablet (50 mcg) by mouth daily 90 tablet 1     No current facility-administered medications for this visit.            Review of Systems:     12-point ROS reviewed and abnormalities stated in the history.         Physical Exam:     Constitutional - looks well, in no apparent distress  Eyes - no redness or discharge  Respiratory -breathing appears comfortable.  No cough.  Skin - No appreciable discoloration or lesions (very limited exam)  Neurological - No apparent tremors. Speech fluent and articlate  Psychiatric - no signs of delirium or anxiety     Exam limited to that easily identified on a virtual visit. The rest of a comprehensive physical examination is deferred due to PHE (public health emergency) video visit restrictions.         Current Laboratory Data:   All laboratory and  imaging data reviewed.           No data to display

## 2024-05-07 NOTE — NURSING NOTE
Is the patient currently in the state of [place]? YES    Visit mode:VIDEO    If the visit is dropped, the patient can be reconnected by: VIDEO VISIT: Text to cell phone:   Telephone Information:   Mobile [phone]       Will anyone else be joining the visit? NO  (If patient encounters technical issues they should call [phone]    How would you like to obtain your AVS? MyChart    Are changes needed to the allergy or medication list? Pt stated no med changes    Are refills needed on medications prescribed by this physician? NO    Reason for visit: RECHECK    No other vitals to report per pt    Denise ESPAÑAF

## 2024-05-07 NOTE — LETTER
"5/7/2024       RE: Bucky Edgar  [address]  [facility] [address]     Dear Colleague,    Thank you [facility] referring your [facility]ucky Edgar[facility]. Please see a copy of my visit note below.    LUNG NODULE & INTERVENTIONAL PULMONARY CLINIC  CLINICS & SURGERY [place], [facility]   VIDEO VISIT    Bucky Edgar MRN# [id number]   Age: 68 year old YOB: 1955     Reason for Consultation: Lung Nodule and Lung Mass    Requesting Physician: Hamlet Roberts PA-C  [address] [facility] [address]  [place] [address]       The patient has been notified of following:   \"This video visit will be conducted via a call between you and your physician/provider. We have found that certain health care needs can be provided without the need for an in-person physical exam.  This service lets us provide the care you need with a video conversation.  If a prescription is necessary we can send it directly to your pharmacy.  If lab work is needed we can place an order for that and you can then stop by our lab to have the test done at a later time.  Video visits are billed at different rates depending on your insurance coverage.  Please reach out to your insurance provider with any questions.  If during the course of the call the physician/provider feels a video visit is not appropriate, you will not be charged for this service.\"  Patient has given verbal consent for Video visit? Yes  How would you like to obtain your AVS? Please refer to rooming staff note  Patient would like the video invitation sent by: Please refer to rooming staff note   Will anyone else be joining your video visit? Please refer to rooming staff note       Video-Visit Details     Type of service:  Video Visit  Video Start Time: 328  Video End Time: 338  Provider Name: Bryn Valdovinos MD, A   Originating Location " (pt. Location): Home  Provider Location: Off campus   Distant Location (provider location): Home/Clinic  Platform used for Video Visit: Rody/Asim    Assessment and Plan:    1. New multiple pulmonary lung nodule(s) and lung mass . Given the characteristics on current/previous imaging and risk factors; I would classify this to be Intermediate (6-65%) risk for cancer. Had CT in 2022 demonstrating large right pleural effusion. There are no records on cytology or fluid analysis at that time. His recent CT in May shows multiple mass-like lesions in the right lung which could be malignancy vs rounded atelectasis. Will get PET-CT to evaluate    Billing: I spent a total of 45min spent on date of encounter which includes prep time, visit with the patient and post visit work including documentation and nursing communication     Bryn Valdovinos MD, MHA  Associate Professor of Medicine  Section of Interventional Pulmonology   Division of Pulmonary, Allergy, Critical Care and Sleep Medicine   [facility]  Pager: [phone]   Office: [phone]  Email: [email or web address]    Noreen Benjamin RN   Interventional Pulmonary Care Coordinator   Office: [phone]  Email: [email or web address]     Ismael Bender  Interventional Pulmonary Surgery Scheduler   Office: [phone]  Email: [email or web address]         History:     Bucky Edgar is a 68 year old male with sig h/o for COPD who is here for evaluation/followup of Lung Nodule.    - No new resp sx or complaints. Denies dyspnea or cough.   - CT chest with multiple masses. Here for eval   - Personal hx of cancer: no  - Family hx of cancer: no  - Exposure hx: Denies asbestos or radon exposure   - Tobacco hx: Past Smoker, >40pkyr and quit in 2017.   - My interpretation of the images relevant for this visit includes: masses  - My interpretation of the PFT's relevant for this visit includes: None     Culprit Nodule(s):   -  24.7x34.3  mm solid mass in the right lower lobe on series:  4  image:  222.     - 70.2x26.5  mm solid mass in the right middle lobe on series:  4  image:  195.     - 5.6x3.6  mm solid nodule in the right upper lobe on series:  4  image:  65.    Other active medical problems include:   - has COPD.    -         Past Medical History:      Past Medical History:   Diagnosis Date     COPD (chronic obstructive pulmonary disease) (H)      Displacement of lumbar intervertebral disc without myelopathy ,      HTN, goal below 140/90              Past Surgical History:      Past Surgical History:   Procedure Laterality Date     HC REPAIR ROTATOR CUFF,CHRONIC       IR LOWER EXTREMITY VENOGRAM LEFT  2021     IR VENOUS STENT  2021     ZZC DECOMPRESS SPINAL CORD,1 SEG  , 2002    leftL4-L5, 2nd right L4-L5     ZZC SPINE FUSION,ANTER,3 SGMTS  2004    ant and post fusion L4-S1            Social History:     Social History     Tobacco Use     Smoking status: Former     Current packs/day: 0.00     Average packs/day: 1.3 packs/day for 41.0 years (51.3 ttl pk-yrs)     Types: Cigarettes     Start date: 10/1/1976     Quit date: 10/1/2017     Years since quittin.6     Smokeless tobacco: Never   Substance Use Topics     Alcohol use: Yes     Alcohol/week: 0.0 standard drinks of alcohol     Comment: 12 pack of beer every 2 weeks            Family History:     Family History   Problem Relation Age of Onset     Family History Negative Other      Diabetes No family hx of      Coronary Artery Disease No family hx of      Hypertension No family hx of      Hyperlipidemia No family hx of      Breast Cancer No family hx of      Prostate Cancer No family hx of      Anxiety Disorder No family hx of      Substance Abuse No family hx of      Asthma No family hx of      Thyroid Disease No family hx of      Unknown/Adopted No family hx of      Genetic Disorder No family hx of      Osteoporosis No family hx of      Anesthesia  Reaction No family hx of      Mental Illness No family hx of      Depression No family hx of      Other Cancer No family hx of      Colon Cancer No family hx of      Cerebrovascular Disease No family hx of      Obesity No family hx of              Allergies:      Allergies   Allergen Reactions     Amlodipine Headache     Tremor      Chlorthalidone      Hypotension             Medications:     Current Outpatient Medications   Medication Sig Dispense Refill     acetaminophen (TYLENOL) 325 MG tablet Take 650 mg by mouth every 6 hours        albuterol (PROAIR HFA/PROVENTIL HFA/VENTOLIN HFA) 108 (90 Base) MCG/ACT inhaler Inhale 2 puffs into the lungs every 6 hours as needed for shortness of breath / dyspnea 18 g 0     allopurinol (ZYLOPRIM) 100 MG tablet Take 2 tablets (200 mg) by mouth daily 180 tablet 1     aspirin (ASA) 81 MG EC tablet Take 1 tablet (81 mg) by mouth daily       atorvastatin (LIPITOR) 10 MG tablet Take 1 tablet (10 mg) by mouth daily 90 tablet 3     carvedilol (COREG) 25 MG tablet Take 1 tablet (25 mg) by mouth 2 times daily (with meals) 180 tablet 3     fluticasone (ARNUITY ELLIPTA) 100 MCG/ACT inhaler Inhale 1 puff into the lungs daily 30 each 2     furosemide (LASIX) 20 MG tablet Take 1 tablet (20 mg) by mouth every other day 45 tablet 3     lisinopril (ZESTRIL) 40 MG tablet Take 0.5 tablets (20 mg) by mouth daily       nicotine (NICORETTE) 4 MG lozenge Place 4 mg inside cheek as needed       vitamin D3 (CHOLECALCIFEROL) 50 mcg (2000 units) tablet Take 1 tablet (50 mcg) by mouth daily 90 tablet 1     No current facility-administered medications for this visit.            Review of Systems:     12-point ROS reviewed and abnormalities stated in the history.         Physical Exam:     Constitutional - looks well, in no apparent distress  Eyes - no redness or discharge  Respiratory -breathing appears comfortable.  No cough.  Skin - No appreciable discoloration or lesions (very limited  exam)  Neurological - No apparent tremors. Speech fluent and articlate  Psychiatric - no signs of delirium or anxiety     Exam limited to that easily identified on a virtual visit. The rest of a comprehensive physical examination is deferred due to PHE (public health emergency) video visit restrictions.         Current Laboratory Data:   All laboratory and imaging data reviewed.           No data to display                              Again, thank you for allowing me to participate in the care of your patient.      Sincerely,    Bryn Valdovinos MD

## 2024-05-07 NOTE — PROGRESS NOTES
Pre-visit planning and chart review completed.     NEW patient appointment:    5/7 with Dr. Valdovinos  5/3 CT chest wo contrast  5/9 PFTs    - On aspirin.   - Former smoker.  - LungRADS 4X.    CE updated. Medications, allergies, problem list, and immunizations reconciled.

## 2024-05-08 ENCOUNTER — OFFICE VISIT (OUTPATIENT)
Dept: VASCULAR SURGERY | Facility: CLINIC | Age: 69
End: 2024-05-08
Payer: COMMERCIAL

## 2024-05-08 VITALS — DIASTOLIC BLOOD PRESSURE: 74 MMHG | SYSTOLIC BLOOD PRESSURE: 113 MMHG | HEART RATE: 60 BPM | OXYGEN SATURATION: 95 %

## 2024-05-08 DIAGNOSIS — Z86.718 HISTORY OF DEEP VENOUS THROMBOSIS (DVT) OF DISTAL VEIN OF LEFT LOWER EXTREMITY: ICD-10-CM

## 2024-05-08 DIAGNOSIS — I87.1 MAY-THURNER SYNDROME: ICD-10-CM

## 2024-05-08 DIAGNOSIS — I10 HTN, GOAL BELOW 140/90: ICD-10-CM

## 2024-05-08 DIAGNOSIS — Z87.891 FORMER CIGARETTE SMOKER: ICD-10-CM

## 2024-05-08 DIAGNOSIS — Z01.810 PRE-OPERATIVE CARDIOVASCULAR EXAMINATION: ICD-10-CM

## 2024-05-08 DIAGNOSIS — Z01.818 PRE-OP EXAM: ICD-10-CM

## 2024-05-08 DIAGNOSIS — I71.43 INFRARENAL ABDOMINAL AORTIC ANEURYSM (AAA) WITHOUT RUPTURE (H): Primary | ICD-10-CM

## 2024-05-08 PROCEDURE — 99204 OFFICE O/P NEW MOD 45 MIN: CPT | Performed by: SURGERY

## 2024-05-08 NOTE — PATIENT INSTRUCTIONS
Thank you so much for choosing us for your care. It was a pleasure to see you at the vascular clinic today.     Follow-up recommendations: We will follow up after your testing.    Additional testing/imaging ordered today: Stress test.      Our scheduling team will get in touch with you to set up any follow-up testing/imaging and/or appointments. Please be aware that any testing/imaging recommended today will need to completed prior to your next visit with the provider. If testing/imaging is not completed prior to your next visit, your visit may be rescheduled.     If you have any questions, please contact our clinic directly at [phone] and ask for the nurse. We also encourage the use of [employer] to communicate with your healthcare provider.    If you have an urgent need after business hours (8:00 am to 4:30 pm) please call [phone], option 4, and ask for the vascular attending on call. For non-urgent after hours needs, please call the vascular clinic at [phone]. For scheduling needs, please call our clinic directly at [phone].    =====================================================================    [facility] is recognized by the [facility] as a comprehensive stroke center. As part of our commitment to better patient outcomes and excellent stroke education, we attach the below stroke education materials to ALL of the after visit summaries in our vascular clinic.        Learning About BE FAST: Stroke Warning Signs  BE FAST is a simple way to remember the main symptoms of stroke. These symptoms happen suddenly. So learning what to look for helps you know when to call for medical help. BE FAST stands for:    B - Balance.  Loss of balance or trouble walking.     E - Eyes.  Trouble seeing out of one or both eyes.     F - Face.  Weakness or drooping on one side of the face.     A - Arm.  Weakness or numbness in an arm or leg.     S - Speech.  Trouble speaking.     T -  Time to call 911.  Also call 911 if you have other stroke symptoms. They include:  Sudden confusion.  Sudden trouble understanding simple statements.  Fainting.  A seizure.  A sudden, severe headache.     A stroke happens when a blood vessel in the brain bursts or is blocked by a blood clot. The blood supply to part of the brain--and the oxygen the blood carries--is reduced. This damages the brain.   If you have a stroke, quick treatment may save your life. And it may reduce the damage in your brain so that you have fewer problems after the stroke.   Current as of: December 18, 2022               Content Version: 13.8    7971-6565 "[employer]".   Care instructions adapted under license by your healthcare professional. If you have questions about a medical condition or this instruction, always ask your healthcare professional. "[employer]" disclaims any warranty or liability for your use of this information.    Learning About How to Prevent a Stroke  What is a stroke?  A stroke is damage to the brain that occurs when a blood vessel in the brain bursts or is blocked by a blood clot. Without blood and the oxygen it carries, part of the brain starts to die. The part of the body controlled by the damaged area of the brain can't work properly.  Brain damage can start within minutes of a stroke. But quick treatment can help limit the damage and increase the chance of a full recovery.  What puts you at risk for stroke?  A risk factor is anything that makes you more likely to have a particular health problem.  Risk factors for stroke that you can manage or change include:  Health problems like atrial fibrillation, diabetes, high blood pressure, high cholesterol, hardening of the arteries (atherosclerosis), and sickle cell disease.  Smoking.  Drinking more than 2 alcoholic drinks a day for men and 1 drink a day for women.  Being overweight.  Not eating healthy foods.  Not getting enough physical  activity.  Risk factors you can't change include:  Having a previous stroke.  Family history of stroke.  Being older.  Being , [place] Native, , or South  American.  Being female.  Having certain problems during pregnancy, such as preeclampsia.  Being past menopause.  Your doctor can help you know your risk. Then you and your doctor can talk about whether to take steps to lower it.  How can you help prevent a stroke?  Here are some things you can do to help prevent a stroke.  Manage health problems that raise your risk. These include atrial fibrillation, diabetes, high blood pressure, and high cholesterol.  Have a heart-healthy lifestyle.  Don't smoke. If you need help quitting, talk to your doctor about stop-smoking programs and medicines. These can increase your chances of quitting for good.  Limit alcohol to 2 drinks a day for men and 1 drink a day for women.  Stay at a healthy weight. Lose weight if you need to.  Be active. Get at least 30 minutes of exercise on most days of the week. Walking is a good choice. You also may want to do other activities, such as running, swimming, cycling, or playing tennis or team sports.  Eat heart-healthy foods. These include vegetables, fruits, nuts, beans, lean meat, fish, and whole grains. Limit sodium and sugar.  If you think you may have a problem with alcohol or drug use, talk to your doctor.  If you use hormone therapy for menopause or hormonal birth control, talk with your doctor. Ask if these are right for you. They may raise the risk of stroke in some people.  Decide with your doctor whether you will also take medicines to help lower your risk. For example, you and your doctor may decide you will take a medicine that prevents blood clots.  What are the symptoms of a stroke?  Symptoms of a stroke happen quickly. A stroke may cause:  Sudden numbness, tingling, weakness, or loss of movement in your face, arm, or leg, especially on only  one side of your body.  Sudden vision changes.  Sudden trouble speaking.  Sudden confusion or trouble understanding simple statements.  Sudden problems with walking or balance.  A sudden, severe headache that is different from past headaches.  Fainting.  A seizure.  It's important to call for medical help if you have stroke symptoms. Quick treatment may save your life. And it may reduce the damage in your brain so that you have fewer problems after the stroke.  Follow-up care is a key part of your treatment and safety. Be sure to make and go to all appointments, and call your doctor if you are having problems. It's also a good idea to know your test results and keep a list of the medicines you take.    Current as of: December 18, 2022               Content Version: 13.8    0342-2166 [employer].   Care instructions adapted under license by your healthcare professional. If you have questions about a medical condition or this instruction, always ask your healthcare professional. [employer] disclaims any warranty or liability for your use of this information.

## 2024-05-08 NOTE — PROGRESS NOTES
Vascular & Endovascular Surgery Clinic Consultation   Vascular Surgeon: Aman Valerio MD, RPVI       Location:  [facility]    Bucky Edgar  Medical Record #:  [id number]  YOB: 1955  Age:  68 year old     Date of Service: 5/8/2024    Referring Provider: Hamlet Roberts.  Primary Care Provider: Hamlet Roberts    Chief Complaint/Reason for Visit: 6.1 cm infrarenal abdominal aortic aneurysm.    HPI:  Mr. Edgar is a pleasant 68 year old male seen today with his family for an asymptomatic abdominal aortic aneurysm. He has no abdominal or back pain. His main issue is his breathing. Gets short of breath very easily with longstanding COPD.  Struggles to get up stairs right now but this is worse than normal.  He is seeing pulmonology for workup of this and a lung nodule.    CV risk factors include COPD, HTN, history of DVT and is a former smoker.    Relevant surgical history:  - Left lower extremity venogram with catheter directed thromboysis ([facility] - 8/24-8/25/2017)  - Left lower extremity venogram with Left common iliac stenting with 16x80 mm Abre stent (Dr. Escobar - 5/4/2021)  - Anterior and posterior lumbar spinal fusion    Review of Systems:    A 12 point ROS was reviewed and is negative except for symptoms noted in HPI.     Medical/Surgical History:    Past Medical History:   Diagnosis Date    COPD (chronic obstructive pulmonary disease) (H)     Displacement of lumbar intervertebral disc without myelopathy 1995, 2002    HTN, goal below 140/90          Past Surgical History:   Procedure Laterality Date    HC REPAIR ROTATOR CUFF,CHRONIC  1989    IR LOWER EXTREMITY VENOGRAM LEFT  5/4/2021    IR VENOUS STENT  5/4/2021    ZC DECOMPRESS SPINAL CORD,1 SEG  1995, 4/2002    leftL4-L5, 2nd right L4-L5    ZZC SPINE FUSION,ANTER,3 SGMTS  2/9/2004    ant and post fusion L4-S1        Allergies:     Allergies   Allergen Reactions    Amlodipine Headache     Tremor      Chlorthalidone      Hypotension         Medications:    Current Outpatient Medications   Medication Sig Dispense Refill    acetaminophen (TYLENOL) 325 MG tablet Take 650 mg by mouth every 6 hours       allopurinol (ZYLOPRIM) 100 MG tablet Take 2 tablets (200 mg) by mouth daily 180 tablet 1    aspirin (ASA) 81 MG EC tablet Take 1 tablet (81 mg) by mouth daily      atorvastatin (LIPITOR) 10 MG tablet Take 1 tablet (10 mg) by mouth daily 90 tablet 3    carvedilol (COREG) 25 MG tablet Take 1 tablet (25 mg) by mouth 2 times daily (with meals) 180 tablet 3    fluticasone (ARNUITY ELLIPTA) 100 MCG/ACT inhaler Inhale 1 puff into the lungs daily 30 each 2    furosemide (LASIX) 20 MG tablet Take 1 tablet (20 mg) by mouth every other day 45 tablet 3    lisinopril (ZESTRIL) 40 MG tablet Take 0.5 tablets (20 mg) by mouth daily (Patient taking differently: Take 40 mg by mouth daily)      nicotine (NICORETTE) 4 MG lozenge Place 4 mg inside cheek as needed      vitamin D3 (CHOLECALCIFEROL) 50 mcg (2000 units) tablet Take 1 tablet (50 mcg) by mouth daily 90 tablet 1    albuterol (PROAIR HFA/PROVENTIL HFA/VENTOLIN HFA) 108 (90 Base) MCG/ACT inhaler Inhale 2 puffs into the lungs every 6 hours as needed for shortness of breath / dyspnea (Patient not taking: Reported on 2024) 18 g 0     No current facility-administered medications for this visit.        Social Habits:    Tobacco Use      Smoking status: Former        Packs/day: 0.00        Years: 1.3 packs/day for 41.0 years (51.3 ttl pk-yrs)        Types: Cigarettes        Start date: 10/1/1976        Quit date: 10/1/2017        Years since quittin.6        Passive exposure: Never (per pt)      Smokeless tobacco: Never       Alcohol Use: Not on file       History   Drug Use No        Family History:    Family History   Problem Relation Age of Onset    Family History Negative Other     Diabetes No family hx of     Coronary Artery Disease No family hx of     Hypertension No  family hx of     Hyperlipidemia No family hx of     Breast Cancer No family hx of     Prostate Cancer No family hx of     Anxiety Disorder No family hx of     Substance Abuse No family hx of     Asthma No family hx of     Thyroid Disease No family hx of     Unknown/Adopted No family hx of     Genetic Disorder No family hx of     Osteoporosis No family hx of     Anesthesia Reaction No family hx of     Mental Illness No family hx of     Depression No family hx of     Other Cancer No family hx of     Colon Cancer No family hx of     Cerebrovascular Disease No family hx of     Obesity No family hx of        Physical Examination:  /74 (BP Location: Left arm, Patient Position: Sitting, Cuff Size: Adult Regular)   Pulse 60   SpO2 95%   Wt Readings from Last 1 Encounters:   05/09/24 92.8 kg (204 lb 9.6 oz)     There is no height or weight on file to calculate BMI.    Exam:  General: No apparent distress. Answers questions appropriately   Neurologic: Focally intact, Alert and oriented x 3.   Eyes: Grossly normal.   Cardiac:  RRR  Pulmonary:  Non labored breathing with normal respiratory effort  Abdominal: Soft, non distended, non tender to palpation.  Non tender pulsatile and expansile abdominal mass.   Musculoskeletal/Extremity: 5/5 gross lower extremity strength.     Vascular Exam:     Femoral: Left: 2+   Right: 2+    Laboratory Findings:  Hemoglobin   Date Value Ref Range Status   04/22/2024 15.7 13.3 - 17.7 g/dL Final   03/06/2024 16.2 13.3 - 17.7 g/dL Final   05/24/2021 16.0 13.3 - 17.7 g/dL Final   05/04/2021 17.0 13.3 - 17.7 g/dL Final     WBC   Date Value Ref Range Status   05/24/2021 8.8 4.0 - 11.0 10e9/L Final   05/04/2021 9.5 4.0 - 11.0 10e9/L Final     WBC Count   Date Value Ref Range Status   04/22/2024 9.3 4.0 - 11.0 10e3/uL Final   03/06/2024 10.0 4.0 - 11.0 10e3/uL Final     Platelet Count   Date Value Ref Range Status   04/22/2024 261 150 - 450 10e3/uL Final   03/06/2024 267 150 - 450 10e3/uL Final    05/24/2021 217 150 - 450 10e9/L Final   05/04/2021 220 150 - 450 10e9/L Final     Creatinine   Date Value Ref Range Status   04/22/2024 1.42 (H) 0.67 - 1.17 mg/dL Final   05/24/2021 0.95 0.66 - 1.25 mg/dL Final     Sodium   Date Value Ref Range Status   04/22/2024 140 135 - 145 mmol/L Final     Comment:     Reference intervals for this test were updated on 09/26/2023 to more accurately reflect our healthy population. There may be differences in the flagging of prior results with similar values performed with this method. Interpretation of those prior results can be made in the context of the updated reference intervals.    05/24/2021 140 133 - 144 mmol/L Final     Glucose   Date Value Ref Range Status   04/22/2024 113 (H) 70 - 99 mg/dL Final   03/06/2024 105 (H) 70 - 99 mg/dL Final   05/31/2022 95 70 - 99 mg/dL Final   05/24/2021 112 (H) 70 - 99 mg/dL Final   05/04/2021 100 (H) 70 - 99 mg/dL Final     Hemoglobin A1C   Date Value Ref Range Status   05/08/2019 5.3 0 - 5.6 % Final     Comment:     Normal <5.7% Prediabetes 5.7-6.4%  Diabetes 6.5% or higher - adopted from ADA   consensus guidelines.     11/16/2017 5.1 4.3 - 6.0 % Final     INR   Date Value Ref Range Status   05/04/2021 0.92 0.86 - 1.14 Final       Imaging:    I personally reviewed the CTA from 5/3/2024 which shows normal caliber paravisceral aorta with focal irregularity. Widely patent celiac, SMA and left renal arteries. High grade right renal artery stenosis.  Angulated infrarenal neck with mild thrombus.  61 mm infrarenal abdominal aortic aneurysm extending to the aortic bifurcation. Patent iliofemoral system.    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 Edgar is a pleasant 68 year old male evaluated for  an infrarenal abodminal aortic aneurysm.  He is currently undergoing evaluation for his pulmonary nodule and status.  I do not think he would tolerate open repair regardless of his young age.  We will consider him for EVAR and perform risk stratification pending his malignancy evaluation.    Risks, benefits, and alternatives including but not limited to the risk of death, anesthetic or cardiopulmonary complications, bleeding, infection, myocardial infarction, stroke, renal insufficiency requiring temporary or permanent dialysis, bowel infarction necessitating bowel resection and colostomy, vessel injury, dissection, distal embolization, perforation, worsening ischemia with either compartment syndrome or limb loss and spinal cord injury.  Discussed possibility of interval aortic events including rupture.  Discussed the potential need for bank blood products, advanced directives, and the need for a team approach as well as the potential for medical photography. The patient and his family understand the risks and would like to proceed with evaluation of his lung nodule followed by perioperative risk stratification for endovascular repair.    Discussed warning signs including, but not limited to, symptomatic aneurysms, aortic rupture, new abdominal or back pain, hypotension, or syncope/lightheadedness.  If he experiences any of these, he has been advised to seek treatment and contact my team.    Mr. Edgar and his family are in agreement with this plan as outlined.    Recommendations/Plan:  We will follow up the PET scan and discuss results with pulmonology.   He is not fit for open repair given his pulmonary status, will tentatively plan for endovascular aneurysm repair pending results of workup for his lung nodule  Lower extremity duplex to evaluate for popliteal aneurysms  Continue optimal medical therapy with   Aspirin  Statin therapy  Continue smoking avoidence  Antihypertensives     Aman Valerio,  MD  Vascular & Endovascular Surgery      45 minutes spent on the day of encounter doing chart review from our system and care everywhere, history and exam, documentation, coordinating care, and further activities as noted with over half spent counseling.

## 2024-05-08 NOTE — LETTER
5/8/2024       RE: Bucky Edgar  [address] [place] [address]     Dear Colleague,    Thank you for referring your patient, Bucky Edgar, to the [facility] VASCULAR [facility]INIC [place] at [facility]. Please see a copy of my visit note below.    Vascular & Endovascular Surgery Clinic Consultation   Vascular Surgeon: Aman Valerio MD, RPVI       Location:  [facility] CLINICS AND SUR[facility]ERY CENTER    Bucky Edgar  Medical Record #:  [id number]  YOB: 1955  Age:  68 year old     Date of Service: 5/8/2024    Referring Provider: Hamlet Roberts.  Primary Care Provider: Hamlet Roberts    Chief Complaint/Reason for Visit: 6.1 cm infrarenal abdominal aortic aneurysm.    HPI:  Mr. Edgar is a pleasant 68 year old male seen today with his family for an asymptomatic abdominal aortic aneurysm. He has no abdominal or back pain. His main issue is his breathing. Gets short of breath very easily with longstanding COPD.  Struggles to get up stairs right now but this is worse than normal.  He is seeing pulmonology for workup of this and a lung nodule.    CV risk factors include COPD, HTN, history of DVT and is a former smoker.    Relevant surgical history:  - Left lower extremity venogram with catheter directed thromboysis ([facility] - 8/24-8/25/2017)  - Left lower extremity venogram with Left common iliac stenting with 16x80 mm Abre stent (Dr. Escobar - 5/4/2021)  - Anterior and posterior lumbar spinal fusion    Review of Systems:    A 12 point ROS was reviewed and is negative except for symptoms noted in HPI.     Medical/Surgical History:    Past Medical History:   Diagnosis Date    COPD (chronic obstructive pulmonary disease) (H)     Displacement of lumbar intervertebral disc without myelopathy 1995, 2002    HTN, goal below 140/90          Past Surgical History:   Procedure Laterality Date    HC REPAIR ROTATOR CUFF,CHRONIC  1989    IR  LOWER EXTREMITY VENOGRAM LEFT  2021    IR VENOUS STENT  2021    ZZC DECOMPRESS SPINAL CORD,1 SEG  , 2002    leftL4-L5, 2nd right L4-L5    ZZC SPINE FUSION,ANTER,3 SGMTS  2004    ant and post fusion L4-S1        Allergies:     Allergies   Allergen Reactions    Amlodipine Headache     Tremor     Chlorthalidone      Hypotension         Medications:    Current Outpatient Medications   Medication Sig Dispense Refill    acetaminophen (TYLENOL) 325 MG tablet Take 650 mg by mouth every 6 hours       allopurinol (ZYLOPRIM) 100 MG tablet Take 2 tablets (200 mg) by mouth daily 180 tablet 1    aspirin (ASA) 81 MG EC tablet Take 1 tablet (81 mg) by mouth daily      atorvastatin (LIPITOR) 10 MG tablet Take 1 tablet (10 mg) by mouth daily 90 tablet 3    carvedilol (COREG) 25 MG tablet Take 1 tablet (25 mg) by mouth 2 times daily (with meals) 180 tablet 3    fluticasone (ARNUITY ELLIPTA) 100 MCG/ACT inhaler Inhale 1 puff into the lungs daily 30 each 2    furosemide (LASIX) 20 MG tablet Take 1 tablet (20 mg) by mouth every other day 45 tablet 3    lisinopril (ZESTRIL) 40 MG tablet Take 0.5 tablets (20 mg) by mouth daily (Patient taking differently: Take 40 mg by mouth daily)      nicotine (NICORETTE) 4 MG lozenge Place 4 mg inside cheek as needed      vitamin D3 (CHOLECALCIFEROL) 50 mcg (2000 units) tablet Take 1 tablet (50 mcg) by mouth daily 90 tablet 1    albuterol (PROAIR HFA/PROVENTIL HFA/VENTOLIN HFA) 108 (90 Base) MCG/ACT inhaler Inhale 2 puffs into the lungs every 6 hours as needed for shortness of breath / dyspnea (Patient not taking: Reported on 2024) 18 g 0     No current facility-administered medications for this visit.        Social Habits:    Tobacco Use      Smoking status: Former        Packs/day: 0.00        Years: 1.3 packs/day for 41.0 years (51.3 ttl pk-yrs)        Types: Cigarettes        Start date: 10/1/1976        Quit date: 10/1/2017        Years since quittin.6        Passive  exposure: Never (per pt)      Smokeless tobacco: Never       Alcohol Use: Not on file       History   Drug Use No        Family History:    Family History   Problem Relation Age of Onset    Family History Negative Other     Diabetes No family hx of     Coronary Artery Disease No family hx of     Hypertension No family hx of     Hyperlipidemia No family hx of     Breast Cancer No family hx of     Prostate Cancer No family hx of     Anxiety Disorder No family hx of     Substance Abuse No family hx of     Asthma No family hx of     Thyroid Disease No family hx of     Unknown/Adopted No family hx of     Genetic Disorder No family hx of     Osteoporosis No family hx of     Anesthesia Reaction No family hx of     Mental Illness No family hx of     Depression No family hx of     Other Cancer No family hx of     Colon Cancer No family hx of     Cerebrovascular Disease No family hx of     Obesity No family hx of        Physical Examination:  /74 (BP Location: Left arm, Patient Position: Sitting, Cuff Size: Adult Regular)   Pulse 60   SpO2 95%   Wt Readings from Last 1 Encounters:   05/09/24 92.8 kg (204 lb 9.6 oz)     There is no height or weight on file to calculate BMI.    Exam:  General: No apparent distress. Answers questions appropriately   Neurologic: Focally intact, Alert and oriented x 3.   Eyes: Grossly normal.   Cardiac:  RRR  Pulmonary:  Non labored breathing with normal respiratory effort  Abdominal: Soft, non distended, non tender to palpation.  Non tender pulsatile and expansile abdominal mass.   Musculoskeletal/Extremity: 5/5 gross lower extremity strength.     Vascular Exam:     Femoral: Left: 2+   Right: 2+    Laboratory Findings:  Hemoglobin   Date Value Ref Range Status   04/22/2024 15.7 13.3 - 17.7 g/dL Final   03/06/2024 16.2 13.3 - 17.7 g/dL Final   05/24/2021 16.0 13.3 - 17.7 g/dL Final   05/04/2021 17.0 13.3 - 17.7 g/dL Final     WBC   Date Value Ref Range Status   05/24/2021 8.8 4.0 - 11.0  10e9/L Final   05/04/2021 9.5 4.0 - 11.0 10e9/L Final     WBC Count   Date Value Ref Range Status   04/22/2024 9.3 4.0 - 11.0 10e3/uL Final   03/06/2024 10.0 4.0 - 11.0 10e3/uL Final     Platelet Count   Date Value Ref Range Status   04/22/2024 261 150 - 450 10e3/uL Final   03/06/2024 267 150 - 450 10e3/uL Final   05/24/2021 217 150 - 450 10e9/L Final   05/04/2021 220 150 - 450 10e9/L Final     Creatinine   Date Value Ref Range Status   04/22/2024 1.42 (H) 0.67 - 1.17 mg/dL Final   05/24/2021 0.95 0.66 - 1.25 mg/dL Final     Sodium   Date Value Ref Range Status   04/22/2024 140 135 - 145 mmol/L Final     Comment:     Reference intervals for this test were updated on 09/26/2023 to more accurately reflect our healthy population. There may be differences in the flagging of prior results with similar values performed with this method. Interpretation of those prior results can be made in the context of the updated reference intervals.    05/24/2021 140 133 - 144 mmol/L Final     Glucose   Date Value Ref Range Status   04/22/2024 113 (H) 70 - 99 mg/dL Final   03/06/2024 105 (H) 70 - 99 mg/dL Final   05/31/2022 95 70 - 99 mg/dL Final   05/24/2021 112 (H) 70 - 99 mg/dL Final   05/04/2021 100 (H) 70 - 99 mg/dL Final     Hemoglobin A1C   Date Value Ref Range Status   05/08/2019 5.3 0 - 5.6 % Final     Comment:     Normal <5.7% Prediabetes 5.7-6.4%  Diabetes 6.5% or higher - adopted from ADA   consensus guidelines.     11/16/2017 5.1 4.3 - 6.0 % Final     INR   Date Value Ref Range Status   05/04/2021 0.92 0.86 - 1.14 Final       Imaging:    I personally reviewed the CTA from 5/3/2024 which shows normal caliber paravisceral aorta with focal irregularity. Widely patent celiac, SMA and left renal arteries. High grade right renal artery stenosis.  Angulated infrarenal neck with mild thrombus.  61 mm infrarenal abdominal aortic aneurysm extending to the aortic bifurcation. Patent iliofemoral system.    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 Edgar is a pleasant 68 year old male evaluated for an infrarenal abodminal aortic aneurysm.  He is currently undergoing evaluation for his pulmonary nodule and status.  I do not think he would tolerate open repair regardless of his young age.  We will consider him for EVAR and perform risk stratification pending his malignancy evaluation.    Risks, benefits, and alternatives including but not limited to the risk of death, anesthetic or cardiopulmonary complications, bleeding, infection, myocardial infarction, stroke, renal insufficiency requiring temporary or permanent dialysis, bowel infarction necessitating bowel resection and colostomy, vessel injury, dissection, distal embolization, perforation, worsening ischemia with either compartment syndrome or limb loss and spinal cord injury.  Discussed possibility of interval aortic events including rupture.  Discussed the potential need for bank blood products, advanced directives, and the need for a team approach as well as the potential for medical photography. The patient and his family understand the risks and would like to proceed with evaluation of his lung nodule followed by perioperative risk stratification for endovascular repair.    Discussed warning signs including, but not limited to, symptomatic aneurysms, aortic rupture, new abdominal or back pain, hypotension, or syncope/lightheadedness.  If he experiences any of these, he has been advised to seek treatment and contact my team.    Mr. Edgar and his family are in agreement with this plan as outlined.    Recommendations/Plan:  We will follow up the PET scan and discuss results with pulmonology.   He is not fit for open repair given his pulmonary  status, will tentatively plan for endovascular aneurysm repair pending results of workup for his lung nodule  Lower extremity duplex to evaluate for popliteal aneurysms  Continue optimal medical therapy with   Aspirin  Statin therapy  Continue smoking avoidence  Antihypertensives     45 minutes spent on the day of encounter doing chart review from our system and care everywhere, history and exam, documentation, coordinating care, and further activities as noted with over half spent counseling.         Again, thank you for allowing me to participate in the care of your patient.      Sincerely,    Aman Valerio MD

## 2024-05-08 NOTE — NURSING NOTE
Chief Complaint   Patient presents with    Consult     AAA Consult. CTA completed 05/03.       Medications and allergies reconciled.  Vitals collected.    Wendy Kan LPN

## 2024-05-09 ENCOUNTER — OFFICE VISIT (OUTPATIENT)
Dept: NURSING | Facility: CLINIC | Age: 69
End: 2024-05-09
Payer: COMMERCIAL

## 2024-05-09 VITALS — OXYGEN SATURATION: 98 % | BODY MASS INDEX: 29.36 KG/M2 | HEART RATE: 61 BPM | WEIGHT: 204.6 LBS

## 2024-05-09 DIAGNOSIS — R91.8 LUNG MASS: Primary | ICD-10-CM

## 2024-05-09 DIAGNOSIS — R91.8 LUNG MASS: ICD-10-CM

## 2024-05-09 PROCEDURE — 94729 DIFFUSING CAPACITY: CPT | Performed by: INTERNAL MEDICINE

## 2024-05-09 PROCEDURE — 94726 PLETHYSMOGRAPHY LUNG VOLUMES: CPT | Performed by: INTERNAL MEDICINE

## 2024-05-09 PROCEDURE — 94375 RESPIRATORY FLOW VOLUME LOOP: CPT | Performed by: INTERNAL MEDICINE

## 2024-05-09 NOTE — PROGRESS NOTES
Neck CT portion of PET scan previously denied. Order placed per Dr. Valdovinos for PET/CT CAP (removing neck portion).

## 2024-05-10 LAB
DLCOUNC-%PRED-PRE: 51 %
DLCOUNC-PRE: 13.23 ML/MIN/MMHG
DLCOUNC-PRED: 25.69 ML/MIN/MMHG
ERV-%PRED-PRE: 54 %
ERV-PRE: 0.79 L
ERV-PRED: 1.45 L
EXPTIME-PRE: 8.25 SEC
FEF2575-%PRED-PRE: 19 %
FEF2575-PRE: 0.49 L/SEC
FEF2575-PRED: 2.56 L/SEC
FEFMAX-%PRED-PRE: 43 %
FEFMAX-PRE: 3.68 L/SEC
FEFMAX-PRED: 8.45 L/SEC
FEV1-%PRED-PRE: 40 %
FEV1-PRE: 1.31 L
FEV1FEV6-PRE: 54 %
FEV1FEV6-PRED: 78 %
FEV1FVC-PRE: 50 %
FEV1FVC-PRED: 77 %
FEV1SVC-PRE: 46 %
FEV1SVC-PRED: 77 %
FIFMAX-PRE: 3.15 L/SEC
FRCPLETH-%PRED-PRE: 98 %
FRCPLETH-PRE: 3.54 L
FRCPLETH-PRED: 3.61 L
FVC-%PRED-PRE: 61 %
FVC-PRE: 2.6 L
FVC-PRED: 4.23 L
IC-%PRED-PRE: 72 %
IC-PRE: 2.09 L
IC-PRED: 2.89 L
RVPLETH-%PRED-PRE: 108 %
RVPLETH-PRE: 2.75 L
RVPLETH-PRED: 2.53 L
TLCPLETH-%PRED-PRE: 81 %
TLCPLETH-PRE: 5.63 L
TLCPLETH-PRED: 6.92 L
VA-%PRED-PRE: 70 %
VA-PRE: 4.42 L
VC-%PRED-PRE: 68 %
VC-PRE: 2.88 L
VC-PRED: 4.19 L

## 2024-05-18 ENCOUNTER — HOSPITAL ENCOUNTER (OUTPATIENT)
Dept: PET IMAGING | Facility: CLINIC | Age: 69
Discharge: HOME | End: 2024-05-18
Attending: INTERNAL MEDICINE | Admitting: INTERNAL MEDICINE
Payer: COMMERCIAL

## 2024-05-18 DIAGNOSIS — R91.8 LUNG MASS: ICD-10-CM

## 2024-05-18 PROCEDURE — A9552 F18 FDG: HCPCS | Performed by: INTERNAL MEDICINE

## 2024-05-18 PROCEDURE — 250N000011 HC RX IP 250 OP 636: Performed by: INTERNAL MEDICINE

## 2024-05-18 PROCEDURE — 74177 CT ABD & PELVIS W/CONTRAST: CPT | Mod: 26 | Performed by: RADIOLOGY

## 2024-05-18 PROCEDURE — 71260 CT THORAX DX C+: CPT | Mod: 26 | Performed by: RADIOLOGY

## 2024-05-18 PROCEDURE — 71260 CT THORAX DX C+: CPT

## 2024-05-18 PROCEDURE — 78815 PET IMAGE W/CT SKULL-THIGH: CPT | Mod: 26 | Performed by: RADIOLOGY

## 2024-05-18 PROCEDURE — 78815 PET IMAGE W/CT SKULL-THIGH: CPT | Mod: PI

## 2024-05-18 PROCEDURE — 343N000001 HC RX 343: Performed by: INTERNAL MEDICINE

## 2024-05-18 RX ORDER — IOPAMIDOL 755 MG/ML
10-135 INJECTION, SOLUTION INTRAVASCULAR ONCE
Status: COMPLETED | OUTPATIENT
Start: 2024-05-18 | End: 2024-05-18

## 2024-05-18 RX ORDER — FLUDEOXYGLUCOSE F 18 200 MCI/ML
10-18 INJECTION, SOLUTION INTRAVENOUS ONCE
Status: COMPLETED | OUTPATIENT
Start: 2024-05-18 | End: 2024-05-18

## 2024-05-18 RX ADMIN — FLUDEOXYGLUCOSE F 18 11.99 MILLICURIE: 200 INJECTION, SOLUTION INTRAVENOUS at 08:18

## 2024-05-18 RX ADMIN — IOPAMIDOL 126 ML: 755 INJECTION, SOLUTION INTRAVENOUS at 09:09

## 2024-05-21 ENCOUNTER — CARE COORDINATION (OUTPATIENT)
Dept: PULMONOLOGY | Facility: CLINIC | Age: 69
End: 2024-05-21
Payer: COMMERCIAL

## 2024-05-21 LAB
RADIOLOGIST FLAGS: NORMAL
RADIOLOGIST FLAGS: NORMAL

## 2024-05-22 NOTE — PROGRESS NOTES
PET scan results forwarded to Dr. Valdovinos per his request for biopsy planning. Requesting provider update patient with results and follow-up plan.     Will await provider direction on orders/follow-up needed.

## 2024-05-23 DIAGNOSIS — R91.8 LUNG MASS: Primary | ICD-10-CM

## 2024-06-04 ENCOUNTER — VIRTUAL VISIT (OUTPATIENT)
Dept: PULMONOLOGY | Facility: CLINIC | Age: 69
End: 2024-06-04
Attending: INTERNAL MEDICINE
Payer: COMMERCIAL

## 2024-06-04 VITALS — WEIGHT: 200 LBS | HEIGHT: 69 IN | BODY MASS INDEX: 29.62 KG/M2

## 2024-06-04 DIAGNOSIS — J44.9 CHRONIC OBSTRUCTIVE PULMONARY DISEASE, UNSPECIFIED COPD TYPE (H): Primary | ICD-10-CM

## 2024-06-04 PROCEDURE — 99215 OFFICE O/P EST HI 40 MIN: CPT | Mod: 95 | Performed by: INTERNAL MEDICINE

## 2024-06-04 RX ORDER — FLUTICASONE FUROATE, UMECLIDINIUM BROMIDE AND VILANTEROL TRIFENATATE 200; 62.5; 25 UG/1; UG/1; UG/1
1 POWDER RESPIRATORY (INHALATION) DAILY
Qty: 90 EACH | Refills: 4 | Status: SHIPPED | OUTPATIENT
Start: 2024-06-04

## 2024-06-04 ASSESSMENT — PAIN SCALES - GENERAL: PAINLEVEL: SEVERE PAIN (6)

## 2024-06-04 NOTE — NURSING NOTE
Patient stated no medication changes since last reviewed on 5/8/24.       Is the patient currently in the state of [place]? YES    Visit mode:VIDEO    If the visit is dropped, the patient can be reconnected by: VIDEO VISIT: Text to cell phone:   Telephone Information:   Mobile [phone]    and VIDEO VISIT: Send to e-mail at: [email or web address]    Will anyone else be joining the visit? NO  (If patient encounters technical issues they should call [phone])    How would you like to obtain your AVS? MyChart    Are changes needed to the allergy or medication list? Pt declined med review and Pt stated no med changes    Are refills needed on medications prescribed by this physician? NO    Reason for visit: CHUN DOTSON

## 2024-06-04 NOTE — PROGRESS NOTES
"Virtual Visit Details    Type of service:  Video Visit   See note    LUNG NODULE & INTERVENTIONAL PULMONARY CLINIC  CLINICS & SURGERY CENTER, [facility], [facility]   VIDEO VISIT    Bucky Edgar MRN# [id number]   Age: 68 year old YOB: 1955     Reason for Consultation: Lung Nodule and Lung Mass    Requesting Physician: Hamlet Roberts PA-C  [address] [place] [address]       The patient has been notified of following:   \"This video visit will be conducted via a call between you and your physician/provider. We have found that certain health care needs can be provided without the need for an in-person physical exam.  This service lets us provide the care you need with a video conversation.  If a prescription is necessary we can send it directly to your pharmacy.  If lab work is needed we can place an order for that and you can then stop by our lab to have the test done at a later time.  Video visits are billed at different rates depending on your insurance coverage.  Please reach out to your insurance provider with any questions.  If during the course of the call the physician/provider feels a video visit is not appropriate, you will not be charged for this service.\"  Patient has given verbal consent for Video visit? Yes  How would you like to obtain your AVS? Please refer to rooming staff note  Patient would like the video invitation sent by: Please refer to rooming staff note   Will anyone else be joining your video visit? Please refer to rooming staff note       Video-Visit Details     Type of service:  Video Visit  Video Start Time: 200  Video End Time: 207  Provider Name: Bryn Valdovinos MD, A   Originating Location (pt. Location): Home  Provider Location: Off campus   Distant Location (provider location): Home/Clinic  Platform used for Video Visit: AmWell/Doximity    Assessment and Plan:    Preop clearance for AAA repair. Feels much better from " breathing standpoint. He has mod-severe COPD and currently on LAMA. I will change this to trelegy in addition to education on how to use HOWIE. He is agreeable.  2. Lung atelectasis. Followup in 6mo with CT     Billing: I spent a total of 45min spent on date of encounter which includes prep time, visit with the patient and post visit work including documentation and nursing communication     Bryn Valdovinos MD, MHA  Associate Professor of Medicine  Section of Interventional Pulmonology   Division of Pulmonary, Allergy, Critical Care and Sleep Medicine   [facility]  Pager: [phone]   Office: [phone]  Email: [email or web address]    Noreen Benjamin RN   Interventional Pulmonary Care Coordinator   Office: [phone]  Email: [email or web address]     Ismael Bender  Interventional Pulmonary Surgery Scheduler   Office: [phone]  Email: [email or web address]         History:     Bucky Edgar is a 68 year old male with sig h/o for COPD who is here for evaluation/followup of Lung Nodule.    - No new resp sx or complaints.   - Here for eval and preop clearance for upcoming AAA repair   - Personal hx of cancer: no  - Family hx of cancer: no  - Exposure hx: Denies asbestos or radon exposure   - Tobacco hx: Past Smoker, >40pkyr and quit in 2017.   - My interpretation of the images relevant for this visit includes: masses  - My interpretation of the PFT's relevant for this visit includes: None     Culprit Nodule(s):   - 24.7x34.3  mm solid mass in the right lower lobe on series:  4  image:  222.     - 70.2x26.5  mm solid mass in the right middle lobe on series:  4  image:  195.     - 5.6x3.6  mm solid nodule in the right upper lobe on series:  4  image:  65.    Other active medical problems include:   - has COPD.  On LAMA and daily HOWIE. Says he feels much better with his breathing. He can walk up a flight of stairs ok but needs to rest. No recent AECOPD.          Past  Medical History:      Past Medical History:   Diagnosis Date    COPD (chronic obstructive pulmonary disease) (H)     Displacement of lumbar intervertebral disc without myelopathy ,     HTN, goal below 140/90              Past Surgical History:      Past Surgical History:   Procedure Laterality Date    HC REPAIR ROTATOR CUFF,CHRONIC      IR LOWER EXTREMITY VENOGRAM LEFT  2021    IR VENOUS STENT  2021    ZZC DECOMPRESS SPINAL CORD,1 SEG  , 2002    leftL4-L5, 2nd right L4-L5    ZZC SPINE FUSION,ANTER,3 SGMTS  2004    ant and post fusion L4-S1            Social History:     Social History     Tobacco Use    Smoking status: Former     Current packs/day: 0.00     Average packs/day: 1.3 packs/day for 41.0 years (51.3 ttl pk-yrs)     Types: Cigarettes     Start date: 10/1/1976     Quit date: 10/1/2017     Years since quittin.6     Passive exposure: Never (per pt)    Smokeless tobacco: Never   Substance Use Topics    Alcohol use: Yes     Alcohol/week: 0.0 standard drinks of alcohol     Comment: 12 pack of beer every 2 weeks            Family History:     Family History   Problem Relation Age of Onset    Family History Negative Other     Diabetes No family hx of     Coronary Artery Disease No family hx of     Hypertension No family hx of     Hyperlipidemia No family hx of     Breast Cancer No family hx of     Prostate Cancer No family hx of     Anxiety Disorder No family hx of     Substance Abuse No family hx of     Asthma No family hx of     Thyroid Disease No family hx of     Unknown/Adopted No family hx of     Genetic Disorder No family hx of     Osteoporosis No family hx of     Anesthesia Reaction No family hx of     Mental Illness No family hx of     Depression No family hx of     Other Cancer No family hx of     Colon Cancer No family hx of     Cerebrovascular Disease No family hx of     Obesity No family hx of              Allergies:      Allergies   Allergen Reactions    Amlodipine  Headache     Tremor     Chlorthalidone      Hypotension             Medications:     Current Outpatient Medications   Medication Sig Dispense Refill    acetaminophen (TYLENOL) 325 MG tablet Take 650 mg by mouth every 6 hours       albuterol (PROAIR HFA/PROVENTIL HFA/VENTOLIN HFA) 108 (90 Base) MCG/ACT inhaler Inhale 2 puffs into the lungs every 6 hours as needed for shortness of breath / dyspnea (Patient not taking: Reported on 5/8/2024) 18 g 0    allopurinol (ZYLOPRIM) 100 MG tablet Take 2 tablets (200 mg) by mouth daily 180 tablet 1    aspirin (ASA) 81 MG EC tablet Take 1 tablet (81 mg) by mouth daily      atorvastatin (LIPITOR) 10 MG tablet Take 1 tablet (10 mg) by mouth daily 90 tablet 3    carvedilol (COREG) 25 MG tablet Take 1 tablet (25 mg) by mouth 2 times daily (with meals) 180 tablet 3    fluticasone (ARNUITY ELLIPTA) 100 MCG/ACT inhaler Inhale 1 puff into the lungs daily 30 each 2    furosemide (LASIX) 20 MG tablet Take 1 tablet (20 mg) by mouth every other day 45 tablet 3    lisinopril (ZESTRIL) 40 MG tablet Take 0.5 tablets (20 mg) by mouth daily (Patient taking differently: Take 40 mg by mouth daily)      nicotine (NICORETTE) 4 MG lozenge Place 4 mg inside cheek as needed      vitamin D3 (CHOLECALCIFEROL) 50 mcg (2000 units) tablet Take 1 tablet (50 mcg) by mouth daily 90 tablet 1     No current facility-administered medications for this visit.            Review of Systems:     12-point ROS reviewed and abnormalities stated in the history.         Physical Exam:     Constitutional - looks well, in no apparent distress  Eyes - no redness or discharge  Respiratory -breathing appears comfortable.  No cough.  Skin - No appreciable discoloration or lesions (very limited exam)  Neurological - No apparent tremors. Speech fluent and articlate  Psychiatric - no signs of delirium or anxiety     Exam limited to that easily identified on a virtual visit. The rest of a comprehensive physical examination is deferred  due to PHE (public health emergency) video visit restrictions.         Current Laboratory Data:   All laboratory and imaging data reviewed.          Latest Ref Rng & Units 5/9/2024     9:28 AM   PFT   FVC L 2.60    FEV1 L 1.31    FVC% % 61    FEV1% % 40

## 2024-06-04 NOTE — LETTER
"6/4/2024       RE: Bucky Edgar  [address]  [facility] [address]     Dear Colleague,    Thank you for referring your patient, Bucky Edgar, to the [facility] at [facility]. Please see a copy of my visit note below.    Virtual Visit Details    Type of service:  Video Visit   See note    LUNG NODULE & INTERVENTIONAL PULMONARY CLINIC  CLINICS & SURGERY [place], [facility]   VIDEO VISIT    Bucky Edgar MRN# [id number]   Age: 68 year old YOB: 1955     Reason for Consultation: Lung Nodule and Lung Mass    Requesting Physician: Hamlet Roberts PA-C  [address] [address]       The patient has been notified of following:   \"This video visit will be conducted via a call between you and your physician/provider. We have found that certain health care needs can be provided without the need for an in-person physical exam.  This service lets us provide the care you need with a video conversation.  If a prescription is necessary we can send it directly to your pharmacy.  If lab work is needed we can place an order for that and you can then stop by our lab to have the test done at a later time.  Video visits are billed at different rates depending on your insurance coverage.  Please reach out to your insurance provider with any questions.  If during the course of the call the physician/provider feels a video visit is not appropriate, you will not be charged for this service.\"  Patient has given verbal consent for Video visit? Yes  How would you like to obtain your AVS? Please refer to rooming staff note  Patient would like the video invitation sent by: Please refer to rooming staff note   Will anyone else be joining your video visit? Please refer to rooming staff note       Video-Visit Details     Type of service:  Video Visit  Video Start Time: 200  Video End " Time: 207  Provider Name: Bryn Valdovinos MD, MHA   Originating Location (pt. Location): Home  Provider Location: Off campus   Distant Location (provider location): Home/Clinic  Platform used for Video Visit: [facility]/Asim    Assessment and Plan:    Preop clearance for AAA repair. Feels much better from breathing standpoint. He has mod-severe COPD and currently on LAMA. I will change this to trelegy in addition to education on how to use HOWIE. He is agreeable.  2. Lung atelectasis. Followup in 6mo with CT     Billing: I spent a total of 45min spent on date of encounter which includes prep time, visit with the patient and post visit work including documentation and nursing communication     Bryn Valdovinos MD, MHA  Associate Professor of Medicine  Section of Interventional Pulmonology   Division of Pulmonary, Allergy, Critical Care and Sleep Medicine   [facility]  Pager: [phone]   Office: [phone]  Email: [email or web address]    Noreen Benjamin RN   Interventional Pulmonary Care Coordinator   Office: [phone]  Email: [email or web address]     Ismael Bender  Interventional Pulmonary Surgery Scheduler   Office: [phone]  Email: [email or web address]         History:     Bucky Edgar is a 68 year old male with sig h/o for COPD who is here for evaluation/followup of Lung Nodule.    - No new resp sx or complaints.   - Here for eval and preop clearance for upcoming AAA repair   - Personal hx of cancer: no  - Family hx of cancer: no  - Exposure hx: Denies asbestos or radon exposure   - Tobacco hx: Past Smoker, >40pkyr and quit in 2017.   - My interpretation of the images relevant for this visit includes: masses  - My interpretation of the PFT's relevant for this visit includes: None     Culprit Nodule(s):   - 24.7x34.3  mm solid mass in the right lower lobe on series:  4  image:  222.     - 70.2x26.5  mm solid mass in the right middle lobe on series:  4  image:   195.     - 5.6x3.6  mm solid nodule in the right upper lobe on series:  4  image:  65.    Other active medical problems include:   - has COPD.  On LAMA and daily HOWIE. Says he feels much better with his breathing. He can walk up a flight of stairs ok but needs to rest. No recent AECOPD.          Past Medical History:      Past Medical History:   Diagnosis Date     COPD (chronic obstructive pulmonary disease) (H)      Displacement of lumbar intervertebral disc without myelopathy ,      HTN, goal below 140/90              Past Surgical History:      Past Surgical History:   Procedure Laterality Date     HC REPAIR ROTATOR CUFF,CHRONIC       IR LOWER EXTREMITY VENOGRAM LEFT  2021     IR VENOUS STENT  2021     ZZC DECOMPRESS SPINAL CORD,1 SEG  , 2002    leftL4-L5, 2nd right L4-L5     ZZC SPINE FUSION,ANTER,3 SGMTS  2004    ant and post fusion L4-S1            Social History:     Social History     Tobacco Use     Smoking status: Former     Current packs/day: 0.00     Average packs/day: 1.3 packs/day for 41.0 years (51.3 ttl pk-yrs)     Types: Cigarettes     Start date: 10/1/1976     Quit date: 10/1/2017     Years since quittin.6     Passive exposure: Never (per pt)     Smokeless tobacco: Never   Substance Use Topics     Alcohol use: Yes     Alcohol/week: 0.0 standard drinks of alcohol     Comment: 12 pack of beer every 2 weeks            Family History:     Family History   Problem Relation Age of Onset     Family History Negative Other      Diabetes No family hx of      Coronary Artery Disease No family hx of      Hypertension No family hx of      Hyperlipidemia No family hx of      Breast Cancer No family hx of      Prostate Cancer No family hx of      Anxiety Disorder No family hx of      Substance Abuse No family hx of      Asthma No family hx of      Thyroid Disease No family hx of      Unknown/Adopted No family hx of      Genetic Disorder No family hx of      Osteoporosis No family  hx of      Anesthesia Reaction No family hx of      Mental Illness No family hx of      Depression No family hx of      Other Cancer No family hx of      Colon Cancer No family hx of      Cerebrovascular Disease No family hx of      Obesity No family hx of              Allergies:      Allergies   Allergen Reactions     Amlodipine Headache     Tremor      Chlorthalidone      Hypotension             Medications:     Current Outpatient Medications   Medication Sig Dispense Refill     acetaminophen (TYLENOL) 325 MG tablet Take 650 mg by mouth every 6 hours        albuterol (PROAIR HFA/PROVENTIL HFA/VENTOLIN HFA) 108 (90 Base) MCG/ACT inhaler Inhale 2 puffs into the lungs every 6 hours as needed for shortness of breath / dyspnea (Patient not taking: Reported on 5/8/2024) 18 g 0     allopurinol (ZYLOPRIM) 100 MG tablet Take 2 tablets (200 mg) by mouth daily 180 tablet 1     aspirin (ASA) 81 MG EC tablet Take 1 tablet (81 mg) by mouth daily       atorvastatin (LIPITOR) 10 MG tablet Take 1 tablet (10 mg) by mouth daily 90 tablet 3     carvedilol (COREG) 25 MG tablet Take 1 tablet (25 mg) by mouth 2 times daily (with meals) 180 tablet 3     fluticasone (ARNUITY ELLIPTA) 100 MCG/ACT inhaler Inhale 1 puff into the lungs daily 30 each 2     furosemide (LASIX) 20 MG tablet Take 1 tablet (20 mg) by mouth every other day 45 tablet 3     lisinopril (ZESTRIL) 40 MG tablet Take 0.5 tablets (20 mg) by mouth daily (Patient taking differently: Take 40 mg by mouth daily)       nicotine (NICORETTE) 4 MG lozenge Place 4 mg inside cheek as needed       vitamin D3 (CHOLECALCIFEROL) 50 mcg (2000 units) tablet Take 1 tablet (50 mcg) by mouth daily 90 tablet 1     No current facility-administered medications for this visit.            Review of Systems:     12-point ROS reviewed and abnormalities stated in the history.         Physical Exam:     Constitutional - looks well, in no apparent distress  Eyes - no redness or discharge  Respiratory  -breathing appears comfortable.  No cough.  Skin - No appreciable discoloration or lesions (very limited exam)  Neurological - No apparent tremors. Speech fluent and articlate  Psychiatric - no signs of delirium or anxiety     Exam limited to that easily identified on a virtual visit. The rest of a comprehensive physical examination is deferred due to PHE (public health emergency) video visit restrictions.         Current Laboratory Data:   All laboratory and imaging data reviewed.          Latest Ref Rng & Units 5/9/2024     9:28 AM   PFT   FVC L 2.60    FEV1 L 1.31    FVC% % 61    FEV1% % 40                      Again, thank you for allowing me to participate in the care of your patient.      Sincerely,    Bryn Valdovinos MD

## 2024-06-08 ENCOUNTER — TELEPHONE (OUTPATIENT)
Dept: VASCULAR SURGERY | Facility: CLINIC | Age: 69
End: 2024-06-08
Payer: COMMERCIAL

## 2024-06-08 NOTE — TELEPHONE ENCOUNTER
----- Message from Magdalena Brown RN sent at 6/5/2024  9:31 AM CDT -----  Faisal spencer,    This patient is scheduled for a Lexiscan stress test 7/1. It really needs to be sooner than that. Can we please move it up?    Magdalena

## 2024-06-08 NOTE — TELEPHONE ENCOUNTER
Patient Contacted for the patient to schedule the following:Nuclear Med Stress test for a sooner date than 7/1/24.    Location: [facility]  Provider:   Appointment type: Imaging  Appointment mode in person   Return date: ASAP    Specialty phone number: [phone] or  [phone]    Is Imaging Needed: Yes-Strss Test  Imaging Phone Number: [phone]     Additional Notes: SEE NOTE BELOW     [place] [facility] [place] to see if there was anything sooner at  the soonest at this time is 7/8. It appears that pt has the 1st available at . I ask the pt was it ok that we continue to check for earlier date and he states that yes it is ok. I will check again on my on every two days maybe we will get bala the pt is pretty stuck on going to .  Eyal Sandoval on 6/5/2024 at 11:54 AM    There is no sooner availability at  I will check with Ed[facility] on Monday 6/10/24 those are the two location that the pt is willing to go to.  yEal Sandoval on 6/8/2024 at 3:08 PM

## 2024-06-18 ENCOUNTER — HOSPITAL ENCOUNTER (OUTPATIENT)
Dept: NUCLEAR MEDICINE | Facility: CLINIC | Age: 69
Setting detail: NUCLEAR MEDICINE
Discharge: HOME | End: 2024-06-18
Attending: SURGERY
Payer: COMMERCIAL

## 2024-06-18 ENCOUNTER — HOSPITAL ENCOUNTER (OUTPATIENT)
Dept: CARDIOLOGY | Facility: CLINIC | Age: 69
Setting detail: NUCLEAR MEDICINE
Discharge: HOME | End: 2024-06-18
Attending: SURGERY
Payer: COMMERCIAL

## 2024-06-18 DIAGNOSIS — Z01.810 PRE-OPERATIVE CARDIOVASCULAR EXAMINATION: ICD-10-CM

## 2024-06-18 DIAGNOSIS — I71.43 INFRARENAL ABDOMINAL AORTIC ANEURYSM (AAA) WITHOUT RUPTURE (H): ICD-10-CM

## 2024-06-18 LAB
CV STRESS MAX HR HE: 96
RATE PRESSURE PRODUCT: NORMAL
STRESS ECHO BASELINE DIASTOLIC HE: 86
STRESS ECHO BASELINE HR: 57 BPM
STRESS ECHO BASELINE SYSTOLIC BP: 150
STRESS ECHO CALCULATED PERCENT HR: 62 %
STRESS ECHO LAST STRESS DIASTOLIC BP: 82
STRESS ECHO LAST STRESS SYSTOLIC BP: 122
STRESS ECHO TARGET HR: 154

## 2024-06-18 PROCEDURE — 250N000011 HC RX IP 250 OP 636: Mod: JZ | Performed by: SURGERY

## 2024-06-18 PROCEDURE — 343N000001 HC RX 343: Performed by: SURGERY

## 2024-06-18 PROCEDURE — A9502 TC99M TETROFOSMIN: HCPCS | Performed by: SURGERY

## 2024-06-18 PROCEDURE — 78452 HT MUSCLE IMAGE SPECT MULT: CPT | Mod: 26 | Performed by: INTERNAL MEDICINE

## 2024-06-18 PROCEDURE — 78452 HT MUSCLE IMAGE SPECT MULT: CPT

## 2024-06-18 PROCEDURE — 93016 CV STRESS TEST SUPVJ ONLY: CPT

## 2024-06-18 PROCEDURE — 93018 CV STRESS TEST I&R ONLY: CPT | Performed by: INTERNAL MEDICINE

## 2024-06-18 RX ORDER — REGADENOSON 0.08 MG/ML
0.4 INJECTION, SOLUTION INTRAVENOUS ONCE
Status: COMPLETED | OUTPATIENT
Start: 2024-06-18 | End: 2024-06-18

## 2024-06-18 RX ADMIN — REGADENOSON 0.4 MG: 0.08 INJECTION, SOLUTION INTRAVENOUS at 13:54

## 2024-06-18 RX ADMIN — TETROFOSMIN 31.9 MILLICURIE: 1.38 INJECTION, POWDER, LYOPHILIZED, FOR SOLUTION INTRAVENOUS at 14:00

## 2024-06-18 RX ADMIN — TETROFOSMIN 10.2 MILLICURIE: 1.38 INJECTION, POWDER, LYOPHILIZED, FOR SOLUTION INTRAVENOUS at 12:30

## 2024-06-19 DIAGNOSIS — R94.39 ABNORMAL STRESS TEST: Primary | ICD-10-CM

## 2024-06-24 DIAGNOSIS — I10 HTN, GOAL BELOW 140/90: ICD-10-CM

## 2024-06-24 RX ORDER — LISINOPRIL 40 MG/1
20 TABLET ORAL DAILY
Status: CANCELLED | OUTPATIENT
Start: 2024-06-24

## 2024-06-24 RX ORDER — LISINOPRIL 20 MG/1
20 TABLET ORAL DAILY
Qty: 90 TABLET | Refills: 3 | Status: SHIPPED | OUTPATIENT
Start: 2024-06-24

## 2024-06-24 NOTE — TELEPHONE ENCOUNTER
Patient calling to get refill of Lisinopril. He only has one pill left. He would like to know why this got cancelled and if he should still be taking it. No open encounters regarding this medication.

## 2024-06-24 NOTE — TELEPHONE ENCOUNTER
I am not sure why it was cancelled. I have sent over refills of 20 mg instead of cutting the 40 mg in half.    Hamlet Roberts PA-C

## 2024-07-09 ENCOUNTER — OFFICE VISIT (OUTPATIENT)
Dept: CARDIOLOGY | Facility: CLINIC | Age: 69
End: 2024-07-09
Payer: COMMERCIAL

## 2024-07-09 VITALS
WEIGHT: 207.5 LBS | HEIGHT: 69 IN | OXYGEN SATURATION: 96 % | DIASTOLIC BLOOD PRESSURE: 84 MMHG | HEART RATE: 64 BPM | SYSTOLIC BLOOD PRESSURE: 132 MMHG | BODY MASS INDEX: 30.73 KG/M2 | RESPIRATION RATE: 20 BRPM

## 2024-07-09 DIAGNOSIS — E78.5 HYPERLIPIDEMIA LDL GOAL <130: ICD-10-CM

## 2024-07-09 DIAGNOSIS — I71.42 JUXTARENAL ABDOMINAL AORTIC ANEURYSM (AAA) WITHOUT RUPTURE (H): Primary | ICD-10-CM

## 2024-07-09 DIAGNOSIS — E78.5 DYSLIPIDEMIA: ICD-10-CM

## 2024-07-09 DIAGNOSIS — R94.39 ABNORMAL STRESS TEST: ICD-10-CM

## 2024-07-09 DIAGNOSIS — I10 BENIGN ESSENTIAL HYPERTENSION: ICD-10-CM

## 2024-07-09 PROCEDURE — 99214 OFFICE O/P EST MOD 30 MIN: CPT | Performed by: INTERNAL MEDICINE

## 2024-07-09 PROCEDURE — G2211 COMPLEX E/M VISIT ADD ON: HCPCS | Performed by: INTERNAL MEDICINE

## 2024-07-09 RX ORDER — ATORVASTATIN CALCIUM 40 MG/1
40 TABLET, FILM COATED ORAL DAILY
Qty: 90 TABLET | Refills: 11 | Status: SHIPPED | OUTPATIENT
Start: 2024-07-09

## 2024-07-09 ASSESSMENT — PAIN SCALES - GENERAL: PAINLEVEL: SEVERE PAIN (7)

## 2024-07-09 NOTE — LETTER
7/9/2024    Hamlet Roberts PA-C  [address]  [facility] [address]    RE: Bucky CHRISTINE Edgar       Dear Colleague,     I had the pleasure of seeing Bucky KING Tala in the [facility].  HISTORY OF PRESENT ILLNESS:  Bucky Edgar a 68 year old male with Atherosclerotic Peripheral Vascular Disease ( AAA awaiting EVAR), COPD ( long standing smoking history), Dyslipidemia, hypertension,chronic kidney disease (Cr 1.42/GFR 54), pulmonary nodule ( undergoing serial CT imaging) , and chronic diastolic heart failure was seen today for follow up of an abnormal nuclear stress test    Dyspnea at 2 blocks       PAST MEDICAL HISTORY  1.Atherosclerotic Peripheral Vascular Disease : Asymptomatic 6.1 cm infrarenal abdominal aortic aneurysm: Awaiting EVAR  2.  History of May-Thurner syndrome: Status post left iliac vein stenting for DVT  3.  Hypertension  4.  Dyslipidemia  5.  Chronic obstructive pulmonary disease; on bronchodilators FEV1 1.31  6.  Pulmonary nodules: Follow-up with CT scans.  7.  Chronic kidney disease: 1 and 1.42/GFR 54  8. Chronic low back  pain  sp fusion surgeries   9. Gout   10. Chronic diastolic heart failure    Orders this Visit:  No orders of the defined types were placed in this encounter.    No orders of the defined types were placed in this encounter.    There are no discontinued medications.    No diagnosis found.    CURRENT MEDICATIONS:  Current Outpatient Medications   Medication Sig Dispense Refill    allopurinol (ZYLOPRIM) 100 MG tablet Take 2 tablets (200 mg) by mouth daily 180 tablet 1    aspirin (ASA) 81 MG EC tablet Take 1 tablet (81 mg) by mouth daily      atorvastatin (LIPITOR) 10 MG tablet Take 1 tablet (10 mg) by mouth daily 90 tablet 3    carvedilol (COREG) 25 MG tablet Take 1 tablet (25 mg) by mouth 2 times daily (with meals) 180 tablet 3    Fluticasone-Umeclidin-Vilanterol (TRELEGY ELLIPTA) 200-62.5-25 MCG/ACT oral inhaler Inhale 1 puff into the lungs daily 90  "each 4    furosemide (LASIX) 20 MG tablet Take 1 tablet (20 mg) by mouth every other day 45 tablet 3    lisinopril (ZESTRIL) 20 MG tablet Take 1 tablet (20 mg) by mouth daily 90 tablet 3    nicotine (NICORETTE) 4 MG lozenge Place 4 mg inside cheek as needed      acetaminophen (TYLENOL) 325 MG tablet Take 650 mg by mouth every 6 hours  (Patient not taking: Reported on 7/9/2024)      albuterol (PROAIR HFA/PROVENTIL HFA/VENTOLIN HFA) 108 (90 Base) MCG/ACT inhaler Inhale 2 puffs into the lungs every 6 hours as needed for shortness of breath / dyspnea (Patient not taking: Reported on 5/8/2024) 18 g 0    vitamin D3 (CHOLECALCIFEROL) 50 mcg (2000 units) tablet Take 1 tablet (50 mcg) by mouth daily (Patient not taking: Reported on 7/9/2024) 90 tablet 1       ALLERGIES     Allergies   Allergen Reactions    Amlodipine Headache     Tremor     Chlorthalidone      Hypotension        PAST MEDICAL, SURGICAL, FAMILY, SOCIAL HISTORY:  History was reviewed and updated as needed, see medical record.        Review of Systems:  A 12-point review of systems was completed, see medical record for detailed review of systems information.    Physical Exam:  Vitals: /84 (BP Location: Left arm, Patient Position: Sitting, Cuff Size: Adult Regular)   Pulse 64   Resp 20   Ht 1.753 m (5' 9\")   Wt 94.1 kg (207 lb 8 oz)   SpO2 96%   BMI 30.64 kg/m      Constitutional: No apparent distress    HEENT: pupils equal round reactive to light, thyroid normal size, JVP is normal    Chest: Clear to percussion and auscultation    Cardiac: Normal S1, normal S2 no S3, no murmur or click    Abdomen: Scaphoid.  Liver percusses to 6 cm spleen is not palpable aorta is not tender or enlarged    Extremitiies: no edema.    Neurological:  Cranial nerves II through XII are intact, strength equal and symmetrical, displays normal insight and judgment        ASSESSMENT: Bucky Edgar is a 68 year old male with          We reviewed the benefits of a regular " exercise program, a Mediterranean-style weight loss diet, and contacting us for any changes in between now and the time of her next visit.     RECOMMENDATIONS:   1) continue  Guideline Directed Medical Therapy for chronic vascular  disease.  2) increase atorvastatin to 40 mg daily  3) Continue  aspirin  81 daily  4) Continue optimal  blood pressure control        Recent Lab Results:  LIPID RESULTS:  Lab Results   Component Value Date    CHOL 149 04/18/2024    CHOL 155 05/24/2021    HDL 67 04/18/2024    HDL 51 05/24/2021    LDL 67 04/18/2024    LDL 74 05/24/2021    TRIG 73 04/18/2024    TRIG 151 (H) 05/24/2021    CHOLHDLRATIO 4.4 11/12/2015       LIVER ENZYME RESULTS:  Lab Results   Component Value Date    AST 18 04/22/2024    AST 18 05/24/2021    ALT 13 04/22/2024    ALT 28 05/24/2021       CBC RESULTS:  Lab Results   Component Value Date    WBC 9.3 04/22/2024    WBC 8.8 05/24/2021    RBC 4.78 04/22/2024    RBC 5.18 05/24/2021    HGB 15.7 04/22/2024    HGB 16.0 05/24/2021    HCT 48.3 04/22/2024    HCT 48.8 05/24/2021     (H) 04/22/2024    MCV 94 05/24/2021    MCH 32.8 04/22/2024    MCH 30.9 05/24/2021    MCHC 32.5 04/22/2024    MCHC 32.8 05/24/2021    RDW 14.8 04/22/2024    RDW 15.8 (H) 05/24/2021     04/22/2024     05/24/2021       BMP RESULTS:  Lab Results   Component Value Date     04/22/2024     05/24/2021    POTASSIUM 4.8 04/22/2024    POTASSIUM 4.3 05/31/2022    POTASSIUM 4.4 05/24/2021    CHLORIDE 106 04/22/2024    CHLORIDE 114 (H) 05/31/2022    CHLORIDE 110 (H) 05/24/2021    CO2 22 04/22/2024    CO2 26 05/31/2022    CO2 25 05/24/2021    ANIONGAP 12 04/22/2024    ANIONGAP 4 05/31/2022    ANIONGAP 5 05/24/2021     (H) 04/22/2024    GLC 95 05/31/2022     (H) 05/24/2021    BUN 27.4 (H) 04/22/2024    BUN 14 05/31/2022    BUN 13 05/24/2021    CR 1.42 (H) 04/22/2024    CR 0.95 05/24/2021    GFRESTIMATED 54 (L) 04/22/2024    GFRESTIMATED 84 05/24/2021    GFRESTBLACK >90  05/24/2021    JUSTIN 9.4 04/22/2024    JUSTIN 8.5 05/24/2021        A1C RESULTS:  Lab Results   Component Value Date    A1C 5.3 05/08/2019       INR RESULTS:  Lab Results   Component Value Date    INR 0.92 05/04/2021    INR 2.4 (H) 05/01/2020    INR 2.2 (H) 04/17/2020    INR 1.47 (H) 04/07/2020       We greatly appreciate the opportunity to be involved in the care of your patient, Bucky CHRISTINE Edgar.    Sincerely,  Jaylen Perez MD      CC  Referred Self,

## 2024-07-09 NOTE — PROGRESS NOTES
"HISTORY OF PRESENT ILLNESS:  Bucky Edgar a 68 year old male with Atherosclerotic Peripheral Vascular Disease ( AAA awaiting EVAR), COPD ( long standing smoking history), Dyslipidemia, hypertension,chronic kidney disease (Cr 1.42/GFR 54), pulmonary nodule ( undergoing serial CT imaging) , and chronic diastolic heart failure was seen today for follow up of an abnormal nuclear stress test.    Mr. Edgar is scheduled for an EVAR procedure for treatment of an asymptomatic 6.1 cm infrarenal abdominal aortic aneurysm.  As part of his preoperative testing, his surgeons ordered a nuclear stress test.  The study showed a normal ejection fraction of 65% with a small area of \"mild ischemia' at the apex.  The patient denies chest pain, previous MI, and diabetes mellitus.  He has chronic exertional dyspnea at about 1-2 blocks which has markedly improved with bronchodilator therapy and has known severe COPD.  The patient has pulmonary nodules that are currently being followed by critical care pulmonologist with serial CT imaging.    His weight has remained stable on current therapy and he denies edema orthopnea or PND.      PAST MEDICAL HISTORY  1.Atherosclerotic Peripheral Vascular Disease : Asymptomatic 6.1 cm infrarenal abdominal aortic aneurysm: Awaiting EVAR  2.  History of May-Thurner syndrome: Status post left iliac vein stenting for DVT  3.  Hypertension  4.  Dyslipidemia  5.  Chronic obstructive pulmonary disease; on bronchodilators FEV1 1.31  6.  Pulmonary nodules: Follow-up with CT scans.  7.  Chronic kidney disease: 1 and 1.42/GFR 54  8. Chronic low back  pain  sp fusion surgeries   9. Gout   10. Chronic diastolic heart failure    Orders this Visit:  No orders of the defined types were placed in this encounter.    No orders of the defined types were placed in this encounter.    There are no discontinued medications.    No diagnosis found.    CURRENT MEDICATIONS:  Current Outpatient Medications   Medication Sig " "Dispense Refill    allopurinol (ZYLOPRIM) 100 MG tablet Take 2 tablets (200 mg) by mouth daily 180 tablet 1    aspirin (ASA) 81 MG EC tablet Take 1 tablet (81 mg) by mouth daily      atorvastatin (LIPITOR) 10 MG tablet Take 1 tablet (10 mg) by mouth daily 90 tablet 3    carvedilol (COREG) 25 MG tablet Take 1 tablet (25 mg) by mouth 2 times daily (with meals) 180 tablet 3    Fluticasone-Umeclidin-Vilanterol (TRELEGY ELLIPTA) 200-62.5-25 MCG/ACT oral inhaler Inhale 1 puff into the lungs daily 90 each 4    furosemide (LASIX) 20 MG tablet Take 1 tablet (20 mg) by mouth every other day 45 tablet 3    lisinopril (ZESTRIL) 20 MG tablet Take 1 tablet (20 mg) by mouth daily 90 tablet 3    nicotine (NICORETTE) 4 MG lozenge Place 4 mg inside cheek as needed      acetaminophen (TYLENOL) 325 MG tablet Take 650 mg by mouth every 6 hours  (Patient not taking: Reported on 7/9/2024)      albuterol (PROAIR HFA/PROVENTIL HFA/VENTOLIN HFA) 108 (90 Base) MCG/ACT inhaler Inhale 2 puffs into the lungs every 6 hours as needed for shortness of breath / dyspnea (Patient not taking: Reported on 5/8/2024) 18 g 0    vitamin D3 (CHOLECALCIFEROL) 50 mcg (2000 units) tablet Take 1 tablet (50 mcg) by mouth daily (Patient not taking: Reported on 7/9/2024) 90 tablet 1       ALLERGIES     Allergies   Allergen Reactions    Amlodipine Headache     Tremor     Chlorthalidone      Hypotension        PAST MEDICAL, SURGICAL, FAMILY, SOCIAL HISTORY:  History was reviewed and updated as needed, see medical record.        Review of Systems:  A 12-point review of systems was completed, see medical record for detailed review of systems information.    Physical Exam:  Vitals: /84 (BP Location: Left arm, Patient Position: Sitting, Cuff Size: Adult Regular)   Pulse 64   Resp 20   Ht 1.753 m (5' 9\")   Wt 94.1 kg (207 lb 8 oz)   SpO2 96%   BMI 30.64 kg/m      Constitutional: No apparent distress    HEENT: pupils equal round reactive to light, thyroid " normal size, JVP is normal    Chest: Clear to percussion and auscultation    Cardiac: Normal S1, normal S2 no S3, no murmur or click    Abdomen: Obese.  Liver percusses to 6 cm spleen is enlarged.  I am unable to feel any aneurysm nor can I hear a bruit.  His pulses are full and symmetrical in the carotid, radial, brachial, femoral, and posterior tibial areas.    Extremitiies: no edema.    Neurological:  Cranial nerves II through XII are intact, strength equal and symmetrical, displays normal insight and judgment        ASSESSMENT: The patient's documented vascular disease makes him at higher than average risk for underlying coronary disease, but the patient's nuclear stress test is only mildly abnormal and does not suggest high risk.      At this point, I do not believe that diagnostic coronary angiography would change our periprocedural management for his upcoming EVAR procedure.  I would favor continued guideline directed medical therapy for chronic vascular disease and would reserve angiography for new symptoms.  Current ACC guidelines favor high potency statin therapy find as doses of atorvastatin of 40 mg or greater in the setting of documented vascular disease.  His blood pressure is well-controlled at this time on current therapy.    RECOMMENDATIONS:   1) continue  Guideline Directed Medical Therapy for chronic vascular  disease.  2) increase atorvastatin to 40 mg daily  3) Continue  aspirin  81 daily  4) Continue optimal  blood pressure control  5) Follow up in one year, earlier if new symptoms         Recent Lab Results:  LIPID RESULTS:  Lab Results   Component Value Date    CHOL 149 04/18/2024    CHOL 155 05/24/2021    HDL 67 04/18/2024    HDL 51 05/24/2021    LDL 67 04/18/2024    LDL 74 05/24/2021    TRIG 73 04/18/2024    TRIG 151 (H) 05/24/2021    CHOLHDLRATIO 4.4 11/12/2015       LIVER ENZYME RESULTS:  Lab Results   Component Value Date    AST 18 04/22/2024    AST 18 05/24/2021    ALT 13 04/22/2024     ALT 28 05/24/2021       CBC RESULTS:  Lab Results   Component Value Date    WBC 9.3 04/22/2024    WBC 8.8 05/24/2021    RBC 4.78 04/22/2024    RBC 5.18 05/24/2021    HGB 15.7 04/22/2024    HGB 16.0 05/24/2021    HCT 48.3 04/22/2024    HCT 48.8 05/24/2021     (H) 04/22/2024    MCV 94 05/24/2021    MCH 32.8 04/22/2024    MCH 30.9 05/24/2021    MCHC 32.5 04/22/2024    MCHC 32.8 05/24/2021    RDW 14.8 04/22/2024    RDW 15.8 (H) 05/24/2021     04/22/2024     05/24/2021       BMP RESULTS:  Lab Results   Component Value Date     04/22/2024     05/24/2021    POTASSIUM 4.8 04/22/2024    POTASSIUM 4.3 05/31/2022    POTASSIUM 4.4 05/24/2021    CHLORIDE 106 04/22/2024    CHLORIDE 114 (H) 05/31/2022    CHLORIDE 110 (H) 05/24/2021    CO2 22 04/22/2024    CO2 26 05/31/2022    CO2 25 05/24/2021    ANIONGAP 12 04/22/2024    ANIONGAP 4 05/31/2022    ANIONGAP 5 05/24/2021     (H) 04/22/2024    GLC 95 05/31/2022     (H) 05/24/2021    BUN 27.4 (H) 04/22/2024    BUN 14 05/31/2022    BUN 13 05/24/2021    CR 1.42 (H) 04/22/2024    CR 0.95 05/24/2021    GFRESTIMATED 54 (L) 04/22/2024    GFRESTIMATED 84 05/24/2021    GFRESTBLACK >90 05/24/2021    JUSTIN 9.4 04/22/2024    JUSTIN 8.5 05/24/2021        A1C RESULTS:  Lab Results   Component Value Date    A1C 5.3 05/08/2019       INR RESULTS:  Lab Results   Component Value Date    INR 0.92 05/04/2021    INR 2.4 (H) 05/01/2020    INR 2.2 (H) 04/17/2020    INR 1.47 (H) 04/07/2020       We greatly appreciate the opportunity to be involved in the care of your patient, Bucky Edgar.    Sincerely,  Jaylen Perez MD      CC  Referred Self, MD  No address on file

## 2024-07-15 ENCOUNTER — VIRTUAL VISIT (OUTPATIENT)
Dept: VASCULAR SURGERY | Facility: CLINIC | Age: 69
End: 2024-07-15
Payer: COMMERCIAL

## 2024-07-15 VITALS — HEIGHT: 69 IN | BODY MASS INDEX: 29.62 KG/M2 | WEIGHT: 200 LBS

## 2024-07-15 DIAGNOSIS — I10 HTN, GOAL BELOW 140/90: ICD-10-CM

## 2024-07-15 DIAGNOSIS — Z86.718 HISTORY OF DEEP VENOUS THROMBOSIS (DVT) OF DISTAL VEIN OF LEFT LOWER EXTREMITY: ICD-10-CM

## 2024-07-15 DIAGNOSIS — I71.43 INFRARENAL ABDOMINAL AORTIC ANEURYSM (AAA) WITHOUT RUPTURE (H): Primary | ICD-10-CM

## 2024-07-15 DIAGNOSIS — Z87.891 FORMER CIGARETTE SMOKER: ICD-10-CM

## 2024-07-15 DIAGNOSIS — R94.39 ABNORMAL STRESS TEST: ICD-10-CM

## 2024-07-15 PROCEDURE — 99442 PR PHYSICIAN TELEPHONE EVALUATION 11-20 MIN: CPT | Mod: 93 | Performed by: SURGERY

## 2024-07-15 ASSESSMENT — PAIN SCALES - GENERAL: PAINLEVEL: NO PAIN (0)

## 2024-07-15 NOTE — NURSING NOTE
[place]    Is the patient currently in the state of [place]? YES    Visit mode:TELEPHONE    If the visit is dropped, the patient can be reconnected by: TELEPHONE VISIT: Phone number:   Telephone Information:   Mobile [phone]     Will anyone else be joining the visit? Pts daughter  (If patient encounters technical issues they should call [phone])    How would you like to obtain your AVS? MyChart    Are changes needed to the allergy or medication list? No    Patient denies any changes since echeck-in completion and states all information entered during echeck-in remains accurate.    Are refills needed on medications prescribed by this physician? NO    Reason for visit: RECHECK    No other vitals to report today    Chinedu Prado MA VVF

## 2024-07-15 NOTE — PROGRESS NOTES
"Virtual Visit Details    Type of service:  Telephone Visit   Phone call duration: *** minutes   Originating Location (pt. Location): {patient location:014498::\"Home\"}  {PROVIDER LOCATION On-site should be selected for visits conducted from your clinic location or adjoining [facility] hospital, academic office, or other nearby [facility] building. Off-site should be selected for all other provider locations, including home:[id number]}  Distant Location (provider location):  {virtual location provider:304791}    [facility]      Type of Visit: Virtual: Telephone    Patient is here for a return visit to discuss surgical plan s/p cards .     Vitals - Patient Reported  Pain Score: No Pain (0)    Questions patient would like addressed today are: NA, will discuss any at appt time.    Refills are needed: No    How would you like to obtain your AVS? Eric Prado MA  "

## 2024-07-15 NOTE — PATIENT INSTRUCTIONS
Thank you so much for choosing us for your care. It was a pleasure to see you at the vascular clinic today.     Follow-up recommendations: We will plan for surgery in the coming weeks. Our surgical scheduler will get in touch with you soon to determine a surgery date and will provide you with detailed pre-op instructions.    Additional testing/imaging ordered today: None      Our scheduling team will get in touch with you to set up any follow-up testing/imaging and/or appointments. Please be aware that any testing/imaging recommended today will need to completed prior to your next visit with the provider. If testing/imaging is not completed prior to your next visit, your visit may be rescheduled.     If you have any questions, please contact our clinic directly at [phone] and ask for the nurse. We also encourage the use of [employer] to communicate with your healthcare provider.    If you have an urgent need after business hours (8:00 am to 4:30 pm) please call [phone], option 4, and ask for the vascular attending on call. For non-urgent after hours needs, please call the vascular clinic at [phone]. For scheduling needs, please call our clinic directly at [phone].

## 2024-07-15 NOTE — LETTER
7/15/2024       RE: Bucky Edgar  [address] [place] [address]     Dear Colleague,    Thank you for referring your patient, Bucky Edgar, to the [facility] [place] [facility]. Please see a copy of my visit note below.    [facility]      Type of Visit: Virtual: Telephone    Patient is here for a return visit to discuss surgical plan s/p cards .     Vitals - Patient Reported  Pain Score: No Pain (0)    Questions patient would like addressed today are: NA, will discuss any at appt time.    Refills are needed: No    How would you like to obtain your AVS? Eric Prado MA      Again, thank you for allowing me to participate in the care of your patient.      Sincerely,    Aman Valerio MD

## 2024-07-16 ENCOUNTER — PREP FOR PROCEDURE (OUTPATIENT)
Dept: SURGERY | Facility: CLINIC | Age: 69
End: 2024-07-16
Payer: COMMERCIAL

## 2024-07-16 DIAGNOSIS — I71.43 INFRARENAL ABDOMINAL AORTIC ANEURYSM (AAA) WITHOUT RUPTURE (H): Primary | ICD-10-CM

## 2024-07-17 ENCOUNTER — DOCUMENTATION ONLY (OUTPATIENT)
Dept: VASCULAR SURGERY | Facility: CLINIC | Age: 69
End: 2024-07-17
Payer: COMMERCIAL

## 2024-07-17 DIAGNOSIS — Z98.890 POSTOPERATIVE STATE: ICD-10-CM

## 2024-07-17 DIAGNOSIS — I71.40 ABDOMINAL AORTIC ANEURYSM (AAA) WITHOUT RUPTURE (H): Primary | ICD-10-CM

## 2024-07-17 NOTE — PROGRESS NOTES
Surgery Scheduled    Discussed surgery plan/instructions. Pt will schedule a pre-op physical exam. Will send surgery packet once medications have been reviewed.     Surgery/Procedure: REPAIR, ANEURYSM, AORTOILIAC, ENDOVASCULAR (N/A)     CPT: 76594    Special Equipment: Endoanchors, anesthesia to give inhalers per Pulm    Length of Surgery: standard  Preoperative physical needed: Yes  Product Rep needed? Yes  Company? [employer]    Location: [facility] [place]:  [address] [place] [phone] (Fax: [phone])    We highly recommend  parking. It is priced about the same as the parking ramp. Enter the main entrance. Take the elevators to the 3rd floor and surgery check-in will be on your left.     Date: 8/16/2024    Time: 7:30am     Admission Type: Inpatient    Surgeon:  Dr. Valerio    OR Confirmed & :  Yes- sent case to OR schedulers. Case has been placed on provider schedule in [employer].      IR Needed: Yes- chat message sent via TEAMS. Confirmed by IR (Sophia NICHOLE) on TEAMS.     Entered on provider calendar:  Yes    Post Op: See appt desk.     Wound Vac Needed: No    Home Care Needed: No    Reps Contacted: Yes. Email sent to Rajinder crowley/ [employer] on 7/17/2024. See screenshot below.       Rep confirmed, 7/17/2024.       Blood Thinners Addressed:  Message sent to RN to review medication for holds.     Stress Test Clearance: NO

## 2024-07-18 NOTE — TELEPHONE ENCOUNTER
Vascular Surgery Pre-op Medication Hold Instructions  furosemide (LASIX) 20 MG tablet - HOLD day of surgery.  lisinopril (ZESTRIL) 20 MG tablet - HOLD day of surgery or evening prior if you take this medication at night.  HOLD all vitamins/supplements for 7 days before surgery  DO NOT HOLD YOUR ASPIRIN FOR ANY REASON    Medication hold information routed to connie-op scheduling team to be added into patient surgical packet.    NEVA Tomlinson, RN  RN Care Coordinator  [facility] Vascular Surgery - [facility] phone: [phone]  Fax: [phone]

## 2024-07-29 ENCOUNTER — LAB (OUTPATIENT)
Dept: LAB | Facility: OTHER | Age: 69
End: 2024-07-29
Payer: COMMERCIAL

## 2024-07-29 DIAGNOSIS — M1A.09X0 IDIOPATHIC CHRONIC GOUT OF MULTIPLE SITES WITHOUT TOPHUS: ICD-10-CM

## 2024-07-29 LAB — URATE SERPL-MCNC: 5.3 MG/DL (ref 3.4–7)

## 2024-07-29 PROCEDURE — 36415 COLL VENOUS BLD VENIPUNCTURE: CPT

## 2024-07-29 PROCEDURE — 84550 ASSAY OF BLOOD/URIC ACID: CPT

## 2024-07-31 NOTE — PROGRESS NOTES
No answer and no option to leave a message.     [employer] message sent to patient requesting the date and location of his pre-op physical.

## 2024-08-02 ENCOUNTER — VIRTUAL VISIT (OUTPATIENT)
Dept: RHEUMATOLOGY | Facility: CLINIC | Age: 69
End: 2024-08-02
Payer: COMMERCIAL

## 2024-08-02 DIAGNOSIS — M1A.09X0 IDIOPATHIC CHRONIC GOUT OF MULTIPLE SITES WITHOUT TOPHUS: Primary | ICD-10-CM

## 2024-08-02 PROCEDURE — G2211 COMPLEX E/M VISIT ADD ON: HCPCS | Mod: 95 | Performed by: INTERNAL MEDICINE

## 2024-08-02 PROCEDURE — 99214 OFFICE O/P EST MOD 30 MIN: CPT | Mod: 95 | Performed by: INTERNAL MEDICINE

## 2024-08-02 RX ORDER — ALLOPURINOL 100 MG/1
200 TABLET ORAL DAILY
Qty: 180 TABLET | Refills: 2 | Status: SHIPPED | OUTPATIENT
Start: 2024-08-02

## 2024-08-02 NOTE — PROGRESS NOTES
Bucky Edgar  is a 68 year old year old who is being evaluated via a billable video visit.      How would you like to obtain your AVS? MyChart  If the video visit is dropped, the invitation should be resent by: Text to cell phone: [phone]   Will anyone else be joining your video visit? No        Rheumatology Video Visit      Bucky Edgar MRN# [id number]   YOB: 1955 Age: 68 year old      Date of visit: 8/02/24   PCP: Dr. Percy Deshpande    Chief Complaint   Patient presents with:  Idiopathic chronic gout    Assessment and Plan     1.  Gout: Started having gout flares in early 2017, acute onset and resolved with prednisone; joints involved include his left ankle, left first MTP, and left wrist.  Colchicine was cost prohibitive.  Was doing well on allopurinol 700mg daily, uric acid 2.1, tophi had resolved, and he was not having any flares.  Note that in the past he was still flaring when he was on allopurinol 600 mg daily but that could have also been related to mobilization of tophi, and the tophi appear to have resolved.  With resolution of visible tophi, allopurinol has been reduced over time while maintaining a low uric acid and has not had gout flares.  Currently on allopurinol 200 mg daily and uric acid is at goal.  Not having gout flares.  Continue allopurinol at the current dose.    Chronic illness, chronic  - Continue  allopurinol 200 mg daily  - For gout flare only: prednisone 60mg daily x2days, then 40mg daily x5days, then 20mg daily x5days, then stop.  - Labs in 6 months: CBC, CMP, Uric Acid    2. Hx of Elevated Hgb: thought to be polycythemia vera by Dr. Muñoz (hematology/oncology) secondary to heavy smoking and underlying COPD as well as [place] syndrome. Advised to continue baby aspirin beebe by Dr. Muñoz. Phlebotomy was also started to try to keep the hematocrit <50.  This is documented here for historical significance only and was not discussed today.      3.  Chronic low back  pain: Patient reports 4 spine surgeries, including spine fusion.  Pain radiates to his legs.  Chronic and unchanged for years per patient.  He will follow-up with a spine surgeon as needed. Documented here for historical significance only and was not discussed today.     4.  CKD: Following with nephrology.     Total minutes spent in evaluation with patient, documentation, , and review of pertinent studies and chart notes: 8  The longitudinal plan of care for the rheumatology problem(s) were addressed during this visit.  Due to added complexity of care, we will continue to support the patient and the subsequent management of this condition with ongoing continuity of care.        Mr. Edgar verbalized agreement with and understanding of the rational for the diagnosis and treatment plan.  All questions were answered to best of my ability and the patient's satisfaction. Mr. Edgar was advised to contact the clinic with any questions that may arise after the clinic visit.     Thank you for involving me in the care of the patient    Return to clinic: 6 months      HPI   Bucky Edgar is a 68 year old male with a past medical history significant for COPD, dyslipidemia, vitamin D deficiency, history of rib fractures, DVT/PE on anticoagulation, CKD, hypertension, who is seen for follow-up of gout.    6/7/2022: Gout is well controlled.  No gout flares since last seen.  Taking allopurinol 600 mg daily.  Has not required prednisone for gout since last seen.      10/4/2022: Gout is well controlled.  No gout flares since last seen.  Taking allopurinol 500 mg daily without any missed doses.  Has not required prednisone since last seen.  He would like to try to reduce allopurinol again.    4/3/2023: Taking allopurinol 400 mg daily.  No gout flares since last seen.  Has not required prednisone since last seen.  No tophi.    11/3/2023: Taking allopurinol 300 mg daily.  No gout flares since last seen.  Has not required  prednisone since last seen.  No tophi.  Working with cardiology and nephrology regarding blood pressure management and diuretic use.    5/2/2024: No gout flares since last seen.  Taking allopurinol 3 mg daily.  Has not required prednisone since last seen.  No tophi.  Working with cardiology nephrology regarding diuretic use; only drinking 2 glasses of water per day.  Known CKD.  Patient reports that he is working on his fluid status with his nephrologist; has crackles and breathing as he does when he has too much fluid and they have been adjusting the furosemide.  No chest pain or shortness of breath.    Today, 8/2/2024: No gout flare since last seen.  Take allopurinol 200 mg daily.  Has not required prednisone.  Happy with how well gout is controlled.      Denies fevers, chills, nausea, vomiting, constipation, diarrhea. No abdominal pain. No chest pain/pressure, palpitations, or shortness of breath currently. No rash.      Tobacco: Quit in 2017  EtOH: 6 beers per week  Drugs: None    ROS   12 point review of system was completed and negative except as noted in the HPI     Active Problem List     Patient Active Problem List   Diagnosis    Displacement of lumbar intervertebral disc without myelopathy    CARDIOVASCULAR SCREENING; LDL GOAL LESS THAN 130    HTN, goal below 140/90    COPD (chronic obstructive pulmonary disease) (H)    Impaired fasting glucose    Hypertriglyceridemia    Vitamin D deficiency    Chronic bronchitis with COPD (chronic obstructive pulmonary disease) (H)    Closed fracture of multiple ribs of left side with routine healing, subsequent encounter    Chronic pain due to trauma    Long-term (current) use of anticoagulants [Z79.01]    Elevated serum creatinine    History of deep venous thrombosis (DVT) of distal vein of left lower extremity    Elevated liver enzymes    Renal atrophy, right    Abdominal aortic aneurysm (AAA) without rupture (H24)    Hepatomegaly    Fatty liver    Polycythemia     Pulmonary embolism without acute cor pulmonale, unspecified chronicity, unspecified pulmonary embolism type (H)    Elevated hemoglobin (H24)    Abnormal CT lung screening    Accidental fall from ladder    Acute deep vein thrombosis (DVT) of left lower extremity (H)    Acute-on-chronic renal failure  (H24)    Cellulitis of left foot    Ectatic abdominal aorta (H24)    Pneumonia    Acute kidney injury (H24)    Stage 2 chronic kidney disease    Acute on chronic congestive heart failure, unspecified heart failure type (H)    Pulmonary hypertension (H)    Tobacco abuse     Past Medical History     Past Medical History:   Diagnosis Date    COPD (chronic obstructive pulmonary disease) (H)     Displacement of lumbar intervertebral disc without myelopathy 1995, 2002    HTN, goal below 140/90      Past Surgical History     Past Surgical History:   Procedure Laterality Date    HC REPAIR ROTATOR CUFF,CHRONIC  1989    IR LOWER EXTREMITY VENOGRAM LEFT  5/4/2021    IR VENOUS STENT  5/4/2021    ZZC DECOMPRESS SPINAL CORD,1 SEG  1995, 4/2002    leftL4-L5, 2nd right L4-L5    ZZC SPINE FUSION,ANTER,3 SGMTS  2/9/2004    ant and post fusion L4-S1     Allergy     Allergies   Allergen Reactions    Amlodipine Headache     Tremor     Chlorthalidone      Hypotension      Current Medication List     Current Outpatient Medications   Medication Sig Dispense Refill    allopurinol (ZYLOPRIM) 100 MG tablet Take 2 tablets (200 mg) by mouth daily 180 tablet 1    aspirin (ASA) 81 MG EC tablet Take 1 tablet (81 mg) by mouth daily      atorvastatin (LIPITOR) 40 MG tablet Take 1 tablet (40 mg) by mouth daily 90 tablet 11    carvedilol (COREG) 25 MG tablet Take 1 tablet (25 mg) by mouth 2 times daily (with meals) 180 tablet 3    Fluticasone-Umeclidin-Vilanterol (TRELEGY ELLIPTA) 200-62.5-25 MCG/ACT oral inhaler Inhale 1 puff into the lungs daily 90 each 4    furosemide (LASIX) 20 MG tablet Take 1 tablet (20 mg) by mouth every other day 45 tablet 3     "lisinopril (ZESTRIL) 20 MG tablet Take 1 tablet (20 mg) by mouth daily 90 tablet 3    nicotine (NICORETTE) 4 MG lozenge Place 4 mg inside cheek as needed       No current facility-administered medications for this visit.         Social History   See HPI    Family History     Family History   Problem Relation Age of Onset    Family History Negative Other     Diabetes No family hx of     Coronary Artery Disease No family hx of     Hypertension No family hx of     Hyperlipidemia No family hx of     Breast Cancer No family hx of     Prostate Cancer No family hx of     Anxiety Disorder No family hx of     Substance Abuse No family hx of     Asthma No family hx of     Thyroid Disease No family hx of     Unknown/Adopted No family hx of     Genetic Disorder No family hx of     Osteoporosis No family hx of     Anesthesia Reaction No family hx of     Mental Illness No family hx of     Depression No family hx of     Other Cancer No family hx of     Colon Cancer No family hx of     Cerebrovascular Disease No family hx of     Obesity No family hx of        Physical Exam     Temp Readings from Last 3 Encounters:   04/18/24 97.3  F (36.3  C) (Temporal)   01/18/24 97.1  F (36.2  C) (Temporal)   11/20/23 97.5  F (36.4  C) (Temporal)     BP Readings from Last 5 Encounters:   07/09/24 132/84   05/08/24 113/74   05/07/24 117/69   05/02/24 110/71   04/18/24 (!) 80/60     Pulse Readings from Last 1 Encounters:   07/09/24 64     Resp Readings from Last 1 Encounters:   07/09/24 20     Estimated body mass index is 29.53 kg/m  as calculated from the following:    Height as of 7/15/24: 1.753 m (5' 9\").    Weight as of 7/15/24: 90.7 kg (200 lb).      GEN: NAD  HEENT: Anicteric, noninjected sclera. No obvious external lesions of the ear and nose. Hearing intact.  PULM: No increased work of breathing  MSK:  Hands and wrists without swelling.   SKIN: No rash or jaundice seen  PSYCH: Alert. Appropriate.      Labs / Imaging (select studies) "     CBC  Recent Labs   Lab Test 04/22/24  1007 03/06/24  1216 10/24/23  1237 09/12/23  1033 05/15/23  1103 05/31/22  1014 05/24/21  1035 05/04/21  1030 08/25/20  0940 07/28/20  1040   WBC 9.3 10.0 10.4  --  8.1   < > 8.8   < > 8.1 8.4   RBC 4.78 5.01 4.73  --  4.95   < > 5.18   < > 5.29 5.48   HGB 15.7 16.2 16.1   < > 15.5   < > 16.0   < > 15.4 16.5   HCT 48.3 49.6 49.2  --  48.4   < > 48.8   < > 48.3 51.9   * 99 104*  --  98   < > 94   < > 91 95   RDW 14.8 14.4 13.7  --  16.2*   < > 15.8*   < > 14.6 16.3*    267 247  --  180   < > 217   < > 259 242   MCH 32.8 32.3 34.0*  --  31.3   < > 30.9   < > 29.1 30.1   MCHC 32.5 32.7 32.7  --  32.0   < > 32.8   < > 31.9 31.8   NEUTROPHIL 71 75  --   --  73   < > 69.4  --  73.0 68.7   LYMPH 16 14  --   --  14   < > 18.2  --  14.2 18.6   MONOCYTE 8 7  --   --  10   < > 7.8  --  8.9 8.5   EOSINOPHIL 4 4  --   --  3   < > 3.9  --  2.7 2.8   BASOPHIL 1 1  --   --  1   < > 0.7  --  1.0 0.8   ANEU  --   --   --   --   --   --  6.1  --  5.9 5.7   ALYM  --   --   --   --   --   --  1.6  --  1.2 1.6   LIZZ  --   --   --   --   --   --  0.7  --  0.7 0.7   AEOS  --   --   --   --   --   --  0.3  --  0.2 0.2   ABAS  --   --   --   --   --   --  0.1  --  0.1 0.1    < > = values in this interval not displayed.     CMP  Recent Labs   Lab Test 04/22/24  1007 03/06/24  1216 01/18/24  1008 10/24/23  1237 05/31/23  1534 05/15/23  1103 05/31/22  1014 05/24/21  1035 05/04/21  1030 03/22/21  1219    139 141 142   < > 141   < > 140 139 139   POTASSIUM 4.8 4.1 4.8 4.0   < > 4.4   < > 4.4 4.2 4.2   CHLORIDE 106 104 102 106   < > 107   < > 110* 111* 108   CO2 22 24 30* 25   < > 27   < > 25 22 29   ANIONGAP 12 11 9 11   < > 7   < > 5 6 2*   * 105* 110* 91   < > 102*   < > 112* 100* 100*   BUN 27.4* 13.3 21.8 10.9   < > 10.1   < > 13 16 11   CR 1.42* 1.25* 1.24* 0.96   < > 0.92   < > 0.95 1.00 0.94   GFRESTIMATED 54* 64 64 87   < > >90   < > 84 79 86   GFRESTBLACK  --   --   --    --   --   --   --  >90 >90 >90   JUSTIN 9.4 9.3 9.4 9.0   < > 8.9   < > 8.5 9.5 9.1   BILITOTAL 0.5 0.8  --   --   --  1.1   < > 0.7  --   --    ALBUMIN 3.7 3.6  --  3.7   < > 3.5   < > 3.4  --   --    PROTTOTAL 7.1 7.0  --   --   --  6.3*   < > 6.9  --   --    ALKPHOS 108 131  --   --   --  123   < > 98  --   --    AST 18 16  --   --   --  18   < > 18  --   --    ALT 13 9  --   --   --  12   < > 28  --   --     < > = values in this interval not displayed.     Uric Acid  Recent Labs   Lab Test 07/29/24  0925 04/22/24  1007 03/06/24  1216 09/12/23  1033 03/27/23  1138 05/31/22  1014   URIC 5.3 3.6 3.6 4.8 3.6 2.1*     Immunization History     Immunization History   Administered Date(s) Administered    COVID-19 MONOVALENT 12+ (Pfizer) 03/31/2021, 04/21/2021    Influenza Vaccine 18-64 (Flublok) 11/12/2019    Influenza Vaccine >6 months,quad, PF 11/27/2013, 10/29/2015, 12/12/2016, 10/23/2017, 11/01/2018    Mantoux Tuberculin Skin Test 07/17/2012    Pneumo Conj 13-V (2010&after) 01/22/2016    Pneumococcal 23 valent 11/27/2013    TDAP Vaccine (Adacel) 08/25/2009, 11/25/2019    TDAP Vaccine (Boostrix) 08/25/2009          Chart documentation done in part with Dragon Voice recognition Software. Although reviewed after completion, some word and grammatical error may remain.      Video-Visit Details    Type of service:  Video Visit    Video Start Time: 9:35 AM    Video End Time: 9:41 AM    Originating Location (pt. Location): Home, [place]    Distant Location (provider location):  off site, [place]    Platform used for Video Visit: Asim Bolaños MD

## 2024-08-13 ENCOUNTER — OFFICE VISIT (OUTPATIENT)
Dept: FAMILY MEDICINE | Facility: OTHER | Age: 69
End: 2024-08-13
Payer: COMMERCIAL

## 2024-08-13 VITALS
WEIGHT: 204 LBS | OXYGEN SATURATION: 98 % | TEMPERATURE: 97.6 F | HEIGHT: 69 IN | HEART RATE: 83 BPM | DIASTOLIC BLOOD PRESSURE: 70 MMHG | SYSTOLIC BLOOD PRESSURE: 126 MMHG | BODY MASS INDEX: 30.21 KG/M2 | RESPIRATION RATE: 20 BRPM

## 2024-08-13 DIAGNOSIS — D58.2 ELEVATED HEMOGLOBIN (H): ICD-10-CM

## 2024-08-13 DIAGNOSIS — J44.89 CHRONIC BRONCHITIS WITH COPD (CHRONIC OBSTRUCTIVE PULMONARY DISEASE) (H): ICD-10-CM

## 2024-08-13 DIAGNOSIS — I10 HTN, GOAL BELOW 140/90: ICD-10-CM

## 2024-08-13 DIAGNOSIS — I27.20 PULMONARY HYPERTENSION (H): ICD-10-CM

## 2024-08-13 DIAGNOSIS — N18.2 STAGE 2 CHRONIC KIDNEY DISEASE: ICD-10-CM

## 2024-08-13 DIAGNOSIS — Z01.818 PREOP GENERAL PHYSICAL EXAM: ICD-10-CM

## 2024-08-13 DIAGNOSIS — I50.9 ACUTE ON CHRONIC CONGESTIVE HEART FAILURE, UNSPECIFIED HEART FAILURE TYPE (H): ICD-10-CM

## 2024-08-13 DIAGNOSIS — I71.43 INFRARENAL ABDOMINAL AORTIC ANEURYSM (AAA) WITHOUT RUPTURE (H): Primary | ICD-10-CM

## 2024-08-13 LAB
ANION GAP SERPL CALCULATED.3IONS-SCNC: 10 MMOL/L (ref 7–15)
BUN SERPL-MCNC: 11.1 MG/DL (ref 8–23)
CALCIUM SERPL-MCNC: 9.1 MG/DL (ref 8.8–10.4)
CHLORIDE SERPL-SCNC: 106 MMOL/L (ref 98–107)
CREAT SERPL-MCNC: 1.25 MG/DL (ref 0.67–1.17)
EGFRCR SERPLBLD CKD-EPI 2021: 64 ML/MIN/1.73M2
ERYTHROCYTE [DISTWIDTH] IN BLOOD BY AUTOMATED COUNT: 13.8 % (ref 10–15)
GLUCOSE SERPL-MCNC: 97 MG/DL (ref 70–99)
HCO3 SERPL-SCNC: 26 MMOL/L (ref 22–29)
HCT VFR BLD AUTO: 47 % (ref 40–53)
HGB BLD-MCNC: 15.5 G/DL (ref 13.3–17.7)
MCH RBC QN AUTO: 33.9 PG (ref 26.5–33)
MCHC RBC AUTO-ENTMCNC: 33 G/DL (ref 31.5–36.5)
MCV RBC AUTO: 103 FL (ref 78–100)
PLATELET # BLD AUTO: 247 10E3/UL (ref 150–450)
POTASSIUM SERPL-SCNC: 4.1 MMOL/L (ref 3.4–5.3)
RBC # BLD AUTO: 4.57 10E6/UL (ref 4.4–5.9)
SODIUM SERPL-SCNC: 142 MMOL/L (ref 135–145)
WBC # BLD AUTO: 9.6 10E3/UL (ref 4–11)

## 2024-08-13 PROCEDURE — 99214 OFFICE O/P EST MOD 30 MIN: CPT

## 2024-08-13 PROCEDURE — 36415 COLL VENOUS BLD VENIPUNCTURE: CPT

## 2024-08-13 PROCEDURE — 85027 COMPLETE CBC AUTOMATED: CPT

## 2024-08-13 PROCEDURE — 80048 BASIC METABOLIC PNL TOTAL CA: CPT

## 2024-08-13 ASSESSMENT — PAIN SCALES - GENERAL: PAINLEVEL: SEVERE PAIN (6)

## 2024-08-13 NOTE — PATIENT INSTRUCTIONS
How to Take Your Medication Before Surgery  Preoperative Medication Instructions   Antiplatelet or Anticoagulation Medication Instructions   - aspirin: continue WITHOUT modification per vascular    Additional Medication Instructions  Take all scheduled medications on the day of surgery EXCEPT for modifications listed below:   - ACE/ARB: DO NOT TAKE on day of surgery (minimum 11 hours for general anesthesia).   - Beta Blockers: Continue taking on the day of surgery.   - Diuretics: DO NOT TAKE on the day of surgery.   - Herbal medications and vitamins: DO NOT TAKE 14 days prior to surgery.   - ibuprofen (Advil, Motrin): DO NOT TAKE 1 day before surgery.    - naproxen (Aleve, Naprosyn): DO NOT TAKE 4 days before surgery.    - LABA, inhaled corticosteroid, long-acting anticholinergics: Continue without modification.   - rescue Inhaler: Continue PRN. Bring to hospital on the day of surgery.       Patient Education   Preparing for Your Surgery  Getting started  A nurse will call you to review your health history and instructions. They will give you an arrival time based on your scheduled surgery time. Please be ready to share:  Your doctor's clinic name and phone number  Your medical, surgical, and anesthesia history  A list of allergies and sensitivities  A list of medicines, including herbal treatments and over-the-counter drugs  Whether the patient has a legal guardian (ask how to send us the papers in advance)  Please tell us if you're pregnant--or if there's any chance you might be pregnant. Some surgeries may injure a fetus (unborn baby), so they require a pregnancy test. Surgeries that are safe for a fetus don't always need a test, and you can choose whether to have one.   If you have a child who's having surgery, please ask for a copy of Preparing for Your Child's Surgery.    Preparing for surgery  Within 10 to 30 days of surgery: Have a pre-op exam (sometimes called an H&P, or History and Physical). This can be  done at a clinic or pre-operative center.  If you're having a , you may not need this exam. Talk to your care team.  At your pre-op exam, talk to your care team about all medicines you take. If you need to stop any medicines before surgery, ask when to start taking them again.  We do this for your safety. Many medicines can make you bleed too much during surgery. Some change how well surgery (anesthesia) drugs work.  Call your insurance company to let them know you're having surgery. (If you don't have insurance, call [phone].)  Call your clinic if there's any change in your health. This includes signs of a cold or flu (sore throat, runny nose, cough, rash, fever). It also includes a scrape or scratch near the surgery site.  If you have questions on the day of surgery, call your hospital or surgery center.  Eating and drinking guidelines  For your safety: Unless your surgeon tells you otherwise, follow the guidelines below.  Eat and drink as usual until 8 hours before you arrive for surgery. After that, no food or milk.  Drink clear liquids until 2 hours before you arrive. These are liquids you can see through, like water, Gatorade, and Propel Water. They also include plain black coffee and tea (no cream or milk), candy, and breath mints. You can spit out gum when you arrive.  If you drink alcohol: Stop drinking it the night before surgery.  If your care team tells you to take medicine on the morning of surgery, it's okay to take it with a sip of water.  Preventing infection  Shower or bathe the night before and morning of your surgery. Follow the instructions your clinic gave you. (If no instructions, use regular soap.)  Don't shave or clip hair near your surgery site. We'll remove the hair if needed.  Don't smoke or vape the morning of surgery. You may chew nicotine gum up to 2 hours before surgery. A nicotine patch is okay.  Note: Some surgeries require you to completely quit smoking and nicotine.  Check with your surgeon.  Your care team will make every effort to keep you safe from infection. We will:  Clean our hands often with soap and water (or an alcohol-based hand rub).  Clean the skin at your surgery site with a special soap that kills germs.  Give you a special gown to keep you warm. (Cold raises the risk of infection.)  Wear special hair covers, masks, gowns and gloves during surgery.  Give antibiotic medicine, if prescribed. Not all surgeries need antibiotics.  What to bring on the day of surgery  Photo ID and insurance card  Copy of your health care directive, if you have one  Glasses and hearing aids (bring cases)  You can't wear contacts during surgery  Inhaler and eye drops, if you use them (tell us about these when you arrive)  CPAP machine or breathing device, if you use them  A few personal items, if spending the night  If you have . . .  A pacemaker, ICD (cardiac defibrillator) or other implant: Bring the ID card.  An implanted stimulator: Bring the remote control.  A legal guardian: Bring a copy of the certified (court-stamped) guardianship papers.  Please remove any jewelry, including body piercings. Leave jewelry and other valuables at home.  If you're going home the day of surgery  You must have a responsible adult drive you home. They should stay with you overnight as well.  If you don't have someone to stay with you, and you aren't safe to go home alone, we may keep you overnight. Insurance often won't pay for this.  After surgery  If it's hard to control your pain or you need more pain medicine, please call your surgeon's office.  Questions?   If you have any questions for your care team, list them here: _________________________________________________________________________________________________________________________________________________________________________ ____________________________________ ____________________________________ ____________________________________  For  informational purposes only. Not to replace the advice of your health care provider. Copyright   2003, 2019 [facility]. All rights reserved. Clinically reviewed by Ursula Ford MD. [employer] [id number] - REV 12/22.

## 2024-08-13 NOTE — PROGRESS NOTES
Preoperative Evaluation  [facility]  [address]  [facility] [address]  Phone: [phone]  Primary Provider: Hamlet Roberts PA-C  Pre-op Performing Provider: TROY Morel CNP  Aug 13, 2024             8/8/2024   Surgical Information   What procedure is being done? Repair  aneurysm aortoiliac endovavascular   Facility or Hospital where procedure/surgery will be performed: [facility]   Who is doing the procedure / surgery? Dr ovidio long-joy   Date of surgery / procedure: 8/16/2024   Time of surgery / procedure: 5:30am   Where do you plan to recover after surgery? at home with family      Fax number for surgical facility: Note does not need to be faxed, will be available electronically in Epic.    Assessment & Plan     The proposed surgical procedure is considered INTERMEDIATE risk.    1. Preop general physical exam  Patient is a 68 year-old male with hypertension, CHF, COPD, CKD,  and AAA presenting for preoperative physical exam. Meets 4 METS. Preoperative labs pending. No EKG required, no history of coronary heart disease, significant arrhythmia, peripheral arterial disease or other structural heart disease. Last preoperative evaluation completed by cardiology. Had Lexiscan stress test completed at that time without apparent findings of ischemia, approval given. Discussed medication that require holding prior to procedure. Continue with daily aspirin without modification per cardiology. Patient is otherwise medically optimized for the above procedure pending labs.     2. Infrarenal abdominal aortic aneurysm (AAA) without rupture (H24)  Following with vascular, plan for above procedure.     3. HTN, goal below 140/90  Controlled during encounter. To hold lisinopril and lasix morning of procedure.     4. Acute on chronic congestive heart failure, unspecified heart failure type (H)  Stable, no lower extremity swelling of clinic signs of  exacerbation. Meets 4 METS.     5. Pulmonary hypertension (H)  Following with pulmonology, stable.     6. Stage 2 chronic kidney disease  Stable, check BMP, results pending.   - Basic metabolic panel  (Ca, Cl, CO2, Creat, Gluc, K, Na, BUN); Future    7. Chronic bronchitis with COPD (chronic obstructive pulmonary disease) (H)  Continue inhalers without modification. Has noticed improvement in dyspneic symptoms since starting Telergy.     8. Elevated hemoglobin (H24)  Resolved on last CBC, plan to check today, labs pending.   - CBC with platelets; Future          Risks and Recommendations  The patient has the following additional risks and recommendations for perioperative complications:  Cardiovascular:   - Cardiology complete preop in May 2024 complete with updated Lexiscan stress test. Stress test without apparent signs of ischemia. Approval given at that time.  Pulmonary:    - Incentive spirometry post-op    Preoperative Medication Instructions  Antiplatelet or Anticoagulation Medication Instructions   - aspirin: continue WITHOUT modification per vascular    Additional Medication Instructions  Take all scheduled medications on the day of surgery EXCEPT for modifications listed below:   - ACE/ARB: DO NOT TAKE on day of surgery (minimum 11 hours for general anesthesia).   - Beta Blockers: Continue taking on the day of surgery.   - Diuretics: DO NOT TAKE on the day of surgery.   - Herbal medications and vitamins: DO NOT TAKE 14 days prior to surgery.   - ibuprofen (Advil, Motrin): DO NOT TAKE 1 day before surgery.    - naproxen (Aleve, Naprosyn): DO NOT TAKE 4 days before surgery.    - LABA, inhaled corticosteroid, long-acting anticholinergics: Continue without modification.   - rescue Inhaler: Continue PRN. Bring to hospital on the day of surgery.    Recommendation  Approval given to proceed with proposed procedure pending review of diagnostic evaluation.    David Lawler is a 68 year old, presenting for the  following:  Pre-Op Exam        8/13/2024     1:39 PM   Additional Questions   Roomed by Yuli MATHEW related to upcoming procedure: Repair  aneurysm aortoiliac endovavascular        8/8/2024   Pre-Op Questionnaire   Have you ever had a heart attack or stroke? No   Have you ever had surgery on your heart or blood vessels, such as a stent placement, a coronary artery bypass, or surgery on an artery in your head, neck, heart, or legs? (!) YES- vascular surgery   Do you have chest pain with activity? No   Do you have a history of heart failure? No   Do you currently have a cold, bronchitis or symptoms of other infection? No   Do you have a cough, shortness of breath, or wheezing? (!) YES- has COPD at baseline   Do you or anyone in your family have previous history of blood clots? (!) YES- history of DVT    Do you or does anyone in your family have a serious bleeding problem such as prolonged bleeding following surgeries or cuts? No   Have you ever had problems with anemia or been told to take iron pills? No   Have you had any abnormal blood loss such as black, tarry or bloody stools? No   Have you ever had a blood transfusion? No   Are you willing to have a blood transfusion if it is medically needed before, during, or after your surgery? Yes   Have you or any of your relatives ever had problems with anesthesia? No   Do you have sleep apnea, excessive snoring or daytime drowsiness? No   Do you have any artifical heart valves or other implanted medical devices like a pacemaker, defibrillator, or continuous glucose monitor? No   Do you have artificial joints? No   Are you allergic to latex? No      Health Care Directive  Patient does not have a Health Care Directive or Living Will: Discussed advance care planning with patient; however, patient declined at this time.    Preoperative Review of    reviewed - no record of controlled substances prescribed.      Status of Chronic Conditions:  CHF - Patient has a  longstanding history of moderate-severe CHF. Exacerbating conditions include hypertension and COPD. Currently the patient's condition is improving. Current treatment regimen includes ACE inhibitor, Coreg, ASA, and diuretic. The patient denies chest pain, edema, orthopnea, SOB or recent weight gain. Last Echocardiogram 6/22/23, EKG 5/15/23.     COPD - Patient has a longstanding history of moderate-severe COPD . Patient has been doing well overall noting SOB and COUGH and continues on medication regimen consisting of Trelegy Ellita without adverse reactions or side effects.    HYPERLIPIDEMIA - Patient has a long history of significant Hyperlipidemia requiring medication for treatment with recent fair control. Patient reports no problems or side effects with the medication.     HYPERTENSION - Patient has longstanding history of HTN , currently denies any symptoms referable to elevated blood pressure. Specifically denies chest pain, palpitations, dyspnea, orthopnea, PND or peripheral edema. Blood pressure readings have been in normal range. Current medication regimen is as listed below. Patient denies any side effects of medication.     RENAL INSUFFICIENCY - Patient has a longstanding history of moderate-severe chronic renal insufficiency. Last Cr 1.25.     Patient Active Problem List    Diagnosis Date Noted    Acute on chronic congestive heart failure, unspecified heart failure type (H) 05/17/2023     Priority: Medium    Pulmonary hypertension (H) 05/17/2023     Priority: Medium    Stage 2 chronic kidney disease 05/15/2023     Priority: Medium    Abnormal CT lung screening 03/31/2021     Priority: Medium     Currently managed with follow up imaging by [facility].        Elevated hemoglobin (H24) 03/22/2021     Priority: Medium    Pneumonia 05/17/2020     Priority: Medium    Pulmonary embolism without acute cor pulmonale, unspecified chronicity, unspecified pulmonary embolism type (H)  04/02/2020     Priority: Medium    Polycythemia 01/28/2020     Priority: Medium    Renal atrophy, right 05/16/2019     Priority: Medium    Abdominal aortic aneurysm (AAA) without rupture (H24) 05/16/2019     Priority: Medium    Hepatomegaly 05/16/2019     Priority: Medium    Fatty liver 05/16/2019     Priority: Medium     partial alcohol induced      Elevated liver enzymes 05/09/2019     Priority: Medium    History of deep venous thrombosis (DVT) of distal vein of left lower extremity 11/01/2018     Priority: Medium    Elevated serum creatinine 02/28/2018     Priority: Medium    Long-term (current) use of anticoagulants [Z79.01] 09/01/2017     Priority: Medium    Acute deep vein thrombosis (DVT) of left lower extremity (H) 08/24/2017     Priority: Medium    Tobacco abuse 08/24/2017     Priority: Medium    Chronic pain due to trauma 02/23/2017     Priority: Medium    Closed fracture of multiple ribs of left side with routine healing, subsequent encounter 11/29/2016     Priority: Medium    Accidental fall from ladder 10/11/2016     Priority: Medium    Acute-on-chronic renal failure  (H24) 10/11/2016     Priority: Medium    Ectatic abdominal aorta (H24) 10/11/2016     Priority: Medium    Acute kidney injury (H24) 10/11/2016     Priority: Medium    Vitamin D deficiency 02/26/2014     Priority: Medium     Problem list name updated by automated process. Provider to review      Cellulitis of left foot 02/13/2014     Priority: Medium    Hypertriglyceridemia 01/30/2014     Priority: Medium    Impaired fasting glucose 11/27/2013     Priority: Medium    Chronic bronchitis with COPD (chronic obstructive pulmonary disease) (H) 09/26/2012     Priority: Medium    HTN, goal below 140/90 07/26/2012     Priority: Medium    CARDIOVASCULAR SCREENING; LDL GOAL LESS THAN 130 10/31/2010     Priority: Medium    Displacement of lumbar intervertebral disc without myelopathy 10/08/2002     Priority: Medium      Past Medical History:    Diagnosis Date    COPD (chronic obstructive pulmonary disease) (H)     Displacement of lumbar intervertebral disc without myelopathy ,     HTN, goal below 140/90      Past Surgical History:   Procedure Laterality Date    HC REPAIR ROTATOR CUFF,CHRONIC      IR LOWER EXTREMITY VENOGRAM LEFT  2021    IR VENOUS STENT  2021    ZZC DECOMPRESS SPINAL CORD,1 SEG  , 2002    leftL4-L5, 2nd right L4-L5    ZZC SPINE FUSION,ANTER,3 SGMTS  2004    ant and post fusion L4-S1     Current Outpatient Medications   Medication Sig Dispense Refill    allopurinol (ZYLOPRIM) 100 MG tablet Take 2 tablets (200 mg) by mouth daily 180 tablet 2    aspirin (ASA) 81 MG EC tablet Take 1 tablet (81 mg) by mouth daily      atorvastatin (LIPITOR) 40 MG tablet Take 1 tablet (40 mg) by mouth daily 90 tablet 11    carvedilol (COREG) 25 MG tablet Take 1 tablet (25 mg) by mouth 2 times daily (with meals) 180 tablet 3    Fluticasone-Umeclidin-Vilanterol (TRELEGY ELLIPTA) 200-62.5-25 MCG/ACT oral inhaler Inhale 1 puff into the lungs daily 90 each 4    furosemide (LASIX) 20 MG tablet Take 1 tablet (20 mg) by mouth every other day 45 tablet 3    lisinopril (ZESTRIL) 20 MG tablet Take 1 tablet (20 mg) by mouth daily 90 tablet 3    nicotine (NICORETTE) 4 MG lozenge Place 4 mg inside cheek as needed       Allergies   Allergen Reactions    Amlodipine Headache     Tremor     Chlorthalidone      Hypotension       Social History     Tobacco Use    Smoking status: Former     Current packs/day: 0.00     Average packs/day: 1.3 packs/day for 41.0 years (51.3 ttl pk-yrs)     Types: Cigarettes     Start date: 10/1/1976     Quit date: 10/1/2017     Years since quittin.8     Passive exposure: Never (per pt)    Smokeless tobacco: Never   Substance Use Topics    Alcohol use: Yes     Comment: 12 pack of beer every 2 weeks     History   Drug Use No         Review of Systems  Constitutional, neuro, ENT, endocrine, pulmonary, cardiac,  "gastrointestinal, genitourinary, musculoskeletal, integument and psychiatric systems are negative, except as otherwise noted.    Objective    /70   Pulse 83   Temp 97.6  F (36.4  C) (Temporal)   Resp 20   Ht 1.74 m (5' 8.5\")   Wt 92.5 kg (204 lb)   SpO2 98%   BMI 30.56 kg/m     Estimated body mass index is 30.56 kg/m  as calculated from the following:    Height as of this encounter: 1.74 m (5' 8.5\").    Weight as of this encounter: 92.5 kg (204 lb).  Physical Exam  GENERAL: alert and no distress  EYES: Eyes grossly normal to inspection, PERRL and conjunctivae and sclerae normal  HENT: ear canals and TM's normal, nose and mouth without ulcers or lesions  NECK: no adenopathy, no asymmetry, masses, or scars  RESP: lungs clear to auscultation - no rales, rhonchi or wheezes  CV: regular rate and rhythm, normal S1 S2, no S3 or S4, no murmur, click or rub, no peripheral edema  ABDOMEN: soft, nontender, no hepatosplenomegaly, no masses and bowel sounds normal  MS: no gross musculoskeletal defects noted, no edema  SKIN: no suspicious lesions or rashes  NEURO: Normal strength and tone, mentation intact and speech normal  PSYCH: mentation appears normal, affect normal/bright  LYMPH: no cervical or supraclavicular adenopathy    Recent Labs   Lab Test 04/22/24  1007 03/06/24  1216   HGB 15.7 16.2    267    139   POTASSIUM 4.8 4.1   CR 1.42* 1.25*      Diagnostics  Labs pending at this time.  Results will be reviewed when available.   No EKG required, no history of coronary heart disease, significant arrhythmia, peripheral arterial disease or other structural heart disease.    Revised Cardiac Risk Index (RCRI)  The patient has the following serious cardiovascular risks for perioperative complications:   - Congestive Heart Failure (pulmonary edema, PND, s3 kody, CXR with pulmonary congestion, basilar rales) = 1 point     RCRI Interpretation: 1 point: Class II (low risk - 0.9% complication rate)   "   Signed Electronically by: TROY Morel CNP  A copy of this evaluation report is provided to the requesting physician.

## 2024-08-15 ENCOUNTER — ANESTHESIA EVENT (OUTPATIENT)
Dept: SURGERY | Facility: CLINIC | Age: 69
DRG: 269 | End: 2024-08-15
Payer: COMMERCIAL

## 2024-08-15 PROBLEM — R91.8 ABNORMAL CT LUNG SCREENING: Status: RESOLVED | Noted: 2021-03-31 | Resolved: 2024-08-15

## 2024-08-15 PROBLEM — N26.1 RENAL ATROPHY, RIGHT: Status: RESOLVED | Noted: 2019-05-16 | Resolved: 2024-08-15

## 2024-08-15 PROBLEM — J18.9 PNEUMONIA: Status: RESOLVED | Noted: 2020-05-17 | Resolved: 2024-08-15

## 2024-08-15 PROBLEM — I50.9 ACUTE ON CHRONIC CONGESTIVE HEART FAILURE, UNSPECIFIED HEART FAILURE TYPE (H): Status: RESOLVED | Noted: 2023-05-17 | Resolved: 2024-08-15

## 2024-08-16 ENCOUNTER — ANESTHESIA (OUTPATIENT)
Dept: SURGERY | Facility: CLINIC | Age: 69
DRG: 269 | End: 2024-08-16
Payer: COMMERCIAL

## 2024-08-16 ENCOUNTER — APPOINTMENT (OUTPATIENT)
Dept: INTERVENTIONAL RADIOLOGY/VASCULAR | Facility: CLINIC | Age: 69
DRG: 269 | End: 2024-08-16
Attending: SURGERY
Payer: COMMERCIAL

## 2024-08-16 ENCOUNTER — HOSPITAL ENCOUNTER (INPATIENT)
Facility: CLINIC | Age: 69
LOS: 1 days | Discharge: HOME | DRG: 269 | End: 2024-08-17
Attending: SURGERY | Admitting: SURGERY
Payer: COMMERCIAL

## 2024-08-16 DIAGNOSIS — I71.43 INFRARENAL ABDOMINAL AORTIC ANEURYSM (AAA) WITHOUT RUPTURE (H): Primary | ICD-10-CM

## 2024-08-16 PROBLEM — I71.40 AAA (ABDOMINAL AORTIC ANEURYSM) (H): Status: ACTIVE | Noted: 2024-08-16

## 2024-08-16 LAB
ABO/RH(D): NORMAL
ACT BLD: 128 SECONDS (ref 74–150)
ACT BLD: 236 SECONDS (ref 74–150)
ACT BLD: 249 SECONDS (ref 74–150)
ACT BLD: 270 SECONDS (ref 74–150)
ACT BLD: 274 SECONDS (ref 74–150)
ALBUMIN SERPL BCG-MCNC: 3.4 G/DL (ref 3.5–5.2)
ALP SERPL-CCNC: 96 U/L (ref 40–150)
ALT SERPL W P-5'-P-CCNC: 9 U/L (ref 0–70)
ANION GAP SERPL CALCULATED.3IONS-SCNC: 11 MMOL/L (ref 7–15)
ANION GAP SERPL CALCULATED.3IONS-SCNC: 12 MMOL/L (ref 7–15)
ANTIBODY SCREEN: NEGATIVE
AST SERPL W P-5'-P-CCNC: 17 U/L (ref 0–45)
BILIRUB SERPL-MCNC: 0.8 MG/DL
BUN SERPL-MCNC: 10.7 MG/DL (ref 8–23)
BUN SERPL-MCNC: 12.5 MG/DL (ref 8–23)
CALCIUM SERPL-MCNC: 8.3 MG/DL (ref 8.8–10.4)
CALCIUM SERPL-MCNC: 8.7 MG/DL (ref 8.8–10.4)
CHLORIDE SERPL-SCNC: 105 MMOL/L (ref 98–107)
CHLORIDE SERPL-SCNC: 106 MMOL/L (ref 98–107)
CREAT SERPL-MCNC: 0.98 MG/DL (ref 0.67–1.17)
CREAT SERPL-MCNC: 1.09 MG/DL (ref 0.67–1.17)
EGFRCR SERPLBLD CKD-EPI 2021: 75 ML/MIN/1.73M2
EGFRCR SERPLBLD CKD-EPI 2021: 85 ML/MIN/1.73M2
ERYTHROCYTE [DISTWIDTH] IN BLOOD BY AUTOMATED COUNT: 14.2 % (ref 10–15)
ERYTHROCYTE [DISTWIDTH] IN BLOOD BY AUTOMATED COUNT: 14.2 % (ref 10–15)
GLUCOSE SERPL-MCNC: 107 MG/DL (ref 70–99)
GLUCOSE SERPL-MCNC: 108 MG/DL (ref 70–99)
HCO3 SERPL-SCNC: 21 MMOL/L (ref 22–29)
HCO3 SERPL-SCNC: 21 MMOL/L (ref 22–29)
HCT VFR BLD AUTO: 43.2 % (ref 40–53)
HCT VFR BLD AUTO: 44.6 % (ref 40–53)
HGB BLD-MCNC: 14.4 G/DL (ref 13.3–17.7)
HGB BLD-MCNC: 14.8 G/DL (ref 13.3–17.7)
MAGNESIUM SERPL-MCNC: 1.9 MG/DL (ref 1.7–2.3)
MCH RBC QN AUTO: 33.6 PG (ref 26.5–33)
MCH RBC QN AUTO: 34.1 PG (ref 26.5–33)
MCHC RBC AUTO-ENTMCNC: 33.2 G/DL (ref 31.5–36.5)
MCHC RBC AUTO-ENTMCNC: 33.3 G/DL (ref 31.5–36.5)
MCV RBC AUTO: 101 FL (ref 78–100)
MCV RBC AUTO: 103 FL (ref 78–100)
PHOSPHATE SERPL-MCNC: 2.1 MG/DL (ref 2.5–4.5)
PLATELET # BLD AUTO: 182 10E3/UL (ref 150–450)
PLATELET # BLD AUTO: 205 10E3/UL (ref 150–450)
POTASSIUM SERPL-SCNC: 3.8 MMOL/L (ref 3.4–5.3)
POTASSIUM SERPL-SCNC: 4.3 MMOL/L (ref 3.4–5.3)
PROT SERPL-MCNC: 6 G/DL (ref 6.4–8.3)
RBC # BLD AUTO: 4.28 10E6/UL (ref 4.4–5.9)
RBC # BLD AUTO: 4.34 10E6/UL (ref 4.4–5.9)
SODIUM SERPL-SCNC: 138 MMOL/L (ref 135–145)
SODIUM SERPL-SCNC: 138 MMOL/L (ref 135–145)
SPECIMEN EXPIRATION DATE: NORMAL
WBC # BLD AUTO: 8 10E3/UL (ref 4–11)
WBC # BLD AUTO: 9.1 10E3/UL (ref 4–11)

## 2024-08-16 PROCEDURE — 85347 COAGULATION TIME ACTIVATED: CPT

## 2024-08-16 PROCEDURE — 34705 EVAC RPR A-BIILIAC NDGFT: CPT | Mod: GC | Performed by: SURGERY

## 2024-08-16 PROCEDURE — 250N000024 HC ISOFLURANE, PER MIN: Performed by: SURGERY

## 2024-08-16 PROCEDURE — 710N000010 HC RECOVERY PHASE 1, LEVEL 2, PER MIN: Performed by: SURGERY

## 2024-08-16 PROCEDURE — C1760 CLOSURE DEV, VASC: HCPCS | Performed by: SURGERY

## 2024-08-16 PROCEDURE — 272N000001 HC OR GENERAL SUPPLY STERILE: Performed by: SURGERY

## 2024-08-16 PROCEDURE — 255N000002 HC RX 255 OP 636: Performed by: SURGERY

## 2024-08-16 PROCEDURE — 94640 AIRWAY INHALATION TREATMENT: CPT | Mod: 76

## 2024-08-16 PROCEDURE — 250N000009 HC RX 250: Performed by: STUDENT IN AN ORGANIZED HEALTH CARE EDUCATION/TRAINING PROGRAM

## 2024-08-16 PROCEDURE — 34713 PERQ ACCESS & CLSR FEM ART: CPT | Mod: LT | Performed by: SURGERY

## 2024-08-16 PROCEDURE — C1725 CATH, TRANSLUMIN NON-LASER: HCPCS | Performed by: SURGERY

## 2024-08-16 PROCEDURE — 80048 BASIC METABOLIC PNL TOTAL CA: CPT | Performed by: SURGERY

## 2024-08-16 PROCEDURE — 258N000003 HC RX IP 258 OP 636: Performed by: NURSE ANESTHETIST, CERTIFIED REGISTERED

## 2024-08-16 PROCEDURE — 360N000085 HC SURGERY LEVEL 5 W/ FLUORO, PER MIN: Performed by: SURGERY

## 2024-08-16 PROCEDURE — 250N000011 HC RX IP 250 OP 636: Performed by: SURGERY

## 2024-08-16 PROCEDURE — 250N000013 HC RX MED GY IP 250 OP 250 PS 637: Performed by: SURGERY

## 2024-08-16 PROCEDURE — 250N000011 HC RX IP 250 OP 636: Performed by: STUDENT IN AN ORGANIZED HEALTH CARE EDUCATION/TRAINING PROGRAM

## 2024-08-16 PROCEDURE — C1769 GUIDE WIRE: HCPCS | Performed by: SURGERY

## 2024-08-16 PROCEDURE — 04V03DZ RESTRICTION OF ABDOMINAL AORTA WITH INTRALUMINAL DEVICE, PERCUTANEOUS APPROACH: ICD-10-PCS | Performed by: SURGERY

## 2024-08-16 PROCEDURE — 258N000003 HC RX IP 258 OP 636: Performed by: SURGERY

## 2024-08-16 PROCEDURE — 86900 BLOOD TYPING SEROLOGIC ABO: CPT | Performed by: STUDENT IN AN ORGANIZED HEALTH CARE EDUCATION/TRAINING PROGRAM

## 2024-08-16 PROCEDURE — 120N000003 HC R&B IMCU UMMC

## 2024-08-16 PROCEDURE — C1887 CATHETER, GUIDING: HCPCS | Performed by: SURGERY

## 2024-08-16 PROCEDURE — 250N000011 HC RX IP 250 OP 636: Performed by: NURSE ANESTHETIST, CERTIFIED REGISTERED

## 2024-08-16 PROCEDURE — 250N000009 HC RX 250: Performed by: NURSE ANESTHETIST, CERTIFIED REGISTERED

## 2024-08-16 PROCEDURE — 999N000141 HC STATISTIC PRE-PROCEDURE NURSING ASSESSMENT: Performed by: SURGERY

## 2024-08-16 PROCEDURE — 34705 EVAC RPR A-BIILIAC NDGFT: CPT | Performed by: SURGERY

## 2024-08-16 PROCEDURE — 34705 EVAC RPR A-BIILIAC NDGFT: CPT | Performed by: NURSE ANESTHETIST, CERTIFIED REGISTERED

## 2024-08-16 PROCEDURE — 258N000003 HC RX IP 258 OP 636: Performed by: STUDENT IN AN ORGANIZED HEALTH CARE EDUCATION/TRAINING PROGRAM

## 2024-08-16 PROCEDURE — 36415 COLL VENOUS BLD VENIPUNCTURE: CPT | Performed by: SURGERY

## 2024-08-16 PROCEDURE — 94642 AEROSOL INHALATION TREATMENT: CPT

## 2024-08-16 PROCEDURE — 83735 ASSAY OF MAGNESIUM: CPT | Performed by: STUDENT IN AN ORGANIZED HEALTH CARE EDUCATION/TRAINING PROGRAM

## 2024-08-16 PROCEDURE — 999N000157 HC STATISTIC RCP TIME EA 10 MIN

## 2024-08-16 PROCEDURE — 370N000017 HC ANESTHESIA TECHNICAL FEE, PER MIN: Performed by: SURGERY

## 2024-08-16 PROCEDURE — 250N000011 HC RX IP 250 OP 636: Performed by: NURSE PRACTITIONER

## 2024-08-16 PROCEDURE — 85027 COMPLETE CBC AUTOMATED: CPT | Performed by: SURGERY

## 2024-08-16 PROCEDURE — 250N000013 HC RX MED GY IP 250 OP 250 PS 637: Performed by: STUDENT IN AN ORGANIZED HEALTH CARE EDUCATION/TRAINING PROGRAM

## 2024-08-16 PROCEDURE — 85014 HEMATOCRIT: CPT | Performed by: STUDENT IN AN ORGANIZED HEALTH CARE EDUCATION/TRAINING PROGRAM

## 2024-08-16 PROCEDURE — C1768 GRAFT, VASCULAR: HCPCS | Performed by: SURGERY

## 2024-08-16 PROCEDURE — 84100 ASSAY OF PHOSPHORUS: CPT | Performed by: STUDENT IN AN ORGANIZED HEALTH CARE EDUCATION/TRAINING PROGRAM

## 2024-08-16 PROCEDURE — C1894 INTRO/SHEATH, NON-LASER: HCPCS | Performed by: SURGERY

## 2024-08-16 PROCEDURE — 999N000083 IR OR ANGIOGRAM

## 2024-08-16 DEVICE — IMPLANTABLE DEVICE: Type: IMPLANTABLE DEVICE | Site: AORTA | Status: FUNCTIONAL

## 2024-08-16 DEVICE — IMPLANTABLE DEVICE: Type: IMPLANTABLE DEVICE | Site: ILIAC/FEMORALS | Status: FUNCTIONAL

## 2024-08-16 RX ORDER — OXYCODONE HYDROCHLORIDE 5 MG/1
5 TABLET ORAL
Status: CANCELLED | OUTPATIENT
Start: 2024-08-16

## 2024-08-16 RX ORDER — HYDRALAZINE HYDROCHLORIDE 20 MG/ML
INJECTION INTRAMUSCULAR; INTRAVENOUS PRN
Status: DISCONTINUED | OUTPATIENT
Start: 2024-08-16 | End: 2024-08-16

## 2024-08-16 RX ORDER — OXYCODONE HYDROCHLORIDE 5 MG/1
5 TABLET ORAL EVERY 4 HOURS PRN
Status: DISCONTINUED | OUTPATIENT
Start: 2024-08-16 | End: 2024-08-17 | Stop reason: HOSPADM

## 2024-08-16 RX ORDER — ONDANSETRON 4 MG/1
4 TABLET, ORALLY DISINTEGRATING ORAL EVERY 30 MIN PRN
Status: DISCONTINUED | OUTPATIENT
Start: 2024-08-16 | End: 2024-08-16 | Stop reason: HOSPADM

## 2024-08-16 RX ORDER — DEXAMETHASONE SODIUM PHOSPHATE 4 MG/ML
INJECTION, SOLUTION INTRA-ARTICULAR; INTRALESIONAL; INTRAMUSCULAR; INTRAVENOUS; SOFT TISSUE PRN
Status: DISCONTINUED | OUTPATIENT
Start: 2024-08-16 | End: 2024-08-16

## 2024-08-16 RX ORDER — LABETALOL HYDROCHLORIDE 5 MG/ML
10 INJECTION, SOLUTION INTRAVENOUS
Status: COMPLETED | OUTPATIENT
Start: 2024-08-16 | End: 2024-08-16

## 2024-08-16 RX ORDER — CEFAZOLIN SODIUM 2 G/100ML
2 INJECTION, SOLUTION INTRAVENOUS EVERY 8 HOURS
Status: COMPLETED | OUTPATIENT
Start: 2024-08-16 | End: 2024-08-17

## 2024-08-16 RX ORDER — NALOXONE HYDROCHLORIDE 0.4 MG/ML
0.1 INJECTION, SOLUTION INTRAMUSCULAR; INTRAVENOUS; SUBCUTANEOUS
Status: DISCONTINUED | OUTPATIENT
Start: 2024-08-16 | End: 2024-08-16 | Stop reason: HOSPADM

## 2024-08-16 RX ORDER — LISINOPRIL 20 MG/1
20 TABLET ORAL DAILY
Status: DISCONTINUED | OUTPATIENT
Start: 2024-08-16 | End: 2024-08-17 | Stop reason: HOSPADM

## 2024-08-16 RX ORDER — METHOCARBAMOL 100 MG/ML
500 INJECTION, SOLUTION INTRAMUSCULAR; INTRAVENOUS EVERY 8 HOURS
Status: DISCONTINUED | OUTPATIENT
Start: 2024-08-16 | End: 2024-08-17

## 2024-08-16 RX ORDER — LABETALOL HYDROCHLORIDE 5 MG/ML
10 INJECTION, SOLUTION INTRAVENOUS EVERY 4 HOURS PRN
Status: DISCONTINUED | OUTPATIENT
Start: 2024-08-16 | End: 2024-08-16 | Stop reason: HOSPADM

## 2024-08-16 RX ORDER — NALOXONE HYDROCHLORIDE 0.4 MG/ML
0.4 INJECTION, SOLUTION INTRAMUSCULAR; INTRAVENOUS; SUBCUTANEOUS
Status: DISCONTINUED | OUTPATIENT
Start: 2024-08-16 | End: 2024-08-17 | Stop reason: HOSPADM

## 2024-08-16 RX ORDER — OXYCODONE HYDROCHLORIDE 10 MG/1
10 TABLET ORAL EVERY 4 HOURS PRN
Status: DISCONTINUED | OUTPATIENT
Start: 2024-08-16 | End: 2024-08-17 | Stop reason: HOSPADM

## 2024-08-16 RX ORDER — ASPIRIN 81 MG/1
81 TABLET ORAL DAILY
Status: DISCONTINUED | OUTPATIENT
Start: 2024-08-17 | End: 2024-08-17 | Stop reason: HOSPADM

## 2024-08-16 RX ORDER — CEFAZOLIN SODIUM/WATER 2 G/20 ML
2 SYRINGE (ML) INTRAVENOUS
Status: COMPLETED | OUTPATIENT
Start: 2024-08-16 | End: 2024-08-16

## 2024-08-16 RX ORDER — FENTANYL CITRATE 50 UG/ML
INJECTION, SOLUTION INTRAMUSCULAR; INTRAVENOUS PRN
Status: DISCONTINUED | OUTPATIENT
Start: 2024-08-16 | End: 2024-08-16

## 2024-08-16 RX ORDER — ATORVASTATIN CALCIUM 40 MG/1
40 TABLET, FILM COATED ORAL AT BEDTIME
Status: DISCONTINUED | OUTPATIENT
Start: 2024-08-16 | End: 2024-08-17 | Stop reason: HOSPADM

## 2024-08-16 RX ORDER — ACETAMINOPHEN 325 MG/1
975 TABLET ORAL ONCE
Status: CANCELLED | OUTPATIENT
Start: 2024-08-16 | End: 2024-08-16

## 2024-08-16 RX ORDER — LIDOCAINE 40 MG/G
CREAM TOPICAL
Status: DISCONTINUED | OUTPATIENT
Start: 2024-08-16 | End: 2024-08-17 | Stop reason: HOSPADM

## 2024-08-16 RX ORDER — KETOROLAC TROMETHAMINE 30 MG/ML
15 INJECTION, SOLUTION INTRAMUSCULAR; INTRAVENOUS EVERY 6 HOURS PRN
Status: DISCONTINUED | OUTPATIENT
Start: 2024-08-16 | End: 2024-08-17 | Stop reason: HOSPADM

## 2024-08-16 RX ORDER — ACETAMINOPHEN 325 MG/1
325 TABLET ORAL EVERY 4 HOURS PRN
Status: DISCONTINUED | OUTPATIENT
Start: 2024-08-16 | End: 2024-08-17 | Stop reason: HOSPADM

## 2024-08-16 RX ORDER — NALOXONE HYDROCHLORIDE 0.4 MG/ML
0.2 INJECTION, SOLUTION INTRAMUSCULAR; INTRAVENOUS; SUBCUTANEOUS
Status: DISCONTINUED | OUTPATIENT
Start: 2024-08-16 | End: 2024-08-17 | Stop reason: HOSPADM

## 2024-08-16 RX ORDER — ACETAMINOPHEN 325 MG/1
975 TABLET ORAL ONCE
Status: COMPLETED | OUTPATIENT
Start: 2024-08-16 | End: 2024-08-16

## 2024-08-16 RX ORDER — SODIUM CHLORIDE, SODIUM GLUCONATE, SODIUM ACETATE, POTASSIUM CHLORIDE AND MAGNESIUM CHLORIDE 526; 502; 368; 37; 30 MG/100ML; MG/100ML; MG/100ML; MG/100ML; MG/100ML
INJECTION, SOLUTION INTRAVENOUS CONTINUOUS PRN
Status: DISCONTINUED | OUTPATIENT
Start: 2024-08-16 | End: 2024-08-16

## 2024-08-16 RX ORDER — HEPARIN SODIUM 1000 [USP'U]/ML
INJECTION, SOLUTION INTRAVENOUS; SUBCUTANEOUS PRN
Status: DISCONTINUED | OUTPATIENT
Start: 2024-08-16 | End: 2024-08-16

## 2024-08-16 RX ORDER — ONDANSETRON 2 MG/ML
4 INJECTION INTRAMUSCULAR; INTRAVENOUS EVERY 30 MIN PRN
Status: CANCELLED | OUTPATIENT
Start: 2024-08-16

## 2024-08-16 RX ORDER — PROTAMINE SULFATE 10 MG/ML
INJECTION, SOLUTION INTRAVENOUS PRN
Status: DISCONTINUED | OUTPATIENT
Start: 2024-08-16 | End: 2024-08-16

## 2024-08-16 RX ORDER — ONDANSETRON 2 MG/ML
INJECTION INTRAMUSCULAR; INTRAVENOUS PRN
Status: DISCONTINUED | OUTPATIENT
Start: 2024-08-16 | End: 2024-08-16

## 2024-08-16 RX ORDER — IODIXANOL 320 MG/ML
INJECTION, SOLUTION INTRAVASCULAR PRN
Status: DISCONTINUED | OUTPATIENT
Start: 2024-08-16 | End: 2024-08-16 | Stop reason: HOSPADM

## 2024-08-16 RX ORDER — FENTANYL CITRATE 50 UG/ML
50 INJECTION, SOLUTION INTRAMUSCULAR; INTRAVENOUS EVERY 5 MIN PRN
Status: DISCONTINUED | OUTPATIENT
Start: 2024-08-16 | End: 2024-08-16 | Stop reason: HOSPADM

## 2024-08-16 RX ORDER — CARVEDILOL 25 MG/1
25 TABLET ORAL 2 TIMES DAILY WITH MEALS
Status: DISCONTINUED | OUTPATIENT
Start: 2024-08-16 | End: 2024-08-17 | Stop reason: HOSPADM

## 2024-08-16 RX ORDER — LABETALOL HYDROCHLORIDE 5 MG/ML
INJECTION, SOLUTION INTRAVENOUS PRN
Status: DISCONTINUED | OUTPATIENT
Start: 2024-08-16 | End: 2024-08-16

## 2024-08-16 RX ORDER — IPRATROPIUM BROMIDE AND ALBUTEROL SULFATE 2.5; .5 MG/3ML; MG/3ML
3 SOLUTION RESPIRATORY (INHALATION) ONCE
Status: COMPLETED | OUTPATIENT
Start: 2024-08-16 | End: 2024-08-16

## 2024-08-16 RX ORDER — IPRATROPIUM BROMIDE AND ALBUTEROL SULFATE 2.5; .5 MG/3ML; MG/3ML
3 SOLUTION RESPIRATORY (INHALATION)
Status: DISCONTINUED | OUTPATIENT
Start: 2024-08-16 | End: 2024-08-17 | Stop reason: HOSPADM

## 2024-08-16 RX ORDER — ONDANSETRON 2 MG/ML
4 INJECTION INTRAMUSCULAR; INTRAVENOUS EVERY 30 MIN PRN
Status: DISCONTINUED | OUTPATIENT
Start: 2024-08-16 | End: 2024-08-16 | Stop reason: HOSPADM

## 2024-08-16 RX ORDER — ESMOLOL HYDROCHLORIDE 10 MG/ML
INJECTION INTRAVENOUS PRN
Status: DISCONTINUED | OUTPATIENT
Start: 2024-08-16 | End: 2024-08-16

## 2024-08-16 RX ORDER — PROPOFOL 10 MG/ML
INJECTION, EMULSION INTRAVENOUS PRN
Status: DISCONTINUED | OUTPATIENT
Start: 2024-08-16 | End: 2024-08-16

## 2024-08-16 RX ORDER — ASPIRIN 325 MG
325 TABLET ORAL DAILY
Status: DISCONTINUED | OUTPATIENT
Start: 2024-08-16 | End: 2024-08-16

## 2024-08-16 RX ORDER — HYDRALAZINE HYDROCHLORIDE 20 MG/ML
10 INJECTION INTRAMUSCULAR; INTRAVENOUS
Status: DISCONTINUED | OUTPATIENT
Start: 2024-08-16 | End: 2024-08-17 | Stop reason: HOSPADM

## 2024-08-16 RX ORDER — HYDROMORPHONE HCL IN WATER/PF 6 MG/30 ML
0.4 PATIENT CONTROLLED ANALGESIA SYRINGE INTRAVENOUS EVERY 5 MIN PRN
Status: DISCONTINUED | OUTPATIENT
Start: 2024-08-16 | End: 2024-08-16 | Stop reason: HOSPADM

## 2024-08-16 RX ORDER — LIDOCAINE HYDROCHLORIDE 20 MG/ML
INJECTION, SOLUTION INFILTRATION; PERINEURAL PRN
Status: DISCONTINUED | OUTPATIENT
Start: 2024-08-16 | End: 2024-08-16

## 2024-08-16 RX ORDER — SODIUM CHLORIDE, SODIUM LACTATE, POTASSIUM CHLORIDE, CALCIUM CHLORIDE 600; 310; 30; 20 MG/100ML; MG/100ML; MG/100ML; MG/100ML
INJECTION, SOLUTION INTRAVENOUS CONTINUOUS
Status: DISCONTINUED | OUTPATIENT
Start: 2024-08-16 | End: 2024-08-16 | Stop reason: HOSPADM

## 2024-08-16 RX ORDER — HEPARIN SODIUM 5000 [USP'U]/.5ML
5000 INJECTION, SOLUTION INTRAVENOUS; SUBCUTANEOUS EVERY 8 HOURS
Status: DISCONTINUED | OUTPATIENT
Start: 2024-08-17 | End: 2024-08-17 | Stop reason: HOSPADM

## 2024-08-16 RX ORDER — FENTANYL CITRATE 50 UG/ML
25 INJECTION, SOLUTION INTRAMUSCULAR; INTRAVENOUS EVERY 5 MIN PRN
Status: DISCONTINUED | OUTPATIENT
Start: 2024-08-16 | End: 2024-08-16 | Stop reason: HOSPADM

## 2024-08-16 RX ORDER — NALOXONE HYDROCHLORIDE 0.4 MG/ML
0.1 INJECTION, SOLUTION INTRAMUSCULAR; INTRAVENOUS; SUBCUTANEOUS
Status: CANCELLED | OUTPATIENT
Start: 2024-08-16

## 2024-08-16 RX ORDER — HYDROMORPHONE HCL IN WATER/PF 6 MG/30 ML
0.5 PATIENT CONTROLLED ANALGESIA SYRINGE INTRAVENOUS EVERY 4 HOURS PRN
Status: DISCONTINUED | OUTPATIENT
Start: 2024-08-16 | End: 2024-08-17 | Stop reason: HOSPADM

## 2024-08-16 RX ORDER — CEFAZOLIN SODIUM/WATER 2 G/20 ML
2 SYRINGE (ML) INTRAVENOUS SEE ADMIN INSTRUCTIONS
Status: DISCONTINUED | OUTPATIENT
Start: 2024-08-16 | End: 2024-08-16 | Stop reason: HOSPADM

## 2024-08-16 RX ORDER — KETOROLAC TROMETHAMINE 30 MG/ML
15 INJECTION, SOLUTION INTRAMUSCULAR; INTRAVENOUS
Status: COMPLETED | OUTPATIENT
Start: 2024-08-16 | End: 2024-08-16

## 2024-08-16 RX ORDER — MAGNESIUM HYDROXIDE/ALUMINUM HYDROXICE/SIMETHICONE 120; 1200; 1200 MG/30ML; MG/30ML; MG/30ML
30 SUSPENSION ORAL EVERY 4 HOURS PRN
Status: DISCONTINUED | OUTPATIENT
Start: 2024-08-16 | End: 2024-08-17 | Stop reason: HOSPADM

## 2024-08-16 RX ORDER — HYDROMORPHONE HCL IN WATER/PF 6 MG/30 ML
0.2 PATIENT CONTROLLED ANALGESIA SYRINGE INTRAVENOUS EVERY 5 MIN PRN
Status: DISCONTINUED | OUTPATIENT
Start: 2024-08-16 | End: 2024-08-16 | Stop reason: HOSPADM

## 2024-08-16 RX ORDER — SODIUM CHLORIDE, SODIUM LACTATE, POTASSIUM CHLORIDE, CALCIUM CHLORIDE 600; 310; 30; 20 MG/100ML; MG/100ML; MG/100ML; MG/100ML
INJECTION, SOLUTION INTRAVENOUS CONTINUOUS
Status: DISCONTINUED | OUTPATIENT
Start: 2024-08-16 | End: 2024-08-17

## 2024-08-16 RX ORDER — ONDANSETRON 4 MG/1
4 TABLET, ORALLY DISINTEGRATING ORAL EVERY 30 MIN PRN
Status: CANCELLED | OUTPATIENT
Start: 2024-08-16

## 2024-08-16 RX ADMIN — HYDRALAZINE HYDROCHLORIDE 6 MG: 20 INJECTION INTRAMUSCULAR; INTRAVENOUS at 10:46

## 2024-08-16 RX ADMIN — OXYCODONE HYDROCHLORIDE 5 MG: 5 TABLET ORAL at 16:42

## 2024-08-16 RX ADMIN — IPRATROPIUM BROMIDE AND ALBUTEROL SULFATE 3 ML: .5; 3 SOLUTION RESPIRATORY (INHALATION) at 06:37

## 2024-08-16 RX ADMIN — SODIUM CHLORIDE, SODIUM GLUCONATE, SODIUM ACETATE, POTASSIUM CHLORIDE AND MAGNESIUM CHLORIDE: 526; 502; 368; 37; 30 INJECTION, SOLUTION INTRAVENOUS at 09:00

## 2024-08-16 RX ADMIN — PROPOFOL 150 MG: 10 INJECTION, EMULSION INTRAVENOUS at 07:48

## 2024-08-16 RX ADMIN — Medication 70 MG: at 07:50

## 2024-08-16 RX ADMIN — LIDOCAINE HYDROCHLORIDE 100 MG: 20 INJECTION, SOLUTION INFILTRATION; PERINEURAL at 07:47

## 2024-08-16 RX ADMIN — HYDRALAZINE HYDROCHLORIDE 4 MG: 20 INJECTION INTRAMUSCULAR; INTRAVENOUS at 10:36

## 2024-08-16 RX ADMIN — ESMOLOL HYDROCHLORIDE 80 MG: 10 INJECTION, SOLUTION INTRAVENOUS at 07:47

## 2024-08-16 RX ADMIN — PHENYLEPHRINE HYDROCHLORIDE 150 MCG: 10 INJECTION INTRAVENOUS at 09:17

## 2024-08-16 RX ADMIN — FENTANYL CITRATE 50 MCG: 50 INJECTION INTRAMUSCULAR; INTRAVENOUS at 08:37

## 2024-08-16 RX ADMIN — LABETALOL HYDROCHLORIDE 10 MG: 5 INJECTION INTRAVENOUS at 10:48

## 2024-08-16 RX ADMIN — METHOCARBAMOL 500 MG: 100 INJECTION, SOLUTION INTRAMUSCULAR; INTRAVENOUS at 15:11

## 2024-08-16 RX ADMIN — KETOROLAC TROMETHAMINE 15 MG: 30 INJECTION, SOLUTION INTRAMUSCULAR at 12:36

## 2024-08-16 RX ADMIN — HYDROMORPHONE HYDROCHLORIDE 0.5 MG: 1 INJECTION, SOLUTION INTRAMUSCULAR; INTRAVENOUS; SUBCUTANEOUS at 10:30

## 2024-08-16 RX ADMIN — ACETAMINOPHEN 975 MG: 325 TABLET ORAL at 13:15

## 2024-08-16 RX ADMIN — LABETALOL HYDROCHLORIDE 5 MG: 5 INJECTION INTRAVENOUS at 10:27

## 2024-08-16 RX ADMIN — SODIUM CHLORIDE, SODIUM GLUCONATE, SODIUM ACETATE, POTASSIUM CHLORIDE AND MAGNESIUM CHLORIDE: 526; 502; 368; 37; 30 INJECTION, SOLUTION INTRAVENOUS at 07:35

## 2024-08-16 RX ADMIN — IPRATROPIUM BROMIDE AND ALBUTEROL SULFATE 3 ML: .5; 3 SOLUTION RESPIRATORY (INHALATION) at 17:14

## 2024-08-16 RX ADMIN — HYDRALAZINE HYDROCHLORIDE 10 MG: 20 INJECTION INTRAMUSCULAR; INTRAVENOUS at 13:31

## 2024-08-16 RX ADMIN — IPRATROPIUM BROMIDE AND ALBUTEROL SULFATE 3 ML: .5; 3 SOLUTION RESPIRATORY (INHALATION) at 20:10

## 2024-08-16 RX ADMIN — PROPOFOL 50 MG: 10 INJECTION, EMULSION INTRAVENOUS at 07:50

## 2024-08-16 RX ADMIN — PHENYLEPHRINE HYDROCHLORIDE 100 MCG: 10 INJECTION INTRAVENOUS at 09:28

## 2024-08-16 RX ADMIN — IPRATROPIUM BROMIDE AND ALBUTEROL SULFATE 3 ML: .5; 3 SOLUTION RESPIRATORY (INHALATION) at 14:43

## 2024-08-16 RX ADMIN — LABETALOL HYDROCHLORIDE 5 MG: 5 INJECTION INTRAVENOUS at 10:29

## 2024-08-16 RX ADMIN — Medication 2 G: at 08:00

## 2024-08-16 RX ADMIN — LABETALOL HYDROCHLORIDE 5 MG: 5 INJECTION INTRAVENOUS at 10:24

## 2024-08-16 RX ADMIN — HYDROMORPHONE HYDROCHLORIDE 0.5 MG: 0.2 INJECTION, SOLUTION INTRAMUSCULAR; INTRAVENOUS; SUBCUTANEOUS at 18:10

## 2024-08-16 RX ADMIN — DEXAMETHASONE SODIUM PHOSPHATE 6 MG: 4 INJECTION, SOLUTION INTRA-ARTICULAR; INTRALESIONAL; INTRAMUSCULAR; INTRAVENOUS; SOFT TISSUE at 08:00

## 2024-08-16 RX ADMIN — PROPOFOL 60 MG: 10 INJECTION, EMULSION INTRAVENOUS at 10:54

## 2024-08-16 RX ADMIN — FENTANYL CITRATE 50 MCG: 50 INJECTION INTRAMUSCULAR; INTRAVENOUS at 09:00

## 2024-08-16 RX ADMIN — HEPARIN SODIUM 2000 UNITS: 1000 INJECTION INTRAVENOUS; SUBCUTANEOUS at 09:55

## 2024-08-16 RX ADMIN — ONDANSETRON 4 MG: 2 INJECTION INTRAMUSCULAR; INTRAVENOUS at 10:41

## 2024-08-16 RX ADMIN — SODIUM CHLORIDE, SODIUM GLUCONATE, SODIUM ACETATE, POTASSIUM CHLORIDE AND MAGNESIUM CHLORIDE: 526; 502; 368; 37; 30 INJECTION, SOLUTION INTRAVENOUS at 09:28

## 2024-08-16 RX ADMIN — LABETALOL HYDROCHLORIDE 10 MG: 5 INJECTION INTRAVENOUS at 10:58

## 2024-08-16 RX ADMIN — Medication 20 MG: at 09:36

## 2024-08-16 RX ADMIN — HEPARIN SODIUM 10000 UNITS: 1000 INJECTION INTRAVENOUS; SUBCUTANEOUS at 09:04

## 2024-08-16 RX ADMIN — SODIUM CHLORIDE, POTASSIUM CHLORIDE, SODIUM LACTATE AND CALCIUM CHLORIDE: 600; 310; 30; 20 INJECTION, SOLUTION INTRAVENOUS at 14:00

## 2024-08-16 RX ADMIN — PHENYLEPHRINE HYDROCHLORIDE 100 MCG: 10 INJECTION INTRAVENOUS at 09:10

## 2024-08-16 RX ADMIN — PHENYLEPHRINE HYDROCHLORIDE 50 MCG: 10 INJECTION INTRAVENOUS at 07:50

## 2024-08-16 RX ADMIN — OXYCODONE HYDROCHLORIDE 10 MG: 10 TABLET ORAL at 20:38

## 2024-08-16 RX ADMIN — HYDRALAZINE HYDROCHLORIDE 4 MG: 20 INJECTION INTRAMUSCULAR; INTRAVENOUS at 10:40

## 2024-08-16 RX ADMIN — PHENYLEPHRINE HYDROCHLORIDE 100 MCG: 10 INJECTION INTRAVENOUS at 08:26

## 2024-08-16 RX ADMIN — HYDROMORPHONE HYDROCHLORIDE 0.5 MG: 0.2 INJECTION, SOLUTION INTRAMUSCULAR; INTRAVENOUS; SUBCUTANEOUS at 22:26

## 2024-08-16 RX ADMIN — PHENYLEPHRINE HYDROCHLORIDE 0.5 MCG/KG/MIN: 10 INJECTION INTRAVENOUS at 09:28

## 2024-08-16 RX ADMIN — LABETALOL HYDROCHLORIDE 10 MG: 5 INJECTION, SOLUTION INTRAVENOUS at 12:36

## 2024-08-16 RX ADMIN — SUGAMMADEX 200 MG: 100 INJECTION, SOLUTION INTRAVENOUS at 10:55

## 2024-08-16 RX ADMIN — CEFAZOLIN SODIUM 2 G: 2 INJECTION, SOLUTION INTRAVENOUS at 17:10

## 2024-08-16 RX ADMIN — FENTANYL CITRATE 100 MCG: 50 INJECTION INTRAMUSCULAR; INTRAVENOUS at 07:47

## 2024-08-16 RX ADMIN — HEPARIN SODIUM 1000 UNITS: 1000 INJECTION INTRAVENOUS; SUBCUTANEOUS at 09:25

## 2024-08-16 RX ADMIN — ATORVASTATIN CALCIUM 40 MG: 40 TABLET, FILM COATED ORAL at 22:27

## 2024-08-16 RX ADMIN — PHENYLEPHRINE HYDROCHLORIDE 100 MCG: 10 INJECTION INTRAVENOUS at 08:56

## 2024-08-16 RX ADMIN — PROTAMINE SULFATE 50 MG: 10 INJECTION, SOLUTION INTRAVENOUS at 10:30

## 2024-08-16 RX ADMIN — Medication 30 MG: at 09:00

## 2024-08-16 ASSESSMENT — ENCOUNTER SYMPTOMS: SEIZURES: 0

## 2024-08-16 ASSESSMENT — ACTIVITIES OF DAILY LIVING (ADL)
ADLS_ACUITY_SCORE: 23
ADLS_ACUITY_SCORE: 22
WEAR_GLASSES_OR_BLIND: YES
ADLS_ACUITY_SCORE: 23
CONCENTRATING,_REMEMBERING_OR_MAKING_DECISIONS_DIFFICULTY: NO
VISION_MANAGEMENT: GLASSES
ADLS_ACUITY_SCORE: 23
ADLS_ACUITY_SCORE: 22
ADLS_ACUITY_SCORE: 23
DIFFICULTY_COMMUNICATING: NO
ADLS_ACUITY_SCORE: 22
ADLS_ACUITY_SCORE: 22
WALKING_OR_CLIMBING_STAIRS_DIFFICULTY: YES
FALL_HISTORY_WITHIN_LAST_SIX_MONTHS: NO
HEARING_DIFFICULTY_OR_DEAF: NO
TOILETING_ISSUES: NO
ADLS_ACUITY_SCORE: 23
ADLS_ACUITY_SCORE: 22
ADLS_ACUITY_SCORE: 22
DRESSING/BATHING_DIFFICULTY: NO
ADLS_ACUITY_SCORE: 22
ADLS_ACUITY_SCORE: 23
CHANGE_IN_FUNCTIONAL_STATUS_SINCE_ONSET_OF_CURRENT_ILLNESS/INJURY: NO
EQUIPMENT_CURRENTLY_USED_AT_HOME: CANE, STRAIGHT
DOING_ERRANDS_INDEPENDENTLY_DIFFICULTY: NO
DIFFICULTY_EATING/SWALLOWING: NO
ADLS_ACUITY_SCORE: 23
WALKING_OR_CLIMBING_STAIRS: STAIR CLIMBING DIFFICULTY, REQUIRES EQUIPMENT

## 2024-08-16 ASSESSMENT — VISUAL ACUITY
OU: NORMAL ACUITY

## 2024-08-16 ASSESSMENT — LIFESTYLE VARIABLES: TOBACCO_USE: 1

## 2024-08-16 ASSESSMENT — COPD QUESTIONNAIRES
COPD: 1
CAT_SEVERITY: MODERATE

## 2024-08-16 NOTE — PHARMACY-ADMISSION MEDICATION HISTORY
Pharmacist Admission Medication History    Admission medication history is complete. The information provided in this note is only as accurate as the sources available at the time of the update.    Information Source(s): Patient; Fill History (Sure Scripts); Pre-op nursing     Changes made to PTA medication list:  Added: None  Deleted: None  Changed: None    Pertinent Information: Fill history supports scheduled medications below. Fills prescriptions primarily at [facility] in [place]     Prior to Admission medications    Medication Sig Last Dose Taking? Auth Provider Long Term End Date   allopurinol (ZYLOPRIM) 100 MG tablet Take 2 tablets (200 mg) by mouth daily 8/15/2024 Yes Cyo Bolaños MD No    aspirin (ASA) 81 MG EC tablet Take 1 tablet (81 mg) by mouth daily 8/15/2024 Yes Hamlet Roberts PA-C     atorvastatin (LIPITOR) 40 MG tablet Take 1 tablet (40 mg) by mouth daily 8/15/2024 Yes Jaylen Perez MD Yes    carvedilol (COREG) 25 MG tablet Take 1 tablet (25 mg) by mouth 2 times daily (with meals) 8/15/2024 Yes Hamlet Roberts PA-C Yes    Fluticasone-Umeclidin-Vilanterol (TRELEGY ELLIPTA) 200-62.5-25 MCG/ACT oral inhaler Inhale 1 puff into the lungs daily 8/15/2024 Yes Bryn Valdovinos MD     furosemide (LASIX) 20 MG tablet Take 1 tablet (20 mg) by mouth every other day 8/15/2024 Yes Hamlet Roberts PA-C Yes    lisinopril (ZESTRIL) 20 MG tablet Take 1 tablet (20 mg) by mouth daily 8/15/2024 Yes Hamlet Roberts PA-C Yes    nicotine (NICORETTE) 4 MG lozenge Place 4 mg inside cheek every hour as needed 8/15/2024 Yes Reported, Patient No      Medication History Completed By: Manuel You RPH 8/16/2024 6:18 PM

## 2024-08-16 NOTE — SUMMARY OF CARE
Admission          8/16/2024  5:30 AM  -----------------------------------------------------------  Reason for admission: Post op AAA  Primary team notified of pt arrival.  Admitted from: PACU  Via: stretcher  Accompanied by: daughter  Belongings: Placed in closet; wallet, keys and meds sent home with daughter  Admission Profile: complete  Teaching: orientation to unit and call light- call light within reach, call don't fall, use of console, meal times, when to call for the RN, and enforced importance of safety   Access: R PIV x 2 and L PIV x2  Telemetry:Placed on pt  Ht./Wt.: complete  Code Status verified on armband: yes  2 RN Skin Assessment Completed By:   Med Rec completed: yes  Suction/Ambu bag/Flowmeter at bedside: yes  Is patient having diarrhea upon admission- if YES fill out testing algorithm : no        Pt status:    Temp:  [97.9  F (36.6  C)-98.5  F (36.9  C)] 98.5  F (36.9  C)  Pulse:  [65-78] 73  Resp:  [10-18] 16  BP: (115-166)/(57-90) 131/81  MAP:  [77 mmHg-109 mmHg] 90 mmHg  Arterial Line BP: (109-166)/(59-79) 143/67  SpO2:  [94 %-100 %] 97 %

## 2024-08-16 NOTE — H&P
Interval History & Physical:    Chief complaint: infrarenal AAA    Patient s/e this AM, denies any changes to H&P since last performed.   Has been NPO since midnight.   Denies F/C/N/V/D/C/CP/SOB.  AFVSS.   Pulse exam: 2+ DP/PT/fem BL.   BL groins marked.   Consented for procedure and blood products.   All questions and concerns addressed and patient and his daughter demonstrate understanding.  To OR today for EVAR to repair AAA.       Vandana Gaona MD  PGY-6  Vascular Surgery  Pager: #[phone]  8/16/2024  5:50 AM

## 2024-08-16 NOTE — OR NURSING
Paged          FIRST CALL - [facility] - [facility] AND INPT VASCULAR CONSULTS - GENERAL SURGERY RESIDENT PGY2 [[id number]]  6:00AM  -   3:00PM      MERLIN WALTER [ Msg Id [id number] ]            Pt Bucky Edgar PACU bay 12, to help with BP control can we consider giving him home dose of Lisinopril and Coreg which he did not take this am? James Asencio RN on [place] or [phone]

## 2024-08-16 NOTE — ANESTHESIA POSTPROCEDURE EVALUATION
Patient: Bucky Edgar    Procedure: Procedure(s):  Endovascular Infrarenal Abdominal Aneurysm Repair with 32x14x 103mm ENDURANT IIs Endograft with bilateral docking limbs ( Left Common Iliac with ENDURANT II Limb [id number] x 20mm x 93mm, Right Common Iliac  with ENDURANT II Limb Graft size 16mm x 16mm x 82mm). Bilateral ultrasound guided percutaneous access. Aortogram, bilateral selective iliac angiograms, Cone Beam CT with rotational DSA.       Anesthesia Type:  General    Note:  Disposition: Inpatient   Postop Pain Control: Uneventful            Sign Out: Well controlled pain   PONV: No   Neuro/Psych: Uneventful            Sign Out: Acceptable/Baseline neuro status   Airway/Respiratory: Uneventful            Sign Out: Acceptable/Baseline resp. status   CV/Hemodynamics: Uneventful            Sign Out: Acceptable CV status; No obvious hypovolemia; No obvious fluid overload   Other NRE: NONE   DID A NON-ROUTINE EVENT OCCUR? No           Last vitals:  Vitals Value Taken Time   /76 08/16/24 1230   Temp 36.8  C (98.3  F) 08/16/24 1115   Pulse 68 08/16/24 1231   Resp 17 08/16/24 1231   SpO2 98 % 08/16/24 1231   Vitals shown include unfiled device data.    Electronically Signed By: Man Pierre MD  August 16, 2024  12:31 PM

## 2024-08-16 NOTE — BRIEF OP NOTE
[facility]    Brief Operative Note    Pre-operative diagnosis:   #1- Asymptomatic 6.1 cm infrarenal abdominal aortic aneurysm  #2- COPD  #3- Hypertension  #4- History of DVT with previous left iliac vein stenting for May-Thurner syndrome  #5- Lung nodule undergoing workup  #6- Former nicotine dependence    Post-operative diagnosis: Same    Procedure: Endovascular Infrarenal Abdominal Aneurysm Repair with 32x14x 103mm ENDURANT IIs Endograft with bilateral docking limbs ( Left Common Iliac with ENDURANT II Limb [id number] x 20mm x 93mm, Right Common Iliac  with ENDURANT II Limb Graft size 16mm x 16mm x 82mm). Bilateral ultrasound guided percutaneous access. Aortogram, bilateral selective iliac angiograms, Cone Beam CT with rotational DSA., Bilateral - Groin  US-guided access of bilateral common femoral arteries with insertion of large bore (>12 Fr) sheaths bilaterally.  Endovascular aortic repair of infrarenal aortic aneurysm using bifurcated Medtronic Endurant II stent graft ([id number] mm, R iliac limb 43t64t25 mm, L iliac limb 61w90v79 mm)  Bilateral arteriotomy closure with Perclose closure devices  Cone beam CT scan with radiologic interpretation    Surgeon: Surgeons and Role:     * Aman Valerio MD - Primary     * Gurjit Gaona MD - Fellow - Assisting  Anesthesia: General   Estimated Blood Loss: 10 mL from 8/16/2024  7:35 AM to 8/16/2024 11:13 AM      Drains: None  Specimens: * No specimens in log *  Findings:    Bilateral renal arteries patent.   Bilateral hypogastric arteries preserved.   Successful exclusion of infrarenal AAA with endograft.   Small type 2 endoleak. No type 1 or 3 endoleaks identified on final cone beam CT.   Postop pulse exam: 2+ DP BL.    Complications: None.  Implants:   Implant Name Type Inv. Item Serial No.  Lot No. LRB No. Used Action   GRAFT STNT 103MM 14-32MM 20FR ENDURANT IIS 2 BRCH EVAS NTNL - [id number]  Graft GRAFT STNT 103MM 14-32MM 20FR ENDURANT IIS 2 BRCH EVAS NTNL [id number] [employer]  N/A 1 Implanted   STENT GRAFT ENDURANT II CONTRALATERAL LIMB [id number] - [id number] Graft STENT GRAFT ENDURANT II CONTRALATERAL LIMB [id number] [employer]  Left 1 Implanted   STENT GRAFT ENDURANT II CONTRALATERAL LIMB [id number] - [id number] Graft STENT GRAFT ENDURANT II CONTRALATERAL LIMB [id number] [employer]  Right 1 Implanted     Plan:  - Admit to intermediate care postop  - Lie flat x6 hours, then bed rest overnight  - PT/OT, OOBTC POD 1  - CLD  - Q1 neurovascular checks  - Post-op groin check for hematomas      Vandana Gaona MD  PGY-6  Vascular Surgery  Pager: #[phone]

## 2024-08-16 NOTE — ANESTHESIA CARE TRANSFER NOTE
Patient: Bucky Edgar    Procedure: Procedure(s):  Endovascular Infrarenal Abdominal Aneurysm Repair with 32x14x 103mm ENDURANT IIs Endograft with bilateral docking limbs ( Left Common Iliac with ENDURANT II Limb [id number] x 20mm x 93mm, Right Common Iliac  with ENDURANT II Limb Graft size 16mm x 16mm x 82mm). Bilateral ultrasound guided percutaneous access. Aortogram, bilateral selective iliac angiograms, Cone Beam CT with rotational DSA.       Diagnosis: Infrarenal abdominal aortic aneurysm (AAA) without rupture (H24) [I71.43]  Diagnosis Additional Information: No value filed.    Anesthesia Type:   No value filed.     Note:    Oropharynx: oropharynx clear of all foreign objects and spontaneously breathing  Level of Consciousness: awake  Oxygen Supplementation: face mask (oxi plus)  Level of Supplemental Oxygen (L/min / FiO2): 6  Independent Airway: airway patency satisfactory and stable  Dentition: dentition unchanged  Vital Signs Stable: post-procedure vital signs reviewed and stable  Report to RN Given: handoff report given  Patient transferred to: PACU    Handoff Report: Identifed the Patient, Identified the Reponsible Provider, Reviewed the pertinent medical history, Discussed the surgical course, Reviewed Intra-OP anesthesia mangement and issues during anesthesia, Set expectations for post-procedure period and Allowed opportunity for questions and acknowledgement of understanding      Vitals:  Vitals Value Taken Time   /57 08/16/24 1115   Temp     Pulse 78 08/16/24 1116   Resp 10 08/16/24 1116   SpO2 99 % 08/16/24 1116   Vitals shown include unfiled device data.    Electronically Signed By: TROY Monroy CRNA  August 16, 2024  11:16 AM

## 2024-08-16 NOTE — ANESTHESIA PROCEDURE NOTES
Arterial Line Procedure Note    Pre-Procedure   Staff -        Anesthesiologist:  Man Pierre MD       Performed By: anesthesiologist       Location: OR       Pre-Anesthestic Checklist: patient identified, IV checked, risks and benefits discussed, informed consent, monitors and equipment checked, pre-op evaluation and at physician/surgeon's request  Line Placement:   This line was placed Post Induction  Procedure   Procedure: arterial line       Diagnosis: infrarenal AAA       Laterality: left       Insertion Site: radial.  Sterile Prep        Standard elements of sterile barrier followed       Skin prep: Chloraprep  Insertion/Injection        Technique: Seldinger Technique (Anatomic landmark and palpation)        Catheter Type/Size: 20 G, 1.75 in/4.5 cm quick cath (integral wire)  Narrative         Secured by: other       Tegaderm dressing used.       Complications: None apparent,        Arterial waveform: Yes        IBP within 10% of NIBP: Yes

## 2024-08-16 NOTE — PLAN OF CARE
Neuro: A&Ox4.   Cardiac: SR. VSS.   Respiratory: Sating  on RA.  GI/: Adequate urine output. Jorgensen catheter in place. No BM. Not passing gas.   Diet/appetite: Tolerating clear liquid diet.  Activity:  Bedrest until tomorrow AM  Pain: At acceptable level on current regimen.   Skin: No new deficits noted.  LDA's: R upper PIV infusing  ml/hr. R lower PIV SL. L PIV x2    Plan: Continue with POC. Notify primary team with changes.             Goal Outcome Evaluation:      Plan of Care Reviewed With: patient    Overall Patient Progress: improvingOverall Patient Progress: improving    Outcome Evaluation: Transfered to 6B from PACU. Pt recovering well.      Problem: Adult Inpatient Plan of Care  Goal: Plan of Care Review  Description: The Plan of Care Review/Shift note should be completed every shift.  The Outcome Evaluation is a brief statement about your assessment that the patient is improving, declining, or no change.  This information will be displayed automatically on your shift  note.  Outcome: Progressing  Flowsheets (Taken 8/16/2024 1826)  Outcome Evaluation: Transfered to 6B from PACU. Pt recovering well.  Plan of Care Reviewed With: patient  Overall Patient Progress: improving     Problem: Adult Inpatient Plan of Care  Goal: Optimal Comfort and Wellbeing  Outcome: Progressing  Intervention: Monitor Pain and Promote Comfort  Recent Flowsheet Documentation  Taken 8/16/2024 1636 by Kinga Chambers, RN  Pain Management Interventions: medication (see MAR)     Problem: Comorbidity Management  Goal: Blood Pressure in Desired Range  Outcome: Progressing  Intervention: Maintain Blood Pressure Management  Recent Flowsheet Documentation  Taken 8/16/2024 1636 by Kinga Chambers, RN  Medication Review/Management: medications reviewed     Problem: Pain Chronic (Persistent)  Goal: Optimal Pain Control and Function  Outcome: Progressing  Intervention: Develop Pain Management Plan  Recent Flowsheet  Documentation  Taken 8/16/2024 1636 by Kinga Chambers RN  Pain Management Interventions: medication (see MAR)  Intervention: Manage Persistent Pain  Recent Flowsheet Documentation  Taken 8/16/2024 1636 by Kinga Chambers RN  Medication Review/Management: medications reviewed  Intervention: Optimize Psychosocial Wellbeing  Recent Flowsheet Documentation  Taken 8/16/2024 1636 by Kinga Chambers RN  Supportive Measures: active listening utilized     Problem: Lower Extremity Revascularization  Goal: Absence of Bleeding  Outcome: Progressing  Intervention: Monitor and Manage Bleeding  Recent Flowsheet Documentation  Taken 8/16/2024 1636 by Kinga Chambers RN  Bleeding Management: movement restricted     Problem: Lower Extremity Revascularization  Goal: Anesthesia/Sedation Recovery  Outcome: Progressing  Intervention: Optimize Anesthesia Recovery  Recent Flowsheet Documentation  Taken 8/16/2024 1636 by Kinga Chambers RN  Safety Promotion/Fall Prevention:   room near nurse's station   safety round/check completed  Administration (IS):   instruction provided, initial   proper technique demonstrated  Patient Tolerance (IS): good     Problem: Lower Extremity Revascularization  Goal: Optimal Pain Control and Function  Outcome: Progressing  Intervention: Prevent or Manage Pain  Recent Flowsheet Documentation  Taken 8/16/2024 1636 by Kinga Chambers RN  Pain Management Interventions: medication (see MAR)     Problem: Lower Extremity Revascularization  Goal: Effective Oxygenation and Ventilation  Outcome: Progressing  Intervention: Optimize Oxygenation and Ventilation  Recent Flowsheet Documentation  Taken 8/16/2024 1636 by Kinga Chambers RN  Head of Bed (HOB) Positioning: HOB at 20 degrees

## 2024-08-16 NOTE — ANESTHESIA PROCEDURE NOTES
Airway       Patient location during procedure: OR       Procedure Start/Stop Times: 8/16/2024 7:52 AM  Staff -        Anesthesiologist:  Man Pierre MD       CRNA: Padmini Thompson APRN CRNA       Performed By: CRNA  Consent for Airway        Urgency: elective  Indications and Patient Condition       Indications for airway management: connie-procedural       Induction type:intravenous       Mask difficulty assessment: 2 - vent by mask + OA or adjuvant +/- NMBA    Final Airway Details       Final airway type: endotracheal airway       Successful airway: ETT - single and Oral  Endotracheal Airway Details        ETT size (mm): 7.5       Cuffed: yes       Successful intubation technique: direct laryngoscopy       DL Blade Type: Roberts 2       Grade View of Cords: 3 (clear and open)       Adjucts: stylet       Position: Right       Measured from: lips       Secured at (cm): 24       Bite block used: None    Post intubation assessment        Placement verified by: capnometry, equal breath sounds and chest rise        Number of attempts at approach: 1       Number of other approaches attempted: 0       Secured with: tape       Ease of procedure: easy       Dentition: Intact and Unchanged    Medication(s) Administered   Medication Administration Time: 8/16/2024 7:52 AM

## 2024-08-16 NOTE — ANESTHESIA PREPROCEDURE EVALUATION
Anesthesia Pre-Procedure Evaluation    Patient: Bucky Edgar   MRN: [id number] : 1955        Procedure : Procedure(s):  REPAIR, ANEURYSM, AORTOILIAC, ENDOVASCULAR          Past Medical History:   Diagnosis Date    COPD (chronic obstructive pulmonary disease) (H)     Displacement of lumbar intervertebral disc without myelopathy ,     HTN, goal below 140/90       Past Surgical History:   Procedure Laterality Date    HC REPAIR ROTATOR CUFF,CHRONIC      IR LOWER EXTREMITY VENOGRAM LEFT  2021    IR VENOUS STENT  2021    ZZC DECOMPRESS SPINAL CORD,1 SEG  , 2002    leftL4-L5, 2nd right L4-L5    ZZC SPINE FUSION,ANTER,3 SGMTS  2004    ant and post fusion L4-S1      Allergies   Allergen Reactions    Amlodipine Headache     Tremor     Chlorthalidone      Hypotension       Social History     Tobacco Use    Smoking status: Former     Current packs/day: 0.00     Average packs/day: 1.3 packs/day for 41.0 years (51.3 ttl pk-yrs)     Types: Cigarettes     Start date: 10/1/1976     Quit date: 10/1/2017     Years since quittin.8     Passive exposure: Never (per pt)    Smokeless tobacco: Never   Substance Use Topics    Alcohol use: Yes     Comment: 12 pack of beer every 2 weeks      Wt Readings from Last 1 Encounters:   24 92.2 kg (203 lb 4.2 oz)        Anesthesia Evaluation   Pt has had prior anesthetic. Type: General.    No history of anesthetic complications       ROS/MED HX  ENT/Pulmonary:     (+)     ALICIA risk factors,  hypertension,         tobacco use, Past use,  51  Pack-Year Hx,       moderate,  COPD,           (-) recent URI   Neurologic:    (-) no seizures and no CVA   Cardiovascular: Comment: Difficult going up stairs due to pain and some mild SOB. No chest pain.    Patient took beta blocker last night 8/15    (+) Dyslipidemia hypertension- -  CAD -  - -                                 Previous cardiac testing   Echo: Date: 2023 Results:  Normal Biventricular  function.  Mild AR. Trace TR.  Stress Test:  Date: 6/2024 Results:  The nuclear stress test is abnormal.  There is a small area of mild ischemia in the apical segment(s) of the left ventricle.  Left ventricular function is normal, EF >65%.    No EKG changes suggesting ischemia.    ECG Reviewed:  Date: 7/2023 Results:  NSR  Right axis deviation  Cath:  Date: Results:   (-) angina, past MI, CHF, stent, angina, past MI and CABG   METS/Exercise Tolerance: >4 METS    Hematologic:     (+) History of blood clots,    pt is not anticoagulated,        (-) anemia   Musculoskeletal: Comment: Chronic low back pain managed without medications. This limits his walking.  (+)  arthritis,             GI/Hepatic:    (-) GERD   Renal/Genitourinary:     (+) renal disease, type: CRI, Pt does not require dialysis,           Endo:     (+)               Obesity,    (-) Type II DM and thyroid disease   Psychiatric/Substance Use:    (-) chronic opioid use history   Infectious Disease:       Malignancy:       Other:            Physical Exam    Airway        Mallampati: III   TM distance: > 3 FB   Neck ROM: full   Mouth opening: > 3 cm    Respiratory Devices and Support         Dental       (+) Multiple visibly decayed, broken teeth    B=Bridge, C=Chipped, L=Loose, M=Missing    Cardiovascular   cardiovascular exam normal          Pulmonary   pulmonary exam normal            Other findings: Upper edentulous    OUTSIDE LABS:  CBC:   Lab Results   Component Value Date    WBC 8.0 08/16/2024    WBC 9.6 08/13/2024    HGB 14.8 08/16/2024    HGB 15.5 08/13/2024    HCT 44.6 08/16/2024    HCT 47.0 08/13/2024     08/16/2024     08/13/2024     BMP:   Lab Results   Component Value Date     08/16/2024     08/13/2024    POTASSIUM 3.8 08/16/2024    POTASSIUM 4.1 08/13/2024    CHLORIDE 105 08/16/2024    CHLORIDE 106 08/13/2024    CO2 21 (L) 08/16/2024    CO2 26 08/13/2024    BUN 12.5 08/16/2024    BUN 11.1 08/13/2024    CR 1.09  "08/16/2024    CR 1.25 (H) 08/13/2024     (H) 08/16/2024    GLC 97 08/13/2024     COAGS:   Lab Results   Component Value Date    PTT 28 05/04/2021    INR 0.92 05/04/2021     POC: No results found for: \"BGM\", \"HCG\", \"HCGS\"  HEPATIC:   Lab Results   Component Value Date    ALBUMIN 3.7 04/22/2024    PROTTOTAL 7.1 04/22/2024    ALT 13 04/22/2024    AST 18 04/22/2024    ALKPHOS 108 04/22/2024    BILITOTAL 0.5 04/22/2024     OTHER:   Lab Results   Component Value Date    PH 6.5 10/08/2002    A1C 5.3 05/08/2019    JUSTIN 8.7 (L) 08/16/2024    PHOS 3.2 04/22/2024    TSH 0.97 05/15/2023    CRP 10.8 (H) 08/07/2018    SED 6 05/15/2023       Anesthesia Plan    ASA Status:  3    NPO Status:  NPO Appropriate    Anesthesia Type: General.     - Airway: ETT   Induction: Intravenous, Propofol.   Maintenance: Balanced.   Techniques and Equipment:     - Lines/Monitors: 2nd IV, Arterial Line, BIS     Consents    Anesthesia Plan(s) and associated risks, benefits, and realistic alternatives discussed. Questions answered and patient/representative(s) expressed understanding.     - Discussed:     - Discussed with:  Patient      - Specific Concerns: Anesthetic risks discussed with the patient including but not limited to PONV, corneal abrasion, sore throat, stroke, low blood pressure, MI, and hypoxia..     - Extended Intubation/Ventilatory Support Discussed: No.      - Patient is DNR/DNI Status: No     Use of blood products discussed: Yes.     - Discussed with: Patient.     - Consented: consented to blood products     Postoperative Care    Pain management: IV analgesics, Oral pain medications, Multi-modal analgesia.   PONV prophylaxis: Ondansetron (or other 5HT-3), Dexamethasone or Solumedrol     Comments:               Man Pierre MD    I have reviewed the pertinent notes and labs in the chart from the past 30 days and (re)examined the patient.  Any updates or changes from those notes are reflected in this note.             # Drug " "Induced Platelet Defect: home medication list includes an antiplatelet medication  # Overweight: Estimated body mass index is 29.59 kg/m  as calculated from the following:    Height as of this encounter: 1.765 m (5' 9.5\").    Weight as of this encounter: 92.2 kg (203 lb 4.2 oz).      "

## 2024-08-16 NOTE — OP NOTE
VASCULAR SURGERY OPERATIVE REPORT     LOCATION:    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 syndrome  #5- Lung nodule undergoing workup  #6- Former nicotine dependence    Postoperative diagnosis    #1- Asymptomatic 6.1 cm infrarenal abdominal aortic aneurysm  #2- COPD  #3- Hypertension  #4- History of DVT with previous left iliac vein stenting for May-Thurner syndrome  #5- Lung nodule undergoing workup  #6- Former nicotine dependence    Surgeon: Aman Valerio MD  First Assistant: Gurjit Gaona MD (PGY 6 vascular surgery fellow)  A first assistant actively participated and was necessary for one or more of the following: opening, exposure and visualization during the case, maintaining hemostasis, wound closure resulting in its safe and expeditious completion.       Procedures:  Endovascular repair of infrarenal abdominal aortic aneurysm using bifurcated modular Medtronic Endurant IIs stent graft with bilateral docking limb (main body [id number]; Right iliac limb [id number], Left iliac limb [id number]).  Total percutaneous bilateral femoral approach with Perclose device, 16-Fr sheath on the right groin, 16-Fr sheath on the left.  Abdominal aortogram and bilateral pelvic rotational digital subtraction angiography.  Cone beam CT scan with and without contrast and radiological interpretation.   Balloon dilatation of the proximal and distal attachment sites.    Findings:   Patent renal arteries, aorta, and common, internal, external iliac arteries preoperatively. On completion all vessels patent. Subtle type II endoleak from lumbar artery. No type I or III endoleaks.    Indication for  procedure:    Mr. Edgar is a 68 year old male with a history of severe COPD and a large infrarenal aneurysm . We had discussed both open and endovascular repair however, he is not felt to be fit from a respiratory standpoint for open repair so we are proceeding with endovascular repair despite his young age and higher risk of aortic degeneration/reinterventions. Risks, benefits, alternatives and indications including but not limited to the risk of death, anesthetic or cardiopulmonary complications, bleeding, infection, myocardial infarction, stroke, renal insufficiency requiring temporary or permanent dialysis, bowel infarction necessitating bowel resection and colostomy, access complication, vessel injury, dissection, distal embolization, conversion to open, perforation, extremity ischemia with either compartment syndrome or limb loss, and spinal cord injury.  Discussed the potential need for bank blood products, advanced directives, and the need for a team approach as well as the potential for medical photography. The patient and his family understand the risks and would like to proceed with endovascular repair of his 6.1 cm infrarenal abdominal aortic aneurysm.     Description of procedure:    Operative details:    The patient was brought to the hybrid operating room.  Under satisfactory general anesthesia, he was placed in supine position with all pressure points padded in standard fashion.  The abdomen and thighs were widely prepped and draped in sterile, standard fashion.  A surgical pause was performed with all members of the surgical team to verify patient, medical record number, birth date, planned surgical procedure, surgical site, allergies, fire risk and perioperative antibiotics.    Under realtime ultrasound guidance, percutaneous bilateral retrograde transfemoral access was established with a micropuncture set and exchanged for 0.035-inch system.  The system was upsized to a 6 [place] sheath. The  patient was systemically heparinized with [id number] units of intravenous heparin. An ACT greater than 250 seconds was obtained. Each femoral puncture site was preclosed using two Perclose devices and an 8 Fr sheath was placed. Lunderquist wires were advanced to the descending thoracic aorta aorta under the protection of Kumpe catheters. A [id number] Medtronic Endurant bifurcated aortic stent graft was oriented extracorporeally, introduced via the right transfemoral approach and deployed below the lowest renal artery. The contralateral gate was successfully catheterized via the left transfemoral approach and sheath upsized to a 16 Fr sheath on the left.  Limited, selective left iliac angiography was performed for measurements. The repair was extended to the left common iliac artery using a [id number] Medtronic Endurant iliac extension limb. The main device was deployed to the ipsilateral common iliac artery.  Limited, selective right iliac angiography was performed for measurements. The repair was extended to the right common iliac artery using a [id number] Medtronic Endurant II iliac extension limb.  Balloon dilatation of the proximal and distal landing zones and attachment sites was performed using Coda balloon. Completion rotational digital subtraction angiography and cone beam CT scan with and without contrast revealed widely patent bilateral renal arteries, main body, iliac limbs, and iliac arteries without evidence of endoleak. All sheaths and wires were removed. Both femoral artery arteriotomies were closed using Perclose devices. All needle and sponge counts were correct. Groin incisions were closed using running Monocryl sutures for the subcuticular layer. A sterile dressing was applied. The patient was extubated and transferred to the recovery area in stable condition.    The patient had palpable pulses at completion, stable from his baseline.    Contrast: 68.5 mL  Flouro time: 20 min  DAP: 904  mGy    Estimated blood loss: 10 cc    Specimens: none     Complications: None apparent    Plan:  -ASA 81 mg  -Flat for 6 hours, followed by bedrest  -Site checks  -Neurovascular checks          Aman Valerio MD, RPVI  VASCULAR AND ENDOVASCULAR SURGERY   [facility]

## 2024-08-17 ENCOUNTER — APPOINTMENT (OUTPATIENT)
Dept: OCCUPATIONAL THERAPY | Facility: CLINIC | Age: 69
DRG: 269 | End: 2024-08-17
Attending: STUDENT IN AN ORGANIZED HEALTH CARE EDUCATION/TRAINING PROGRAM
Payer: COMMERCIAL

## 2024-08-17 VITALS
SYSTOLIC BLOOD PRESSURE: 104 MMHG | RESPIRATION RATE: 20 BRPM | OXYGEN SATURATION: 97 % | BODY MASS INDEX: 29.73 KG/M2 | HEART RATE: 54 BPM | TEMPERATURE: 98.3 F | WEIGHT: 207.67 LBS | DIASTOLIC BLOOD PRESSURE: 59 MMHG | HEIGHT: 70 IN

## 2024-08-17 LAB
ALBUMIN SERPL BCG-MCNC: 3 G/DL (ref 3.5–5.2)
ANION GAP SERPL CALCULATED.3IONS-SCNC: 10 MMOL/L (ref 7–15)
BUN SERPL-MCNC: 14.1 MG/DL (ref 8–23)
CALCIUM SERPL-MCNC: 8.2 MG/DL (ref 8.8–10.4)
CHLORIDE SERPL-SCNC: 104 MMOL/L (ref 98–107)
CREAT SERPL-MCNC: 1.1 MG/DL (ref 0.67–1.17)
EGFRCR SERPLBLD CKD-EPI 2021: 74 ML/MIN/1.73M2
ERYTHROCYTE [DISTWIDTH] IN BLOOD BY AUTOMATED COUNT: 14.3 % (ref 10–15)
GLUCOSE SERPL-MCNC: 112 MG/DL (ref 70–99)
HCO3 SERPL-SCNC: 21 MMOL/L (ref 22–29)
HCT VFR BLD AUTO: 42.3 % (ref 40–53)
HGB BLD-MCNC: 13.7 G/DL (ref 13.3–17.7)
MAGNESIUM SERPL-MCNC: 1.9 MG/DL (ref 1.7–2.3)
MCH RBC QN AUTO: 33.3 PG (ref 26.5–33)
MCHC RBC AUTO-ENTMCNC: 32.4 G/DL (ref 31.5–36.5)
MCV RBC AUTO: 103 FL (ref 78–100)
PHOSPHATE SERPL-MCNC: 2.8 MG/DL (ref 2.5–4.5)
PLATELET # BLD AUTO: 180 10E3/UL (ref 150–450)
POTASSIUM SERPL-SCNC: 4.3 MMOL/L (ref 3.4–5.3)
RBC # BLD AUTO: 4.11 10E6/UL (ref 4.4–5.9)
SODIUM SERPL-SCNC: 135 MMOL/L (ref 135–145)
WBC # BLD AUTO: 12.1 10E3/UL (ref 4–11)

## 2024-08-17 PROCEDURE — 999N000157 HC STATISTIC RCP TIME EA 10 MIN

## 2024-08-17 PROCEDURE — 36415 COLL VENOUS BLD VENIPUNCTURE: CPT | Performed by: STUDENT IN AN ORGANIZED HEALTH CARE EDUCATION/TRAINING PROGRAM

## 2024-08-17 PROCEDURE — 85014 HEMATOCRIT: CPT | Performed by: STUDENT IN AN ORGANIZED HEALTH CARE EDUCATION/TRAINING PROGRAM

## 2024-08-17 PROCEDURE — 999N000147 HC STATISTIC PT IP EVAL DEFER: Performed by: REHABILITATION PRACTITIONER

## 2024-08-17 PROCEDURE — 250N000011 HC RX IP 250 OP 636: Mod: JZ | Performed by: STUDENT IN AN ORGANIZED HEALTH CARE EDUCATION/TRAINING PROGRAM

## 2024-08-17 PROCEDURE — 80069 RENAL FUNCTION PANEL: CPT | Performed by: STUDENT IN AN ORGANIZED HEALTH CARE EDUCATION/TRAINING PROGRAM

## 2024-08-17 PROCEDURE — 250N000013 HC RX MED GY IP 250 OP 250 PS 637: Performed by: SURGERY

## 2024-08-17 PROCEDURE — 83735 ASSAY OF MAGNESIUM: CPT | Performed by: STUDENT IN AN ORGANIZED HEALTH CARE EDUCATION/TRAINING PROGRAM

## 2024-08-17 PROCEDURE — 97165 OT EVAL LOW COMPLEX 30 MIN: CPT | Mod: GO

## 2024-08-17 PROCEDURE — 97530 THERAPEUTIC ACTIVITIES: CPT | Mod: GO

## 2024-08-17 PROCEDURE — 97535 SELF CARE MNGMENT TRAINING: CPT | Mod: GO

## 2024-08-17 PROCEDURE — 250N000013 HC RX MED GY IP 250 OP 250 PS 637: Performed by: STUDENT IN AN ORGANIZED HEALTH CARE EDUCATION/TRAINING PROGRAM

## 2024-08-17 RX ORDER — OXYCODONE HYDROCHLORIDE 5 MG/1
5 TABLET ORAL EVERY 6 HOURS PRN
Qty: 8 TABLET | Refills: 0 | Status: SHIPPED | OUTPATIENT
Start: 2024-08-17

## 2024-08-17 RX ORDER — METHOCARBAMOL 500 MG/1
500 TABLET, FILM COATED ORAL EVERY 8 HOURS
Status: DISCONTINUED | OUTPATIENT
Start: 2024-08-17 | End: 2024-08-17 | Stop reason: HOSPADM

## 2024-08-17 RX ORDER — OXYCODONE HYDROCHLORIDE 5 MG/1
5 TABLET ORAL EVERY 6 HOURS PRN
Qty: 8 TABLET | Refills: 0 | Status: SHIPPED | OUTPATIENT
Start: 2024-08-17 | End: 2024-08-17

## 2024-08-17 RX ORDER — ACETAMINOPHEN 325 MG/1
325 TABLET ORAL EVERY 6 HOURS PRN
COMMUNITY
Start: 2024-08-17 | End: 2024-08-31

## 2024-08-17 RX ORDER — METHOCARBAMOL 500 MG/1
500 TABLET, FILM COATED ORAL EVERY 8 HOURS
Qty: 30 TABLET | Refills: 0 | Status: SHIPPED | OUTPATIENT
Start: 2024-08-17

## 2024-08-17 RX ORDER — METHOCARBAMOL 500 MG/1
500 TABLET, FILM COATED ORAL EVERY 8 HOURS
Qty: 30 TABLET | Refills: 0 | Status: SHIPPED | OUTPATIENT
Start: 2024-08-17 | End: 2024-08-17

## 2024-08-17 RX ADMIN — HYDROMORPHONE HYDROCHLORIDE 0.5 MG: 0.2 INJECTION, SOLUTION INTRAMUSCULAR; INTRAVENOUS; SUBCUTANEOUS at 07:27

## 2024-08-17 RX ADMIN — METHOCARBAMOL 500 MG: 500 TABLET ORAL at 09:28

## 2024-08-17 RX ADMIN — ACETAMINOPHEN 325 MG: 325 TABLET ORAL at 15:48

## 2024-08-17 RX ADMIN — ACETAMINOPHEN 325 MG: 325 TABLET ORAL at 11:46

## 2024-08-17 RX ADMIN — CEFAZOLIN SODIUM 2 G: 2 INJECTION, SOLUTION INTRAVENOUS at 01:37

## 2024-08-17 RX ADMIN — HYDROMORPHONE HYDROCHLORIDE 0.5 MG: 0.2 INJECTION, SOLUTION INTRAMUSCULAR; INTRAVENOUS; SUBCUTANEOUS at 02:31

## 2024-08-17 RX ADMIN — CARVEDILOL 25 MG: 25 TABLET, FILM COATED ORAL at 09:13

## 2024-08-17 RX ADMIN — ASPIRIN 81 MG: 81 TABLET ORAL at 09:12

## 2024-08-17 RX ADMIN — HEPARIN SODIUM 5000 UNITS: 5000 INJECTION, SOLUTION INTRAVENOUS; SUBCUTANEOUS at 05:39

## 2024-08-17 RX ADMIN — LISINOPRIL 20 MG: 20 TABLET ORAL at 09:12

## 2024-08-17 RX ADMIN — METHOCARBAMOL 500 MG: 100 INJECTION, SOLUTION INTRAMUSCULAR; INTRAVENOUS at 01:46

## 2024-08-17 ASSESSMENT — ACTIVITIES OF DAILY LIVING (ADL)
ADLS_ACUITY_SCORE: 23
ADLS_ACUITY_SCORE: 23
ADLS_ACUITY_SCORE: 31
ADLS_ACUITY_SCORE: 23
ADLS_ACUITY_SCORE: 23
ADLS_ACUITY_SCORE: 31
ADLS_ACUITY_SCORE: 23
ADLS_ACUITY_SCORE: 23
ADLS_ACUITY_SCORE: 31
ADLS_ACUITY_SCORE: 23
ADLS_ACUITY_SCORE: 31

## 2024-08-17 NOTE — PLAN OF CARE
Goal Outcome Evaluation:      Plan of Care Reviewed With: patient    Overall Patient Progress: improvingOverall Patient Progress: improving    Outcome Evaluation: Groin site stable, pulses palpable. Pain adequately managed with dilaudid. Continue with POC.    Neuro: A&Ox4. Pedal pulses palpable +2  Cardiac: SR. VSS.   Respiratory: Sat >91% on RA  GI/: Jorgensen catheter in place - notified provider of potentially low urine output. Provider responded - urine output adequate with no new orders. No BM during shift - last BM before procedure per patient.   Diet/appetite: Tolerating clear liquid diet.   Activity: Bedrest overnight per orders.    Pain: Managing pain adequately - dilaudid PRN effective per patient, oxycodone not as effective. Not left on chart regarding possible change in oral pain meds.   Skin: L & R groin sites. L side ecchymotic - no bruit in either side.   LDA's: 2 L PIV - SL and TKO. 1 R PIV - SL.

## 2024-08-17 NOTE — DISCHARGE SUMMARY
Physician Discharge Summary     Patient ID:  Bucky Edgar  [id number]  68 year old  1955    Admit date: 8/16/2024    Discharge date and time: 8/17/2024     Admitting Physician: Aman Valerio MD     Discharge Physician: Aman Valerio MD    Admission Diagnoses: Infrarenal abdominal aortic aneurysm (AAA) without rupture (H24) [I71.43]    Discharge Diagnoses: Same    Admission Condition: good    Discharged Condition: good    Indication for Admission: Admitted through day surgery for surgical procedure.     Hospital Course: The patient underwent EVAR for an infrarenal AAA on 8/16/24, tolerated the procedure well. Was admitted to intermediate care post-op. On POD 1, his pain was well-controlled with minimal use of PRN narcotics. Jorgensen removed on POD 1, patient voiding spontaneously after Jorgensen removal although complaining of minor urethral soreness with pink-tinged urine, likely from Jorgensen trauma. Post-void residual checked, was <100 ml after both voids. Patient ambulating well without difficulty, groins remained soft. Tolerating regular diet and passing gas. 2+ DP pulses bilaterally. Stated that he was comfortable going home and wished to go today if possible.     Consults: None    Significant Diagnostic Studies: None    Treatments: IV hydration, analgesia: acetaminophen, Dilaudid, and oxycodone, anticoagulation: ASA, and surgery: EVAR    Discharge Exam:  Gen: NAD  CV: RRR  Resp: NLB on RA  Abdomen: soft, ND, NT, non-peritonitic  Extremities: 2+ DP BL. Feet WWP.   Incisions: BL groins soft, access sites with dermabond in place, CDI, no SOI.     Disposition: home    Patient Instructions:   Endovascular Abdominal Aortic Aneurysm Repair   Post-Procedure Instructions     Incision Care   You will have an incision in one or both groins with Dermabond surgical glue on them; do not peel this off. It will come off on its own in a few weeks once the underlying incision has healed.    You may shower 2 days  after your surgery - pat the incision dry to prevent irritation from moisture. You do not have to cover the incisions in the shower, as the Dermabond is water-proof.   You may develop bruising and firmness near your incision.  This will soften and resolve over the next 1-3 weeks.  Call our office if it enlarges, becomes red, or painful.   On occasion a soft, fluid-filled bulge can develop near a surgical incision - this is called a seroma.  It will likely resolve on its own, but occasionally requires your doctor to drain it in clinic.  You should call our office if you have questions about this.   You may notice numbness around your incision.  This is due to nerve irritation from the surgery and will gradually improve over the next several weeks.     Activity   Walking is important after surgery.  You will begin walking before you leave the hospital, and then you should gradually increase your distance as tolerated.  You should be taking at least 3-4 short walks a day.   You may climb stairs when you feel strong enough.   You should not drive for 1-2 weeks or while taking prescription strength pain medication.   You must feel strong enough to drive safely.   You may return to work once you feel ready - this can be decided at your 2 week post-operative visit.   You should avoid strenuous activity, including running, biking, or weight-lifting until discussed at your post-operative appointment.     Diet   You may resume a regular diet before you leave the hospital.  You may find that it is best to try smaller, more frequent meals until your appetite returns.     Medications   Resume all previous medications as prior to surgery, unless otherwise instructed.   See instructions below for which new medications you will need to fill at the pharmacy post-op.     Special Instructions   Always carry your stent graft card with you; there may be restrictions pertaining to MRI testing for one month from the date of your  procedure.   If you will be having an invasive procedure, such as a colonoscopy or dental work, within one year of your surgery, you will need to take an antibiotic prior to the procedure.  Please call us so we can assist you with this.     Follow-Up Appointments   You will need to see your surgeon 10-14 days after your procedure. The clinic will be in touch with you with a specific time and date.   You will also see your surgeon 1 month after your procedure and annually thereafter.  We will arrange for you to have a CT angiogram prior to these appointments and will notify you by mail when you are due to schedule.     When to Call our Office   Temperature greater than 101?F.   Concerns regarding your incision.  If you notice new onset of numbness, tingling, coolness or pain in your legs.   Severe pain, especially if it is new and preventing sleep.   Persistent problems with urination.       Review of your medicines        START taking        Dose / Directions   acetaminophen 325 MG tablet  Commonly known as: TYLENOL  Used for: Infrarenal abdominal aortic aneurysm (AAA) without rupture (H24)      Dose: 325 mg  Take 1 tablet (325 mg) by mouth every 6 hours as needed for mild pain or fever  Refills: 0     methocarbamol 500 MG tablet  Commonly known as: ROBAXIN  Used for: Infrarenal abdominal aortic aneurysm (AAA) without rupture (H24)      Dose: 500 mg  Take 1 tablet (500 mg) by mouth every 8 hours for 10 days  Quantity: 30 tablet  Refills: 0     oxyCODONE 5 MG tablet  Commonly known as: ROXICODONE  Used for: Infrarenal abdominal aortic aneurysm (AAA) without rupture (H24)      Dose: 5 mg  Take 1 tablet (5 mg) by mouth every 6 hours as needed for breakthrough pain  Quantity: 8 tablet  Refills: 0            CONTINUE these medicines which have NOT CHANGED        Dose / Directions   allopurinol 100 MG tablet  Commonly known as: ZYLOPRIM  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      Dose: 200  mg  Take 2 tablets (200 mg) by mouth daily  Quantity: 180 tablet  Refills: 2     aspirin 81 MG EC tablet  Commonly known as: ASA      Dose: 81 mg  Take 1 tablet (81 mg) by mouth daily  Refills: 0     atorvastatin 40 MG tablet  Commonly known as: LIPITOR  Used for: Hyperlipidemia LDL goal <130      Dose: 40 mg  Take 1 tablet (40 mg) by mouth daily  Quantity: 90 tablet  Refills: 11     carvedilol 25 MG tablet  Commonly known as: COREG  Used for: HTN, goal below 140/90      Dose: 25 mg  Take 1 tablet (25 mg) by mouth 2 times daily (with meals)  Quantity: 180 tablet  Refills: 3     furosemide 20 MG tablet  Commonly known as: LASIX  Used for: Pulmonary hypertension (H), Acute on chronic congestive heart failure, unspecified heart failure type (H)      Dose: 20 mg  Take 1 tablet (20 mg) by mouth every other day  Quantity: 45 tablet  Refills: 3     lisinopril 20 MG tablet  Commonly known as: ZESTRIL  Used for: HTN, goal below 140/90      Dose: 20 mg  Take 1 tablet (20 mg) by mouth daily  Quantity: 90 tablet  Refills: 3     nicotine 4 MG lozenge  Commonly known as: NICORETTE      Dose: 4 mg  Place 4 mg inside cheek every hour as needed  Refills: 0     Trelegy Ellipta 200-62.5-25 MCG/ACT oral inhaler  Used for: Chronic obstructive pulmonary disease, unspecified COPD type (H)  Generic drug: Fluticasone-Umeclidin-Vilanterol      Dose: 1 puff  Inhale 1 puff into the lungs daily  Quantity: 90 each  Refills: 4               Where to get your medicines        Some of these will need a paper prescription and others can be bought over the counter. Ask your nurse if you have questions.    Bring a paper prescription for each of these medications  methocarbamol 500 MG tablet  oxyCODONE 5 MG tablet  You don't need a prescription for these medications  acetaminophen 325 MG tablet       Activity: activity as tolerated  Diet: regular diet  Wound Care: keep wound clean and dry    Follow up in vascular NP clinic with Drea Aguilar NP in 2  weeks.  Follow-up with Dr. Valerio's vascular surgery clinic in 4 weeks with repeat CTA chest/abdomen/pelvis.      Vandana Gaona MD  PGY-6  Vascular Surgery  Pager: #[phone]   8/17/2024  2:23 PM

## 2024-08-17 NOTE — PLAN OF CARE
"DISCHARGE                         No discharge date for patient encounter.  ----------------------------------------------------------------------------  Discharged to: Home  Via: private transportation  Accompanied by: Family  Discharge Instructions: *diet, *activity, medications, follow up appointments, when to call the MD, aftercare instructions.  Prescriptions: To be filled by Discharge pharmacy; medication list reviewed & sent with pt  Follow Up Appointments: arranged; information given  Belongings: All sent with pt  IV: d/c'd  Telemetry: d/c'd  Pt exhibits understanding of above discharge instructions; all questions answered.    Discharge Paperwork: Signed, copied, and sent home with patient.      /59   Pulse 54   Temp 98.3  F (36.8  C) (Oral)   Resp 20   Ht 1.765 m (5' 9.5\")   Wt 94.2 kg (207 lb 10.8 oz)   SpO2 97%   BMI 30.23 kg/m     "

## 2024-08-17 NOTE — DISCHARGE INSTRUCTIONS
Endovascular Abdominal Aortic Aneurysm Repair   Post-Procedure Instructions     Incision Care   You will have an incision in one or both groins with Dermabond surgical glue on them; do not peel this off. It will come off on its own in a few weeks once the underlying incision has healed.    You may shower 2 days after your surgery - pat the incision dry to prevent irritation from moisture. You do not have to cover the incisions in the shower, as the Dermabond is water-proof.   You may develop bruising and firmness near your incision.  This will soften and resolve over the next 1-3 weeks.  Call our office if it enlarges, becomes red, or painful.   On occasion a soft, fluid-filled bulge can develop near a surgical incision - this is called a seroma.  It will likely resolve on its own, but occasionally requires your doctor to drain it in clinic.  You should call our office if you have questions about this.   You may notice numbness around your incision.  This is due to nerve irritation from the surgery and will gradually improve over the next several weeks.     Activity   Walking is important after surgery.  You will begin walking before you leave the hospital, and then you should gradually increase your distance as tolerated.  You should be taking at least 3-4 short walks a day.   You may climb stairs when you feel strong enough.   You should not drive for 1-2 weeks or while taking prescription strength pain medication.   You must feel strong enough to drive safely.   You may return to work once you feel ready - this can be decided at your 2 week post-operative visit.   You should avoid strenuous activity, including running, biking, or weight-lifting until discussed at your post-operative appointment.     Diet   You may resume a regular diet before you leave the hospital.  You may find that it is best to try smaller, more frequent meals until your appetite returns.     Medications   Resume all previous medications as  prior to surgery, unless otherwise instructed.   See further information regarding medications containing Metformin    Special Instructions   Always carry your stent graft card with you; there may be restrictions pertaining to MRI testing for one month from the date of your procedure.   If you will be having an invasive procedure, such as a colonoscopy or dental work, within one year of your surgery, you will need to take an antibiotic prior to the procedure.  Please call us so we can assist you with this.     Follow-Up Appointments   You will need to see your surgeon 10-14 days after your procedure. The clinic will be in touch with you with a specific time and date.   You will also see your surgeon 1 month after your procedure and annually thereafter.  We will arrange for you to have a CT angiogram prior to these appointments and will notify you by mail when you are due to schedule.     When to Call our Office   Temperature greater than 101?F.   Concerns regarding your incision.  If you notice new onset of numbness, tingling, coolness or pain in your legs.   Severe pain, especially if it is new and preventing sleep.   Persistent problems with urination.

## 2024-08-17 NOTE — PLAN OF CARE
Hours of care: 7515-5780    Neuro: A&Ox4. Endorses numbness/tingling in BLE, R worse than L.  Cardiac: SR-SB, HR 50-70s. VSS.   Respiratory: Sating >95% on RA.  GI/: Oliguric via urinal. Jorgensen out in AM. Some pain with urination. No BM this shift.  Diet/appetite: Tolerating regular diet. Eating well.  Activity:  Assist of 1, up to chair and in halls with therapy.   Pain: C/o constant mid-back pain. At acceptable level on current regimen. Pain improved with activity and repositioning up in chair.  Skin: No new deficits noted. Groin sites unchanged this shift - soft, tender, minimal ecchymosis.  LDA's: R PIV (SL)    Plan: Continue with POC. Notify primary team with changes. Anticipate discharge this afternoon.     Goal Outcome Evaluation:      Plan of Care Reviewed With: patient, child    Overall Patient Progress: improvingOverall Patient Progress: improving    Outcome Evaluation: Groin site stable, pulses palpable. Pain adequately manated with tylenol and robaxin. Jorgensen out. Cleared by therapy.

## 2024-08-17 NOTE — PROVIDER NOTIFICATION
Time of notification: 2:02 PM  Provider notified: Paged [phone]  Patient status: Pt cleared by therapy. Up walking well. UOP 75mL total since Jorgensen out at 9AM. Pink tinged, some pain with urination. Tolerating regular diet. Pain controlled.     Orders received: Discharge orders.

## 2024-08-17 NOTE — PROGRESS NOTES
"Vascular Surgery Progress Note  Hospital Day #1    Surgeries:  8/17: EVAR    Interval: NAEON. AFVSS, satting well on RA. UOP 50 ml/hr. Tolerating CLD and passing flatus. Pain w/c, states he feels well. Pulling 1000 ml on IS.     O:  /81 (BP Location: Right arm)   Pulse 66   Temp 98.1  F (36.7  C) (Oral)   Resp 16   Ht 1.765 m (5' 9.5\")   Wt 94.2 kg (207 lb 10.8 oz)   SpO2 95%   BMI 30.23 kg/m     Gen: NAD  CV: RRR  Resp: NLB on RA  Abdomen: soft, ND, NT, non-peritonitic  Extremities: 2+ DP BL. Feet WWP.   Incisions: BL groins soft, access sites with dermabond in place, CDI, no SOI.    I/O:  Intake/Output Summary (Last 24 hours) at 8/17/2024 0899  Last data filed at 8/17/2024 0543  Gross per 24 hour   Intake 3385 ml   Output 1220 ml   Net 2165 ml     Imaging: All imaging for the last 24 hrs reviewed.     Microbiology: NA    Pathology: NA    Assessment/Plan: 68 year old WM with infrarenal AAA, POD 1 s/p EVAR. Progressing well post-op.    - Jorgensen and IVF discontinued.   - Advance to RD.  - Ambulate, OOBTC  - Continue pulmonary toilet.    Dispo: If voiding well without Jorgensen, tolerating regular diet, and ambulating well w/ groins remaining soft, potential discharge home this afternoon.     Staff: Dr. Teodoro Gaona MD  PGY-6  Vascular Surgery  Pager: #[phone]  "

## 2024-08-17 NOTE — PROGRESS NOTES
Physical Therapy: Orders received. Chart reviewed and discussed with care team.? Physical Therapy not indicated due to patient is mobilizing well, no LOB.? Defer discharge recommendations to Occupational Therapy.? Will complete orders.

## 2024-08-17 NOTE — PROGRESS NOTES
08/17/24 1200   Appointment Info   Signing Clinician's Name / Credentials (OT) Coy Rousseau, OTR/L   Living Environment   People in Home alone  (daughter staying with him for a few days.)   Current Living Arrangements house   Home Accessibility stairs to enter home   Number of Stairs, Main Entrance 2   Stair Railings, Main Entrance none   Transportation Anticipated car, drives self;family or friend will provide   Living Environment Comments Pt has a tub/shower.   Self-Care   Usual Activity Tolerance good   Current Activity Tolerance good   Regular Exercise No   Equipment Currently Used at Home cane, straight  (as needed, depending on back pain.)   Fall history within last six months no   General Information   Onset of Illness/Injury or Date of Surgery 08/16/24   Referring Physician Gurjit Gaona MD   Patient/Family Therapy Goal Statement (OT) Pt would like to return  home independently.   Additional Occupational Profile Info/Pertinent History of Current Problem 68 year old WM with infrarenal AAA, POD 1 s/p EVAR. Progressing well post-op.   Existing Precautions/Restrictions fall;cardiac   Left Upper Extremity (Weight-bearing Status)   (10lbs)   Right Upper Extremity (Weight-bearing Status)   (10lbs)   Left Lower Extremity (Weight-bearing Status) full weight-bearing (FWB)   Right Lower Extremity (Weight-bearing Status) full weight-bearing (FWB)   Cognitive Status Examination   Orientation Status orientation to person, place and time   Visual Perception   Visual Impairment/Limitations WFL   Sensory   Sensory Quick Adds sensation intact   Sensory Comments Pt has some numbness/tingling in BLE's.   Pain Assessment   Patient Currently in Pain No   Range of Motion Comprehensive   Comment, General Range of Motion BUE AROM WFL.   Strength Comprehensive (MMT)   Comment, General Manual Muscle Testing (MMT) Assessment Pt presents with generalized post of weakness/deconditioning.   Bed Mobility   Comment (Bed Mobility) SBA    Transfers   Transfer Comments CGA   Activities of Daily Living   BADL Assessment/Intervention lower body dressing;toileting;bathing   Bathing Assessment/Intervention   Hodgeman Level (Bathing) set up;verbal cues   Comment, (Bathing) Per clinical judgement.   Lower Body Dressing Assessment/Training   Comment, (Lower Body Dressing) Per clinical judgement.   Hodgeman Level (Lower Body Dressing) modified independence;set up;verbal cues   Toileting   Hodgeman Level (Toileting) set up;verbal cues   Clinical Impression   Criteria for Skilled Therapeutic Interventions Met (OT) Yes, treatment indicated   OT Diagnosis Decreased tolerance for ADLS/transfers.   OT Problem List-Impairments impacting ADL problems related to;activity tolerance impaired;post-surgical precautions;strength   Assessment of Occupational Performance 1-3 Performance Deficits   Identified Performance Deficits Decreased tolerance for ADLS/transfers.   Planned Therapy Interventions (OT) ROM;strengthening;stretching;transfer training;home program guidelines;progressive activity/exercise;risk factor education   Clinical Decision Making Complexity (OT) problem focused assessment/low complexity   Risk & Benefits of therapy have been explained evaluation/treatment results reviewed;care plan/treatment goals reviewed;risks/benefits reviewed;patient;daughter   OT Total Evaluation Time   OT Eval, Low Complexity Minutes (68524) 10   OT Goals   Therapy Frequency (OT) Daily   OT Predicted Duration/Target Date for Goal Attainment 08/24/24   OT Discharge Planning   OT Plan OT: Ambulation, Functional endurance, Precautions.   OT Discharge Recommendation (DC Rec) home with assist;home with outpatient cardiac rehab   OT Rationale for DC Rec Pt is near baseline function. Pt will be able to return home with A and OP CR if qualifies.   OT Brief overview of current status CGA and vc's.   Total Session Time   Total Session Time (sum of timed and untimed services)  10

## 2024-08-18 NOTE — PLAN OF CARE
Occupational Therapy Discharge Summary    Reason for therapy discharge:    Discharged to home.    Progress towards therapy goal(s). See goals on Care Plan in [facility] electronic health record for goal details.  Goals partially met.  Barriers to achieving goals:   discharge from facility.    Therapy recommendation(s):    Continued therapy is recommended.  Rationale/Recommendations:  Home with A and OP CR as appropriate.

## 2024-08-20 ENCOUNTER — TELEPHONE (OUTPATIENT)
Dept: VASCULAR SURGERY | Facility: CLINIC | Age: 69
End: 2024-08-20
Payer: COMMERCIAL

## 2024-08-20 NOTE — TELEPHONE ENCOUNTER
Attempted to contact Ismael for post-op follow-up. He does not have a voicemail set up. Will make another attempt tomorrow and then send MyC message if no response.    NEVA Tomlinson, RN  RN Care Coordinator  [facility] Vascular Surgery - [facility] phone: [phone]  Fax: [phone]

## 2024-08-21 NOTE — TELEPHONE ENCOUNTER
"Called and spoke with Ismael arce/rashida DUNNE 8/16. He reports he is doing \"great\". He states he has minimal pain - just some mild discomfort around groin access sites. He states he is taking oxycodone - encouraged him to taper off of this since he has minimal pain and switch to PRN tylenol. He will do this today. Groin sites CDI, no drainage or sx infection. He is ambulating w/o difficulty and feels he is almost back to baseline activity. No issues with appetite etc. He confirmed he is taking his atorvastatin and baby aspirin. Confirmed follow-up appts with him and encouraged him to contact us if any concerns arise prior to follow-up.    NEVA Tomlinson, RN  RN Care Coordinator  [facility] Vascular Surgery - [facility] phone: [phone]  Fax: [phone]    "

## 2024-08-24 NOTE — PROGRESS NOTES
Vascular & Endovascular Surgery Clinic Return Visit   Vascular Surgeon: Aman Valerio MD, RPVI      Location:  Virtual Visit Details:    Type of service:  telephone     Length of call: 15 minutes    Originating Location (Pt. Location): Home     Distant Location (Provider location):  [facility]     Platform used for visit:  Asim     Received verbal consent for virtual visit.       Bucky Edgar  Medical Record #:  [id number]  YOB: 1955  Age:  68 year old     Date of Service: 7/15/2024    Primary Care Provider: Hamlet Roberts    Chief Complaint/Reason for Visit: Follow up of his asymptomatic abdominal aortic aneurysm    Interval History:  Mr. Edgar is a pleasant 68 year old male who returns today with his wife by phone for surgical discussion now that he has completed pre anesthesia risk stratification.  His breathing is a bit better with changes in inhalers by Dr Valdovinos. He has no abdominal or back pain.       Review of Systems:    A 12 point ROS was reviewed and is negative except for symptoms noted in HPI.     Medical/Surgical History:    Past Medical History:   Diagnosis Date    COPD (chronic obstructive pulmonary disease) (H)     Displacement of lumbar intervertebral disc without myelopathy 1995, 2002    HTN, goal below 140/90          Past Surgical History:   Procedure Laterality Date    ENDOVASCULAR REPAIR ANEURYSM AORTOILIAC Bilateral 8/16/2024    Procedure: Endovascular Infrarenal Abdominal Aneurysm Repair with 32x14x 103mm ENDURANT IIs Endograft with bilateral docking limbs ( Left Common Iliac with ENDURANT II Limb [id number] x 20mm x 93mm, Right Common Iliac  with ENDURANT II Limb Graft size 16mm x 16mm x 82mm). Bilateral ultrasound guided percutaneous access. Aortogram, bilateral selective iliac angiograms, [facility] CT with rotational DS    HC REPAIR ROTATOR CUFF,CHRONIC  1989    IR LOWER EXTREMITY VENOGRAM LEFT  5/4/2021    IR OR  ANGIOGRAM  2024    IR VENOUS STENT  2021    ZZC DECOMPRESS SPINAL CORD,1 SEG  , 2002    leftL4-L5, 2nd right L4-L5    ZZC SPINE FUSION,ANTER,3 SGMTS  2004    ant and post fusion L4-S1        Allergies:     Allergies   Allergen Reactions    Amlodipine Headache     Tremor     Chlorthalidone      Hypotension         Medications:    Current Outpatient Medications   Medication Sig Dispense Refill    acetaminophen (TYLENOL) 325 MG tablet Take 1 tablet (325 mg) by mouth every 6 hours as needed for mild pain or fever      allopurinol (ZYLOPRIM) 100 MG tablet Take 2 tablets (200 mg) by mouth daily 180 tablet 2    aspirin (ASA) 81 MG EC tablet Take 1 tablet (81 mg) by mouth daily      atorvastatin (LIPITOR) 40 MG tablet Take 1 tablet (40 mg) by mouth daily 90 tablet 11    carvedilol (COREG) 25 MG tablet Take 1 tablet (25 mg) by mouth 2 times daily (with meals) 180 tablet 3    Fluticasone-Umeclidin-Vilanterol (TRELEGY ELLIPTA) 200-62.5-25 MCG/ACT oral inhaler Inhale 1 puff into the lungs daily 90 each 4    furosemide (LASIX) 20 MG tablet Take 1 tablet (20 mg) by mouth every other day 45 tablet 3    lisinopril (ZESTRIL) 20 MG tablet Take 1 tablet (20 mg) by mouth daily 90 tablet 3    methocarbamol (ROBAXIN) 500 MG tablet Take 1 tablet (500 mg) by mouth every 8 hours 30 tablet 0    nicotine (NICORETTE) 4 MG lozenge Place 4 mg inside cheek every hour as needed      oxyCODONE (ROXICODONE) 5 MG tablet Take 1 tablet (5 mg) by mouth every 6 hours as needed for breakthrough pain 8 tablet 0     No current facility-administered medications for this visit.        Social Habits:    Tobacco Use      Smoking status: Former        Packs/day: 0.00        Years: 1.3 packs/day for 41.0 years (51.3 ttl pk-yrs)        Types: Cigarettes        Start date: 10/1/1976        Quit date: 10/1/2017        Years since quittin.9        Passive exposure: Never (per pt)      Smokeless tobacco: Never       Alcohol Use: Not on file      "  History   Drug Use    Types: Marijuana     Comment: smoke PRN for back pain management        Family History:    Family History   Problem Relation Age of Onset    Family History Negative Other     Diabetes No family hx of     Coronary Artery Disease No family hx of     Hypertension No family hx of     Hyperlipidemia No family hx of     Breast Cancer No family hx of     Prostate Cancer No family hx of     Anxiety Disorder No family hx of     Substance Abuse No family hx of     Asthma No family hx of     Thyroid Disease No family hx of     Unknown/Adopted No family hx of     Genetic Disorder No family hx of     Osteoporosis No family hx of     Anesthesia Reaction No family hx of     Mental Illness No family hx of     Depression No family hx of     Other Cancer No family hx of     Colon Cancer No family hx of     Cerebrovascular Disease No family hx of     Obesity No family hx of        Physical Examination:  Ht 1.753 m (5' 9\")   Wt 90.7 kg (200 lb)   BMI 29.53 kg/m    Wt Readings from Last 1 Encounters:   08/17/24 94.2 kg (207 lb 10.8 oz)     Body mass index is 29.53 kg/m .    Exam:  No exam as this was a telephone visit    Laboratory Findings:  Hemoglobin   Date Value Ref Range Status   08/17/2024 13.7 13.3 - 17.7 g/dL Final   08/16/2024 14.4 13.3 - 17.7 g/dL Final   05/24/2021 16.0 13.3 - 17.7 g/dL Final   05/04/2021 17.0 13.3 - 17.7 g/dL Final     WBC   Date Value Ref Range Status   05/24/2021 8.8 4.0 - 11.0 10e9/L Final   05/04/2021 9.5 4.0 - 11.0 10e9/L Final     WBC Count   Date Value Ref Range Status   08/17/2024 12.1 (H) 4.0 - 11.0 10e3/uL Final   08/16/2024 9.1 4.0 - 11.0 10e3/uL Final     Platelet Count   Date Value Ref Range Status   08/17/2024 180 150 - 450 10e3/uL Final   08/16/2024 182 150 - 450 10e3/uL Final   05/24/2021 217 150 - 450 10e9/L Final   05/04/2021 220 150 - 450 10e9/L Final     Creatinine   Date Value Ref Range Status   08/17/2024 1.10 0.67 - 1.17 mg/dL Final   05/24/2021 0.95 0.66 - " 1.25 mg/dL Final     Sodium   Date Value Ref Range Status   08/17/2024 135 135 - 145 mmol/L Final   05/24/2021 140 133 - 144 mmol/L Final     Glucose   Date Value Ref Range Status   08/17/2024 112 (H) 70 - 99 mg/dL Final   08/16/2024 108 (H) 70 - 99 mg/dL Final   05/31/2022 95 70 - 99 mg/dL Final   05/24/2021 112 (H) 70 - 99 mg/dL Final   05/04/2021 100 (H) 70 - 99 mg/dL Final     Hemoglobin A1C   Date Value Ref Range Status   05/08/2019 5.3 0 - 5.6 % Final     Comment:     Normal <5.7% Prediabetes 5.7-6.4%  Diabetes 6.5% or higher - adopted from ADA   consensus guidelines.     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 Edgar is a pleasant 68 year old male seen for presurgical follow up of his abdominal aortic aneurysm.  We discussed open and endovascular repair.  Given his pulmonary and functional status he would not be a good open candidate despite his young age and status of the aortic neck.  We discussed endovascular repair and limitations including need for re intervention, however I do think this is best with his physiologic state.    Risks, benefits, and alternatives including but not limited to the risk of death, anesthetic or cardiopulmonary complications, bleeding, infection, myocardial infarction, stroke, renal insufficiency requiring temporary or permanent dialysis, bowel infarction necessitating bowel resection and colostomy, vessel injury, dissection, distal embolization, perforation, worsening ischemia with either compartment syndrome or limb loss and spinal cord injury.  Discussed possibility of interval aortic events including rupture.  Discussed the  potential need for bank blood products, advanced directives, and the need for a team approach as well as the potential for medical photography. The patient and his wife understand the risks and would like to proceed with EVAR.   .    Discussed warning signs including, but not limited to, symptomatic aneurysms, aortic rupture, new abdominal or back pain, hypotension, or syncope/lightheadedness.  If he experiences any of these, he has been advised to seek treatment and contact my team.    Mr. Edgar and his wife are in agreement with this plan as outlined.    Recommendations/Plan:  We will schedule him for endovascular aortic repair    Aman Valerio MD  Vascular & Endovascular Surgery

## 2024-08-28 ENCOUNTER — PATIENT OUTREACH (OUTPATIENT)
Dept: CARE COORDINATION | Facility: CLINIC | Age: 69
End: 2024-08-28
Payer: COMMERCIAL

## 2024-09-10 ENCOUNTER — OFFICE VISIT (OUTPATIENT)
Dept: VASCULAR SURGERY | Facility: CLINIC | Age: 69
End: 2024-09-10
Payer: COMMERCIAL

## 2024-09-10 VITALS — DIASTOLIC BLOOD PRESSURE: 71 MMHG | OXYGEN SATURATION: 96 % | SYSTOLIC BLOOD PRESSURE: 113 MMHG | HEART RATE: 62 BPM

## 2024-09-10 DIAGNOSIS — Z48.89 ENCOUNTER FOR POSTOPERATIVE WOUND CHECK: ICD-10-CM

## 2024-09-10 DIAGNOSIS — Z86.79 STATUS POST ENDOVASCULAR ANEURYSM REPAIR (EVAR): ICD-10-CM

## 2024-09-10 DIAGNOSIS — Z98.890 POSTOPERATIVE STATE: Primary | ICD-10-CM

## 2024-09-10 DIAGNOSIS — Z98.890 STATUS POST ENDOVASCULAR ANEURYSM REPAIR (EVAR): ICD-10-CM

## 2024-09-10 PROCEDURE — 99024 POSTOP FOLLOW-UP VISIT: CPT | Performed by: NURSE PRACTITIONER

## 2024-09-10 ASSESSMENT — PAIN SCALES - GENERAL: PAINLEVEL: NO PAIN (0)

## 2024-09-10 NOTE — PROGRESS NOTES
VASCULAR SURGERY PROGRESS NOTE    LOCATION: [facility], [place]     Bucky Edgar  Medical Record #:  [id number]  YOB: 1955  Age:  68 year old     Date of Service: 9/10/2024    PRIMARY CARE PROVIDER: Hamlet Roberts    Reason for visit: s/p EVAR for 6.1 cm infrarenal abdominal aortic aneurysm, post op check    IMPRESSION / RECOMMENDATION:   Bucky Edgar is a pleasant 69-year-old gentleman who on 8/16/2024 underwent EVAR with Dr. Renato Kline.  Presents to clinic for postop assessment and wound check. With healed groins, soft, no drainage, no hematoma, no swelling. Recovering well.    -Continue to monitor for new changes at surgical site, new abdominal or back pain. Patient was encouraged to call us with any concerns.  -Follow-up with me on a as needed basis  -Patient has scheduled follow-up with her vascular surgeon, Dr. Valerio on 9/30/2024 with repeat imaging.    Information/Education: patient able to teach back. Patient agreeable to plan of care: yes. All questions were answered and support provided. He has our contact information and knows to reach out with any additional questions or concerns.     Drea Aguilar, [facility]  Vascular Surgery  Pager: [phone]  [email or web address]  Send message or 10 digit call back number Securely via [employer] with the [employer] Web Console (learn more here)        HPI:  Bucky Edgar is a 68 year old male with past medical history significant for COPD, HTN, lung nodule for which is undergoing workup, former nicotine dependence, history of DVT with previous iliac vein stenting for May Thurner syndrome, asymptomatic 6.1 infrarenal AAA now s/p EVAR on 8/16/2024. Without new abdominal or back pain, numbness and tingling in bilateral lower extremities at baseline due to chronic back pain. On aspirin and statin, without chest discomfort.  No other concerns.    REVIEW OF SYSTEMS:    A 12 point ROS was reviewed and is negative except for what is  listed above in HPI.    PHH:    Past Medical History:   Diagnosis Date    COPD (chronic obstructive pulmonary disease) (H)     Displacement of lumbar intervertebral disc without myelopathy 1995, 2002    HTN, goal below 140/90           Past Surgical History:   Procedure Laterality Date    ENDOVASCULAR REPAIR ANEURYSM AORTOILIAC Bilateral 8/16/2024    Procedure: Endovascular Infrarenal Abdominal Aneurysm Repair with 32x14x 103mm ENDURANT IIs Endograft with bilateral docking limbs ( Left Common Iliac with ENDURANT II Limb [id number] x 20mm x 93mm, Right Common Iliac  with ENDURANT II Limb Graft size 16mm x 16mm x 82mm). Bilateral ultrasound guided percutaneous access. Aortogram, bilateral selective iliac angiograms, [facility] CT with rotational DS    HC REPAIR ROTATOR CUFF,CHRONIC  1989    IR LOWER EXTREMITY VENOGRAM LEFT  5/4/2021    IR OR ANGIOGRAM  8/16/2024    IR VENOUS STENT  5/4/2021    ZZC DECOMPRESS SPINAL CORD,1 SEG  1995, 4/2002    leftL4-L5, 2nd right L4-L5    ZZC SPINE FUSION,ANTER,3 SGMTS  2/9/2004    ant and post fusion L4-S1       ALLERGIES:  Amlodipine and Chlorthalidone    MEDS:    Current Outpatient Medications:     allopurinol (ZYLOPRIM) 100 MG tablet, Take 2 tablets (200 mg) by mouth daily, Disp: 180 tablet, Rfl: 2    aspirin (ASA) 81 MG EC tablet, Take 1 tablet (81 mg) by mouth daily, Disp: , Rfl:     atorvastatin (LIPITOR) 40 MG tablet, Take 1 tablet (40 mg) by mouth daily, Disp: 90 tablet, Rfl: 11    carvedilol (COREG) 25 MG tablet, Take 1 tablet (25 mg) by mouth 2 times daily (with meals), Disp: 180 tablet, Rfl: 3    Fluticasone-Umeclidin-Vilanterol (TRELEGY ELLIPTA) 200-62.5-25 MCG/ACT oral inhaler, Inhale 1 puff into the lungs daily, Disp: 90 each, Rfl: 4    furosemide (LASIX) 20 MG tablet, Take 1 tablet (20 mg) by mouth every other day, Disp: 45 tablet, Rfl: 3    lisinopril (ZESTRIL) 20 MG tablet, Take 1 tablet (20 mg) by mouth daily, Disp: 90 tablet, Rfl: 3    methocarbamol (ROBAXIN) 500 MG  tablet, Take 1 tablet (500 mg) by mouth every 8 hours, Disp: 30 tablet, Rfl: 0    nicotine (NICORETTE) 4 MG lozenge, Place 4 mg inside cheek every hour as needed, Disp: , Rfl:     oxyCODONE (ROXICODONE) 5 MG tablet, Take 1 tablet (5 mg) by mouth every 6 hours as needed for breakthrough pain, Disp: 8 tablet, Rfl: 0    SOCIAL HABITS:    History   Smoking Status    Former    Types: Cigarettes   Smokeless Tobacco    Never     Social History    Substance and Sexual Activity      Alcohol use: Yes        Comment: 12 pack of beer every 2 weeks      History   Drug Use    Types: Marijuana     Comment: smoke PRN for back pain management       FAMILY HISTORY:    Family History   Problem Relation Age of Onset    Family History Negative Other     Diabetes No family hx of     Coronary Artery Disease No family hx of     Hypertension No family hx of     Hyperlipidemia No family hx of     Breast Cancer No family hx of     Prostate Cancer No family hx of     Anxiety Disorder No family hx of     Substance Abuse No family hx of     Asthma No family hx of     Thyroid Disease No family hx of     Unknown/Adopted No family hx of     Genetic Disorder No family hx of     Osteoporosis No family hx of     Anesthesia Reaction No family hx of     Mental Illness No family hx of     Depression No family hx of     Other Cancer No family hx of     Colon Cancer No family hx of     Cerebrovascular Disease No family hx of     Obesity No family hx of        PE:  /71 (BP Location: Left arm, Patient Position: Chair, Cuff Size: Adult Regular)   Pulse 62   SpO2 96%   Wt Readings from Last 1 Encounters:   08/17/24 94.2 kg (207 lb 10.8 oz)     There is no height or weight on file to calculate BMI.    EXAM:  General: No apparent distress. Answers questions appropriately.   Neurologic: Focally intact, Alert and oriented x 3.   Eyes: Grossly normal.   Cardiac: RRR per radial pulse  Pulmonary: Non labored breathing with normal respiratory  effort  Abdominal: Soft, non distended, non tender to palpation. No pulsatile mass.   Musculoskeletal/Extremity: Normal dorsiflexion and plantarflexion bilaterally, palpable DPs and femoral pulses bilaterally, warm, well-perfused. Strength 5/5 bilaterally, no edema.  LABS:      Sodium   Date Value Ref Range Status   08/17/2024 135 135 - 145 mmol/L Final   08/16/2024 138 135 - 145 mmol/L Final   08/16/2024 138 135 - 145 mmol/L Final   05/24/2021 140 133 - 144 mmol/L Final   05/04/2021 139 133 - 144 mmol/L Final   03/22/2021 139 133 - 144 mmol/L Final     Urea Nitrogen   Date Value Ref Range Status   08/17/2024 14.1 8.0 - 23.0 mg/dL Final   08/16/2024 10.7 8.0 - 23.0 mg/dL Final   08/16/2024 12.5 8.0 - 23.0 mg/dL Final   05/31/2022 14 7 - 30 mg/dL Final   05/24/2021 13 7 - 30 mg/dL Final   05/04/2021 16 7 - 30 mg/dL Final   03/22/2021 11 7 - 30 mg/dL Final     Hemoglobin   Date Value Ref Range Status   08/17/2024 13.7 13.3 - 17.7 g/dL Final   08/16/2024 14.4 13.3 - 17.7 g/dL Final   08/16/2024 14.8 13.3 - 17.7 g/dL Final   05/24/2021 16.0 13.3 - 17.7 g/dL Final   05/04/2021 17.0 13.3 - 17.7 g/dL Final   08/25/2020 15.4 13.3 - 17.7 g/dL Final     Platelet Count   Date Value Ref Range Status   08/17/2024 180 150 - 450 10e3/uL Final   08/16/2024 182 150 - 450 10e3/uL Final   08/16/2024 205 150 - 450 10e3/uL Final   05/24/2021 217 150 - 450 10e9/L Final   05/04/2021 220 150 - 450 10e9/L Final   08/25/2020 259 150 - 450 10e9/L Final     INR   Date Value Ref Range Status   05/04/2021 0.92 0.86 - 1.14 Final   04/07/2020 1.47 (H) 0.86 - 1.14 Final   04/06/2020 1.30 (H) 0.86 - 1.14 Final     Comment:     This test is intended for monitoring Coumadin therapy.  Results are not   accurate in patients with prolonged INR due to factor deficiency.       INR Point of Care   Date Value Ref Range Status   05/01/2020 2.4 (H) 0.86 - 1.14 Final     Comment:     This test is intended for monitoring Coumadin therapy.  Results are not    accurate in patients with prolonged INR due to factor deficiency.     04/17/2020 2.2 (H) 0.86 - 1.14 Final     Comment:     This test is intended for monitoring Coumadin therapy.  Results are not   accurate in patients with prolonged INR due to factor deficiency.     04/10/2020 2.0 (H) 0.86 - 1.14 Final     Comment:     This test is intended for monitoring Coumadin therapy.  Results are not   accurate in patients with prolonged INR due to factor deficiency.

## 2024-09-10 NOTE — LETTER
9/10/2024       RE: Bucky Edgar  [address] [place] [address]     Dear Colleague,    Thank you for referring your patient, Bucky Edgar, to the [facility] [place] at [facility]. Please see a copy of my visit note below.        VASCULAR SURGERY PROGRESS NOTE    LOCATION: [facility]     Bucky Edgar  Medical Record #:  [id number]  YOB: 1955  Age:  68 year old     Date of Service: 9/10/2024    PRIMARY CARE PROVIDER: Hamlet Roberts    Reason for visit: s/p EVAR for 6.1 cm infrarenal abdominal aortic aneurysm, post op check    IMPRESSION / RECOMMENDATION:   Bucky Edgar is a pleasant 69-year-old gentleman who on 8/16/2024 underwent EVAR with Dr. Renato Kline.  Presents to clinic for postop assessment and wound check. With healed groins, soft, no drainage, no hematoma, no swelling. Recovering well.    -Continue to monitor for new changes at surgical site, new abdominal or back pain. Patient was encouraged to call us with any concerns.  -Follow-up with me on a as needed basis  -Patient has scheduled follow-up with her vascular surgeon, Dr. Valerio on 9/30/2024 with repeat imaging.    Information/Education: patient able to teach back. Patient agreeable to plan of care: yes. All questions were answered and support provided. He has our contact information and knows to reach out with any additional questions or concerns.     Drea Aguilar, CNP  Vascular Surgery  Pager: [phone]  [email or web address]  Send message or 10 digit call back number Securely via [employer] with the Vocera Web Console (learn more here)        HPI:  Bucky Edgar is a 68 year old male with past medical history significant for COPD, HTN, lung nodule for which is undergoing workup, former nicotine dependence, history of DVT with previous iliac vein stenting for May Thurner syndrome, asymptomatic 6.1 infrarenal AAA now s/p  EVAR on 8/16/2024. Without new abdominal or back pain, numbness and tingling in bilateral lower extremities at baseline due to chronic back pain. On aspirin and statin, without chest discomfort.  No other concerns.    REVIEW OF SYSTEMS:    A 12 point ROS was reviewed and is negative except for what is listed above in HPI.    PHH:    Past Medical History:   Diagnosis Date     COPD (chronic obstructive pulmonary disease) (H)      Displacement of lumbar intervertebral disc without myelopathy 1995, 2002     HTN, goal below 140/90           Past Surgical History:   Procedure Laterality Date     ENDOVASCULAR REPAIR ANEURYSM AORTOILIAC Bilateral 8/16/2024    Procedure: Endovascular Infrarenal Abdominal Aneurysm Repair with 32x14x 103mm ENDURANT IIs Endograft with bilateral docking limbs ( Left Common Iliac with ENDURANT II Limb [id number] x 20mm x 93mm, Right Common Iliac  with ENDURANT II Limb Graft size 16mm x 16mm x 82mm). Bilateral ultrasound guided percutaneous access. Aortogram, bilateral selective iliac angiograms, [facility] CT with rotational DS     HC REPAIR ROTATOR CUFF,CHRONIC  1989     IR LOWER EXTREMITY VENOGRAM LEFT  5/4/2021     IR OR ANGIOGRAM  8/16/2024     IR VENOUS STENT  5/4/2021     Z DECOMPRESS SPINAL CORD,1 SEG  1995, 4/2002    leftL4-L5, 2nd right L4-L5     ZZC SPINE FUSION,ANTER,3 SGMTS  2/9/2004    ant and post fusion L4-S1       ALLERGIES:  Amlodipine and Chlorthalidone    MEDS:    Current Outpatient Medications:      allopurinol (ZYLOPRIM) 100 MG tablet, Take 2 tablets (200 mg) by mouth daily, Disp: 180 tablet, Rfl: 2     aspirin (ASA) 81 MG EC tablet, Take 1 tablet (81 mg) by mouth daily, Disp: , Rfl:      atorvastatin (LIPITOR) 40 MG tablet, Take 1 tablet (40 mg) by mouth daily, Disp: 90 tablet, Rfl: 11     carvedilol (COREG) 25 MG tablet, Take 1 tablet (25 mg) by mouth 2 times daily (with meals), Disp: 180 tablet, Rfl: 3     Fluticasone-Umeclidin-Vilanterol (TRELEGY ELLIPTA) 200-62.5-25  MCG/ACT oral inhaler, Inhale 1 puff into the lungs daily, Disp: 90 each, Rfl: 4     furosemide (LASIX) 20 MG tablet, Take 1 tablet (20 mg) by mouth every other day, Disp: 45 tablet, Rfl: 3     lisinopril (ZESTRIL) 20 MG tablet, Take 1 tablet (20 mg) by mouth daily, Disp: 90 tablet, Rfl: 3     methocarbamol (ROBAXIN) 500 MG tablet, Take 1 tablet (500 mg) by mouth every 8 hours, Disp: 30 tablet, Rfl: 0     nicotine (NICORETTE) 4 MG lozenge, Place 4 mg inside cheek every hour as needed, Disp: , Rfl:      oxyCODONE (ROXICODONE) 5 MG tablet, Take 1 tablet (5 mg) by mouth every 6 hours as needed for breakthrough pain, Disp: 8 tablet, Rfl: 0    SOCIAL HABITS:    History   Smoking Status     Former     Types: Cigarettes   Smokeless Tobacco     Never     Social History    Substance and Sexual Activity      Alcohol use: Yes        Comment: 12 pack of beer every 2 weeks      History   Drug Use     Types: Marijuana     Comment: smoke PRN for back pain management       FAMILY HISTORY:    Family History   Problem Relation Age of Onset     Family History Negative Other      Diabetes No family hx of      Coronary Artery Disease No family hx of      Hypertension No family hx of      Hyperlipidemia No family hx of      Breast Cancer No family hx of      Prostate Cancer No family hx of      Anxiety Disorder No family hx of      Substance Abuse No family hx of      Asthma No family hx of      Thyroid Disease No family hx of      Unknown/Adopted No family hx of      Genetic Disorder No family hx of      Osteoporosis No family hx of      Anesthesia Reaction No family hx of      Mental Illness No family hx of      Depression No family hx of      Other Cancer No family hx of      Colon Cancer No family hx of      Cerebrovascular Disease No family hx of      Obesity No family hx of        PE:  /71 (BP Location: Left arm, Patient Position: Chair, Cuff Size: Adult Regular)   Pulse 62   SpO2 96%   Wt Readings from Last 1 Encounters:    08/17/24 94.2 kg (207 lb 10.8 oz)     There is no height or weight on file to calculate BMI.    EXAM:  General: No apparent distress. Answers questions appropriately.   Neurologic: Focally intact, Alert and oriented x 3.   Eyes: Grossly normal.   Cardiac: RRR per radial pulse  Pulmonary: Non labored breathing with normal respiratory effort  Abdominal: Soft, non distended, non tender to palpation. No pulsatile mass.   Musculoskeletal/Extremity: Normal dorsiflexion and plantarflexion bilaterally, palpable DPs and femoral pulses bilaterally, warm, well-perfused. Strength 5/5 bilaterally, no edema.  LABS:      Sodium   Date Value Ref Range Status   08/17/2024 135 135 - 145 mmol/L Final   08/16/2024 138 135 - 145 mmol/L Final   08/16/2024 138 135 - 145 mmol/L Final   05/24/2021 140 133 - 144 mmol/L Final   05/04/2021 139 133 - 144 mmol/L Final   03/22/2021 139 133 - 144 mmol/L Final     Urea Nitrogen   Date Value Ref Range Status   08/17/2024 14.1 8.0 - 23.0 mg/dL Final   08/16/2024 10.7 8.0 - 23.0 mg/dL Final   08/16/2024 12.5 8.0 - 23.0 mg/dL Final   05/31/2022 14 7 - 30 mg/dL Final   05/24/2021 13 7 - 30 mg/dL Final   05/04/2021 16 7 - 30 mg/dL Final   03/22/2021 11 7 - 30 mg/dL Final     Hemoglobin   Date Value Ref Range Status   08/17/2024 13.7 13.3 - 17.7 g/dL Final   08/16/2024 14.4 13.3 - 17.7 g/dL Final   08/16/2024 14.8 13.3 - 17.7 g/dL Final   05/24/2021 16.0 13.3 - 17.7 g/dL Final   05/04/2021 17.0 13.3 - 17.7 g/dL Final   08/25/2020 15.4 13.3 - 17.7 g/dL Final     Platelet Count   Date Value Ref Range Status   08/17/2024 180 150 - 450 10e3/uL Final   08/16/2024 182 150 - 450 10e3/uL Final   08/16/2024 205 150 - 450 10e3/uL Final   05/24/2021 217 150 - 450 10e9/L Final   05/04/2021 220 150 - 450 10e9/L Final   08/25/2020 259 150 - 450 10e9/L Final     INR   Date Value Ref Range Status   05/04/2021 0.92 0.86 - 1.14 Final   04/07/2020 1.47 (H) 0.86 - 1.14 Final   04/06/2020 1.30 (H) 0.86 - 1.14 Final      Comment:     This test is intended for monitoring Coumadin therapy.  Results are not   accurate in patients with prolonged INR due to factor deficiency.       INR Point of Care   Date Value Ref Range Status   05/01/2020 2.4 (H) 0.86 - 1.14 Final     Comment:     This test is intended for monitoring Coumadin therapy.  Results are not   accurate in patients with prolonged INR due to factor deficiency.     04/17/2020 2.2 (H) 0.86 - 1.14 Final     Comment:     This test is intended for monitoring Coumadin therapy.  Results are not   accurate in patients with prolonged INR due to factor deficiency.     04/10/2020 2.0 (H) 0.86 - 1.14 Final     Comment:     This test is intended for monitoring Coumadin therapy.  Results are not   accurate in patients with prolonged INR due to factor deficiency.           Again, thank you for allowing me to participate in the care of your patient.      Sincerely,    TROY Johnson CNP

## 2024-09-10 NOTE — NURSING NOTE
Vascular Clinic Rooming Questions        Patient is here for a post op follow up  to discuss  aneurysm    There were no vitals taken for this visit.    The provider has been notified that the patient has no concerns.     Questions patient would like addressed today are: N/A.    Refills are needed: No    Has homecare services and agency name:  No

## 2024-09-13 NOTE — PATIENT INSTRUCTIONS
Thank you so much for choosing us for your care. It was a pleasure to see you at the vascular clinic today.     Follow-up recommendations: Follow-up with me if symptoms worsen or fail to improve. You have follow-up with our vascular surgeon on 9/30/2024 and repeat imaging prior to clinic visit.     If you have any questions, please contact our clinic directly at [phone] and ask for the nurse. We also encourage the use of [employer] to communicate with your HealthCare Provider.        If you have an urgent need after business hours (8:00 am to 4:30 pm) please call [phone], option 4, and ask for the vascular attending on call. For non-urgent after hours needs, please call the vascular nurse at [phone] and leave a detailed voicemail. For scheduling needs, please call our clinic directly at [phone].

## 2024-10-09 ENCOUNTER — OFFICE VISIT (OUTPATIENT)
Dept: VASCULAR SURGERY | Facility: CLINIC | Age: 69
End: 2024-10-09
Payer: COMMERCIAL

## 2024-10-09 ENCOUNTER — ANCILLARY PROCEDURE (OUTPATIENT)
Dept: CT IMAGING | Facility: CLINIC | Age: 69
End: 2024-10-09
Attending: SURGERY
Payer: COMMERCIAL

## 2024-10-09 VITALS
DIASTOLIC BLOOD PRESSURE: 87 MMHG | SYSTOLIC BLOOD PRESSURE: 134 MMHG | WEIGHT: 202 LBS | HEART RATE: 60 BPM | BODY MASS INDEX: 29.4 KG/M2

## 2024-10-09 DIAGNOSIS — Z87.891 FORMER CIGARETTE SMOKER: ICD-10-CM

## 2024-10-09 DIAGNOSIS — I10 HTN, GOAL BELOW 140/90: ICD-10-CM

## 2024-10-09 DIAGNOSIS — N18.2 STAGE 2 CHRONIC KIDNEY DISEASE: ICD-10-CM

## 2024-10-09 DIAGNOSIS — Z86.79 STATUS POST ENDOVASCULAR ANEURYSM REPAIR (EVAR): Primary | ICD-10-CM

## 2024-10-09 DIAGNOSIS — J44.89 CHRONIC BRONCHITIS WITH COPD (CHRONIC OBSTRUCTIVE PULMONARY DISEASE) (H): ICD-10-CM

## 2024-10-09 DIAGNOSIS — I71.43 INFRARENAL ABDOMINAL AORTIC ANEURYSM (AAA) WITHOUT RUPTURE (H): ICD-10-CM

## 2024-10-09 DIAGNOSIS — I71.40 ABDOMINAL AORTIC ANEURYSM (AAA) WITHOUT RUPTURE (H): ICD-10-CM

## 2024-10-09 DIAGNOSIS — R94.39 ABNORMAL STRESS TEST: ICD-10-CM

## 2024-10-09 DIAGNOSIS — Z98.890 STATUS POST ENDOVASCULAR ANEURYSM REPAIR (EVAR): Primary | ICD-10-CM

## 2024-10-09 DIAGNOSIS — Z98.890 POSTOPERATIVE STATE: ICD-10-CM

## 2024-10-09 LAB
CREAT BLD-MCNC: 1.4 MG/DL (ref 0.7–1.3)
EGFRCR SERPLBLD CKD-EPI 2021: 55 ML/MIN/1.73M2

## 2024-10-09 PROCEDURE — 82565 ASSAY OF CREATININE: CPT | Performed by: PATHOLOGY

## 2024-10-09 PROCEDURE — 74174 CTA ABD&PLVS W/CONTRAST: CPT | Performed by: RADIOLOGY

## 2024-10-09 PROCEDURE — 99024 POSTOP FOLLOW-UP VISIT: CPT | Mod: GC | Performed by: SURGERY

## 2024-10-09 PROCEDURE — 71275 CT ANGIOGRAPHY CHEST: CPT | Performed by: RADIOLOGY

## 2024-10-09 RX ORDER — IOPAMIDOL 755 MG/ML
125 INJECTION, SOLUTION INTRAVASCULAR ONCE
Status: COMPLETED | OUTPATIENT
Start: 2024-10-09 | End: 2024-10-09

## 2024-10-09 RX ADMIN — IOPAMIDOL 125 ML: 755 INJECTION, SOLUTION INTRAVASCULAR at 13:29

## 2024-10-09 NOTE — NURSING NOTE
Chief Complaint   Patient presents with    RECHECK     RETURN VASCULAR PATIENT - Follow-up for surgery, just had MRI downstairs. No pain or SOB present.     Vitals were taken and medications reconciled.    Manuel Salomon, EMT  1:49 PM

## 2024-10-09 NOTE — PROGRESS NOTES
Vascular & Endovascular Surgery Clinic Return Visit   Vascular Surgeon: Aman Valerio MD, RPVI      Location:  [facility]    Bucky Edgar  Medical Record #:  [id number]  YOB: 1955  Age:  69 year old     Date of Service: 10/9/2024    Primary Care Provider: Hamlet Roberts    Chief Complaint/Reason for Visit: 2nd post-op follow up of infrarenal AAA s/p EVAR    Interval History:  Mr. Edgar is a pleasant 69 year old male who returns today for follow-up s/p EVAR on 8/16/24, tolerated the procedure well and was ready for discharge on POD 1. Today, he states he is doing well overall, only complaint is of some dyspnea on exertion with ambulation which he reports is improving steadily. Denies any new or worsening chest/abdominal/back pain, palpitations, dizziness/lightheadedness, or weakness. Denies claudication, ischemic rest pain, or nonhealing wounds on the lower extremities.     Review of Systems:    A 12 point ROS was reviewed and is negative except for symptoms noted in HPI.     Medical/Surgical History:    Past Medical History:   Diagnosis Date    COPD (chronic obstructive pulmonary disease) (H)     Displacement of lumbar intervertebral disc without myelopathy 1995, 2002    HTN, goal below 140/90          Past Surgical History:   Procedure Laterality Date    ENDOVASCULAR REPAIR ANEURYSM AORTOILIAC Bilateral 8/16/2024    Procedure: Endovascular Infrarenal Abdominal Aneurysm Repair with 32x14x 103mm ENDURANT IIs Endograft with bilateral docking limbs ( Left Common Iliac with ENDURANT II Limb [id number] x 20mm x 93mm, Right Common Iliac  with ENDURANT II Limb Graft size 16mm x 16mm x 82mm). Bilateral ultrasound guided percutaneous access. Aortogram, bilateral selective iliac angiograms, [facility] CT with rotational DS    HC REPAIR ROTATOR CUFF,CHRONIC  1989    IR LOWER EXTREMITY VENOGRAM LEFT  5/4/2021    IR OR ANGIOGRAM  8/16/2024    IR VENOUS STENT   2021    ZZC DECOMPRESS SPINAL CORD,1 SEG  , 2002    leftL4-L5, 2nd right L4-L5    ZZC SPINE FUSION,ANTER,3 SGMTS  2004    ant and post fusion L4-S1        Allergies:     Allergies   Allergen Reactions    Amlodipine Headache     Tremor     Chlorthalidone      Hypotension         Medications:    Current Outpatient Medications   Medication Sig Dispense Refill    allopurinol (ZYLOPRIM) 100 MG tablet Take 2 tablets (200 mg) by mouth daily 180 tablet 2    aspirin (ASA) 81 MG EC tablet Take 1 tablet (81 mg) by mouth daily      atorvastatin (LIPITOR) 40 MG tablet Take 1 tablet (40 mg) by mouth daily 90 tablet 11    carvedilol (COREG) 25 MG tablet Take 1 tablet (25 mg) by mouth 2 times daily (with meals) 180 tablet 3    Fluticasone-Umeclidin-Vilanterol (TRELEGY ELLIPTA) 200-62.5-25 MCG/ACT oral inhaler Inhale 1 puff into the lungs daily 90 each 4    furosemide (LASIX) 20 MG tablet Take 1 tablet (20 mg) by mouth every other day 45 tablet 3    lisinopril (ZESTRIL) 20 MG tablet Take 1 tablet (20 mg) by mouth daily 90 tablet 3    methocarbamol (ROBAXIN) 500 MG tablet Take 1 tablet (500 mg) by mouth every 8 hours 30 tablet 0    nicotine (NICORETTE) 4 MG lozenge Place 4 mg inside cheek every hour as needed      oxyCODONE (ROXICODONE) 5 MG tablet Take 1 tablet (5 mg) by mouth every 6 hours as needed for breakthrough pain 8 tablet 0     No current facility-administered medications for this visit.        Social Habits:    Tobacco Use      Smoking status: Former        Packs/day: 0.00        Years: 1.3 packs/day for 41.0 years (51.3 ttl pk-yrs)        Types: Cigarettes        Start date: 10/1/1976        Quit date: 10/1/2017        Years since quittin.0        Passive exposure: Never (per pt)      Smokeless tobacco: Never       Alcohol Use: Not on file       History   Drug Use    Types: Marijuana     Comment: smoke PRN for back pain management        Family History:    Family History   Problem Relation Age of Onset     Family History Negative Other     Diabetes No family hx of     Coronary Artery Disease No family hx of     Hypertension No family hx of     Hyperlipidemia No family hx of     Breast Cancer No family hx of     Prostate Cancer No family hx of     Anxiety Disorder No family hx of     Substance Abuse No family hx of     Asthma No family hx of     Thyroid Disease No family hx of     Unknown/Adopted No family hx of     Genetic Disorder No family hx of     Osteoporosis No family hx of     Anesthesia Reaction No family hx of     Mental Illness No family hx of     Depression No family hx of     Other Cancer No family hx of     Colon Cancer No family hx of     Cerebrovascular Disease No family hx of     Obesity No family hx of        Physical Examination:  There were no vitals taken for this visit.  Wt Readings from Last 1 Encounters:   08/17/24 94.2 kg (207 lb 10.8 oz)     There is no height or weight on file to calculate BMI.    Exam:  General: No apparent distress. Answers questions appropriately   Neurologic: Focally intact, Alert and oriented x 3.   Eyes: Grossly normal.   Cardiac: RRR  Pulmonary:  Non labored breathing with normal respiratory effort  Abdominal: Soft, non distended, non tender to palpation.  No pulsatile mass.   Groins: soft bilaterally. Access sites healing well.   Musculoskeletal/Extremity: 5/5 gross BUE/BLE extremity strength. No edema.  No open wounds or abrasions.     Vascular Exam:   Radial: Left: 2+   Right: 2+    Femoral: Left: 2+   Right: 2+    DP: Left: 2+   Right: 2+     PT:   Left: 2+   Right: 2+    Laboratory Findings:  Hemoglobin   Date Value Ref Range Status   08/17/2024 13.7 13.3 - 17.7 g/dL Final   08/16/2024 14.4 13.3 - 17.7 g/dL Final   05/24/2021 16.0 13.3 - 17.7 g/dL Final   05/04/2021 17.0 13.3 - 17.7 g/dL Final     WBC   Date Value Ref Range Status   05/24/2021 8.8 4.0 - 11.0 10e9/L Final   05/04/2021 9.5 4.0 - 11.0 10e9/L Final     WBC Count   Date Value Ref Range Status    08/17/2024 12.1 (H) 4.0 - 11.0 10e3/uL Final   08/16/2024 9.1 4.0 - 11.0 10e3/uL Final     Platelet Count   Date Value Ref Range Status   08/17/2024 180 150 - 450 10e3/uL Final   08/16/2024 182 150 - 450 10e3/uL Final   05/24/2021 217 150 - 450 10e9/L Final   05/04/2021 220 150 - 450 10e9/L Final     Creatinine   Date Value Ref Range Status   08/17/2024 1.10 0.67 - 1.17 mg/dL Final   05/24/2021 0.95 0.66 - 1.25 mg/dL Final     Creatinine POCT   Date Value Ref Range Status   10/09/2024 1.4 (H) 0.7 - 1.3 mg/dL Final     Sodium   Date Value Ref Range Status   08/17/2024 135 135 - 145 mmol/L Final   05/24/2021 140 133 - 144 mmol/L Final     Glucose   Date Value Ref Range Status   08/17/2024 112 (H) 70 - 99 mg/dL Final   08/16/2024 108 (H) 70 - 99 mg/dL Final   05/31/2022 95 70 - 99 mg/dL Final   05/24/2021 112 (H) 70 - 99 mg/dL Final   05/04/2021 100 (H) 70 - 99 mg/dL Final     Hemoglobin A1C   Date Value Ref Range Status   05/08/2019 5.3 0 - 5.6 % Final     Comment:     Normal <5.7% Prediabetes 5.7-6.4%  Diabetes 6.5% or higher - adopted from ADA   consensus guidelines.     11/16/2017 5.1 4.3 - 6.0 % Final     INR   Date Value Ref Range Status   05/04/2021 0.92 0.86 - 1.14 Final     Imaging:  I personally reviewed the CTA from 10/09/24 which shows patent EVAR repair with good filling of the bilateral renal arteries and good aneurysmal exclusion without endoleak.     Impression/Shared Medical Decision Making:    #1- Asymptomatic 6.1 cm infrarenal abdominal aortic aneurysm, s/p EVAR 8/16/24 ([id number] ENDURANT IIs Endograft with bilateral docking limbs  - Left Common Iliac with ENDURANT II Limb Graft [id number], Right Common Iliac with ENDURANT II Limb Graft [id number])  #2- COPD  #3- Hypertension  #4- History of DVT with previous left iliac vein stenting for May-Thurner syndrome  #5- Lung atelectasis  #6- Former nicotine dependence    Mr. Edgar is a pleasant 69 year old male seen for follow up s/p EVAR on 8/16/24.  Imaging shows patent repair with good flow in the bilateral renal arteries and no endoleak or expansion of his aneurysm. He has been recovering well post-operatively aside from some dyspnea on exertion which he states is improving. He follows with a pulmonologist.    Discussed warning signs including, but not limited to, symptomatic aneurysms, aortic rupture, new abdominal or back pain, hypotension, or syncope/lightheadedness\.  If he experiences any of these, he has been advised to seek treatment and contact my team.    Mr. Edgar is in agreement with this plan as outlined.    Recommendations/Plan:  RTC 6 months with repeat CTA A/P    Vandana Gaona MD  PGY-6  Vascular & Endovascular Surgery  Pager: #[phone]

## 2024-10-09 NOTE — PATIENT INSTRUCTIONS
Thank you so much for choosing us for your care. It was a pleasure to see you at the vascular clinic today.     Follow-up recommendations: We will see you back in February 2025. Please do not hesitate to reach out to us in the meantime with any concerns. Our schedulers will get in touch with you to set up your follow-up visit.    Additional testing/imaging ordered today: Repeat abdomen/pelvis in February.      Our scheduling team will get in touch with you to set up any follow-up testing/imaging and/or appointments. Please be aware that any testing/imaging recommended today will need to completed prior to your next visit with the provider. If testing/imaging is not completed prior to your next visit, your visit may be rescheduled.     If you have any questions, please contact our clinic directly at [phone] and ask for the nurse. We also encourage the use of [email or web address] to communicate with your healthcare provider.    If you have an urgent need after business hours (8:00 am to 4:30 pm) please call [phone], option 4, and ask for the vascular attending on call. For non-urgent after hours needs, please call the vascular clinic at [phone]. For scheduling needs, please call our clinic directly at [phone].

## 2024-10-09 NOTE — DISCHARGE INSTRUCTIONS

## 2024-10-09 NOTE — LETTER
10/9/2024       RE: Bucky Edgar  [address] [place] [address]     Dear Colleague,    Thank you for referring your patient, Bucky Edgar, to the [facility] [place] [facility]. Please see a copy of my visit note below.    Vascular & Endovascular Surgery Clinic Return Visit   Vascular Surgeon: Aman Valerio MD, RPVI      Location:  [facility]    Bucky Edgar  Medical Record #:  [id number]  YOB: 1955  Age:  69 year old     Date of Service: 10/9/2024    Primary Care Provider: Hamlet Roberts    Chief Complaint/Reason for Visit: 2nd post-op follow up of infrarenal AAA s/p EVAR    Interval History:  Mr. Edgar is a pleasant 69 year old male who returns today for follow-up s/p EVAR on 8/16/24, tolerated the procedure well and was ready for discharge on POD 1. Today, he states he is doing well overall, only complaint is of some dyspnea on exertion with ambulation which he reports is improving steadily. Denies any new or worsening chest/abdominal/back pain, palpitations, dizziness/lightheadedness, or weakness. Denies claudication, ischemic rest pain, or nonhealing wounds on the lower extremities.     Review of Systems:    A 12 point ROS was reviewed and is negative except for symptoms noted in HPI.     Medical/Surgical History:    Past Medical History:   Diagnosis Date     COPD (chronic obstructive pulmonary disease) (H)      Displacement of lumbar intervertebral disc without myelopathy 1995, 2002     HTN, goal below 140/90          Past Surgical History:   Procedure Laterality Date     ENDOVASCULAR REPAIR ANEURYSM AORTOILIAC Bilateral 8/16/2024    Procedure: Endovascular Infrarenal Abdominal Aneurysm Repair with 32x14x 103mm ENDURANT IIs Endograft with bilateral docking limbs ( Left Common Iliac with ENDURANT II Limb [id number] x 20mm x 93mm, Right Common Iliac  with  ENDURANT II Limb Graft size 16mm x 16mm x 82mm). Bilateral ultrasound guided percutaneous access. Aortogram, bilateral selective iliac angiograms, [facility] CT with rotational DS     HC REPAIR ROTATOR CUFF,CHRONIC  1989     IR LOWER EXTREMITY VENOGRAM LEFT  5/4/2021     IR OR ANGIOGRAM  8/16/2024     IR VENOUS STENT  5/4/2021     ZZC DECOMPRESS SPINAL CORD,1 SEG  1995, 4/2002    leftL4-L5, 2nd right L4-L5     ZZC SPINE FUSION,ANTER,3 SGMTS  2/9/2004    ant and post fusion L4-S1        Allergies:     Allergies   Allergen Reactions     Amlodipine Headache     Tremor      Chlorthalidone      Hypotension         Medications:    Current Outpatient Medications   Medication Sig Dispense Refill     allopurinol (ZYLOPRIM) 100 MG tablet Take 2 tablets (200 mg) by mouth daily 180 tablet 2     aspirin (ASA) 81 MG EC tablet Take 1 tablet (81 mg) by mouth daily       atorvastatin (LIPITOR) 40 MG tablet Take 1 tablet (40 mg) by mouth daily 90 tablet 11     carvedilol (COREG) 25 MG tablet Take 1 tablet (25 mg) by mouth 2 times daily (with meals) 180 tablet 3     Fluticasone-Umeclidin-Vilanterol (TRELEGY ELLIPTA) 200-62.5-25 MCG/ACT oral inhaler Inhale 1 puff into the lungs daily 90 each 4     furosemide (LASIX) 20 MG tablet Take 1 tablet (20 mg) by mouth every other day 45 tablet 3     lisinopril (ZESTRIL) 20 MG tablet Take 1 tablet (20 mg) by mouth daily 90 tablet 3     methocarbamol (ROBAXIN) 500 MG tablet Take 1 tablet (500 mg) by mouth every 8 hours 30 tablet 0     nicotine (NICORETTE) 4 MG lozenge Place 4 mg inside cheek every hour as needed       oxyCODONE (ROXICODONE) 5 MG tablet Take 1 tablet (5 mg) by mouth every 6 hours as needed for breakthrough pain 8 tablet 0     No current facility-administered medications for this visit.        Social Habits:    Tobacco Use      Smoking status: Former        Packs/day: 0.00        Years: 1.3 packs/day for 41.0 years (51.3 ttl pk-yrs)        Types: Cigarettes        Start date:  10/1/1976        Quit date: 10/1/2017        Years since quittin.0        Passive exposure: Never (per pt)      Smokeless tobacco: Never       Alcohol Use: Not on file       History   Drug Use     Types: Marijuana     Comment: smoke PRN for back pain management        Family History:    Family History   Problem Relation Age of Onset     Family History Negative Other      Diabetes No family hx of      Coronary Artery Disease No family hx of      Hypertension No family hx of      Hyperlipidemia No family hx of      Breast Cancer No family hx of      Prostate Cancer No family hx of      Anxiety Disorder No family hx of      Substance Abuse No family hx of      Asthma No family hx of      Thyroid Disease No family hx of      Unknown/Adopted No family hx of      Genetic Disorder No family hx of      Osteoporosis No family hx of      Anesthesia Reaction No family hx of      Mental Illness No family hx of      Depression No family hx of      Other Cancer No family hx of      Colon Cancer No family hx of      Cerebrovascular Disease No family hx of      Obesity No family hx of        Physical Examination:  There were no vitals taken for this visit.  Wt Readings from Last 1 Encounters:   24 94.2 kg (207 lb 10.8 oz)     There is no height or weight on file to calculate BMI.    Exam:  General: No apparent distress. Answers questions appropriately   Neurologic: Focally intact, Alert and oriented x 3.   Eyes: Grossly normal.   Cardiac: RRR  Pulmonary:  Non labored breathing with normal respiratory effort  Abdominal: Soft, non distended, non tender to palpation.  No pulsatile mass.   Groins: soft bilaterally. Access sites healing well.   Musculoskeletal/Extremity: 5/5 gross BUE/BLE extremity strength. No edema.  No open wounds or abrasions.     Vascular Exam:   Radial: Left: 2+   Right: 2+    Femoral: Left: 2+   Right: 2+    DP: Left: 2+   Right: 2+     PT:   Left: 2+   Right: 2+    Laboratory Findings:  Hemoglobin    Date Value Ref Range Status   08/17/2024 13.7 13.3 - 17.7 g/dL Final   08/16/2024 14.4 13.3 - 17.7 g/dL Final   05/24/2021 16.0 13.3 - 17.7 g/dL Final   05/04/2021 17.0 13.3 - 17.7 g/dL Final     WBC   Date Value Ref Range Status   05/24/2021 8.8 4.0 - 11.0 10e9/L Final   05/04/2021 9.5 4.0 - 11.0 10e9/L Final     WBC Count   Date Value Ref Range Status   08/17/2024 12.1 (H) 4.0 - 11.0 10e3/uL Final   08/16/2024 9.1 4.0 - 11.0 10e3/uL Final     Platelet Count   Date Value Ref Range Status   08/17/2024 180 150 - 450 10e3/uL Final   08/16/2024 182 150 - 450 10e3/uL Final   05/24/2021 217 150 - 450 10e9/L Final   05/04/2021 220 150 - 450 10e9/L Final     Creatinine   Date Value Ref Range Status   08/17/2024 1.10 0.67 - 1.17 mg/dL Final   05/24/2021 0.95 0.66 - 1.25 mg/dL Final     Creatinine POCT   Date Value Ref Range Status   10/09/2024 1.4 (H) 0.7 - 1.3 mg/dL Final     Sodium   Date Value Ref Range Status   08/17/2024 135 135 - 145 mmol/L Final   05/24/2021 140 133 - 144 mmol/L Final     Glucose   Date Value Ref Range Status   08/17/2024 112 (H) 70 - 99 mg/dL Final   08/16/2024 108 (H) 70 - 99 mg/dL Final   05/31/2022 95 70 - 99 mg/dL Final   05/24/2021 112 (H) 70 - 99 mg/dL Final   05/04/2021 100 (H) 70 - 99 mg/dL Final     Hemoglobin A1C   Date Value Ref Range Status   05/08/2019 5.3 0 - 5.6 % Final     Comment:     Normal <5.7% Prediabetes 5.7-6.4%  Diabetes 6.5% or higher - adopted from ADA   consensus guidelines.     11/16/2017 5.1 4.3 - 6.0 % Final     INR   Date Value Ref Range Status   05/04/2021 0.92 0.86 - 1.14 Final     Imaging:  I personally reviewed the CTA from 10/09/24 which shows patent EVAR repair with good filling of the bilateral renal arteries and good aneurysmal exclusion without endoleak.     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 Edgar is a pleasant 69 year old male seen for follow up s/p EVAR on 8/16/24. Imaging shows patent repair with good flow in the bilateral renal arteries and no endoleak or expansion of his aneurysm. He has been recovering well post-operatively aside from some dyspnea on exertion which he states is improving. He follows with a pulmonologist.    Discussed warning signs including, but not limited to, symptomatic aneurysms, aortic rupture, new abdominal or back pain, hypotension, or syncope/lightheadedness\.  If he experiences any of these, he has been advised to seek treatment and contact my team.    Mr. Edgar is in agreement with this plan as outlined.    Recommendations/Plan:  RTC 6 months with repeat CTA A/P    Vandana Gaona MD  PGY-6  Vascular & Endovascular Surgery  Pager: #[phone]    Attestation signed by Aman Valerio MD at 10/9/2024  3:10 PM:  Patient seen and examined with Dr. Gaona, my vascular surgery fellow.  I corroborated the history and reperformed physical examination.  Agree with findings as documented in their note with the exception as documented below.    Mr. Edgar is doing quite well post operatively from his EVAR for asymptomatic abdominal aortic aneurysm.  He is recovered well with no issues. His chronic dyspnea is doing better still.  No abdominal or back pain.  His abdominal exam is benign and his groins are healing well. I personally reviewed his CTA from today which shows well positioned endograft without kink, migration, or fracture.  Widely patent without endoleak on any phase. Aneurysm sac is stable. No iliofemoral issues. Overall he is recovering well at 1 month follow up from EVAR.  We will  plan to see him back at the 6 month anniversary of his operation with a repeat CTA abdomen and pelvis. Sooner if issues arise.    Aman Valerio MD  Vascular and Endovascular Surgery     15 minutes spent on the day of encounter doing chart review from our system and care everywhere, history and exam, documentation, coordinating care, and further activities as noted with over half spent counseling.       Again, thank you for allowing me to participate in the care of your patient.      Sincerely,    Aman Valerio MD

## 2024-11-07 ENCOUNTER — ONCOLOGY VISIT (OUTPATIENT)
Dept: PULMONOLOGY | Facility: CLINIC | Age: 69
End: 2024-11-07
Payer: COMMERCIAL

## 2024-11-07 VITALS
OXYGEN SATURATION: 100 % | HEIGHT: 69 IN | BODY MASS INDEX: 29.92 KG/M2 | SYSTOLIC BLOOD PRESSURE: 123 MMHG | RESPIRATION RATE: 16 BRPM | DIASTOLIC BLOOD PRESSURE: 78 MMHG | WEIGHT: 202 LBS | HEART RATE: 66 BPM

## 2024-11-07 DIAGNOSIS — J44.9 CHRONIC OBSTRUCTIVE PULMONARY DISEASE, UNSPECIFIED COPD TYPE (H): ICD-10-CM

## 2024-11-07 DIAGNOSIS — Z87.891 PERSONAL HISTORY OF TOBACCO USE: Primary | ICD-10-CM

## 2024-11-07 PROCEDURE — 99214 OFFICE O/P EST MOD 30 MIN: CPT | Performed by: PHYSICIAN ASSISTANT

## 2024-11-07 RX ORDER — FLUTICASONE FUROATE, UMECLIDINIUM BROMIDE AND VILANTEROL TRIFENATATE 200; 62.5; 25 UG/1; UG/1; UG/1
1 POWDER RESPIRATORY (INHALATION) DAILY
Qty: 30 EACH | Refills: 11 | Status: SHIPPED | OUTPATIENT
Start: 2024-11-07

## 2024-11-07 ASSESSMENT — PAIN SCALES - GENERAL: PAINLEVEL_OUTOF10: MODERATE PAIN (5)

## 2024-11-07 NOTE — PROGRESS NOTES
"Bucky Edgar's goals for this visit include: Return  He requests these members of his care team be copied on today's visit information: PCP    PCP: Hamlet Roberts    Referring Provider:  Referred Self, MD  No address on file    /78   Pulse 66   Resp 16   Ht 1.765 m (5' 9.49\")   Wt 91.6 kg (202 lb)   SpO2 100%   BMI 29.41 kg/m      Do you need any medication refills at today's visit? N    Angeles Acosta LPN  Pulmonary Medicine:  [facility]  Phone: [phone] Fax: [phone]      "

## 2024-11-07 NOTE — PROGRESS NOTES
LUNG NODULE & INTERVENTIONAL PULMONARY CLINIC  [facility]     Bucky Edgar MRN# [id number]   Age: 69 year old YOB: 1955     Reason for Consultation: lung nodule(s)    Requesting Physician: Referred MD Martin  No address on file       Assessment and Plan:    1. Atelectasis. Stable on CT chest 10/2024 when compared to CT 5/2024.     2. Personal history tobacco use. Eligible for lung cancer screening. Ordered for 11/2025    3. COPD. Continue Trelegy 200. Consider mucinex PRN for cough    Follow up 1 year with repeat LDCT    Anila Khoury PA-C  Interventional and General Pulmonology  Department of Pulmonary, Allergy, Critical Care and Sleep Medicine   [facility]     35 minutes spent reviewing chart, reviewing test results, talking with and examining patient, formulating plan, and documentation on the day of the encounter.          History:     Bucky Edgar is a 69 year old male with who is here for lung nodule(s)/atelectasis.    Patient initially presented in clinic and saw Dr. Valdovinos 5/2024 for multiple new mass-like lesions in the RLL on CTA abdomen 5/3/2024 when compared to CT chest 5/2023. Concern raised for malignancy vs atelectasis. Dr. Valdovinos ordered a PET, completed 5/18/2024. The masses are not FDG avid, which is consistent with atelectasis. Saw Dr. Valdovinos again 6/2024 for preop clearance for AAA repair. Switched from LAMA to triple inhaler therapy to optimize his moderate to severe COPD. Here for 6 month follow up CT chest.   EVAR for AAA went well, completed 8/16/2024.     Always coughing, nonproductive.     - Personal hx of cancer: no  - Family hx of cancer: no  - Exposure hx: Denies asbestos or radon exposure , no known TB exposures  - Tobacco hx: Past Smoker: 1ppd for >40years. Quit 2017  - Images reviewed this visit: atelectasis RLL   - My interpretation of the PFT's relevant for this visit includes: Mixed Moderate airflow obstruction and Mild  restriction. Moderate diffusion defect.     Other pertinent active medical problems include:   - COPD. No recent AECOPD. Doing better with Trelegy, not having to breathe as hard and can walk more. Feels better. Not wheezing or gurgling as much.   -AAA s/p EVAR without complication         Past Medical History:      Past Medical History:   Diagnosis Date    COPD (chronic obstructive pulmonary disease) (H)     Displacement of lumbar intervertebral disc without myelopathy ,     HTN, goal below 140/90            Past Surgical History:      Past Surgical History:   Procedure Laterality Date    ENDOVASCULAR REPAIR ANEURYSM AORTOILIAC Bilateral 2024    Procedure: Endovascular Infrarenal Abdominal Aneurysm Repair with 32x14x 103mm ENDURANT IIs Endograft with bilateral docking limbs ( Left Common Iliac with ENDURANT II Limb [id number] x 20mm x 93mm, Right Common Iliac  with ENDURANT II Limb Graft size 16mm x 16mm x 82mm). Bilateral ultrasound guided percutaneous access. Aortogram, bilateral selective iliac angiograms, [facility] CT with rotational DS    HC REPAIR ROTATOR CUFF,CHRONIC      IR LOWER EXTREMITY VENOGRAM LEFT  2021    IR OR ANGIOGRAM  2024    IR VENOUS STENT  2021    ZZC DECOMPRESS SPINAL CORD,1 SEG  , 2002    leftL4-L5, 2nd right L4-L5    ZZC SPINE FUSION,ANTER,3 SGMTS  2004    ant and post fusion L4-S1          Social History:     Social History     Tobacco Use    Smoking status: Former     Current packs/day: 0.00     Average packs/day: 1.3 packs/day for 41.0 years (51.3 ttl pk-yrs)     Types: Cigarettes     Start date: 10/1/1976     Quit date: 10/1/2017     Years since quittin.1     Passive exposure: Never (per pt)    Smokeless tobacco: Never   Substance Use Topics    Alcohol use: Yes     Comment: 12 pack of beer every 2 weeks          Family History:     Family History   Problem Relation Age of Onset    Family History Negative Other     Diabetes No family hx of      Coronary Artery Disease No family hx of     Hypertension No family hx of     Hyperlipidemia No family hx of     Breast Cancer No family hx of     Prostate Cancer No family hx of     Anxiety Disorder No family hx of     Substance Abuse No family hx of     Asthma No family hx of     Thyroid Disease No family hx of     Unknown/Adopted No family hx of     Genetic Disorder No family hx of     Osteoporosis No family hx of     Anesthesia Reaction No family hx of     Mental Illness No family hx of     Depression No family hx of     Other Cancer No family hx of     Colon Cancer No family hx of     Cerebrovascular Disease No family hx of     Obesity No family hx of            Allergies:      Allergies   Allergen Reactions    Amlodipine Headache     Tremor     Chlorthalidone      Hypotension           Medications:     Current Outpatient Medications   Medication Sig Dispense Refill    allopurinol (ZYLOPRIM) 100 MG tablet Take 2 tablets (200 mg) by mouth daily 180 tablet 2    aspirin (ASA) 81 MG EC tablet Take 1 tablet (81 mg) by mouth daily      atorvastatin (LIPITOR) 40 MG tablet Take 1 tablet (40 mg) by mouth daily 90 tablet 11    carvedilol (COREG) 25 MG tablet Take 1 tablet (25 mg) by mouth 2 times daily (with meals) 180 tablet 3    Fluticasone-Umeclidin-Vilanterol (TRELEGY ELLIPTA) 200-62.5-25 MCG/ACT oral inhaler Inhale 1 puff into the lungs daily 90 each 4    furosemide (LASIX) 20 MG tablet Take 1 tablet (20 mg) by mouth every other day 45 tablet 3    lisinopril (ZESTRIL) 20 MG tablet Take 1 tablet (20 mg) by mouth daily 90 tablet 3    nicotine (NICORETTE) 4 MG lozenge Place 4 mg inside cheek every hour as needed      methocarbamol (ROBAXIN) 500 MG tablet Take 1 tablet (500 mg) by mouth every 8 hours (Patient not taking: Reported on 11/7/2024) 30 tablet 0    oxyCODONE (ROXICODONE) 5 MG tablet Take 1 tablet (5 mg) by mouth every 6 hours as needed for breakthrough pain (Patient not taking: Reported on 11/7/2024) 8 tablet 0      No current facility-administered medications for this visit.            Physical Exam:   There were no vitals taken for this visit.  Wt Readings from Last 4 Encounters:   10/09/24 91.6 kg (202 lb)   08/17/24 94.2 kg (207 lb 10.8 oz)   08/13/24 92.5 kg (204 lb)   07/15/24 90.7 kg (200 lb)     General: Well appearing  Lungs: Nonlabored breathing  Neuro: Answering questions appropriately  Psych: Normal affect       Imaging/Lab Data   All laboratory and imaging data reviewed.    No results found for this or any previous visit (from the past 24 hours).       Lung Cancer Screening Shared Decision Making Visit     Bucky Edgar is eligible for lung cancer screening on the basis of:   has not not experienced symptoms suggestive of lung cancer.   born on 1955, 69 year old years old.   smoked 1 packs of cigarettes for 40 years for a total of 40 pack-years   has quit smoking 7 years ago      I have discussed with patient the risks and benefits of screening for lung cancer with low-dose CT.     The risks include:   radiation exposure    false positives     over-diagnosis    The benefit of early detection of lung cancer is contingent upon adherence to annual screening or more frequent follow up if indicated.     Furthermore, reaping the benefits of screening requires Bucky Edgar to be willing and able to undergo diagnostic procedures, if indicated.     We did not discuss that the only way to prevent lung cancer is to not smoke. Smoking cessation assistance was not offered.

## 2024-11-11 NOTE — TELEPHONE ENCOUNTER
RECORDS RECEIVED FROM: Internal   DIAGNOSIS:   Acute on chronic congestive heart failure, unspecified heart failure type (H)   Pulmonary hypertension       DATE RECEIVED: 05/17/23   NOTES STATUS DETAILS   OFFICE NOTE from referring provider  Internal Referred by Hamlet Roberts PA-C   OFFICE NOTE from other specialist   N/A    DISCHARGE SUMMARY from hospital  N/A    DISCHARGE REPORT from the ER N/A    OPERATIVE REPORT  N/A    MEDICATION LIST Internal    LABS      Internal    DIAGNOSTIC PROCEDURES     EKG  Internal 05/15/23   US Aorta/lvc/illiac  Internal 03/16/23   IMAGING (DISC & REPORT)      CT  Internal 02/16/22   MRI N/A    XRAY N/A    ULTRASOUND  [facility] 10/12/16       
(V5) oriented
Case discussed with Dr. Guillen.  Case also discussed with epilepsy team and patient admitted to the epilepsy service.

## 2024-11-19 ENCOUNTER — TELEPHONE (OUTPATIENT)
Dept: VASCULAR SURGERY | Facility: CLINIC | Age: 69
End: 2024-11-19
Payer: COMMERCIAL

## 2024-11-19 NOTE — TELEPHONE ENCOUNTER
Left Voicemail (1st Attempt) and Sent Mychart (1st Attempt) for the patient to call back and schedule the followin Month follow up REPAIR, ANEURYSM, AORTOILIAC, ENDOVASCULAR.    Location: [facility]  Provider: Dr. Aman Valerio  Appointment type: Return Vascular Patient  Appointment mode: In Person  Return date: 2024    Specialty phone number: [phone] or [phone]    Referred to Vascular Health Center: No    Is Imaging Needed: Yes, CTA    Additional Notes:Please schedule imaging prior to Provider visit.    -Eyal Sandoval on 2024 at 4:56 PM

## 2025-01-16 DIAGNOSIS — J44.89 CHRONIC BRONCHITIS WITH COPD (CHRONIC OBSTRUCTIVE PULMONARY DISEASE) (H): Primary | ICD-10-CM

## 2025-01-16 RX ORDER — TIOTROPIUM BROMIDE AND OLODATEROL 3.124; 2.736 UG/1; UG/1
2 SPRAY, METERED RESPIRATORY (INHALATION) DAILY
Qty: 4 G | Refills: 4 | Status: SHIPPED | OUTPATIENT
Start: 2025-01-16

## 2025-01-16 NOTE — TELEPHONE ENCOUNTER
"Call from pt, who states he has left a few messages for [facility], but has not heard back. He said he is \"getting desperate\".     He was most recently on the Trelegy Ellipta inhaler. He states Trelegy went up in price from $47 to $167 and he cannot afford this every month. He spoke w/ insurance, who states Trelegy went up to a level 3 and insurance will only cover 20%. He has been out of this for about a week now and is starting to have some gurgling congestion starting again.     Insurance said they will cover Wilexa or Stiolto inhalers at a more affordable pricing ($12 and $18). Pt is looking for a new prescription inhaler be sent to pharmacy.     Pharmacy of choice: [facility] in [place]    He saw Anila Khoury PA-C, in pulm last on 11/7/24.     Informed pt will send message to caret team and someone will contact him with recommendations. Pt voiced understanding.   "

## 2025-01-16 NOTE — TELEPHONE ENCOUNTER
New order pended for STIOLTO RESPIMAT for provider to review and sign. Patients Trelegy no longer covered by insurance and this was placed as alternative option.     Abisai Sanders RN on 1/16/2025 at 10:13 AM

## 2025-02-25 DIAGNOSIS — I10 HTN, GOAL BELOW 140/90: ICD-10-CM

## 2025-02-25 RX ORDER — CARVEDILOL 25 MG/1
25 TABLET ORAL 2 TIMES DAILY WITH MEALS
Qty: 180 TABLET | Refills: 0 | Status: SHIPPED | OUTPATIENT
Start: 2025-02-25

## 2025-02-26 ENCOUNTER — LAB (OUTPATIENT)
Dept: LAB | Facility: OTHER | Age: 70
End: 2025-02-26
Payer: COMMERCIAL

## 2025-02-26 DIAGNOSIS — M1A.09X0 IDIOPATHIC CHRONIC GOUT OF MULTIPLE SITES WITHOUT TOPHUS: ICD-10-CM

## 2025-02-26 DIAGNOSIS — N18.2 STAGE 2 CHRONIC KIDNEY DISEASE: Primary | ICD-10-CM

## 2025-02-26 LAB
ALBUMIN SERPL BCG-MCNC: 4 G/DL (ref 3.5–5.2)
ALP SERPL-CCNC: 119 U/L (ref 40–150)
ALT SERPL W P-5'-P-CCNC: 12 U/L (ref 0–70)
ANION GAP SERPL CALCULATED.3IONS-SCNC: 12 MMOL/L (ref 7–15)
AST SERPL W P-5'-P-CCNC: 19 U/L (ref 0–45)
BASOPHILS # BLD AUTO: 0.1 10E3/UL (ref 0–0.2)
BASOPHILS NFR BLD AUTO: 1 %
BILIRUB SERPL-MCNC: 1.7 MG/DL
BUN SERPL-MCNC: 13.1 MG/DL (ref 8–23)
CALCIUM SERPL-MCNC: 9.2 MG/DL (ref 8.8–10.4)
CHLORIDE SERPL-SCNC: 105 MMOL/L (ref 98–107)
CREAT SERPL-MCNC: 1.34 MG/DL (ref 0.67–1.17)
CREAT UR-MCNC: 370 MG/DL
EGFRCR SERPLBLD CKD-EPI 2021: 58 ML/MIN/1.73M2
EOSINOPHIL # BLD AUTO: 0.2 10E3/UL (ref 0–0.7)
EOSINOPHIL NFR BLD AUTO: 2 %
ERYTHROCYTE [DISTWIDTH] IN BLOOD BY AUTOMATED COUNT: 13.2 % (ref 10–15)
GLUCOSE SERPL-MCNC: 107 MG/DL (ref 70–99)
HCO3 SERPL-SCNC: 27 MMOL/L (ref 22–29)
HCT VFR BLD AUTO: 46 % (ref 40–53)
HGB BLD-MCNC: 15.2 G/DL (ref 13.3–17.7)
IMM GRANULOCYTES # BLD: 0 10E3/UL
IMM GRANULOCYTES NFR BLD: 0 %
LYMPHOCYTES # BLD AUTO: 1.6 10E3/UL (ref 0.8–5.3)
LYMPHOCYTES NFR BLD AUTO: 16 %
MCH RBC QN AUTO: 33.7 PG (ref 26.5–33)
MCHC RBC AUTO-ENTMCNC: 33 G/DL (ref 31.5–36.5)
MCV RBC AUTO: 102 FL (ref 78–100)
MICROALBUMIN UR-MCNC: 92.2 MG/L
MICROALBUMIN/CREAT UR: 24.92 MG/G CR (ref 0–17)
MONOCYTES # BLD AUTO: 0.8 10E3/UL (ref 0–1.3)
MONOCYTES NFR BLD AUTO: 8 %
NEUTROPHILS # BLD AUTO: 7.4 10E3/UL (ref 1.6–8.3)
NEUTROPHILS NFR BLD AUTO: 73 %
PLATELET # BLD AUTO: 275 10E3/UL (ref 150–450)
POTASSIUM SERPL-SCNC: 4.6 MMOL/L (ref 3.4–5.3)
PROT SERPL-MCNC: 6.9 G/DL (ref 6.4–8.3)
RBC # BLD AUTO: 4.51 10E6/UL (ref 4.4–5.9)
SODIUM SERPL-SCNC: 144 MMOL/L (ref 135–145)
URATE SERPL-MCNC: 5.4 MG/DL (ref 3.4–7)
WBC # BLD AUTO: 10.2 10E3/UL (ref 4–11)

## 2025-02-26 PROCEDURE — 80053 COMPREHEN METABOLIC PANEL: CPT

## 2025-02-26 PROCEDURE — 84550 ASSAY OF BLOOD/URIC ACID: CPT

## 2025-02-26 PROCEDURE — 36415 COLL VENOUS BLD VENIPUNCTURE: CPT

## 2025-02-26 PROCEDURE — 82570 ASSAY OF URINE CREATININE: CPT

## 2025-02-26 PROCEDURE — 85025 COMPLETE CBC W/AUTO DIFF WBC: CPT

## 2025-02-26 PROCEDURE — 82043 UR ALBUMIN QUANTITATIVE: CPT

## 2025-03-20 DIAGNOSIS — I50.9 ACUTE ON CHRONIC CONGESTIVE HEART FAILURE, UNSPECIFIED HEART FAILURE TYPE (H): ICD-10-CM

## 2025-03-20 DIAGNOSIS — I27.20 PULMONARY HYPERTENSION (H): ICD-10-CM

## 2025-03-20 RX ORDER — FUROSEMIDE 20 MG/1
20 TABLET ORAL EVERY OTHER DAY
Qty: 45 TABLET | Refills: 0 | Status: SHIPPED | OUTPATIENT
Start: 2025-03-20

## 2025-04-16 SDOH — HEALTH STABILITY: PHYSICAL HEALTH: ON AVERAGE, HOW MANY DAYS PER WEEK DO YOU ENGAGE IN MODERATE TO STRENUOUS EXERCISE (LIKE A BRISK WALK)?: 3 DAYS

## 2025-04-16 SDOH — HEALTH STABILITY: PHYSICAL HEALTH: ON AVERAGE, HOW MANY MINUTES DO YOU ENGAGE IN EXERCISE AT THIS LEVEL?: 20 MIN

## 2025-04-16 ASSESSMENT — SOCIAL DETERMINANTS OF HEALTH (SDOH): HOW OFTEN DO YOU GET TOGETHER WITH FRIENDS OR RELATIVES?: PATIENT DECLINED

## 2025-04-21 ENCOUNTER — OFFICE VISIT (OUTPATIENT)
Dept: FAMILY MEDICINE | Facility: OTHER | Age: 70
End: 2025-04-21
Payer: COMMERCIAL

## 2025-04-21 VITALS
HEART RATE: 66 BPM | WEIGHT: 204.5 LBS | SYSTOLIC BLOOD PRESSURE: 102 MMHG | OXYGEN SATURATION: 98 % | TEMPERATURE: 97.1 F | RESPIRATION RATE: 19 BRPM | HEIGHT: 69 IN | BODY MASS INDEX: 30.29 KG/M2 | DIASTOLIC BLOOD PRESSURE: 69 MMHG

## 2025-04-21 DIAGNOSIS — E78.5 HYPERLIPIDEMIA LDL GOAL <130: ICD-10-CM

## 2025-04-21 DIAGNOSIS — J44.89 CHRONIC BRONCHITIS WITH COPD (CHRONIC OBSTRUCTIVE PULMONARY DISEASE) (H): ICD-10-CM

## 2025-04-21 DIAGNOSIS — Z00.00 MEDICARE ANNUAL WELLNESS VISIT, SUBSEQUENT: Primary | ICD-10-CM

## 2025-04-21 DIAGNOSIS — I27.20 PULMONARY HYPERTENSION (H): ICD-10-CM

## 2025-04-21 DIAGNOSIS — E78.1 HYPERTRIGLYCERIDEMIA: ICD-10-CM

## 2025-04-21 DIAGNOSIS — Z12.11 SCREEN FOR COLON CANCER: ICD-10-CM

## 2025-04-21 DIAGNOSIS — N43.3 HYDROCELE OF TESTIS: ICD-10-CM

## 2025-04-21 DIAGNOSIS — Z12.5 SCREENING FOR PROSTATE CANCER: ICD-10-CM

## 2025-04-21 DIAGNOSIS — N18.31 CHRONIC KIDNEY DISEASE, STAGE 3A (H): ICD-10-CM

## 2025-04-21 DIAGNOSIS — I10 HTN, GOAL BELOW 140/90: ICD-10-CM

## 2025-04-21 DIAGNOSIS — K76.0 FATTY LIVER: ICD-10-CM

## 2025-04-21 LAB
ALBUMIN SERPL BCG-MCNC: 3.5 G/DL (ref 3.5–5.2)
ALP SERPL-CCNC: 97 U/L (ref 40–150)
ALT SERPL W P-5'-P-CCNC: 13 U/L (ref 0–70)
ANION GAP SERPL CALCULATED.3IONS-SCNC: 10 MMOL/L (ref 7–15)
AST SERPL W P-5'-P-CCNC: 19 U/L (ref 0–45)
BILIRUB SERPL-MCNC: 0.2 MG/DL
BUN SERPL-MCNC: 17.8 MG/DL (ref 8–23)
CALCIUM SERPL-MCNC: 9.1 MG/DL (ref 8.8–10.4)
CHLORIDE SERPL-SCNC: 106 MMOL/L (ref 98–107)
CHOLEST SERPL-MCNC: 117 MG/DL
CREAT SERPL-MCNC: 1.36 MG/DL (ref 0.67–1.17)
EGFRCR SERPLBLD CKD-EPI 2021: 57 ML/MIN/1.73M2
FASTING STATUS PATIENT QL REPORTED: YES
FASTING STATUS PATIENT QL REPORTED: YES
GLUCOSE SERPL-MCNC: 104 MG/DL (ref 70–99)
HCO3 SERPL-SCNC: 24 MMOL/L (ref 22–29)
HDLC SERPL-MCNC: 45 MG/DL
LDLC SERPL CALC-MCNC: 53 MG/DL
NONHDLC SERPL-MCNC: 72 MG/DL
POTASSIUM SERPL-SCNC: 5.2 MMOL/L (ref 3.4–5.3)
PROT SERPL-MCNC: 6.6 G/DL (ref 6.4–8.3)
PSA SERPL DL<=0.01 NG/ML-MCNC: 0.48 NG/ML (ref 0–4.5)
SODIUM SERPL-SCNC: 140 MMOL/L (ref 135–145)
TRIGL SERPL-MCNC: 95 MG/DL

## 2025-04-21 PROCEDURE — 36415 COLL VENOUS BLD VENIPUNCTURE: CPT | Performed by: PHYSICIAN ASSISTANT

## 2025-04-21 PROCEDURE — 80053 COMPREHEN METABOLIC PANEL: CPT | Performed by: PHYSICIAN ASSISTANT

## 2025-04-21 PROCEDURE — G0103 PSA SCREENING: HCPCS | Performed by: PHYSICIAN ASSISTANT

## 2025-04-21 PROCEDURE — 80061 LIPID PANEL: CPT | Performed by: PHYSICIAN ASSISTANT

## 2025-04-21 RX ORDER — LISINOPRIL 20 MG/1
20 TABLET ORAL DAILY
Qty: 90 TABLET | Refills: 3 | Status: SHIPPED | OUTPATIENT
Start: 2025-04-21

## 2025-04-21 RX ORDER — CARVEDILOL 25 MG/1
25 TABLET ORAL 2 TIMES DAILY WITH MEALS
Qty: 180 TABLET | Refills: 3 | Status: SHIPPED | OUTPATIENT
Start: 2025-04-21

## 2025-04-21 RX ORDER — ATORVASTATIN CALCIUM 40 MG/1
40 TABLET, FILM COATED ORAL DAILY
Qty: 90 TABLET | Refills: 3 | Status: SHIPPED | OUTPATIENT
Start: 2025-04-21

## 2025-04-21 ASSESSMENT — PAIN SCALES - GENERAL: PAINLEVEL_OUTOF10: NO PAIN (0)

## 2025-04-21 NOTE — PATIENT INSTRUCTIONS
Patient Education     Okay to remain off furosemide for now unless you start to notice increased swelling or weight gain.    Follow-up annually.      Preventive Care Advice   This is general advice given by our system to help you stay healthy. However, your care team may have specific advice just for you. Please talk to your care team about your preventive care needs.  Nutrition  Eat 5 or more servings of fruits and vegetables each day.  Try wheat bread, brown rice and whole grain pasta (instead of white bread, rice, and pasta).  Get enough calcium and vitamin D. Check the label on foods and aim for 100% of the RDA (recommended daily allowance).  Lifestyle  Exercise at least 150 minutes each week  (30 minutes a day, 5 days a week).  Do muscle strengthening activities 2 days a week. These help control your weight and prevent disease.  No smoking.  Wear sunscreen to prevent skin cancer.  Have a dental exam and cleaning every 6 months.  Yearly exams  See your health care team every year to talk about:  Any changes in your health.  Any medicines your care team has prescribed.  Preventive care, family planning, and ways to prevent chronic diseases.  Shots (vaccines)   HPV shots (up to age 26), if you've never had them before.  Hepatitis B shots (up to age 59), if you've never had them before.  COVID-19 shot: Get this shot when it's due.  Flu shot: Get a flu shot every year.  Tetanus shot: Get a tetanus shot every 10 years.  Pneumococcal, hepatitis A, and RSV shots: Ask your care team if you need these based on your risk.  Shingles shot (for age 50 and up)  General health tests  Diabetes screening:  Starting at age 35, Get screened for diabetes at least every 3 years.  If you are younger than age 35, ask your care team if you should be screened for diabetes.  Cholesterol test: At age 39, start having a cholesterol test every 5 years, or more often if advised.  Bone density scan (DEXA): At age 50, ask your care team if you  should have this scan for osteoporosis (brittle bones).  Hepatitis C: Get tested at least once in your life.  STIs (sexually transmitted infections)  Before age 24: Ask your care team if you should be screened for STIs.  After age 24: Get screened for STIs if you're at risk. You are at risk for STIs (including HIV) if:  You are sexually active with more than one person.  You don't use condoms every time.  You or a partner was diagnosed with a sexually transmitted infection.  If you are at risk for HIV, ask about PrEP medicine to prevent HIV.  Get tested for HIV at least once in your life, whether you are at risk for HIV or not.  Cancer screening tests  Cervical cancer screening: If you have a cervix, begin getting regular cervical cancer screening tests starting at age 21.  Breast cancer scan (mammogram): If you've ever had breasts, begin having regular mammograms starting at age 40. This is a scan to check for breast cancer.  Colon cancer screening: It is important to start screening for colon cancer at age 45.  Have a colonoscopy test every 10 years (or more often if you're at risk) Or, ask your provider about stool tests like a FIT test every year or Cologuard test every 3 years.  To learn more about your testing options, visit:   .  For help making a decision, visit:   https://bit.ly/eh12754.  Prostate cancer screening test: If you have a prostate, ask your care team if a prostate cancer screening test (PSA) at age 55 is right for you.  Lung cancer screening: If you are a current or former smoker ages 50 to 80, ask your care team if ongoing lung cancer screenings are right for you.  For informational purposes only. Not to replace the advice of your health care provider. Copyright   2023 [employer]. All rights reserved. Clinically reviewed by the [facility]. [employer] [id number] - REV 01/24.  Preventing Falls: Care Instructions  Injuries and health problems such as trouble  walking or poor eyesight can increase your risk of falling. So can some medicines. But there are things you can do to help prevent falls. You can exercise to get stronger. You can also arrange your home to make it safer.    Talk to your doctor about the medicines you take. Ask if any of them increase the risk of falls and whether they can be changed or stopped.   Try to exercise regularly. It can help improve your strength and balance. This can help lower your risk of falling.         Practice fall safety and prevention.   Wear low-heeled shoes that fit well and give your feet good support. Talk to your doctor if you have foot problems that make this hard.  Carry a cellphone or wear a medical alert device that you can use to call for help.  Use stepladders instead of chairs to reach high objects. Don't climb if you're at risk for falls. Ask for help, if needed.  Wear the correct eyeglasses, if you need them.        Make your home safer.   Remove rugs, cords, clutter, and furniture from walkways.  Keep your house well lit. Use night-lights in hallways and bathrooms.  Install and use sturdy handrails on stairways.  Wear nonskid footwear, even inside. Don't walk barefoot or in socks without shoes.        Be safe outside.   Use handrails, curb cuts, and ramps whenever possible.  Keep your hands free by using a shoulder bag or backpack.  Try to walk in well-lit areas. Watch out for uneven ground, changes in pavement, and debris.  Be careful in the winter. Walk on the grass or gravel when sidewalks are slippery. Use de-icer on steps and walkways. Add non-slip devices to shoes.    Put grab bars and nonskid mats in your shower or tub and near the toilet. Try to use a shower chair or bath bench when bathing.   Get into a tub or shower by putting in your weaker leg first. Get out with your strong side first. Have a phone or medical alert device in the bathroom with you.   Where can you learn more?  Go to  "https://www.[employer].net/patiented  Enter G117 in the search box to learn more about \"Preventing Falls: Care Instructions.\"  Current as of: July 31, 2024  Content Version: 14.4 2024-2025 [employer].   Care instructions adapted under license by your healthcare professional. If you have questions about a medical condition or this instruction, always ask your healthcare professional. [employer] disclaims any warranty or liability for your use of this information.    Bladder Training: Care Instructions  Your Care Instructions     Bladder training is used to treat urge incontinence and stress incontinence. Urge incontinence means that the need to urinate comes on so fast that you can't get to a toilet in time. Stress incontinence means that you leak urine because of pressure on your bladder. For example, it may happen when you laugh, cough, or lift something heavy.  Bladder training can increase how long you can wait before you have to urinate. It can also help your bladder hold more urine. And it can give you better control over the urge to urinate.  It is important to remember that bladder training takes a few weeks to a few months to make a difference. You may not see results right away, but don't give up.  Follow-up care is a key part of your treatment and safety. Be sure to make and go to all appointments, and call your doctor if you are having problems. It's also a good idea to know your test results and keep a list of the medicines you take.  How can you care for yourself at home?  Work with your doctor to come up with a bladder training program that is right for you. You may use one or more of the following methods.  Delayed urination  In the beginning, try to keep from urinating for 5 minutes after you first feel the need to go.  While you wait, take deep, slow breaths to relax. Kegel exercises can also help you delay the need to go to the bathroom.  After some practice, when you can " "easily wait 5 minutes to urinate, try to wait 10 minutes before you urinate.  Slowly increase the waiting period until you are able to control when you have to urinate.  Scheduled urination  Empty your bladder when you first wake up in the morning.  Schedule times throughout the day when you will urinate.  Start by going to the bathroom every hour, even if you don't need to go.  Slowly increase the time between trips to the bathroom.  When you have found a schedule that works well for you, keep doing it.  If you wake up during the night and have to urinate, do it. Apply your schedule to waking hours only.  Kegel exercises  These tighten and strengthen pelvic muscles, which can help you control the flow of urine. (If doing these exercises causes pain, stop doing them and talk with your doctor.) To do Kegel exercises:  Squeeze your muscles as if you were trying not to pass gas. Or squeeze your muscles as if you were stopping the flow of urine. Your belly, legs, and buttocks shouldn't move.  Hold the squeeze for 3 seconds, then relax for 5 to 10 seconds.  Start with 3 seconds, then add 1 second each week until you are able to squeeze for 10 seconds.  Repeat the exercise 10 times a session. Do 3 to 8 sessions a day.  When should you call for help?  Watch closely for changes in your health, and be sure to contact your doctor if:    Your incontinence is getting worse.     You do not get better as expected.   Where can you learn more?  Go to https://www.[employer].net/patiented  Enter V684 in the search box to learn more about \"Bladder Training: Care Instructions.\"  Current as of: April 30, 2024  Content Version: 14.4    3423-3377 [employer].   Care instructions adapted under license by your healthcare professional. If you have questions about a medical condition or this instruction, always ask your healthcare professional. [employer] disclaims any warranty or liability for your use of this " information.

## 2025-04-21 NOTE — PROGRESS NOTES
Preventive Care Visit  [facility]  Hamlet Roberts PA-C, Family Medicine  Apr 21, 2025    Assessment & Plan       ICD-10-CM    1. Medicare annual wellness visit, subsequent  Z00.00       2. Hypertriglyceridemia  E78.1       3. HTN, goal below 140/90  I10 carvedilol (COREG) 25 MG tablet     lisinopril (ZESTRIL) 20 MG tablet     Comprehensive metabolic panel (BMP + Alb, Alk Phos, ALT, AST, Total. Bili, TP)     Comprehensive metabolic panel (BMP + Alb, Alk Phos, ALT, AST, Total. Bili, TP)      4. Hyperlipidemia LDL goal <130  E78.5 atorvastatin (LIPITOR) 40 MG tablet     Lipid panel reflex to direct LDL Fasting     Lipid panel reflex to direct LDL Fasting      5. Hydrocele of testis  N43.3       6. Chronic bronchitis with COPD (chronic obstructive pulmonary disease) (H)  J44.89       7. Fatty liver  K76.0       8. Pulmonary hypertension (H)  I27.20       9. Chronic kidney disease, stage 3a (H)  N18.31       10. Screen for colon cancer  Z12.11 COLOGUARD([employer])      11. Screening for prostate cancer  Z12.5 PSA, screen     PSA, screen          1. Medicare wellness visit completed. He declines all vaccines.    2, 4. Continue Lipitor as directed.    3. Blood pressure is stable on current medications. Will check an updated CMP today.    5. Chronic large left hydrocele that he thinks is growing. It is not painful but more of a nuisance, especially when sitting on a toilet as sometime the water hits his scrotum. We discussed referral for surgical intervention if he desires and he will think about it.    6. This is managed by pulmonology. He was recently switched from Trelegy to Stiolto but often forgets the evening dose. He will work on being more consistent.    7. He still drinks 2 beers nightly. I recommend he avoid daily drinking.    8. He stopped his Lasix last week due to stomach upset but it sounds like this was more of a viral gastroenteritis. He will remain off Lasix for now but if he  starts to gain more weight or has increased leg swelling or shortness of breath, he should restart the every other day dosing.    9. Continue with a low salt diet and NSAID avoidance with good hydration.    10. Updated Cologuard ordered.    11. Updated PSA ordered.    The longitudinal plan of care for the diagnosis(es)/condition(s) as documented were addressed during this visit. Due to the added complexity in care, I will continue to support Ismael in the subsequent management and with ongoing continuity of care.    Follow-up annually.     Patient has been advised of split billing requirements and indicates understanding: Yes      Counseling  Appropriate preventive services were addressed with this patient via screening, questionnaire, or discussion as appropriate for fall prevention, nutrition, physical activity, Tobacco-use cessation, social engagement, weight loss and cognition.  Checklist reviewing preventive services available has been given to the patient.  Reviewed patient's diet, addressing concerns and/or questions.   He is at risk for lack of exercise and has been provided with information to increase physical activity for the benefit of his well-being.   The patient was instructed to see the dentist every 6 months.   Information on urinary incontinence and treatment options given to patient.       David Guevara is a 69 year old, presenting for the following:  Wellness Visit        4/21/2025    10:38 AM   Additional Questions   Roomed by myla   Accompanied by self       HPI    Medication Followup of Furosemide  Taking Medication as prescribed: no  Side Effects:  diarrhea and cramps  Medication Helping Symptoms:  stopped taking medication last Thursday, had stomach upset, patient is wondering why he should still be on it    He had stomach cramping and diarrhea last week along with some nausea. He is unsure if he was sick or if this is potentially a medication side effects. He was still taking Lasix  every other day and remembers this causing some stomach upset when he initially started it so he stopped it last week. He is maybe 2 pounds up since stopping it but denies any worsening shortness of breath. He did change his inhaler recently as insurance stopped covering Trelegy. The new inhaler does not seem to work quite as well but he admits to frequently forgetting to take the evening dose.    Advance Care Planning    Discussed advance care planning with patient; informed AVS has link to [facility] [employer].        4/16/2025   General Health   How would you rate your overall physical health? Good   Feel stress (tense, anxious, or unable to sleep) Not at all         4/16/2025   Nutrition   Diet: Low salt         4/16/2025   Exercise   Days per week of moderate/strenous exercise 3 days   Average minutes spent exercising at this level 20 min         4/16/2025   Social Factors   Frequency of gathering with friends or relatives Patient declined   Worry food won't last until get money to buy more Patient declined   Food not last or not have enough money for food? Patient declined   Do you have housing? (Housing is defined as stable permanent housing and does not include staying ouside in a car, in a tent, in an abandoned building, in an overnight shelter, or couch-surfing.) Patient declined   Are you worried about losing your housing? No   Lack of transportation? No   Unable to get utilities (heat,electricity)? No         4/21/2025   Fall Risk   Gait Speed Test (Document in seconds) 5.28   Gait Speed Test Interpretation Greater than 5.01 seconds - ABNORMAL          4/16/2025   Activities of Daily Living- Home Safety   Needs help with the following daily activites None of the above   Safety concerns in the home None of the above         4/16/2025   Dental   Dentist two times every year? (!) NO         4/16/2025   Hearing Screening   Hearing concerns? None of the above         4/16/2025   Driving Risk Screening    Patient/family members have concerns about driving No         2025   General Alertness/Fatigue Screening   Have you been more tired than usual lately? No         2025   Urinary Incontinence Screening   Bothered by leaking urine in past 6 months Yes         Today's PHQ-2 Score:       2025     1:16 PM   PHQ-2 (  [employer])   Q1: Little interest or pleasure in doing things 0   Q2: Feeling down, depressed or hopeless 0   PHQ-2 Score 0    Q1: Little interest or pleasure in doing things Not at all   Q2: Feeling down, depressed or hopeless Not at all   PHQ-2 Score 0       Patient-reported           2025   Substance Use   Alcohol more than 3/day or more than 7/wk No   Do you have a current opioid prescription? No   How severe/bad is pain from 1 to 10? 8/10   Do you use any other substances recreationally? No     Social History     Tobacco Use    Smoking status: Former     Current packs/day: 0.00     Average packs/day: 1.3 packs/day for 41.0 years (51.3 ttl pk-yrs)     Types: Cigarettes     Start date: 10/1/1976     Quit date: 10/1/2017     Years since quittin.5     Passive exposure: Never (per pt)    Smokeless tobacco: Never   Vaping Use    Vaping status: Never Used   Substance Use Topics    Alcohol use: Yes     Comment: 12 pack of beer every 2 weeks    Drug use: Yes     Types: Marijuana     Comment: smoke PRN for back pain management       Last PSA:   PSA   Date Value Ref Range Status   2017 0.40 0 - 4 ug/L Final     Comment:     Assay Method:  Chemiluminescence using Siemens Vista analyzer     Prostate Specific Antigen Screen   Date Value Ref Range Status   2025 0.48 0.00 - 4.50 ng/mL Final     ASCVD Risk   The ASCVD Risk score (Brandon DK, et al., 2019) failed to calculate for the following reasons:    The valid total cholesterol range is 130 to 320 mg/dL      Reviewed and updated as needed this visit by Provider   Tobacco  Allergies  Meds  Problems  Med Hx  Surg Hx   Fam Hx            Current providers sharing in care for this patient include:  Patient Care Team:  Hamlet Roberts PA-C as PCP - General (Physician Assistant)  Germain Muñoz MD as MD (Hematology & Oncology)  Hamlet Roberts PA-C as Assigned PCP  Cyo Bolaños MD as Assigned Rheumatology Provider  Bhavya Torres MD as Assigned Nephrology Provider  Bryn Valdovinos MD as MD (Critical Care)  Bryn Valdovinos MD as Assigned Pulmonology Provider  Magdalena Brown, RN as Registered Nurse (Vascular Surgery)  Drea Aguilar APRN CNP as Assigned Surgical Provider  Anila Khoury PA-C as Assigned Cancer Care Provider  Jaylen Perez MD as Assigned Heart and Vascular Provider  Aman Valerio MD as Assigned Heart and Vascular Surgical Provider    The following health maintenance items are reviewed in Epic and correct as of today:  Health Maintenance   Topic Date Due    HF ACTION PLAN  Never done    ZOSTER IMMUNIZATION (1 of 2) Never done    RSV VACCINE (1 - Risk 60-74 years 1-dose series) Never done    COLORECTAL CANCER SCREENING  07/26/2019    Pneumococcal Vaccine: 50+ Years (3 of 3 - PCV20 or PCV21) 01/22/2021    INFLUENZA VACCINE (1) 09/01/2024    COVID-19 Vaccine (3 - 2024-25 season) 09/01/2024    LUNG CANCER SCREENING  10/09/2025    MICROALBUMIN  02/26/2026    CBC  02/26/2026    URIC ACID  02/26/2026    HEMOGLOBIN  02/26/2026    MEDICARE ANNUAL WELLNESS VISIT  04/21/2026    ALT  04/21/2026    BMP  04/21/2026    LIPID  04/21/2026    ANNUAL REVIEW OF HM ORDERS  04/21/2026    FALL RISK ASSESSMENT  04/21/2026    DIABETES SCREENING  04/21/2028    ADVANCE CARE PLANNING  08/13/2029    DTAP/TDAP/TD IMMUNIZATION (4 - Td or Tdap) 11/25/2029    TSH W/FREE T4 REFLEX  Completed    SPIROMETRY  Completed    COPD ACTION PLAN  Completed    PHQ-2 (once per calendar year)  Completed    URINALYSIS  Completed    AORTIC ANEURYSM SCREENING (SYSTEM ASSIGNED)  Completed    HEPATITIS C SCREENING   "Addressed    HPV IMMUNIZATION  Aged Out    MENINGITIS IMMUNIZATION  Aged Out         Review of Systems  Constitutional, HEENT, cardiovascular, pulmonary, GI, , musculoskeletal, neuro, skin, endocrine and psych systems are negative, except as otherwise noted.     Objective    Exam  /69   Pulse 66   Temp 97.1  F (36.2  C) (Temporal)   Resp 19   Ht 1.761 m (5' 9.34\")   Wt 92.8 kg (204 lb 8 oz)   SpO2 98%   BMI 29.91 kg/m     Estimated body mass index is 29.91 kg/m  as calculated from the following:    Height as of this encounter: 1.761 m (5' 9.34\").    Weight as of this encounter: 92.8 kg (204 lb 8 oz).    Physical Exam  GENERAL: healthy, alert and no distress  EYES: Eyes grossly normal to inspection, PERRL and conjunctivae and sclerae normal  HENT: ear canals and TM's normal, nose and mouth without ulcers or lesions  NECK: no adenopathy, no asymmetry, masses, or scars and thyroid normal to palpation  RESP: lungs clear to auscultation - no rales, rhonchi or wheezes  CV: regular rate and rhythm, normal S1 S2, no S3 or S4, no murmur, click or rub, no peripheral edema and peripheral pulses strong  ABDOMEN: soft, nontender, no hepatosplenomegaly, no masses and bowel sounds normal   (male): normal male circumcised genitalia without lesions or urethral discharge, no hernia. Baseball sized left hydrocele.  MS: no gross musculoskeletal defects noted, no edema. FROM to all extremities. No spinal tenderness.   SKIN: no suspicious lesions or rashes  NEURO: Normal strength and tone, mentation intact and speech normal. Cranial nerves II-XII are grossly intact. DTRs are 2+/4 throughout and symmetric. Gait is stable. .  PSYCH: mentation appears normal, affect normal/bright          4/21/2025   Mini Cog   Clock Draw Score 2 Normal   3 Item Recall 2 objects recalled   Mini Cog Total Score 4         Signed Electronically by: Hamlet Roberts PA-C    "

## 2025-05-15 ENCOUNTER — TELEPHONE (OUTPATIENT)
Dept: FAMILY MEDICINE | Facility: OTHER | Age: 70
End: 2025-05-15
Payer: COMMERCIAL

## 2025-05-15 DIAGNOSIS — I50.9 ACUTE ON CHRONIC CONGESTIVE HEART FAILURE, UNSPECIFIED HEART FAILURE TYPE (H): ICD-10-CM

## 2025-05-15 DIAGNOSIS — I27.20 PULMONARY HYPERTENSION (H): ICD-10-CM

## 2025-05-15 RX ORDER — FUROSEMIDE 20 MG/1
20 TABLET ORAL DAILY
Qty: 90 TABLET | Refills: 3 | Status: SHIPPED | OUTPATIENT
Start: 2025-05-15

## 2025-05-15 NOTE — TELEPHONE ENCOUNTER
Has been taking this. Taking for 2 days, skip, take for a day or two, skip. Gaining weight 3-4 lbs.   Breathing changes due to inhalers.     Called and spoke with patient. He has been needing to take the lasix again due to weight gain. He will take for a day or two, then stop for 1-2 days, then take again for 1-2 days.   He will usually see a 3-4 lb weight gain and this will be the need to take lasix.     He reports recent inhaler changes so he is still getting used to this, otherwise breathing is stable.     Areli Abdullahi BSN, RN

## 2025-05-15 NOTE — TELEPHONE ENCOUNTER
Let's just go back to daily use of Lasix if he is needing it this often. New Rx sent. Thanks!    Gilberto Roberts PA-C

## 2025-05-15 NOTE — TELEPHONE ENCOUNTER
Can we call and see if his leg swelling/breathing as been stable off the Lasix (water pill) or if he has been needing it lately? Thanks!    Gilberto Roberts PA-C

## 2025-06-27 PROBLEM — Z98.1 HISTORY OF LUMBAR FUSION: Status: ACTIVE | Noted: 2025-06-27

## 2025-06-27 PROBLEM — Z98.890 H/O LUMBAR DISCECTOMY: Status: ACTIVE | Noted: 2025-06-27

## 2025-07-07 ENCOUNTER — TELEPHONE (OUTPATIENT)
Dept: FAMILY MEDICINE | Facility: OTHER | Age: 70
End: 2025-07-07
Payer: COMMERCIAL

## 2025-07-07 DIAGNOSIS — M62.830 BACK MUSCLE SPASM: ICD-10-CM

## 2025-07-07 DIAGNOSIS — G89.29 ACUTE ON CHRONIC BACK PAIN: Primary | ICD-10-CM

## 2025-07-07 DIAGNOSIS — M54.9 ACUTE ON CHRONIC BACK PAIN: Primary | ICD-10-CM

## 2025-07-07 DIAGNOSIS — Z98.1 HISTORY OF LUMBAR FUSION: ICD-10-CM

## 2025-07-07 DIAGNOSIS — Z98.890 H/O LUMBAR DISCECTOMY: ICD-10-CM

## 2025-07-07 NOTE — TELEPHONE ENCOUNTER
Physical therapy would be the next best steps but also with his previous low back surgical history, would be best to visit back with neurosurgery      Thank you,    Martin Roberts MD    [address] [address]   Ph: [phone]  Fax:[phone]

## 2025-07-07 NOTE — TELEPHONE ENCOUNTER
FYI - Status Update    Who is Calling: patient    Update: patient is still experiencing back pain. States the pain has slightly improved with medication but would like to discuss next steps    Does caller want a call/response back: Yes     Could we send this information to you in [employer] or would you prefer to receive a phone call?:   Patient would prefer a phone call   Okay to leave a detailed message?: Yes at [phone]

## 2025-07-08 NOTE — TELEPHONE ENCOUNTER
Called and spoke with Patient. Advised of providers message below. Patient verbalizes understanding and agrees with plan. Provided patient phone numbers to call for scheduling (neurology [phone] / [phone]) if he does not receive call in the next few days for scheduling. Denies further questions at time of call.    Diana Osorio RN on 7/8/2025 at 5:27 PM

## 2025-07-08 NOTE — TELEPHONE ENCOUNTER
MRI and referral to neurosurgery placed. Continue with physical therapy  as well      Thank you,    Martin Roberts MD    [address] [address]   Ph: [phone]  Fax:[phone]

## 2025-07-09 DIAGNOSIS — M54.9 ACUTE ON CHRONIC BACK PAIN: ICD-10-CM

## 2025-07-09 DIAGNOSIS — G89.29 ACUTE ON CHRONIC BACK PAIN: ICD-10-CM

## 2025-07-09 DIAGNOSIS — M62.830 BACK MUSCLE SPASM: ICD-10-CM

## 2025-07-09 RX ORDER — CYCLOBENZAPRINE HCL 5 MG
5-10 TABLET ORAL 3 TIMES DAILY PRN
Qty: 60 TABLET | Refills: 0 | Status: SHIPPED | OUTPATIENT
Start: 2025-07-09

## 2025-07-09 RX ORDER — GABAPENTIN 300 MG/1
300 CAPSULE ORAL 3 TIMES DAILY
Qty: 90 CAPSULE | Refills: 0 | Status: SHIPPED | OUTPATIENT
Start: 2025-07-09

## 2025-07-09 NOTE — TELEPHONE ENCOUNTER
Called patient and relayed message below. Patient verbalized understanding and all questions answered.     Golden Nesbitt RN  [facility]  July 9, 2025

## 2025-07-09 NOTE — TELEPHONE ENCOUNTER
Patient saw you on 6/27 and was prescribed prednisone x5 days. Patient does have appointment scheduled with PT and MRI scheduled.     Would you like to refill again or have patient follow up with a visit?     DREA AndradeN, RN

## 2025-07-09 NOTE — TELEPHONE ENCOUNTER
Refill of Flexeril was placed.  Instead of doing another prednisone burst, I sent in a prescription of gabapentin to see if this can help with his pain.  Please make him aware that at times this medication can make people drowsy.  Please have him continue to follow-up with MRI, neurosurgery, and physical therapy      Thank you,    Martin Roberts MD    [address] [address]   Ph: [phone]  Fax:[phone]

## 2025-07-14 ENCOUNTER — PATIENT OUTREACH (OUTPATIENT)
Dept: CARE COORDINATION | Facility: CLINIC | Age: 70
End: 2025-07-14
Payer: COMMERCIAL

## 2025-07-21 DIAGNOSIS — Z98.1 HISTORY OF LUMBAR FUSION: Primary | ICD-10-CM

## 2025-07-23 ENCOUNTER — ANCILLARY PROCEDURE (OUTPATIENT)
Dept: MRI IMAGING | Facility: CLINIC | Age: 70
End: 2025-07-23
Attending: STUDENT IN AN ORGANIZED HEALTH CARE EDUCATION/TRAINING PROGRAM
Payer: COMMERCIAL

## 2025-07-23 DIAGNOSIS — G89.29 ACUTE ON CHRONIC BACK PAIN: ICD-10-CM

## 2025-07-23 DIAGNOSIS — M62.830 BACK MUSCLE SPASM: ICD-10-CM

## 2025-07-23 DIAGNOSIS — Z98.890 H/O LUMBAR DISCECTOMY: ICD-10-CM

## 2025-07-23 DIAGNOSIS — Z98.1 HISTORY OF LUMBAR FUSION: ICD-10-CM

## 2025-07-23 DIAGNOSIS — M54.9 ACUTE ON CHRONIC BACK PAIN: ICD-10-CM

## 2025-07-23 PROCEDURE — 72148 MRI LUMBAR SPINE W/O DYE: CPT | Performed by: RADIOLOGY

## 2025-07-28 ENCOUNTER — OFFICE VISIT (OUTPATIENT)
Dept: NEUROSURGERY | Facility: CLINIC | Age: 70
End: 2025-07-28
Attending: STUDENT IN AN ORGANIZED HEALTH CARE EDUCATION/TRAINING PROGRAM
Payer: COMMERCIAL

## 2025-07-28 ENCOUNTER — PRE VISIT (OUTPATIENT)
Dept: NEUROSURGERY | Facility: CLINIC | Age: 70
End: 2025-07-28

## 2025-07-28 VITALS
DIASTOLIC BLOOD PRESSURE: 66 MMHG | SYSTOLIC BLOOD PRESSURE: 99 MMHG | WEIGHT: 197 LBS | BODY MASS INDEX: 29.18 KG/M2 | HEART RATE: 64 BPM | HEIGHT: 69 IN

## 2025-07-28 DIAGNOSIS — G89.29 ACUTE ON CHRONIC BACK PAIN: ICD-10-CM

## 2025-07-28 DIAGNOSIS — M80.08XA AGE-RELATED OSTEOPOROSIS WITH CURRENT PATHOLOGICAL FRACTURE, VERTEBRA(E), INITIAL ENCOUNTER FOR FRACTURE (H): ICD-10-CM

## 2025-07-28 DIAGNOSIS — Z98.1 HISTORY OF LUMBAR FUSION: ICD-10-CM

## 2025-07-28 DIAGNOSIS — M25.471 PAIN AND SWELLING OF RIGHT ANKLE: ICD-10-CM

## 2025-07-28 DIAGNOSIS — M54.9 ACUTE ON CHRONIC BACK PAIN: ICD-10-CM

## 2025-07-28 DIAGNOSIS — M25.571 PAIN AND SWELLING OF RIGHT ANKLE: ICD-10-CM

## 2025-07-28 DIAGNOSIS — M62.830 BACK MUSCLE SPASM: ICD-10-CM

## 2025-07-28 DIAGNOSIS — S32.020A CLOSED COMPRESSION FRACTURE OF L2 LUMBAR VERTEBRA, INITIAL ENCOUNTER (H): Primary | ICD-10-CM

## 2025-07-28 DIAGNOSIS — M80.00XA OSTEOPOROSIS WITH CURRENT PATHOLOGICAL FRACTURE, UNSPECIFIED OSTEOPOROSIS TYPE, INITIAL ENCOUNTER: ICD-10-CM

## 2025-07-28 DIAGNOSIS — Z98.890 H/O LUMBAR DISCECTOMY: ICD-10-CM

## 2025-07-28 RX ORDER — CYCLOBENZAPRINE HCL 5 MG
5-10 TABLET ORAL 3 TIMES DAILY PRN
Qty: 60 TABLET | Refills: 0 | Status: SHIPPED | OUTPATIENT
Start: 2025-07-28

## 2025-07-28 NOTE — PROGRESS NOTES
[facility] NEUROSURGERY CLINIC [address] [address]  Phone: [phone]      Patient:  Bucky Edgar, Date of birth 1955, 69 year old, male  Referring Provider Martin Roberts MD  [address] [place] [id number]    ASSESSMENT & PLAN:  Patient with a mild, subacute L2 seComp fracture on MRI 7/23/25.  Stable on XR today (1 week later).  Patient notes increased pain in back with doing lawn work about 1 month ago.  Pain limited right >left leg function with chronic right leg BTK numbness.  Right ankle swelling/pain new w/ fall 2 weeks ago.      New L2 fracture w/o trauma puts him in the category of having osteoporosis.      -right ankle XR ordered - directed patient to f/u with PCP for this  -lumbar XR in 2 weeks with f/u with me to check stability.  If progresses, will consider bracing or vertebroplasty.    -no lifting more than 10 pounds, avoid bending/twisting activities.  -continue with gabapentin and Flexeril (refilled)  -Referral for bone health with endocrine.  DEXA and vitamin D lab prior to that visit.  Discussed needing to get on medication to help with bone health.        I spent 72 minutes on  the date of the encounter doing patient care, independent review and interpretation of medical records/imaging, reviewing old records.    History of Present Illness:      Referred for   Acute on chronic back pain   History of lumbar fusion   Back muscle spasm   H/O lumbar discectomy     PMH -  lumbar surgeries x3 (2002 rev R L4/5 decomp, 2004 ant/post L3-5 fusion both by Dr. Mcgregor), chronic right foot numbness, iliac stents 2021 (May-Thurner syndrome), LLE DVT 2017 07/28/2025 Visit:  Patient notes his back pain is worse in the last few weeks.  This pain is in the middle of his upper back.  He fell 2 weeks ago because his back pain was sharp and he dropped.  He twisted his right foot.  His right foot is still swollen and he has been icing it.  He  notes a constant aching pain in his back.  The pain can be sharp with certain movements.  He notes persistent right foot n/t since the prior surgery.  He notes right thigh n/t that is newer with his back pain.  He denies b/b problems.      Patient recalls cutting lawn and bending over to get off the riding  and felt a pop.        Conservative Treatment:  Tylenol regular strength - takes 2 as needed  Flexeril 5 mg tid PRN - helps with pain  Prednisone pack x1 - helped to take edge off  Gabapentin 300 mg tid- unable to take more than bid because it causes cognitive issues.    PT - Not started yet, but it has been ordered.  Patient does not feel he can handle doing PT in his current state.        Tobacco use:  No    IMAGING   Imaging independently reviewed.    Lumbar F/E XR 7/28/25 - Findings:  Standing  AP, lateral including flexion extension views of the lumbar  spine were obtained.  5 lumbar type vertebral bodies are assumed for the purpose of this  dictation.  Redemonstration of the mild superior endplate compression fracture at  L2 without evidence of retropulsion. Stable postoperative changes at  L3-4 and L4-5 with intervertebral disc cage with associated bony  fusion.   There is multilevel degenerative changes of the lumbar spine, most  pronounced at L5-S1. Trace retrolisthesis of L2 on L3 without  substantial change with flexion and extension.   Aortobiiliac endograft. The visualized bowel gas pattern is  non-obstructive.  Persistent blunting of the right costophrenic angle with rounded  opacities in the right lower lobe, similar compared to CTA 10/9/2024.  Impression:  1. Redemonstration of the mild superior endplate compression fracture  at L2.  2. Bony fusion demonstrated at L3-L5.  3. Trace retrolisthesis of L2 on L3 without evidence of dynamic  instability with flexion or extension.  4. Persistent small right pleural effusion with rounded opacities in  the included right lower lobe. Findings are  similar compared to CTA  dated 10/9/2024 with differential including rounded atelectasis versus  pneumonia. Clinical correlation recommended.      MR Lumbar Spine w/o Contrast  Result Date: 7/24/2025  EXAM: MR LUMBAR SPINE W/O CONTRAST LOCATION: [facility] DATE: 7/23/2025 INDICATION:  Acute on chronic back pain, Acute on chronic back pain, History of lumbar fusion, Back muscle spasm, H/O lumbar discectomy COMPARISON: MRI 12/15/2018 TECHNIQUE: Routine Lumbar Spine MRI without IV contrast. FINDINGS: Nomenclature is based on 5 lumbar type vertebral bodies. Mild thoracolumbar dextrocurvature centered at L2. No subluxation. Acute to early subacute superior endplate compression fracture at L2, with less than 20% vertebral height loss. No associated retropulsion or involvement of posterior elements. Mild Modic type one endplate edema anteriorly at T12-L1. Solid interbody bony fusion from L3 through L5. Normal distal spinal cord and cauda equina with conus medullaris at T12-L1. Posterior paraspinal muscular atrophy and postoperative changes. Aortobiiliac stent graft with abdominal aortic aneurysm measuring up to 5.1 cm at the L3 level. Unremarkable visualized bony pelvis. T12-L1: Mild loss of disc height and signal. Circumferential annular bulge and ventral bridging osteophyte. Mild facet arthropathy. No spinal canal or neural foraminal stenosis. L1-L2: Disc desiccation. Minor accentuation of disc height due to L2 compression fracture. Slight annular bulge. Mild facet arthropathy. No spinal canal stenosis. Mild bilateral neural foraminal stenosis. L2-L3: Disc desiccation with preserved disc height. Slight annular bulge and shallow right foraminal and lateral disc protrusion. Mild to moderate facet arthropathy and ligamentum flavum thickening. Mild spinal canal stenosis. Mild bilateral neural foraminal stenosis, greater on the right. L3-L4: Anterior and posterior bony fusion. No significant spinal  canal stenosis. Low-grade neural foraminal narrowing bilaterally without interval change. L4-L5: Anterior and posterior bony fusion. No spinal canal stenosis. Unchanged mild bilateral neural foraminal stenosis. L5-S1: Disc desiccation with subtly progressive disc height loss. Slight annular bulge without focal disc herniation. Moderate bilateral facet arthropathy. Mild narrowing of the lateral recesses without central spinal canal stenosis. Mild to moderate right neural foraminal stenosis. Mild left neural foraminal stenosis.   IMPRESSION: 1.  New acute to early subacute superior endplate compression fracture at L2 with less than 20% vertebral height loss. No definite fracture involvement of the posterior elements or significant vertebral retropulsion. 2.  Changes of solid bony fusion from L3 through L5 and mildly progressive lumbar spondylosis elsewhere, including progressive disc degeneration at L5-S1 compared to the 2018 MRI. 3.  At L2-3, mild spinal canal stenosis. No high-grade central spinal canal stenosis elsewhere. 4.  Mild diffuse neural foraminal stenosis throughout the lumbar spine as detailed above, greatest on the right at L5-S1.    CTA Chest Abdomen Pelvis w Contrast  Result Date: 10/9/2024  CTA CHEST ABDOMEN PELVIS  10/9/2024 1:47 PM CLINICAL HISTORY: Abdominal aortic aneurysm. Left iliac venous stent.. Aortobiiliac endograft placed 8/16/2024. COMPARISONS: PET CT 5/18/2024 REFERRING PROVIDER: JOHN RODRIGES C TECHNIQUE: Unenhanced CT chest, abdomen, pelvis. After the uneventful administration of intravenous contrast, EKG gated CTA aortic root. CTA chest, abdomen, pelvis. 90 second delay CT abdomen pelvis. Coronal and sagittal reconstructions were produced. Lung MIP produced. CONTRAST: 125 mL Isovue 370 DOSE (DLP): 2754 mGy*cm FINDINGS: CTA: Aortobiiliac graft struts extend across the left renal artery origin to L1-L2. Limbs end in their respective common iliac arteries. Endograft patent. No  endoleak. Excluded aneurysm sac measures 55 mm in AP diameter in the sagittal reconstruction at L3. Left carotid artery originates immediately adjacent to the right innominate artery. Right innominate, subclavian, and carotid, left carotid, and left subclavian arteries patent. Celiac, superior mesenteric, and bilateral renal arteries patent. Bilateral inferior phrenic arteries originate from the celiac artery. Accessory left hepatic artery originates from the left gastric artery. Inferior mesenteric artery origin occluded. Reconstituted distally by collaterals. Right common, internal, and external iliac arteries patent. Right corona mortis. Distal left common iliac artery plaque ulceration above the bifurcation. Left common, internal, and external iliac arteries patent. Bilateral common femoral arteries patent. Left common iliac venous stent. Intraluminal densities in the non contrast phase. Stent otherwise patent. CT: Right effusion. Lower lobe consolidation. 3 mm right upper lobe nodule. 4 mm left upper lobe nodule. Emphysematous changes. Bilateral thyroid nodules. Calcified splenic granulomas. Subcentimeter renal hypodensities, too small to characterize. Diverticulosis. Left hydrocele. L3-L5 fusion.   IMPRESSION: 1. Aortobiiliac endograft patent. No endoleak. Excluded aneurysm sac measures 55 mm in diameter. 2. Right effusion and lower lobe consolidation. Correlate for pneumonia. 3. Sub 6 mm pulmonary nodules. Per Fleischner Society recommendations, if the patient is at low risk for lung cancer, no further follow up is needed. If the patient is at high risk for lung cancer, optional chest CT could be performed in 12 months for reevaluation. 4. Bilateral thyroid nodules. If clinically indicated, further evaluation with thyroid ultrasound could be performed. 5. Left common iliac venous stent patent. MARY PADILLA MD   SYSTEM ID:  [id number]    PET Oncology (Eyes to Thighs)  Result Date: 5/21/2024  Combined Report of:  PET and CT on 5/18/2024 9:28 AM: 1. PET of the neck, chest, abdomen, and pelvis. 2. PET CT Fusion for Attenuation Correction and Anatomical Localization. 3. Diagnostic CT of the chest, abdomen and pelvis with intravenous contrast obtained for diagnostic interpretation. 4. 3D MIP and PET-CT fused images were processed on an independent workstation and archived to PACS and reviewed by a radiologist. Technique: 1. PET: The patient received 11.99 mCi of F-18-FDG. The serum glucose was 92 mg/dL prior to administration. Body weight was 92.8 kg. Images were evaluated in the axial, sagittal, and coronal planes as well as the rotational whole body MIP. Images were acquired from cranial vertex to feet. UPTAKE WAS MEASURED AT 60 MINUTES. 2. CT: Volumetric acquisition for clinical interpretation of the chest, abdomen, and pelvis acquired at 3 mm sections. The chest, abdomen, and pelvis were evaluated at 5 mm sections in bone, soft tissue, and lung windows. Contrast and Medications: IV contrast: 126 mL of Isovue 370 intravenously. PO contrast: None. Additional Medications: None. 3. 3D MIP and PET-CT fused images were processed on an independent workstation and archived to PACS and reviewed by a radiologist. INDICATION: Lung mass. ADDITIONAL INFORMATION OBTAINED FROM EMR: 68-year-old man with history of new pulmonary nodules on CT 5/30/2024. PET/CT for evaluation. COMPARISON: CT 5/3/2024, 5/16/2023 FINDINGS: BACKGROUND: Liver SUV max = 4.0, Aorta Blood SUV max = 3.1. HEAD/NECK: Pharyngeal mucosal spaces: Nasopharynx and palatine tonsils are within normal limits. Tongue base is normal. Oral tongue and buccal mucosa of the oral cavity is normal. Oropharynx, hypopharynx and larynx are within normal limits. Sinonasal region: Mild scattered mucosal thickening in the paranasal sinuses. No mass within the nasal cavity. Lymph nodes: No FDG avid or abnormally enlarged lymph nodes. Salivary glands: The major salivary glands are within  normal limits. Thyroid gland: Bilateral non-FDG avid hypoattenuating thyroid nodules, including 1.4 cm left thyroid nodule (3/103), previously 1.0 cm on CT 5/16/2023. Vascular structures: The major vasculature of the neck are patent. Bilateral carotid bifurcation atherosclerotic calcifications. Brain: No abnormal FDG avid lesion or abnormal enhancement. CHEST: Lymph nodes: No FDG avid or abnormally enlarged lymph nodes. Calcified mediastinal and left hilar lymph nodes. Lungs: The central tracheobronchial tree is clear. No pneumothorax. Similar trace right pleural effusion and right middle and lower subpleural opacities without significant uptake, favored to be rounded atelectasis. Lingular fibroatelectasis. Upper lobe predominant centrilobular and paraseptal emphysematous changes. Heart and great vessels: Nonspecific focal uptake associated with intraventricular septum (3/149) with SUV max 8.3. Focal thickening of the septum with no abnormal enhancement on CT part. Prominent heart size. No pericardial effusion. Dilated main pulmonary artery measuring 3.7 cm diameter, which can be seen in pulmonary hypertension. Coronary calcifications. Mild diffuse uptake associated with mid to distal esophagus, which can be seen in reflux esophagitis. Small hiatal hernia. ABDOMEN AND PELVIS: Liver: No FDG avid lesion. No intrahepatic or extrahepatic biliary ductal dilatation. Gallbladder is within normal limits. Pancreas: The pancreas is within normal limits. No pancreatic ductal dilatation. Spleen: No FDG avid lesion. Calcified splenic granulomas. Adrenal glands: No FDG avid foci. Kidneys: No FDG avid lesion. Asymmetric right renal atrophy with renal cysts. No hydronephrosis. The urinary bladder is unremarkable. Persistent large left-sided hydrocele. Gastrointestinal system: There is segmental FDG uptake of sigmoid colon where there is wall enhancement and extensive diverticuli. No significant surrounding stranding. Normal caliber  of the small and large bowel. Intense uptake by cecum and ascending colon likely physiologic. Lymph nodes: No FDG avid or abnormally enlarged lymph nodes. Vascular structures: Similar 6.0 x 5.7 cm infrarenal abdominal aortic aneurysm and ectatic bilateral common iliac arteries. Left common iliac venous stent. No free air or fluid. EXTREMITIES: No abnormal FDG uptake in the visualized extremities. BONES AND SOFT TISSUES: No abnormal FDG uptake in the skeleton. No abnormal lytic or blastic osseous lesions. Left-sided rib fracture deformities. Degenerative changes in the spine.   IMPRESSION: 1. No hypermetabolic pulmonary lesion. Similar trace right pleural effusion and right middle and lower subpleural opacities without significant metabolic activity, favored to be rounded atelectasis. 2. Nonspecific interventricular septal hypermetabolic focus. Can consider further evaluation with echocardiogram or cardiac MRI with contrast. 3. Hypermetabolic segmental wall enhancement of the sigmoid colon with extensive diverticuli. No significant adjacent inflammatory stranding. Findings are worrisome for early diverticulitis. 4. Mildly increased size of the 1.4 cm left thyroid nodule with no FDG uptake since CT 5/16/2023. Can consider further evaluation with thyroid ultrasound. 5. Additional chronic findings include infrarenal abdominal aortic aneurysm, ectatic bilateral common iliac arteries, and large left hydrocele. [Consider Follow Up: Interventricular septal focus, Thyroid nodule, Possible early sigmoid diverticulitis] This report will be copied to the [facility] to ensure a provider acknowledges the finding. Access Center is available Monday through Friday 8am-3:30 pm. I have personally reviewed the examination and initial interpretation and I agree with the findings. LITZY PINK MD   SYSTEM ID:  [id number]    CT Chest/Abdomen/Pelvis w Contrast  Result Date: 5/21/2024  Combined Report of: PET and CT on 5/18/2024  9:28 AM: 1. PET of the neck, chest, abdomen, and pelvis. 2. PET CT Fusion for Attenuation Correction and Anatomical Localization. 3. Diagnostic CT of the chest, abdomen and pelvis with intravenous contrast obtained for diagnostic interpretation. 4. 3D MIP and PET-CT fused images were processed on an independent workstation and archived to PACS and reviewed by a radiologist. Technique: 1. PET: The patient received 11.99 mCi of F-18-FDG. The serum glucose was 92 mg/dL prior to administration. Body weight was 92.8 kg. Images were evaluated in the axial, sagittal, and coronal planes as well as the rotational whole body MIP. Images were acquired from cranial vertex to feet. UPTAKE WAS MEASURED AT 60 MINUTES. 2. CT: Volumetric acquisition for clinical interpretation of the chest, abdomen, and pelvis acquired at 3 mm sections. The chest, abdomen, and pelvis were evaluated at 5 mm sections in bone, soft tissue, and lung windows. Contrast and Medications: IV contrast: 126 mL of Isovue 370 intravenously. PO contrast: None. Additional Medications: None. 3. 3D MIP and PET-CT fused images were processed on an independent workstation and archived to PACS and reviewed by a radiologist. INDICATION: Lung mass. ADDITIONAL INFORMATION OBTAINED FROM EMR: 68-year-old man with history of new pulmonary nodules on CT 5/30/2024. PET/CT for evaluation. COMPARISON: CT 5/3/2024, 5/16/2023 FINDINGS: BACKGROUND: Liver SUV max = 4.0, Aorta Blood SUV max = 3.1. HEAD/NECK: Pharyngeal mucosal spaces: Nasopharynx and palatine tonsils are within normal limits. Tongue base is normal. Oral tongue and buccal mucosa of the oral cavity is normal. Oropharynx, hypopharynx and larynx are within normal limits. Sinonasal region: Mild scattered mucosal thickening in the paranasal sinuses. No mass within the nasal cavity. Lymph nodes: No FDG avid or abnormally enlarged lymph nodes. Salivary glands: The major salivary glands are within normal limits. Thyroid  gland: Bilateral non-FDG avid hypoattenuating thyroid nodules, including 1.4 cm left thyroid nodule (3/103), previously 1.0 cm on CT 5/16/2023. Vascular structures: The major vasculature of the neck are patent. Bilateral carotid bifurcation atherosclerotic calcifications. Brain: No abnormal FDG avid lesion or abnormal enhancement. CHEST: Lymph nodes: No FDG avid or abnormally enlarged lymph nodes. Calcified mediastinal and left hilar lymph nodes. Lungs: The central tracheobronchial tree is clear. No pneumothorax. Similar trace right pleural effusion and right middle and lower subpleural opacities without significant uptake, favored to be rounded atelectasis. Lingular fibroatelectasis. Upper lobe predominant centrilobular and paraseptal emphysematous changes. Heart and great vessels: Nonspecific focal uptake associated with intraventricular septum (3/149) with SUV max 8.3. Focal thickening of the septum with no abnormal enhancement on CT part. Prominent heart size. No pericardial effusion. Dilated main pulmonary artery measuring 3.7 cm diameter, which can be seen in pulmonary hypertension. Coronary calcifications. Mild diffuse uptake associated with mid to distal esophagus, which can be seen in reflux esophagitis. Small hiatal hernia. ABDOMEN AND PELVIS: Liver: No FDG avid lesion. No intrahepatic or extrahepatic biliary ductal dilatation. Gallbladder is within normal limits. Pancreas: The pancreas is within normal limits. No pancreatic ductal dilatation. Spleen: No FDG avid lesion. Calcified splenic granulomas. Adrenal glands: No FDG avid foci. Kidneys: No FDG avid lesion. Asymmetric right renal atrophy with renal cysts. No hydronephrosis. The urinary bladder is unremarkable. Persistent large left-sided hydrocele. Gastrointestinal system: There is segmental FDG uptake of sigmoid colon where there is wall enhancement and extensive diverticuli. No significant surrounding stranding. Normal caliber of the small and large  bowel. Intense uptake by cecum and ascending colon likely physiologic. Lymph nodes: No FDG avid or abnormally enlarged lymph nodes. Vascular structures: Similar 6.0 x 5.7 cm infrarenal abdominal aortic aneurysm and ectatic bilateral common iliac arteries. Left common iliac venous stent. No free air or fluid. EXTREMITIES: No abnormal FDG uptake in the visualized extremities. BONES AND SOFT TISSUES: No abnormal FDG uptake in the skeleton. No abnormal lytic or blastic osseous lesions. Left-sided rib fracture deformities. Degenerative changes in the spine.   IMPRESSION: 1. No hypermetabolic pulmonary lesion. Similar trace right pleural effusion and right middle and lower subpleural opacities without significant metabolic activity, favored to be rounded atelectasis. 2. Nonspecific interventricular septal hypermetabolic focus. Can consider further evaluation with echocardiogram or cardiac MRI with contrast. 3. Hypermetabolic segmental wall enhancement of the sigmoid colon with extensive diverticuli. No significant adjacent inflammatory stranding. Findings are worrisome for early diverticulitis. 4. Mildly increased size of the 1.4 cm left thyroid nodule with no FDG uptake since CT 5/16/2023. Can consider further evaluation with thyroid ultrasound. 5. Additional chronic findings include infrarenal abdominal aortic aneurysm, ectatic bilateral common iliac arteries, and large left hydrocele. [Consider Follow Up: Interventricular septal focus, Thyroid nodule, Possible early sigmoid diverticulitis] This report will be copied to the [facility] to ensure a provider acknowledges the finding. Access Center is available Monday through Friday 8am-3:30 pm. I have personally reviewed the examination and initial interpretation and I agree with the findings. LITZY PINK MD   SYSTEM ID:  [id number]    Physical Exam   There were no vitals taken for this visit.    Constitutional: Appears well-developed and well-nourished.  Cooperative. No acute distress.   HENT:   Head: Normocephalic and atraumatic.   Eyes: Conjunctivae are normal.  Neck: Neck supple. No tracheal deviation present.  Cardiovascular: Normal rate and regular rhythm.    Pulmonary/Chest: Effort normal and breath sounds normal.  Abdominal: Exhibits no obvious distension.   Musculoskeletal: Able to move all extremities.  No involuntary motor movements. +upper to lower lumbar TTP and bilateral paraspinal TTP.  +right SI joint TTP.  SLR, Joshua with low back pain.  Right ankle swollen, bilateral ankle TTP, hematoma/ecchymosis present on right lower leg.    Skin: Skin is warm, dry and intact.   Psychiatric: Normal mood and affect. Speech is normal and behavior is normal.    Neurological:  Alert, NAD, and oriented to person, place, and time.   No cranial nerve deficit   Gait: using cane in right arm, right antalgic gait, requires assistance with getting on/off exam table.      Strength (L/R)  5/4+ Hip Flexion - pain limited  5/5 Knee Extension  5/4 knee flexion - pain limited  5/5 Ankle Dorsiflexion  5/5 Extensor Hallucis Longus  5/5 Plantar Flexion    Reflexes (L/R)  2+/2+ Bicep  2+/2+ Brachioradialis  2+/2+ Patellar  2+/2+ Ankle    No ankle clonus    Sensation: reduced sensation in lateral 1/2 of distal leg, dorsum of foot, plantar foot lateral foot on right only.     Review of Systems   See HPI     Past Medical History:   Diagnosis Date    COPD (chronic obstructive pulmonary disease) (H)     Displacement of lumbar intervertebral disc without myelopathy 1995, 2002    HTN, goal below 140/90        Past Surgical History:   Procedure Laterality Date    ENDOVASCULAR REPAIR ANEURYSM AORTOILIAC Bilateral 8/16/2024    Procedure: Endovascular Infrarenal Abdominal Aneurysm Repair with 32x14x 103mm ENDURANT IIs Endograft with bilateral docking limbs ( Left Common Iliac with ENDURANT II Limb [id number] x 20mm x 93mm, Right Common Iliac  with ENDURANT II Limb Graft size 16mm x 16mm x 82mm).  Bilateral ultrasound guided percutaneous access. Aortogram, bilateral selective iliac angiograms, [facility] Beam CT with rotational DS    HC REPAIR ROTATOR CUFF,CHRONIC      IR LOWER EXTREMITY VENOGRAM LEFT  2021    IR OR ANGIOGRAM  2024    IR VENOUS STENT  2021    ZZC DECOMPRESS SPINAL CORD,1 SEG  , 2002    leftL4-L5, 2nd right L4-L5    ZZC SPINE FUSION,ANTER,3 SGMTS  2004    ant and post fusion L4-S1       Social History     Socioeconomic History    Marital status:    Tobacco Use    Smoking status: Former     Current packs/day: 0.00     Average packs/day: 1.3 packs/day for 41.0 years (51.3 ttl pk-yrs)     Types: Cigarettes     Start date: 10/1/1976     Quit date: 10/1/2017     Years since quittin.8     Passive exposure: Never (per pt)    Smokeless tobacco: Never   Vaping Use    Vaping status: Never Used   Substance and Sexual Activity    Alcohol use: Yes     Comment: 12 pack of beer every 2 weeks    Drug use: Yes     Types: Marijuana     Comment: smoke PRN for back pain management    Sexual activity: Not Currently     Partners: Female   Other Topics Concern    Parent/sibling w/ CABG, MI or angioplasty before 65F 55M? No     Social Drivers of Health     Financial Resource Strain: Low Risk  (2025)    Financial Resource Strain     Within the past 12 months, have you or your family members you live with been unable to get utilities (heat, electricity) when it was really needed?: No   Food Insecurity: Unknown (2025)    Food Insecurity     Within the past 12 months, did you worry that your food would run out before you got money to buy more?: Patient declined     Within the past 12 months, did the food you bought just not last and you didn t have money to get more?: Patient declined   Transportation Needs: Low Risk  (2025)    Transportation Needs     Within the past 12 months, has lack of transportation kept you from medical appointments, getting your medicines,  non-medical meetings or appointments, work, or from getting things that you need?: No   Physical Activity: Insufficiently Active (4/16/2025)    Exercise Vital Sign     Days of Exercise per Week: 3 days     Minutes of Exercise per Session: 20 min   Stress: No Stress Concern Present (4/16/2025)    [employer] [place] [employer] - Occupational Stress Questionnaire     Feeling of Stress : Not at all   Social Connections: Unknown (4/16/2025)    Social Connection and Isolation Panel [NHANES]     Frequency of Social Gatherings with Friends and Family: Patient declined   Interpersonal Safety: Low Risk  (4/21/2025)    Interpersonal Safety     Do you feel physically and emotionally safe where you currently live?: Yes     Within the past 12 months, have you been hit, slapped, kicked or otherwise physically hurt by someone?: No     Within the past 12 months, have you been humiliated or emotionally abused in other ways by your partner or ex-partner?: No   Housing Stability: Low Risk  (4/16/2025)    Housing Stability     Do you have housing? : Patient declined     Are you worried about losing your housing?: No       family history includes Family History Negative in an other family member.       Robyn Wood PA-C  Department of Neurosurgery  Office: [phone]

## 2025-07-28 NOTE — LETTER
7/28/2025      Bucky Edgar  [address] [facility] [address]      Dear Colleague,    Thank you for referring your patient, Bucky Edgar, to the [facility] [place]. Please see a copy of my visit note below.      [facility] [place]  [id number] [place] [address] [address] [facility] [address]  Phone: [phone]      Patient:  Bucky Edgar, Date of birth 1955, 69 year old, male  Referring Provider Martin Roberts MD  [address] [place] [id number]    ASSESSMENT & PLAN:  Patient with a mild, subacute L2 seComp fracture on MRI 7/23/25.  Stable on XR today (1 week later).  Patient notes increased pain in back with doing lawn work about 1 month ago.  Pain limited right >left leg function with chronic right leg BTK numbness.  Right ankle swelling/pain new w/ fall 2 weeks ago.      New L2 fracture w/o trauma puts him in the category of having osteoporosis.      -right ankle XR ordered - directed patient to f/u with PCP for this  -lumbar XR in 2 weeks with f/u with me to check stability.  If progresses, will consider bracing or vertebroplasty.    -no lifting more than 10 pounds, avoid bending/twisting activities.  -continue with gabapentin and Flexeril (refilled)  -Referral for bone health with endocrine.  DEXA and vitamin D lab prior to that visit.  Discussed needing to get on medication to help with bone health.        I spent 72 minutes on  the date of the encounter doing patient care, independent review and interpretation of medical records/imaging, reviewing old records.    History of Present Illness:      Referred for   Acute on chronic back pain   History of lumbar fusion   Back muscle spasm   H/O lumbar discectomy     PMH -  lumbar surgeries x3 (2002 rev R L4/5 decomp, 2004 ant/post L3-5 fusion both by Dr. Mcgregor), chronic right foot numbness, iliac stents 2021 (May-Thurner syndrome), LLE DVT 2017 07/28/2025 Visit:  Patient notes his  back pain is worse in the last few weeks.  This pain is in the middle of his upper back.  He fell 2 weeks ago because his back pain was sharp and he dropped.  He twisted his right foot.  His right foot is still swollen and he has been icing it.  He notes a constant aching pain in his back.  The pain can be sharp with certain movements.  He notes persistent right foot n/t since the prior surgery.  He notes right thigh n/t that is newer with his back pain.  He denies b/b problems.      Patient recalls cutting lawn and bending over to get off the riding  and felt a pop.        Conservative Treatment:  Tylenol regular strength - takes 2 as needed  Flexeril 5 mg tid PRN - helps with pain  Prednisone pack x1 - helped to take edge off  Gabapentin 300 mg tid- unable to take more than bid because it causes cognitive issues.    PT - Not started yet, but it has been ordered.  Patient does not feel he can handle doing PT in his current state.        Tobacco use:  No    IMAGING   Imaging independently reviewed.    Lumbar F/E XR 7/28/25 - Findings:  Standing  AP, lateral including flexion extension views of the lumbar  spine were obtained.  5 lumbar type vertebral bodies are assumed for the purpose of this  dictation.  Redemonstration of the mild superior endplate compression fracture at  L2 without evidence of retropulsion. Stable postoperative changes at  L3-4 and L4-5 with intervertebral disc cage with associated bony  fusion.   There is multilevel degenerative changes of the lumbar spine, most  pronounced at L5-S1. Trace retrolisthesis of L2 on L3 without  substantial change with flexion and extension.   Aortobiiliac endograft. The visualized bowel gas pattern is  non-obstructive.  Persistent blunting of the right costophrenic angle with rounded  opacities in the right lower lobe, similar compared to CTA 10/9/2024.  Impression:  1. Redemonstration of the mild superior endplate compression fracture  at L2.  2. Bony  fusion demonstrated at L3-L5.  3. Trace retrolisthesis of L2 on L3 without evidence of dynamic  instability with flexion or extension.  4. Persistent small right pleural effusion with rounded opacities in  the included right lower lobe. Findings are similar compared to CTA  dated 10/9/2024 with differential including rounded atelectasis versus  pneumonia. Clinical correlation recommended.      MR Lumbar Spine w/o Contrast  Result Date: 7/24/2025  EXAM: MR LUMBAR SPINE W/O CONTRAST LOCATION: [facility] DATE: 7/23/2025 INDICATION:  Acute on chronic back pain, Acute on chronic back pain, History of lumbar fusion, Back muscle spasm, H/O lumbar discectomy COMPARISON: MRI 12/15/2018 TECHNIQUE: Routine Lumbar Spine MRI without IV contrast. FINDINGS: Nomenclature is based on 5 lumbar type vertebral bodies. Mild thoracolumbar dextrocurvature centered at L2. No subluxation. Acute to early subacute superior endplate compression fracture at L2, with less than 20% vertebral height loss. No associated retropulsion or involvement of posterior elements. Mild Modic type one endplate edema anteriorly at T12-L1. Solid interbody bony fusion from L3 through L5. Normal distal spinal cord and cauda equina with conus medullaris at T12-L1. Posterior paraspinal muscular atrophy and postoperative changes. Aortobiiliac stent graft with abdominal aortic aneurysm measuring up to 5.1 cm at the L3 level. Unremarkable visualized bony pelvis. T12-L1: Mild loss of disc height and signal. Circumferential annular bulge and ventral bridging osteophyte. Mild facet arthropathy. No spinal canal or neural foraminal stenosis. L1-L2: Disc desiccation. Minor accentuation of disc height due to L2 compression fracture. Slight annular bulge. Mild facet arthropathy. No spinal canal stenosis. Mild bilateral neural foraminal stenosis. L2-L3: Disc desiccation with preserved disc height. Slight annular bulge and shallow right foraminal and  lateral disc protrusion. Mild to moderate facet arthropathy and ligamentum flavum thickening. Mild spinal canal stenosis. Mild bilateral neural foraminal stenosis, greater on the right. L3-L4: Anterior and posterior bony fusion. No significant spinal canal stenosis. Low-grade neural foraminal narrowing bilaterally without interval change. L4-L5: Anterior and posterior bony fusion. No spinal canal stenosis. Unchanged mild bilateral neural foraminal stenosis. L5-S1: Disc desiccation with subtly progressive disc height loss. Slight annular bulge without focal disc herniation. Moderate bilateral facet arthropathy. Mild narrowing of the lateral recesses without central spinal canal stenosis. Mild to moderate right neural foraminal stenosis. Mild left neural foraminal stenosis.   IMPRESSION: 1.  New acute to early subacute superior endplate compression fracture at L2 with less than 20% vertebral height loss. No definite fracture involvement of the posterior elements or significant vertebral retropulsion. 2.  Changes of solid bony fusion from L3 through L5 and mildly progressive lumbar spondylosis elsewhere, including progressive disc degeneration at L5-S1 compared to the 2018 MRI. 3.  At L2-3, mild spinal canal stenosis. No high-grade central spinal canal stenosis elsewhere. 4.  Mild diffuse neural foraminal stenosis throughout the lumbar spine as detailed above, greatest on the right at L5-S1.    CTA Chest Abdomen Pelvis w Contrast  Result Date: 10/9/2024  CTA CHEST ABDOMEN PELVIS  10/9/2024 1:47 PM CLINICAL HISTORY: Abdominal aortic aneurysm. Left iliac venous stent.. Aortobiiliac endograft placed 8/16/2024. COMPARISONS: PET CT 5/18/2024 REFERRING PROVIDER: JOHN RODRIGES C TECHNIQUE: Unenhanced CT chest, abdomen, pelvis. After the uneventful administration of intravenous contrast, EKG gated CTA aortic root. CTA chest, abdomen, pelvis. 90 second delay CT abdomen pelvis. Coronal and sagittal reconstructions were  produced. Lung MIP produced. CONTRAST: 125 mL Isovue 370 DOSE (DLP): 2754 mGy*cm FINDINGS: CTA: Aortobiiliac graft struts extend across the left renal artery origin to L1-L2. Limbs end in their respective common iliac arteries. Endograft patent. No endoleak. Excluded aneurysm sac measures 55 mm in AP diameter in the sagittal reconstruction at L3. Left carotid artery originates immediately adjacent to the right innominate artery. Right innominate, subclavian, and carotid, left carotid, and left subclavian arteries patent. Celiac, superior mesenteric, and bilateral renal arteries patent. Bilateral inferior phrenic arteries originate from the celiac artery. Accessory left hepatic artery originates from the left gastric artery. Inferior mesenteric artery origin occluded. Reconstituted distally by collaterals. Right common, internal, and external iliac arteries patent. Right corona mortis. Distal left common iliac artery plaque ulceration above the bifurcation. Left common, internal, and external iliac arteries patent. Bilateral common femoral arteries patent. Left common iliac venous stent. Intraluminal densities in the non contrast phase. Stent otherwise patent. CT: Right effusion. Lower lobe consolidation. 3 mm right upper lobe nodule. 4 mm left upper lobe nodule. Emphysematous changes. Bilateral thyroid nodules. Calcified splenic granulomas. Subcentimeter renal hypodensities, too small to characterize. Diverticulosis. Left hydrocele. L3-L5 fusion.   IMPRESSION: 1. Aortobiiliac endograft patent. No endoleak. Excluded aneurysm sac measures 55 mm in diameter. 2. Right effusion and lower lobe consolidation. Correlate for pneumonia. 3. Sub 6 mm pulmonary nodules. Per Fleischner Society recommendations, if the patient is at low risk for lung cancer, no further follow up is needed. If the patient is at high risk for lung cancer, optional chest CT could be performed in 12 months for reevaluation. 4. Bilateral thyroid  nodules. If clinically indicated, further evaluation with thyroid ultrasound could be performed. 5. Left common iliac venous stent patent. MARY PADILLA MD   SYSTEM ID:  [id number]    PET Oncology (Eyes to Thighs)  Result Date: 5/21/2024  Combined Report of: PET and CT on 5/18/2024 9:28 AM: 1. PET of the neck, chest, abdomen, and pelvis. 2. PET CT Fusion for Attenuation Correction and Anatomical Localization. 3. Diagnostic CT of the chest, abdomen and pelvis with intravenous contrast obtained for diagnostic interpretation. 4. 3D MIP and PET-CT fused images were processed on an independent workstation and archived to PACS and reviewed by a radiologist. Technique: 1. PET: The patient received 11.99 mCi of F-18-FDG. The serum glucose was 92 mg/dL prior to administration. Body weight was 92.8 kg. Images were evaluated in the axial, sagittal, and coronal planes as well as the rotational whole body MIP. Images were acquired from cranial vertex to feet. UPTAKE WAS MEASURED AT 60 MINUTES. 2. CT: Volumetric acquisition for clinical interpretation of the chest, abdomen, and pelvis acquired at 3 mm sections. The chest, abdomen, and pelvis were evaluated at 5 mm sections in bone, soft tissue, and lung windows. Contrast and Medications: IV contrast: 126 mL of Isovue 370 intravenously. PO contrast: None. Additional Medications: None. 3. 3D MIP and PET-CT fused images were processed on an independent workstation and archived to PACS and reviewed by a radiologist. INDICATION: Lung mass. ADDITIONAL INFORMATION OBTAINED FROM EMR: 68-year-old man with history of new pulmonary nodules on CT 5/30/2024. PET/CT for evaluation. COMPARISON: CT 5/3/2024, 5/16/2023 FINDINGS: BACKGROUND: Liver SUV max = 4.0, Aorta Blood SUV max = 3.1. HEAD/NECK: Pharyngeal mucosal spaces: Nasopharynx and palatine tonsils are within normal limits. Tongue base is normal. Oral tongue and buccal mucosa of the oral cavity is normal. Oropharynx, hypopharynx and larynx  are within normal limits. Sinonasal region: Mild scattered mucosal thickening in the paranasal sinuses. No mass within the nasal cavity. Lymph nodes: No FDG avid or abnormally enlarged lymph nodes. Salivary glands: The major salivary glands are within normal limits. Thyroid gland: Bilateral non-FDG avid hypoattenuating thyroid nodules, including 1.4 cm left thyroid nodule (3/103), previously 1.0 cm on CT 5/16/2023. Vascular structures: The major vasculature of the neck are patent. Bilateral carotid bifurcation atherosclerotic calcifications. Brain: No abnormal FDG avid lesion or abnormal enhancement. CHEST: Lymph nodes: No FDG avid or abnormally enlarged lymph nodes. Calcified mediastinal and left hilar lymph nodes. Lungs: The central tracheobronchial tree is clear. No pneumothorax. Similar trace right pleural effusion and right middle and lower subpleural opacities without significant uptake, favored to be rounded atelectasis. Lingular fibroatelectasis. Upper lobe predominant centrilobular and paraseptal emphysematous changes. Heart and great vessels: Nonspecific focal uptake associated with intraventricular septum (3/149) with SUV max 8.3. Focal thickening of the septum with no abnormal enhancement on CT part. Prominent heart size. No pericardial effusion. Dilated main pulmonary artery measuring 3.7 cm diameter, which can be seen in pulmonary hypertension. Coronary calcifications. Mild diffuse uptake associated with mid to distal esophagus, which can be seen in reflux esophagitis. Small hiatal hernia. ABDOMEN AND PELVIS: Liver: No FDG avid lesion. No intrahepatic or extrahepatic biliary ductal dilatation. Gallbladder is within normal limits. Pancreas: The pancreas is within normal limits. No pancreatic ductal dilatation. Spleen: No FDG avid lesion. Calcified splenic granulomas. Adrenal glands: No FDG avid foci. Kidneys: No FDG avid lesion. Asymmetric right renal atrophy with renal cysts. No hydronephrosis. The  urinary bladder is unremarkable. Persistent large left-sided hydrocele. Gastrointestinal system: There is segmental FDG uptake of sigmoid colon where there is wall enhancement and extensive diverticuli. No significant surrounding stranding. Normal caliber of the small and large bowel. Intense uptake by cecum and ascending colon likely physiologic. Lymph nodes: No FDG avid or abnormally enlarged lymph nodes. Vascular structures: Similar 6.0 x 5.7 cm infrarenal abdominal aortic aneurysm and ectatic bilateral common iliac arteries. Left common iliac venous stent. No free air or fluid. EXTREMITIES: No abnormal FDG uptake in the visualized extremities. BONES AND SOFT TISSUES: No abnormal FDG uptake in the skeleton. No abnormal lytic or blastic osseous lesions. Left-sided rib fracture deformities. Degenerative changes in the spine.   IMPRESSION: 1. No hypermetabolic pulmonary lesion. Similar trace right pleural effusion and right middle and lower subpleural opacities without significant metabolic activity, favored to be rounded atelectasis. 2. Nonspecific interventricular septal hypermetabolic focus. Can consider further evaluation with echocardiogram or cardiac MRI with contrast. 3. Hypermetabolic segmental wall enhancement of the sigmoid colon with extensive diverticuli. No significant adjacent inflammatory stranding. Findings are worrisome for early diverticulitis. 4. Mildly increased size of the 1.4 cm left thyroid nodule with no FDG uptake since CT 5/16/2023. Can consider further evaluation with thyroid ultrasound. 5. Additional chronic findings include infrarenal abdominal aortic aneurysm, ectatic bilateral common iliac arteries, and large left hydrocele. [Consider Follow Up: Interventricular septal focus, Thyroid nodule, Possible early sigmoid diverticulitis] This report will be copied to the [facility] to ensure a provider acknowledges the finding. Access Center is available Monday through Friday  8am-3:30 pm. I have personally reviewed the examination and initial interpretation and I agree with the findings. LITZY PINK MD   SYSTEM ID:  [id number]    CT Chest/Abdomen/Pelvis w Contrast  Result Date: 5/21/2024  Combined Report of: PET and CT on 5/18/2024 9:28 AM: 1. PET of the neck, chest, abdomen, and pelvis. 2. PET CT Fusion for Attenuation Correction and Anatomical Localization. 3. Diagnostic CT of the chest, abdomen and pelvis with intravenous contrast obtained for diagnostic interpretation. 4. 3D MIP and PET-CT fused images were processed on an independent workstation and archived to PACS and reviewed by a radiologist. Technique: 1. PET: The patient received 11.99 mCi of F-18-FDG. The serum glucose was 92 mg/dL prior to administration. Body weight was 92.8 kg. Images were evaluated in the axial, sagittal, and coronal planes as well as the rotational whole body MIP. Images were acquired from cranial vertex to feet. UPTAKE WAS MEASURED AT 60 MINUTES. 2. CT: Volumetric acquisition for clinical interpretation of the chest, abdomen, and pelvis acquired at 3 mm sections. The chest, abdomen, and pelvis were evaluated at 5 mm sections in bone, soft tissue, and lung windows. Contrast and Medications: IV contrast: 126 mL of Isovue 370 intravenously. PO contrast: None. Additional Medications: None. 3. 3D MIP and PET-CT fused images were processed on an independent workstation and archived to PACS and reviewed by a radiologist. INDICATION: Lung mass. ADDITIONAL INFORMATION OBTAINED FROM EMR: 68-year-old man with history of new pulmonary nodules on CT 5/30/2024. PET/CT for evaluation. COMPARISON: CT 5/3/2024, 5/16/2023 FINDINGS: BACKGROUND: Liver SUV max = 4.0, Aorta Blood SUV max = 3.1. HEAD/NECK: Pharyngeal mucosal spaces: Nasopharynx and palatine tonsils are within normal limits. Tongue base is normal. Oral tongue and buccal mucosa of the oral cavity is normal. Oropharynx, hypopharynx and larynx are within normal  limits. Sinonasal region: Mild scattered mucosal thickening in the paranasal sinuses. No mass within the nasal cavity. Lymph nodes: No FDG avid or abnormally enlarged lymph nodes. Salivary glands: The major salivary glands are within normal limits. Thyroid gland: Bilateral non-FDG avid hypoattenuating thyroid nodules, including 1.4 cm left thyroid nodule (3/103), previously 1.0 cm on CT 5/16/2023. Vascular structures: The major vasculature of the neck are patent. Bilateral carotid bifurcation atherosclerotic calcifications. Brain: No abnormal FDG avid lesion or abnormal enhancement. CHEST: Lymph nodes: No FDG avid or abnormally enlarged lymph nodes. Calcified mediastinal and left hilar lymph nodes. Lungs: The central tracheobronchial tree is clear. No pneumothorax. Similar trace right pleural effusion and right middle and lower subpleural opacities without significant uptake, favored to be rounded atelectasis. Lingular fibroatelectasis. Upper lobe predominant centrilobular and paraseptal emphysematous changes. Heart and great vessels: Nonspecific focal uptake associated with intraventricular septum (3/149) with SUV max 8.3. Focal thickening of the septum with no abnormal enhancement on CT part. Prominent heart size. No pericardial effusion. Dilated main pulmonary artery measuring 3.7 cm diameter, which can be seen in pulmonary hypertension. Coronary calcifications. Mild diffuse uptake associated with mid to distal esophagus, which can be seen in reflux esophagitis. Small hiatal hernia. ABDOMEN AND PELVIS: Liver: No FDG avid lesion. No intrahepatic or extrahepatic biliary ductal dilatation. Gallbladder is within normal limits. Pancreas: The pancreas is within normal limits. No pancreatic ductal dilatation. Spleen: No FDG avid lesion. Calcified splenic granulomas. Adrenal glands: No FDG avid foci. Kidneys: No FDG avid lesion. Asymmetric right renal atrophy with renal cysts. No hydronephrosis. The urinary bladder is  unremarkable. Persistent large left-sided hydrocele. Gastrointestinal system: There is segmental FDG uptake of sigmoid colon where there is wall enhancement and extensive diverticuli. No significant surrounding stranding. Normal caliber of the small and large bowel. Intense uptake by cecum and ascending colon likely physiologic. Lymph nodes: No FDG avid or abnormally enlarged lymph nodes. Vascular structures: Similar 6.0 x 5.7 cm infrarenal abdominal aortic aneurysm and ectatic bilateral common iliac arteries. Left common iliac venous stent. No free air or fluid. EXTREMITIES: No abnormal FDG uptake in the visualized extremities. BONES AND SOFT TISSUES: No abnormal FDG uptake in the skeleton. No abnormal lytic or blastic osseous lesions. Left-sided rib fracture deformities. Degenerative changes in the spine.   IMPRESSION: 1. No hypermetabolic pulmonary lesion. Similar trace right pleural effusion and right middle and lower subpleural opacities without significant metabolic activity, favored to be rounded atelectasis. 2. Nonspecific interventricular septal hypermetabolic focus. Can consider further evaluation with echocardiogram or cardiac MRI with contrast. 3. Hypermetabolic segmental wall enhancement of the sigmoid colon with extensive diverticuli. No significant adjacent inflammatory stranding. Findings are worrisome for early diverticulitis. 4. Mildly increased size of the 1.4 cm left thyroid nodule with no FDG uptake since CT 5/16/2023. Can consider further evaluation with thyroid ultrasound. 5. Additional chronic findings include infrarenal abdominal aortic aneurysm, ectatic bilateral common iliac arteries, and large left hydrocele. [Consider Follow Up: Interventricular septal focus, Thyroid nodule, Possible early sigmoid diverticulitis] This report will be copied to the [facility] to ensure a provider acknowledges the finding. Access Center is available Monday through Friday 8am-3:30 pm. I have  personally reviewed the examination and initial interpretation and I agree with the findings. LITZY PINK MD   SYSTEM ID:  [id number]    Physical Exam   There were no vitals taken for this visit.    Constitutional: Appears well-developed and well-nourished. Cooperative. No acute distress.   HENT:   Head: Normocephalic and atraumatic.   Eyes: Conjunctivae are normal.  Neck: Neck supple. No tracheal deviation present.  Cardiovascular: Normal rate and regular rhythm.    Pulmonary/Chest: Effort normal and breath sounds normal.  Abdominal: Exhibits no obvious distension.   Musculoskeletal: Able to move all extremities.  No involuntary motor movements. +upper to lower lumbar TTP and bilateral paraspinal TTP.  +right SI joint TTP.  SLR, Joshua with low back pain.  Right ankle swollen, bilateral ankle TTP, hematoma/ecchymosis present on right lower leg.    Skin: Skin is warm, dry and intact.   Psychiatric: Normal mood and affect. Speech is normal and behavior is normal.    Neurological:  Alert, NAD, and oriented to person, place, and time.   No cranial nerve deficit   Gait: using cane in right arm, right antalgic gait, requires assistance with getting on/off exam table.      Strength (L/R)  5/4+ Hip Flexion - pain limited  5/5 Knee Extension  5/4 knee flexion - pain limited  5/5 Ankle Dorsiflexion  5/5 Extensor Hallucis Longus  5/5 Plantar Flexion    Reflexes (L/R)  2+/2+ Bicep  2+/2+ Brachioradialis  2+/2+ Patellar  2+/2+ Ankle    No ankle clonus    Sensation: reduced sensation in lateral 1/2 of distal leg, dorsum of foot, plantar foot lateral foot on right only.     Review of Systems   See HPI     Past Medical History:   Diagnosis Date     COPD (chronic obstructive pulmonary disease) (H)      Displacement of lumbar intervertebral disc without myelopathy 1995, 2002     HTN, goal below 140/90        Past Surgical History:   Procedure Laterality Date     ENDOVASCULAR REPAIR ANEURYSM AORTOILIAC Bilateral 8/16/2024    Procedure:  Endovascular Infrarenal Abdominal Aneurysm Repair with 32x14x 103mm ENDURANT IIs Endograft with bilateral docking limbs ( Left Common Iliac with ENDURANT II Limb [id number] x 20mm x 93mm, Right Common Iliac  with ENDURANT II Limb Graft size 16mm x 16mm x 82mm). Bilateral ultrasound guided percutaneous access. Aortogram, bilateral selective iliac angiograms, [facility] Beam CT with rotational DS     HC REPAIR ROTATOR CUFF,CHRONIC       IR LOWER EXTREMITY VENOGRAM LEFT  2021     IR OR ANGIOGRAM  2024     IR VENOUS STENT  2021     ZZC DECOMPRESS SPINAL CORD,1 SEG  , 2002    leftL4-L5, 2nd right L4-L5     ZZC SPINE FUSION,ANTER,3 SGMTS  2004    ant and post fusion L4-S1       Social History     Socioeconomic History     Marital status:    Tobacco Use     Smoking status: Former     Current packs/day: 0.00     Average packs/day: 1.3 packs/day for 41.0 years (51.3 ttl pk-yrs)     Types: Cigarettes     Start date: 10/1/1976     Quit date: 10/1/2017     Years since quittin.8     Passive exposure: Never (per pt)     Smokeless tobacco: Never   Vaping Use     Vaping status: Never Used   Substance and Sexual Activity     Alcohol use: Yes     Comment: 12 pack of beer every 2 weeks     Drug use: Yes     Types: Marijuana     Comment: smoke PRN for back pain management     Sexual activity: Not Currently     Partners: Female   Other Topics Concern     Parent/sibling w/ CABG, MI or angioplasty before 65F 55M? No     Social Drivers of Health     Financial Resource Strain: Low Risk  (2025)    Financial Resource Strain      Within the past 12 months, have you or your family members you live with been unable to get utilities (heat, electricity) when it was really needed?: No   Food Insecurity: Unknown (2025)    Food Insecurity      Within the past 12 months, did you worry that your food would run out before you got money to buy more?: Patient declined      Within the past 12 months, did the food  you bought just not last and you didn t have money to get more?: Patient declined   Transportation Needs: Low Risk  (4/16/2025)    Transportation Needs      Within the past 12 months, has lack of transportation kept you from medical appointments, getting your medicines, non-medical meetings or appointments, work, or from getting things that you need?: No   Physical Activity: Insufficiently Active (4/16/2025)    Exercise Vital Sign      Days of Exercise per Week: 3 days      Minutes of Exercise per Session: 20 min   Stress: No Stress Concern Present (4/16/2025)    [employer] - Occupational Stress Questionnaire      Feeling of Stress : Not at all   Social Connections: Unknown (4/16/2025)    Social Connection and Isolation Panel [NHANES]      Frequency of Social Gatherings with Friends and Family: Patient declined   Interpersonal Safety: Low Risk  (4/21/2025)    Interpersonal Safety      Do you feel physically and emotionally safe where you currently live?: Yes      Within the past 12 months, have you been hit, slapped, kicked or otherwise physically hurt by someone?: No      Within the past 12 months, have you been humiliated or emotionally abused in other ways by your partner or ex-partner?: No   Housing Stability: Low Risk  (4/16/2025)    Housing Stability      Do you have housing? : Patient declined      Are you worried about losing your housing?: No       family history includes Family History Negative in an other family member.       Robyn Wood PA-C  Department of Neurosurgery  Office: [phone]        Again, thank you for allowing me to participate in the care of your patient.        Sincerely,        Robyn Wood PA-C    Electronically signed

## 2025-07-28 NOTE — PATIENT INSTRUCTIONS
Follow up in 2 weeks with Lumbar XR for L2 fracture.    Right ankle XR when able and follow up with PCP for ankle pain and swelling.    Flexeril refilled.    Referral placed to endocrinology for bone health visit.  Please have DEXA scan and vitamin D lab done ahead of this visit.      No lifting more than 10 pounds.  Avoid bending/twisting activities.

## 2025-07-28 NOTE — TELEPHONE ENCOUNTER
REASON FOR VISIT: Provider type: Neurosurgery spine ROBBY, Urgency: AFTER lumbar    DATE OF APPT: 7/28/2025   NOTES (FOR ALL VISITS) STATUS DETAILS   OFFICE NOTE from referring provider Internal [facility] Martin Tong MD 7/08/2025   MEDICATION LIST N/A    IMAGING  (FOR ALL VISITS)     XR Internal [facility]  XR Lumbar spine 7/28/2025   MRI (HEAD, NECK, SPINE) Internal [facility]  MR Lumbar spine 7/23/2025   CT (HEAD, NECK, SPINE) N/A

## 2025-07-29 ENCOUNTER — ANCILLARY PROCEDURE (OUTPATIENT)
Dept: GENERAL RADIOLOGY | Facility: OTHER | Age: 70
End: 2025-07-29
Attending: PHYSICIAN ASSISTANT
Payer: COMMERCIAL

## 2025-07-29 ENCOUNTER — PATIENT OUTREACH (OUTPATIENT)
Dept: CARE COORDINATION | Facility: CLINIC | Age: 70
End: 2025-07-29

## 2025-07-29 DIAGNOSIS — M25.571 PAIN AND SWELLING OF RIGHT ANKLE: ICD-10-CM

## 2025-07-29 DIAGNOSIS — M25.471 PAIN AND SWELLING OF RIGHT ANKLE: ICD-10-CM

## 2025-07-29 PROCEDURE — 73600 X-RAY EXAM OF ANKLE: CPT | Mod: TC | Performed by: STUDENT IN AN ORGANIZED HEALTH CARE EDUCATION/TRAINING PROGRAM

## 2025-07-31 ENCOUNTER — PATIENT OUTREACH (OUTPATIENT)
Dept: CARE COORDINATION | Facility: CLINIC | Age: 70
End: 2025-07-31

## 2025-07-31 ENCOUNTER — OFFICE VISIT (OUTPATIENT)
Dept: FAMILY MEDICINE | Facility: OTHER | Age: 70
End: 2025-07-31
Payer: COMMERCIAL

## 2025-07-31 VITALS
HEIGHT: 69 IN | TEMPERATURE: 96.9 F | SYSTOLIC BLOOD PRESSURE: 102 MMHG | WEIGHT: 202.5 LBS | DIASTOLIC BLOOD PRESSURE: 70 MMHG | RESPIRATION RATE: 20 BRPM | BODY MASS INDEX: 29.99 KG/M2 | OXYGEN SATURATION: 95 % | HEART RATE: 88 BPM

## 2025-07-31 DIAGNOSIS — M80.00XA OSTEOPOROSIS WITH CURRENT PATHOLOGICAL FRACTURE, UNSPECIFIED OSTEOPOROSIS TYPE, INITIAL ENCOUNTER: ICD-10-CM

## 2025-07-31 DIAGNOSIS — S82.831A OTHER CLOSED FRACTURE OF DISTAL END OF RIGHT FIBULA, INITIAL ENCOUNTER: Primary | ICD-10-CM

## 2025-07-31 DIAGNOSIS — S32.020A CLOSED COMPRESSION FRACTURE OF L2 LUMBAR VERTEBRA, INITIAL ENCOUNTER (H): ICD-10-CM

## 2025-07-31 DIAGNOSIS — M85.9 LOW BONE DENSITY: ICD-10-CM

## 2025-07-31 DIAGNOSIS — M80.08XA AGE-RELATED OSTEOPOROSIS WITH CURRENT PATHOLOGICAL FRACTURE, VERTEBRA(E), INITIAL ENCOUNTER FOR FRACTURE (H): ICD-10-CM

## 2025-07-31 DIAGNOSIS — R79.89 ELEVATED SERUM CREATININE: ICD-10-CM

## 2025-07-31 LAB
ANION GAP SERPL CALCULATED.3IONS-SCNC: 9 MMOL/L (ref 7–15)
BUN SERPL-MCNC: 14 MG/DL (ref 8–23)
CALCIUM SERPL-MCNC: 9.1 MG/DL (ref 8.8–10.4)
CHLORIDE SERPL-SCNC: 105 MMOL/L (ref 98–107)
CREAT SERPL-MCNC: 1.33 MG/DL (ref 0.67–1.17)
EGFRCR SERPLBLD CKD-EPI 2021: 59 ML/MIN/1.73M2
GLUCOSE SERPL-MCNC: 104 MG/DL (ref 70–99)
HCO3 SERPL-SCNC: 26 MMOL/L (ref 22–29)
POTASSIUM SERPL-SCNC: 4.9 MMOL/L (ref 3.4–5.3)
SODIUM SERPL-SCNC: 140 MMOL/L (ref 135–145)
VIT D+METAB SERPL-MCNC: 13 NG/ML (ref 20–50)

## 2025-07-31 ASSESSMENT — PAIN SCALES - GENERAL: PAINLEVEL_OUTOF10: SEVERE PAIN (8)

## 2025-07-31 NOTE — PROGRESS NOTES
Assessment & Plan       ICD-10-CM    1. Other closed fracture of distal end of right fibula, initial encounter  S82.831A Orthopedic  Referral      2. Elevated serum creatinine  R79.89 Basic metabolic panel  (Ca, Cl, CO2, Creat, Gluc, K, Na, BUN)     Basic metabolic panel  (Ca, Cl, CO2, Creat, Gluc, K, Na, BUN)      3. Low bone density  M85.9 calcium carbonate-vitamin D (OSCAL) 500-5 MG-MCG tablet      4. Closed compression fracture of L2 lumbar vertebra, initial encounter (H)  S32.020A Vitamin D Deficiency Screening      5. Osteoporosis with current pathological fracture, unspecified osteoporosis type, initial encounter  M80.00XA Vitamin D Deficiency Screening      6. Age-related osteoporosis with current pathological fracture, vertebra(e), initial encounter for fracture (H)  M80.08XA Vitamin D Deficiency Screening          1. Right distal fibular ankle fracture approximately 3 weeks ago. He has unfortunately been weight bearing since the injury as he was not seen right away. I have provided a tall walking boot today and recommend no weight bearing. He should be using his crutches when walking and should keep the boot on at all times except showering. I recommend frequent elevation, ice and Tylenol. I recommend he ideally avoid driving. His sister is coming this weekend and can help with driving. Will refer to podiatry for follow-up to ensure proper healing.     2. Will recheck a BMP today. I recommend improved hydration, at least 60 oz of water daily.    3-6. I recommend he start calcium + vitamin D given his recent L2 compression fracture likely indicating osteoporosis. Will check vitamin D level and he has a DEXA scan and rheumatology visit already ordered.      David Guevara is a 69 year old, presenting for the following health issues:  RECHECK (Right foot)      7/31/2025     8:32 AM   Additional Questions   Roomed by Yuli OLIVEIRA     History of Present Illness       Reason for visit:  Ankle  "injury  Symptom onset:  3-4 weeks ago  Symptoms include:  Pain in ankle  Symptom intensity:  Severe  Symptom progression:  Staying the same  Had these symptoms before:  No  What makes it worse:  No  What makes it better:  Medicine prescribed   He is taking medications regularly.        He was recently seen for low back pain without an obvious injury and was found to have an L2 compression fracture. He saw a neurosurgeon earlier this week and had mentioned a right ankle injury he sustained 3 weeks ago. He was lifting a microwave and his ankle got caught under the ledge of a cabinet and he twisted the ankle. He had pain and it started to swell more as the weeks progressed. He finally had an x-ray with the neurosurgeon which revealed a mildly displaced, oblique, distal right fibular fracture. He has been weight bearing since the injury but is using a cane. He has crutches at home but has not been using them. He describe swelling over the lateral and anterior ankle. He is taking Tylenol, gabapentin and Flexeril for his back pain which is also helping the ankle pain.       Review of Systems  Constitutional,  cardiovascular, pulmonary, musculoskeletal, neuro systems are negative, except as otherwise noted.      Objective    /70   Pulse 88   Temp 96.9  F (36.1  C) (Temporal)   Resp 20   Ht 1.753 m (5' 9.02\")   Wt 91.9 kg (202 lb 8 oz)   SpO2 95%   BMI 29.89 kg/m    Body mass index is 29.89 kg/m .  Physical Exam   GENERAL: alert and no distress  MS: swelling over the right lateral malleolus and anterior right ankle with tenderness in these areas.   NEURO: Normal strength and tone, mentation intact and speech normal. Gait is antalgic.      XR Right Ankle 7/29/25    IMPRESSION: Acute minimally displaced oblique fracture of the distal fibula. Soft tissue swelling along the lateral ankle. Intact ankle mortise and distal syndesmosis. Joint spaces are maintained. Plantar calcaneal heel spur. Distal Achilles "   enthesophyte.    Signed Electronically by: Hamlet Roberts PA-C

## 2025-07-31 NOTE — PATIENT INSTRUCTIONS
I recommend non-weight bearing for at least 4 weeks.    Keep the leg in the boot at all times except for showering.    Elevate and ice as able.    Continue with Tylenol and the muscle relaxer.    Start a calcium + vitamin D supplement.

## 2025-08-06 ENCOUNTER — OFFICE VISIT (OUTPATIENT)
Dept: PODIATRY | Facility: CLINIC | Age: 70
End: 2025-08-06
Payer: COMMERCIAL

## 2025-08-06 VITALS — BODY MASS INDEX: 29.99 KG/M2 | HEIGHT: 69 IN | WEIGHT: 202.5 LBS

## 2025-08-06 DIAGNOSIS — S82.831A OTHER CLOSED FRACTURE OF DISTAL END OF RIGHT FIBULA, INITIAL ENCOUNTER: ICD-10-CM

## 2025-08-06 PROCEDURE — 1125F AMNT PAIN NOTED PAIN PRSNT: CPT | Performed by: PODIATRIST

## 2025-08-06 PROCEDURE — 99203 OFFICE O/P NEW LOW 30 MIN: CPT | Performed by: PODIATRIST

## 2025-08-06 ASSESSMENT — PAIN SCALES - GENERAL: PAINLEVEL_OUTOF10: MODERATE PAIN (6)

## 2025-08-11 ENCOUNTER — ANCILLARY PROCEDURE (OUTPATIENT)
Dept: GENERAL RADIOLOGY | Facility: CLINIC | Age: 70
End: 2025-08-11
Attending: PHYSICIAN ASSISTANT
Payer: COMMERCIAL

## 2025-08-11 ENCOUNTER — OFFICE VISIT (OUTPATIENT)
Dept: NEUROSURGERY | Facility: CLINIC | Age: 70
End: 2025-08-11
Payer: COMMERCIAL

## 2025-08-11 VITALS
HEART RATE: 62 BPM | WEIGHT: 199.3 LBS | BODY MASS INDEX: 28.53 KG/M2 | DIASTOLIC BLOOD PRESSURE: 75 MMHG | HEIGHT: 70 IN | SYSTOLIC BLOOD PRESSURE: 122 MMHG

## 2025-08-11 DIAGNOSIS — S32.020D COMPRESSION FRACTURE OF L2, WITH ROUTINE HEALING, SUBSEQUENT ENCOUNTER: Primary | ICD-10-CM

## 2025-08-11 DIAGNOSIS — M80.00XD AGE-RELATED OSTEOPOROSIS WITH CURRENT PATHOLOGICAL FRACTURE WITH ROUTINE HEALING, SUBSEQUENT ENCOUNTER: ICD-10-CM

## 2025-08-11 DIAGNOSIS — G89.29 ACUTE ON CHRONIC BACK PAIN: ICD-10-CM

## 2025-08-11 DIAGNOSIS — M54.9 ACUTE ON CHRONIC BACK PAIN: ICD-10-CM

## 2025-08-11 DIAGNOSIS — S32.020A CLOSED COMPRESSION FRACTURE OF L2 LUMBAR VERTEBRA, INITIAL ENCOUNTER (H): ICD-10-CM

## 2025-08-11 PROCEDURE — 99213 OFFICE O/P EST LOW 20 MIN: CPT | Performed by: PHYSICIAN ASSISTANT

## 2025-08-11 PROCEDURE — 1125F AMNT PAIN NOTED PAIN PRSNT: CPT | Performed by: PHYSICIAN ASSISTANT

## 2025-08-11 PROCEDURE — 3078F DIAST BP <80 MM HG: CPT | Performed by: PHYSICIAN ASSISTANT

## 2025-08-11 PROCEDURE — 3074F SYST BP LT 130 MM HG: CPT | Performed by: PHYSICIAN ASSISTANT

## 2025-08-11 ASSESSMENT — PAIN SCALES - GENERAL: PAINLEVEL_OUTOF10: MODERATE PAIN (6)

## 2025-08-13 ENCOUNTER — VIRTUAL VISIT (OUTPATIENT)
Dept: PHARMACY | Facility: CLINIC | Age: 70
End: 2025-08-13
Payer: COMMERCIAL

## 2025-08-13 DIAGNOSIS — Z86.718 HISTORY OF DEEP VENOUS THROMBOSIS (DVT) OF DISTAL VEIN OF LEFT LOWER EXTREMITY: ICD-10-CM

## 2025-08-13 DIAGNOSIS — E55.9 VITAMIN D DEFICIENCY: ICD-10-CM

## 2025-08-13 DIAGNOSIS — I10 HTN, GOAL BELOW 140/90: Primary | ICD-10-CM

## 2025-08-13 DIAGNOSIS — I82.402 ACUTE DEEP VEIN THROMBOSIS (DVT) OF LEFT LOWER EXTREMITY (H): ICD-10-CM

## 2025-08-13 DIAGNOSIS — I27.20 PULMONARY HYPERTENSION (H): ICD-10-CM

## 2025-08-13 DIAGNOSIS — M10.9 GOUT: ICD-10-CM

## 2025-08-13 DIAGNOSIS — G89.21 CHRONIC PAIN DUE TO TRAUMA: ICD-10-CM

## 2025-08-13 DIAGNOSIS — I50.30 HEART FAILURE WITH PRESERVED EJECTION FRACTION (H): ICD-10-CM

## 2025-08-13 DIAGNOSIS — Z13.6 CARDIOVASCULAR SCREENING; LDL GOAL LESS THAN 130: ICD-10-CM

## 2025-08-13 DIAGNOSIS — N18.31 CHRONIC KIDNEY DISEASE, STAGE 3A (H): ICD-10-CM

## 2025-08-13 DIAGNOSIS — J44.89 CHRONIC BRONCHITIS WITH COPD (CHRONIC OBSTRUCTIVE PULMONARY DISEASE) (H): ICD-10-CM

## 2025-08-13 PROCEDURE — 99605 MTMS BY PHARM NP 15 MIN: CPT | Mod: 93 | Performed by: PHARMACIST

## 2025-08-13 PROCEDURE — 99607 MTMS BY PHARM ADDL 15 MIN: CPT | Mod: 93 | Performed by: PHARMACIST

## 2025-08-13 RX ORDER — FUROSEMIDE 20 MG/1
20 TABLET ORAL EVERY OTHER DAY
Qty: 45 TABLET | Refills: 2 | Status: SHIPPED | OUTPATIENT
Start: 2025-08-13

## 2025-08-13 RX ORDER — FAMOTIDINE 20 MG
75 TABLET ORAL DAILY
Qty: 300 CAPSULE | Refills: 1 | Status: SHIPPED | OUTPATIENT
Start: 2025-08-13

## 2025-08-27 ENCOUNTER — OFFICE VISIT (OUTPATIENT)
Dept: PODIATRY | Facility: CLINIC | Age: 70
End: 2025-08-27
Payer: COMMERCIAL

## 2025-08-27 ENCOUNTER — ANCILLARY PROCEDURE (OUTPATIENT)
Dept: GENERAL RADIOLOGY | Facility: CLINIC | Age: 70
End: 2025-08-27
Attending: PODIATRIST
Payer: COMMERCIAL

## 2025-08-27 VITALS — WEIGHT: 199 LBS | BODY MASS INDEX: 28.49 KG/M2 | HEIGHT: 70 IN

## 2025-08-27 DIAGNOSIS — S82.831A OTHER CLOSED FRACTURE OF DISTAL END OF RIGHT FIBULA, INITIAL ENCOUNTER: ICD-10-CM

## 2025-08-27 DIAGNOSIS — S82.831A OTHER CLOSED FRACTURE OF DISTAL END OF RIGHT FIBULA, INITIAL ENCOUNTER: Primary | ICD-10-CM

## 2025-08-27 PROCEDURE — 73610 X-RAY EXAM OF ANKLE: CPT | Mod: TC | Performed by: RADIOLOGY

## 2025-08-27 PROCEDURE — 1125F AMNT PAIN NOTED PAIN PRSNT: CPT | Performed by: PODIATRIST

## 2025-08-27 PROCEDURE — 99213 OFFICE O/P EST LOW 20 MIN: CPT | Performed by: PODIATRIST

## 2025-08-27 ASSESSMENT — PAIN SCALES - GENERAL: PAINLEVEL_OUTOF10: MODERATE PAIN (5)

## 2025-08-28 ENCOUNTER — PATIENT OUTREACH (OUTPATIENT)
Dept: CARE COORDINATION | Facility: CLINIC | Age: 70
End: 2025-08-28
Payer: COMMERCIAL

## (undated) DEVICE — SHEATH INTRODUCER DRYSEAL FLEX W/HYDRO CT 16FRX33CM DSF1633

## (undated) DEVICE — Device

## (undated) DEVICE — KIT MICRO-INTRODUCER STIFFEN 4FR 7274V

## (undated) DEVICE — BASIN EMESIS STERILE  SSK9005A

## (undated) DEVICE — INTRO SHEATH BRITE TIP 6FRX11CM 401-611M

## (undated) DEVICE — GLOVE BIOGEL PI MICRO SZ 8.0 48580

## (undated) DEVICE — STRAP UNIVERSAL POSITIONING 2-PIECE 4X47X76" 91-287

## (undated) DEVICE — CLOSURE DEVICE 6FR VASC PROGLIDE MEDICATED SUTURE 12673-03

## (undated) DEVICE — PREP CHLORAPREP 26ML TINTED HI-LITE ORANGE 930815

## (undated) DEVICE — LINEN TOWEL PACK X30 5481

## (undated) DEVICE — PACK GOWN 3/PK DISP XL SBA32GPFCB

## (undated) DEVICE — DEVICE MULTI TORQUE TD01

## (undated) DEVICE — CATH TRAY FOLEY SURESTEP 16FR W/URNE MTR STLK LATEX A303316A

## (undated) DEVICE — TUBING MEDRAD 48" HIGH PRESSURE MX694

## (undated) DEVICE — SU MONOCRYL 4-0 PS-2 27" UND Y426H

## (undated) DEVICE — SU DERMABOND ADVANCED .7ML DNX12

## (undated) DEVICE — COVER ULTRASOUND PROBE W/GEL FLEXI-FEEL 6"X58" LF  25-FF658

## (undated) DEVICE — DRAPE MAYO STAND 23X54 8337

## (undated) DEVICE — WIPES FOLEY CARE SURESTEP PROVON DFC100

## (undated) DEVICE — WIRE GUIDE LUNDERQUIST .035X260CM G31453

## (undated) DEVICE — TOURNIQUET VASCULAR KIT 7 1/2" 79012

## (undated) DEVICE — DRAPE CVARTS 60"X100" 89454

## (undated) DEVICE — CATH ANGIO KUMPE SOFT-VU 5FRX65CM CVD H787107327015

## (undated) DEVICE — GLIDEWIRE TERUMO RADIOFOCUS .035X260CM ANG EXCHANGE GR3509

## (undated) DEVICE — CATH BAL CODA OCCLUSION 9FR 32MMX120CM G03831

## (undated) DEVICE — INTRO TERUMO 8FRX25CM W/MARKER RSB803

## (undated) DEVICE — TUBING PRESSURE 30"

## (undated) DEVICE — DRAPE IOBAN INCISE 23X17" 6650EZ

## (undated) DEVICE — SYR 10ML LL W/O NDL 302995

## (undated) DEVICE — GOWN IMPERVIOUS BREATHABLE SMART XLG 89045

## (undated) RX ORDER — DEXAMETHASONE SODIUM PHOSPHATE 4 MG/ML
INJECTION, SOLUTION INTRA-ARTICULAR; INTRALESIONAL; INTRAMUSCULAR; INTRAVENOUS; SOFT TISSUE
Status: DISPENSED
Start: 2024-08-16

## (undated) RX ORDER — SODIUM CHLORIDE, SODIUM LACTATE, POTASSIUM CHLORIDE, CALCIUM CHLORIDE 600; 310; 30; 20 MG/100ML; MG/100ML; MG/100ML; MG/100ML
INJECTION, SOLUTION INTRAVENOUS
Status: DISPENSED
Start: 2024-08-16

## (undated) RX ORDER — HYDRALAZINE HYDROCHLORIDE 20 MG/ML
INJECTION INTRAMUSCULAR; INTRAVENOUS
Status: DISPENSED
Start: 2021-05-04

## (undated) RX ORDER — FENTANYL CITRATE 50 UG/ML
INJECTION, SOLUTION INTRAMUSCULAR; INTRAVENOUS
Status: DISPENSED
Start: 2021-05-04

## (undated) RX ORDER — LIDOCAINE HYDROCHLORIDE 10 MG/ML
INJECTION, SOLUTION EPIDURAL; INFILTRATION; INTRACAUDAL; PERINEURAL
Status: DISPENSED
Start: 2021-05-04

## (undated) RX ORDER — HEPARIN SODIUM 1000 [USP'U]/ML
INJECTION, SOLUTION INTRAVENOUS; SUBCUTANEOUS
Status: DISPENSED
Start: 2024-08-16

## (undated) RX ORDER — PROPOFOL 10 MG/ML
INJECTION, EMULSION INTRAVENOUS
Status: DISPENSED
Start: 2024-08-16

## (undated) RX ORDER — HEPARIN SODIUM 1000 [USP'U]/ML
INJECTION, SOLUTION INTRAVENOUS; SUBCUTANEOUS
Status: DISPENSED
Start: 2021-05-04

## (undated) RX ORDER — IODIXANOL 320 MG/ML
INJECTION, SOLUTION INTRAVASCULAR
Status: DISPENSED
Start: 2024-08-16

## (undated) RX ORDER — ESMOLOL HYDROCHLORIDE 10 MG/ML
INJECTION INTRAVENOUS
Status: DISPENSED
Start: 2024-08-16

## (undated) RX ORDER — SODIUM CHLORIDE 9 MG/ML
INJECTION, SOLUTION INTRAVENOUS
Status: DISPENSED
Start: 2021-05-04

## (undated) RX ORDER — OXYCODONE AND ACETAMINOPHEN 5; 325 MG/1; MG/1
TABLET ORAL
Status: DISPENSED
Start: 2021-05-04

## (undated) RX ORDER — HYDRALAZINE HYDROCHLORIDE 20 MG/ML
INJECTION INTRAMUSCULAR; INTRAVENOUS
Status: DISPENSED
Start: 2024-08-16

## (undated) RX ORDER — GLYCOPYRROLATE 0.2 MG/ML
INJECTION, SOLUTION INTRAMUSCULAR; INTRAVENOUS
Status: DISPENSED
Start: 2024-08-16

## (undated) RX ORDER — ONDANSETRON 2 MG/ML
INJECTION INTRAMUSCULAR; INTRAVENOUS
Status: DISPENSED
Start: 2024-08-16

## (undated) RX ORDER — KETOROLAC TROMETHAMINE 30 MG/ML
INJECTION, SOLUTION INTRAMUSCULAR; INTRAVENOUS
Status: DISPENSED
Start: 2024-08-16

## (undated) RX ORDER — PHENYLEPHRINE HCL IN 0.9% NACL 50MG/250ML
PLASTIC BAG, INJECTION (ML) INTRAVENOUS
Status: DISPENSED
Start: 2024-08-16

## (undated) RX ORDER — IPRATROPIUM BROMIDE AND ALBUTEROL SULFATE 2.5; .5 MG/3ML; MG/3ML
SOLUTION RESPIRATORY (INHALATION)
Status: DISPENSED
Start: 2024-08-16

## (undated) RX ORDER — CEFAZOLIN SODIUM/WATER 2 G/20 ML
SYRINGE (ML) INTRAVENOUS
Status: DISPENSED
Start: 2024-08-16

## (undated) RX ORDER — ACETAMINOPHEN 325 MG/1
TABLET ORAL
Status: DISPENSED
Start: 2024-08-16

## (undated) RX ORDER — HYDROMORPHONE HCL IN WATER/PF 6 MG/30 ML
PATIENT CONTROLLED ANALGESIA SYRINGE INTRAVENOUS
Status: DISPENSED
Start: 2021-05-04

## (undated) RX ORDER — FENTANYL CITRATE 50 UG/ML
INJECTION, SOLUTION INTRAMUSCULAR; INTRAVENOUS
Status: DISPENSED
Start: 2024-08-16

## (undated) RX ORDER — HYDROMORPHONE HYDROCHLORIDE 1 MG/ML
INJECTION, SOLUTION INTRAMUSCULAR; INTRAVENOUS; SUBCUTANEOUS
Status: DISPENSED
Start: 2024-08-16

## (undated) RX ORDER — LABETALOL HYDROCHLORIDE 5 MG/ML
INJECTION, SOLUTION INTRAVENOUS
Status: DISPENSED
Start: 2024-08-16